# Patient Record
Sex: MALE | Race: WHITE | Employment: OTHER | URBAN - METROPOLITAN AREA
[De-identification: names, ages, dates, MRNs, and addresses within clinical notes are randomized per-mention and may not be internally consistent; named-entity substitution may affect disease eponyms.]

---

## 2017-03-30 ENCOUNTER — OFFICE VISIT (OUTPATIENT)
Dept: SURGERY | Age: 82
End: 2017-03-30

## 2017-03-30 VITALS
OXYGEN SATURATION: 96 % | HEART RATE: 61 BPM | DIASTOLIC BLOOD PRESSURE: 73 MMHG | BODY MASS INDEX: 29.21 KG/M2 | SYSTOLIC BLOOD PRESSURE: 132 MMHG | WEIGHT: 197.2 LBS | HEIGHT: 69 IN | RESPIRATION RATE: 18 BRPM

## 2017-03-30 DIAGNOSIS — K40.90 NON-RECURRENT UNILATERAL INGUINAL HERNIA WITHOUT OBSTRUCTION OR GANGRENE: Primary | ICD-10-CM

## 2017-03-30 NOTE — MR AVS SNAPSHOT
Visit Information Date & Time Provider Department Dept. Phone Encounter #  
 3/30/2017 12:00 PM Amada Bonner MD [facility] [id number] Upcoming Health Maintenance Date Due DTaP/Tdap/Td series (1 - Tdap) 3/13/1952 ZOSTER VACCINE AGE 60> 3/13/1991 GLAUCOMA SCREENING Q2Y 3/13/1996 MEDICARE YEARLY EXAM 3/13/1996 INFLUENZA AGE 9 TO ADULT 8/1/2016 Pneumococcal 65+ Low/Medium Risk (2 of 2 - PPSV23) 1/1/2019 Allergies as of 3/30/2017  Review Complete On: 3/30/2017 By: Corina Berrios No Known Allergies Current Immunizations  Reviewed on 4/9/2014 Name Date Pneumococcal Vaccine (Unspecified Type) 1/1/2014 Not reviewed this visit Vitals BP Pulse Resp Height(growth percentile) Weight(growth percentile) SpO2  
 132/73 (BP 1 Location: Left arm, BP Patient Position: Sitting) 61 18 5' 9\" (1.753 m) 197 lb 3.2 oz (89.4 kg) 96% BMI Smoking Status 29.12 kg/m2 Former Smoker BMI and BSA Data Body Mass Index Body Surface Area  
 29.12 kg/m 2 2.09 m 2 Your Updated Medication List  
  
   
This list is accurate as of: 3/30/17  3:08 PM.  Always use your most recent med list.  
  
  
  
  
 finasteride 5 mg tablet Commonly known as:  PROSCAR Take 5 mg by mouth daily (after dinner). furosemide 40 mg tablet Commonly known as:  LASIX Take 40 mg by mouth every morning. losartan 100 mg tablet Commonly known as:  COZAAR Take 100 mg by mouth two (2) times a day. NEXIUM PO Take 40 mg by mouth every morning. simvastatin 40 mg tablet Commonly known as:  ZOCOR Take 40 mg by mouth every morning. SYNTHROID 125 mcg tablet Generic drug:  levothyroxine Take 125 mcg by mouth Daily (before breakfast). tamsulosin 0.4 mg capsule Commonly known as:  FLOMAX Take 0.4 mg by mouth daily (after dinner). Introducing hospitals & HEALTH SERVICES! J[facility] Ingram introduces LiveLoop patient portal. Now you can access parts of your medical record, email your doctor's office, and request medication refills online. 1. In your internet browser, go to [email or web address] 2. Click on the First Time User? Click Here link in the Sign In box. You will see the New Member Sign Up page. 3. Enter your LiveLoop Access Code exactly as it appears below. You will not need to use this code after youve completed the sign-up process. If you do not sign up before the expiration date, you must request a new code. · LiveLoop Access Code: [email or web address] Expires: 6/28/2017 12:00 PM 
 
4. Enter the last four digits of your Social Security Number (xxxx) and Date of Birth (mm/dd/yyyy) as indicated and click Submit. You will be taken to the next sign-up page. 5. Create a LiveLoop ID. This will be your LiveLoop login ID and cannot be changed, so think of one that is secure and easy to remember. 6. Create a LiveLoop password. You can change your password at any time. 7. Enter your Password Reset Question and Answer. This can be used at a later time if you forget your password. 8. Enter your e-mail address. You will receive e-mail notification when new information is available in [address] 9. Click Sign Up. You can now view and download portions of your medical record. 10. Click the Download Summary menu link to download a portable copy of your medical information. If you have questions, please visit the Frequently Asked Questions section of the LiveLoop website. Remember, LiveLoop is NOT to be used for urgent needs. For medical emergencies, dial 911. Now available from your iPhone and Android! Please provide this summary of care documentation to your next provider. Your primary care clinician is listed as Rag[place]. If you have any questions after today's visit, please call [phone].

## 2017-04-17 ENCOUNTER — TELEPHONE (OUTPATIENT)
Dept: SURGERY | Age: 82
End: 2017-04-17

## 2017-04-17 NOTE — TELEPHONE ENCOUNTER
Patient has been waiting to hear from you regarding scheduling surgery.  He wants to know if you have spoken to his cardiologist.

## 2017-04-18 ENCOUNTER — TELEPHONE (OUTPATIENT)
Dept: SURGERY | Age: 82
End: 2017-04-18

## 2017-04-18 NOTE — TELEPHONE ENCOUNTER
Pt has not heard from Dr Alicia Noguera about Hernia Surg. Please get in touch with him today. They don't know what the holdup is.

## 2017-04-21 NOTE — TELEPHONE ENCOUNTER
Spoke to Primitivo Mark Huge surgery is scheduled for 5/5. Will get back with him next week with details. Also left message for his daughter.

## 2017-04-28 ENCOUNTER — TELEPHONE (OUTPATIENT)
Dept: SURGERY | Age: 82
End: 2017-04-28

## 2017-04-28 NOTE — PROGRESS NOTES
To: Fred Mcdaniel MD  From: Alejandra Armas MD    Thank you for sending Alejandro Root to see us. Encounter Date: 3/30/2017  History and Physical    Assessment:   Large right inguinal hernia. Comorbid BPH, CAD, xarelto use, GERD. Body mass index is 29.12 kg/(m^2). Plan:   Open repair. Will see if we can hold the Xarelto. Discussed possibility of incarceration, strangulation, increase in size of the hernia over time, and the risk of emergency surgery in the face of strangulation. Discussed techniques for reducing the hernia should it not reduce spontaneously. Knows to call immediately for incarceration. Also discussed the risk of surgery including recurrence, bleeding, hematoma, infection, difficulty voiding, chronic nerve pain, and the risks of general anesthetic. The patient expressed understanding of the risks and all questions were answered to the patient's satisfaction. HPI:   River Canseco is a 80 y.o. male who is seen in consultation at the request of Fred Mcdaniel MD for left inguinal hernia, right inguinal hernia. Symptoms were first noted several months ago. Pain is described as soreness only at times. Patient has a bulge. Bulge is reducible. Patient has urinary symptoms. Patient does not have difficulty with bowel movements. Patient does not have nausea or vomiting. Patient does not have history of previous hernia surgery. Patient does not have history of prior abdominal operations.       Past Medical History:   Diagnosis Date    Arrhythmia     A-fib    GERD (gastroesophageal reflux disease)     Hypertension     Other ill-defined conditions(799.89)     BPH    Other ill-defined conditions(799.89)     lipids    Other ill-defined conditions(799.89)     shingles hx    Thyroid disease     Unspecified adverse effect of anesthesia      Past Surgical History:   Procedure Laterality Date    HX CATARACT REMOVAL      HX HEART CATHETERIZATION      ablation    HX HEENT      scar tissue removed/laser    HX ORTHOPAEDIC      left foot fx--hardware    HX ORTHOPAEDIC  4/9/14    Right total knee arthroplasty    HX OTHER SURGICAL      prostate bx      Family History   Problem Relation Age of Onset    Heart Disease Mother     Hypertension Mother     Stroke Mother     Heart Disease Father     Cancer Paternal Grandmother       Social History   Substance Use Topics    Smoking status: Former Smoker     Packs/day: 0.50     Quit date: 3/28/1963    Smokeless tobacco: Never Used    Alcohol use Yes      Comment: rare/social-beer      Current Outpatient Prescriptions   Medication Sig    levothyroxine (SYNTHROID) 125 mcg tablet Take 125 mcg by mouth Daily (before breakfast).  ESOMEPRAZOLE MAGNESIUM (NEXIUM PO) Take 40 mg by mouth every morning.  furosemide (LASIX) 40 mg tablet Take 40 mg by mouth every morning.  losartan (COZAAR) 100 mg tablet Take 100 mg by mouth two (2) times a day.  finasteride (PROSCAR) 5 mg tablet Take 5 mg by mouth daily (after dinner).  tamsulosin (FLOMAX) 0.4 mg capsule Take 0.4 mg by mouth daily (after dinner).  simvastatin (ZOCOR) 40 mg tablet Take 40 mg by mouth every morning. No current facility-administered medications for this visit. Allergies:  No Known Allergies     Review of Systems:  10 systems reviewed. See scanned sheet in \"Media\" section. See HPI for pertinent positives and negatives. Objective:     Visit Vitals    /73 (BP 1 Location: Left arm, BP Patient Position: Sitting)    Pulse 61    Resp 18    Ht 5' 9\" (1.753 m)    Wt 89.4 kg (197 lb 3.2 oz)    SpO2 96%    BMI 29.12 kg/m2       Physical Exam:  General appearance  Alert, cooperative, no distress, appears stated age   HEENT Anicteric   Neck Supple   Back   No CVA tenderness   Lungs   Clear to auscultation bilaterally   Heart  Regular rate and rhythm. No murmur, rub or gallop   Abdomen   Soft.  Bowel sounds normal. No organomegaly. Large RIH.    Extremities no cyanosis or edema   Pulses 2+ right radial   Skin Skin color, texture, turgor normal.   Lymph nodes Inguinal nodes normal.   Neurologic Without overt sensory or motor deficit     Signed By: Jadyn Engel MD     April 28, 2017

## 2017-04-28 NOTE — TELEPHONE ENCOUNTER
He is having surgery on 5/5/17 and he hasn't heard from anyone as to what med's he should or should not take. Please call.

## 2017-04-28 NOTE — TELEPHONE ENCOUNTER
Spoke to Baldemar discussed surgery 5/5 times and instructed to stop xarelto 48 hrs prior. And all blood thinners. Will discuss again.

## 2017-05-01 NOTE — PERIOP NOTES
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 No longer symptoms and has since had neg nares culture (2014). Requested callback to verify receipt of message. Malu Daily called PAT and advised, orders/labs/ekg are to be faxed and that she will make Dr Adriana Mcgregor aware of verbal MRSA HX and call PAT back if he has any additional orders.

## 2017-05-01 NOTE — PERIOP NOTES
[facility]  Preoperative Instructions        Surgery Date 5/5/17          Time of Arrival 6:30am    1. On the day of your surgery, please report to the Surgical Services Registration Desk and sign in at your designated time. The Surgery Center is located to the right of the Emergency Room. 2. You must have someone with you to drive you home. You should not drive a car for 24 hours following surgery. Please make arrangements for a friend or family member to stay with you for the first 24 hours after your surgery. 3. Do not have anything to eat or drink (including water, gum, mints, coffee, juice) after midnight 5/4/17              . This may not apply to medications prescribed by your physician. Please note special instructions, if applicable. If you are currently taking Plavix, Coumadin, or other blood-thinning agents, contact your surgeon for instructions. 4. We recommend you do not drink any alcoholic beverages for 24 hours before and after your surgery. 5. Have a list of all current medications, including vitamins, herbal supplements and any other over the counter medications. Stop all Aspirin and non-steroidal anti-inflammatory drugs (I.e. Advil, Aleve), as directed by your surgeon's office. Stop all vitamins and herbal supplements seven days prior to your surgery. 6. Wear comfortable clothes. Wear glasses instead of contacts. Do not bring any money or jewelry. Please bring picture ID, insurance card, and any prearranged co-payment or hospital payment. Do not wear make-up, particularly mascara the morning of your surgery. Do not wear nail polish, particularly if you are having foot /hand surgery. Wear your hair loose or down, no ponytails, buns, shawn pins or clips. All body piercings must be removed.   Please shower with antibacterial soap for three consecutive days before and on the morning of surgery, but do not apply any lotions, powders or deodorants after the shower on the day of surgery. Please use a fresh towels after each shower. Please sleep in clean clothes and change bed linens the night before surgery. Please do not shave for 48 hours prior to surgery. Shaving of the face is acceptable. 7. You should understand that if you do not follow these instructions your surgery may be cancelled. If your physical condition changes (I.e. fever, cold or flu) please contact your surgeon as soon as possible. 8. It is important that you be on time. If a situation occurs where you may be late, please call [phone] (OR Holding Area). 9. If you have any questions and or problems, please call [phone] (Pre-admission Testing). 10. Your surgery time may be subject to change. You will receive a phone call the evening prior if your time changes. 11.  If having outpatient surgery, you must have someone to drive you here, stay with you during the duration of your stay, and to drive you home at time of discharge. Special Instructions:Pt stated he is to take last dose of Xarelto 5/2/17 for surgery    MEDICATIONS TO TAKE THE MORNING OF SURGERY WITH A SIP OF WATER:Synthroid, also may have Nexium if needed      I understand a pre-operative phone call will be made to verify my surgery time. In the event that I am not available, I give permission for a message to be left on my answering service and/or with another person?   Yes          ___________________      __________   _________    (Signature of Patient)             (Witness)                (Date and Time)

## 2017-05-05 ENCOUNTER — ANESTHESIA (OUTPATIENT)
Dept: SURGERY | Age: 82
DRG: 395 | End: 2017-05-05
Payer: MEDICARE

## 2017-05-05 ENCOUNTER — HOSPITAL ENCOUNTER (OUTPATIENT)
Age: 82
Setting detail: OUTPATIENT SURGERY
Discharge: HOME | DRG: 395 | End: 2017-05-05
Attending: SURGERY | Admitting: SURGERY
Payer: MEDICARE

## 2017-05-05 ENCOUNTER — ANESTHESIA EVENT (OUTPATIENT)
Dept: SURGERY | Age: 82
DRG: 395 | End: 2017-05-05
Payer: MEDICARE

## 2017-05-05 VITALS
SYSTOLIC BLOOD PRESSURE: 137 MMHG | BODY MASS INDEX: 27.93 KG/M2 | TEMPERATURE: 97.7 F | HEIGHT: 70 IN | DIASTOLIC BLOOD PRESSURE: 62 MMHG | HEART RATE: 65 BPM | OXYGEN SATURATION: 95 % | RESPIRATION RATE: 16 BRPM | WEIGHT: 195.11 LBS

## 2017-05-05 PROBLEM — K40.90 RIGHT INGUINAL HERNIA: Status: RESOLVED | Noted: 2017-05-05 | Resolved: 2017-05-05

## 2017-05-05 PROBLEM — K40.90 RIGHT INGUINAL HERNIA: Status: ACTIVE | Noted: 2017-05-05

## 2017-05-05 LAB
ANION GAP BLD CALC-SCNC: 19 MMOL/L (ref 5–15)
APPEARANCE UR: CLEAR
BACTERIA URNS QL MICRO: NEGATIVE /HPF
BILIRUB UR QL: NEGATIVE
BUN BLD-MCNC: 40 MG/DL (ref 9–20)
CA-I BLD-MCNC: 1.27 MMOL/L (ref 1.12–1.32)
CHLORIDE BLD-SCNC: 108 MMOL/L (ref 98–107)
CO2 BLD-SCNC: 22 MMOL/L (ref 21–32)
COLOR UR: NORMAL
CREAT BLD-MCNC: 1.6 MG/DL (ref 0.6–1.3)
EPITH CASTS URNS QL MICRO: NORMAL /LPF
GLUCOSE BLD-MCNC: 107 MG/DL (ref 65–100)
GLUCOSE UR STRIP.AUTO-MCNC: NEGATIVE MG/DL
HCT VFR BLD CALC: 41 % (ref 36.6–50.3)
HGB BLD-MCNC: 13.9 GM/DL (ref 12.1–17)
HGB UR QL STRIP: NEGATIVE
HYALINE CASTS URNS QL MICRO: NORMAL /LPF (ref 0–5)
KETONES UR QL STRIP.AUTO: NEGATIVE MG/DL
LEUKOCYTE ESTERASE UR QL STRIP.AUTO: NEGATIVE
NITRITE UR QL STRIP.AUTO: NEGATIVE
PH UR STRIP: 5.5 [PH] (ref 5–8)
POTASSIUM BLD-SCNC: 4.3 MMOL/L (ref 3.5–5.1)
PROT UR STRIP-MCNC: NEGATIVE MG/DL
RBC #/AREA URNS HPF: NORMAL /HPF (ref 0–5)
SERVICE CMNT-IMP: ABNORMAL
SODIUM BLD-SCNC: 143 MMOL/L (ref 136–145)
SP GR UR REFRACTOMETRY: 1.02 (ref 1–1.03)
UA: UC IF INDICATED,UAUC: NORMAL
UROBILINOGEN UR QL STRIP.AUTO: 0.2 EU/DL (ref 0.2–1)
WBC URNS QL MICRO: NORMAL /HPF (ref 0–4)

## 2017-05-05 PROCEDURE — 76210000006 HC OR PH I REC 0.5 TO 1 HR: Performed by: SURGERY

## 2017-05-05 PROCEDURE — 77030012890

## 2017-05-05 PROCEDURE — 88304 TISSUE EXAM BY PATHOLOGIST: CPT | Performed by: SURGERY

## 2017-05-05 PROCEDURE — 76060000033 HC ANESTHESIA 1 TO 1.5 HR: Performed by: SURGERY

## 2017-05-05 PROCEDURE — 77030031139 HC SUT VCRL2 J&J -A: Performed by: SURGERY

## 2017-05-05 PROCEDURE — 74011250636 HC RX REV CODE- 250/636

## 2017-05-05 PROCEDURE — 77030011640 HC PAD GRND REM COVD -A: Performed by: SURGERY

## 2017-05-05 PROCEDURE — 76010000149 HC OR TIME 1 TO 1.5 HR: Performed by: SURGERY

## 2017-05-05 PROCEDURE — C1781 MESH (IMPLANTABLE): HCPCS | Performed by: SURGERY

## 2017-05-05 PROCEDURE — 77030032490 HC SLV COMPR SCD KNE COVD -B: Performed by: SURGERY

## 2017-05-05 PROCEDURE — 74011000272 HC RX REV CODE- 272: Performed by: SURGERY

## 2017-05-05 PROCEDURE — 77030002966 HC SUT PDS J&J -A: Performed by: SURGERY

## 2017-05-05 PROCEDURE — 77030020143 HC AIRWY LARYN INTUB CGAS -A: Performed by: NURSE ANESTHETIST, CERTIFIED REGISTERED

## 2017-05-05 PROCEDURE — 81001 URINALYSIS AUTO W/SCOPE: CPT | Performed by: SURGERY

## 2017-05-05 PROCEDURE — 76210000021 HC REC RM PH II 0.5 TO 1 HR: Performed by: SURGERY

## 2017-05-05 PROCEDURE — 77030018673: Performed by: SURGERY

## 2017-05-05 PROCEDURE — 77030020782 HC GWN BAIR PAWS FLX 3M -B

## 2017-05-05 PROCEDURE — 74011000250 HC RX REV CODE- 250

## 2017-05-05 PROCEDURE — 77030003028 HC SUT VCRL J&J -A: Performed by: SURGERY

## 2017-05-05 PROCEDURE — 77030002933 HC SUT MCRYL J&J -A: Performed by: SURGERY

## 2017-05-05 PROCEDURE — 74011000250 HC RX REV CODE- 250: Performed by: SURGERY

## 2017-05-05 PROCEDURE — 74011250636 HC RX REV CODE- 250/636: Performed by: SURGERY

## 2017-05-05 PROCEDURE — 74011250637 HC RX REV CODE- 250/637

## 2017-05-05 PROCEDURE — 80047 BASIC METABLC PNL IONIZED CA: CPT

## 2017-05-05 PROCEDURE — 77030010507 HC ADH SKN DERMBND J&J -B: Performed by: SURGERY

## 2017-05-05 PROCEDURE — 77030012406 HC DRN WND PENRS BARD -A: Performed by: SURGERY

## 2017-05-05 PROCEDURE — 77030019908 HC STETH ESOPH SIMS -A: Performed by: NURSE ANESTHETIST, CERTIFIED REGISTERED

## 2017-05-05 PROCEDURE — 74011250636 HC RX REV CODE- 250/636: Performed by: ANESTHESIOLOGY

## 2017-05-05 DEVICE — MESH HERN W3XL4.75IN L PLA PRECUT FLAP STYL PARTIALLY ABSRB: Type: IMPLANTABLE DEVICE | Site: GROIN | Status: FUNCTIONAL

## 2017-05-05 RX ORDER — PROPOFOL 10 MG/ML
INJECTION, EMULSION INTRAVENOUS AS NEEDED
Status: DISCONTINUED | OUTPATIENT
Start: 2017-05-05 | End: 2017-05-05 | Stop reason: HOSPADM

## 2017-05-05 RX ORDER — SODIUM CHLORIDE, SODIUM LACTATE, POTASSIUM CHLORIDE, CALCIUM CHLORIDE 600; 310; 30; 20 MG/100ML; MG/100ML; MG/100ML; MG/100ML
25 INJECTION, SOLUTION INTRAVENOUS CONTINUOUS
Status: DISCONTINUED | OUTPATIENT
Start: 2017-05-05 | End: 2017-05-05 | Stop reason: HOSPADM

## 2017-05-05 RX ORDER — HYDROCODONE BITARTRATE AND ACETAMINOPHEN 5; 325 MG/1; MG/1
TABLET ORAL
Status: COMPLETED
Start: 2017-05-05 | End: 2017-05-05

## 2017-05-05 RX ORDER — SODIUM CHLORIDE 0.9 % (FLUSH) 0.9 %
5-10 SYRINGE (ML) INJECTION AS NEEDED
Status: DISCONTINUED | OUTPATIENT
Start: 2017-05-05 | End: 2017-05-05 | Stop reason: HOSPADM

## 2017-05-05 RX ORDER — FENTANYL CITRATE 50 UG/ML
25 INJECTION, SOLUTION INTRAMUSCULAR; INTRAVENOUS
Status: DISCONTINUED | OUTPATIENT
Start: 2017-05-05 | End: 2017-05-05 | Stop reason: HOSPADM

## 2017-05-05 RX ORDER — MIDAZOLAM HYDROCHLORIDE 1 MG/ML
INJECTION, SOLUTION INTRAMUSCULAR; INTRAVENOUS AS NEEDED
Status: DISCONTINUED | OUTPATIENT
Start: 2017-05-05 | End: 2017-05-05 | Stop reason: HOSPADM

## 2017-05-05 RX ORDER — LIDOCAINE HYDROCHLORIDE 10 MG/ML
0.1 INJECTION, SOLUTION EPIDURAL; INFILTRATION; INTRACAUDAL; PERINEURAL AS NEEDED
Status: DISCONTINUED | OUTPATIENT
Start: 2017-05-05 | End: 2017-05-05 | Stop reason: HOSPADM

## 2017-05-05 RX ORDER — HYDROMORPHONE HYDROCHLORIDE 1 MG/ML
0.2 INJECTION, SOLUTION INTRAMUSCULAR; INTRAVENOUS; SUBCUTANEOUS
Status: DISCONTINUED | OUTPATIENT
Start: 2017-05-05 | End: 2017-05-05 | Stop reason: HOSPADM

## 2017-05-05 RX ORDER — DOCUSATE SODIUM 100 MG/1
100 CAPSULE, LIQUID FILLED ORAL 2 TIMES DAILY
Qty: 60 CAP | Refills: 0 | Status: SHIPPED | OUTPATIENT
Start: 2017-05-05 | End: 2017-05-23 | Stop reason: ALTCHOICE

## 2017-05-05 RX ORDER — BUPIVACAINE HYDROCHLORIDE AND EPINEPHRINE 5; 5 MG/ML; UG/ML
INJECTION, SOLUTION EPIDURAL; INTRACAUDAL; PERINEURAL AS NEEDED
Status: DISCONTINUED | OUTPATIENT
Start: 2017-05-05 | End: 2017-05-05 | Stop reason: HOSPADM

## 2017-05-05 RX ORDER — DIPHENHYDRAMINE HYDROCHLORIDE 50 MG/ML
12.5 INJECTION, SOLUTION INTRAMUSCULAR; INTRAVENOUS AS NEEDED
Status: DISCONTINUED | OUTPATIENT
Start: 2017-05-05 | End: 2017-05-05 | Stop reason: HOSPADM

## 2017-05-05 RX ORDER — KETOROLAC TROMETHAMINE 30 MG/ML
INJECTION, SOLUTION INTRAMUSCULAR; INTRAVENOUS AS NEEDED
Status: DISCONTINUED | OUTPATIENT
Start: 2017-05-05 | End: 2017-05-05 | Stop reason: HOSPADM

## 2017-05-05 RX ORDER — HYDROCODONE BITARTRATE AND ACETAMINOPHEN 5; 325 MG/1; MG/1
1-2 TABLET ORAL
Qty: 40 TAB | Refills: 0 | Status: SHIPPED | OUTPATIENT
Start: 2017-05-05 | End: 2017-05-23 | Stop reason: ALTCHOICE

## 2017-05-05 RX ORDER — DEXAMETHASONE SODIUM PHOSPHATE 4 MG/ML
INJECTION, SOLUTION INTRA-ARTICULAR; INTRALESIONAL; INTRAMUSCULAR; INTRAVENOUS; SOFT TISSUE AS NEEDED
Status: DISCONTINUED | OUTPATIENT
Start: 2017-05-05 | End: 2017-05-05 | Stop reason: HOSPADM

## 2017-05-05 RX ORDER — ONDANSETRON 2 MG/ML
INJECTION INTRAMUSCULAR; INTRAVENOUS AS NEEDED
Status: DISCONTINUED | OUTPATIENT
Start: 2017-05-05 | End: 2017-05-05 | Stop reason: HOSPADM

## 2017-05-05 RX ORDER — SODIUM CHLORIDE 0.9 % (FLUSH) 0.9 %
5-10 SYRINGE (ML) INJECTION EVERY 8 HOURS
Status: DISCONTINUED | OUTPATIENT
Start: 2017-05-05 | End: 2017-05-05 | Stop reason: HOSPADM

## 2017-05-05 RX ORDER — HYDROCODONE BITARTRATE AND ACETAMINOPHEN 5; 325 MG/1; MG/1
1 TABLET ORAL ONCE
Status: COMPLETED | OUTPATIENT
Start: 2017-05-05 | End: 2017-05-05

## 2017-05-05 RX ORDER — LIDOCAINE HYDROCHLORIDE 20 MG/ML
INJECTION, SOLUTION EPIDURAL; INFILTRATION; INTRACAUDAL; PERINEURAL AS NEEDED
Status: DISCONTINUED | OUTPATIENT
Start: 2017-05-05 | End: 2017-05-05 | Stop reason: HOSPADM

## 2017-05-05 RX ORDER — ACETAMINOPHEN 10 MG/ML
INJECTION, SOLUTION INTRAVENOUS AS NEEDED
Status: DISCONTINUED | OUTPATIENT
Start: 2017-05-05 | End: 2017-05-05 | Stop reason: HOSPADM

## 2017-05-05 RX ORDER — FENTANYL CITRATE 50 UG/ML
INJECTION, SOLUTION INTRAMUSCULAR; INTRAVENOUS AS NEEDED
Status: DISCONTINUED | OUTPATIENT
Start: 2017-05-05 | End: 2017-05-05 | Stop reason: HOSPADM

## 2017-05-05 RX ORDER — VANCOMYCIN/0.9 % SOD CHLORIDE 1.5G/250ML
1500 PLASTIC BAG, INJECTION (ML) INTRAVENOUS ONCE
Status: COMPLETED | OUTPATIENT
Start: 2017-05-05 | End: 2017-05-05

## 2017-05-05 RX ADMIN — ONDANSETRON 4 MG: 2 INJECTION INTRAMUSCULAR; INTRAVENOUS at 09:13

## 2017-05-05 RX ADMIN — FENTANYL CITRATE 25 MCG: 50 INJECTION, SOLUTION INTRAMUSCULAR; INTRAVENOUS at 09:01

## 2017-05-05 RX ADMIN — LIDOCAINE HYDROCHLORIDE 50 MG: 20 INJECTION, SOLUTION EPIDURAL; INFILTRATION; INTRACAUDAL; PERINEURAL at 09:01

## 2017-05-05 RX ADMIN — FENTANYL CITRATE 25 MCG: 50 INJECTION, SOLUTION INTRAMUSCULAR; INTRAVENOUS at 10:31

## 2017-05-05 RX ADMIN — HYDROCODONE BITARTRATE AND ACETAMINOPHEN 1 TABLET: 5; 325 TABLET ORAL at 11:09

## 2017-05-05 RX ADMIN — PROPOFOL 40 MG: 10 INJECTION, EMULSION INTRAVENOUS at 09:12

## 2017-05-05 RX ADMIN — SODIUM CHLORIDE, SODIUM LACTATE, POTASSIUM CHLORIDE, AND CALCIUM CHLORIDE 25 ML/HR: 600; 310; 30; 20 INJECTION, SOLUTION INTRAVENOUS at 07:13

## 2017-05-05 RX ADMIN — FENTANYL CITRATE 25 MCG: 50 INJECTION, SOLUTION INTRAMUSCULAR; INTRAVENOUS at 10:26

## 2017-05-05 RX ADMIN — KETOROLAC TROMETHAMINE 15 MG: 30 INJECTION, SOLUTION INTRAMUSCULAR; INTRAVENOUS at 09:58

## 2017-05-05 RX ADMIN — FENTANYL CITRATE 25 MCG: 50 INJECTION, SOLUTION INTRAMUSCULAR; INTRAVENOUS at 09:11

## 2017-05-05 RX ADMIN — FENTANYL CITRATE 25 MCG: 50 INJECTION, SOLUTION INTRAMUSCULAR; INTRAVENOUS at 09:06

## 2017-05-05 RX ADMIN — MIDAZOLAM HYDROCHLORIDE 1 MG: 1 INJECTION, SOLUTION INTRAMUSCULAR; INTRAVENOUS at 08:53

## 2017-05-05 RX ADMIN — FENTANYL CITRATE 25 MCG: 50 INJECTION, SOLUTION INTRAMUSCULAR; INTRAVENOUS at 10:36

## 2017-05-05 RX ADMIN — ACETAMINOPHEN 1000 MG: 10 INJECTION, SOLUTION INTRAVENOUS at 09:05

## 2017-05-05 RX ADMIN — PROPOFOL 40 MG: 10 INJECTION, EMULSION INTRAVENOUS at 09:34

## 2017-05-05 RX ADMIN — PROPOFOL 120 MG: 10 INJECTION, EMULSION INTRAVENOUS at 09:01

## 2017-05-05 RX ADMIN — FENTANYL CITRATE 25 MCG: 50 INJECTION, SOLUTION INTRAMUSCULAR; INTRAVENOUS at 10:44

## 2017-05-05 RX ADMIN — FENTANYL CITRATE 25 MCG: 50 INJECTION, SOLUTION INTRAMUSCULAR; INTRAVENOUS at 09:23

## 2017-05-05 RX ADMIN — DEXAMETHASONE SODIUM PHOSPHATE 4 MG: 4 INJECTION, SOLUTION INTRA-ARTICULAR; INTRALESIONAL; INTRAMUSCULAR; INTRAVENOUS; SOFT TISSUE at 09:13

## 2017-05-05 RX ADMIN — VANCOMYCIN HYDROCHLORIDE 1500 MG: 10 INJECTION, POWDER, LYOPHILIZED, FOR SOLUTION INTRAVENOUS at 07:19

## 2017-05-05 NOTE — PERIOP NOTES
Handoff Report from Operating Room to PACU    Report received from ISAIAH Raphael RN and DAVI Granda CRNA regarding Song Jean. Surgeon(s):  Ilda Perez MD  And Procedure(s) (LRB):  OPEN RIGHT INGUINAL HERNIA REPAIR WITH MESH (Right)  confirmed   with allergies, dressings and local anesthetic discussed. Anesthesia type, drugs, patient history, complications, estimated blood loss, vital signs, intake and output, and last pain medication, lines and temperature were reviewed.

## 2017-05-05 NOTE — OP NOTES
DATE OF PROCEDURE:  5/5/2017     PREOPERATIVE DIAGNOSIS: Symptomatic right inguinal hernia. POSTOPERATIVE DIAGNOSIS: Reducible right sliding indirect inguinal hernia. OPERATIVE PROCEDURE: Open repair of right sliding inguinal hernia with mesh    SURGEON: Julia Moore MD     ANESTHESIA: General endotracheal.     ESTIMATED BLOOD LOSS: Minimal.     IMPLANTS:    Implant Name Type Inv. Item Serial No.  Lot No. LRB No. Used Action   MESH POLYSTR SLF- 12X8 RT -- PROGRIP - SN/A   MESH POLYSTR SLF- 12X8 RT -- PROGRIP N/A [facility] SURGICAL [id number] Right 1 Implanted       SPECIMENS:    ID Type Source Tests Collected by Time Destination   1 : Cord Lipoma and Hernia Sac Preservative Groin  Julia Moore MD 5/5/2017 8417 Pathology        INDICATIONS:  Symptomatic inguinal hernia. FINDINGS:  Indirect sliding inguinal hernia. DESCRIPTION OF PROCEDURE:  After obtaining informed consent, the patient was taken to the operating room and placed supine on the operating table. An operative time out was performed, and general endotracheal anesthesia was induced. Preoperative antibiotics were administered and the abdomen was prepped and draped in the usual sterile fashion. An MEDSEEK Hobby was employed. An incision was made over the inguinal ligament with a blade and taken down to the subcutaneous tissues using electrocautery. The inferior superficial epigastric vessels were divided between 3-0 Vicryl ties. The external oblique was identified and cleared of its areolar attachments. The external oblique was incised along its fibers at the level of the external ring. This was then elevated with Metzenbaum scissors and divided through the external ring. The cord structures were surrounded at the pubic tubercle with a 1/4-inch Penrose drain. The cremasterics were then  using blunt dissection and the cord structures were inspected. The hernia sac was noted and grasped.  It was dissected proximally to the internal ring. The remaining cord structures were identified and isolated. The hernia sac was then opened at the distal end with scissors and the sliding epiploic appendage was dissected off of the peritoneum and reduced. Under direct vision the sac was high ligated with a 2-0 Vicryl suture. The redundant sac was excised, and the peritoneum retracted back into the abdominal cavity. The Progrip mesh was then soaked in bacitracin and saline and made ready for implantation. It was set in place with 2-cm medial overlap of the pubic tubercle. The flap was then reapproximated around the cord. The mesh was then secured to the pubic tubercle and iliopubic tract with interrupted 2-0 PDS sutures. The operative field was then copiously irrigated with bacitracin and saline, and the external oblique aponeurosis was closed with a running 3-0 Vicryl suture. Again, the field was irrigated with bacitracin and saline, and Jazz's fascia was closed with a running 3-0 Vicryl. The deep dermal tissues were reapproximated with buried interrupted 3-0 Vicryl sutures, and the skin was then closed with a running subcuticular 4-0 Monocryl. The incision was dressed with Dermabond, and the patient was recovered from general anesthesia and taken to the recovery area in satisfactory condition. All instrument, sponge, and needle counts were reported as correct.

## 2017-05-05 NOTE — DISCHARGE INSTRUCTIONS
Discharge Instructions: Hernia -- Dr. Sushant Erazo    Call on next business day to arrange appointment for follow up in 3 weeks -- [phone]. Activity:  Walk regularly. No lifting more than 10 pounds for 6 weeks. Light aerobic activity is okay when you feel up to it. You may resume driving in three days unless still requiring narcotics for pain. Return to work in 1-2 weeks but no heavy lifting for 6 weeks. Apply ice to areas of tenderness for 20 minutes at a time. Keep a cloth between the skin and the bag of ice. Work:  You may return to work in 1 or 2 weeks to light activity. No lifting more than 10 pounds (=\"light duty\") for 6 weeks. If your employer can not accommodate \"light duty,\" your employer will need to provide any necessary paperwork to our office. This document with serve as the initial \"note\" to your employer. Diet:  You may resume normal diet. Wound Care:  Dermabond glue dressing will fall off on its own. If you experience a lot of drainage, develop redness around the wound, or a fever over 101 F occurs please call the office. There will be significant swelling under the incision(s) that will develop over the next 3-4 days. Ice will help reduce this. Male patients may experience swelling and bruising of the scrotum -- this is normal.  However, if the scrotum becomes tense and painful you should call. Wear a jock strap/athletic supporter for the next week to reduce scrotal swelling. If you can't urinate within 8 hours, call. Intentionally urinate every 2 hours today, even if you don't feel like you have to go. You may shower. No baths or swimming for 3 weeks. Medications:  See attached \"Medication Reconciliation. \"    Resume home medications as indicated on the Medical Reconciliation form. Do not use your Xarelto until Monday. PUT ICE ON YOUR INCISIONS 20 MINUTES EVERY HOUR WHILE THEY REMAIN SORE. PLACE A CLOTH BETWEEN YOUR SKIN THE THE ICE BAG.     A narcotic prescription will also be given for breakthrough pain. Tylenol can be used in place of the narcotic. Colace should be used to prevent constipation while on narcotics. If you are having diarrhea you can stop the colace. Narcotics and anesthesia sometimes cause nausea and vomiting. If persistent please call the office. Do not hesitate to call with questions or concerns. Reynaldo Stone MD  Tel [phone]  Fax [phone]  DISCHARGE SUMMARY from Nurse    The following personal items are in your possession at time of discharge:    Dental Appliances: Uppers  Visual Aid: Glasses     Home Medications: None  Jewelry: None  Clothing: Socks, Pants, Undergarments, Shirt, Footwear  Other Valuables: Other (comment), Eyeglasses (checkbook and glasses to daughter)  Personal Items Sent to Safe: declined          PATIENT INSTRUCTIONS:    After general anesthesia or intravenous sedation, for 24 hours or while taking prescription Narcotics:  · Limit your activities  · Do not drive and operate hazardous machinery  · Do not make important personal or business decisions  · Do  not drink alcoholic beverages  · If you have not urinated within 8 hours after discharge, please contact your surgeon on call. Report the following to your surgeon:  · Excessive pain, swelling, redness or odor of or around the surgical area  · Temperature over 100.5  · Nausea and vomiting lasting longer than 4 hours or if unable to take medications  · Any signs of decreased circulation or nerve impairment to extremity: change in color, persistent  numbness, tingling, coldness or increase pain  · Any questions        What to do at Home:   Please update this list whenever your medications are discontinued, doses are      changed, or new medications (including over-the-counter products) are added. A common side effect of anesthesia following surgery is nausea and/or vomiting.  In order to decrease symptoms, it is wise to avoid foods that are high in fat, greasy foods, milk products, and spicy foods for the first 24 hours. Acceptable foods for the first 24 hours following surgery include but are not limited to:     soup   broth    toast    crackers    applesauce    bananas    mashed potatoes,   soft or scrambled eggs   oatmeal    jello    It is important to eat when taking your pain medication. This will help to prevent nausea. If possible, please try to time your meals with your medications. It is very important to stay hydrated following surgery. Sip fluids frequently while awake. Avoid acidic drinks such as citrus juices and soda for 24 hours. Carbonated beverages may cause bloating and gas. Acceptable fluids include:    - water (flavor packets may add variety)  - coffee or tea (in moderation)  - Gatorade  - Hkoa-aid  - apple juice  - cranberry juice    You are encouraged to cough and deep breathe every hour when awake. This will help to prevent respiratory complications following anesthesia. You may want to hug a pillow when coughing and sneezing to add additional support to the surgical area and to decrease discomfort if you had abdominal or chest surgery. If you are discharged home with support stockings, you may remove them after 24 hours. Support stockings are used to help prevent blood clots in the legs following surgery. Please take time to review all of your Home Care Instructions and Medication Information sheets provided in your discharge packet. If you have any questions, please contact your surgeons office. Thank you. How to Care for Your Wound After Its Treated With  DERMABOND* Topical Skin Adhesive  DERMABOND* Topical Skin Adhesive (2-octyl cyanoacrylate) is a sterile, liquid skin adhesive  that holds wound edges together. The film will usually remain in place for 5 to 10 days, then  naturally fall off your skin.   The following will answer some of your questions and provide instructions for proper care for your  wound while it is healing:    CHECK WOUND APPEARANCE   Some swelling, redness, and pain are common with all wounds and normally will go away as the  wound heals. If swelling, redness, or pain increases or if the wound feels warm to the touch,  contact a doctor. Also contact a doctor if the wound edges reopen or separate. REPLACE BANDAGES   If your wound is bandaged, keep the bandage dry.  Replace the dressing daily until the adhesive film has fallen off or if the  bandage should become wet, unless otherwise instructed by your  physician.  When changing the dressing, do not place tape directly over the  DERMABOND adhesive film, because removing the tape later may also  remove the film. AVOID TOPICAL MEDICATIONS   Do not apply liquid or ointment medications or any other product to your wound while the  DERMABOND adhesive film is in place. These may loosen the film before your wound is healed. KEEP WOUND DRY AND PROTECTED   You may occasionally and briefly wet your wound in the shower or bath. Do not soak or scrub  your wound, do not swim, and avoid periods of heavy perspiration until the DERMABOND  adhesive has naturally fallen off. After showering or bathing, gently blot your wound dry with a  soft towel. If a protective dressing is being used, apply a fresh, dry bandage, being sure to keep  the tape off the DERMABOND adhesive film.  Apply a clean, dry bandage over the wound if necessary to protect it.  Protect your wound from injury until the skin has had sufficient time to heal.   Do not scratch, rub, or pick at the DERMABOND adhesive film. This may loosen the film before  your wound is healed.  Protect the wound from prolonged exposure to sunlight or tanning lamps while the film is in  place. If you have any questions or concerns about this product, please consult your doctor.   *Trademark ©[employer] 2002  *  Please carry medication information at all times in case of emergency situations. Hydrocodone/Acetaminophen (By mouth)   Acetaminophen (j-qgkr-k-MIN-oh-fen), Hydrocodone Bitartrate (ral-rres-WTJ-done bye-TAR-trate)  Treats pain. This medicine contains a narcotic pain reliever. Brand Name(s): Hycet, Lorcet, Lorcet HD, Lorcet Plus, Lortab 10/325, Lortab 5/325, Lortab 7.5/325, Lortab Elixir, Norco, Verdrocet, Vicodin, Vicodin ES, Vicodin HP, Xodol, Xodol 5/300   There may be other brand names for this medicine. When This Medicine Should Not Be Used: This medicine is not right for everyone. Do not use it if you had an allergic reaction to acetaminophen, hydrocodone, or other narcotic medicines, or stomach or bowel blockage (including paralytic ileus). How to Use This Medicine:   Capsule, Liquid, Tablet  · Your doctor will tell you how much medicine to use. Do not use more than directed. · An overdose can be dangerous. Follow directions carefully so you do not get too much medicine at one time. · Oral liquid: Measure the oral liquid medicine with a marked measuring spoon, oral syringe, or medicine cup. · Drink plenty of liquids to help avoid constipation. · This medicine should come with a Medication Guide. Ask your pharmacist for a copy if you do not have one. · Missed dose: Take a dose as soon as you remember. If it is almost time for your next dose, wait until then and take a regular dose. Do not take extra medicine to make up for a missed dose. · Store the medicine in a closed container at room temperature, away from heat, moisture, and direct light. Flush any unused Norco® tablets down the toilet. Drugs and Foods to Avoid:   Ask your doctor or pharmacist before using any other medicine, including over-the-counter medicines, vitamins, and herbal products. · Do not use this medicine if you are using or have used an MAO inhibitor within the past 14 days. · Some medicines can affect how hydrocodone/acetaminophen works.  Tell your doctor if you are using any of the following:   ¨ Carbamazepine, erythromycin, ketoconazole, mirtazapine, phenytoin, rifampin, ritonavir, tramadol, trazodone  ¨ Diuretic (water pill)  ¨ Medicine to treat depression or mental health problems  ¨ Medicine to treat migraine headaches  ¨ Phenothiazine medicine  · Tell your doctor if you use anything else that makes you sleepy. Some examples are allergy medicine, narcotic pain medicine, and alcohol. Tell your doctor if you are using buprenorphine, butorphanol, nalbuphine, pentazocine, or a muscle relaxer. · Do not drink alcohol while you are using this medicine. Acetaminophen can damage your liver, and your risk is higher if you also drink alcohol. Warnings While Using This Medicine:   · Tell your doctor if you are pregnant or breastfeeding, or if you have kidney disease, liver disease, lung or breathing problems, gallbladder or pancreas problems, an underactive thyroid, Coleman disease, prostate problems, trouble urinating, stomach problems, or a history of head injury or brain tumor, seizures, alcohol or drug addiction. · This medicine may cause the following problems:   ¨ High risk of overdose, which can lead to death  ¨ Respiratory depression (serious breathing problem that can be life-threatening)  ¨ Liver problems  ¨ Serious skin reactions  ¨ Serotonin syndrome (when used with certain medicines)  · This medicine can be habit-forming. Do not use more than your prescribed dose. Call your doctor if you think your medicine is not working. · This medicine may make you dizzy or drowsy. Do not drive or doing anything else that could be dangerous until you know how this medicine affects you. · This medicine contains acetaminophen. Read the labels of all other medicines you are using to see if they also contain acetaminophen, or ask your doctor or pharmacist. Gold Dhillon not use more than 4 grams (4,000 milligrams) total of acetaminophen in one day.   · Tell any doctor or dentist who treats you that you are using this medicine. This medicine may affect certain medical test results. · This medicine may cause constipation, especially with long-term use. Ask your doctor if you should use a laxative to prevent and treat constipation. · This medicine could cause infertility. Talk with your doctor before using this medicine if you plan to have children. · Keep all medicine out of the reach of children. Never share your medicine with anyone. Possible Side Effects While Using This Medicine:   Call your doctor right away if you notice any of these side effects:  · Allergic reaction: Itching or hives, swelling in your face or hands, swelling or tingling in your mouth or throat, chest tightness, trouble breathing  · Anxiety, restlessness, fast heartbeat, fever, sweating, muscle spasms, twitching, diarrhea, seeing or hearing things that are not there  · Blistering, peeling, red skin rash  · Blue lips, fingernails, or skin  · Dark urine or pale stools, loss of appetite, nausea or vomiting, stomach pain, yellow skin or eyes  · Extreme weakness, shallow breathing, slow heartbeat, sweating, seizures, cold or clammy skin  · Lightheadedness, dizziness, fainting  If you notice these less serious side effects, talk with your doctor:   · Constipation, nausea, vomiting  · Tiredness or sleepiness  If you notice other side effects that you think are caused by this medicine, tell your doctor. Call your doctor for medical advice about side effects. You may report side effects to FDA at 1-989-FDA-5553  © 2017 [address] Information is for End User's use only and may not be sold, redistributed or otherwise used for commercial purposes. The above information is an  only. It is not intended as medical advice for individual conditions or treatments. Talk to your doctor, nurse or pharmacist before following any medical regimen to see if it is safe and effective for you.         These are general instructions for a healthy lifestyle:    No smoking/ No tobacco products/ Avoid exposure to second hand smoke    Surgeon General's Warning:  Quitting smoking now greatly reduces serious risk to your health. Obesity, smoking, and sedentary lifestyle greatly increases your risk for illness    A healthy diet, regular physical exercise & weight monitoring are important for maintaining a healthy lifestyle    You may be retaining fluid if you have a history of heart failure or if you experience any of the following symptoms:  Weight gain of 3 pounds or more overnight or 5 pounds in a week, increased swelling in our hands or feet or shortness of breath while lying flat in bed. Please call your doctor as soon as you notice any of these symptoms; do not wait until your next office visit. Recognize signs and symptoms of STROKE:    F-face looks uneven    A-arms unable to move or move unevenly    S-speech slurred or non-existent    T-time-call 911 as soon as signs and symptoms begin-DO NOT go       Back to bed or wait to see if you get better-TIME IS BRAIN. Warning Signs of HEART ATTACK     Call 911 if you have these symptoms:   Chest discomfort. Most heart attacks involve discomfort in the center of the chest that lasts more than a few minutes, or that goes away and comes back. It can feel like uncomfortable pressure, squeezing, fullness, or pain.  Discomfort in other areas of the upper body. Symptoms can include pain or discomfort in one or both arms, the back, neck, jaw, or stomach.  Shortness of breath with or without chest discomfort.  Other signs may include breaking out in a cold sweat, nausea, or lightheadedness. Don't wait more than five minutes to call 911 - MINUTES MATTER! Fast action can save your life. Calling 911 is almost always the fastest way to get lifesaving treatment. Emergency Medical Services staff can begin treatment when they arrive -- up to an hour sooner than if someone gets to the hospital by car. The discharge information has been reviewed with the patient and caregiver. The patient and caregiver verbalized understanding. Discharge medications reviewed with the patient and caregiver and appropriate educational materials and side effects teaching were provided.

## 2017-05-05 NOTE — H&P
H&P    Assessment:    INGUINAL HERNIA RIGHT    Body mass index is 28.4 kg/(m^2). Plan:   OPEN RIGHT INGUINAL HERNIA REPAIR WITH MESH (Right Abdomen) Discussed the risk of surgery including bleeding, infection, injury to adjacent structures, and the risks of general anesthetic. The patient understands the risks; any and all questions were answered to the patient's satisfaction. Subjective:      Maddie Gallagher is a 80 y.o. male who presents for above procedure. See prior consult or office visit for details. Objective:   Blood pressure 143/56, pulse 60, temperature 98.2 °F (36.8 °C), resp. rate 18, height 5' 9.5\" (1.765 m), weight 88.5 kg (195 lb 1.7 oz), SpO2 96 %.   Temp (24hrs), Av.2 °F (36.8 °C), Min:98.2 °F (36.8 °C), Max:98.2 °F (36.8 °C)      Physical Exam:  PHYSICAL EXAM:    Chest:   [x]   CTA bialterally, no wheezing/rhonchi/rales/crackles    []   wheezing     []   rhonchi     []   crackles     []   use of accessory muscles    Heart:  [x]   regular rate and rhythm     [x]   No murmurs/rubs/gallops    []   irregular rhythm     []   Murmur     []   Rubs     []   Gallops    rih     Past Medical History:   Diagnosis Date    Arrhythmia     A-fib    GERD (gastroesophageal reflux disease)     Hypertension     Other ill-defined conditions     BPH    Other ill-defined conditions     lipids    Other ill-defined conditions     shingles hx    Thyroid disease     Unspecified adverse effect of anesthesia      Past Surgical History:   Procedure Laterality Date    HX CATARACT REMOVAL      HX HEART CATHETERIZATION      ablation    HX HEENT      scar tissue removed/laser    HX ORTHOPAEDIC      left foot fx--hardware    HX ORTHOPAEDIC  14    Right total knee arthroplasty    HX OTHER SURGICAL      prostate bx      Family History   Problem Relation Age of Onset    Heart Disease Mother     Hypertension Mother     Stroke Mother     Heart Disease Father     Cancer Paternal Grandmother Social History     Social History    Marital status:      Spouse name: N/A    Number of children: N/A    Years of education: N/A     Social History Main Topics    Smoking status: Former Smoker     Packs/day: 0.50     Quit date: 3/28/1963    Smokeless tobacco: Never Used    Alcohol use Yes      Comment: rare/social-beer    Drug use: Yes     Special: Prescription, OTC    Sexual activity: Not Asked     Other Topics Concern    None     Social History Narrative      Prior to Admission medications    Medication Sig Start Date End Date Taking? Authorizing Provider   docusate sodium (COLACE) 100 mg capsule Take 1 Cap by mouth two (2) times a day. May use OTC instead. Prevents constipation from narcotics. 5/5/17  Yes Kenzie Goodman MD   HYDROcodone-acetaminophen [facility]) 5-325 mg per tablet Take 1-2 Tabs by mouth every four (4) hours as needed for Pain. Max Daily Amount: 12 Tabs. 5/5/17  Yes Kenzie Goodman MD   rivaroxaban (XARELTO) 15 mg tab tablet Take 15 mg by mouth nightly. Yes Historical Provider   levothyroxine (SYNTHROID) 125 mcg tablet Take 125 mcg by mouth Daily (before breakfast). Yes Historical Provider   ESOMEPRAZOLE MAGNESIUM (NEXIUM PO) Take 40 mg by mouth every morning. Yes Historical Provider   furosemide (LASIX) 40 mg tablet Take 40 mg by mouth every morning. Yes Historical Provider   losartan (COZAAR) 100 mg tablet Take 100 mg by mouth daily. Yes Historical Provider   finasteride (PROSCAR) 5 mg tablet Take 5 mg by mouth daily (after dinner). Yes Historical Provider   tamsulosin (FLOMAX) 0.4 mg capsule Take 0.4 mg by mouth daily (after dinner). Yes Historical Provider   simvastatin (ZOCOR) 40 mg tablet Take 40 mg by mouth every morning. Yes Historical Provider     ALLERGIES:  No Known Allergies    Review of Systems:    See prior consult or office note. 10 systems negative except as specified.                 Signed By: Kenzie Goodman MD     May 5, 2017

## 2017-05-05 NOTE — IP AVS SNAPSHOT
[address] [facility] 
[phone] Patient: Yara Schafer MRN: [id number] PQU:[id number] You are allergic to the following No active allergies Recent Documentation Height Weight BMI Smoking Status 1.765 m 88.5 kg 28.4 kg/m2 Former Smoker Unresulted Labs Order Current Status CULTURE, MRSA In process Emergency Contacts Name Discharge Info Relation Home Work Mobile Anna Del Toro  Spouse [3] [phone] Bowen Del Toro  Child [2] [phone] About your hospitalization You were admitted on: May 5, 2017 You last received care in the:  [facility] You were discharged on: May 5, 2017 Unit phone number:  [phone] Why you were hospitalized Your primary diagnosis was:  Not on File Your diagnoses also included:  Right Inguinal Hernia Providers Seen During Your Hospitalizations Provider Role Specialty Primary office phone Sophie Lewis MD Attending Provider General Surgery [phone] Your Primary Care Physician (PCP) Primary Care Physician Office Phone Office Fax Yulissa Jonel [phone] Follow-up Information Follow up With Details Comments Contact Info MD Mario Villarreal [facility] 
[phone] Current Discharge Medication List  
  
START taking these medications Dose & Instructions Dispensing Information Comments Morning Noon Evening Bedtime  
 docusate sodium 100 mg capsule Commonly known as:  Major Rehman Your last dose was: Your next dose is:    
   
   
 Dose:  100 mg Take 1 Cap by mouth two (2) times a day. May use OTC instead. Prevents constipation from narcotics. Quantity:  60 Cap Refills:  0 HYDROcodone-acetaminophen 5-325 mg per tablet Commonly known as:  Elyn Barley Your last dose was: Your next dose is:    
   
   
 Dose:  1-2 Tab Take 1-2 Tabs by mouth every four (4) hours as needed for Pain. Max Daily Amount: 12 Tabs. Quantity:  40 Tab Refills:  0 CONTINUE these medications which have NOT CHANGED Dose & Instructions Dispensing Information Comments Morning Noon Evening Bedtime  
 finasteride 5 mg tablet Commonly known as:  PROSCAR Your last dose was: Your next dose is:    
   
   
 Dose:  5 mg Take 5 mg by mouth daily (after dinner). Refills:  0  
     
   
   
   
  
 furosemide 40 mg tablet Commonly known as:  LASIX Your last dose was: Your next dose is:    
   
   
 Dose:  40 mg Take 40 mg by mouth every morning. Refills:  0  
     
   
   
   
  
 losartan 100 mg tablet Commonly known as:  COZAAR Your last dose was: Your next dose is:    
   
   
 Dose:  100 mg Take 100 mg by mouth daily. Refills:  0 NEXIUM PO Your last dose was: Your next dose is:    
   
   
 Dose:  40 mg Take 40 mg by mouth every morning. Refills:  0  
     
   
   
   
  
 simvastatin 40 mg tablet Commonly known as:  ZOCOR Your last dose was: Your next dose is:    
   
   
 Dose:  40 mg Take 40 mg by mouth every morning. Refills:  0  
     
   
   
   
  
 SYNTHROID 125 mcg tablet Generic drug:  levothyroxine Your last dose was: Your next dose is:    
   
   
 Dose:  125 mcg Take 125 mcg by mouth Daily (before breakfast). Refills:  0  
     
   
   
   
  
 tamsulosin 0.4 mg capsule Commonly known as:  FLOMAX Your last dose was: Your next dose is:    
   
   
 Dose:  0.4 mg Take 0.4 mg by mouth daily (after dinner). Refills:  0 XARELTO 15 mg Tab tablet Generic drug:  rivaroxaban Your last dose was: Your next dose is:    
   
   
 Dose:  15 mg Take 15 mg by mouth nightly. Refills:  0 Where to Get Your Medications These medications were sent to [address] [place] [address], [address] [place] [address] Phone:  [phone]  
  docusate sodium 100 mg capsule Information on where to get these meds will be given to you by the nurse or doctor. ! Ask your nurse or doctor about these medications HYDROcodone-acetaminophen 5-325 mg per tablet Discharge Instructions Discharge Instructions: Hernia -- Dr. Rolon Face Call on next business day to arrange appointment for follow up in 3 weeks -- [phone]. Activity: 
Walk regularly. No lifting more than 10 pounds for 6 weeks. Light aerobic activity is okay when you feel up to it. You may resume driving in three days unless still requiring narcotics for pain. Return to work in 1-2 weeks but no heavy lifting for 6 weeks. Apply ice to areas of tenderness for 20 minutes at a time. Keep a cloth between the skin and the bag of ice. Work: 
You may return to work in 1 or 2 weeks to light activity. No lifting more than 10 pounds (=\"light duty\") for 6 weeks. If your employer can not accommodate \"light duty,\" your employer will need to provide any necessary paperwork to our office. This document with serve as the initial \"note\" to your employer. Diet: 
You may resume normal diet. Wound Care: 
Dermabond glue dressing will fall off on its own. If you experience a lot of drainage, develop redness around the wound, or a fever over 101 F occurs please call the office. There will be significant swelling under the incision(s) that will develop over the next 3-4 days. Ice will help reduce this.  
 
Male patients may experience swelling and bruising of the scrotum -- this is normal.  However, if the scrotum becomes tense and painful you should call. Wear a jock strap/athletic supporter for the next week to reduce scrotal swelling. If you can't urinate within 8 hours, call. Intentionally urinate every 2 hours today, even if you don't feel like you have to go. You may shower. No baths or swimming for 3 weeks. Medications: 
See attached \"Medication Reconciliation. \" 
 
Resume home medications as indicated on the Medical Reconciliation form. Do not use your Xarelto until Monday. PUT ICE ON YOUR INCISIONS 20 MINUTES EVERY HOUR WHILE THEY REMAIN SORE. PLACE A CLOTH BETWEEN YOUR SKIN THE THE ICE BAG. A narcotic prescription will also be given for breakthrough pain. Tylenol can be used in place of the narcotic. Colace should be used to prevent constipation while on narcotics. If you are having diarrhea you can stop the colace. Narcotics and anesthesia sometimes cause nausea and vomiting. If persistent please call the office. Do not hesitate to call with questions or concerns. Reynaldo Stone MD 
Tel [phone] Fax [phone] DISCHARGE SUMMARY from Nurse The following personal items are in your possession at time of discharge: 
 
Dental Appliances: Uppers Visual Aid: Glasses Home Medications: None Jewelry: None Clothing: Socks, Pants, Undergarments, Shirt, Footwear Other Valuables: Other (comment), Eyeglasses (checkbook and glasses to daughter) Personal Items Sent to Safe: declined PATIENT INSTRUCTIONS: 
 
After general anesthesia or intravenous sedation, for 24 hours or while taking prescription Narcotics: · Limit your activities · Do not drive and operate hazardous machinery · Do not make important personal or business decisions · Do  not drink alcoholic beverages · If you have not urinated within 8 hours after discharge, please contact your surgeon on call. Report the following to your surgeon: · Excessive pain, swelling, redness or odor of or around the surgical area · Temperature over 100.5 · Nausea and vomiting lasting longer than 4 hours or if unable to take medications · Any signs of decreased circulation or nerve impairment to extremity: change in color, persistent  numbness, tingling, coldness or increase pain · Any questions What to do at Home: 
 Please update this list whenever your medications are discontinued, doses are 
    changed, or new medications (including over-the-counter products) are added. A common side effect of anesthesia following surgery is nausea and/or vomiting. In order to decrease symptoms, it is wise to avoid foods that are high in fat, greasy foods, milk products, and spicy foods for the first 24 hours. Acceptable foods for the first 24 hours following surgery include but are not limited to: 
 
? soup 
? broth 
?  toast  
? crackers ? applesauce 
? bananas  
? mashed potatoes, 
? soft or scrambled eggs 
? oatmeal 
?  jello It is important to eat when taking your pain medication. This will help to prevent nausea. If possible, please try to time your meals with your medications. It is very important to stay hydrated following surgery. Sip fluids frequently while awake. Avoid acidic drinks such as citrus juices and soda for 24 hours. Carbonated beverages may cause bloating and gas. Acceptable fluids include: 
 
? water (flavor packets may add variety) ? coffee or tea (in moderation) ? Gatorade ? Eduar Fran ? apple juice 
? cranberry juice You are encouraged to cough and deep breathe every hour when awake. This will help to prevent respiratory complications following anesthesia. You may want to hug a pillow when coughing and sneezing to add additional support to the surgical area and to decrease discomfort if you had abdominal or chest surgery.  
 
If you are discharged home with support stockings, you may remove them after 24 hours. Support stockings are used to help prevent blood clots in the legs following surgery. Please take time to review all of your Home Care Instructions and Medication Information sheets provided in your discharge packet. If you have any questions, please contact your surgeons office. Thank you. How to Care for Your Wound After Its Treated With DERMABOND* Topical Skin Adhesive DERMABOND* Topical Skin Adhesive (2-octyl cyanoacrylate) is a sterile, liquid skin adhesive 
that holds wound edges together. The film will usually remain in place for 5 to 10 days, then 
naturally fall off your skin. The following will answer some of your questions and provide instructions for proper care for your 
wound while it is healing: CHECK WOUND APPEARANCE 
 Some swelling, redness, and pain are common with all wounds and normally will go away as the 
wound heals. If swelling, redness, or pain increases or if the wound feels warm to the touch, 
contact a doctor. Also contact a doctor if the wound edges reopen or separate. REPLACE BANDAGES 
 If your wound is bandaged, keep the bandage dry.  Replace the dressing daily until the adhesive film has fallen off or if the 
bandage should become wet, unless otherwise instructed by your 
physician.  When changing the dressing, do not place tape directly over the DERMABOND adhesive film, because removing the tape later may also 
remove the film. AVOID TOPICAL MEDICATIONS  Do not apply liquid or ointment medications or any other product to your wound while the DERMABOND adhesive film is in place. These may loosen the film before your wound is healed. KEEP WOUND DRY AND PROTECTED  You may occasionally and briefly wet your wound in the shower or bath. Do not soak or scrub 
your wound, do not swim, and avoid periods of heavy perspiration until the DERMABOND 
adhesive has naturally fallen off.  After showering or bathing, gently blot your wound dry with a 
soft towel. If a protective dressing is being used, apply a fresh, dry bandage, being sure to keep 
the tape off the DERMABOND adhesive film.  Apply a clean, dry bandage over the wound if necessary to protect it.  Protect your wound from injury until the skin has had sufficient time to heal. 
 Do not scratch, rub, or pick at the DERMABOND adhesive film. This may loosen the film before 
your wound is healed.  Protect the wound from prolonged exposure to sunlight or tanning lamps while the film is in 
place. If you have any questions or concerns about this product, please consult your doctor. *Trademark ©[employer] 2002 *  Please carry medication information at all times in case of emergency situations. Hydrocodone/Acetaminophen (By mouth) Acetaminophen (r-xwmt-i-MIN-oh-fen), Hydrocodone Bitartrate (vhn-dtbv-LNF-done bye-TAR-trate) Treats pain. This medicine contains a narcotic pain reliever. Brand Name(s): Hycet, Lorcet, Lorcet HD, Lorcet Plus, Lortab 10/325, Lortab 5/325, Lortab 7.5/325, Lortab Elixir, Norco, Verdrocet, Vicodin, Vicodin ES, Vicodin HP, Xodol, Xodol 5/300 There may be other brand names for this medicine. When This Medicine Should Not Be Used: This medicine is not right for everyone. Do not use it if you had an allergic reaction to acetaminophen, hydrocodone, or other narcotic medicines, or stomach or bowel blockage (including paralytic ileus). How to Use This Medicine:  
Capsule, Liquid, Tablet · Your doctor will tell you how much medicine to use. Do not use more than directed. · An overdose can be dangerous. Follow directions carefully so you do not get too much medicine at one time. · Oral liquid: Measure the oral liquid medicine with a marked measuring spoon, oral syringe, or medicine cup. · Drink plenty of liquids to help avoid constipation. · This medicine should come with a Medication Guide.  Ask your pharmacist for a copy if you do not have one. · Missed dose: Take a dose as soon as you remember. If it is almost time for your next dose, wait until then and take a regular dose. Do not take extra medicine to make up for a missed dose. · Store the medicine in a closed container at room temperature, away from heat, moisture, and direct light. Flush any unused Norco® tablets down the toilet. Drugs and Foods to Avoid: Ask your doctor or pharmacist before using any other medicine, including over-the-counter medicines, vitamins, and herbal products. · Do not use this medicine if you are using or have used an MAO inhibitor within the past 14 days. · Some medicines can affect how hydrocodone/acetaminophen works. Tell your doctor if you are using any of the following: ¨ Carbamazepine, erythromycin, ketoconazole, mirtazapine, phenytoin, rifampin, ritonavir, tramadol, trazodone ¨ Diuretic (water pill) ¨ Medicine to treat depression or mental health problems ¨ Medicine to treat migraine headaches ¨ Phenothiazine medicine · Tell your doctor if you use anything else that makes you sleepy. Some examples are allergy medicine, narcotic pain medicine, and alcohol. Tell your doctor if you are using buprenorphine, butorphanol, nalbuphine, pentazocine, or a muscle relaxer. · Do not drink alcohol while you are using this medicine. Acetaminophen can damage your liver, and your risk is higher if you also drink alcohol. Warnings While Using This Medicine: · Tell your doctor if you are pregnant or breastfeeding, or if you have kidney disease, liver disease, lung or breathing problems, gallbladder or pancreas problems, an underactive thyroid, Norberto disease, prostate problems, trouble urinating, stomach problems, or a history of head injury or brain tumor, seizures, alcohol or drug addiction. · This medicine may cause the following problems:  
¨ High risk of overdose, which can lead to death ¨ Respiratory depression (serious breathing problem that can be life-threatening) ¨ Liver problems ¨ Serious skin reactions ¨ Serotonin syndrome (when used with certain medicines) · This medicine can be habit-forming. Do not use more than your prescribed dose. Call your doctor if you think your medicine is not working. · This medicine may make you dizzy or drowsy. Do not drive or doing anything else that could be dangerous until you know how this medicine affects you. · This medicine contains acetaminophen. Read the labels of all other medicines you are using to see if they also contain acetaminophen, or ask your doctor or pharmacist. Baldomero Turcios not use more than 4 grams (4,000 milligrams) total of acetaminophen in one day. · Tell any doctor or dentist who treats you that you are using this medicine. This medicine may affect certain medical test results. · This medicine may cause constipation, especially with long-term use. Ask your doctor if you should use a laxative to prevent and treat constipation. · This medicine could cause infertility. Talk with your doctor before using this medicine if you plan to have children. · Keep all medicine out of the reach of children. Never share your medicine with anyone. Possible Side Effects While Using This Medicine:  
Call your doctor right away if you notice any of these side effects: · Allergic reaction: Itching or hives, swelling in your face or hands, swelling or tingling in your mouth or throat, chest tightness, trouble breathing · Anxiety, restlessness, fast heartbeat, fever, sweating, muscle spasms, twitching, diarrhea, seeing or hearing things that are not there · Blistering, peeling, red skin rash · Blue lips, fingernails, or skin · Dark urine or pale stools, loss of appetite, nausea or vomiting, stomach pain, yellow skin or eyes · Extreme weakness, shallow breathing, slow heartbeat, sweating, seizures, cold or clammy skin · Lightheadedness, dizziness, fainting If you notice these less serious side effects, talk with your doctor: · Constipation, nausea, vomiting · Tiredness or sleepiness If you notice other side effects that you think are caused by this medicine, tell your doctor. Call your doctor for medical advice about side effects. You may report side effects to FDA at 3-839-FDA-9020 © 2017 [address] Information is for End User's use only and may not be sold, redistributed or otherwise used for commercial purposes. The above information is an  only. It is not intended as medical advice for individual conditions or treatments. Talk to your doctor, nurse or pharmacist before following any medical regimen to see if it is safe and effective for you. These are general instructions for a healthy lifestyle: No smoking/ No tobacco products/ Avoid exposure to second hand smoke Surgeon General's Warning:  Quitting smoking now greatly reduces serious risk to your health. Obesity, smoking, and sedentary lifestyle greatly increases your risk for illness A healthy diet, regular physical exercise & weight monitoring are important for maintaining a healthy lifestyle You may be retaining fluid if you have a history of heart failure or if you experience any of the following symptoms:  Weight gain of 3 pounds or more overnight or 5 pounds in a week, increased swelling in our hands or feet or shortness of breath while lying flat in bed. Please call your doctor as soon as you notice any of these symptoms; do not wait until your next office visit. Recognize signs and symptoms of STROKE: 
 
F-face looks uneven A-arms unable to move or move unevenly S-speech slurred or non-existent T-time-call 911 as soon as signs and symptoms begin-DO NOT go Back to bed or wait to see if you get better-TIME IS BRAIN.  
 
Warning Signs of HEART ATTACK  
 
 Call 911 if you have these symptoms: 
? Chest discomfort. Most heart attacks involve discomfort in the center of the chest that lasts more than a few minutes, or that goes away and comes back. It can feel like uncomfortable pressure, squeezing, fullness, or pain. ? Discomfort in other areas of the upper body. Symptoms can include pain or discomfort in one or both arms, the back, neck, jaw, or stomach. ? Shortness of breath with or without chest discomfort. ? Other signs may include breaking out in a cold sweat, nausea, or lightheadedness. Don't wait more than five minutes to call [address]! Fast action can save your life. Calling 911 is almost always the fastest way to get lifesaving treatment. Emergency Medical Services staff can begin treatment when they arrive  up to an hour sooner than if someone gets to the hospital by car. The discharge information has been reviewed with the patient and caregiver. The patient and caregiver verbalized understanding. Discharge medications reviewed with the patient and caregiver and appropriate educational materials and side effects teaching were provided. Discharge Orders None Introducing [facility]! [facility] introduces [employer] patient portal. Now you can access parts of your medical record, email your doctor's office, and request medication refills online. 1. In your internet browser, go to [email or web address] 2. Click on the First Time User? Click Here link in the Sign In box. You will see the New Member Sign Up page. 3. Enter your [employer] Access Code exactly as it appears below. You will not need to use this code after youve completed the sign-up process. If you do not sign up before the expiration date, you must request a new code. · [employer] Access Code: [email or web address] Expires: 6/28/2017 12:00 PM 
 
4.  Enter the last four digits of your Social Security Number (xxxx) and Date of Birth (mm/dd/yyyy) as indicated and click Submit. You will be taken to the next sign-up page. 5. Create a Digital Perception ID. This will be your [employer] login ID and cannot be changed, so think of one that is secure and easy to remember. 6. Create a [employer] password. You can change your password at any time. 7. Enter your Password Reset Question and Answer. This can be used at a later time if you forget your password. 8. Enter your e-mail address. You will receive e-mail notification when new information is available in [address] 9. Click Sign Up. You can now view and download portions of your medical record. 10. Click the Download Summary menu link to download a portable copy of your medical information. If you have questions, please visit the Frequently Asked Questions section of the [employer] website. Remember, [employer]ception is NOT to be used for urgent needs. For medical emergencies, dial 911. Now available from your iPhone and Android! General Information Please provide this summary of care documentation to your next provider. Patient Signature:  ____________________________________________________________ Date:  ____________________________________________________________  
  
Zully Byrne Provider Signature:  ____________________________________________________________ Date:  ____________________________________________________________

## 2017-05-05 NOTE — PERIOP NOTES
Daughter in [address] notified of patients stable condition & plan for discharge to home in the next hour if patient is able to void.

## 2017-05-05 NOTE — ANESTHESIA POSTPROCEDURE EVALUATION
Post-Anesthesia Evaluation and Assessment    Patient: Wayland Fabry MRN: [id number]  SSN: [id number]    YOB: 1931  Age: 80 y.o. Sex: male       Cardiovascular Function/Vital Signs  Visit Vitals    /55    Pulse 66    Temp 36.7 °C (98 °F)    Resp 17    Ht 5' 9.5\" (1.765 m)    Wt 88.5 kg (195 lb 1.7 oz)    SpO2 94%    BMI 28.4 kg/m2       Patient is status post general anesthesia for Procedure(s):  OPEN RIGHT INGUINAL HERNIA REPAIR WITH MESH. Nausea/Vomiting: None    Postoperative hydration reviewed and adequate. Pain:  Pain Scale 1: Numeric (0 - 10) (05/05/17 1025)  Pain Intensity 1: 5 (05/05/17 1025)   Managed    Neurological Status:   Neuro (WDL): Exceptions to WDL (05/05/17 1013)  Neuro  Neurologic State: Drowsy; Eyes open to voice (Mildly stuporous) (05/05/17 1013)  Orientation Level: Oriented to person;Oriented to situation (05/05/17 1013)  Cognition: Appropriate for age attention/concentration; Follows commands (05/05/17 1013)  Speech: Delayed responses; Appropriate for age (05/05/17 1013)  LUE Motor Response: Purposeful (05/05/17 1013)  LLE Motor Response: Purposeful (05/05/17 1013)  RUE Motor Response: Purposeful (05/05/17 1013)  RLE Motor Response: Purposeful (05/05/17 1013)   At baseline    Mental Status and Level of Consciousness: Arousable    Pulmonary Status:   O2 Device: Nasal cannula (05/05/17 1045)   Adequate oxygenation and airway patent    Complications related to anesthesia: None    Post-anesthesia assessment completed.  No concerns    Signed By: Nakia Banda MD     May 5, 2017

## 2017-05-05 NOTE — ANESTHESIA PREPROCEDURE EVALUATION
Anesthetic History   No history of anesthetic complications            Review of Systems / Medical History  Patient summary reviewed, nursing notes reviewed and pertinent labs reviewed    Pulmonary  Within defined limits                 Neuro/Psych   Within defined limits           Cardiovascular    Hypertension: well controlled        Dysrhythmias       Exercise tolerance: >4 METS     GI/Hepatic/Renal     GERD: well controlled           Endo/Other      Hypothyroidism: well controlled       Other Findings              Physical Exam    Airway  Mallampati: II  TM Distance: > 6 cm  Neck ROM: normal range of motion   Mouth opening: Normal     Cardiovascular  Regular rate and rhythm,  S1 and S2 normal,  no murmur, click, rub, or gallop             Dental    Dentition: Lower partial plate and Full upper dentures     Pulmonary  Breath sounds clear to auscultation               Abdominal  GI exam deferred       Other Findings            Anesthetic Plan    ASA: 2  Anesthesia type: general          Induction: Intravenous  Anesthetic plan and risks discussed with: Patient

## 2017-05-06 LAB
BACTERIA SPEC CULT: NORMAL
BACTERIA SPEC CULT: NORMAL
SERVICE CMNT-IMP: NORMAL

## 2017-05-23 ENCOUNTER — OFFICE VISIT (OUTPATIENT)
Dept: SURGERY | Age: 82
End: 2017-05-23

## 2017-05-23 VITALS
BODY MASS INDEX: 27.72 KG/M2 | OXYGEN SATURATION: 98 % | HEART RATE: 92 BPM | SYSTOLIC BLOOD PRESSURE: 91 MMHG | WEIGHT: 193.6 LBS | DIASTOLIC BLOOD PRESSURE: 52 MMHG | RESPIRATION RATE: 16 BRPM | HEIGHT: 70 IN

## 2017-05-23 DIAGNOSIS — Z09 POSTOPERATIVE EXAMINATION: Primary | ICD-10-CM

## 2017-05-23 NOTE — PROGRESS NOTES
To: Richard Childers MD  From: Michael Melissa MD    Thank you for referring Diane Perez. He looks great. Encounter Date: 5/23/2017    Subjective:      Freddie Ricardo is a 80 y.o. male presents for postop care. Has no complaints today. Want to do more int he yard. Eating a regular diet without difficulty. Bowel movements are regular. Objective:     General:  alert, cooperative, no distress, appears stated age   Abdomen: soft, bowel sounds active, non-tender   Incision(s):  healing well, no drainage, no erythema, repair intact with vigorous valsalva. Assessment:     S/p sliding right inguinal hernia repair. Doing well postoperatively. Plan:     Reminded of activity restrictions documented on discharge instructions. Told him he can do everything besides heavy lifting. Patient understands no further follow-up required. Knows to call for any concerns.       Michael Melissa MD

## 2017-05-23 NOTE — MR AVS SNAPSHOT
Visit Information Date & Time Provider Department Dept. Phone Encounter #  
 5/23/2017  2:00 PM Aman Morales MD [facility] [id number] Upcoming Health Maintenance Date Due DTaP/Tdap/Td series (1 - Tdap) 3/13/1952 ZOSTER VACCINE AGE 60> 3/13/1991 GLAUCOMA SCREENING Q2Y 3/13/1996 MEDICARE YEARLY EXAM 3/13/1996 INFLUENZA AGE 9 TO ADULT 8/1/2017 Pneumococcal 65+ Low/Medium Risk (2 of 2 - PPSV23) 1/1/2019 Allergies as of 5/23/2017  Review Complete On: 5/23/2017 By: Aman Morales MD  
 No Known Allergies Current Immunizations  Reviewed on 4/9/2014 Name Date Pneumococcal Vaccine (Unspecified Type) 1/1/2014 Not reviewed this visit You Were Diagnosed With   
  
 Codes Comments Postoperative examination    -  Primary ICD-10-CM: B07 ICD-9-CM: V67.00 Vitals BP Pulse Resp Height(growth percentile) Weight(growth percentile) SpO2  
 91/52 (BP 1 Location: Left arm, BP Patient Position: Sitting) 92 16 5' 9.5\" (1.765 m) 193 lb 9.6 oz (87.8 kg) 98% BMI Smoking Status 28.18 kg/m2 Former Smoker BMI and BSA Data Body Mass Index Body Surface Area  
 28.18 kg/m 2 2.07 m 2 Preferred Pharmacy Pharmacy Name Phone [facility] [address] [phone] Your Updated Medication List  
  
   
This list is accurate as of: 5/23/17 11:59 PM.  Always use your most recent med list.  
  
  
  
  
 finasteride 5 mg tablet Commonly known as:  PROSCAR Take 5 mg by mouth daily (after dinner). furosemide 40 mg tablet Commonly known as:  LASIX Take 40 mg by mouth every morning. losartan 100 mg tablet Commonly known as:  COZAAR Take 100 mg by mouth daily. NEXIUM PO Take 40 mg by mouth every morning. simvastatin 40 mg tablet Commonly known as:  ZOCOR Take 40 mg by mouth every morning. SYNTHROID 125 mcg tablet Generic drug:  levothyroxine Take 125 mcg by mouth Daily (before breakfast). tamsulosin 0.4 mg capsule Commonly known as:  FLOMAX Take 0.4 mg by mouth daily (after dinner). XARELTO 15 mg Tab tablet Generic drug:  rivaroxaban Take 15 mg by mouth nightly. Introducing [facility]! [employer] introduces Nepris patient portal. Now you can access parts of your medical record, email your doctor's office, and request medication refills online. 1. In your internet browser, go to [email or web address] 2. Click on the First Time User? Click Here link in the Sign In box. You will see the New Member Sign Up page. 3. Enter your Nepris Access Code exactly as it appears below. You will not need to use this code after youve completed the sign-up process. If you do not sign up before the expiration date, you must request a new code. · Nepris Access Code: [email or web address] Expires: 6/28/2017 12:00 PM 
 
4. Enter the last four digits of your Social Security Number (xxxx) and Date of Birth (mm/dd/yyyy) as indicated and click Submit. You will be taken to the next sign-up page. 5. Create a Nepris ID. This will be your Nepris login ID and cannot be changed, so think of one that is secure and easy to remember. 6. Create a Nepris password. You can change your password at any time. 7. Enter your Password Reset Question and Answer. This can be used at a later time if you forget your password. 8. Enter your e-mail address. You will receive e-mail notification when new information is available in [address] 9. Click Sign Up. You can now view and download portions of your medical record. 10. Click the Download Summary menu link to download a portable copy of your medical information. If you have questions, please visit the Frequently Asked Questions section of the Nepris website.  Remember, Nepris is NOT to be used for urgent needs. For medical emergencies, dial 911. Now available from your iPhone and Android! Please provide this summary of care documentation to your next provider. Your primary care clinician is listed as Raghu. If you have any questions after today's visit, please call [phone].

## 2017-08-21 PROBLEM — J44.9 COPD (CHRONIC OBSTRUCTIVE PULMONARY DISEASE) (HCC): Status: ACTIVE | Noted: 2017-08-21

## 2017-08-21 PROBLEM — M25.50 JOINT PAIN: Status: ACTIVE | Noted: 2017-08-21

## 2017-08-21 PROBLEM — E87.5 HYPERKALEMIA: Status: ACTIVE | Noted: 2017-08-21

## 2017-08-21 PROBLEM — B02.9 SHINGLES: Status: ACTIVE | Noted: 2017-08-21

## 2017-08-21 PROBLEM — H83.09 LABYRINTHITIS: Status: ACTIVE | Noted: 2017-08-21

## 2017-08-21 PROBLEM — C44.91 CANCER OF THE SKIN, BASAL CELL: Status: ACTIVE | Noted: 2017-08-21

## 2017-08-21 PROBLEM — R97.20 ELEVATED PROSTATE SPECIFIC ANTIGEN (PSA): Status: ACTIVE | Noted: 2017-08-21

## 2017-08-21 PROBLEM — E03.9 HYPOTHYROID: Status: ACTIVE | Noted: 2017-08-21

## 2017-08-21 PROBLEM — M10.9 GOUT: Status: ACTIVE | Noted: 2017-08-21

## 2017-08-21 PROBLEM — K21.9 GERD (GASTROESOPHAGEAL REFLUX DISEASE): Status: ACTIVE | Noted: 2017-08-21

## 2017-08-21 PROBLEM — I48.91 ATRIAL FIBRILLATION (HCC): Status: ACTIVE | Noted: 2017-08-21

## 2017-08-21 PROBLEM — I25.10 ASCVD (ARTERIOSCLEROTIC CARDIOVASCULAR DISEASE): Status: ACTIVE | Noted: 2017-08-21

## 2017-08-21 PROBLEM — E78.5 HYPERLIPIDEMIA: Status: ACTIVE | Noted: 2017-08-21

## 2017-08-21 PROBLEM — N18.9 CKD (CHRONIC KIDNEY DISEASE): Status: ACTIVE | Noted: 2017-08-21

## 2017-08-21 PROBLEM — I12.9 HYPERTENSION WITH RENAL DISEASE: Status: ACTIVE | Noted: 2017-08-21

## 2017-08-21 PROBLEM — R73.02 GLUCOSE INTOLERANCE (IMPAIRED GLUCOSE TOLERANCE): Status: ACTIVE | Noted: 2017-08-21

## 2017-08-21 PROBLEM — N40.0 BPH (BENIGN PROSTATIC HYPERPLASIA): Status: ACTIVE | Noted: 2017-08-21

## 2017-08-21 PROBLEM — R06.00 DYSPNEA: Status: ACTIVE | Noted: 2017-08-21

## 2017-08-21 PROBLEM — Z79.899 ON STATIN THERAPY: Status: ACTIVE | Noted: 2017-08-21

## 2017-08-21 RX ORDER — NAPROXEN 500 MG/1
500 TABLET ORAL 2 TIMES DAILY WITH MEALS
COMMUNITY
End: 2018-04-11 | Stop reason: ALTCHOICE

## 2017-08-21 RX ORDER — ALLOPURINOL 100 MG/1
TABLET ORAL DAILY
COMMUNITY
End: 2018-06-18 | Stop reason: SDUPTHER

## 2017-08-21 RX ORDER — LEVOTHYROXINE SODIUM 137 UG/1
TABLET ORAL
COMMUNITY
End: 2018-03-19 | Stop reason: ALTCHOICE

## 2017-09-12 ENCOUNTER — OFFICE VISIT (OUTPATIENT)
Dept: INTERNAL MEDICINE CLINIC | Age: 82
End: 2017-09-12

## 2017-09-12 VITALS
OXYGEN SATURATION: 94 % | RESPIRATION RATE: 20 BRPM | DIASTOLIC BLOOD PRESSURE: 60 MMHG | HEIGHT: 70 IN | BODY MASS INDEX: 28.92 KG/M2 | HEART RATE: 52 BPM | SYSTOLIC BLOOD PRESSURE: 136 MMHG | WEIGHT: 202 LBS

## 2017-09-12 DIAGNOSIS — N18.3 CKD (CHRONIC KIDNEY DISEASE), STAGE 3 (MODERATE): ICD-10-CM

## 2017-09-12 DIAGNOSIS — R29.6 FREQUENT FALLS: ICD-10-CM

## 2017-09-12 DIAGNOSIS — K21.9 GASTROESOPHAGEAL REFLUX DISEASE WITHOUT ESOPHAGITIS: ICD-10-CM

## 2017-09-12 DIAGNOSIS — I12.9 HYPERTENSION WITH RENAL DISEASE: Primary | ICD-10-CM

## 2017-09-12 DIAGNOSIS — E78.2 MIXED HYPERLIPIDEMIA: ICD-10-CM

## 2017-09-12 DIAGNOSIS — I25.10 ASCVD (ARTERIOSCLEROTIC CARDIOVASCULAR DISEASE): ICD-10-CM

## 2017-09-12 DIAGNOSIS — I48.0 PAROXYSMAL ATRIAL FIBRILLATION (HCC): ICD-10-CM

## 2017-09-12 DIAGNOSIS — J44.9 CHRONIC OBSTRUCTIVE PULMONARY DISEASE, UNSPECIFIED COPD TYPE (HCC): ICD-10-CM

## 2017-09-12 DIAGNOSIS — R73.02 GLUCOSE INTOLERANCE (IMPAIRED GLUCOSE TOLERANCE): ICD-10-CM

## 2017-09-12 DIAGNOSIS — Z00.00 MEDICARE ANNUAL WELLNESS VISIT, INITIAL: ICD-10-CM

## 2017-09-12 PROBLEM — M15.9 PRIMARY OSTEOARTHRITIS INVOLVING MULTIPLE JOINTS: Status: ACTIVE | Noted: 2017-09-12

## 2017-09-12 RX ORDER — SIMVASTATIN 40 MG/1
40 TABLET, FILM COATED ORAL
Qty: 90 TAB | Refills: 3 | Status: SHIPPED | OUTPATIENT
Start: 2017-09-12 | End: 2018-01-01 | Stop reason: SDUPTHER

## 2017-09-12 RX ORDER — FUROSEMIDE 40 MG/1
TABLET ORAL
Qty: 90 TAB | Refills: 3 | Status: CANCELLED | OUTPATIENT
Start: 2017-09-12

## 2017-09-12 RX ORDER — FUROSEMIDE 40 MG/1
TABLET ORAL
Qty: 90 TAB | Refills: 3 | Status: SHIPPED | OUTPATIENT
Start: 2017-09-12 | End: 2019-01-01

## 2017-09-12 RX ORDER — FINASTERIDE 5 MG/1
5 TABLET, FILM COATED ORAL
Qty: 90 TAB | Refills: 3 | Status: SHIPPED | OUTPATIENT
Start: 2017-09-12 | End: 2018-01-01 | Stop reason: SDUPTHER

## 2017-09-12 NOTE — PROGRESS NOTES
1. Have you been to the ER, urgent care clinic since your last visit? Hospitalized since your last visit? No    2. Have you seen or consulted any other health care providers outside of the [address] [place] [address] since your last visit? Include any pap smears or colon screening. No    3 month follow up    Episodes of diarrhea off and on. Several days where he spit up discolored mucus. Discomfort with swallowing for about 6 weeks.

## 2017-09-12 NOTE — PROGRESS NOTES
This is an Initial Medicare Annual Wellness Exam (AWV) (Performed 12 months after IPPE or effective date of Medicare Part B enrollment, Once in a lifetime)    I have reviewed the patient's medical history in detail and updated the computerized patient record. Today for his initial annual Medicare wellness examination. He is also here in follow-up of his medical problems to include hypertension, glucose intolerance, hyperlipidemia, ASCVD, history of atrial fibrillation, BPH, chronic kidney disease, COPD, DJD, GERD, and gout. He is taking his medications and following his diet. He is trying to maintain a lot of activity and keep himself physically fit. He denies any chest pain but has some chronic dyspnea without change in there are no other cardiorespiratory complaints. There are no GI/ complaints. He has no headaches or neurologic complaints. He denies any other complaints on complete review of systems. History     Past Medical History:   Diagnosis Date    Arrhythmia     A-fib    GERD (gastroesophageal reflux disease)     Hypertension     Other ill-defined conditions     BPH    Other ill-defined conditions     lipids    Other ill-defined conditions     shingles hx    Thyroid disease     Unspecified adverse effect of anesthesia       Past Surgical History:   Procedure Laterality Date    HX CATARACT REMOVAL      HX HEART CATHETERIZATION      ablation    HX HEENT      scar tissue removed/laser    HX HERNIA REPAIR  05/05/2017    Sari Womack MD    HX ORTHOPAEDIC      left foot fx--hardware    HX ORTHOPAEDIC  4/9/14    Right total knee arthroplasty    HX OTHER SURGICAL      prostate bx     Current Outpatient Prescriptions   Medication Sig Dispense Refill    furosemide (LASIX) 40 mg tablet 1 daily 90 Tab 3    dilTIAZem ER (CARDIZEM LA) 120 mg tablet Take 1 Tab by mouth daily. 90 Tab 3    simvastatin (ZOCOR) 40 mg tablet Take 1 Tab by mouth nightly.  90 Tab 3    finasteride (PROSCAR) 5 mg tablet Take 1 Tab by mouth daily (after dinner). 90 Tab 3    levothyroxine (SYNTHROID) 137 mcg tablet Take  by mouth Daily (before breakfast).  naproxen (NAPROSYN) 500 mg tablet Take 500 mg by mouth two (2) times daily (with meals).  allopurinol (ZYLOPRIM) 100 mg tablet Take  by mouth daily.  DOCOSAHEXANOIC ACID/EPA (FISH OIL PO) Take  by mouth.  rivaroxaban (XARELTO) 15 mg tab tablet Take 15 mg by mouth nightly.  ESOMEPRAZOLE MAGNESIUM (NEXIUM PO) Take 40 mg by mouth every morning.  losartan (COZAAR) 100 mg tablet Take 100 mg by mouth daily.  tamsulosin (FLOMAX) 0.4 mg capsule Take 0.4 mg by mouth.  Take 2 tablets daily after dinner       No Known Allergies  Family History   Problem Relation Age of Onset    Heart Disease Mother     Hypertension Mother     Stroke Mother     Heart Disease Father     Cancer Paternal Grandmother      Social History   Substance Use Topics    Smoking status: Former Smoker     Packs/day: 0.50     Quit date: 3/28/1963    Smokeless tobacco: Never Used    Alcohol use Yes      Comment: rare/social-beer     Patient Active Problem List   Diagnosis Code    Cancer of the skin, basal cell C44.91    Elevated prostate specific antigen (PSA) R97.20    Hyperkalemia E87.5    Shingles B02.9    On statin therapy Z79.899    Labyrinthitis H83.09    Hypothyroid E03.9    Hypertension with renal disease I12.9    Hyperlipidemia E78.5    Gout M10.9    Glucose intolerance (impaired glucose tolerance) R73.02    GERD (gastroesophageal reflux disease) K21.9    COPD (chronic obstructive pulmonary disease) (HCC) J44.9    CKD (chronic kidney disease) N18.9    BPH (benign prostatic hyperplasia) N40.0    Atrial fibrillation (HCC) I48.91    ASCVD (arteriosclerotic cardiovascular disease) I25.10    Frequent falls R29.6    Medicare annual wellness visit, initial Z00.00    Primary osteoarthritis involving multiple joints M15.0       Depression Risk Factor Screening:     PHQ over the last two weeks 9/12/2017   Little interest or pleasure in doing things Not at all   Feeling down, depressed or hopeless Not at all   Total Score PHQ 2 0     Alcohol Risk Factor Screening: You do not drink alcohol or very rarely. Functional Ability and Level of Safety:     Hearing Loss  Hearing is good. Activities of Daily Living  The home contains: no safety equipment  Patient does total self care    Fall RiskFall Risk Assessment, last 12 mths 9/12/2017   Able to walk? Yes   Fall in past 12 months? Yes   Fall with injury? No   Number of falls in past 12 months 6   Fall Risk Score 6       Abuse Screen  Patient is not abused    Cognitive Screening   Evaluation of Cognitive Function:  Has your family/caregiver stated any concerns about your memory: no  Normal     ROS:    Constitutional: He denies fevers, weight loss, sweats. Eyes: No blurred or double vision. ENT: No difficulty with swallowing, taste, speech or smell. Neck: no stiffness or swelling  Respiratory: No cough wheezing or shortness of breath. Cardiovascular: Denies chest pain, palpitations, unexplained indigestion or syncope. Gastrointestinal:  No changes in bowel movements, no abdominal pain, no bloating. Genitourinary:  He denies frequency, nocturia or stranguria. Extremities: No joint pain, stiffness or swelling. Neurological:  No numbness, tingling, burring paresthesias or loss of motor strength. No syncope, dizziness or frequent headache. He has had several falls in his own yard but not picking his feet up when he walks. He has a cane but he does not use it. Lymphatic: no adenopathy noted  Hematologic: no easy bruising or bleeding gums  Skin:  No recent rashes or mole changes. Psychiatric/Behavioral:  Negative for depression.       Vitals:    09/12/17 1452   BP: 136/60   Pulse: (!) 52   Resp: 20   SpO2: 94%   Weight: 202 lb (91.6 kg)   Height: 5' 9.5\" (1.765 m)   PainSc:   0 - No pain        PHYSICAL EXAM:    General appearance - alert, well appearing, and in no distress  Mental status - alert, oriented to person, place, and time  HEENT:  Ears - bilateral TM's and external ear canals clear  Eyes - pupillary responses were normal.  Extraocular muscle function intact. Lids and conjunctiva not injected. Fundoscopic exam revealed sharp disc margins. eye movements intact  Pharynx- clear with teeth in good repair. No masses were noted  Neck - supple without thyromegaly or burit. No JVD noted  Lungs - clear to auscultation and percussion  Cardiac- normal rate, regular rhythm without murmurs. PMI not displaced. No gallop, rub or click  Abdomen - flat, soft, non-tender without palpable organomegaly or mass. No pulsatile mass was felt, and not bruit was heard. Bowel sounds were active  Extremities -  no clubbing cyanosis or edema  Lymphatics - no palpable lymphadenopathy, no hepatosplenomegaly  Hematologic: no petechiae or purpura  Peripheral vascular -Femoral, Dorsalis pedis and posterior tibial pulses felt without difficulty  Skin - no rash or unusual mole change noted  Neurological - Cranial nerves II-XII grossly intact. Motor strength 5/5. DTR's 2+ and symmetric. Station and gait normal  Back exam - full range of motion, no tenderness, palpable spasm or pain on motion  Musculoskeletal - no joint tenderness, deformity or swelling      Patient Care Team   Patient Care Team:  Dat Mei MD as PCP - General (Internal Medicine)  Jaison Valencia MD as Surgeon (General Surgery)    Assessment/Plan   Education and counseling provided:  Are appropriate based on today's review and evaluation    ASSESSMENT:   1. Hypertension with renal disease    2. Glucose intolerance (impaired glucose tolerance)    3. Mixed hyperlipidemia    4. Chronic obstructive pulmonary disease, unspecified COPD type ([address]    5. ASCVD (arteriosclerotic cardiovascular disease)    6. Paroxysmal atrial fibrillation (HCC)    7.  CKD (chronic kidney disease), stage 3 (moderate)    8. Frequent falls    9. Medicare annual wellness visit, initial    10. Gastroesophageal reflux disease without esophagitis      Impression  1. Hypertension that is currently controlled medicines reviewed and will not be changed  2. Glucose intolerance that status is pending I did review his last numbers with him A1c at that time was 6.4 right at the diabetic cutoff stressed diet and exercise  3. Hyperlipidemia we will see what that status is and make adjustments if necessary his last numbers were good  4. COPD currently seems to be stable  5. ASCVD that is currently stable and asymptomatic  6. History of A. fib he has had no recent palpitations or anything to indicate atrial fib  7. Chronic kidney disease we will see what that status is and make adjustments as necessary  8. Frequent falls that is the main concern and I stressed importance of taking care of this problem before it becomes a issue where he cracks a hip has old injuries. I suggested he use a cane which she is reluctant to do. I did convince him to go to physical therapy for balance training and hopefully that will be beneficial.  That referral was made. 9.  GERD that seems to be stable  Medicare annual wellness examination questionnaire performed on patient today. Results were reviewed with him and his questions were answered. Lifestyle recommendations and modifications strongly suggested made. Laboratory studies are pending as noted. I will make further recommendations and suggestions based upon the labs. We will otherwise continue current medications and recheck him in 3 months or sooner should there be a problem. Hopefully this is some progression with physical therapy.     PLAN:  .  Orders Placed This Encounter    REFERRAL TO PHYSICAL THERAPY    AMB POC LIPID PROFILE    AMB POC HEMOGLOBIN A1C    AMB POC COMPREHENSIVE METABOLIC PANEL    AMB POC CK (CPK)    furosemide (LASIX) 40 mg tablet  dilTIAZem ER (CARDIZEM LA) 120 mg tablet    simvastatin (ZOCOR) 40 mg tablet    finasteride (PROSCAR) 5 mg tablet         ATTENTION:   This medical record was transcribed using an electronic medical records system. Although proofread, it may and can contain electronic and spelling errors. Other human spelling and other errors may be present. Corrections may be executed at a later time. Please feel free to contact us for any clarifications as needed. Follow-up Disposition:  Return in about 3 months (around 12/12/2017).       Eric Milligan MD     Health Maintenance Due   Topic Date Due    DTaP/Tdap/Td series (1 - Tdap) 03/13/1952    ZOSTER VACCINE AGE 60>  01/13/1991    GLAUCOMA SCREENING Q2Y  03/13/1996    INFLUENZA AGE 9 TO ADULT  08/01/2017

## 2017-09-12 NOTE — MR AVS SNAPSHOT
Visit Information Date & Time Provider Department Dept. Phone Encounter #  
 9/12/2017  1:50 PM Rafael Rivera MD [facility] [phone] [id number] Follow-up Instructions Return in about 3 months (around 12/12/2017). Your Appointments 12/12/2017  1:50 PM  
FOLLOW UP 10 with MD ECTOR March [facility] ([address]) Appt Note: [address] [facility] [address] [phone] [address] [facility] [phone] Upcoming Health Maintenance Date Due DTaP/Tdap/Td series (1 - Tdap) 3/13/1952 ZOSTER VACCINE AGE 60> 1/13/1991 GLAUCOMA SCREENING Q2Y 3/13/1996 INFLUENZA AGE 9 TO ADULT 8/1/2017 MEDICARE YEARLY EXAM 9/13/2018 Allergies as of 9/12/2017  Review Complete On: 9/12/2017 By: Rafael Rivera MD  
 No Known Allergies Current Immunizations  Reviewed on 4/9/2014 Name Date Influenza Vaccine 10/20/2016, 12/9/2015, 10/27/2014 Pneumococcal Conjugate (PCV-13) 9/1/2015 Pneumococcal Vaccine (Unspecified Type) 1/1/2014, 10/11/2004 Not reviewed this visit You Were Diagnosed With   
  
 Codes Comments Hypertension with renal disease    -  Primary ICD-10-CM: I12.9 ICD-9-CM: 403.90 Glucose intolerance (impaired glucose tolerance)     ICD-10-CM: R73.02 
ICD-9-CM: 790.22 Mixed hyperlipidemia     ICD-10-CM: E78.2 ICD-9-CM: 272.2 Chronic obstructive pulmonary disease, unspecified COPD type ([facility]     ICD-10-CM: J44.9 ICD-9-CM: 233 ASCVD (arteriosclerotic cardiovascular disease)     ICD-10-CM: I25.10 ICD-9-CM: 429.2, 440.9 Paroxysmal atrial fibrillation (HCC)     ICD-10-CM: I48.0 ICD-9-CM: 427.31 CKD (chronic kidney disease), stage 3 (moderate)     ICD-10-CM: N18.3 ICD-9-CM: 296. 3 Frequent falls     ICD-10-CM: R29.6 ICD-9-CM: V15.88   
 Medicare annual wellness visit, initial     ICD-10-CM: Z00.00 ICD-9-CM: V70.0 Gastroesophageal reflux disease without esophagitis     ICD-10-CM: K21.9 ICD-9-CM: 530.81 Vitals BP Pulse Resp Height(growth percentile) Weight(growth percentile) SpO2  
 136/60 (BP 1 Location: Right arm, BP Patient Position: Sitting) (!) 52 20 5' 9.5\" (1.765 m) 202 lb (91.6 kg) 94% BMI Smoking Status 29.4 kg/m2 Former Smoker BMI and BSA Data Body Mass Index Body Surface Area  
 29.4 kg/m 2 2.12 m 2 Preferred Pharmacy Pharmacy Name Phone [facility] Yeni Durand, [address] [place] [address] [phone] Your Updated Medication List  
  
   
This list is accurate as of: 9/12/17  3:21 PM.  Always use your most recent med list.  
  
  
  
  
 allopurinol 100 mg tablet Commonly known as:  Hilda Karla Take  by mouth daily. dilTIAZem  mg tablet Commonly known as:  CARDIZEM LA Take 1 Tab by mouth daily. finasteride 5 mg tablet Commonly known as:  PROSCAR Take 1 Tab by mouth daily (after dinner). FISH OIL PO Take  by mouth. furosemide 40 mg tablet Commonly known as:  LASIX  
1 daily  
  
 losartan 100 mg tablet Commonly known as:  COZAAR Take 100 mg by mouth daily. naproxen 500 mg tablet Commonly known as:  NAPROSYN Take 500 mg by mouth two (2) times daily (with meals). NEXIUM PO Take 40 mg by mouth every morning. simvastatin 40 mg tablet Commonly known as:  ZOCOR Take 1 Tab by mouth nightly. SYNTHROID 137 mcg tablet Generic drug:  levothyroxine Take  by mouth Daily (before breakfast). tamsulosin 0.4 mg capsule Commonly known as:  FLOMAX Take 0.4 mg by mouth. Take 2 tablets daily after dinner XARELTO 15 mg Tab tablet Generic drug:  rivaroxaban Take 15 mg by mouth nightly. Prescriptions Printed Refills furosemide (LASIX) 40 mg tablet 3 Si daily Class: Print  
 dilTIAZem ER (CARDIZEM LA) 120 mg tablet 3 Sig: Take 1 Tab by mouth daily. Class: Print Route: Oral  
 simvastatin (ZOCOR) 40 mg tablet 3 Sig: Take 1 Tab by mouth nightly. Class: Print Route: Oral  
 finasteride (PROSCAR) 5 mg tablet 3 Sig: Take 1 Tab by mouth daily (after dinner). Class: Print Route: Oral  
  
We Performed the Following AMB POC CK (CPK) [21317 CPT(R)] AMB POC COMPREHENSIVE METABOLIC PANEL [47985 CPT(R)] AMB POC HEMOGLOBIN A1C [59068 CPT(R)] AMB POC LIPID PROFILE [81547 CPT(R)] REFERRAL TO PHYSICAL THERAPY [DCJ25 Custom] Comments:  
 . Physical therapy physical therapy for balance training with [facility]. Evaluate and treat Follow-up Instructions Return in about 3 months (around 2017). Referral Information Referral ID Referred By Referred To  
  
 [id number] Handy Morley Not Available Visits Status Start Date End Date 1 New Request 17 If your referral has a status of pending review or denied, additional information will be sent to support the outcome of this decision. Introducing hospitals & HEALTH SERVICES! [employer] introduces AdTrib patient portal. Now you can access parts of your medical record, email your doctor's office, and request medication refills online. 1. In your internet browser, go to [email or web address] 2. Click on the First Time User? Click Here link in the Sign In box. You will see the New Member Sign Up page. 3. Enter your AdTrib Access Code exactly as it appears below. You will not need to use this code after youve completed the sign-up process. If you do not sign up before the expiration date, you must request a new code. · AdTrib Access Code: [email or web address] Expires: 2017  1:42 PM 
 
4.  Enter the last four digits of your Social Security Number (xxxx) and Date of Birth (mm/dd/yyyy) as indicated and click Submit. You will be taken to the next sign-up page. 5. Create a Wildfire ID. This will be your Wildfire login ID and cannot be changed, so think of one that is secure and easy to remember. 6. Create a Wildfire password. You can change your password at any time. 7. Enter your Password Reset Question and Answer. This can be used at a later time if you forget your password. 8. Enter your e-mail address. You will receive e-mail notification when new information is available in [address] 9. Click Sign Up. You can now view and download portions of your medical record. 10. Click the Download Summary menu link to download a portable copy of your medical information. If you have questions, please visit the Frequently Asked Questions section of the Wildfire website. Remember, Wildfire is NOT to be used for urgent needs. For medical emergencies, dial 911. Now available from your iPhone and Android! Please provide this summary of care documentation to your next provider. Your primary care clinician is listed as Raghu. If you have any questions after today's visit, please call [phone].

## 2017-09-13 LAB
ALBUMIN SERPL-MCNC: 4 G/DL (ref 3.9–5.4)
ALKALINE PHOS POC: 86 U/L (ref 38–126)
ALT SERPL-CCNC: 35 U/L (ref 9–52)
AST SERPL-CCNC: 22 U/L (ref 14–36)
BUN BLD-MCNC: 24 MG/DL (ref 9–20)
CALCIUM BLD-MCNC: 9.8 MG/DL (ref 8.4–10.2)
CHLORIDE BLD-SCNC: 107 MMOL/L (ref 98–107)
CHOLEST SERPL-MCNC: 131 MG/DL (ref 0–200)
CK (CPK) POC: 121 U/L (ref 30–135)
CO2 POC: 28 MMOL/L (ref 22–32)
CREAT BLD-MCNC: 1.2 MG/DL (ref 0.8–1.5)
EGFR (POC): 54.4
GLUCOSE POC: 87 MG/DL (ref 75–110)
HBA1C MFR BLD HPLC: 6 % (ref 4.5–5.7)
HDLC SERPL-MCNC: 44 MG/DL (ref 35–130)
LDL CHOLESTEROL POC: 65 MG/DL (ref 0–130)
POTASSIUM SERPL-SCNC: 5 MMOL/L (ref 3.6–5)
PROT SERPL-MCNC: 7 G/DL (ref 6.3–8.2)
SODIUM SERPL-SCNC: 145 MMOL/L (ref 137–145)
TCHOL/HDL RATIO (POC): 3 (ref 0–4)
TOTAL BILIRUBIN POC: 0.8 MG/DL (ref 0.2–1.3)
TRIGL SERPL-MCNC: 110 MG/DL (ref 0–200)
VLDLC SERPL CALC-MCNC: 22 MG/DL

## 2017-12-11 PROBLEM — E78.2 MIXED HYPERLIPIDEMIA: Status: ACTIVE | Noted: 2017-08-21

## 2017-12-12 ENCOUNTER — OFFICE VISIT (OUTPATIENT)
Dept: INTERNAL MEDICINE CLINIC | Age: 82
End: 2017-12-12

## 2017-12-12 VITALS
OXYGEN SATURATION: 96 % | HEART RATE: 55 BPM | BODY MASS INDEX: 29.06 KG/M2 | WEIGHT: 203 LBS | SYSTOLIC BLOOD PRESSURE: 138 MMHG | HEIGHT: 70 IN | DIASTOLIC BLOOD PRESSURE: 66 MMHG

## 2017-12-12 DIAGNOSIS — Z23 ENCOUNTER FOR IMMUNIZATION: ICD-10-CM

## 2017-12-12 DIAGNOSIS — M15.9 PRIMARY OSTEOARTHRITIS INVOLVING MULTIPLE JOINTS: ICD-10-CM

## 2017-12-12 DIAGNOSIS — K21.9 GASTROESOPHAGEAL REFLUX DISEASE WITHOUT ESOPHAGITIS: ICD-10-CM

## 2017-12-12 DIAGNOSIS — J44.9 CHRONIC OBSTRUCTIVE PULMONARY DISEASE, UNSPECIFIED COPD TYPE (HCC): ICD-10-CM

## 2017-12-12 DIAGNOSIS — I12.9 HYPERTENSION WITH RENAL DISEASE: Primary | ICD-10-CM

## 2017-12-12 DIAGNOSIS — R35.1 BENIGN PROSTATIC HYPERPLASIA WITH NOCTURIA: ICD-10-CM

## 2017-12-12 DIAGNOSIS — I25.10 ASCVD (ARTERIOSCLEROTIC CARDIOVASCULAR DISEASE): ICD-10-CM

## 2017-12-12 DIAGNOSIS — I48.0 PAROXYSMAL ATRIAL FIBRILLATION (HCC): ICD-10-CM

## 2017-12-12 DIAGNOSIS — N18.30 STAGE 3 CHRONIC KIDNEY DISEASE (HCC): ICD-10-CM

## 2017-12-12 DIAGNOSIS — N40.1 BENIGN PROSTATIC HYPERPLASIA WITH NOCTURIA: ICD-10-CM

## 2017-12-12 DIAGNOSIS — E78.2 MIXED HYPERLIPIDEMIA: ICD-10-CM

## 2017-12-12 DIAGNOSIS — R73.02 GLUCOSE INTOLERANCE (IMPAIRED GLUCOSE TOLERANCE): ICD-10-CM

## 2017-12-12 NOTE — MR AVS SNAPSHOT
Visit Information Date & Time Provider Department Dept. Phone Encounter #  
 12/12/2017  1:50 PM Rudy Jernigan MD [address] [place] [address] [phone] [id number] Follow-up Instructions Return in about 3 months (around 3/12/2018). Upcoming Health Maintenance Date Due DTaP/Tdap/Td series (1 - Tdap) 3/13/1952 ZOSTER VACCINE AGE 60> 1/13/1991 GLAUCOMA SCREENING Q2Y 3/13/1996 MEDICARE YEARLY EXAM 9/13/2018 Allergies as of 12/12/2017  Review Complete On: 12/12/2017 By: Rudy Jernigan MD  
 No Known Allergies Current Immunizations  Reviewed on 4/9/2014 Name Date Influenza High Dose Vaccine PF  Incomplete Influenza Vaccine 10/20/2016, 12/9/2015, 10/27/2014 Pneumococcal Conjugate (PCV-13) 9/1/2015 Pneumococcal Vaccine (Unspecified Type) 1/1/2014, 10/11/2004 Not reviewed this visit You Were Diagnosed With   
  
 Codes Comments Hypertension with renal disease    -  Primary ICD-10-CM: I12.9 ICD-9-CM: 403.90 Glucose intolerance (impaired glucose tolerance)     ICD-10-CM: R73.02 
ICD-9-CM: 790.22 Gastroesophageal reflux disease without esophagitis     ICD-10-CM: K21.9 ICD-9-CM: 530.81 Chronic obstructive pulmonary disease, unspecified COPD type ([facility]     ICD-10-CM: J44.9 ICD-9-CM: 525 ASCVD (arteriosclerotic cardiovascular disease)     ICD-10-CM: I25.10 ICD-9-CM: 429.2, 440.9 Paroxysmal atrial fibrillation (HCC)     ICD-10-CM: I48.0 ICD-9-CM: 427.31 Stage 3 chronic kidney disease     ICD-10-CM: N18.3 ICD-9-CM: 068. 3 Mixed hyperlipidemia     ICD-10-CM: E78.2 ICD-9-CM: 272.2 Primary osteoarthritis involving multiple joints     ICD-10-CM: M15.0 ICD-9-CM: 715.09 Benign prostatic hyperplasia with nocturia     ICD-10-CM: N40.1, R35.1 ICD-9-CM: 600.01, 788.43 Encounter for immunization     ICD-10-CM: V00 ICD-9-CM: V03.89 Vitals BP Pulse Height(growth percentile) Weight(growth percentile) SpO2 BMI  
 138/66 (!) 55 5' 9.5\" (1.765 m) 203 lb (92.1 kg) 96% 29.55 kg/m2 Smoking Status Former Smoker Vitals History BMI and BSA Data Body Mass Index Body Surface Area  
 29.55 kg/m 2 2.13 m 2 Preferred Pharmacy [facility]acy [facility] Phone [facility] [address] [place] [address] [phone] Your Updated Medication List  
  
   
This list is accurate as of: 12/12/17  3:02 PM.  Always use your most recent med list.  
  
  
  
  
 allopurinol 100 mg tablet Commonly known as:  Jaunita Phenes Take  by mouth daily. dilTIAZem  mg tablet Commonly known as:  CARDIZEM LA Take 1 Tab by mouth daily. finasteride 5 mg tablet Commonly known as:  PROSCAR Take 1 Tab by mouth daily (after dinner). FISH OIL PO Take  by mouth. furosemide 40 mg tablet Commonly known as:  LASIX  
1 daily  
  
 losartan 100 mg tablet Commonly known as:  COZAAR Take 100 mg by mouth daily. naproxen 500 mg tablet Commonly known as:  NAPROSYN Take 500 mg by mouth two (2) times daily (with meals). NEXIUM PO Take 40 mg by mouth every morning. simvastatin 40 mg tablet Commonly known as:  ZOCOR Take 1 Tab by mouth nightly. SYNTHROID 137 mcg tablet Generic drug:  levothyroxine Take  by mouth Daily (before breakfast). tamsulosin 0.4 mg capsule Commonly known as:  FLOMAX Take 0.4 mg by mouth. Take 2 tablets daily after dinner XARELTO 15 mg Tab tablet Generic drug:  rivaroxaban Take 15 mg by mouth nightly. We Performed the Following ADMIN INFLUENZA VIRUS VAC [ [facility]] INFLUENZA VIRUS VACCINE, HIGH DOSE SEASONAL, PRESERVATIVE FREE [19211 CPT(R)] Follow-up Instructions Return in about 3 months (around 3/12/2018). Introducing \[facility]\"" & HEALTH SERVICES! Maritza Oseguera introduces [employer] patient portal. Now you can access parts of your medical record, email your doctor's office, and request medication refills online. 1. In your internet browser, go to [email or web address] 2. Click on the First Time User? Click Here link in the Sign In box. You will see the New Member Sign Up page. 3. Enter your [employer] Access Code exactly as it appears below. You will not need to use this code after youve completed the sign-up process. If you do not sign up before the expiration date, you must request a new code. · [employer] Access Code: [email or web address] Expires: 3/12/2018  1:39 PM 
 
4. Enter the last four digits of your Social Security Number (xxxx) and Date of Birth (mm/dd/yyyy) as indicated and click Submit. You will be taken to the next sign-up page. 5. Create a Webupo ID. This will be your Web[employer] login ID and cannot be changed, so think of one that is secure and easy to remember. 6. Create a [employer] password. You can change your password at any time. 7. Enter your Password Reset Question and Answer. This can be used at a later time if you forget your password. 8. Enter your e-mail address. You will receive e-mail notification when new information is available in [address] 9. Click Sign Up. You can now view and download portions of your medical record. 10. Click the Download Summary menu link to download a portable copy of your medical information. If you have questions, please visit the Frequently Asked Questions section of the [employer] website. Remember, [employer] is NOT to be used for urgent needs. For medical emergencies, dial 911. Now available from your iPhone and Android! Please provide this summary of care documentation to your next provider. Your primary care clinician is listed as Raghu. If you have any questions after today's visit, please call [phone].

## 2017-12-12 NOTE — PROGRESS NOTES
Chief Complaint   Patient presents with    Follow-up       SUBJECTIVE:    Faviola Centeno is a 80 y.o. male who returns today in follow-up for his hypertension, glucose intolerance, hyperlipidemia, COPD, ASCVD, history of atrial fibrillation, DJD, CKD, GERD, and BPH. He is still having some nocturia 2-3 times per night despite taking the Flomax 0.8 daily as well as the Proscar 5 mg daily. He denies any dysuria or any urinary frequency during the daytime. There are no other  complaints and no GI complaints. He denies chest pain palpitations shortness breath lightheadedness or cardiorespiratory complaints. There are no headaches dizziness or neurologic complaints. He does note that physical therapy tremendously helped his balance as far as his falls and has had no more falls. He denies any arthritic complaints. There are no other complaints on complete review of systems. He is taking his medications following his diet and trying to exercise regularly to maintain his balance and strength. Current Outpatient Prescriptions   Medication Sig Dispense Refill    furosemide (LASIX) 40 mg tablet 1 daily 90 Tab 3    dilTIAZem ER (CARDIZEM LA) 120 mg tablet Take 1 Tab by mouth daily. 90 Tab 3    simvastatin (ZOCOR) 40 mg tablet Take 1 Tab by mouth nightly. 90 Tab 3    finasteride (PROSCAR) 5 mg tablet Take 1 Tab by mouth daily (after dinner). 90 Tab 3    levothyroxine (SYNTHROID) 137 mcg tablet Take  by mouth Daily (before breakfast).  naproxen (NAPROSYN) 500 mg tablet Take 500 mg by mouth two (2) times daily (with meals).  allopurinol (ZYLOPRIM) 100 mg tablet Take  by mouth daily.  DOCOSAHEXANOIC ACID/EPA (FISH OIL PO) Take  by mouth.  rivaroxaban (XARELTO) 15 mg tab tablet Take 15 mg by mouth nightly.  ESOMEPRAZOLE MAGNESIUM (NEXIUM PO) Take 40 mg by mouth every morning.  losartan (COZAAR) 100 mg tablet Take 100 mg by mouth daily.       tamsulosin (FLOMAX) 0.4 mg capsule Take 0.4 mg by mouth. Take 2 tablets daily after dinner       Past Medical History:   Diagnosis Date    Arrhythmia     A-fib    GERD (gastroesophageal reflux disease)     Hypertension     Other ill-defined conditions(799.89)     BPH    Other ill-defined conditions(799.89)     lipids    Other ill-defined conditions(799.89)     shingles hx    Thyroid disease     Unspecified adverse effect of anesthesia      Past Surgical History:   Procedure Laterality Date    HX CATARACT REMOVAL      HX HEART CATHETERIZATION      ablation    HX HEENT      scar tissue removed/laser    HX HERNIA REPAIR  05/05/2017    Tang Womack MD    HX ORTHOPAEDIC      left foot fx--hardware    HX ORTHOPAEDIC  4/9/14    Right total knee arthroplasty    HX OTHER SURGICAL      prostate bx     No Known Allergies    REVIEW OF SYSTEMS:  General: negative for - chills or fever, or weight loss or gain  ENT: negative for - headaches, nasal congestion or tinnitus  Eyes: no blurred or visual changes  Neck: No stiffness or swollen nodes  Respiratory: negative for - cough, hemoptysis, shortness of breath or wheezing  Cardiovascular : negative for - chest pain, edema, palpitations or shortness of breath  Gastrointestinal: negative for - abdominal pain, blood in stools, heartburn or nausea/vomiting  Genito-Urinary: no dysuria, trouble voiding, or hematuria.   Nocturia 2-3 times per night  Musculoskeletal: negative for - gait disturbance, joint pain, joint stiffness or joint swelling  Neurological: no TIA or stroke symptoms  Hematologic: no bruises, no bleeding  Lymphatic: no swollen glands  Integument: no lumps, mole changes, nail changes or rash  Endocrine:no malaise/lethargy poly uria or polydipsia or unexpected weight changes        Social History     Social History    Marital status:      Spouse name: N/A    Number of children: N/A    Years of education: N/A     Social History Main Topics    Smoking status: Former Smoker Packs/day: 0.50     Quit date: 3/28/1963    Smokeless tobacco: Never Used    Alcohol use Yes      Comment: rare/social-beer    Drug use: Yes     Special: Prescription, OTC    Sexual activity: Not Asked     Other Topics Concern    None     Social History Narrative     Family History   Problem Relation Age of Onset    Heart Disease Mother     Hypertension Mother     Stroke Mother     Heart Disease Father     Cancer Paternal Grandmother        OBJECTIVE:     Visit Vitals    /66    Pulse (!) 55    Ht 5' 9.5\" (1.765 m)    Wt 203 lb (92.1 kg)    SpO2 96%    BMI 29.55 kg/m2     CONSTITUTIONAL:   well nourished, appears age appropriate  EYES: sclera anicteric, PERRL, EOMI  ENMT:nars clear, moist mucous membranes, pharynx clear  NECK: supple. Thyroid normal, No JVD or bruits  RESPIRATORY: Chest: clear to ascultation and percussion, normal inspiratory effort  CARDIOVASCULAR: Heart: regular rate and rhythm no murmurs, rubs or gallops, PMI not displaced, No thrills  GASTROINTESTINAL: Abdomen: non distended, soft, non tender, bowel sounds normal  HEMATOLOGIC: no purpura, petechiae or bruising  LYMPHATIC: No lymph node enlargemant  MUSCULOSKELETAL: Extremities: no edema or active synovitis, pulse 1+   INTEGUMENT: No unusual rashes or suspicious skin lesions noted. Nails appear normal.  PERIPHERAL VASCULAR: normal pulses femoral, PT and DP  NEUROLOGIC: non-focal exam, A & O X 3  PSYCHIATRIC:, appropriate affect     ASSESSMENT:   1. Hypertension with renal disease    2. Glucose intolerance (impaired glucose tolerance)    3. Gastroesophageal reflux disease without esophagitis    4. Chronic obstructive pulmonary disease, unspecified COPD type ([address]    5. ASCVD (arteriosclerotic cardiovascular disease)    6. Paroxysmal atrial fibrillation (HCC)    7. Stage 3 chronic kidney disease    8. Mixed hyperlipidemia    9. Primary osteoarthritis involving multiple joints    10.  Benign prostatic hyperplasia with nocturia 11. Encounter for immunization      Impression  1. Hypertension that is control with just reviewed his medicines will continue the same  2. Glucose intolerance repeat status pending reviewed prior labs repeat status pending will make adjustments if necessary  3. GERD that seems to be stable  4. COPD stable on current regimen  5. ASCVD currently asymptomatic  6. Paroxysmal atrial fibrillation currently in sinus rhythm  7. CKD repeat status pending reviewed prior labs and will make further recommendations necessary  8. Hyperlipidemia repeat status pending reviewed prior labs  9. DJD that seems to be stable  10. BPH we discussed switching Flomax to Rapaflo but might run into an insurance issue so he will stick with the Flomax and he says he be okay doing that  Flu shot given today. Labs are pending as noted will make further recommendation based on those. Follow stable continue same and follow-up scheduled again for 3 months or sooner should be a problem. PLAN:  .  Orders Placed This Encounter    Influenza virus vaccine ([address]) 72 years and older ([address])    AMB POC LIPID PROFILE    AMB POC HEMOGLOBIN A1C    AMB POC COMPREHENSIVE METABOLIC PANEL    AMB POC CK (CPK)         ATTENTION:   This medical record was transcribed using an electronic medical records system. Although proofread, it may and can contain electronic and spelling errors. Other human spelling and other errors may be present. Corrections may be executed at a later time. Please feel free to contact us for any clarifications as needed. Follow-up Disposition:  Return in about 3 months (around 3/12/2018). No results found for any visits on 12/12/17. Abdullahi Sharma MD    The patient verbalized understanding of the problems and plans as explained.

## 2017-12-12 NOTE — PROGRESS NOTES
Paul Miller presents with   Chief Complaint   Patient presents with    Follow-up   Patient here for a 3 month followup & fasting labs. No new complaints. 1. Have you been to the ER, urgent care clinic since your last visit? Hospitalized since your last visit? No    2. Have you seen or consulted any other health care providers outside of the [address] [place] [address] since your last visit? Include any pap smears or colon screening.  No

## 2017-12-13 LAB
ALBUMIN SERPL-MCNC: 4.1 G/DL (ref 3.9–5.4)
ALKALINE PHOS POC: 74 U/L (ref 38–126)
ALT SERPL-CCNC: 29 U/L (ref 9–52)
AST SERPL-CCNC: 23 U/L (ref 14–36)
BUN BLD-MCNC: 37 MG/DL (ref 9–20)
CALCIUM BLD-MCNC: 9.8 MG/DL (ref 8.4–10.2)
CHLORIDE BLD-SCNC: 106 MMOL/L (ref 98–107)
CHOLEST SERPL-MCNC: 128 MG/DL (ref 0–200)
CK (CPK) POC: 152 U/L (ref 30–135)
CO2 POC: 27 MMOL/L (ref 22–32)
CREAT BLD-MCNC: 1.6 MG/DL (ref 0.8–1.5)
EGFR (POC): 38.4
GLUCOSE POC: 99 MG/DL (ref 75–110)
HBA1C MFR BLD HPLC: 5.8 % (ref 4.5–5.7)
HDLC SERPL-MCNC: 46 MG/DL (ref 35–130)
LDL CHOLESTEROL POC: 65 MG/DL (ref 0–130)
POTASSIUM SERPL-SCNC: 4.2 MMOL/L (ref 3.6–5)
PROT SERPL-MCNC: 7.1 G/DL (ref 6.3–8.2)
SODIUM SERPL-SCNC: 146 MMOL/L (ref 137–145)
TCHOL/HDL RATIO (POC): 2.8 (ref 0–4)
TOTAL BILIRUBIN POC: 1 MG/DL (ref 0.2–1.3)
TRIGL SERPL-MCNC: 85 MG/DL (ref 0–200)
VLDLC SERPL CALC-MCNC: 17 MG/DL

## 2018-01-01 ENCOUNTER — OFFICE VISIT (OUTPATIENT)
Dept: INTERNAL MEDICINE CLINIC | Age: 83
End: 2018-01-01

## 2018-01-01 ENCOUNTER — TELEPHONE (OUTPATIENT)
Dept: INTERNAL MEDICINE CLINIC | Age: 83
End: 2018-01-01

## 2018-01-01 VITALS
SYSTOLIC BLOOD PRESSURE: 138 MMHG | HEIGHT: 69 IN | RESPIRATION RATE: 14 BRPM | WEIGHT: 198.4 LBS | OXYGEN SATURATION: 95 % | HEART RATE: 55 BPM | BODY MASS INDEX: 29.38 KG/M2 | DIASTOLIC BLOOD PRESSURE: 60 MMHG

## 2018-01-01 DIAGNOSIS — K21.9 GASTROESOPHAGEAL REFLUX DISEASE WITHOUT ESOPHAGITIS: ICD-10-CM

## 2018-01-01 DIAGNOSIS — I12.9 HYPERTENSION WITH RENAL DISEASE: Primary | ICD-10-CM

## 2018-01-01 DIAGNOSIS — I48.0 PAROXYSMAL ATRIAL FIBRILLATION (HCC): ICD-10-CM

## 2018-01-01 DIAGNOSIS — E78.2 MIXED HYPERLIPIDEMIA: ICD-10-CM

## 2018-01-01 DIAGNOSIS — J44.9 CHRONIC OBSTRUCTIVE PULMONARY DISEASE, UNSPECIFIED COPD TYPE (HCC): ICD-10-CM

## 2018-01-01 DIAGNOSIS — R19.7 DIARRHEA, UNSPECIFIED TYPE: ICD-10-CM

## 2018-01-01 DIAGNOSIS — I25.10 ASCVD (ARTERIOSCLEROTIC CARDIOVASCULAR DISEASE): ICD-10-CM

## 2018-01-01 DIAGNOSIS — M15.9 PRIMARY OSTEOARTHRITIS INVOLVING MULTIPLE JOINTS: ICD-10-CM

## 2018-01-01 DIAGNOSIS — N18.30 STAGE 3 CHRONIC KIDNEY DISEASE (HCC): ICD-10-CM

## 2018-01-01 DIAGNOSIS — R73.02 GLUCOSE INTOLERANCE (IMPAIRED GLUCOSE TOLERANCE): ICD-10-CM

## 2018-01-01 LAB
ALBUMIN SERPL-MCNC: 4.1 G/DL (ref 3.5–4.7)
ALBUMIN/GLOB SERPL: 1.4 {RATIO} (ref 1.2–2.2)
ALP SERPL-CCNC: 94 IU/L (ref 39–117)
ALT SERPL-CCNC: 18 IU/L (ref 0–44)
AST SERPL-CCNC: 17 IU/L (ref 0–40)
BILIRUB SERPL-MCNC: 0.6 MG/DL (ref 0–1.2)
BUN SERPL-MCNC: 23 MG/DL (ref 8–27)
BUN/CREAT SERPL: 16 (ref 10–24)
C DIFF TOX A+B STL QL IA: POSITIVE
CALCIUM SERPL-MCNC: 9.5 MG/DL (ref 8.6–10.2)
CAMPYLOBACTER STL CULT: NORMAL
CHLORIDE SERPL-SCNC: 106 MMOL/L (ref 96–106)
CHOLEST SERPL-MCNC: 129 MG/DL (ref 100–199)
CO2 SERPL-SCNC: 28 MMOL/L (ref 20–29)
CREAT SERPL-MCNC: 1.43 MG/DL (ref 0.76–1.27)
E COLI SXT STL QL IA: NEGATIVE
EST. AVERAGE GLUCOSE BLD GHB EST-MCNC: 123 MG/DL
GLOBULIN SER CALC-MCNC: 3 G/DL (ref 1.5–4.5)
GLUCOSE SERPL-MCNC: 96 MG/DL (ref 65–99)
HBA1C MFR BLD: 5.9 % (ref 4.8–5.6)
HDLC SERPL-MCNC: 38 MG/DL
LDLC SERPL CALC-MCNC: 69 MG/DL (ref 0–99)
O+P SPEC MICRO: NORMAL
POTASSIUM SERPL-SCNC: 4.6 MMOL/L (ref 3.5–5.2)
PROT SERPL-MCNC: 7.1 G/DL (ref 6–8.5)
SALM + SHIG STL CULT: NORMAL
SODIUM SERPL-SCNC: 145 MMOL/L (ref 134–144)
TRIGL SERPL-MCNC: 112 MG/DL (ref 0–149)
VLDLC SERPL CALC-MCNC: 22 MG/DL (ref 5–40)

## 2018-01-01 RX ORDER — SIMVASTATIN 40 MG/1
40 TABLET, FILM COATED ORAL
Qty: 90 TAB | Refills: 3 | Status: SHIPPED | OUTPATIENT
Start: 2018-01-01

## 2018-01-01 RX ORDER — FINASTERIDE 5 MG/1
5 TABLET, FILM COATED ORAL
Qty: 90 TAB | Refills: 3 | Status: SHIPPED | OUTPATIENT
Start: 2018-01-01

## 2018-01-01 RX ORDER — METRONIDAZOLE 500 MG/1
500 TABLET ORAL 3 TIMES DAILY
Qty: 30 TAB | Refills: 0 | Status: SHIPPED | OUTPATIENT
Start: 2018-01-01 | End: 2019-01-01 | Stop reason: ALTCHOICE

## 2018-03-18 DIAGNOSIS — N40.0 BENIGN PROSTATIC HYPERPLASIA, UNSPECIFIED WHETHER LOWER URINARY TRACT SYMPTOMS PRESENT: Primary | ICD-10-CM

## 2018-03-18 PROBLEM — N18.30 STAGE 3 CHRONIC KIDNEY DISEASE (HCC): Status: ACTIVE | Noted: 2017-08-21

## 2018-03-19 ENCOUNTER — OFFICE VISIT (OUTPATIENT)
Dept: INTERNAL MEDICINE CLINIC | Age: 83
End: 2018-03-19

## 2018-03-19 VITALS
SYSTOLIC BLOOD PRESSURE: 138 MMHG | HEIGHT: 70 IN | HEART RATE: 59 BPM | DIASTOLIC BLOOD PRESSURE: 68 MMHG | TEMPERATURE: 97.7 F | RESPIRATION RATE: 20 BRPM | OXYGEN SATURATION: 96 % | BODY MASS INDEX: 29.35 KG/M2 | WEIGHT: 205 LBS

## 2018-03-19 DIAGNOSIS — R73.02 GLUCOSE INTOLERANCE (IMPAIRED GLUCOSE TOLERANCE): ICD-10-CM

## 2018-03-19 DIAGNOSIS — N18.30 STAGE 3 CHRONIC KIDNEY DISEASE (HCC): ICD-10-CM

## 2018-03-19 DIAGNOSIS — L72.3 SEBACEOUS CYST: ICD-10-CM

## 2018-03-19 DIAGNOSIS — I25.10 ASCVD (ARTERIOSCLEROTIC CARDIOVASCULAR DISEASE): ICD-10-CM

## 2018-03-19 DIAGNOSIS — I48.0 PAROXYSMAL ATRIAL FIBRILLATION (HCC): ICD-10-CM

## 2018-03-19 DIAGNOSIS — J44.9 CHRONIC OBSTRUCTIVE PULMONARY DISEASE, UNSPECIFIED COPD TYPE (HCC): ICD-10-CM

## 2018-03-19 DIAGNOSIS — M15.9 PRIMARY OSTEOARTHRITIS INVOLVING MULTIPLE JOINTS: ICD-10-CM

## 2018-03-19 DIAGNOSIS — K21.9 GASTROESOPHAGEAL REFLUX DISEASE WITHOUT ESOPHAGITIS: ICD-10-CM

## 2018-03-19 DIAGNOSIS — E78.2 MIXED HYPERLIPIDEMIA: ICD-10-CM

## 2018-03-19 DIAGNOSIS — I12.9 HYPERTENSION WITH RENAL DISEASE: Primary | ICD-10-CM

## 2018-03-19 RX ORDER — TAMSULOSIN HYDROCHLORIDE 0.4 MG/1
CAPSULE ORAL
Qty: 180 CAP | Refills: 3 | Status: SHIPPED | OUTPATIENT
Start: 2018-03-19 | End: 2019-01-01 | Stop reason: SDUPTHER

## 2018-03-19 RX ORDER — CEPHALEXIN 500 MG/1
500 CAPSULE ORAL 2 TIMES DAILY
Qty: 20 CAP | Refills: 0 | Status: SHIPPED | OUTPATIENT
Start: 2018-03-19 | End: 2018-04-11 | Stop reason: ALTCHOICE

## 2018-03-19 RX ORDER — LEVOTHYROXINE SODIUM 137 UG/1
137 TABLET ORAL
Qty: 90 TAB | Refills: 3 | Status: SHIPPED | OUTPATIENT
Start: 2018-03-19 | End: 2018-06-18 | Stop reason: ALTCHOICE

## 2018-03-19 NOTE — MR AVS SNAPSHOT
Alejandro Sanchez 
 
 
 [address] [id number] [phone] Patient: Shannon Farnsworth MRN: [id number] QA Visit Information Date & Time Provider Department Dept. Phone Encounter #  
 3/19/2018  1:50 PM Jes Jerome MD [facility] [id number] Your Appointments 2018  1:00 PM  
FOLLOW UP 10 with MD ECTOR Marrufo [facility] ([facility]) Appt Note: 3 mo flp [address] [address] [address] [address] Upcoming Health Maintenance Date Due DTaP/Tdap/Td series (1 - Tdap) 3/13/1952 ZOSTER VACCINE AGE 60> 1991 GLAUCOMA SCREENING Q2Y 3/13/1996 MEDICARE YEARLY EXAM 2018 Allergies as of 3/19/2018  Review Complete On: 3/19/2018 By: Jes Jerome MD  
 No Known Allergies Current Immunizations  Reviewed on 2014 Name Date Influenza High Dose Vaccine PF 2017 Influenza Vaccine 10/20/2016, 2015, 10/27/2014 Pneumococcal Conjugate (PCV-13) 2015 Pneumococcal Vaccine (Unspecified Type) 2014, 10/11/2004 Not reviewed this visit You Were Diagnosed With   
  
 Codes Comments Hypertension with renal disease    -  Primary ICD-10-CM: I12.9 ICD-9-CM: 403.90 Glucose intolerance (impaired glucose tolerance)     ICD-10-CM: R73.02 
ICD-9-CM: 790.22 Mixed hyperlipidemia     ICD-10-CM: E78.2 ICD-9-CM: 272.2 Primary osteoarthritis involving multiple joints     ICD-10-CM: M15.0 ICD-9-CM: 715.09 Gastroesophageal reflux disease without esophagitis     ICD-10-CM: K21.9 ICD-9-CM: 530.81 Paroxysmal atrial fibrillation (HCC)     ICD-10-CM: I48.0 ICD-9-CM: 427.31   
 ASCVD (arteriosclerotic cardiovascular disease)     ICD-10-CM: I25.10 ICD-9-CM: 429.2, 440.9 Chronic obstructive pulmonary disease, unspecified COPD type ([facility]     ICD-10-CM: J44.9 ICD-9-CM: 031 Stage 3 chronic kidney disease     ICD-10-CM: N18.3 ICD-9-CM: 060. 3 Sebaceous cyst     ICD-10-CM: L72.3 ICD-9-CM: 706. 2 Vitals BP Pulse Temp Resp Height(growth percentile) Weight(growth percentile)  
 138/68 (BP 1 Location: Left arm, BP Patient Position: Sitting) (!) 59 97.7 °F (36.5 °C) (Oral) 20 5' 9.5\" (1.765 m) 205 lb (93 kg) SpO2 BMI Smoking Status 96% 29.84 kg/m2 Former Smoker Vitals History BMI and BSA Data Body Mass Index Body Surface Area  
 29.84 kg/m 2 2.14 m 2 Preferred Pharmacy [facility]acy [facility] Phone [facility] [address] [place] [address] [phone] Your Updated Medication List  
  
   
This list is accurate as of 3/19/18  3:33 PM.  Always use your most recent med list.  
  
  
  
  
 allopurinol 100 mg tablet Commonly known as:  Beau Reno Take  by mouth daily. cephALEXin 500 mg capsule Commonly known as:  Verlena Cleburne Take 1 Cap by mouth two (2) times a day. dilTIAZem  mg tablet Commonly known as:  CARDIZEM LA Take 1 Tab by mouth daily. finasteride 5 mg tablet Commonly known as:  PROSCAR Take 1 Tab by mouth daily (after dinner). FISH OIL PO Take  by mouth. furosemide 40 mg tablet Commonly known as:  LASIX  
1 daily  
  
 levothyroxine 137 mcg tablet Commonly known as:  SYNTHROID Take 137 mcg by mouth Daily (before breakfast). losartan 100 mg tablet Commonly known as:  COZAAR Take 100 mg by mouth daily. naproxen 500 mg tablet Commonly known as:  NAPROSYN Take 500 mg by mouth two (2) times daily (with meals). NEXIUM PO Take 40 mg by mouth every morning. rivaroxaban 15 mg Tab tablet Commonly known as:  Channing Cathie Take 1 Tab by mouth daily (with breakfast). simvastatin 40 mg tablet Commonly known as:  ZOCOR Take 1 Tab by mouth nightly. tamsulosin 0.4 mg capsule Commonly known as:  FLOMAX Take 0.4 mg by mouth. Take 2 tablets daily after dinner Prescriptions Printed Refills  
 rivaroxaban (XARELTO) 15 mg tab tablet 3 Sig: Take 1 Tab by mouth daily (with breakfast). Class: Print Route: Oral  
 levothyroxine (SYNTHROID) 137 mcg tablet 3 Sig: Take 137 mcg by mouth Daily (before breakfast). Class: Print Route: Oral  
  
Prescriptions Sent to Pharmacy Refills  
 cephALEXin (KEFLEX) 500 mg capsule 0 Sig: Take 1 Cap by mouth two (2) times a day. Class: Normal  
 Pharmacy: [address] [place] [address] #: [phone] Route: Oral  
  
We Performed the Following AMB POC CK (CPK) [45400 CPT(R)] AMB POC COMPLETE CBC,AUTOMATED ENTER [id number] CPT(R)] AMB POC COMPREHENSIVE METABOLIC PANEL [53578 CPT(R)] AMB POC HEMOGLOBIN A1C [27007 CPT(R)] AMB POC LIPID PROFILE [76282 CPT(R)] DRAIN SKIN ABSCESS SIMPLE [21105 CPT(R)] Patient Instructions Arthritis: Care Instructions Your Care Instructions Arthritis, also called osteoarthritis, is a breakdown of the cartilage that cushions your joints. When the cartilage wears down, your bones rub against each other. This causes pain and stiffness. Many people have some arthritis as they age. Arthritis most often affects the joints of the spine, hands, hips, knees, or feet. You can take simple measures to protect your joints, ease your pain, and help you stay active. Follow-up care is a key part of your treatment and safety. Be sure to make and go to all appointments, and call your doctor if you are having problems. It's also a good idea to know your test results and keep a list of the medicines you take. How can you care for yourself at home? · Stay at a healthy weight. Being overweight puts extra strain on your joints. · Talk to your doctor or physical therapist about exercises that will help ease joint pain. ¨ Stretch. You may enjoy gentle forms of yoga to help keep your joints and muscles flexible. ¨ Walk instead of jog. Other types of exercise that are less stressful on the joints include riding a bicycle, swimming, julia chi, or water exercise. ¨ Lift weights. Strong muscles help reduce stress on your joints. Stronger thigh muscles, for example, take some of the stress off of the knees and hips. Learn the right way to lift weights so you do not make joint pain worse. · Take your medicines exactly as prescribed. Call your doctor if you think you are having a problem with your medicine. · Take pain medicines exactly as directed. ¨ If the doctor gave you a prescription medicine for pain, take it as prescribed. ¨ If you are not taking a prescription pain medicine, ask your doctor if you can take an over-the-counter medicine. · Use a cane, crutch, walker, or another device if you need help to get around. These can help rest your joints. You also can use other things to make life easier, such as a higher toilet seat and padded handles on kitchen utensils. · Do not sit in low chairs, which can make it hard to get up. · Put heat or cold on your sore joints as needed. Use whichever helps you most. You also can take turns with hot and cold packs. ¨ Apply heat 2 or 3 times a day for 20 to 30 minutes-using a heating pad, hot shower, or hot pack-to relieve pain and stiffness. ¨ Put ice or a cold pack on your sore joint for 10 to 20 minutes at a time. Put a thin cloth between the ice and your skin. When should you call for help? Call your doctor now or seek immediate medical care if: 
? · You have sudden swelling, warmth, or pain in any joint. ? · You have joint pain and a fever or rash. ? · You have such bad pain that you cannot use a joint. ? Watch closely for changes in your health, and be sure to contact your doctor if: 
? · You have mild joint symptoms that continue even with more than 6 weeks of care at home. ? · You have stomach pain or other problems with your medicine. Where can you learn more? Go to http://frank-lyubov.info/. Enter S509 in the search box to learn more about \"Arthritis: Care Instructions. \" Current as of: October 31, 2016 Content Version: 11.4 © 3486-4419 [employer]. Care instructions adapted under license by [employer] (which disclaims liability or warranty for this information). If you have questions about a medical condition or this instruction, always ask your healthcare professional. [address] any warranty or liability for your use of this information. Patient Instructions History Introducing \[facility]\"" & HEALTH SERVICES! Terrance Davila introduces [employer] patient portal. Now you can access parts of your medical record, email your doctor's office, and request medication refills online. 1. In your internet browser, go to [email or web address] 2. Click on the First Time User? Click Here link in the Sign In box. You will see the New Member Sign Up page. 3. Enter your [employer] Access Code exactly as it appears below. You will not need to use this code after youve completed the sign-up process. If you do not sign up before the expiration date, you must request a new code. · [employer] Access Code: [email or web address] Expires: 6/17/2018  1:31 PM 
 
4. Enter the last four digits of your Social Security Number (xxxx) and Date of Birth (mm/dd/yyyy) as indicated and click Submit. You will be taken to the next sign-up page. 5. Create a [employer] ID. This will be your [employer] login ID and cannot be changed, so think of one that is secure and easy to remember. 6. Create a [employer] password. You can change your password at any time. 7. Enter your Password Reset Question and Answer.  This can be used at a later time if you forget your password. 8. Enter your e-mail address. You will receive e-mail notification when new information is available in [address] 9. Click Sign Up. You can now view and download portions of your medical record. 10. Click the Download Summary menu link to download a portable copy of your medical information. If you have questions, please visit the Frequently Asked Questions section of the [employer] website. Remember, [employer] is NOT to be used for urgent needs. For medical emergencies, dial 911. Now available from your iPhone and Android! Please provide this summary of care documentation to your next provider. Your primary care clinician is listed as Raghu. If you have any questions after today's visit, please call [phone].

## 2018-03-19 NOTE — PATIENT INSTRUCTIONS
Arthritis: Care Instructions  Your Care Instructions  Arthritis, also called osteoarthritis, is a breakdown of the cartilage that cushions your joints. When the cartilage wears down, your bones rub against each other. This causes pain and stiffness. Many people have some arthritis as they age. Arthritis most often affects the joints of the spine, hands, hips, knees, or feet. You can take simple measures to protect your joints, ease your pain, and help you stay active. Follow-up care is a key part of your treatment and safety. Be sure to make and go to all appointments, and call your doctor if you are having problems. It's also a good idea to know your test results and keep a list of the medicines you take. How can you care for yourself at home? · Stay at a healthy weight. Being overweight puts extra strain on your joints. · Talk to your doctor or physical therapist about exercises that will help ease joint pain. ¨ Stretch. You may enjoy gentle forms of yoga to help keep your joints and muscles flexible. ¨ Walk instead of jog. Other types of exercise that are less stressful on the joints include riding a bicycle, swimming, julia chi, or water exercise. ¨ Lift weights. Strong muscles help reduce stress on your joints. Stronger thigh muscles, for example, take some of the stress off of the knees and hips. Learn the right way to lift weights so you do not make joint pain worse. · Take your medicines exactly as prescribed. Call your doctor if you think you are having a problem with your medicine. · Take pain medicines exactly as directed. ¨ If the doctor gave you a prescription medicine for pain, take it as prescribed. ¨ If you are not taking a prescription pain medicine, ask your doctor if you can take an over-the-counter medicine. · Use a cane, crutch, walker, or another device if you need help to get around. These can help rest your joints.  You also can use other things to make life easier, such as a higher toilet seat and padded handles on kitchen utensils. · Do not sit in low chairs, which can make it hard to get up. · Put heat or cold on your sore joints as needed. Use whichever helps you most. You also can take turns with hot and cold packs. ¨ Apply heat 2 or 3 times a day for 20 to 30 minutes-using a heating pad, hot shower, or hot pack-to relieve pain and stiffness. ¨ Put ice or a cold pack on your sore joint for 10 to 20 minutes at a time. Put a thin cloth between the ice and your skin. When should you call for help? Call your doctor now or seek immediate medical care if:  ? · You have sudden swelling, warmth, or pain in any joint. ? · You have joint pain and a fever or rash. ? · You have such bad pain that you cannot use a joint. ? Watch closely for changes in your health, and be sure to contact your doctor if:  ? · You have mild joint symptoms that continue even with more than 6 weeks of care at home. ? · You have stomach pain or other problems with your medicine. Where can you learn more? Go to http://frank-lyubov.info/. Enter C384 in the search box to learn more about \"Arthritis: Care Instructions. \"  Current as of: October 31, 2016  Content Version: 11.4  © 0459-5473 [employer]. Care instructions adapted under license by [employer] (which disclaims liability or warranty for this information). If you have questions about a medical condition or this instruction, always ask your healthcare professional. [place] [address] any warranty or liability for your use of this information.

## 2018-03-19 NOTE — PROGRESS NOTES
1. Have you been to the ER, urgent care clinic since your last visit? Hospitalized since your last visit? No    2. Have you seen or consulted any other health care providers outside of the [facility] since your last visit? Include any pap smears or colon screening. Yes, Cardiologist, Dr. Octavia Greenberg, , had Furosemide increased to 1 1/2 tablets daily. Chief Complaint   Patient presents with    Hypertension     3 mo. f/u    COPD     3 mo. f/u    Cholesterol Problem     3 mo.  f/u       No food or drink since 7am.

## 2018-03-19 NOTE — PROGRESS NOTES
Chief Complaint   Patient presents with    Hypertension     3 mo. f/u    COPD     3 mo. f/u    Cholesterol Problem     3 mo. f/u       SUBJECTIVE:    Dave Tam is a 80 y.o. male who returns in follow-up of his medical problems include hypertension, glucose intolerance, hyperlipidemia, COPD, GERD, DJD, ASCVD, atrial fibrillation and other medical problems. In addition to his chronic problems he has an acute problem today of a cyst is developed on the right upper back. This is become somewhat painful to him. He notes no fevers or chills. He denies any chest pain, shortness of breath, palpitations or cardiorespiratory complaints. He denies any GI or  complaints. He denies any headaches, dizziness or neurologic complaints. He has no change of his chronic arthritic complaints and there are no other complaints on complete review of systems. Current Outpatient Prescriptions   Medication Sig Dispense Refill    rivaroxaban (XARELTO) 15 mg tab tablet Take 1 Tab by mouth daily (with breakfast). 90 Tab 3    levothyroxine (SYNTHROID) 137 mcg tablet Take 137 mcg by mouth Daily (before breakfast). 90 Tab 3    cephALEXin (KEFLEX) 500 mg capsule Take 1 Cap by mouth two (2) times a day. 20 Cap 0    furosemide (LASIX) 40 mg tablet 1 daily (Patient taking differently: 1 daily  Indications: Take 1 1/2 tablets daily.) 90 Tab 3    dilTIAZem ER (CARDIZEM LA) 120 mg tablet Take 1 Tab by mouth daily. 90 Tab 3    simvastatin (ZOCOR) 40 mg tablet Take 1 Tab by mouth nightly. 90 Tab 3    finasteride (PROSCAR) 5 mg tablet Take 1 Tab by mouth daily (after dinner). 90 Tab 3    naproxen (NAPROSYN) 500 mg tablet Take 500 mg by mouth two (2) times daily (with meals).  allopurinol (ZYLOPRIM) 100 mg tablet Take  by mouth daily.  DOCOSAHEXANOIC ACID/EPA (FISH OIL PO) Take  by mouth.  ESOMEPRAZOLE MAGNESIUM (NEXIUM PO) Take 40 mg by mouth every morning.       losartan (COZAAR) 100 mg tablet Take 100 mg by mouth daily.  tamsulosin (FLOMAX) 0.4 mg capsule Take 0.4 mg by mouth.  Take 2 tablets daily after dinner       Past Medical History:   Diagnosis Date    Arrhythmia     A-fib    GERD (gastroesophageal reflux disease)     Hypertension     Other ill-defined conditions(799.89)     BPH    Other ill-defined conditions(799.89)     lipids    Other ill-defined conditions(799.89)     shingles hx    Thyroid disease     Unspecified adverse effect of anesthesia      Past Surgical History:   Procedure Laterality Date    HX CATARACT REMOVAL      HX HEART CATHETERIZATION      ablation    HX HEENT      scar tissue removed/laser    HX HERNIA REPAIR  05/05/2017    Babatunde Womack MD    HX ORTHOPAEDIC      left foot fx--hardware    HX ORTHOPAEDIC  4/9/14    Right total knee arthroplasty    HX OTHER SURGICAL      prostate bx     No Known Allergies    REVIEW OF SYSTEMS:  General: negative for - chills or fever, or weight loss or gain  ENT: negative for - headaches, nasal congestion or tinnitus  Eyes: no blurred or visual changes  Neck: No stiffness or swollen nodes  Respiratory: negative for - cough, hemoptysis, shortness of breath or wheezing  Cardiovascular : negative for - chest pain, edema, palpitations or shortness of breath  Gastrointestinal: negative for - abdominal pain, blood in stools, heartburn or nausea/vomiting  Genito-Urinary: no dysuria, trouble voiding, or hematuria  Musculoskeletal: negative for - gait disturbance, joint pain, joint stiffness or joint swelling  Neurological: no TIA or stroke symptoms  Hematologic: no bruises, no bleeding  Lymphatic: no swollen glands  Integument: no lumps, mole changes, nail changes or rash cyst upper back  Endocrine:no malaise/lethargy poly uria or polydipsia or unexpected weight changes        Social History     Social History    Marital status:      Spouse name: N/A    Number of children: N/A    Years of education: N/A     Social History Main Topics  Smoking status: Former Smoker     Packs/day: 0.50     Quit date: 3/28/1963    Smokeless tobacco: Never Used    Alcohol use Yes      Comment: rare/social-beer    Drug use: Yes     Special: Prescription, OTC    Sexual activity: Not Asked     Other Topics Concern    None     Social History Narrative     Family History   Problem Relation Age of Onset    Heart Disease Mother     Hypertension Mother     Stroke Mother     Heart Disease Father     Cancer Paternal Grandmother        OBJECTIVE:     Visit Vitals    /68 (BP 1 Location: Left arm, BP Patient Position: Sitting)    Pulse (!) 59    Temp 97.7 °F (36.5 °C) (Oral)    Resp 20    Ht 5' 9.5\" (1.765 m)    Wt 205 lb (93 kg)    SpO2 96%    BMI 29.84 kg/m2     CONSTITUTIONAL:   well nourished, appears age appropriate  EYES: sclera anicteric, PERRL, EOMI  ENMT:nares clear, moist mucous membranes, pharynx clear  NECK: supple. Thyroid normal, No JVD or bruits  RESPIRATORY: Chest: clear to ascultation and percussion, normal inspiratory effort  CARDIOVASCULAR: Heart: regular rate and rhythm no murmurs, rubs or gallops, PMI not displaced, No thrills  GASTROINTESTINAL: Abdomen: non distended, soft, non tender, bowel sounds normal  HEMATOLOGIC: no purpura, petechiae or bruising  LYMPHATIC: No lymph node enlargemant  MUSCULOSKELETAL: Extremities: no edema or active synovitis, pulse 1+   INTEGUMENT: No unusual rashes or suspicious skin lesions noted. Nails appear normal.  3-4 cm sebaceous cyst right upper back. PERIPHERAL VASCULAR: normal pulses femoral, PT and DP  NEUROLOGIC: non-focal exam, A & O X 3  PSYCHIATRIC:, appropriate affect     ASSESSMENT:   1. Hypertension with renal disease    2. Glucose intolerance (impaired glucose tolerance)    3. Mixed hyperlipidemia    4. Primary osteoarthritis involving multiple joints    5. Gastroesophageal reflux disease without esophagitis    6. Paroxysmal atrial fibrillation (HCC)    7.  ASCVD (arteriosclerotic cardiovascular disease)    8. Chronic obstructive pulmonary disease, unspecified COPD type ([facility] [address]    9. Stage 3 chronic kidney disease    10. Sebaceous cyst      Pression  1 hypertension that is controlled continue current therapy as reviewed with him  2.  Glucose intolerance repeat status pending will make adjustments as needed based upon today's lab  3. Hyperlipidemia repeat status pending reviewed prior labs make adjustments if needed  4. DJD that is stable  5. GERD that is stable  6. A. fib seem to be stable  7. Atherosclerotic cardiovascular disease continue current therapy  8. COPD stable  9. CKD repeat status pending will make adjustments as needed  10. Sebaceous cyst we discussed treatment options and decided in drainage after Betadine scrub and local lidocaine anesthesia this was incised and drained of a large amount of purulent sebaceous type material.  This was sent for culture. The wound was then sterilely dressed and she is instructed in local care for some Keflex 500 mg twice daily for 10 days  Recheck 3 months or sooner should this cyst not resolve or should he have other problems. PLAN:  .  Orders Placed This Encounter    DRAIN SKIN ABSCESS SIMPLE    AMB POC COMPLETE CBC,AUTOMATED ENTER    AMB POC COMPREHENSIVE METABOLIC PANEL    AMB POC LIPID PROFILE    AMB POC CK (CPK)    AMB POC HEMOGLOBIN A1C    rivaroxaban (XARELTO) 15 mg tab tablet    levothyroxine (SYNTHROID) 137 mcg tablet    cephALEXin (KEFLEX) 500 mg capsule         ATTENTION:   This medical record was transcribed using an electronic medical records system. Although proofread, it may and can contain electronic and spelling errors. Other human spelling and other errors may be present. Corrections may be executed at a later time. Please feel free to contact us for any clarifications as needed. Follow-up Disposition: Not on File    No results found for any visits on 03/19/18.     Iraida Epps MD    The patient verbalized understanding of the problems and plans as explained.

## 2018-03-19 NOTE — TELEPHONE ENCOUNTER
RX refill request from the patient/pharmacy. Patient last seen 12- with labs, and next appt. scheduled for 03.-.

## 2018-03-21 LAB
ALBUMIN SERPL-MCNC: 4.2 G/DL (ref 3.9–5.4)
ALKALINE PHOS POC: 74 U/L (ref 38–126)
ALT SERPL-CCNC: 34 U/L (ref 9–52)
AST SERPL-CCNC: 23 U/L (ref 14–36)
BUN BLD-MCNC: 26 MG/DL (ref 9–20)
CALCIUM BLD-MCNC: 10 MG/DL (ref 8.4–10.2)
CHLORIDE BLD-SCNC: 107 MMOL/L (ref 98–107)
CHOLEST SERPL-MCNC: 127 MG/DL (ref 0–200)
CK (CPK) POC: 131 U/L (ref 30–135)
CO2 POC: 31 MMOL/L (ref 22–32)
CREAT BLD-MCNC: 1.4 MG/DL (ref 0.8–1.5)
EGFR (POC): 44.9
GLUCOSE POC: 89 MG/DL (ref 75–110)
GRAN# POC: 5.1 K/UL (ref 2–7.8)
GRAN% POC: 62.7 % (ref 37–92)
HBA1C MFR BLD HPLC: 5.8 % (ref 4.5–5.7)
HCT VFR BLD CALC: 44.1 % (ref 37–51)
HDLC SERPL-MCNC: 42 MG/DL (ref 35–130)
HGB BLD-MCNC: 14 G/DL (ref 12–18)
LDL CHOLESTEROL POC: 63.8 MG/DL (ref 0–130)
LY# POC: 2.2 K/UL (ref 0.6–4.1)
LY% POC: 27.9 % (ref 10–58.5)
MCH RBC QN: 29.5 PG (ref 26–32)
MCHC RBC-ENTMCNC: 31.8 G/DL (ref 30–36)
MCV RBC: 93 FL (ref 80–97)
MID #, POC: 0.7 K/UL (ref 0–1.8)
MID% POC: 9.4 % (ref 0.1–24)
PLATELET # BLD: 191 K/UL (ref 140–440)
POTASSIUM SERPL-SCNC: 5.2 MMOL/L (ref 3.6–5)
PROT SERPL-MCNC: 7.4 G/DL (ref 6.3–8.2)
RBC # BLD: 4.74 M/UL (ref 4.2–6.3)
SODIUM SERPL-SCNC: 144 MMOL/L (ref 137–145)
TCHOL/HDL RATIO (POC): 3 (ref 0–4)
TOTAL BILIRUBIN POC: 1 MG/DL (ref 0.2–1.3)
TRIGL SERPL-MCNC: 106 MG/DL (ref 0–200)
VLDLC SERPL CALC-MCNC: 21.2 MG/DL
WBC # BLD: 8 K/UL (ref 4.1–10.9)

## 2018-03-25 LAB — BACTERIA SPEC AEROBE CULT: NORMAL

## 2018-04-03 ENCOUNTER — APPOINTMENT (OUTPATIENT)
Dept: CT IMAGING | Age: 83
End: 2018-04-03
Attending: PHYSICIAN ASSISTANT
Payer: MEDICARE

## 2018-04-03 ENCOUNTER — HOSPITAL ENCOUNTER (EMERGENCY)
Age: 83
Discharge: HOME | End: 2018-04-03
Attending: EMERGENCY MEDICINE
Payer: MEDICARE

## 2018-04-03 VITALS
WEIGHT: 204.37 LBS | RESPIRATION RATE: 16 BRPM | TEMPERATURE: 97.7 F | BODY MASS INDEX: 30.27 KG/M2 | HEART RATE: 60 BPM | HEIGHT: 69 IN | SYSTOLIC BLOOD PRESSURE: 124 MMHG | DIASTOLIC BLOOD PRESSURE: 60 MMHG

## 2018-04-03 DIAGNOSIS — S01.01XA SCALP LACERATION, INITIAL ENCOUNTER: Primary | ICD-10-CM

## 2018-04-03 PROCEDURE — 90715 TDAP VACCINE 7 YRS/> IM: CPT | Performed by: EMERGENCY MEDICINE

## 2018-04-03 PROCEDURE — 75810000293 HC SIMP/SUPERF WND  RPR

## 2018-04-03 PROCEDURE — 74011250636 HC RX REV CODE- 250/636: Performed by: EMERGENCY MEDICINE

## 2018-04-03 PROCEDURE — 99282 EMERGENCY DEPT VISIT SF MDM: CPT

## 2018-04-03 PROCEDURE — 77030018836 HC SOL IRR NACL ICUM -A

## 2018-04-03 PROCEDURE — 90471 IMMUNIZATION ADMIN: CPT

## 2018-04-03 PROCEDURE — 77030031132 HC SUT NYL COVD -A

## 2018-04-03 PROCEDURE — 70450 CT HEAD/BRAIN W/O DYE: CPT

## 2018-04-03 PROCEDURE — 74011000250 HC RX REV CODE- 250: Performed by: PHYSICIAN ASSISTANT

## 2018-04-03 RX ORDER — LIDOCAINE HYDROCHLORIDE AND EPINEPHRINE 10; 10 MG/ML; UG/ML
1.5 INJECTION, SOLUTION INFILTRATION; PERINEURAL
Status: COMPLETED | OUTPATIENT
Start: 2018-04-03 | End: 2018-04-03

## 2018-04-03 RX ADMIN — TETANUS TOXOID, REDUCED DIPHTHERIA TOXOID AND ACELLULAR PERTUSSIS VACCINE, ADSORBED 0.5 ML: 5; 2.5; 8; 8; 2.5 SUSPENSION INTRAMUSCULAR at 19:30

## 2018-04-03 RX ADMIN — LIDOCAINE HYDROCHLORIDE AND EPINEPHRINE 15 MG: 10; 10 INJECTION, SOLUTION INFILTRATION; PERINEURAL at 18:29

## 2018-04-03 NOTE — ED NOTES
I have reviewed discharge instructions with the patient. The patient verbalized understanding. Patient discharged home with transportation provided. No further complaints noted at this time.

## 2018-04-03 NOTE — ED PROVIDER NOTES
EMERGENCY DEPARTMENT HISTORY AND PHYSICAL EXAM      Date: 4/3/2018  Patient Name: Matthew Taylor    History of Presenting Illness     Chief Complaint   Patient presents with    Laceration     pt reported he tripped while raking leaves about an hour PTA. Pt with laceration to left posterior scalp. Pt denies LOC. He is taking xarelto. History Provided By: Patient    HPI: Matthew Taylor, 80 y.o. male with PMHx significant for HTN, a-fib on chronic anti-coagulation, presents ambulatory to the ED with cc of new laceration to L occipital scalp x PTA. Pt reports that he was doing yard work this afternoon when he tripped and fell backwards, hitting his head on a storage container. His laceration immediately began bleeding, but he denies LOC, nausea, vomiting or HA. Pt is on xarelto at this time. PCP: Jazmin Robertson MD    There are no other complaints, changes, or physical findings at this time. Current Outpatient Prescriptions   Medication Sig Dispense Refill    tamsulosin (FLOMAX) 0.4 mg capsule TAKE 2 CAPSULES DAILY 180 Cap 3    rivaroxaban (XARELTO) 15 mg tab tablet Take 1 Tab by mouth daily (with breakfast). 90 Tab 3    levothyroxine (SYNTHROID) 137 mcg tablet Take 137 mcg by mouth Daily (before breakfast). 90 Tab 3    cephALEXin (KEFLEX) 500 mg capsule Take 1 Cap by mouth two (2) times a day. 20 Cap 0    furosemide (LASIX) 40 mg tablet 1 daily (Patient taking differently: 1 daily  Indications: Take 1 1/2 tablets daily.) 90 Tab 3    dilTIAZem ER (CARDIZEM LA) 120 mg tablet Take 1 Tab by mouth daily. 90 Tab 3    simvastatin (ZOCOR) 40 mg tablet Take 1 Tab by mouth nightly. 90 Tab 3    finasteride (PROSCAR) 5 mg tablet Take 1 Tab by mouth daily (after dinner). 90 Tab 3    naproxen (NAPROSYN) 500 mg tablet Take 500 mg by mouth two (2) times daily (with meals).  allopurinol (ZYLOPRIM) 100 mg tablet Take  by mouth daily.  DOCOSAHEXANOIC ACID/EPA (FISH OIL PO) Take  by mouth.  ESOMEPRAZOLE MAGNESIUM (NEXIUM PO) Take 40 mg by mouth every morning.  losartan (COZAAR) 100 mg tablet Take 100 mg by mouth daily. Past History     Past Medical History:  Past Medical History:   Diagnosis Date    Arrhythmia     A-fib    GERD (gastroesophageal reflux disease)     Hypertension     Other ill-defined conditions(799.89)     BPH    Other ill-defined conditions(799.89)     lipids    Other ill-defined conditions(799.89)     shingles hx    Thyroid disease     Unspecified adverse effect of anesthesia        Past Surgical History:  Past Surgical History:   Procedure Laterality Date    HX CATARACT REMOVAL      HX HEART CATHETERIZATION      ablation    HX HEENT      scar tissue removed/laser    HX HERNIA REPAIR  05/05/2017    Karen Womack MD    HX ORTHOPAEDIC      left foot fx--hardware    HX ORTHOPAEDIC  4/9/14    Right total knee arthroplasty    HX OTHER SURGICAL      prostate bx       Family History:  Family History   Problem Relation Age of Onset    Heart Disease Mother     Hypertension Mother     Stroke Mother     Heart Disease Father     Cancer Paternal Grandmother        Social History:  Social History   Substance Use Topics    Smoking status: Former Smoker     Packs/day: 0.50     Quit date: 3/28/1963    Smokeless tobacco: Never Used    Alcohol use Yes      Comment: rare/social-beer       Allergies:  No Known Allergies      Review of Systems   Review of Systems   Constitutional: Negative. Negative for appetite change, chills, fatigue and fever. HENT: Negative. Negative for congestion, rhinorrhea, sinus pressure and sore throat. Eyes: Negative. Respiratory: Negative. Negative for cough, choking, chest tightness, shortness of breath and wheezing. Cardiovascular: Negative. Negative for chest pain, palpitations and leg swelling. Gastrointestinal: Negative for abdominal pain, constipation, diarrhea, nausea and vomiting. Endocrine: Negative. Genitourinary: Negative. Negative for difficulty urinating, dysuria, flank pain and urgency. Musculoskeletal: Negative. Skin: Positive for wound (laceration to L occipital scalp). Neurological: Negative. Negative for dizziness, speech difficulty, weakness, light-headedness, numbness and headaches. Psychiatric/Behavioral: Negative. All other systems reviewed and are negative. Physical Exam   Physical Exam   Constitutional: He is oriented to person, place, and time. He appears well-developed and well-nourished. No distress. HENT:   Head: Normocephalic. Mouth/Throat: Oropharynx is clear and moist. No oropharyngeal exudate. Laceration posterior scalp no active bleeding   Eyes: Conjunctivae and EOM are normal. Pupils are equal, round, and reactive to light. Neck: Normal range of motion. Neck supple. No JVD present. No tracheal deviation present. Cardiovascular: Normal rate, regular rhythm, normal heart sounds and intact distal pulses. No murmur heard. Pulmonary/Chest: Effort normal and breath sounds normal. No stridor. No respiratory distress. He has no wheezes. He has no rales. He exhibits no tenderness. Musculoskeletal: Normal range of motion. He exhibits no edema or tenderness. Neurological: He is alert and oriented to person, place, and time. No cranial nerve deficit. No gross motor or sensory deficits    Skin: Skin is warm and dry. He is not diaphoretic. Psychiatric: He has a normal mood and affect. His behavior is normal.   Nursing note and vitals reviewed. Diagnostic Study Results     Radiologic Studies -   CT Results  (Last 48 hours)               04/03/18 1622  CT HEAD WO CONT Final result    Impression:  IMPRESSION:  No acute findings. Age-related changes. Narrative:  EXAMINATION:  CT HEAD WO CONT       CLINICAL INFORMATION:  Head injury mild or moderate acute, no neurological   deficit   COMPARISON:  None.     TECHNIQUE: Routine axial head CT was performed. IV contrast was not   administered. Sagittal and coronal reconstructions were generated. CT dose reduction was achieved through use of a standardized protocol tailored   for this examination and automatic exposure control for dose modulation. FINDINGS:   EXTRA-AXIAL SPACES:  Normal    INTRACRANIAL HEMORRHAGE:  None. VENTRICULAR SYSTEM:  Normal for age. BASAL CISTERNS:  Normal.   CEREBRAL PARENCHYMA:  Mild white matter hypodensity likely due to age and   intracranial small vessel disease. Probable prominent perivascular space in the   left inferior basal ganglia region. MIDLINE SHIFT:  None. CEREBELLUM:  Normal.   BRAINSTEM: Normal.   CALVARIUM: Normal.   PARANASAL SINUSES AND MASTOID AIR CELLS: Clear. VISUALIZED ORBITS: Normal.   SELLA: Normal.   SKULL BASE: Normal.                 Medical Decision Making   I am the first provider for this patient. I reviewed the vital signs, available nursing notes, past medical history, past surgical history, family history and social history. Vital Signs-Reviewed the patient's vital signs. Patient Vitals for the past 12 hrs:   Temp Pulse Resp BP   04/03/18 1933 - 60 - 124/60   04/03/18 1540 97.7 °F (36.5 °C) 62 16 139/52       Records Reviewed: Nursing Notes and Old Medical Records    Provider Notes (Medical Decision Making):   DDx: laceration, [address]    ED Course:   Initial assessment performed. The patients presenting problems have been discussed, and they are in agreement with the care plan formulated and outlined with them. I have encouraged them to ask questions as they arise throughout their visit. Procedure Note - Laceration Repair:  6:33 PM  Procedure by Saritha Lew PA-C. Complexity: complex  6cm curvy/linear laceration to L posterior lateral scalp, no active bleeding, was irrigated copiously with NS under jet lavage, prepped with Shur-Clens and draped in a sterile fashion.   The area was anesthetized with 8 mLs of Lidocaine 1% with epinephrine via local infiltration. The wound was explored with the following results: No foreign bodies found. The wound was repaired with One layer suture closure: Skin Layer:  7 sutures placed, stitch type:simple interrupted, suture: 5-0 nylon. .  The wound was closed with good hemostasis and approximation. Sterile dressing applied. Estimated blood loss: 2-3 mL  The procedure took 31-45 minutes, and pt tolerated well. Disposition:  DISCHARGE NOTE:  7:37 PM  Pt has been reexamined. Pt has no new complaints, changes, or physical findings. Care plan outlined and precautions discussed. All available results reviewed with pt. All medications reviewed with pt. All of pts questions and concerns addressed. Pt agrees to f/u as instructed and agrees to return to ED upon further deterioration. Pt is ready to go home. Written by Clovis Mcmillan ED Scribe as dictated by Sarkis Rosario DO    PLAN:  1. Discharge Medication List as of 4/3/2018  7:43 PM        2. Follow-up Information     Follow up With Details Comments [address] [place] [address], [address]  [phone]          Return to ED if worse     Diagnosis     Clinical Impression:   1. Scalp laceration, initial encounter        Attestations: This note is prepared by Clovis Mcmillan acting as scribe for Sarkis Rosario, [address] [place] [address], DO : The scribe's documentation has been prepared under my direction and personally reviewed by me in its entirety. I confirm that the note above accurately reflects all work, treatment, procedures, and medical decision making performed by me.

## 2018-04-03 NOTE — DISCHARGE INSTRUCTIONS
Cuts Closed With Stitches: Care Instructions  Your Care Instructions  A cut can happen anywhere on your body. The doctor used stitches to close the cut. Using stitches also helps the cut heal and reduces scarring. Sometimes pieces of tape called Steri-Strips are put over the stitches. If the cut went deep and through the skin, the doctor may have put in two layers of stitches. The deeper layer brings the deep part of the cut together. These stitches will dissolve and don't need to be removed. The stitches in the upper layer are the ones you see on the cut. You will probably have a bandage over the stitches. You will need to have the stitches removed, usually in 7 to 14 days. The doctor has checked you carefully, but problems can develop later. If you notice any problems or new symptoms, get medical treatment right away. Follow-up care is a key part of your treatment and safety. Be sure to make and go to all appointments, and call your doctor if you are having problems. It's also a good idea to know your test results and keep a list of the medicines you take. How can you care for yourself at home? · Keep the cut dry for the first 24 to 48 hours. After this, you can shower if your doctor okays it. Pat the cut dry. · Don't soak the cut, such as in a bathtub. Your doctor will tell you when it's safe to get the cut wet. · If your doctor told you how to care for your cut, follow your doctor's instructions. If you did not get instructions, follow this general advice:  ¨ After the first 24 to 48 hours, wash around the cut with clean water 2 times a day. Don't use hydrogen peroxide or alcohol, which can slow healing. ¨ You may cover the cut with a thin layer of petroleum jelly, such as Vaseline, and a nonstick bandage. ¨ Apply more petroleum jelly and replace the bandage as needed. · Prop up the sore area on a pillow anytime you sit or lie down during the next 3 days.  Try to keep it above the level of your heart. This will help reduce swelling. · Avoid any activity that could cause your cut to reopen. · Do not remove the stitches on your own. Your doctor will tell you when to come back to have the stitches removed. · Leave Steri-Strips on until they fall off. · Be safe with medicines. Read and follow all instructions on the label. ¨ If the doctor gave you a prescription medicine for pain, take it as prescribed. ¨ If you are not taking a prescription pain medicine, ask your doctor if you can take an over-the-counter medicine. When should you call for help? Call your doctor now or seek immediate medical care if:  ? · You have new pain, or your pain gets worse. ? · The skin near the cut is cold or pale or changes color. ? · You have tingling, weakness, or numbness near the cut.   ? · The cut starts to bleed, and blood soaks through the bandage. Oozing small amounts of blood is normal.   ? · You have trouble moving the area near the cut.   ? · You have symptoms of infection, such as:  ¨ Increased pain, swelling, warmth, or redness around the cut. ¨ Red streaks leading from the cut. ¨ Pus draining from the cut. ¨ A fever. ? Watch closely for changes in your health, and be sure to contact your doctor if:  ? · The cut reopens. ? · You do not get better as expected. Where can you learn more? Go to http://frank-lyubov.info/. Enter R217 in the search box to learn more about \"Cuts Closed With Stitches: Care Instructions. \"  Current as of: March 20, 2017  Content Version: 11.4  © 6060-2448 [employer]. Care instructions adapted under license by [email or web address] (which disclaims liability or warranty for this information). If you have questions about a medical condition or this instruction, always ask your healthcare professional. [address] any warranty or liability for your use of this information.

## 2018-04-03 NOTE — ED NOTES
Pt evaluated in [facility], reports he was raking leaves and tripped today, fell and hit back of head on a container, denies LOC

## 2018-04-11 ENCOUNTER — OFFICE VISIT (OUTPATIENT)
Dept: INTERNAL MEDICINE CLINIC | Age: 83
End: 2018-04-11

## 2018-04-11 VITALS
OXYGEN SATURATION: 95 % | RESPIRATION RATE: 16 BRPM | WEIGHT: 204.4 LBS | TEMPERATURE: 97.9 F | BODY MASS INDEX: 30.27 KG/M2 | HEART RATE: 58 BPM | DIASTOLIC BLOOD PRESSURE: 58 MMHG | HEIGHT: 69 IN | SYSTOLIC BLOOD PRESSURE: 134 MMHG

## 2018-04-11 DIAGNOSIS — S01.01XD LACERATION OF SCALP, SUBSEQUENT ENCOUNTER: ICD-10-CM

## 2018-04-11 DIAGNOSIS — I48.0 PAROXYSMAL ATRIAL FIBRILLATION (HCC): ICD-10-CM

## 2018-04-11 DIAGNOSIS — R29.6 FREQUENT FALLS: Primary | ICD-10-CM

## 2018-04-11 PROBLEM — S01.01XA SCALP LACERATION: Status: ACTIVE | Noted: 2018-04-11

## 2018-04-11 NOTE — PATIENT INSTRUCTIONS
Arthritis: Care Instructions  Your Care Instructions  Arthritis, also called osteoarthritis, is a breakdown of the cartilage that cushions your joints. When the cartilage wears down, your bones rub against each other. This causes pain and stiffness. Many people have some arthritis as they age. Arthritis most often affects the joints of the spine, hands, hips, knees, or feet. You can take simple measures to protect your joints, ease your pain, and help you stay active. Follow-up care is a key part of your treatment and safety. Be sure to make and go to all appointments, and call your doctor if you are having problems. It's also a good idea to know your test results and keep a list of the medicines you take. How can you care for yourself at home? · Stay at a healthy weight. Being overweight puts extra strain on your joints. · Talk to your doctor or physical therapist about exercises that will help ease joint pain. ¨ Stretch. You may enjoy gentle forms of yoga to help keep your joints and muscles flexible. ¨ Walk instead of jog. Other types of exercise that are less stressful on the joints include riding a bicycle, swimming, julia chi, or water exercise. ¨ Lift weights. Strong muscles help reduce stress on your joints. Stronger thigh muscles, for example, take some of the stress off of the knees and hips. Learn the right way to lift weights so you do not make joint pain worse. · Take your medicines exactly as prescribed. Call your doctor if you think you are having a problem with your medicine. · Take pain medicines exactly as directed. ¨ If the doctor gave you a prescription medicine for pain, take it as prescribed. ¨ If you are not taking a prescription pain medicine, ask your doctor if you can take an over-the-counter medicine. · Use a cane, crutch, walker, or another device if you need help to get around. These can help rest your joints.  You also can use other things to make life easier, such as a higher toilet seat and padded handles on kitchen utensils. · Do not sit in low chairs, which can make it hard to get up. · Put heat or cold on your sore joints as needed. Use whichever helps you most. You also can take turns with hot and cold packs. ¨ Apply heat 2 or 3 times a day for 20 to 30 minutes-using a heating pad, hot shower, or hot pack-to relieve pain and stiffness. ¨ Put ice or a cold pack on your sore joint for 10 to 20 minutes at a time. Put a thin cloth between the ice and your skin. When should you call for help? Call your doctor now or seek immediate medical care if:  ? · You have sudden swelling, warmth, or pain in any joint. ? · You have joint pain and a fever or rash. ? · You have such bad pain that you cannot use a joint. ? Watch closely for changes in your health, and be sure to contact your doctor if:  ? · You have mild joint symptoms that continue even with more than 6 weeks of care at home. ? · You have stomach pain or other problems with your medicine. Where can you learn more? Go to http://frankPacific Shore Holdingslyubov.info/. Enter F362 in the search box to learn more about \"Arthritis: Care Instructions. \"  Current as of: October 31, 2016  Content Version: 11.4  © 5329-9339 [employer]. Care instructions adapted under license by [employer] (which disclaims liability or warranty for this information). If you have questions about a medical condition or this instruction, always ask your healthcare professional. [place] [address] any warranty or liability for your use of this information. Arthritis: Care Instructions  Your Care Instructions  Arthritis, also called osteoarthritis, is a breakdown of the cartilage that cushions your joints. When the cartilage wears down, your bones rub against each other. This causes pain and stiffness. Many people have some arthritis as they age.  Arthritis most often affects the joints of the spine, hands, hips, knees, or feet. You can take simple measures to protect your joints, ease your pain, and help you stay active. Follow-up care is a key part of your treatment and safety. Be sure to make and go to all appointments, and call your doctor if you are having problems. It's also a good idea to know your test results and keep a list of the medicines you take. How can you care for yourself at home? · Stay at a healthy weight. Being overweight puts extra strain on your joints. · Talk to your doctor or physical therapist about exercises that will help ease joint pain. ¨ Stretch. You may enjoy gentle forms of yoga to help keep your joints and muscles flexible. ¨ Walk instead of jog. Other types of exercise that are less stressful on the joints include riding a bicycle, swimming, julia chi, or water exercise. ¨ Lift weights. Strong muscles help reduce stress on your joints. Stronger thigh muscles, for example, take some of the stress off of the knees and hips. Learn the right way to lift weights so you do not make joint pain worse. · Take your medicines exactly as prescribed. Call your doctor if you think you are having a problem with your medicine. · Take pain medicines exactly as directed. ¨ If the doctor gave you a prescription medicine for pain, take it as prescribed. ¨ If you are not taking a prescription pain medicine, ask your doctor if you can take an over-the-counter medicine. · Use a cane, crutch, walker, or another device if you need help to get around. These can help rest your joints. You also can use other things to make life easier, such as a higher toilet seat and padded handles on kitchen utensils. · Do not sit in low chairs, which can make it hard to get up. · Put heat or cold on your sore joints as needed. Use whichever helps you most. You also can take turns with hot and cold packs.   ¨ Apply heat 2 or 3 times a day for 20 to 30 minutes-using a heating pad, hot shower, or hot pack-to relieve pain and stiffness. ¨ Put ice or a cold pack on your sore joint for 10 to 20 minutes at a time. Put a thin cloth between the ice and your skin. When should you call for help? Call your doctor now or seek immediate medical care if:  ? · You have sudden swelling, warmth, or pain in any joint. ? · You have joint pain and a fever or rash. ? · You have such bad pain that you cannot use a joint. ? Watch closely for changes in your health, and be sure to contact your doctor if:  ? · You have mild joint symptoms that continue even with more than 6 weeks of care at home. ? · You have stomach pain or other problems with your medicine. Where can you learn more? Go to http://frank-lyubov.info/. Enter Y857 in the search box to learn more about \"Arthritis: Care Instructions. \"  Current as of: October 31, 2016  Content Version: 11.4  © 9354-6115 [employer]. Care instructions adapted under license by [employer] (which disclaims liability or warranty for this information). If you have questions about a medical condition or this instruction, always ask your healthcare professional. [address] any warranty or liability for your use of this information.

## 2018-04-11 NOTE — PROGRESS NOTES
Per orders from Dr. Juliet Cabrales, removed 7 stitches from left side of scalp. Patient verified the seven stitches. Site healing well with scabbed area. No signs of infection observed.

## 2018-04-11 NOTE — PROGRESS NOTES
Chief Complaint   Patient presents with    Follow-up     take stitches out of head         1. Have you been to the ER, urgent care clinic since your last visit? Yes [id number] tripped at home head injury needed stitches  Hospitalized since your last visit? no    2. Have you seen or consulted any other health care providers outside of the [address] [place] [address] since your last visit? Include any pap smears or colon screening.  no

## 2018-04-11 NOTE — PROGRESS NOTES
Chief Complaint   Patient presents with    Follow-up     take stitches out of head       SUBJECTIVE:    Coty Sandoval is a 80 y.o. male who returns in follow-up after an ER visit on April 3 when he presented there with a laceration to his left occipital area that was sustained in the fall when he tripped over a root in his yard. He noted he had extensive bleeding although he is on Xarelto because of chronic atrial fibrillation. He was able to stop the bleeding by applying a cool washcloth and pressure at home before going to the emergency room. Sutures were placed and he said a CT was done of his head which was normal.  He did not lose consciousness. He has noted no headaches or visual symptoms since the fall. He denies any other falls and is now walking with a cane. He said he would have been fine except he tripped over a root. He notes no other complaints at the present time including no cardiorespiratory or neurologic complaints. Current Outpatient Prescriptions   Medication Sig Dispense Refill    tamsulosin (FLOMAX) 0.4 mg capsule TAKE 2 CAPSULES DAILY 180 Cap 3    rivaroxaban (XARELTO) 15 mg tab tablet Take 1 Tab by mouth daily (with breakfast). 90 Tab 3    levothyroxine (SYNTHROID) 137 mcg tablet Take 137 mcg by mouth Daily (before breakfast). 90 Tab 3    furosemide (LASIX) 40 mg tablet 1 daily (Patient taking differently: 1 daily  Indications: Take 1 1/2 tablets daily.) 90 Tab 3    dilTIAZem ER (CARDIZEM LA) 120 mg tablet Take 1 Tab by mouth daily. 90 Tab 3    simvastatin (ZOCOR) 40 mg tablet Take 1 Tab by mouth nightly. 90 Tab 3    finasteride (PROSCAR) 5 mg tablet Take 1 Tab by mouth daily (after dinner). 90 Tab 3    allopurinol (ZYLOPRIM) 100 mg tablet Take  by mouth daily.  DOCOSAHEXANOIC ACID/EPA (FISH OIL PO) Take  by mouth.  ESOMEPRAZOLE MAGNESIUM (NEXIUM PO) Take 40 mg by mouth every morning.  losartan (COZAAR) 100 mg tablet Take 100 mg by mouth daily. Past Medical History:   Diagnosis Date    Arrhythmia     A-fib    GERD (gastroesophageal reflux disease)     Hypertension     Other ill-defined conditions(799.89)     BPH    Other ill-defined conditions(799.89)     lipids    Other ill-defined conditions(799.89)     shingles hx    Thyroid disease     Unspecified adverse effect of anesthesia      Past Surgical History:   Procedure Laterality Date    HX CATARACT REMOVAL      HX HEART CATHETERIZATION      ablation    HX HEENT      scar tissue removed/laser    HX HERNIA REPAIR  05/05/2017    Danyelle Womack MD    HX ORTHOPAEDIC      left foot fx--hardware    HX ORTHOPAEDIC  4/9/14    Right total knee arthroplasty    HX OTHER SURGICAL      prostate bx     No Known Allergies    REVIEW OF SYSTEMS:  General: negative for - chills or fever, or weight loss or gain  ENT: negative for - headaches, nasal congestion or tinnitus  Eyes: no blurred or visual changes  Neck: No stiffness or swollen nodes  Respiratory: negative for - cough, hemoptysis, shortness of breath or wheezing  Cardiovascular : negative for - chest pain, edema, palpitations or shortness of breath  Gastrointestinal: negative for - abdominal pain, blood in stools, heartburn or nausea/vomiting  Genito-Urinary: no dysuria, trouble voiding, or hematuria  Musculoskeletal: negative for - gait disturbance, joint pain, joint stiffness or joint swelling  Neurological: no TIA or stroke symptoms. Status post fall as noted above  Hematologic: no bruises, no bleeding  Lymphatic: no swollen glands  Integument: no lumps, mole changes, nail changes or rash.   Laceration to left occipital area  Endocrine:no malaise/lethargy poly uria or polydipsia or unexpected weight changes        Social History     Social History    Marital status:      Spouse name: N/A    Number of children: N/A    Years of education: N/A     Social History Main Topics    Smoking status: Former Smoker     Packs/day: 0.50     Quit date: 3/28/1963    Smokeless tobacco: Never Used    Alcohol use Yes      Comment: rare/social-beer    Drug use: Yes     Special: Prescription, OTC    Sexual activity: Not Asked     Other Topics Concern    None     Social History Narrative     Family History   Problem Relation Age of Onset    Heart Disease Mother     Hypertension Mother     Stroke Mother     Heart Disease Father     Cancer Paternal Grandmother        OBJECTIVE:     Visit Vitals    /58 (BP 1 Location: Left arm, BP Patient Position: Sitting)    Pulse (!) 58    Temp 97.9 °F (36.6 °C) (Oral)    Resp 16    Ht 5' 9\" (1.753 m)    Wt 204 lb 6.4 oz (92.7 kg)    SpO2 95%    BMI 30.18 kg/m2     CONSTITUTIONAL:   well nourished, appears age appropriate  EYES: sclera anicteric, PERRL, EOMI  ENMT:nares clear, moist mucous membranes, pharynx clear  NECK: supple. Thyroid normal, No JVD or bruits  RESPIRATORY: Chest: clear to ascultation and percussion, normal inspiratory effort  CARDIOVASCULAR: Heart: regular rate and rhythm no murmurs, rubs or gallops, PMI not displaced, No thrills  GASTROINTESTINAL: Abdomen: non distended, soft, non tender, bowel sounds normal  HEMATOLOGIC: no purpura, petechiae or bruising  LYMPHATIC: No lymph node enlargemant  MUSCULOSKELETAL: Extremities: no edema or active synovitis, pulse 1+   INTEGUMENT: No unusual rashes or suspicious skin lesions noted. Nails appear normal..  Well-healed laceration left occipital area with sutures in place which are removed today  PERIPHERAL VASCULAR: normal pulses femoral, PT and DP  NEUROLOGIC: non-focal exam, A & O X 3  PSYCHIATRIC:, appropriate affect     ASSESSMENT:   1. Frequent falls    2. Paroxysmal atrial fibrillation (HCC)    3. Laceration of scalp, subsequent encounter      Impression  1. Status post fall in the ER after tripping on root at this point he is given informational falls and caution to be more From the continue using the cane which is now using  2. Paroxysmal atrial fibrillation thus on long-term Xarelto and thus had extensive bleeding  3. Laceration of scalp sutures in place they removed today is well-healed and he will continue with local care  I recheck him per previous schedule unless there are further problems. PLAN:  . No orders of the defined types were placed in this encounter. ATTENTION:   This medical record was transcribed using an electronic medical records system. Although proofread, it may and can contain electronic and spelling errors. Other human spelling and other errors may be present. Corrections may be executed at a later time. Please feel free to contact us for any clarifications as needed. Follow-up Disposition: Not on File    No results found for any visits on 04/11/18. Yolanda Ayoub MD    The patient verbalized understanding of the problems and plans as explained.

## 2018-04-11 NOTE — MR AVS SNAPSHOT
[facility] 
 
 
 [address] [address] [phone] Patient: Corwin Piña MRN: [id number] Visit Information Date & Time Provider Department Dept. Phone Encounter #  
 4/11/2018  2:10 PM Eric Milligan, [address] [facility] [phone] [id number] Your Appointments 6/18/2018  1:00 PM  
FOLLOW UP 10 with MD ECTOR [facility] ([address]) Appt Note: 3 mo flp [address] [phone] [address] [address] Upcoming Health Maintenance Date Due ZOSTER VACCINE AGE 60> 1/13/1991 GLAUCOMA SCREENING Q2Y 3/13/1996 MEDICARE YEARLY EXAM 9/13/2018 DTaP/Tdap/Td series (2 - Td) 4/3/2028 Allergies as of 4/11/2018  Review Complete On: 4/11/2018 By: Alessia De León LPN No Known Allergies Current Immunizations  Reviewed on 4/9/2014 Name Date Influenza High Dose Vaccine PF 12/12/2017 Influenza Vaccine 10/20/2016, 12/9/2015, 10/27/2014 Pneumococcal Conjugate (PCV-13) 9/1/2015 Pneumococcal Vaccine (Unspecified Type) 1/1/2014, 10/11/2004 Tdap 4/3/2018  7:30 PM  
  
 Not reviewed this visit Vitals BP Pulse Temp Resp Height(growth percentile) Weight(growth percentile) 134/58 (BP 1 Location: Left arm, BP Patient Position: Sitting) (!) 58 97.9 °F (36.6 °C) (Oral) 16 5' 9\" (1.753 m) 204 lb 6.4 oz (92.7 kg) SpO2 BMI Smoking Status 95% 30.18 kg/m2 Former Smoker Vitals History BMI and BSA Data Body Mass Index Body Surface Area  
 30.18 kg/m 2 2.12 m 2 Preferred Pharmacy Pharmacy Name Phone [facility] [address] [place] [address] [phone] Your Updated Medication List  
  
   
 This list is accurate as of 4/11/18  3:14 PM.  Always use your most recent med list.  
  
  
  
  
 allopurinol 100 mg tablet Commonly known as:  Dyan Salts Take  by mouth daily. cephALEXin 500 mg capsule Commonly known as:  Ronelle Pleas Take 1 Cap by mouth two (2) times a day. dilTIAZem  mg tablet Commonly known as:  CARDIZEM LA Take 1 Tab by mouth daily. finasteride 5 mg tablet Commonly known as:  PROSCAR Take 1 Tab by mouth daily (after dinner). FISH OIL PO Take  by mouth. furosemide 40 mg tablet Commonly known as:  LASIX  
1 daily  
  
 levothyroxine 137 mcg tablet Commonly known as:  SYNTHROID Take 137 mcg by mouth Daily (before breakfast). losartan 100 mg tablet Commonly known as:  COZAAR Take 100 mg by mouth daily. naproxen 500 mg tablet Commonly known as:  NAPROSYN Take 500 mg by mouth two (2) times daily (with meals). NEXIUM PO Take 40 mg by mouth every morning. rivaroxaban 15 mg Tab tablet Commonly known as:  Elby Eugene Take 1 Tab by mouth daily (with breakfast). simvastatin 40 mg tablet Commonly known as:  ZOCOR Take 1 Tab by mouth nightly. tamsulosin 0.4 mg capsule Commonly known as:  FLOMAX TAKE 2 CAPSULES DAILY Introducing [facility]! Inez Vilchis introduces [employer] [facility] patient portal. Now you can access parts of your medical record, email your doctor's office, and request medication refills online. 1. In your internet browser, go to [email or web address] 2. Click on the First Time User? Click Here link in the Sign In box. You will see the New Member Sign Up page. 3. Enter your [facility] [facility] Access Code exactly as it appears below. You will not need to use this code after youve completed the sign-up process. If you do not sign up before the expiration date, you must request a new code. · [employer] [facility] Access Code: [email or web address] Expires: 6/17/2018  1:31 PM 
 
 4. Enter the last four digits of your Social Security Number (xxxx) and Date of Birth (mm/dd/yyyy) as indicated and click Submit. You will be taken to the next sign-up page. 5. Create a [email or web address] ID. This will be your [email or web address] login ID and cannot be changed, so think of one that is secure and easy to remember. 6. Create a [email or web address] password. You can change your password at any time. 7. Enter your Password Reset Question and Answer. This can be used at a later time if you forget your password. 8. Enter your e-mail address. You will receive e-mail notification when new information is available in [address] 9. Click Sign Up. You can now view and download portions of your medical record. 10. Click the Download Summary menu link to download a portable copy of your medical information. If you have questions, please visit the Frequently Asked Questions section of the [email or web address] website. Remember, [email or web address] is NOT to be used for urgent needs. For medical emergencies, dial 911. Now available from your iPhone and Android! Please provide this summary of care documentation to your next provider. Your primary care clinician is listed as Raghu. If you have any questions after today's visit, please call [phone].

## 2018-06-17 PROBLEM — E66.09 CLASS 1 OBESITY DUE TO EXCESS CALORIES WITHOUT SERIOUS COMORBIDITY WITH BODY MASS INDEX (BMI) OF 30.0 TO 30.9 IN ADULT: Status: ACTIVE | Noted: 2018-06-17

## 2018-06-18 ENCOUNTER — OFFICE VISIT (OUTPATIENT)
Dept: INTERNAL MEDICINE CLINIC | Age: 83
End: 2018-06-18

## 2018-06-18 VITALS
HEIGHT: 69 IN | HEART RATE: 66 BPM | RESPIRATION RATE: 20 BRPM | WEIGHT: 197.6 LBS | TEMPERATURE: 98.1 F | OXYGEN SATURATION: 95 % | DIASTOLIC BLOOD PRESSURE: 58 MMHG | SYSTOLIC BLOOD PRESSURE: 100 MMHG | BODY MASS INDEX: 29.27 KG/M2

## 2018-06-18 DIAGNOSIS — N18.30 STAGE 3 CHRONIC KIDNEY DISEASE (HCC): ICD-10-CM

## 2018-06-18 DIAGNOSIS — E78.2 MIXED HYPERLIPIDEMIA: ICD-10-CM

## 2018-06-18 DIAGNOSIS — I12.9 HYPERTENSION WITH RENAL DISEASE: Primary | ICD-10-CM

## 2018-06-18 DIAGNOSIS — J44.9 CHRONIC OBSTRUCTIVE PULMONARY DISEASE, UNSPECIFIED COPD TYPE (HCC): ICD-10-CM

## 2018-06-18 DIAGNOSIS — R73.02 GLUCOSE INTOLERANCE (IMPAIRED GLUCOSE TOLERANCE): ICD-10-CM

## 2018-06-18 DIAGNOSIS — I25.10 ASCVD (ARTERIOSCLEROTIC CARDIOVASCULAR DISEASE): ICD-10-CM

## 2018-06-18 DIAGNOSIS — M25.552 PAIN OF LEFT HIP JOINT: ICD-10-CM

## 2018-06-18 DIAGNOSIS — K21.9 GASTROESOPHAGEAL REFLUX DISEASE WITHOUT ESOPHAGITIS: ICD-10-CM

## 2018-06-18 DIAGNOSIS — M15.9 PRIMARY OSTEOARTHRITIS INVOLVING MULTIPLE JOINTS: ICD-10-CM

## 2018-06-18 DIAGNOSIS — I48.0 PAROXYSMAL ATRIAL FIBRILLATION (HCC): ICD-10-CM

## 2018-06-18 RX ORDER — DICLOFENAC SODIUM 75 MG/1
75 TABLET, DELAYED RELEASE ORAL 2 TIMES DAILY
Qty: 20 TAB | Refills: 0 | Status: SHIPPED | OUTPATIENT
Start: 2018-06-18 | End: 2018-11-06 | Stop reason: ALTCHOICE

## 2018-06-18 RX ORDER — LOSARTAN POTASSIUM 100 MG/1
100 TABLET ORAL DAILY
Qty: 90 TAB | Refills: 3 | Status: SHIPPED | OUTPATIENT
Start: 2018-06-18 | End: 2018-09-12 | Stop reason: SDUPTHER

## 2018-06-18 RX ORDER — ALLOPURINOL 100 MG/1
100 TABLET ORAL DAILY
Qty: 90 TAB | Refills: 3 | Status: SHIPPED | OUTPATIENT
Start: 2018-06-18 | End: 2019-01-01 | Stop reason: ALTCHOICE

## 2018-06-18 RX ORDER — NAPROXEN 500 MG/1
500 TABLET ORAL 2 TIMES DAILY WITH MEALS
COMMUNITY
End: 2018-01-01 | Stop reason: ALTCHOICE

## 2018-06-18 NOTE — PATIENT INSTRUCTIONS
Arthritis: Care Instructions  Your Care Instructions  Arthritis, also called osteoarthritis, is a breakdown of the cartilage that cushions your joints. When the cartilage wears down, your bones rub against each other. This causes pain and stiffness. Many people have some arthritis as they age. Arthritis most often affects the joints of the spine, hands, hips, knees, or feet. You can take simple measures to protect your joints, ease your pain, and help you stay active. Follow-up care is a key part of your treatment and safety. Be sure to make and go to all appointments, and call your doctor if you are having problems. It's also a good idea to know your test results and keep a list of the medicines you take. How can you care for yourself at home? · Stay at a healthy weight. Being overweight puts extra strain on your joints. · Talk to your doctor or physical therapist about exercises that will help ease joint pain. ¨ Stretch. You may enjoy gentle forms of yoga to help keep your joints and muscles flexible. ¨ Walk instead of jog. Other types of exercise that are less stressful on the joints include riding a bicycle, swimming, julia chi, or water exercise. ¨ Lift weights. Strong muscles help reduce stress on your joints. Stronger thigh muscles, for example, take some of the stress off of the knees and hips. Learn the right way to lift weights so you do not make joint pain worse. · Take your medicines exactly as prescribed. Call your doctor if you think you are having a problem with your medicine. · Take pain medicines exactly as directed. ¨ If the doctor gave you a prescription medicine for pain, take it as prescribed. ¨ If you are not taking a prescription pain medicine, ask your doctor if you can take an over-the-counter medicine. · Use a cane, crutch, walker, or another device if you need help to get around. These can help rest your joints.  You also can use other things to make life easier, such as a higher toilet seat and padded handles on kitchen utensils. · Do not sit in low chairs, which can make it hard to get up. · Put heat or cold on your sore joints as needed. Use whichever helps you most. You also can take turns with hot and cold packs. ¨ Apply heat 2 or 3 times a day for 20 to 30 minutes-using a heating pad, hot shower, or hot pack-to relieve pain and stiffness. ¨ Put ice or a cold pack on your sore joint for 10 to 20 minutes at a time. Put a thin cloth between the ice and your skin. When should you call for help? Call your doctor now or seek immediate medical care if:  ? · You have sudden swelling, warmth, or pain in any joint. ? · You have joint pain and a fever or rash. ? · You have such bad pain that you cannot use a joint. ? Watch closely for changes in your health, and be sure to contact your doctor if:  ? · You have mild joint symptoms that continue even with more than 6 weeks of care at home. ? · You have stomach pain or other problems with your medicine. Where can you learn more? Go to http://frank-lyubov.info/. Enter K686 in the search box to learn more about \"Arthritis: Care Instructions. \"  Current as of: October 31, 2016  Content Version: 11.4  © 7951-6060 Collect. Care instructions adapted under license by [employer] (which disclaims liability or warranty for this information). If you have questions about a medical condition or this instruction, always ask your healthcare professional. [place] [address] any warranty or liability for your use of this information.

## 2018-06-18 NOTE — MR AVS SNAPSHOT
[address] [facility] [address] [address] [phone] Patient: Kenney Benitez MRN: [id number] XSL:1/49/5318 Visit Information Date & Time Provider Department Dept. Phone Encounter #  
 6/18/2018  1:00 PM Chong Cevallos, 20 [facility] [phone] [id number] Follow-up Instructions Return in about 3 months (around 9/18/2018). Upcoming Health Maintenance Date Due ZOSTER VACCINE AGE 60> 1/13/1991 GLAUCOMA SCREENING Q2Y 3/13/1996 Influenza Age 5 to Adult 8/1/2018 MEDICARE YEARLY EXAM 9/13/2018 DTaP/Tdap/Td series (2 - Td) 4/3/2028 Allergies as of 6/18/2018  Review Complete On: 6/18/2018 By: Chong Cevallos MD  
 No Known Allergies Current Immunizations  Reviewed on 4/9/2014 Name Date Influenza High Dose Vaccine PF 12/12/2017 Influenza Vaccine 10/20/2016, 12/9/2015, 10/27/2014 Pneumococcal Conjugate (PCV-13) 9/1/2015 Pneumococcal Vaccine (Unspecified Type) 1/1/2014, 10/11/2004 Tdap 4/3/2018  7:30 PM  
  
 Not reviewed this visit You Were Diagnosed With   
  
 Codes Comments Hypertension with renal disease    -  Primary ICD-10-CM: I12.9 ICD-9-CM: 403.90 Glucose intolerance (impaired glucose tolerance)     ICD-10-CM: R73.02 
ICD-9-CM: 790.22 Mixed hyperlipidemia     ICD-10-CM: E78.2 ICD-9-CM: 272.2 Primary osteoarthritis involving multiple joints     ICD-10-CM: M15.0 ICD-9-CM: 715.09 Gastroesophageal reflux disease without esophagitis     ICD-10-CM: K21.9 ICD-9-CM: 530.81 Chronic obstructive pulmonary disease, unspecified COPD type ([facility]     ICD-10-CM: J44.9 ICD-9-CM: 650 Paroxysmal atrial fibrillation (HCC)     ICD-10-CM: I48.0 ICD-9-CM: 427.31   
 ASCVD (arteriosclerotic cardiovascular disease)     ICD-10-CM: I25.10 ICD-9-CM: 429.2, 440.9 Stage 3 chronic kidney disease     ICD-10-CM: N18.3 ICD-9-CM: 585.3 Pain of left hip joint     ICD-10-CM: M25.552 ICD-9-CM: 719.45 Vitals BP Pulse Temp Resp Height(growth percentile) Weight(growth percentile) 100/58 (BP 1 Location: Left arm, BP Patient Position: Sitting) 66 98.1 °F (36.7 °C) (Oral) 20 5' 9\" (1.753 m) 197 lb 9.6 oz (89.6 kg) SpO2 BMI Smoking Status 95% 29.18 kg/m2 Former Smoker Vitals History BMI and BSA Data Body Mass Index Body Surface Area  
 29.18 kg/m 2 2.09 m 2 Preferred Pharmacy Pharmacy Name Phone [facility] [address] [phone] Your Updated Medication List  
  
   
This list is accurate as of 6/18/18  1:59 PM.  Always use your most recent med list.  
  
  
  
  
 allopurinol 100 mg tablet Commonly known as:  Pollyann Raja Take 1 Tab by mouth daily. dilTIAZem  mg tablet Commonly known as:  CARDIZEM LA Take 1 Tab by mouth daily. finasteride 5 mg tablet Commonly known as:  PROSCAR Take 1 Tab by mouth daily (after dinner). FISH OIL PO Take  by mouth. furosemide 40 mg tablet Commonly known as:  LASIX  
1 daily  
  
 losartan 100 mg tablet Commonly known as:  COZAAR Take 1 Tab by mouth daily. naproxen 500 mg tablet Commonly known as:  NAPROSYN Take 500 mg by mouth two (2) times daily (with meals). NEXIUM PO Take 40 mg by mouth every morning. rivaroxaban 15 mg Tab tablet Commonly known as:  García Fausto Take 1 Tab by mouth daily (with breakfast). simvastatin 40 mg tablet Commonly known as:  ZOCOR Take 1 Tab by mouth nightly. tamsulosin 0.4 mg capsule Commonly known as:  FLOMAX TAKE 2 CAPSULES DAILY Prescriptions Sent to Pharmacy Refills  
 dilTIAZem ER (CARDIZEM LA) 120 mg tablet 3 Sig: Take 1 Tab by mouth daily. Class: Normal  
 Pharmacy: [facility] [address] Ph #: [phone]  Route: Oral  
 allopurinol (ZYLOPRIM) 100 mg tablet 3 Sig: Take 1 Tab by mouth daily. Class: Normal  
 Pharmacy: [facility] [address] Ph #: [phone] Route: Oral  
 losartan (COZAAR) 100 mg tablet 3 Sig: Take 1 Tab by mouth daily. Class: Normal  
 Pharmacy: [facility] [address] Ph #: [phone] Route: Oral  
  
We Performed the Following AMB POC CK (CPK) [32831 CPT(R)] AMB POC COMPLETE CBC,AUTOMATED ENTER [id number] CPT(R)] AMB POC COMPREHENSIVE METABOLIC PANEL [38972 CPT(R)] AMB POC HEMOGLOBIN A1C [02551 CPT(R)] AMB POC LIPID PROFILE [04767 CPT(R)] Follow-up Instructions Return in about 3 months (around 9/18/2018). To-Do List   
 06/18/2018 Imaging:  XR HIP LT W OR WO PELV 2-3 VWS Patient Instructions Arthritis: Care Instructions Your Care Instructions Arthritis, also called osteoarthritis, is a breakdown of the cartilage that cushions your joints. When the cartilage wears down, your bones rub against each other. This causes pain and stiffness. Many people have some arthritis as they age. Arthritis most often affects the joints of the spine, hands, hips, knees, or feet. You can take simple measures to protect your joints, ease your pain, and help you stay active. Follow-up care is a key part of your treatment and safety. Be sure to make and go to all appointments, and call your doctor if you are having problems. It's also a good idea to know your test results and keep a list of the medicines you take. How can you care for yourself at home? · Stay at a healthy weight. Being overweight puts extra strain on your joints. · Talk to your doctor or physical therapist about exercises that will help ease joint pain. ¨ Stretch. You may enjoy gentle forms of yoga to help keep your joints and muscles flexible. ¨ Walk instead of jog.  Other types of exercise that are less stressful on the joints include riding a bicycle, swimming, julia chi, or water exercise. ¨ Lift weights. Strong muscles help reduce stress on your joints. Stronger thigh muscles, for example, take some of the stress off of the knees and hips. Learn the right way to lift weights so you do not make joint pain worse. · Take your medicines exactly as prescribed. Call your doctor if you think you are having a problem with your medicine. · Take pain medicines exactly as directed. ¨ If the doctor gave you a prescription medicine for pain, take it as prescribed. ¨ If you are not taking a prescription pain medicine, ask your doctor if you can take an over-the-counter medicine. · Use a cane, crutch, walker, or another device if you need help to get around. These can help rest your joints. You also can use other things to make life easier, such as a higher toilet seat and padded handles on kitchen utensils. · Do not sit in low chairs, which can make it hard to get up. · Put heat or cold on your sore joints as needed. Use whichever helps you most. You also can take turns with hot and cold packs. ¨ Apply heat 2 or 3 times a day for 20 to 30 minutes-using a heating pad, hot shower, or hot pack-to relieve pain and stiffness. ¨ Put ice or a cold pack on your sore joint for 10 to 20 minutes at a time. Put a thin cloth between the ice and your skin. When should you call for help? Call your doctor now or seek immediate medical care if: 
? · You have sudden swelling, warmth, or pain in any joint. ? · You have joint pain and a fever or rash. ? · You have such bad pain that you cannot use a joint. ? Watch closely for changes in your health, and be sure to contact your doctor if: 
? · You have mild joint symptoms that continue even with more than 6 weeks of care at home. ? · You have stomach pain or other problems with your medicine. Where can you learn more? Go to http://frank-lyubov.info/. Enter U718 in the search box to learn more about \"Arthritis: Care Instructions. \" Current as of: October 31, 2016 Content Version: 11.4 © 1040-4029 Healthwise, [employer]. Care instructions adapted under license by [employer] (which disclaims liability or warranty for this information). If you have questions about a medical condition or this instruction, always ask your healthcare professional. [address] any warranty or liability for your use of this information. Introducing [facility]! [facility] introduces Horsealot patient portal. Now you can access parts of your medical record, email your doctor's office, and request medication refills online. 1. In your internet browser, go to [email or web address] 2. Click on the First Time User? Click Here link in the Sign In box. You will see the New Member Sign Up page. 3. Enter your Horsealot Access Code exactly as it appears below. You will not need to use this code after youve completed the sign-up process. If you do not sign up before the expiration date, you must request a new code. · Horsealot Access Code: [email or web address] Expires: 9/16/2018  1:59 PM 
 
4. Enter the last four digits of your Social Security Number (xxxx) and Date of Birth (mm/dd/yyyy) as indicated and click Submit. You will be taken to the next sign-up page. 5. Create a Horsealot ID. This will be your Horsealot login ID and cannot be changed, so think of one that is secure and easy to remember. 6. Create a Horsealot password. You can change your password at any time. 7. Enter your Password Reset Question and Answer. This can be used at a later time if you forget your password. 8. Enter your e-mail address. You will receive e-mail notification when new information is available in [address] 9. Click Sign Up. You can now view and download portions of your medical record. 10. Click the Download Summary menu link to download a portable copy of your medical information. If you have questions, please visit the Frequently Asked Questions section of the Upside website. Remember, Upside is NOT to be used for urgent needs. For medical emergencies, dial 911. Now available from your iPhone and Android! Please provide this summary of care documentation to your next provider. Your primary care clinician is listed as Raghu. If you have any questions after today's visit, please call [phone].

## 2018-06-18 NOTE — PROGRESS NOTES
1. Have you been to the ER, urgent care clinic since your last visit? Hospitalized since your last visit? No    2. Have you seen or consulted any other health care providers outside of the [address] [place] [address] since your last visit? Include any pap smears or colon screening. No     Chief Complaint   Patient presents with    Hypertension     3 mo.  f/u    Irregular Heart Beat    Chronic Kidney Disease    Cholesterol Problem       Fasting

## 2018-06-18 NOTE — PROGRESS NOTES
Chief Complaint   Patient presents with    Hypertension     3 mo. f/u    Irregular Heart Beat    Chronic Kidney Disease    Cholesterol Problem       SUBJECTIVE:    Kevin Morrison is a 80 y.o. male who returns in follow-up of his medical problems include hypertension, glucose intolerance, hyperlipidemia, CKD, GERD, COPD, ASCVD, atrial fibrillation and other medical problems. In addition his chronic problems today he has some probable left hip pain is been bothering for about a week. He notes no radiation down the leg and has not had any falls. He denies any other joint aches and pains or arthritic complaints. He denies any chest pain, shortness breath, palpitations or cardiorespiratory complaints. There are no complaints GI or . He denies any headaches, dizziness or neurologic complaints. He is taking his medications and trying to follow his diet and try to get some exercise. Current Outpatient Prescriptions   Medication Sig Dispense Refill    naproxen (NAPROSYN) 500 mg tablet Take 500 mg by mouth two (2) times daily (with meals).  dilTIAZem ER (CARDIZEM LA) 120 mg tablet Take 1 Tab by mouth daily. 90 Tab 3    allopurinol (ZYLOPRIM) 100 mg tablet Take 1 Tab by mouth daily. 90 Tab 3    losartan (COZAAR) 100 mg tablet Take 1 Tab by mouth daily. 90 Tab 3    diclofenac EC (VOLTAREN) 75 mg EC tablet Take 1 Tab by mouth two (2) times a day. 20 Tab 0    tamsulosin (FLOMAX) 0.4 mg capsule TAKE 2 CAPSULES DAILY 180 Cap 3    rivaroxaban (XARELTO) 15 mg tab tablet Take 1 Tab by mouth daily (with breakfast). 90 Tab 3    furosemide (LASIX) 40 mg tablet 1 daily (Patient taking differently: Take 40 mg by mouth daily.) 90 Tab 3    simvastatin (ZOCOR) 40 mg tablet Take 1 Tab by mouth nightly. 90 Tab 3    finasteride (PROSCAR) 5 mg tablet Take 1 Tab by mouth daily (after dinner). 90 Tab 3    DOCOSAHEXANOIC ACID/EPA (FISH OIL PO) Take  by mouth.       ESOMEPRAZOLE MAGNESIUM (NEXIUM PO) Take 40 mg by mouth every morning.        Past Medical History:   Diagnosis Date    Arrhythmia     A-fib    GERD (gastroesophageal reflux disease)     Hypertension     Other ill-defined conditions(799.89)     BPH    Other ill-defined conditions(799.89)     lipids    Other ill-defined conditions(799.89)     shingles hx    Thyroid disease     Unspecified adverse effect of anesthesia      Past Surgical History:   Procedure Laterality Date    HX CATARACT REMOVAL      HX HEART CATHETERIZATION      ablation    HX HEENT      scar tissue removed/laser    HX HERNIA REPAIR  05/05/2017    Alfredo Womack MD    HX ORTHOPAEDIC      left foot fx--hardware    HX ORTHOPAEDIC  4/9/14    Right total knee arthroplasty    HX OTHER SURGICAL      prostate bx     No Known Allergies    REVIEW OF SYSTEMS:  General: negative for - chills or fever, or weight loss or gain  ENT: negative for - headaches, nasal congestion or tinnitus  Eyes: no blurred or visual changes  Neck: No stiffness or swollen nodes  Respiratory: negative for - cough, hemoptysis, shortness of breath or wheezing  Cardiovascular : negative for - chest pain, edema, palpitations or shortness of breath  Gastrointestinal: negative for - abdominal pain, blood in stools, heartburn or nausea/vomiting  Genito-Urinary: no dysuria, trouble voiding, or hematuria  Musculoskeletal: negative for - gait disturbance, joint pain, joint stiffness or joint swelling except left hip pain  Neurological: no TIA or stroke symptoms  Hematologic: no bruises, no bleeding  Lymphatic: no swollen glands  Integument: no lumps, mole changes, nail changes or rash  Endocrine:no malaise/lethargy poly uria or polydipsia or unexpected weight changes        Social History     Social History    Marital status:      Spouse name: N/A    Number of children: N/A    Years of education: N/A     Social History Main Topics    Smoking status: Former Smoker     Packs/day: 0.50     Quit date: 3/28/1963   [address] [place] [address] Smokeless tobacco: Never Used    Alcohol use Yes      Comment: rare/social-beer    Drug use: Yes     Special: Prescription, OTC    Sexual activity: Not Asked     Other Topics Concern    None     Social History Narrative     Family History   Problem Relation Age of Onset    Heart Disease Mother     Hypertension Mother     Stroke Mother     Heart Disease Father     Cancer Paternal Grandmother        OBJECTIVE:     Visit Vitals    /58 (BP 1 Location: Left arm, BP Patient Position: Sitting)    Pulse 66    Temp 98.1 °F (36.7 °C) (Oral)    Resp 20    Ht 5' 9\" (1.753 m)    Wt 197 lb 9.6 oz (89.6 kg)    SpO2 95%    BMI 29.18 kg/m2     CONSTITUTIONAL:   well nourished, appears age appropriate  EYES: sclera anicteric, PERRL, EOMI  ENMT:nares clear, moist mucous membranes, pharynx clear  NECK: supple. Thyroid normal, No JVD or bruits  RESPIRATORY: Chest: clear to ascultation and percussion, normal inspiratory effort  CARDIOVASCULAR: Heart: regular rate and rhythm no murmurs, rubs or gallops, PMI not displaced, No thrills  GASTROINTESTINAL: Abdomen: non distended, soft, non tender, bowel sounds normal  HEMATOLOGIC: no purpura, petechiae or bruising  LYMPHATIC: No lymph node enlargemant  MUSCULOSKELETAL: Extremities: no edema or active synovitis, pulse 1+. Left hip mild tenderness posteriorly  INTEGUMENT: No unusual rashes or suspicious skin lesions noted. Nails appear normal.  PERIPHERAL VASCULAR: normal pulses femoral, PT and DP  NEUROLOGIC: non-focal exam, A & O X 3  PSYCHIATRIC:, appropriate affect     ASSESSMENT:   1. Hypertension with renal disease    2. Glucose intolerance (impaired glucose tolerance)    3. Mixed hyperlipidemia    4. Primary osteoarthritis involving multiple joints    5. Gastroesophageal reflux disease without esophagitis    6. Chronic obstructive pulmonary disease, unspecified COPD type ([address]    7. Paroxysmal atrial fibrillation ([address]    8.  ASCVD (arteriosclerotic cardiovascular disease)    9. Stage 3 chronic kidney disease    10. Pain of left hip joint      Impression  1. Hypertension that is controlled continue current therapy reviewed with him  2.  Glucose intolerance repeat status pending reviewed prior labs will make adjustments if necessary  3. Hyperlipidemia prior labs reviewed repeat status pending will adjust if needed  4. DJD all stable except left hip pain as noted  5. GERD that is stable  6. COPD that is currently stable  7. Paroxysmal atrial fibrillation seems to be stable  8. ASCVD clinically stable  9. CKD stage III repeat status pending  10. Left hip pain x-ray obtained today reveals some arthritis but no acute process we will try diclofenac twice a day for 10 days. If not effective then a cortisone injection is an option. I will call the lab and make further recommendations adjustments if necessary. Follow-up scheduled for 3 months or sooner should they be a problem. PLAN:  .  Orders Placed This Encounter    XR HIP LT W OR WO PELV 2-3 VWS    AMB POC COMPLETE CBC,AUTOMATED ENTER    AMB POC COMPREHENSIVE METABOLIC PANEL    AMB POC HEMOGLOBIN A1C    AMB POC LIPID PROFILE    AMB POC CK (CPK)    naproxen (NAPROSYN) 500 mg tablet    dilTIAZem ER (CARDIZEM LA) 120 mg tablet    allopurinol (ZYLOPRIM) 100 mg tablet    losartan (COZAAR) 100 mg tablet    diclofenac EC (VOLTAREN) 75 mg EC tablet         ATTENTION:   This medical record was transcribed using an electronic medical records system. Although proofread, it may and can contain electronic and spelling errors. Other human spelling and other errors may be present. Corrections may be executed at a later time. Please feel free to contact us for any clarifications as needed. Follow-up Disposition:  Return in about 3 months (around 9/18/2018).     Results for orders placed or performed in visit on 06/18/18   XR HIP LT W OR WO PELV 2-3 VWS    Narrative    EXAM:  XR HIP LT W OR WO PELV 2-3 VWS    INDICATION:  Left hip pain    COMPARISON:  None. FINDINGS:  An AP view of the pelvis and a frogleg lateral view of the left hip  demonstrate no acute fracture or dislocation. Vascular calcification is noted. .         Impression    IMPRESSION:  No acute bony abnormality. Results for orders placed or performed in visit on 06/18/18   AMB POC COMPLETE CBC,AUTOMATED ENTER   Result Value Ref Range    WBC (POC)  4.1 - 10.9 K/uL    RBC (POC)  4.20 - 6.30 M/uL    HGB (POC)  12.0 - 18.0 g/dL    HCT (POC)  37.0 - 51.0 %    MCV (POC)  80.0 - 97.0 fL    MCH (POC)  26.0 - 32.0 pg    MCHC (POC)  30.0 - 36.0 g/dL    PLATELET (POC)  007.0 - 440.0 K/uL    ABS. LYMPHS (POC)  0.6 - 4.1 K/uL    LYMPHOCYTES (POC)  10.0 - 58.5 %    Mid # (POC)  0.0 - 1.8 K/uL    MID% POC  0.1 - 24.0 %    ABS. GRANS (POC)  2.0 - 7.8 K/uL    GRANULOCYTES (POC)  37.0 - 92.0 %   AMB POC COMPREHENSIVE METABOLIC PANEL   Result Value Ref Range    GLUCOSE  75 - 110 mg/dL    BUN  9 - 20 mg/dL    Creatinine (POC)  0.8 - 1.5 mg/dL    Sodium (POC)  137 - 145 MMOL/L    Potassium (POC)  3.6 - 5.0 MMOL/L    CHLORIDE  98 - 107 MMOL/L    CO2  22 - 32 MMOL/L    CALCIUM  8.4 - 10.2 mg/dL    TOTAL PROTEIN  6.3 - 8.2 g/dL    ALBUMIN  3.9 - 5.4 g/dL    AST (POC)  14 - 36 U/L    ALT (POC)  9 - 52 U/L    ALKALINE PHOS  38 - 126 U/L    TOTAL BILIRUBIN  0.2 - 1.3 mg/dL    eGFR (POC)     AMB POC HEMOGLOBIN A1C   Result Value Ref Range    Hemoglobin A1c (POC)  4.5 - 5.7 %   AMB POC LIPID PROFILE   Result Value Ref Range    Cholesterol (POC)  0 - 200 mg/dL    Triglycerides (POC)  0 - 200 mg/dL    HDL Cholesterol (POC)  35 - 130 mg/dL    VLDL (POC)  MG/DL    LDL Cholesterol (POC)  0.0 - 130.0 MG/DL    TChol/HDL Ratio (POC)  0.0 - 4.0   AMB POC CK (CPK)   Result Value Ref Range    CK (CPK) (POC)  30 - 135 U/L       Noah Love MD    The patient verbalized understanding of the problems and plans as explained.

## 2018-06-18 NOTE — ACP (ADVANCE CARE PLANNING)
Discussed Advanced Care Directives. No information on file. Patient states his son brought him a form to complete, but has not yet done it.

## 2018-06-20 LAB
ALBUMIN SERPL-MCNC: 3.9 G/DL (ref 3.9–5.4)
ALKALINE PHOS POC: 89 U/L (ref 38–126)
ALT SERPL-CCNC: 28 U/L (ref 9–52)
AST SERPL-CCNC: 19 U/L (ref 14–36)
BUN BLD-MCNC: 43 MG/DL (ref 9–20)
CALCIUM BLD-MCNC: 10.1 MG/DL (ref 8.4–10.2)
CHLORIDE BLD-SCNC: 106 MMOL/L (ref 98–107)
CHOLEST SERPL-MCNC: 107 MG/DL (ref 0–200)
CK (CPK) POC: 98 U/L (ref 30–135)
CO2 POC: 25 MMOL/L (ref 22–32)
CREAT BLD-MCNC: 1.8 MG/DL (ref 0.8–1.5)
EGFR (POC): 33.1
GLUCOSE POC: 100 MG/DL (ref 75–110)
GRAN# POC: 5.8 K/UL (ref 2–7.8)
GRAN% POC: 74.1 % (ref 37–92)
HBA1C MFR BLD HPLC: 5.9 % (ref 4.5–5.7)
HCT VFR BLD CALC: 39.8 % (ref 37–51)
HDLC SERPL-MCNC: 38 MG/DL (ref 35–130)
HGB BLD-MCNC: 13.2 G/DL (ref 12–18)
LDL CHOLESTEROL POC: 49 MG/DL (ref 0–130)
LY# POC: 1.5 K/UL (ref 0.6–4.1)
LY% POC: 19.8 % (ref 10–58.5)
MCH RBC QN: 30.7 PG (ref 26–32)
MCHC RBC-ENTMCNC: 33.2 G/DL (ref 30–36)
MCV RBC: 93 FL (ref 80–97)
MID #, POC: 0.4 K/UL (ref 0–1.8)
MID% POC: 6.1 % (ref 0.1–24)
PLATELET # BLD: 229 K/UL (ref 140–440)
POTASSIUM SERPL-SCNC: 5.6 MMOL/L (ref 3.6–5)
PROT SERPL-MCNC: 7.2 G/DL (ref 6.3–8.2)
RBC # BLD: 4.3 M/UL (ref 4.2–6.3)
SODIUM SERPL-SCNC: 143 MMOL/L (ref 137–145)
TCHOL/HDL RATIO (POC): 2.8 (ref 0–4)
TOTAL BILIRUBIN POC: 1.1 MG/DL (ref 0.2–1.3)
TRIGL SERPL-MCNC: 100 MG/DL (ref 0–200)
VLDLC SERPL CALC-MCNC: 20 MG/DL
WBC # BLD: 7.7 K/UL (ref 4.1–10.9)

## 2018-09-12 ENCOUNTER — OFFICE VISIT (OUTPATIENT)
Dept: INTERNAL MEDICINE CLINIC | Age: 83
End: 2018-09-12

## 2018-09-12 VITALS
HEART RATE: 75 BPM | DIASTOLIC BLOOD PRESSURE: 60 MMHG | WEIGHT: 196.4 LBS | RESPIRATION RATE: 17 BRPM | HEIGHT: 69 IN | BODY MASS INDEX: 29.09 KG/M2 | OXYGEN SATURATION: 97 % | SYSTOLIC BLOOD PRESSURE: 120 MMHG

## 2018-09-12 DIAGNOSIS — K21.9 GASTROESOPHAGEAL REFLUX DISEASE WITHOUT ESOPHAGITIS: ICD-10-CM

## 2018-09-12 DIAGNOSIS — I48.0 PAROXYSMAL ATRIAL FIBRILLATION (HCC): ICD-10-CM

## 2018-09-12 DIAGNOSIS — J44.9 CHRONIC OBSTRUCTIVE PULMONARY DISEASE, UNSPECIFIED COPD TYPE (HCC): ICD-10-CM

## 2018-09-12 DIAGNOSIS — Z00.00 MEDICARE ANNUAL WELLNESS VISIT, SUBSEQUENT: ICD-10-CM

## 2018-09-12 DIAGNOSIS — M15.9 PRIMARY OSTEOARTHRITIS INVOLVING MULTIPLE JOINTS: ICD-10-CM

## 2018-09-12 DIAGNOSIS — I12.9 HYPERTENSION WITH RENAL DISEASE: Primary | ICD-10-CM

## 2018-09-12 DIAGNOSIS — N18.30 STAGE 3 CHRONIC KIDNEY DISEASE (HCC): ICD-10-CM

## 2018-09-12 DIAGNOSIS — R73.02 GLUCOSE INTOLERANCE (IMPAIRED GLUCOSE TOLERANCE): ICD-10-CM

## 2018-09-12 DIAGNOSIS — I25.10 ASCVD (ARTERIOSCLEROTIC CARDIOVASCULAR DISEASE): ICD-10-CM

## 2018-09-12 DIAGNOSIS — E78.2 MIXED HYPERLIPIDEMIA: ICD-10-CM

## 2018-09-12 LAB
BACTERIA UA POCT, BACTPOCT: NORMAL
BILIRUB UR QL STRIP: NEGATIVE
CASTS UA POCT: NORMAL
CLUE CELLS, CLUEPOCT: NEGATIVE
CRYSTALS UA POCT, CRYSPOCT: NEGATIVE
EPITHELIAL CELLS POCT: NORMAL
GLUCOSE UR-MCNC: NEGATIVE MG/DL
GRAN# POC: 6.8 K/UL (ref 2–7.8)
GRAN% POC: 73.1 % (ref 37–92)
HCT VFR BLD CALC: 42 % (ref 37–51)
HGB BLD-MCNC: 13.9 G/DL (ref 12–18)
KETONES P FAST UR STRIP-MCNC: NEGATIVE MG/DL
LY# POC: 1.9 K/UL (ref 0.6–4.1)
LY% POC: 21.1 % (ref 10–58.5)
MCH RBC QN: 30.6 PG (ref 26–32)
MCHC RBC-ENTMCNC: 33.1 G/DL (ref 30–36)
MCV RBC: 92 FL (ref 80–97)
MID #, POC: 0.5 K/UL (ref 0–1.8)
MID% POC: 5.8 % (ref 0.1–24)
MUCUS UA POCT, MUCPOCT: NORMAL
PH UR STRIP: 5 [PH] (ref 5–7)
PLATELET # BLD: 223 K/UL (ref 140–440)
PROT UR QL STRIP: NORMAL
RBC # BLD: 4.55 M/UL (ref 4.2–6.3)
RBC UA POCT, RBCPOCT: NORMAL
SP GR UR STRIP: 1.02 (ref 1.01–1.02)
TRICH UA POCT, TRICHPOC: NEGATIVE
UA UROBILINOGEN AMB POC: NORMAL (ref 0.2–1)
URINALYSIS CLARITY POC: CLEAR
URINALYSIS COLOR POC: NORMAL
URINE BLOOD POC: NEGATIVE
URINE CULT COMMENT, POCT: NORMAL
URINE LEUKOCYTES POC: NEGATIVE
URINE NITRITES POC: NEGATIVE
WBC # BLD: 9.2 K/UL (ref 4.1–10.9)
WBC UA POCT, WBCPOCT: 0
YEAST UA POCT, YEASTPOC: NEGATIVE

## 2018-09-12 RX ORDER — LEVOTHYROXINE SODIUM 137 UG/1
TABLET ORAL
COMMUNITY
End: 2019-01-01 | Stop reason: SDUPTHER

## 2018-09-12 RX ORDER — LOSARTAN POTASSIUM 50 MG/1
50 TABLET ORAL DAILY
Qty: 90 TAB | Status: SHIPPED | OUTPATIENT
Start: 2018-09-12 | End: 2019-01-01 | Stop reason: ALTCHOICE

## 2018-09-12 NOTE — PROGRESS NOTES
This is a Subsequent Medicare Annual Wellness Visit providing Personalized Prevention Plan Services (PPPS) (Performed 12 months after initial AWV and PPPS ) I have reviewed the patient's medical history in detail and updated the computerized patient record. He presents today for subsequent annual Medicare wellness examination screening questionnaire. He is also here in follow-up of his medical problems include hypertension, hyperlipidemia, GERD, DJD, atherosclerotic coronary vascular disease, atrial fibrillation, BPH, COPD, CKD stage III, prior history of obesity with subsequent weight loss, and other medical problems. He is taking his medications and trying to follow his diet and try and remain physically active. He denies any chest pain, shortness breath, palpitations or cardiorespiratory complaints. He denies any GI or  complaints. He denies any headaches, dizziness or neurological planes. There are no current arthritic complaints and he has no other complaints on complete review of systems. History Past Medical History:  
Diagnosis Date  Arrhythmia A-fib  GERD (gastroesophageal reflux disease)  Hypertension  Other ill-defined conditions(799.89) BPH  Other ill-defined conditions(799.89) lipids  Other ill-defined conditions(799.89)   
 shingles hx  Thyroid disease  Unspecified adverse effect of anesthesia Past Surgical History:  
Procedure Laterality Date  HX CATARACT REMOVAL    
 HX HEART CATHETERIZATION    
 ablation  HX HEENT    
 scar tissue removed/laser  HX HERNIA REPAIR  05/05/2017 Graham Womack MD  
 HX ORTHOPAEDIC    
 left foot fx--hardware  HX ORTHOPAEDIC  4/9/14 Right total knee arthroplasty  HX OTHER SURGICAL    
 prostate bx Social History Substance Use Topics  Smoking status: Former Smoker Packs/day: 0.50 Quit date: 3/28/1963  Smokeless tobacco: Never Used  Alcohol use Yes Comment: rare/social-beer Current Outpatient Prescriptions Medication Sig Dispense Refill  levothyroxine (SYNTHROID) 137 mcg tablet Take  by mouth Daily (before breakfast).  losartan (COZAAR) 50 mg tablet Take 1 Tab by mouth daily. 90 Tab prn  naproxen (NAPROSYN) 500 mg tablet Take 500 mg by mouth two (2) times daily (with meals).  dilTIAZem ER (CARDIZEM LA) 120 mg tablet Take 1 Tab by mouth daily. 90 Tab 3  
 allopurinol (ZYLOPRIM) 100 mg tablet Take 1 Tab by mouth daily. 90 Tab 3  
 diclofenac EC (VOLTAREN) 75 mg EC tablet Take 1 Tab by mouth two (2) times a day. 20 Tab 0  
 tamsulosin (FLOMAX) 0.4 mg capsule TAKE 2 CAPSULES DAILY 180 Cap 3  
 rivaroxaban (XARELTO) 15 mg tab tablet Take 1 Tab by mouth daily (with breakfast). 90 Tab 3  furosemide (LASIX) 40 mg tablet 1 daily (Patient taking differently: Take 40 mg by mouth daily.) 90 Tab 3  
 simvastatin (ZOCOR) 40 mg tablet Take 1 Tab by mouth nightly. 90 Tab 3  
 finasteride (PROSCAR) 5 mg tablet Take 1 Tab by mouth daily (after dinner). 90 Tab 3  
 DOCOSAHEXANOIC ACID/EPA (FISH OIL PO) Take  by mouth.  ESOMEPRAZOLE MAGNESIUM (NEXIUM PO) Take 40 mg by mouth every morning. No Known Allergies Family History Problem Relation Age of Onset  Heart Disease Mother  Hypertension Mother  Stroke Mother  Heart Disease Father  Cancer Paternal Grandmother Patient Active Problem List  
 Diagnosis  Primary osteoarthritis involving multiple joints  Hypertension with renal disease  Mixed hyperlipidemia  Glucose intolerance (impaired glucose tolerance)  Gastroesophageal reflux disease without esophagitis  COPD (chronic obstructive pulmonary disease) ([facility])  Stage 3 chronic kidney disease  Atrial fibrillation ([facility] [address]  ASCVD (arteriosclerotic cardiovascular disease)  Class 1 obesity due to excess calories without serious comorbidity with body mass index (BMI) of 30.0 to 30.9 in adult  Acquired hypothyroidism  BPH (benign prostatic hyperplasia)  Pain of left hip joint  Sebaceous cyst  
 Frequent falls  Medicare annual wellness visit, subsequent  Cancer of the skin, basal cell  Elevated prostate specific antigen (PSA)  Hyperkalemia  Shingles  On statin therapy  Gout Patient Care Team: 
Blu Childers MD as PCP - General (Internal Medicine) Medardo Mcclure MD as Surgeon (General Surgery) Depression Risk Factor Screening: PHQ over the last two weeks 9/12/2018 Little interest or pleasure in doing things Not at all Feeling down, depressed, irritable, or hopeless Not at all Total Score PHQ 2 0 Alcohol Risk Factor Screening: You do not drink alcohol or very rarely. Functional Ability and Level of Safety:  
 
Fall Risk Fall Risk Assessment, last 12 mths 9/12/2018 Able to walk? Yes Fall in past 12 months? No  
Fall with injury? -  
Number of falls in past 12 months - Fall Risk Score - Hearing Loss  
mild Activities of Daily Living Self-care. ADL Assessment 9/12/2018 Feeding yourself No Help Needed Getting from bed to chair No Help Needed Getting dressed No Help Needed Bathing or showering No Help Needed Walk across the room (includes cane/walker) No Help Needed Using the telphone No Help Needed Taking your medications No Help Needed Preparing meals No Help Needed Managing money (expenses/bills) No Help Needed Moderately strenuous housework (laundry) No Help Needed Shopping for personal items (toiletries/medicines) No Help Needed Shopping for groceries No Help Needed Driving No Help Needed Climbing a flight of stairs No Help Needed Getting to places beyond walking distances No Help Needed Abuse Screen Patient is not abused Social History Social History Narrative Review of Systems ROS: 
 
 Constitutional: He denies fevers, weight loss, sweats. Eyes: No blurred or double vision. ENT: No difficulty with swallowing, taste, speech or smell. Neck: no stiffness or swelling Respiratory: No cough wheezing or shortness of breath. Cardiovascular: Denies chest pain, palpitations, unexplained indigestion or syncope. Gastrointestinal:  No changes in bowel movements, no abdominal pain, no bloating. Genitourinary:  He denies frequency, nocturia or stranguria. Extremities: No joint pain, stiffness or swelling. Neurological:  No numbness, tingling, burring paresthesias or loss of motor strength. No syncope, dizziness or frequent headache Lymphatic: no adenopathy noted Hematologic: no easy bruising or bleeding gums Skin:  No recent rashes or mole changes. Psychiatric/Behavioral:  Negative for depression. Physical Examination Evaluation of Cognitive Function: 
Mood/affect:  happy Appearance: age appropriate Family member/caregiver input: none Visit Vitals  /60 (BP 1 Location: Left arm, BP Patient Position: Sitting)  Pulse 75  Resp 17  Ht 5' 9\" (1.753 m)  Wt 196 lb 6.4 oz (89.1 kg)  SpO2 97%  BMI 29 kg/m2 Vitals:  
 09/12/18 1304 BP: 120/60 Pulse: 75 Resp: 17 SpO2: 97% Weight: 196 lb 6.4 oz (89.1 kg) Height: 5' 9\" (1.753 m) PainSc:   0 - No pain PHYSICAL EXAM: 
 
General appearance - alert, well appearing, and in no distress Mental status - alert, oriented to person, place, and time HEENT: 
Ears - bilateral TM's and external ear canals clear Eyes - pupillary responses were normal.  Extraocular muscle function intact. Lids and conjunctiva not injected. Fundoscopic exam revealed sharp disc margins. eye movements intact Pharynx- clear with teeth in good repair. No masses were noted Neck - supple without thyromegaly or burit. No JVD noted Lungs - clear to auscultation and percussion Cardiac- normal rate, regular rhythm without murmurs. PMI not displaced. No gallop, rub or click Abdomen - flat, soft, non-tender without palpable organomegaly or mass. No pulsatile mass was felt, and not bruit was heard. Bowel sounds were active : Circumcised, Testes descended w/o masses Rectal: Deferred per his request 
Extremities -  no clubbing cyanosis or edema Lymphatics - no palpable lymphadenopathy, no hepatosplenomegaly Hematologic: no petechiae or purpura Peripheral vascular -Femoral, Dorsalis pedis and posterior tibial pulses felt without difficulty Skin - no rash or unusual mole change noted Neurological - Cranial nerves II-XII grossly intact. Motor strength 5/5. DTR's 2+ and symmetric. Station and gait normal 
Back exam - full range of motion, no tenderness, palpable spasm or pain on motion Musculoskeletal - no joint tenderness, deformity or swelling Results for orders placed or performed in visit on 06/18/18 AMB POC COMPLETE CBC,AUTOMATED ENTER Result Value Ref Range WBC (POC) 7.7 4.1 - 10.9 K/uL  
 RBC (POC) 4.30 4.20 - 6.30 M/uL  
 HGB (POC) 13.2 12.0 - 18.0 g/dL HCT (POC) 39.8 37.0 - 51.0 %  
 MCV (POC) 93 80.0 - 97.0 fL  
 MCH (POC) 30.7 26.0 - 32.0 pg  
 MCHC (POC) 33.2 30.0 - 36.0 g/dL PLATELET (POC) 460 113.1 - 440.0 K/uL  
 ABS. LYMPHS (POC) 1.5 0.6 - 4.1 K/uL LYMPHOCYTES (POC) 19.8 10.0 - 58.5 % Mid # (POC) 0.4 0.0 - 1.8 K/uL MID% POC 6.1 0.1 - 24.0 %  
 ABS. GRANS (POC) 5.8 2.0 - 7.8 K/uL GRANULOCYTES (POC) 74.1 37.0 - 92.0 % AMB POC COMPREHENSIVE METABOLIC PANEL Result Value Ref Range GLUCOSE 100 75 - 110 mg/dL BUN 43 (A) 9 - 20 mg/dL Creatinine (POC) 1.8 (A) 0.8 - 1.5 mg/dL Sodium (POC) 143 137 - 145 MMOL/L Potassium (POC) 5.6 (A) 3.6 - 5.0 MMOL/L  
 CHLORIDE 106 98 - 107 MMOL/L  
 CO2 25 22 - 32 MMOL/L  
 CALCIUM 10.1 8.4 - 10.2 mg/dL TOTAL PROTEIN 7.2 6.3 - 8.2 g/dL ALBUMIN 3.9 3.9 - 5.4 g/dL AST (POC) 19 14 - 36 U/L  
 ALT (POC) 28 9 - 52 U/L ALKALINE PHOS 89 38 - 126 U/L  
 TOTAL BILIRUBIN 1.1 0.2 - 1.3 mg/dL  
 eGFR (POC) 33.1 AMB POC HEMOGLOBIN A1C Result Value Ref Range Hemoglobin A1c (POC) 5.9 (A) 4.5 - 5.7 % AMB POC LIPID PROFILE Result Value Ref Range Cholesterol (POC) 107 0 - 200 mg/dL Triglycerides (POC) 100 0 - 200 mg/dL HDL Cholesterol (POC) 38 35 - 130 mg/dL VLDL (POC) 20 MG/DL  
 LDL Cholesterol (POC) 49 0.0 - 130.0 MG/DL TChol/HDL Ratio (POC) 2.8 0.0 - 4.0 AMB POC CK (CPK) Result Value Ref Range CK (CPK) (POC) 98 30 - 135 U/L Advice/Referrals/Counseling Education and counseling provided: 
Are appropriate based on today's review and evaluation End-of-Life planning (with patient's consent) Pneumococcal Vaccine Influenza Vaccine Colorectal cancer screening tests Assessment/Plan ASSESSMENT:  
1. Hypertension with renal disease 2. Glucose intolerance (impaired glucose tolerance) 3. Mixed hyperlipidemia 4. Gastroesophageal reflux disease without esophagitis 5. Primary osteoarthritis involving multiple joints 6. Stage 3 chronic kidney disease 7. Chronic obstructive pulmonary disease, unspecified COPD type ([facility] [address] 8. Paroxysmal atrial fibrillation (HCC) 9. ASCVD (arteriosclerotic cardiovascular disease) 10. Medicare annual wellness visit, subsequent Impression 1. Hypertension that is controlled to continue current therapy reviewed with him 2.  Glucose intolerance repeat status pending and prior labs reviewed on make adjustments as necessary. 3.  Hyperlipidemia prior labs reviewed and repeat status pending and I will adjust if needed. 4.  GERD that is stable 5. DJD that is currently stable 6. CKD stage III repeat status pending on make adjustments as necessary. 7.  COPD that is stable 8. Paroxysmal atrial fibrillation currently appears to be in sinus rhythm EKG today reveals no acute process 9. ASCVD clinically stable as noted EKG without acute process continue aspirin daily Medicare annual wellness examination screening questionnaire completed today. The results were reviewed with him his questions were answered. Lifestyle recommendations and modifications discussed and made. I will call the lab results and make further recommendations or adjustments if necessary. Follow-up is scheduled for 3 months or sooner should there be a problem. PLAN: 
. Orders Placed This Encounter  METABOLIC PANEL, COMPREHENSIVE  T4, FREE  
 TSH 3RD GENERATION  
 LIPID PANEL  
 CK  
 HEMOGLOBIN A1C WITH EAG  
 AMB POC COMPLETE CBC,AUTOMATED ENTER  AMB POC URINALYSIS DIP STICK AUTO W/ MICRO  AMB POC EKG ROUTINE W/ 12 LEADS, INTER & REP  
 levothyroxine (SYNTHROID) 137 mcg tablet  losartan (COZAAR) 50 mg tablet ATTENTION:  
This medical record was transcribed using an electronic medical records system. Although proofread, it may and can contain electronic and spelling errors. Other human spelling and other errors may be present. Corrections may be executed at a later time. Please feel free to contact us for any clarifications as needed. Follow-up Disposition: 
Return in about 3 months (around 12/12/2018). Concepcion Jain MD 
 
Recommended healthy diet low in carbohydrates, fats, sodium and cholesterol. Recommended regular cardiovascular exercise 3-6 times per week for 30-60 minutes daily. Current Outpatient Prescriptions Medication Sig Dispense Refill  levothyroxine (SYNTHROID) 137 mcg tablet Take  by mouth Daily (before breakfast).  losartan (COZAAR) 50 mg tablet Take 1 Tab by mouth daily. 90 Tab prn  naproxen (NAPROSYN) 500 mg tablet Take 500 mg by mouth two (2) times daily (with meals).  dilTIAZem ER (CARDIZEM LA) 120 mg tablet Take 1 Tab by mouth daily. 90 Tab 3  
 allopurinol (ZYLOPRIM) 100 mg tablet Take 1 Tab by mouth daily.  90 Tab 3  
  diclofenac EC (VOLTAREN) 75 mg EC tablet Take 1 Tab by mouth two (2) times a day. 20 Tab 0  
 tamsulosin (FLOMAX) 0.4 mg capsule TAKE 2 CAPSULES DAILY 180 Cap 3  
 rivaroxaban (XARELTO) 15 mg tab tablet Take 1 Tab by mouth daily (with breakfast). 90 Tab 3  furosemide (LASIX) 40 mg tablet 1 daily (Patient taking differently: Take 40 mg by mouth daily.) 90 Tab 3  
 simvastatin (ZOCOR) 40 mg tablet Take 1 Tab by mouth nightly. 90 Tab 3  
 finasteride (PROSCAR) 5 mg tablet Take 1 Tab by mouth daily (after dinner). 90 Tab 3  
 DOCOSAHEXANOIC ACID/EPA (FISH OIL PO) Take  by mouth.  ESOMEPRAZOLE MAGNESIUM (NEXIUM PO) Take 40 mg by mouth every morning. No results found for any visits on 09/12/18. Verbal and written instructions (see AVS) provided. Patient expresses understanding of diagnosis and treatment plan.  
 
Zuly Bernstein MD

## 2018-09-12 NOTE — PATIENT INSTRUCTIONS
Arthritis: Care Instructions Your Care Instructions Arthritis, also called osteoarthritis, is a breakdown of the cartilage that cushions your joints. When the cartilage wears down, your bones rub against each other. This causes pain and stiffness. Many people have some arthritis as they age. Arthritis most often affects the joints of the spine, hands, hips, knees, or feet. You can take simple measures to protect your joints, ease your pain, and help you stay active. Follow-up care is a key part of your treatment and safety. Be sure to make and go to all appointments, and call your doctor if you are having problems. It's also a good idea to know your test results and keep a list of the medicines you take. How can you care for yourself at home? · Stay at a healthy weight. Being overweight puts extra strain on your joints. · Talk to your doctor or physical therapist about exercises that will help ease joint pain. ¨ Stretch. You may enjoy gentle forms of yoga to help keep your joints and muscles flexible. ¨ Walk instead of jog. Other types of exercise that are less stressful on the joints include riding a bicycle, swimming, julia chi, or water exercise. ¨ Lift weights. Strong muscles help reduce stress on your joints. Stronger thigh muscles, for example, take some of the stress off of the knees and hips. Learn the right way to lift weights so you do not make joint pain worse. · Take your medicines exactly as prescribed. Call your doctor if you think you are having a problem with your medicine. · Take pain medicines exactly as directed. ¨ If the doctor gave you a prescription medicine for pain, take it as prescribed. ¨ If you are not taking a prescription pain medicine, ask your doctor if you can take an over-the-counter medicine. · Use a cane, crutch, walker, or another device if you need help to get around. These can help rest your joints.  You also can use other things to make life easier, such as a higher toilet seat and padded handles on kitchen utensils. · Do not sit in low chairs, which can make it hard to get up. · Put heat or cold on your sore joints as needed. Use whichever helps you most. You also can take turns with hot and cold packs. ¨ Apply heat 2 or 3 times a day for 20 to 30 minutes-using a heating pad, hot shower, or hot pack-to relieve pain and stiffness. ¨ Put ice or a cold pack on your sore joint for 10 to 20 minutes at a time. Put a thin cloth between the ice and your skin. When should you call for help? Call your doctor now or seek immediate medical care if: 
  · You have sudden swelling, warmth, or pain in any joint.  
  · You have joint pain and a fever or rash.  
  · You have such bad pain that you cannot use a joint.  
 Watch closely for changes in your health, and be sure to contact your doctor if: 
  · You have mild joint symptoms that continue even with more than 6 weeks of care at home.  
  · You have stomach pain or other problems with your medicine. Where can you learn more? Go to http://frank-lyubov.info/. Enter D287 in the search box to learn more about \"Arthritis: Care Instructions. \" Current as of: October 10, 2017 Content Version: 11.7 © 5592-2937 [employer]. Care instructions adapted under license by [employer] (which disclaims liability or warranty for this information). If you have questions about a medical condition or this instruction, always ask your healthcare professional. [place] [address] any warranty or liability for your use of this information. Medicare Wellness Visit, Male The best way to live healthy is to have a lifestyle where you eat a well-balanced diet, exercise regularly, limit alcohol use, and quit all forms of tobacco/nicotine, if applicable. Regular preventive services are another way to keep healthy.  Preventive services (vaccines, screening tests, monitoring & exams) can help personalize your care plan, which helps you manage your own care. Screening tests can find health problems at the earliest stages, when they are easiest to treat. [facility] follows the current, evidence-based guidelines published by the [facility] ([facility]) when recommending preventive services for our patients. Because we follow these guidelines, sometimes recommendations change over time as research supports it. (For example, a prostate screening blood test is no longer routinely recommended for men with no symptoms.) Of course, you and your doctor may decide to screen more often for some diseases, based on your risk and co-morbidities (chronic disease you are already diagnosed with). Preventive services for you include: - Medicare offers their members a free annual wellness visit, which is time for you and your primary care provider to discuss and plan for your preventive service needs. Take advantage of this benefit every year! 
-All adults over age 72 should receive the recommended pneumonia vaccines. Current USPSTF guidelines recommend a series of two vaccines for the best pneumonia protection.  
-All adults should have a flu vaccine yearly and an ECG.  All adults age 61 and older should receive a shingles vaccine once in their lifetime.   
-All adults age 38-68 who are overweight should have a diabetes screening test once every three years.  
-Other screening tests & preventive services for persons with diabetes include: an eye exam to screen for diabetic retinopathy, a kidney function test, a foot exam, and stricter control over your cholesterol.  
-Cardiovascular screening for adults with routine risk involves an electrocardiogram (ECG) at intervals determined by the provider.  
-Colorectal cancer screening should be done for adults age 54-65 with no increased risk factors for colorectal cancer. There are a number of acceptable methods of screening for this type of cancer. Each test has its own benefits and drawbacks. Discuss with your provider what is most appropriate for you during your annual wellness visit. The different tests include: colonoscopy (considered the best screening method), a fecal occult blood test, a fecal DNA test, and sigmoidoscopy. 
-All adults born between [facility] should be screened once for Hepatitis C. 
-An Abdominal Aortic Aneurysm (AAA) Screening is recommended for men age 73-68 who has ever smoked in their lifetime. Here is a list of your current Health Maintenance items (your personalized list of preventive services) with a due date: 
Health Maintenance Due Topic Date Due  Shingles Vaccine  01/13/1991  Glaucoma Screening   03/13/1996  Flu Vaccine  08/01/2018

## 2018-09-12 NOTE — MR AVS SNAPSHOT
[address] [place] [address] [address] [address] [phone] Patient: Willard Kruse MRN: [id number] IWT:5/54/1799 Visit Information Date & Time Provider Department Dept. Phone Encounter #  
 9/12/2018  1:10 PM Zoya Mejia MD [facility] [id number] Follow-up Instructions Return in about 3 months (around 12/12/2018). Upcoming Health Maintenance Date Due ZOSTER VACCINE AGE 60> 1/13/1991 GLAUCOMA SCREENING Q2Y 3/13/1996 Influenza Age 5 to Adult 8/1/2018 MEDICARE YEARLY EXAM 9/13/2019 DTaP/Tdap/Td series (2 - Td) 4/3/2028 Allergies as of 9/12/2018  Review Complete On: 9/12/2018 By: Zoya Mejia MD  
 No Known Allergies Current Immunizations  Reviewed on 4/9/2014 Name Date Influenza High Dose Vaccine PF 12/12/2017 Influenza Vaccine 10/20/2016, 12/9/2015, 10/27/2014 Pneumococcal Conjugate (PCV-13) 9/1/2015 Pneumococcal Vaccine (Unspecified Type) 1/1/2014, 10/11/2004 Tdap 4/3/2018  7:30 PM  
  
 Not reviewed this visit You Were Diagnosed With   
  
 Codes Comments Hypertension with renal disease    -  Primary ICD-10-CM: I12.9 ICD-9-CM: 403.90 Glucose intolerance (impaired glucose tolerance)     ICD-10-CM: R73.02 
ICD-9-CM: 790.22 Mixed hyperlipidemia     ICD-10-CM: E78.2 ICD-9-CM: 272.2 Gastroesophageal reflux disease without esophagitis     ICD-10-CM: K21.9 ICD-9-CM: 530.81 Primary osteoarthritis involving multiple joints     ICD-10-CM: M15.0 ICD-9-CM: 715.09 Stage 3 chronic kidney disease     ICD-10-CM: N18.3 ICD-9-CM: 125. 3 Chronic obstructive pulmonary disease, unspecified COPD type ([facility]     ICD-10-CM: J44.9 ICD-9-CM: 766 Paroxysmal atrial fibrillation (HCC)     ICD-10-CM: I48.0 ICD-9-CM: 427.31   
 ASCVD (arteriosclerotic cardiovascular disease)     ICD-10-CM: I25.10 ICD-9-CM: 429.2, 440.9 Medicare annual wellness visit, subsequent     ICD-10-CM: Z00.00 ICD-9-CM: V70.0 Vitals BP Pulse Resp Height(growth percentile) Weight(growth percentile) SpO2  
 120/60 (BP 1 Location: Left arm, BP Patient Position: Sitting) 75 17 5' 9\" (1.753 m) 196 lb 6.4 oz (89.1 kg) 97% BMI Smoking Status 34 kg/m2 Former Smoker BMI and BSA Data Body Mass Index Body Surface Area  
 29 kg/m 2 2.08 m 2 Preferred Pharmacy Pharmacy Name Phone  [address] [phone] Your Updated Medication List  
  
   
This list is accurate as of 9/12/18  1:44 PM.  Always use your most recent med list.  
  
  
  
  
 allopurinol 100 mg tablet Commonly known as:  Ro Gosling Take 1 Tab by mouth daily. diclofenac EC 75 mg EC tablet Commonly known as:  VOLTAREN Take 1 Tab by mouth two (2) times a day. dilTIAZem  mg tablet Commonly known as:  CARDIZEM LA Take 1 Tab by mouth daily. finasteride 5 mg tablet Commonly known as:  PROSCAR Take 1 Tab by mouth daily (after dinner). FISH OIL PO Take  by mouth. furosemide 40 mg tablet Commonly known as:  LASIX  
1 daily  
  
 losartan 50 mg tablet Commonly known as:  COZAAR Take 1 Tab by mouth daily. naproxen 500 mg tablet Commonly known as:  NAPROSYN Take 500 mg by mouth two (2) times daily (with meals). NEXIUM PO Take 40 mg by mouth every morning. rivaroxaban 15 mg Tab tablet Commonly known as:  Nikky Daunt Take 1 Tab by mouth daily (with breakfast). simvastatin 40 mg tablet Commonly known as:  ZOCOR Take 1 Tab by mouth nightly. SYNTHROID 137 mcg tablet Generic drug:  levothyroxine Take  by mouth Daily (before breakfast). tamsulosin 0.4 mg capsule Commonly known as:  FLOMAX TAKE 2 CAPSULES DAILY Prescriptions Sent to Pharmacy  Refills  
 losartan (COZAAR) 50 mg tablet prn  
 Sig: Take 1 Tab by mouth daily. Class: Normal  
 Pharmacy: [facility] [address] Ph #: [phone] Route: Oral  
  
We Performed the Following AMB POC EKG ROUTINE W/ 12 LEADS, INTER & REP [18059 CPT(R)] Follow-up Instructions Return in about 3 months (around 12/12/2018). Patient Instructions Arthritis: Care Instructions Your Care Instructions Arthritis, also called osteoarthritis, is a breakdown of the cartilage that cushions your joints. When the cartilage wears down, your bones rub against each other. This causes pain and stiffness. Many people have some arthritis as they age. Arthritis most often affects the joints of the spine, hands, hips, knees, or feet. You can take simple measures to protect your joints, ease your pain, and help you stay active. Follow-up care is a key part of your treatment and safety. Be sure to make and go to all appointments, and call your doctor if you are having problems. It's also a good idea to know your test results and keep a list of the medicines you take. How can you care for yourself at home? · Stay at a healthy weight. Being overweight puts extra strain on your joints. · Talk to your doctor or physical therapist about exercises that will help ease joint pain. ¨ Stretch. You may enjoy gentle forms of yoga to help keep your joints and muscles flexible. ¨ Walk instead of jog. Other types of exercise that are less stressful on the joints include riding a bicycle, swimming, julia chi, or water exercise. ¨ Lift weights. Strong muscles help reduce stress on your joints. Stronger thigh muscles, for example, take some of the stress off of the knees and hips. Learn the right way to lift weights so you do not make joint pain worse. · Take your medicines exactly as prescribed. Call your doctor if you think you are having a problem with your medicine. · Take pain medicines exactly as directed. ¨ If the doctor gave you a prescription medicine for pain, take it as prescribed. ¨ If you are not taking a prescription pain medicine, ask your doctor if you can take an over-the-counter medicine. · Use a cane, crutch, walker, or another device if you need help to get around. These can help rest your joints. You also can use other things to make life easier, such as a higher toilet seat and padded handles on kitchen utensils. · Do not sit in low chairs, which can make it hard to get up. · Put heat or cold on your sore joints as needed. Use whichever helps you most. You also can take turns with hot and cold packs. ¨ Apply heat 2 or 3 times a day for 20 to 30 minutes-using a heating pad, hot shower, or hot pack-to relieve pain and stiffness. ¨ Put ice or a cold pack on your sore joint for 10 to 20 minutes at a time. Put a thin cloth between the ice and your skin. When should you call for help? Call your doctor now or seek immediate medical care if: 
  · You have sudden swelling, warmth, or pain in any joint.  
  · You have joint pain and a fever or rash.  
  · You have such bad pain that you cannot use a joint.  
 Watch closely for changes in your health, and be sure to contact your doctor if: 
  · You have mild joint symptoms that continue even with more than 6 weeks of care at home.  
  · You have stomach pain or other problems with your medicine. Where can you learn more? Go to http://frank-lyubov.info/. Enter N005 in the search box to learn more about \"Arthritis: Care Instructions. \" Current as of: October 10, 2017 Content Version: 11.7 © 6537-1558 [employer]. Care instructions adapted under license by [employer] (which disclaims liability or warranty for this information).  If you have questions about a medical condition or this instruction, always ask your healthcare professional. [employer] disclaims any warranty or liability for your use of this information. Medicare Wellness Visit, Male The best way to live healthy is to have a lifestyle where you eat a well-balanced diet, exercise regularly, limit alcohol use, and quit all forms of tobacco/nicotine, if applicable. Regular preventive services are another way to keep healthy. Preventive services (vaccines, screening tests, monitoring & exams) can help personalize your care plan, which helps you manage your own care. Screening tests can find health problems at the earliest stages, when they are easiest to treat. [address] [facility] follows the current, evidence-based guidelines published by the [facility] Haider Houser ([facility]) when recommending preventive services for our patients. Because we follow these guidelines, sometimes recommendations change over time as research supports it. (For example, a prostate screening blood test is no longer routinely recommended for men with no symptoms.) Of course, you and your doctor may decide to screen more often for some diseases, based on your risk and co-morbidities (chronic disease you are already diagnosed with). Preventive services for you include: - Medicare offers their members a free annual wellness visit, which is time for you and your primary care provider to discuss and plan for your preventive service needs. Take advantage of this benefit every year! 
-All adults over age 72 should receive the recommended pneumonia vaccines. Current USPSTF guidelines recommend a series of two vaccines for the best pneumonia protection.  
-All adults should have a flu vaccine yearly and an ECG.  All adults age 61 and older should receive a shingles vaccine once in their lifetime.   
-All adults age 38-68 who are overweight should have a diabetes screening test once every three years.  
-Other screening tests & preventive services for persons with diabetes include: an eye exam to screen for diabetic retinopathy, a kidney function test, a foot exam, and stricter control over your cholesterol.  
-Cardiovascular screening for adults with routine risk involves an electrocardiogram (ECG) at intervals determined by the provider.  
-Colorectal cancer screening should be done for adults age 54-65 with no increased risk factors for colorectal cancer. There are a number of acceptable methods of screening for this type of cancer. Each test has its own benefits and drawbacks. Discuss with your provider what is most appropriate for you during your annual wellness visit. The different tests include: colonoscopy (considered the best screening method), a fecal occult blood test, a fecal DNA test, and sigmoidoscopy. 
-All adults born between [facility] should be screened once for Hepatitis C. 
-An Abdominal Aortic Aneurysm (AAA) Screening is recommended for men age 73-68 who has ever smoked in their lifetime. Here is a list of your current Health Maintenance items (your personalized list of preventive services) with a due date: 
Health Maintenance Due Topic Date Due  Shingles Vaccine  01/13/1991  Glaucoma Screening   03/13/1996  Flu Vaccine  08/01/2018 Introducing [address]! [facility] introduces [employer] patient portal. Now you can access parts of your medical record, email your doctor's office, and request medication refills online. 1. In your internet browser, go to [email or web address] [email or web address] 2. Click on the First Time User? Click Here link in the Sign In box. You will see the New Member Sign Up page. 3. Enter your [employer] Access Code exactly as it appears below. You will not need to use this code after youve completed the sign-up process. If you do not sign up before the expiration date, you must request a new code. · [employer] Access Code: [email or web address] Expires: 9/16/2018  1:59 PM 
 
 4. Enter the last four digits of your Social Security Number (xxxx) and Date of Birth (mm/dd/yyyy) as indicated and click Submit. You will be taken to the next sign-up page. 5. Create a pluriSelect ID. This will be your [employer] login ID and cannot be changed, so think of one that is secure and easy to remember. 6. Create a [employer] password. You can change your password at any time. 7. Enter your Password Reset Question and Answer. This can be used at a later time if you forget your password. 8. Enter your e-mail address. You will receive e-mail notification when new information is available in [address] 9. Click Sign Up. You can now view and download portions of your medical record. 10. Click the Download Summary menu link to download a portable copy of your medical information. If you have questions, please visit the Frequently Asked Questions section of the [employer] website. Remember, [employer] is NOT to be used for urgent needs. For medical emergencies, dial 911. Now available from your iPhone and Android! Please provide this summary of care documentation to your next provider. Your primary care clinician is listed as Raghu. If you have any questions after today's visit, please call [phone].

## 2018-09-12 NOTE — PROGRESS NOTES
Chief Complaint Patient presents with  Complete Physical  
 
1. Have you been to the ER, urgent care clinic since your last visit? Hospitalized since your last visit? No 
 
2. Have you seen or consulted any other health care providers outside of the [address] [place] [address] since your last visit? Include any pap smears or colon screening. No 
Visit Vitals  /60 (BP 1 Location: Left arm, BP Patient Position: Sitting)  Pulse 75  Resp 17  Ht 5' 9\" (1.753 m)  Wt 196 lb 6.4 oz (89.1 kg)  SpO2 97%  BMI 29 kg/m2

## 2018-09-13 LAB
ALBUMIN SERPL-MCNC: 4.4 G/DL (ref 3.5–4.7)
ALBUMIN/GLOB SERPL: 1.6 {RATIO} (ref 1.2–2.2)
ALP SERPL-CCNC: 98 IU/L (ref 39–117)
ALT SERPL-CCNC: 21 IU/L (ref 0–44)
AST SERPL-CCNC: 21 IU/L (ref 0–40)
BILIRUB SERPL-MCNC: 0.7 MG/DL (ref 0–1.2)
BUN SERPL-MCNC: 28 MG/DL (ref 8–27)
BUN/CREAT SERPL: 16 (ref 10–24)
CALCIUM SERPL-MCNC: 9.9 MG/DL (ref 8.6–10.2)
CHLORIDE SERPL-SCNC: 100 MMOL/L (ref 96–106)
CHOLEST SERPL-MCNC: 125 MG/DL (ref 100–199)
CK SERPL-CCNC: 106 U/L (ref 24–204)
CO2 SERPL-SCNC: 25 MMOL/L (ref 20–29)
CREAT SERPL-MCNC: 1.8 MG/DL (ref 0.76–1.27)
EST. AVERAGE GLUCOSE BLD GHB EST-MCNC: 123 MG/DL
GLOBULIN SER CALC-MCNC: 2.7 G/DL (ref 1.5–4.5)
GLUCOSE SERPL-MCNC: 96 MG/DL (ref 65–99)
HBA1C MFR BLD: 5.9 % (ref 4.8–5.6)
HDLC SERPL-MCNC: 41 MG/DL
LDLC SERPL CALC-MCNC: 70 MG/DL (ref 0–99)
POTASSIUM SERPL-SCNC: 5 MMOL/L (ref 3.5–5.2)
PROT SERPL-MCNC: 7.1 G/DL (ref 6–8.5)
SODIUM SERPL-SCNC: 140 MMOL/L (ref 134–144)
T4 FREE SERPL-MCNC: 1.76 NG/DL (ref 0.82–1.77)
TRIGL SERPL-MCNC: 72 MG/DL (ref 0–149)
TSH SERPL DL<=0.005 MIU/L-ACNC: 0.23 UIU/ML (ref 0.45–4.5)
VLDLC SERPL CALC-MCNC: 14 MG/DL (ref 5–40)

## 2018-11-06 ENCOUNTER — OFFICE VISIT (OUTPATIENT)
Dept: INTERNAL MEDICINE CLINIC | Age: 83
End: 2018-11-06

## 2018-11-06 VITALS
HEIGHT: 69 IN | SYSTOLIC BLOOD PRESSURE: 130 MMHG | DIASTOLIC BLOOD PRESSURE: 48 MMHG | RESPIRATION RATE: 19 BRPM | BODY MASS INDEX: 29.3 KG/M2 | HEART RATE: 37 BPM | WEIGHT: 197.8 LBS | OXYGEN SATURATION: 94 %

## 2018-11-06 DIAGNOSIS — Z23 ENCOUNTER FOR IMMUNIZATION: ICD-10-CM

## 2018-11-06 DIAGNOSIS — R19.7 DIARRHEA, UNSPECIFIED TYPE: Primary | ICD-10-CM

## 2018-11-06 DIAGNOSIS — I48.0 PAROXYSMAL ATRIAL FIBRILLATION (HCC): ICD-10-CM

## 2018-11-06 RX ORDER — METRONIDAZOLE 500 MG/1
500 TABLET ORAL 3 TIMES DAILY
Qty: 30 TAB | Refills: 0 | Status: SHIPPED | OUTPATIENT
Start: 2018-11-06 | End: 2018-11-07 | Stop reason: SDUPTHER

## 2018-11-06 NOTE — PATIENT INSTRUCTIONS
Body Mass Index: Care Instructions Your Care Instructions Body mass index (BMI) can help you see if your weight is raising your risk for health problems. It uses a formula to compare how much you weigh with how tall you are. · A BMI lower than 18.5 is considered underweight. · A BMI between 18.5 and 24.9 is considered healthy. · A BMI between 25 and 29.9 is considered overweight. A BMI of 30 or higher is considered obese. If your BMI is in the normal range, it means that you have a lower risk for weight-related health problems. If your BMI is in the overweight or obese range, you may be at increased risk for weight-related health problems, such as high blood pressure, heart disease, stroke, arthritis or joint pain, and diabetes. If your BMI is in the underweight range, you may be at increased risk for health problems such as fatigue, lower protection (immunity) against illness, muscle loss, bone loss, hair loss, and hormone problems. BMI is just one measure of your risk for weight-related health problems. You may be at higher risk for health problems if you are not active, you eat an unhealthy diet, or you drink too much alcohol or use tobacco products. Follow-up care is a key part of your treatment and safety. Be sure to make and go to all appointments, and call your doctor if you are having problems. It's also a good idea to know your test results and keep a list of the medicines you take. How can you care for yourself at home? · Practice healthy eating habits. This includes eating plenty of fruits, vegetables, whole grains, lean protein, and low-fat dairy. · If your doctor recommends it, get more exercise. Walking is a good choice. Bit by bit, increase the amount you walk every day. Try for at least 30 minutes on most days of the week. · Do not smoke. Smoking can increase your risk for health problems.  If you need help quitting, talk to your doctor about stop-smoking programs and medicines. These can increase your chances of quitting for good. · Limit alcohol to 2 drinks a day for men and 1 drink a day for women. Too much alcohol can cause health problems. If you have a BMI higher than 25 · Your doctor may do other tests to check your risk for weight-related health problems. This may include measuring the distance around your waist. A waist measurement of more than 40 inches in men or 35 inches in women can increase the risk of weight-related health problems. · Talk with your doctor about steps you can take to stay healthy or improve your health. You may need to make lifestyle changes to lose weight and stay healthy, such as changing your diet and getting regular exercise. If you have a BMI lower than 18.5 · Your doctor may do other tests to check your risk for health problems. · Talk with your doctor about steps you can take to stay healthy or improve your health. You may need to make lifestyle changes to gain or maintain weight and stay healthy, such as getting more healthy foods in your diet and doing exercises to build muscle. Where can you learn more? Go to http://frank-lyubov.info/. Enter S176 in the search box to learn more about \"Body Mass Index: Care Instructions. \" Current as of: June 26, 2018 Content Version: 11.8 © 7546-8165 [employer]. Care instructions adapted under license by [employer] (which disclaims liability or warranty for this information). If you have questions about a medical condition or this instruction, always ask your healthcare professional. [address] any warranty or liability for your use of this information.

## 2018-11-06 NOTE — PROGRESS NOTES
Chief Complaint Patient presents with  Diarrhea  
  2 months of diarrhea 1. Have you been to the ER, urgent care clinic since your last visit? Hospitalized since your last visit? No 
 
2. Have you seen or consulted any other health care providers outside of the [address] [place] [address] since your last visit? Include any pap smears or colon screening. No 
Visit Vitals /48 (BP 1 Location: Left arm, BP Patient Position: Sitting) Pulse (!) 37 Resp 19 Ht 5' 9\" (1.753 m) Wt 197 lb 12.8 oz (89.7 kg) SpO2 94% BMI 29.21 kg/m²

## 2018-11-06 NOTE — PROGRESS NOTES
Subjective:  
Crow Perry is a 80 y.o. male Chief Complaint Patient presents with  Diarrhea  
  2 months of diarrhea History of present illness: He presents today for evaluation of diarrhea is been present for several months. He notes that he will take Pepto-Bismol for the diarrhea and it seems to stop it for several days and then will start back gradually to the point become full-blown diarrhea about once a week with multiple stools per day. He is noted no blood in the bowel movements. He notes no abdominal pain or nausea vomiting. He denies any fevers or chills. He has had problems off and on with loose bowels for many years but this is become much worse. He has not changed any medications recently. He denies any other complaints including no lightheadedness or dizziness or signs of dehydration. Patient Active Problem List  
Diagnosis Code  Cancer of the skin, basal cell C44.91  
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  
 Gout M10.9  Glucose intolerance (impaired glucose tolerance) R73.02  
 Gastroesophageal reflux disease without esophagitis K21.9  
 COPD (chronic obstructive pulmonary disease) (HCC) J44.9  Stage 3 chronic kidney disease (HCC) N18.3  BPH (benign prostatic hyperplasia) N40.0  Atrial fibrillation (HCC) I48.91  
 ASCVD (arteriosclerotic cardiovascular disease) I25.10  Frequent falls R29.6  Medicare annual wellness visit, subsequent Z00.00  Primary osteoarthritis involving multiple joints M15.0  Sebaceous cyst L72.3  Class 1 obesity due to excess calories without serious comorbidity with body mass index (BMI) of 30.0 to 30.9 in adult E66.09, Z68.30  Pain of left hip joint M25.552  Diarrhea R19.7 Past Medical History:  
Diagnosis Date  Arrhythmia A-fib  GERD (gastroesophageal reflux disease)  Hypertension  Other ill-defined conditions(799.89) BPH  Other ill-defined conditions(799.89) lipids  Other ill-defined conditions(799.89)   
 shingles hx  Thyroid disease  Unspecified adverse effect of anesthesia No Known Allergies Family History Problem Relation Age of Onset  Heart Disease Mother  Hypertension Mother  Stroke Mother  Heart Disease Father  Cancer Paternal Grandmother Social History Socioeconomic History  Marital status:  Spouse name: Not on file  Number of children: Not on file  Years of education: Not on file  Highest education level: Not on file Social Needs  Financial resource strain: Not on file  Food insecurity - worry: Not on file  Food insecurity - inability: Not on file  Transportation needs - medical: Not on file  Transportation needs - non-medical: Not on file Occupational History  Not on file Tobacco Use  Smoking status: Former Smoker Packs/day: 0.50 Last attempt to quit: 3/28/1963 Years since quittin.8  Smokeless tobacco: Never Used Substance and Sexual Activity  Alcohol use: Yes Comment: rare/social-beer  Drug use: Yes Types: Prescription, OTC  Sexual activity: Not on file Other Topics Concern  Not on file Social History Narrative  Not on file Prior to Admission medications Medication Sig Start Date End Date Taking? Authorizing Provider  
metroNIDAZOLE (FLAGYL) 500 mg tablet Take 1 Tab by mouth three (3) times daily. 18  Yes Rambo Llanes MD  
levothyroxine (SYNTHROID) 137 mcg tablet Take  by mouth Daily (before breakfast). Yes Provider, Historical  
losartan (COZAAR) 50 mg tablet Take 1 Tab by mouth daily.  18  Yes Rambo Llanes MD  
naproxen (NAPROSYN) 500 mg tablet Take 500 mg by mouth two (2) times daily (with meals). Yes Provider, Historical  
dilTIAZem ER (CARDIZEM LA) 120 mg tablet Take 1 Tab by mouth daily. 6/18/18  Yes Maxwell Yee MD  
allopurinol (ZYLOPRIM) 100 mg tablet Take 1 Tab by mouth daily. 6/18/18  Yes Maxwell Yee MD  
tamsulosin [facility]) 0.4 mg capsule TAKE 2 CAPSULES DAILY 3/19/18  Yes Maxwell Yee MD  
rivaroxaban (XARELTO) 15 mg tab tablet Take 1 Tab by mouth daily (with breakfast). 3/19/18  Yes Maxwell Yee MD  
furosemide (LASIX) 40 mg tablet 1 daily Patient taking differently: Take 40 mg by mouth daily. 9/12/17  Yes Maxwell Yee MD  
simvastatin (ZOCOR) 40 mg tablet Take 1 Tab by mouth nightly. 9/12/17  Yes Maxwell Yee MD  
finasteride (PROSCAR) 5 mg tablet Take 1 Tab by mouth daily (after dinner). 9/12/17  Yes Maxwell Yee MD  
[facility] ACID/EPA (FISH OIL PO) Take  by mouth. Yes Provider, Historical  
ESOMEPRAZOLE MAGNESIUM (NEXIUM PO) Take 40 mg by mouth every morning. Yes Provider, Historical  
  
 
Review of Systems Constitutional:  He denies fever, weight loss, sweats or fatigue. EYES: No blurred or double vision, ENT: no nasal congestion, no headache or dizziness. No difficulty with               swallowing, taste, speech or smell. Respiratory:  No cough, wheezing or shortness of breath. No sputum production. Cardiac:  Denies chest pain, palpitations, unexplained indigestion, syncope, edema, PND or orthopnea. GI: Diarrhea as described. No abdominal pain, no bloating, anorexia, nausea, vomiting or heartburn. :  No frequency or dysuria. Denies incontinence or sexual dysfunction. Extremities:  No joint pain, stiffness or swelling Back:.no pain or soreness Skin:  No recent rashes or mole changes. Neurological:  No numbness, tingling, burning paresthesias or loss of motor strength. No syncope, dizziness, frequent headaches or memory loss. Hematologic:  No easy bruising Lymphatic: No lymph node enlargement Objective:  
 
Vitals:  
 11/06/18 1515 BP: 130/48 Pulse: (!) 37 Resp: 19 SpO2: 94% Weight: 197 lb 12.8 oz (89.7 kg) Height: 5' 9\" (1.753 m) PainSc:   0 - No pain Body mass index is 29.21 kg/m². Physical Examination:  
           General Appearance:  Well-developed, well-nourished, no acute distress. HEENT:   
  Ears:  The TMs and ear canals were clear. Eyes:  The pupillary responses were normal.  Extraocular muscle function intact. Lids and conjunctiva not injected. Funduscopic exam revealed sharp disc margins. Nares: Clear w/o edema or erythema Pharynx:  Clear with teeth in good repair. No masses were noted. Neck:  Supple without thyromegaly or adenopathy. No JVD noted. No carotid                bruits. Lungs:  Clear to auscultation and percussion. Cardiac:  Regular rate and rhythm without murmur. PMI not displaced. No gallop, rub or click. Abdominal: Soft, non-tender, no hepata-spleenomegally or masses Extremities:  No clubbing, cyanosis or edema. Skin:  No rash or unusual mole changes noted. Lymph Nodes:  None felt in the cervical, supraclavicular, axillary or inguinal region. Neurological: . DTRs 2+ and symmetric. Station and gait normal.  
Hematologic:   No purpura or petechiae Assessment/Plan: 1. Diarrhea, unspecified type 2. Paroxysmal atrial fibrillation (HCC) 3. Encounter for immunization Impressions/Plan: 
Impression 1. Diarrhea of unclear etiology we will first get some stool samples checking for culture and O&P as well as C. difficile although has had no recent antibiotics once he is got the stool sample was collected into the lab will put him on Flagyl 500 3 times daily for 10 days to see if this helps. If the cultures are negative and Flagyl does not clear to problem and I think he is going need a colonoscopy which I discussed with him 2.  Paroxysmal atrial fibrillation I see no evidence to indicate this is related to any blood loss Flu shot given today. Recheck per previous schedule or sooner if there is a problem. Orders Placed This Encounter  CULTURE, STOOL  
 OVA & PARASITES, STOOL  C DIFFICILE TOXIN A & B BY EIA  Influenza Vaccine Inactivated (IIV)(FLUAD), Subunit, Adjuvanted, IM, (24975)  metroNIDAZOLE (FLAGYL) 500 mg tablet Follow-up Disposition: 
Return At prior appt. No results found for any visits on 11/06/18. Sandor Carlos MD 
 
The patient was given after the visit summary the patient verbalized an understanding of the plans and problems as explained.

## 2018-11-07 RX ORDER — METRONIDAZOLE 500 MG/1
500 TABLET ORAL 3 TIMES DAILY
Qty: 30 TAB | Refills: 0 | Status: SHIPPED | OUTPATIENT
Start: 2018-11-07 | End: 2018-01-01 | Stop reason: ALTCHOICE

## 2018-11-07 NOTE — TELEPHONE ENCOUNTER
RX refill request from the patient/pharmacy. Patient last seen 11- with labs, and next appt. scheduled for 12-  Requested Prescriptions     Pending Prescriptions Disp Refills    metroNIDAZOLE (FLAGYL) 500 mg tablet 30 Tab 0     Sig: Take 1 Tab by mouth three (3) times daily. Barb Colon

## 2018-11-13 NOTE — PROGRESS NOTES
Stool is negative except C. difficile is positive and Flagyl is the correct treatment. See if he is doing better. And make sure we treat him for a full 2 weeks with Flagyl 500mg 3 times daily.

## 2018-11-14 NOTE — TELEPHONE ENCOUNTER
----- Message from Nancy Engle MD sent at 11/13/2018  6:20 PM EST -----  Stool is negative except C. difficile is positive and Flagyl is the correct treatment. See if he is doing better. And make sure we treat him for a full 2 weeks with Flagyl 500mg 3 times daily.

## 2018-11-14 NOTE — TELEPHONE ENCOUNTER
Patient informed. Has enough of the medicine because he paid cash to get the medicine from the drug store and then he just got the mail order rx. Advised needs to take this for a full two weeks. Advised to f/up if no improvement.

## 2018-12-12 NOTE — PROGRESS NOTES
Chief Complaint   Patient presents with    Hypertension     3 month f/u    Cholesterol Problem    Diarrhea     again       SUBJECTIVE:    Maria L Guillen is a 80 y.o. male who returns in follow-up of his medical problems include hypertension, hyperlipidemia, atrial fibrillation, ASCVD, GERD, DJD, glucose intolerance, COPD, CKD stage III, gout and other medical problems. In addition to his chronic problems he is having little bit of problem with recurrent diarrhea. He recently saw me for that a little over a month ago at which time I gave him a course of Flagyl his diarrhea almost resolved and that has returned a little again. He notes he has about 3 loose bowel movements in the morning and then he is good for the rest of the day. He is not recent taking the antibiotics other than Flagyl which I gave him which helped the diarrhea. He denies any abdominal pain. He is noted no fevers or chills. He denies any other GI complaints. He denies any chest pain, shortness of breath, palpitations, PND, orthopnea or cardiorespiratory complaints. There are no headaches, dizziness or neurologic complaints. He has no current change of his chronic arthritic complaints and no other complaints on complete review of systems. Current Outpatient Medications   Medication Sig Dispense Refill    finasteride (PROSCAR) 5 mg tablet Take 1 Tab by mouth daily (after dinner). 90 Tab 3    simvastatin (ZOCOR) 40 mg tablet Take 1 Tab by mouth nightly. 90 Tab 3    metroNIDAZOLE (FLAGYL) 500 mg tablet Take 1 Tab by mouth three (3) times daily. 30 Tab 0    levothyroxine (SYNTHROID) 137 mcg tablet Take  by mouth Daily (before breakfast).  losartan (COZAAR) 50 mg tablet Take 1 Tab by mouth daily. 90 Tab prn    dilTIAZem ER (CARDIZEM LA) 120 mg tablet Take 1 Tab by mouth daily. 90 Tab 3    allopurinol (ZYLOPRIM) 100 mg tablet Take 1 Tab by mouth daily.  90 Tab 3    tamsulosin (FLOMAX) 0.4 mg capsule TAKE 2 CAPSULES DAILY 180 Cap 3    rivaroxaban (XARELTO) 15 mg tab tablet Take 1 Tab by mouth daily (with breakfast). 90 Tab 3    furosemide (LASIX) 40 mg tablet 1 daily (Patient taking differently: Take 40 mg by mouth daily.) 90 Tab 3     Past Medical History:   Diagnosis Date    Arrhythmia     A-fib    GERD (gastroesophageal reflux disease)     Hypertension     Other ill-defined conditions(799.89)     BPH    Other ill-defined conditions(799.89)     lipids    Other ill-defined conditions(799.89)     shingles hx    Thyroid disease     Unspecified adverse effect of anesthesia      Past Surgical History:   Procedure Laterality Date    HX CATARACT REMOVAL      HX HEART CATHETERIZATION      ablation    HX HEENT      scar tissue removed/laser    HX HERNIA REPAIR  05/05/2017    Terry Womack MD    HX ORTHOPAEDIC      left foot fx--hardware    HX ORTHOPAEDIC  4/9/14    Right total knee arthroplasty    HX OTHER SURGICAL      prostate bx     No Known Allergies    REVIEW OF SYSTEMS:  General: negative for - chills or fever, or weight loss or gain  ENT: negative for - headaches, nasal congestion or tinnitus  Eyes: no blurred or visual changes  Neck: No stiffness or swollen nodes  Respiratory: negative for - cough, hemoptysis, shortness of breath or wheezing  Cardiovascular : negative for - chest pain, edema, palpitations or shortness of breath  Gastrointestinal: negative for - abdominal pain, blood in stools, heartburn or nausea/vomiting.   Positive for diarrhea as noted  Genito-Urinary: no dysuria, trouble voiding, or hematuria  Musculoskeletal: negative for - gait disturbance, change of his chronic joint pain, joint stiffness or joint swelling  Neurological: no TIA or stroke symptoms  Hematologic: no bruises, no bleeding  Lymphatic: no swollen glands  Integument: no lumps, mole changes, nail changes or rash  Endocrine:no malaise/lethargy poly uria or polydipsia or unexpected weight changes        Social History Socioeconomic History    Marital status:      Spouse name: Not on file    Number of children: Not on file    Years of education: Not on file    Highest education level: Not on file   Tobacco Use    Smoking status: Former Smoker     Packs/day: 0.50     Last attempt to quit: 3/28/1963     Years since quittin.7    Smokeless tobacco: Never Used   Substance and Sexual Activity    Alcohol use: Yes     Comment: rare/social-beer    Drug use: Yes     Types: Prescription, OTC     Family History   Problem Relation Age of Onset    Heart Disease Mother     Hypertension Mother     Stroke Mother     Heart Disease Father     Cancer Paternal Grandmother        OBJECTIVE:     Visit Vitals  /60   Pulse (!) 55   Resp 14   Ht 5' 9\" (1.753 m)   Wt 198 lb 6.4 oz (90 kg)   SpO2 95%   BMI 29.30 kg/m²     CONSTITUTIONAL:   well nourished, appears age appropriate  EYES: sclera anicteric, PERRL, EOMI  ENMT:nares clear, moist mucous membranes, pharynx clear  NECK: supple. Thyroid normal, No JVD or bruits  RESPIRATORY: Chest: clear to ascultation and percussion, normal inspiratory effort  CARDIOVASCULAR: Heart: regular rate and rhythm no murmurs, rubs or gallops, PMI not displaced, No thrills  GASTROINTESTINAL: Abdomen: non distended, soft, non tender, bowel sounds normal  HEMATOLOGIC: no purpura, petechiae or bruising  LYMPHATIC: No lymph node enlargemant  MUSCULOSKELETAL: Extremities: no edema or active synovitis, pulse 1+   INTEGUMENT: No unusual rashes or suspicious skin lesions noted. Nails appear normal.  PERIPHERAL VASCULAR: normal pulses femoral, PT and DP  NEUROLOGIC: non-focal exam, A & O X 3  PSYCHIATRIC:, appropriate affect     ASSESSMENT:   1. Hypertension with renal disease    2. Glucose intolerance (impaired glucose tolerance)    3. Mixed hyperlipidemia    4. Gastroesophageal reflux disease without esophagitis    5. Primary osteoarthritis involving multiple joints    6.  ASCVD (arteriosclerotic cardiovascular disease)    7. Chronic obstructive pulmonary disease, unspecified COPD type ([facility] [address]    8. Stage 3 chronic kidney disease (HCC)    9. Paroxysmal atrial fibrillation ([facility] [address]    10. Diarrhea, unspecified type      Impression  1. Hypertension that is controlled to continue current therapy reviewed with him  2.  Glucose intolerance repeat status pending and prior labs reviewed no make adjustments if necessary. 3.  Hyperlipidemia prior lab reviewed repeat status pending I will adjust if needed. 4   GERD that is stable  5. DJD that is stable  6. ASCVD clinically stable continue aspirin daily  7. COPD currently without any symptoms  8. CKD stage III repeat status pending  9. Paroxysmal atrial fibrillation on chronic Xarelto  10. Diarrhea at this point I question whether some of this may be related to his fish oil or Nexium so I have asked him to stop both of those. Since he did get better with the Flagyl for I will give him another 10-day course of Flagyl. Recheck if not resolved otherwise per previous schedule. I will call the lab results in the interim. PLAN:  .  Orders Placed This Encounter    METABOLIC PANEL, COMPREHENSIVE    LIPID PANEL    HEMOGLOBIN A1C WITH EAG    finasteride (PROSCAR) 5 mg tablet    simvastatin (ZOCOR) 40 mg tablet    metroNIDAZOLE (FLAGYL) 500 mg tablet         ATTENTION:   This medical record was transcribed using an electronic medical records system. Although proofread, it may and can contain electronic and spelling errors. Other human spelling and other errors may be present. Corrections may be executed at a later time. Please feel free to contact us for any clarifications as needed. Follow-up Disposition:  Return in about 3 months (around 3/12/2019). No results found for any visits on 12/12/18. Gildardo Dominguez MD    The patient verbalized understanding of the problems and plans as explained.

## 2018-12-12 NOTE — PROGRESS NOTES
Chief Complaint   Patient presents with    Hypertension     3 month f/u    Cholesterol Problem    Diarrhea     again           1. Have you been to the ER, urgent care clinic since your last visit? Hospitalized since your last visit? no    2. Have you seen or consulted any other health care providers outside of the [address] [place] [address] since your last visit? Include any pap smears or colon screening.   no

## 2018-12-12 NOTE — PATIENT INSTRUCTIONS
Arthritis: Care Instructions  Your Care Instructions  Arthritis, also called osteoarthritis, is a breakdown of the cartilage that cushions your joints. When the cartilage wears down, your bones rub against each other. This causes pain and stiffness. Many people have some arthritis as they age. Arthritis most often affects the joints of the spine, hands, hips, knees, or feet. You can take simple measures to protect your joints, ease your pain, and help you stay active. Follow-up care is a key part of your treatment and safety. Be sure to make and go to all appointments, and call your doctor if you are having problems. It's also a good idea to know your test results and keep a list of the medicines you take. How can you care for yourself at home? · Stay at a healthy weight. Being overweight puts extra strain on your joints. · Talk to your doctor or physical therapist about exercises that will help ease joint pain. ? Stretch. You may enjoy gentle forms of yoga to help keep your joints and muscles flexible. ? Walk instead of jog. Other types of exercise that are less stressful on the joints include riding a bicycle, swimming, julia chi, or water exercise. ? Lift weights. Strong muscles help reduce stress on your joints. Stronger thigh muscles, for example, take some of the stress off of the knees and hips. Learn the right way to lift weights so you do not make joint pain worse. · Take your medicines exactly as prescribed. Call your doctor if you think you are having a problem with your medicine. · Take pain medicines exactly as directed. ? If the doctor gave you a prescription medicine for pain, take it as prescribed. ? If you are not taking a prescription pain medicine, ask your doctor if you can take an over-the-counter medicine. · Use a cane, crutch, walker, or another device if you need help to get around. These can help rest your joints.  You also can use other things to make life easier, such as a higher toilet seat and padded handles on kitchen utensils. · Do not sit in low chairs, which can make it hard to get up. · Put heat or cold on your sore joints as needed. Use whichever helps you most. You also can take turns with hot and cold packs. ? Apply heat 2 or 3 times a day for 20 to 30 minutesusing a heating pad, hot shower, or hot packto relieve pain and stiffness. ? Put ice or a cold pack on your sore joint for 10 to 20 minutes at a time. Put a thin cloth between the ice and your skin. When should you call for help? Call your doctor now or seek immediate medical care if:    · You have sudden swelling, warmth, or pain in any joint.     · You have joint pain and a fever or rash.     · You have such bad pain that you cannot use a joint.    Watch closely for changes in your health, and be sure to contact your doctor if:    · You have mild joint symptoms that continue even with more than 6 weeks of care at home.     · You have stomach pain or other problems with your medicine. Where can you learn more? Go to http://frank-lyuobv.info/. Enter E861 in the search box to learn more about \"Arthritis: Care Instructions. \"  Current as of: June 11, 2018  Content Version: 11.8  © 2528-8742 [employer]. Care instructions adapted under license by [employer] (which disclaims liability or warranty for this information). If you have questions about a medical condition or this instruction, always ask your healthcare professional. [place] [address] any warranty or liability for your use of this information.

## 2019-01-01 ENCOUNTER — TELEPHONE (OUTPATIENT)
Dept: INTERNAL MEDICINE CLINIC | Age: 84
End: 2019-01-01

## 2019-01-01 ENCOUNTER — ANESTHESIA EVENT (OUTPATIENT)
Dept: SURGERY | Age: 84
DRG: 215 | End: 2019-01-01
Payer: MEDICARE

## 2019-01-01 ENCOUNTER — APPOINTMENT (OUTPATIENT)
Dept: CT IMAGING | Age: 84
DRG: 356 | End: 2019-01-01
Attending: EMERGENCY MEDICINE
Payer: MEDICARE

## 2019-01-01 ENCOUNTER — OFFICE VISIT (OUTPATIENT)
Dept: INTERNAL MEDICINE CLINIC | Age: 84
End: 2019-01-01

## 2019-01-01 ENCOUNTER — PATIENT OUTREACH (OUTPATIENT)
Dept: INTERNAL MEDICINE CLINIC | Age: 84
End: 2019-01-01

## 2019-01-01 ENCOUNTER — HOSPITAL ENCOUNTER (INPATIENT)
Age: 84
LOS: 14 days | Discharge: NURSING, REHAB OR LONG-TERM CARE | DRG: 356 | End: 2019-10-10
Attending: EMERGENCY MEDICINE | Admitting: GENERAL ACUTE CARE HOSPITAL
Payer: MEDICARE

## 2019-01-01 ENCOUNTER — APPOINTMENT (OUTPATIENT)
Dept: GENERAL RADIOLOGY | Age: 84
DRG: 356 | End: 2019-01-01
Attending: INTERNAL MEDICINE
Payer: MEDICARE

## 2019-01-01 ENCOUNTER — APPOINTMENT (OUTPATIENT)
Dept: NON INVASIVE DIAGNOSTICS | Age: 84
DRG: 215 | End: 2019-01-01
Attending: INTERNAL MEDICINE
Payer: MEDICARE

## 2019-01-01 ENCOUNTER — APPOINTMENT (OUTPATIENT)
Dept: GENERAL RADIOLOGY | Age: 84
End: 2019-01-01
Attending: EMERGENCY MEDICINE
Payer: MEDICARE

## 2019-01-01 ENCOUNTER — HOSPICE ADMISSION (OUTPATIENT)
Dept: HOSPICE | Facility: HOSPICE | Age: 84
End: 2019-01-01
Payer: MEDICARE

## 2019-01-01 ENCOUNTER — APPOINTMENT (OUTPATIENT)
Dept: GENERAL RADIOLOGY | Age: 84
DRG: 871 | End: 2019-01-01
Attending: INTERNAL MEDICINE
Payer: MEDICARE

## 2019-01-01 ENCOUNTER — ANESTHESIA EVENT (OUTPATIENT)
Dept: ENDOSCOPY | Age: 84
DRG: 356 | End: 2019-01-01
Payer: MEDICARE

## 2019-01-01 ENCOUNTER — ANESTHESIA (OUTPATIENT)
Dept: ENDOSCOPY | Age: 84
DRG: 356 | End: 2019-01-01
Payer: MEDICARE

## 2019-01-01 ENCOUNTER — APPOINTMENT (OUTPATIENT)
Dept: NON INVASIVE DIAGNOSTICS | Age: 84
DRG: 948 | End: 2019-01-01
Attending: INTERNAL MEDICINE
Payer: MEDICARE

## 2019-01-01 ENCOUNTER — HOSPITAL ENCOUNTER (INPATIENT)
Age: 84
LOS: 14 days | Discharge: HOSPICE | DRG: 871 | End: 2019-11-07
Attending: EMERGENCY MEDICINE | Admitting: INTERNAL MEDICINE
Payer: MEDICARE

## 2019-01-01 ENCOUNTER — APPOINTMENT (OUTPATIENT)
Dept: GENERAL RADIOLOGY | Age: 84
DRG: 871 | End: 2019-01-01
Attending: EMERGENCY MEDICINE
Payer: MEDICARE

## 2019-01-01 ENCOUNTER — PATIENT OUTREACH (OUTPATIENT)
Dept: FAMILY MEDICINE CLINIC | Age: 84
End: 2019-01-01

## 2019-01-01 ENCOUNTER — ANESTHESIA (OUTPATIENT)
Dept: SURGERY | Age: 84
DRG: 356 | End: 2019-01-01
Payer: MEDICARE

## 2019-01-01 ENCOUNTER — APPOINTMENT (OUTPATIENT)
Dept: GENERAL RADIOLOGY | Age: 84
DRG: 356 | End: 2019-01-01
Attending: SURGERY
Payer: MEDICARE

## 2019-01-01 ENCOUNTER — APPOINTMENT (OUTPATIENT)
Dept: GENERAL RADIOLOGY | Age: 84
DRG: 356 | End: 2019-01-01
Attending: EMERGENCY MEDICINE
Payer: MEDICARE

## 2019-01-01 ENCOUNTER — ANESTHESIA EVENT (OUTPATIENT)
Dept: SURGERY | Age: 84
DRG: 356 | End: 2019-01-01
Payer: MEDICARE

## 2019-01-01 ENCOUNTER — HOSPITAL ENCOUNTER (INPATIENT)
Age: 84
LOS: 1 days | DRG: 872 | End: 2019-11-08
Attending: INTERNAL MEDICINE | Admitting: INTERNAL MEDICINE
Payer: OTHER MISCELLANEOUS

## 2019-01-01 ENCOUNTER — HOSPITAL ENCOUNTER (INPATIENT)
Age: 84
LOS: 5 days | Discharge: NURSING, REHAB OR LONG-TERM CARE | DRG: 215 | End: 2019-07-22
Attending: INTERNAL MEDICINE | Admitting: INTERNAL MEDICINE
Payer: MEDICARE

## 2019-01-01 ENCOUNTER — APPOINTMENT (OUTPATIENT)
Dept: GENERAL RADIOLOGY | Age: 84
DRG: 948 | End: 2019-01-01
Attending: INTERNAL MEDICINE
Payer: MEDICARE

## 2019-01-01 ENCOUNTER — APPOINTMENT (OUTPATIENT)
Dept: GENERAL RADIOLOGY | Age: 84
End: 2019-01-01
Attending: PHYSICIAN ASSISTANT
Payer: MEDICARE

## 2019-01-01 ENCOUNTER — HOSPITAL ENCOUNTER (INPATIENT)
Age: 84
LOS: 9 days | Discharge: NURSING, REHAB OR LONG-TERM CARE | DRG: 948 | End: 2019-09-04
Attending: INTERNAL MEDICINE | Admitting: INTERNAL MEDICINE
Payer: MEDICARE

## 2019-01-01 ENCOUNTER — ANESTHESIA (OUTPATIENT)
Dept: SURGERY | Age: 84
DRG: 215 | End: 2019-01-01
Payer: MEDICARE

## 2019-01-01 ENCOUNTER — HOSPITAL ENCOUNTER (OUTPATIENT)
Dept: GENERAL RADIOLOGY | Age: 84
Discharge: HOME | End: 2019-07-11
Payer: MEDICARE

## 2019-01-01 ENCOUNTER — APPOINTMENT (OUTPATIENT)
Dept: NON INVASIVE DIAGNOSTICS | Age: 84
DRG: 871 | End: 2019-01-01
Attending: INTERNAL MEDICINE
Payer: MEDICARE

## 2019-01-01 ENCOUNTER — HOSPITAL ENCOUNTER (OUTPATIENT)
Dept: VASCULAR SURGERY | Age: 84
Discharge: HOME | End: 2019-09-18
Attending: INTERNAL MEDICINE
Payer: MEDICARE

## 2019-01-01 ENCOUNTER — APPOINTMENT (OUTPATIENT)
Dept: CT IMAGING | Age: 84
End: 2019-01-01
Attending: EMERGENCY MEDICINE
Payer: MEDICARE

## 2019-01-01 ENCOUNTER — HOSPITAL ENCOUNTER (EMERGENCY)
Age: 84
Discharge: HOME | End: 2019-08-11
Attending: EMERGENCY MEDICINE
Payer: MEDICARE

## 2019-01-01 ENCOUNTER — HOSPITAL ENCOUNTER (EMERGENCY)
Age: 84
Discharge: HOME | End: 2019-01-22
Attending: EMERGENCY MEDICINE
Payer: MEDICARE

## 2019-01-01 ENCOUNTER — APPOINTMENT (OUTPATIENT)
Dept: GENERAL RADIOLOGY | Age: 84
DRG: 215 | End: 2019-01-01
Attending: INTERNAL MEDICINE
Payer: MEDICARE

## 2019-01-01 VITALS
HEIGHT: 69 IN | WEIGHT: 181.99 LBS | BODY MASS INDEX: 26.96 KG/M2 | RESPIRATION RATE: 17 BRPM | TEMPERATURE: 98.3 F | HEART RATE: 60 BPM | OXYGEN SATURATION: 95 % | SYSTOLIC BLOOD PRESSURE: 141 MMHG | DIASTOLIC BLOOD PRESSURE: 63 MMHG

## 2019-01-01 VITALS
RESPIRATION RATE: 19 BRPM | HEIGHT: 69 IN | DIASTOLIC BLOOD PRESSURE: 70 MMHG | SYSTOLIC BLOOD PRESSURE: 136 MMHG | TEMPERATURE: 97.8 F | HEART RATE: 69 BPM | BODY MASS INDEX: 26.9 KG/M2 | OXYGEN SATURATION: 98 % | WEIGHT: 181.6 LBS

## 2019-01-01 VITALS
WEIGHT: 182.2 LBS | OXYGEN SATURATION: 95 % | HEART RATE: 75 BPM | DIASTOLIC BLOOD PRESSURE: 54 MMHG | TEMPERATURE: 98.2 F | HEIGHT: 69 IN | SYSTOLIC BLOOD PRESSURE: 104 MMHG | RESPIRATION RATE: 16 BRPM | BODY MASS INDEX: 26.98 KG/M2

## 2019-01-01 VITALS
OXYGEN SATURATION: 96 % | RESPIRATION RATE: 14 BRPM | HEIGHT: 69 IN | BODY MASS INDEX: 27.11 KG/M2 | SYSTOLIC BLOOD PRESSURE: 136 MMHG | HEART RATE: 64 BPM | DIASTOLIC BLOOD PRESSURE: 69 MMHG | TEMPERATURE: 97.7 F | WEIGHT: 183 LBS

## 2019-01-01 VITALS
HEART RATE: 88 BPM | OXYGEN SATURATION: 92 % | TEMPERATURE: 99.1 F | SYSTOLIC BLOOD PRESSURE: 120 MMHG | DIASTOLIC BLOOD PRESSURE: 40 MMHG | RESPIRATION RATE: 18 BRPM

## 2019-01-01 VITALS
WEIGHT: 186 LBS | HEIGHT: 69 IN | HEART RATE: 79 BPM | DIASTOLIC BLOOD PRESSURE: 74 MMHG | OXYGEN SATURATION: 95 % | TEMPERATURE: 97.6 F | RESPIRATION RATE: 20 BRPM | BODY MASS INDEX: 27.55 KG/M2 | SYSTOLIC BLOOD PRESSURE: 112 MMHG

## 2019-01-01 VITALS
OXYGEN SATURATION: 96 % | RESPIRATION RATE: 18 BRPM | WEIGHT: 216.05 LBS | DIASTOLIC BLOOD PRESSURE: 42 MMHG | HEIGHT: 69 IN | HEART RATE: 69 BPM | SYSTOLIC BLOOD PRESSURE: 100 MMHG | TEMPERATURE: 97.6 F | BODY MASS INDEX: 32 KG/M2

## 2019-01-01 VITALS
HEART RATE: 66 BPM | TEMPERATURE: 97.8 F | BODY MASS INDEX: 29.31 KG/M2 | RESPIRATION RATE: 22 BRPM | WEIGHT: 197.89 LBS | SYSTOLIC BLOOD PRESSURE: 125 MMHG | DIASTOLIC BLOOD PRESSURE: 46 MMHG | HEIGHT: 69 IN | OXYGEN SATURATION: 97 %

## 2019-01-01 VITALS
WEIGHT: 182.4 LBS | SYSTOLIC BLOOD PRESSURE: 112 MMHG | TEMPERATURE: 98.3 F | RESPIRATION RATE: 22 BRPM | BODY MASS INDEX: 27.02 KG/M2 | HEIGHT: 69 IN | DIASTOLIC BLOOD PRESSURE: 60 MMHG | OXYGEN SATURATION: 96 % | HEART RATE: 81 BPM

## 2019-01-01 VITALS
HEART RATE: 70 BPM | WEIGHT: 191.8 LBS | OXYGEN SATURATION: 96 % | TEMPERATURE: 98 F | HEIGHT: 69 IN | RESPIRATION RATE: 23 BRPM | SYSTOLIC BLOOD PRESSURE: 128 MMHG | BODY MASS INDEX: 28.41 KG/M2 | DIASTOLIC BLOOD PRESSURE: 69 MMHG

## 2019-01-01 VITALS
DIASTOLIC BLOOD PRESSURE: 56 MMHG | HEIGHT: 69 IN | RESPIRATION RATE: 19 BRPM | WEIGHT: 197.2 LBS | OXYGEN SATURATION: 94 % | HEART RATE: 59 BPM | BODY MASS INDEX: 29.21 KG/M2 | SYSTOLIC BLOOD PRESSURE: 108 MMHG | TEMPERATURE: 97 F

## 2019-01-01 VITALS
DIASTOLIC BLOOD PRESSURE: 72 MMHG | HEART RATE: 55 BPM | WEIGHT: 203 LBS | OXYGEN SATURATION: 96 % | HEIGHT: 69 IN | BODY MASS INDEX: 30.07 KG/M2 | SYSTOLIC BLOOD PRESSURE: 148 MMHG | RESPIRATION RATE: 16 BRPM | TEMPERATURE: 97.8 F

## 2019-01-01 VITALS
WEIGHT: 200 LBS | HEART RATE: 62 BPM | OXYGEN SATURATION: 97 % | DIASTOLIC BLOOD PRESSURE: 85 MMHG | SYSTOLIC BLOOD PRESSURE: 132 MMHG | TEMPERATURE: 97.8 F | HEIGHT: 69 IN | RESPIRATION RATE: 16 BRPM | BODY MASS INDEX: 29.62 KG/M2

## 2019-01-01 VITALS
DIASTOLIC BLOOD PRESSURE: 67 MMHG | OXYGEN SATURATION: 98 % | HEIGHT: 69 IN | TEMPERATURE: 97.9 F | BODY MASS INDEX: 27 KG/M2 | SYSTOLIC BLOOD PRESSURE: 118 MMHG | WEIGHT: 182.32 LBS | HEART RATE: 60 BPM | RESPIRATION RATE: 18 BRPM

## 2019-01-01 VITALS
HEART RATE: 64 BPM | BODY MASS INDEX: 29.83 KG/M2 | DIASTOLIC BLOOD PRESSURE: 58 MMHG | HEIGHT: 69 IN | OXYGEN SATURATION: 94 % | WEIGHT: 201.4 LBS | SYSTOLIC BLOOD PRESSURE: 128 MMHG | TEMPERATURE: 97.8 F | RESPIRATION RATE: 21 BRPM

## 2019-01-01 VITALS
TEMPERATURE: 98 F | HEART RATE: 69 BPM | WEIGHT: 203.8 LBS | HEIGHT: 69 IN | RESPIRATION RATE: 19 BRPM | OXYGEN SATURATION: 94 % | BODY MASS INDEX: 30.18 KG/M2 | DIASTOLIC BLOOD PRESSURE: 84 MMHG | SYSTOLIC BLOOD PRESSURE: 136 MMHG

## 2019-01-01 VITALS
HEART RATE: 70 BPM | RESPIRATION RATE: 18 BRPM | TEMPERATURE: 97.8 F | OXYGEN SATURATION: 97 % | BODY MASS INDEX: 29.51 KG/M2 | SYSTOLIC BLOOD PRESSURE: 110 MMHG | WEIGHT: 199.2 LBS | HEIGHT: 69 IN | DIASTOLIC BLOOD PRESSURE: 60 MMHG

## 2019-01-01 DIAGNOSIS — A41.9 SEPSIS WITH ACUTE RENAL FAILURE, DUE TO UNSPECIFIED ORGANISM, UNSPECIFIED ACUTE RENAL FAILURE TYPE, UNSPECIFIED WHETHER SEPTIC SHOCK PRESENT (HCC): ICD-10-CM

## 2019-01-01 DIAGNOSIS — I73.9 PERIPHERAL VASCULAR DISEASE (HCC): ICD-10-CM

## 2019-01-01 DIAGNOSIS — R53.1 WEAKNESS GENERALIZED: ICD-10-CM

## 2019-01-01 DIAGNOSIS — N18.3 ACUTE RENAL FAILURE SUPERIMPOSED ON STAGE 3 CHRONIC KIDNEY DISEASE, UNSPECIFIED ACUTE RENAL FAILURE TYPE: ICD-10-CM

## 2019-01-01 DIAGNOSIS — I12.9 HYPERTENSION WITH RENAL DISEASE: ICD-10-CM

## 2019-01-01 DIAGNOSIS — J44.9 CHRONIC OBSTRUCTIVE PULMONARY DISEASE, UNSPECIFIED COPD TYPE (HCC): ICD-10-CM

## 2019-01-01 DIAGNOSIS — E03.9 ACQUIRED HYPOTHYROIDISM: ICD-10-CM

## 2019-01-01 DIAGNOSIS — M16.11 PRIMARY OSTEOARTHRITIS OF RIGHT HIP: ICD-10-CM

## 2019-01-01 DIAGNOSIS — R73.02 GLUCOSE INTOLERANCE (IMPAIRED GLUCOSE TOLERANCE): ICD-10-CM

## 2019-01-01 DIAGNOSIS — E78.2 MIXED HYPERLIPIDEMIA: ICD-10-CM

## 2019-01-01 DIAGNOSIS — I25.10 ASCVD (ARTERIOSCLEROTIC CARDIOVASCULAR DISEASE): ICD-10-CM

## 2019-01-01 DIAGNOSIS — M15.9 PRIMARY OSTEOARTHRITIS INVOLVING MULTIPLE JOINTS: Primary | ICD-10-CM

## 2019-01-01 DIAGNOSIS — M54.42 ACUTE BILATERAL LOW BACK PAIN WITH BILATERAL SCIATICA: Primary | ICD-10-CM

## 2019-01-01 DIAGNOSIS — R07.9 CHEST PAIN, UNSPECIFIED TYPE: ICD-10-CM

## 2019-01-01 DIAGNOSIS — I33.0 ACUTE BACTERIAL ENDOCARDITIS: ICD-10-CM

## 2019-01-01 DIAGNOSIS — N40.1 BENIGN PROSTATIC HYPERPLASIA WITH URINARY RETENTION: ICD-10-CM

## 2019-01-01 DIAGNOSIS — K92.1 GASTROINTESTINAL HEMORRHAGE WITH MELENA: ICD-10-CM

## 2019-01-01 DIAGNOSIS — I12.9 HYPERTENSION WITH RENAL DISEASE: Primary | ICD-10-CM

## 2019-01-01 DIAGNOSIS — I82.4Z2 ACUTE DEEP VEIN THROMBOSIS (DVT) OF DISTAL VEIN OF LEFT LOWER EXTREMITY (HCC): ICD-10-CM

## 2019-01-01 DIAGNOSIS — R65.20 SEPSIS WITH ACUTE RENAL FAILURE, DUE TO UNSPECIFIED ORGANISM, UNSPECIFIED ACUTE RENAL FAILURE TYPE, UNSPECIFIED WHETHER SEPTIC SHOCK PRESENT (HCC): ICD-10-CM

## 2019-01-01 DIAGNOSIS — N18.30 STAGE 3 CHRONIC KIDNEY DISEASE (HCC): ICD-10-CM

## 2019-01-01 DIAGNOSIS — M15.9 PRIMARY OSTEOARTHRITIS INVOLVING MULTIPLE JOINTS: ICD-10-CM

## 2019-01-01 DIAGNOSIS — M79.89 LEFT LEG SWELLING: ICD-10-CM

## 2019-01-01 DIAGNOSIS — G93.40 ACUTE ENCEPHALOPATHY: ICD-10-CM

## 2019-01-01 DIAGNOSIS — A41.9 SEVERE SEPSIS (HCC): ICD-10-CM

## 2019-01-01 DIAGNOSIS — R06.02 SOB (SHORTNESS OF BREATH): Primary | ICD-10-CM

## 2019-01-01 DIAGNOSIS — L03.113 CELLULITIS OF RIGHT UPPER EXTREMITY: ICD-10-CM

## 2019-01-01 DIAGNOSIS — K21.9 GASTROESOPHAGEAL REFLUX DISEASE WITHOUT ESOPHAGITIS: ICD-10-CM

## 2019-01-01 DIAGNOSIS — I48.0 PAROXYSMAL ATRIAL FIBRILLATION (HCC): ICD-10-CM

## 2019-01-01 DIAGNOSIS — R65.21 SEPSIS WITH ACUTE ORGAN DYSFUNCTION AND SEPTIC SHOCK, DUE TO UNSPECIFIED ORGANISM, UNSPECIFIED TYPE (HCC): Primary | ICD-10-CM

## 2019-01-01 DIAGNOSIS — R53.1 WEAKNESS GENERALIZED: Primary | ICD-10-CM

## 2019-01-01 DIAGNOSIS — F51.01 PRIMARY INSOMNIA: Primary | ICD-10-CM

## 2019-01-01 DIAGNOSIS — I95.1 CHRONIC ORTHOSTATIC HYPOTENSION: ICD-10-CM

## 2019-01-01 DIAGNOSIS — R60.9 EDEMA, UNSPECIFIED TYPE: ICD-10-CM

## 2019-01-01 DIAGNOSIS — R06.09 DOE (DYSPNEA ON EXERTION): ICD-10-CM

## 2019-01-01 DIAGNOSIS — N17.9 ACUTE RENAL FAILURE SUPERIMPOSED ON STAGE 3 CHRONIC KIDNEY DISEASE, UNSPECIFIED ACUTE RENAL FAILURE TYPE: ICD-10-CM

## 2019-01-01 DIAGNOSIS — D64.9 ANEMIA, UNSPECIFIED TYPE: ICD-10-CM

## 2019-01-01 DIAGNOSIS — I50.33 ACUTE ON CHRONIC DIASTOLIC CONGESTIVE HEART FAILURE (HCC): ICD-10-CM

## 2019-01-01 DIAGNOSIS — A41.9 SEPSIS WITH ACUTE ORGAN DYSFUNCTION AND SEPTIC SHOCK, DUE TO UNSPECIFIED ORGANISM, UNSPECIFIED TYPE (HCC): Primary | ICD-10-CM

## 2019-01-01 DIAGNOSIS — N17.9 AKI (ACUTE KIDNEY INJURY) (HCC): ICD-10-CM

## 2019-01-01 DIAGNOSIS — N17.9 SEPSIS WITH ACUTE RENAL FAILURE, DUE TO UNSPECIFIED ORGANISM, UNSPECIFIED ACUTE RENAL FAILURE TYPE, UNSPECIFIED WHETHER SEPTIC SHOCK PRESENT (HCC): ICD-10-CM

## 2019-01-01 DIAGNOSIS — R33.8 BENIGN PROSTATIC HYPERPLASIA WITH URINARY RETENTION: ICD-10-CM

## 2019-01-01 DIAGNOSIS — K31.819 AVM (ARTERIOVENOUS MALFORMATION) OF STOMACH, ACQUIRED: ICD-10-CM

## 2019-01-01 DIAGNOSIS — E87.5 HYPERKALEMIA: Primary | ICD-10-CM

## 2019-01-01 DIAGNOSIS — F51.01 PRIMARY INSOMNIA: ICD-10-CM

## 2019-01-01 DIAGNOSIS — R65.20 SEVERE SEPSIS (HCC): ICD-10-CM

## 2019-01-01 DIAGNOSIS — R53.1 WEAKNESS: ICD-10-CM

## 2019-01-01 DIAGNOSIS — J18.9 PNEUMONIA OF LEFT LOWER LOBE DUE TO INFECTIOUS ORGANISM: ICD-10-CM

## 2019-01-01 DIAGNOSIS — N40.0 BENIGN PROSTATIC HYPERPLASIA, UNSPECIFIED WHETHER LOWER URINARY TRACT SYMPTOMS PRESENT: ICD-10-CM

## 2019-01-01 DIAGNOSIS — Z71.89 ACP (ADVANCE CARE PLANNING): ICD-10-CM

## 2019-01-01 DIAGNOSIS — S70.01XA CONTUSION OF RIGHT HIP, INITIAL ENCOUNTER: Primary | ICD-10-CM

## 2019-01-01 DIAGNOSIS — I50.32 CHRONIC DIASTOLIC HEART FAILURE (HCC): ICD-10-CM

## 2019-01-01 DIAGNOSIS — I95.9 HYPOTENSION, UNSPECIFIED HYPOTENSION TYPE: Primary | ICD-10-CM
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
## 2019-01-01 DIAGNOSIS — R41.0 DELIRIUM: ICD-10-CM

## 2019-01-01 DIAGNOSIS — I35.0 NONRHEUMATIC AORTIC VALVE STENOSIS: ICD-10-CM

## 2019-01-01 DIAGNOSIS — M54.41 ACUTE BILATERAL LOW BACK PAIN WITH BILATERAL SCIATICA: Primary | ICD-10-CM

## 2019-01-01 DIAGNOSIS — I95.1 ORTHOSTATIC HYPOTENSION: Primary | ICD-10-CM

## 2019-01-01 DIAGNOSIS — I25.10 ASCVD (ARTERIOSCLEROTIC CARDIOVASCULAR DISEASE): Primary | ICD-10-CM

## 2019-01-01 DIAGNOSIS — R06.02 SOB (SHORTNESS OF BREATH): ICD-10-CM

## 2019-01-01 LAB
A-G RATIO,AGRAT: 1.4 RATIO
A-G RATIO,AGRAT: 1.6 RATIO
ABO + RH BLD: NORMAL
ACT BLD: 131 SECS (ref 79–138)
ACT BLD: 136 SECS (ref 79–138)
ACT BLD: 142 SECS (ref 79–138)
ACT BLD: 142 SECS (ref 79–138)
ACT BLD: 147 SECS (ref 79–138)
ACT BLD: 153 SECS (ref 79–138)
ACT BLD: 158 SECS (ref 79–138)
ACT BLD: 164 SECS (ref 79–138)
ACT BLD: 169 SECS (ref 79–138)
ACT BLD: 169 SECS (ref 79–138)
ACT BLD: 175 SECS (ref 79–138)
ACT BLD: 180 SECS (ref 79–138)
ACT BLD: 208 SECS (ref 79–138)
ACT BLD: 224 SECS (ref 79–138)
ACT BLD: 235 SECS (ref 79–138)
ACT BLD: 241 SECS (ref 79–138)
ACT BLD: 246 SECS (ref 79–138)
ACT BLD: 252 SECS (ref 79–138)
ACT BLD: 257 SECS (ref 79–138)
ACT BLD: 263 SECS (ref 79–138)
ALBUMIN SERPL-MCNC: 2.4 G/DL (ref 3.5–5)
ALBUMIN SERPL-MCNC: 2.6 G/DL (ref 3.5–5)
ALBUMIN SERPL-MCNC: 2.7 G/DL (ref 3.5–5)
ALBUMIN SERPL-MCNC: 2.8 G/DL (ref 3.5–5)
ALBUMIN SERPL-MCNC: 3 G/DL (ref 3.5–5)
ALBUMIN SERPL-MCNC: 4.2 G/DL (ref 3.9–5.4)
ALBUMIN SERPL-MCNC: 4.2 G/DL (ref 3.9–5.4)
ALBUMIN/GLOB SERPL: 0.6 {RATIO} (ref 1.1–2.2)
ALBUMIN/GLOB SERPL: 0.7 {RATIO} (ref 1.1–2.2)
ALBUMIN/GLOB SERPL: 0.8 {RATIO} (ref 1.1–2.2)
ALBUMIN/GLOB SERPL: 0.8 {RATIO} (ref 1.1–2.2)
ALBUMIN/GLOB SERPL: 1 {RATIO} (ref 1.1–2.2)
ALP SERPL-CCNC: 65 U/L (ref 45–117)
ALP SERPL-CCNC: 80 U/L (ref 45–117)
ALP SERPL-CCNC: 83 U/L (ref 45–117)
ALP SERPL-CCNC: 87 U/L (ref 45–117)
ALP SERPL-CCNC: 89 U/L (ref 45–117)
ALP SERPL-CCNC: 94 U/L (ref 38–126)
ALP SERPL-CCNC: 95 U/L (ref 38–126)
ALT SERPL-CCNC: 13 U/L (ref 12–78)
ALT SERPL-CCNC: 16 U/L (ref 12–78)
ALT SERPL-CCNC: 19 U/L (ref 12–78)
ALT SERPL-CCNC: 21 U/L (ref 12–78)
ALT SERPL-CCNC: 22 U/L (ref 9–52)
ALT SERPL-CCNC: 29 U/L (ref 9–52)
ALT SERPL-CCNC: 58 U/L (ref 12–78)
ANION GAP SERPL CALC-SCNC: 10 MMOL/L (ref 5–15)
ANION GAP SERPL CALC-SCNC: 11 MMOL/L (ref 5–15)
ANION GAP SERPL CALC-SCNC: 11 MMOL/L (ref 5–15)
ANION GAP SERPL CALC-SCNC: 12 MMOL/L
ANION GAP SERPL CALC-SCNC: 12 MMOL/L
ANION GAP SERPL CALC-SCNC: 12 MMOL/L (ref 5–15)
ANION GAP SERPL CALC-SCNC: 14 MMOL/L
ANION GAP SERPL CALC-SCNC: 3 MMOL/L (ref 5–15)
ANION GAP SERPL CALC-SCNC: 4 MMOL/L (ref 5–15)
ANION GAP SERPL CALC-SCNC: 4 MMOL/L (ref 5–15)
ANION GAP SERPL CALC-SCNC: 5 MMOL/L (ref 5–15)
ANION GAP SERPL CALC-SCNC: 6 MMOL/L (ref 5–15)
ANION GAP SERPL CALC-SCNC: 7 MMOL/L
ANION GAP SERPL CALC-SCNC: 7 MMOL/L (ref 5–15)
ANION GAP SERPL CALC-SCNC: 8 MMOL/L
ANION GAP SERPL CALC-SCNC: 8 MMOL/L (ref 5–15)
ANION GAP SERPL CALC-SCNC: 9 MMOL/L (ref 5–15)
ANION GAP SERPL CALC-SCNC: 9 MMOL/L (ref 5–15)
APPEARANCE UR: ABNORMAL
APPEARANCE UR: ABNORMAL
APPEARANCE UR: CLEAR
APTT PPP: >130 SEC (ref 22.1–32)
AST SERPL W P-5'-P-CCNC: 19 U/L (ref 14–36)
AST SERPL W P-5'-P-CCNC: 24 U/L (ref 14–36)
AST SERPL-CCNC: 16 U/L (ref 15–37)
AST SERPL-CCNC: 17 U/L (ref 15–37)
AST SERPL-CCNC: 17 U/L (ref 15–37)
AST SERPL-CCNC: 30 U/L (ref 15–37)
AST SERPL-CCNC: 57 U/L (ref 15–37)
ATRIAL RATE: 104 BPM
ATRIAL RATE: 72 BPM
ATRIAL RATE: 74 BPM
ATRIAL RATE: 74 BPM
AV VELOCITY RATIO: 0.34
AV VTI RATIO: 0.5
BACTERIA SPEC CULT: ABNORMAL
BACTERIA SPEC CULT: NORMAL
BACTERIA URNS QL MICRO: NEGATIVE /HPF
BACTERIA,BACTU: ABNORMAL
BASOPHILS # BLD AUTO: 0 X10E3/UL (ref 0–0.2)
BASOPHILS # BLD AUTO: 0 X10E3/UL (ref 0–0.2)
BASOPHILS # BLD AUTO: 0.1 X10E3/UL (ref 0–0.2)
BASOPHILS # BLD AUTO: 0.1 X10E3/UL (ref 0–0.2)
BASOPHILS # BLD: 0 K/UL (ref 0–0.1)
BASOPHILS # BLD: 0.1 K/UL (ref 0–0.1)
BASOPHILS NFR BLD AUTO: 0 %
BASOPHILS NFR BLD AUTO: 0 %
BASOPHILS NFR BLD AUTO: 1 %
BASOPHILS NFR BLD AUTO: 1 %
BASOPHILS NFR BLD: 0 % (ref 0–1)
BASOPHILS NFR BLD: 1 % (ref 0–1)
BILIRUB SERPL-MCNC: 0.4 MG/DL (ref 0.2–1)
BILIRUB SERPL-MCNC: 0.5 MG/DL (ref 0.2–1)
BILIRUB SERPL-MCNC: 0.6 MG/DL (ref 0.2–1)
BILIRUB SERPL-MCNC: 0.6 MG/DL (ref 0.2–1)
BILIRUB SERPL-MCNC: 0.8 MG/DL (ref 0.2–1.3)
BILIRUB SERPL-MCNC: 0.9 MG/DL (ref 0.2–1.3)
BILIRUB SERPL-MCNC: 1 MG/DL (ref 0.2–1)
BILIRUB UR QL CFM: NEGATIVE
BILIRUB UR QL CFM: NEGATIVE
BILIRUB UR QL: NEGATIVE
BLD PROD TYP BPU: NORMAL
BLOOD GROUP ANTIBODIES SERPL: NORMAL
BNP SERPL-MCNC: 2224 PG/ML
BNP SERPL-MCNC: 4368 PG/ML
BPU ID: NORMAL
BUN SERPL-MCNC: 11 MG/DL (ref 6–20)
BUN SERPL-MCNC: 12 MG/DL (ref 6–20)
BUN SERPL-MCNC: 13 MG/DL (ref 6–20)
BUN SERPL-MCNC: 13 MG/DL (ref 6–20)
BUN SERPL-MCNC: 14 MG/DL (ref 6–20)
BUN SERPL-MCNC: 15 MG/DL (ref 6–20)
BUN SERPL-MCNC: 15 MG/DL (ref 6–20)
BUN SERPL-MCNC: 16 MG/DL (ref 6–20)
BUN SERPL-MCNC: 16 MG/DL (ref 6–20)
BUN SERPL-MCNC: 17 MG/DL (ref 6–20)
BUN SERPL-MCNC: 17 MG/DL (ref 6–20)
BUN SERPL-MCNC: 19 MG/DL (ref 6–20)
BUN SERPL-MCNC: 20 MG/DL (ref 9–20)
BUN SERPL-MCNC: 21 MG/DL (ref 6–20)
BUN SERPL-MCNC: 21 MG/DL (ref 9–20)
BUN SERPL-MCNC: 22 MG/DL (ref 6–20)
BUN SERPL-MCNC: 22 MG/DL (ref 6–20)
BUN SERPL-MCNC: 23 MG/DL (ref 6–20)
BUN SERPL-MCNC: 24 MG/DL (ref 6–20)
BUN SERPL-MCNC: 25 MG/DL (ref 6–20)
BUN SERPL-MCNC: 26 MG/DL (ref 6–20)
BUN SERPL-MCNC: 27 MG/DL (ref 6–20)
BUN SERPL-MCNC: 28 MG/DL (ref 6–20)
BUN SERPL-MCNC: 29 MG/DL (ref 6–20)
BUN SERPL-MCNC: 29 MG/DL (ref 6–20)
BUN SERPL-MCNC: 31 MG/DL (ref 9–20)
BUN SERPL-MCNC: 31 MG/DL (ref 9–20)
BUN SERPL-MCNC: 33 MG/DL (ref 6–20)
BUN SERPL-MCNC: 35 MG/DL (ref 6–20)
BUN SERPL-MCNC: 35 MG/DL (ref 6–20)
BUN SERPL-MCNC: 36 MG/DL (ref 6–20)
BUN SERPL-MCNC: 37 MG/DL (ref 6–20)
BUN SERPL-MCNC: 37 MG/DL (ref 9–20)
BUN SERPL-MCNC: 40 MG/DL (ref 9–20)
BUN SERPL-MCNC: 44 MG/DL (ref 9–20)
BUN/CREAT SERPL: 10 (ref 12–20)
BUN/CREAT SERPL: 11 (ref 12–20)
BUN/CREAT SERPL: 12 (ref 12–20)
BUN/CREAT SERPL: 14 (ref 12–20)
BUN/CREAT SERPL: 14 (ref 12–20)
BUN/CREAT SERPL: 15 (ref 12–20)
BUN/CREAT SERPL: 15 (ref 12–20)
BUN/CREAT SERPL: 16 (ref 12–20)
BUN/CREAT SERPL: 16 (ref 12–20)
BUN/CREAT SERPL: 17 (ref 12–20)
BUN/CREAT SERPL: 18 (ref 12–20)
BUN/CREAT SERPL: 18 (ref 12–20)
BUN/CREAT SERPL: 19 (ref 12–20)
BUN/CREAT SERPL: 20 (ref 12–20)
BUN/CREAT SERPL: 21 (ref 12–20)
BUN/CREAT SERPL: 22 (ref 12–20)
BUN/CREAT SERPL: 28 (ref 12–20)
BUN/CREAT SERPL: 29 (ref 12–20)
BUN/CREAT SERPL: 8 (ref 12–20)
BUN/CREAT SERPL: 9 (ref 12–20)
BUN/CREAT SERPL: 9 (ref 12–20)
BUN/CREATININE RATIO,BUCR: 18 RATIO
BUN/CREATININE RATIO,BUCR: 19 RATIO
CALCIUM SERPL-MCNC: 10 MG/DL (ref 8.4–10.2)
CALCIUM SERPL-MCNC: 10 MG/DL (ref 8.4–10.2)
CALCIUM SERPL-MCNC: 10.2 MG/DL (ref 8.4–10.2)
CALCIUM SERPL-MCNC: 7.7 MG/DL (ref 8.5–10.1)
CALCIUM SERPL-MCNC: 8 MG/DL (ref 8.5–10.1)
CALCIUM SERPL-MCNC: 8.2 MG/DL (ref 8.5–10.1)
CALCIUM SERPL-MCNC: 8.3 MG/DL (ref 8.5–10.1)
CALCIUM SERPL-MCNC: 8.3 MG/DL (ref 8.5–10.1)
CALCIUM SERPL-MCNC: 8.4 MG/DL (ref 8.5–10.1)
CALCIUM SERPL-MCNC: 8.5 MG/DL (ref 8.5–10.1)
CALCIUM SERPL-MCNC: 8.6 MG/DL (ref 8.5–10.1)
CALCIUM SERPL-MCNC: 8.6 MG/DL (ref 8.5–10.1)
CALCIUM SERPL-MCNC: 8.7 MG/DL (ref 8.5–10.1)
CALCIUM SERPL-MCNC: 8.8 MG/DL (ref 8.5–10.1)
CALCIUM SERPL-MCNC: 8.8 MG/DL (ref 8.5–10.1)
CALCIUM SERPL-MCNC: 8.9 MG/DL (ref 8.5–10.1)
CALCIUM SERPL-MCNC: 9 MG/DL (ref 8.5–10.1)
CALCIUM SERPL-MCNC: 9.1 MG/DL (ref 8.5–10.1)
CALCIUM SERPL-MCNC: 9.2 MG/DL (ref 8.5–10.1)
CALCIUM SERPL-MCNC: 9.3 MG/DL (ref 8.5–10.1)
CALCIUM SERPL-MCNC: 9.4 MG/DL (ref 8.5–10.1)
CALCIUM SERPL-MCNC: 9.5 MG/DL (ref 8.5–10.1)
CALCIUM SERPL-MCNC: 9.6 MG/DL (ref 8.4–10.2)
CALCIUM SERPL-MCNC: 9.6 MG/DL (ref 8.5–10.1)
CALCIUM SERPL-MCNC: 9.7 MG/DL (ref 8.4–10.2)
CALCIUM SERPL-MCNC: 9.8 MG/DL (ref 8.4–10.2)
CALCIUM SERPL-MCNC: 9.9 MG/DL (ref 8.4–10.2)
CALCULATED P AXIS, ECG09: 15 DEGREES
CALCULATED P AXIS, ECG09: 49 DEGREES
CALCULATED P AXIS, ECG09: 72 DEGREES
CALCULATED P AXIS, ECG09: 87 DEGREES
CALCULATED R AXIS, ECG10: 17 DEGREES
CALCULATED R AXIS, ECG10: 18 DEGREES
CALCULATED R AXIS, ECG10: 20 DEGREES
CALCULATED R AXIS, ECG10: 36 DEGREES
CALCULATED R AXIS, ECG10: 43 DEGREES
CALCULATED R AXIS, ECG10: 70 DEGREES
CALCULATED T AXIS, ECG11: 26 DEGREES
CALCULATED T AXIS, ECG11: 30 DEGREES
CALCULATED T AXIS, ECG11: 34 DEGREES
CALCULATED T AXIS, ECG11: 41 DEGREES
CALCULATED T AXIS, ECG11: 42 DEGREES
CALCULATED T AXIS, ECG11: 52 DEGREES
CC UR VC: ABNORMAL
CC UR VC: NORMAL
CC UR VC: NORMAL
CHLORIDE SERPL-SCNC: 101 MMOL/L (ref 97–108)
CHLORIDE SERPL-SCNC: 101 MMOL/L (ref 97–108)
CHLORIDE SERPL-SCNC: 102 MMOL/L (ref 97–108)
CHLORIDE SERPL-SCNC: 102 MMOL/L (ref 98–107)
CHLORIDE SERPL-SCNC: 102 MMOL/L (ref 98–107)
CHLORIDE SERPL-SCNC: 104 MMOL/L (ref 97–108)
CHLORIDE SERPL-SCNC: 104 MMOL/L (ref 97–108)
CHLORIDE SERPL-SCNC: 105 MMOL/L (ref 97–108)
CHLORIDE SERPL-SCNC: 106 MMOL/L (ref 97–108)
CHLORIDE SERPL-SCNC: 106 MMOL/L (ref 97–108)
CHLORIDE SERPL-SCNC: 106 MMOL/L (ref 98–107)
CHLORIDE SERPL-SCNC: 106 MMOL/L (ref 98–107)
CHLORIDE SERPL-SCNC: 107 MMOL/L (ref 97–108)
CHLORIDE SERPL-SCNC: 108 MMOL/L (ref 97–108)
CHLORIDE SERPL-SCNC: 108 MMOL/L (ref 98–107)
CHLORIDE SERPL-SCNC: 109 MMOL/L (ref 97–108)
CHLORIDE SERPL-SCNC: 110 MMOL/L (ref 97–108)
CHLORIDE SERPL-SCNC: 110 MMOL/L (ref 98–107)
CHLORIDE SERPL-SCNC: 111 MMOL/L (ref 97–108)
CHLORIDE SERPL-SCNC: 112 MMOL/L (ref 97–108)
CHLORIDE SERPL-SCNC: 112 MMOL/L (ref 97–108)
CHLORIDE SERPL-SCNC: 113 MMOL/L (ref 97–108)
CHLORIDE SERPL-SCNC: 115 MMOL/L (ref 97–108)
CHLORIDE SERPL-SCNC: 116 MMOL/L (ref 97–108)
CHLORIDE SERPL-SCNC: 117 MMOL/L (ref 97–108)
CHLORIDE SERPL-SCNC: 95 MMOL/L (ref 98–107)
CHOL/HDL RATIO,CHHD: 3 RATIO (ref 0–4)
CHOL/HDL RATIO,CHHD: 3 RATIO (ref 0–4)
CHOLEST SERPL-MCNC: 117 MG/DL (ref 0–200)
CHOLEST SERPL-MCNC: 120 MG/DL (ref 0–200)
CK MB CFR SERPL CALC: NORMAL % (ref 0–2.5)
CK MB SERPL-MCNC: <1 NG/ML (ref 5–25)
CK SERPL-CCNC: 110 U/L (ref 30–135)
CK SERPL-CCNC: 119 U/L (ref 30–135)
CK SERPL-CCNC: 41 U/L (ref 39–308)
CLARITY: CLEAR
CO2 SERPL-SCNC: 17 MMOL/L (ref 21–32)
CO2 SERPL-SCNC: 19 MMOL/L (ref 21–32)
CO2 SERPL-SCNC: 20 MMOL/L (ref 21–32)
CO2 SERPL-SCNC: 21 MMOL/L (ref 21–32)
CO2 SERPL-SCNC: 22 MMOL/L (ref 21–32)
CO2 SERPL-SCNC: 23 MMOL/L (ref 21–32)
CO2 SERPL-SCNC: 23 MMOL/L (ref 21–32)
CO2 SERPL-SCNC: 24 MMOL/L (ref 21–32)
CO2 SERPL-SCNC: 24 MMOL/L (ref 22–32)
CO2 SERPL-SCNC: 25 MMOL/L (ref 21–32)
CO2 SERPL-SCNC: 25 MMOL/L (ref 22–32)
CO2 SERPL-SCNC: 26 MMOL/L (ref 21–32)
CO2 SERPL-SCNC: 26 MMOL/L (ref 22–32)
CO2 SERPL-SCNC: 26 MMOL/L (ref 22–32)
CO2 SERPL-SCNC: 27 MMOL/L (ref 21–32)
CO2 SERPL-SCNC: 28 MMOL/L (ref 21–32)
CO2 SERPL-SCNC: 28 MMOL/L (ref 22–32)
CO2 SERPL-SCNC: 29 MMOL/L (ref 21–32)
COHGB MFR BLD: 1.1 % (ref 1–2)
COLOR UR: ABNORMAL
COLOR UR: NORMAL
COMMENT, HOLDF: NORMAL
COMMENT, HOLDF: NORMAL
CREAT SERPL-MCNC: 0.92 MG/DL (ref 0.7–1.3)
CREAT SERPL-MCNC: 1.07 MG/DL (ref 0.7–1.3)
CREAT SERPL-MCNC: 1.14 MG/DL (ref 0.7–1.3)
CREAT SERPL-MCNC: 1.21 MG/DL (ref 0.7–1.3)
CREAT SERPL-MCNC: 1.22 MG/DL (ref 0.7–1.3)
CREAT SERPL-MCNC: 1.23 MG/DL (ref 0.7–1.3)
CREAT SERPL-MCNC: 1.23 MG/DL (ref 0.7–1.3)
CREAT SERPL-MCNC: 1.26 MG/DL (ref 0.7–1.3)
CREAT SERPL-MCNC: 1.29 MG/DL (ref 0.7–1.3)
CREAT SERPL-MCNC: 1.3 MG/DL (ref 0.7–1.3)
CREAT SERPL-MCNC: 1.31 MG/DL (ref 0.7–1.3)
CREAT SERPL-MCNC: 1.31 MG/DL (ref 0.7–1.3)
CREAT SERPL-MCNC: 1.32 MG/DL (ref 0.7–1.3)
CREAT SERPL-MCNC: 1.33 MG/DL (ref 0.7–1.3)
CREAT SERPL-MCNC: 1.36 MG/DL (ref 0.7–1.3)
CREAT SERPL-MCNC: 1.37 MG/DL (ref 0.7–1.3)
CREAT SERPL-MCNC: 1.38 MG/DL (ref 0.7–1.3)
CREAT SERPL-MCNC: 1.43 MG/DL (ref 0.7–1.3)
CREAT SERPL-MCNC: 1.45 MG/DL (ref 0.7–1.3)
CREAT SERPL-MCNC: 1.45 MG/DL (ref 0.7–1.3)
CREAT SERPL-MCNC: 1.48 MG/DL (ref 0.7–1.3)
CREAT SERPL-MCNC: 1.49 MG/DL (ref 0.7–1.3)
CREAT SERPL-MCNC: 1.5 MG/DL (ref 0.7–1.3)
CREAT SERPL-MCNC: 1.52 MG/DL (ref 0.7–1.3)
CREAT SERPL-MCNC: 1.54 MG/DL (ref 0.7–1.3)
CREAT SERPL-MCNC: 1.55 MG/DL (ref 0.7–1.3)
CREAT SERPL-MCNC: 1.58 MG/DL (ref 0.7–1.3)
CREAT SERPL-MCNC: 1.58 MG/DL (ref 0.7–1.3)
CREAT SERPL-MCNC: 1.6 MG/DL (ref 0.8–1.5)
CREAT SERPL-MCNC: 1.6 MG/DL (ref 0.8–1.5)
CREAT SERPL-MCNC: 1.66 MG/DL (ref 0.7–1.3)
CREAT SERPL-MCNC: 1.7 MG/DL (ref 0.8–1.5)
CREAT SERPL-MCNC: 1.71 MG/DL (ref 0.7–1.3)
CREAT SERPL-MCNC: 1.79 MG/DL (ref 0.7–1.3)
CREAT SERPL-MCNC: 1.8 MG/DL (ref 0.8–1.5)
CREAT SERPL-MCNC: 1.8 MG/DL (ref 0.8–1.5)
CREAT SERPL-MCNC: 1.9 MG/DL (ref 0.8–1.5)
CREAT SERPL-MCNC: 2.03 MG/DL (ref 0.7–1.3)
CREAT SERPL-MCNC: 2.11 MG/DL (ref 0.7–1.3)
CREAT SERPL-MCNC: 2.4 MG/DL (ref 0.8–1.5)
CREAT SERPL-MCNC: 3.24 MG/DL (ref 0.7–1.3)
CROSSMATCH RESULT,%XM: NORMAL
DATE LAST DOSE: ABNORMAL
DIAGNOSIS, 93000: NORMAL
DIFFERENTIAL METHOD BLD: ABNORMAL
ECHO AV AREA PEAK VELOCITY: 0.9 CM2
ECHO AV AREA PEAK VELOCITY: 1.1 CM2
ECHO AV AREA VTI: 0.9 CM2
ECHO AV AREA VTI: 1.6 CM2
ECHO AV AREA/BSA PEAK VELOCITY: 0.5 CM2/M2
ECHO AV AREA/BSA VTI: 0.8 CM2/M2
ECHO AV MEAN GRADIENT: 25.9 MMHG
ECHO AV MEAN GRADIENT: 7.3 MMHG
ECHO AV MEAN VELOCITY: 1.17 M/S
ECHO AV PEAK GRADIENT: 18.9 MMHG
ECHO AV PEAK GRADIENT: 43 MMHG
ECHO AV PEAK VELOCITY: 217.61 CM/S
ECHO AV PEAK VELOCITY: 327.97 CM/S
ECHO AV VTI: 35.18 CM
ECHO AV VTI: 74.3 CM
ECHO EST RA PRESSURE: 10 MMHG
ECHO EST RA PRESSURE: 10 MMHG
ECHO LA MAJOR AXIS: 3.23 CM
ECHO LA VOL 4C: 57.56 ML (ref 18–58)
ECHO LA VOLUME INDEX A4C: 28.98 ML/M2 (ref 16–28)
ECHO LV E' LATERAL VELOCITY: 9.27 CM/S
ECHO LV E' SEPTAL VELOCITY: 9.27 CM/S
ECHO LV EDV TEICHHOLZ: 0.29 ML
ECHO LV ESV TEICHHOLZ: 0.13 ML
ECHO LV INTERNAL DIMENSION DIASTOLIC: 3.48 CM (ref 4.2–5.9)
ECHO LV INTERNAL DIMENSION DIASTOLIC: 4.03 CM (ref 4.2–5.9)
ECHO LV INTERNAL DIMENSION SYSTOLIC: 2.52 CM
ECHO LV INTERNAL DIMENSION SYSTOLIC: 3.01 CM
ECHO LV IVSD: 1.02 CM (ref 0.6–1)
ECHO LV IVSD: 1.7 CM (ref 0.6–1)
ECHO LV MASS 2D: 155.2 G (ref 88–224)
ECHO LV MASS 2D: 221.6 G (ref 88–224)
ECHO LV MASS INDEX 2D: 105.6 G/M2 (ref 49–115)
ECHO LV MASS INDEX 2D: 78.1 G/M2 (ref 49–115)
ECHO LV POSTERIOR WALL DIASTOLIC: 1.05 CM (ref 0.6–1)
ECHO LV POSTERIOR WALL DIASTOLIC: 1.27 CM (ref 0.6–1)
ECHO LVOT CARDIAC OUTPUT: 4.1 L/MIN
ECHO LVOT DIAM: 1.86 CM
ECHO LVOT DIAM: 2.03 CM
ECHO LVOT PEAK GRADIENT: 2.3 MMHG
ECHO LVOT PEAK GRADIENT: 5.1 MMHG
ECHO LVOT PEAK VELOCITY: 112.49 CM/S
ECHO LVOT PEAK VELOCITY: 75.01 CM/S
ECHO LVOT SV: 57.2 ML
ECHO LVOT SV: 70.1 ML
ECHO LVOT VTI: 17.68 CM
ECHO LVOT VTI: 25.67 CM
ECHO MV A VELOCITY: 107.77 CM/S
ECHO MV A VELOCITY: 66.59 CM/S
ECHO MV AREA PHT: 2.4 CM2
ECHO MV AREA PHT: 5.9 CM2
ECHO MV AREA VTI: 2.2 CM2
ECHO MV E DECELERATION TIME (DT): 114.7 MS
ECHO MV E DECELERATION TIME (DT): 317.3 MS
ECHO MV E VELOCITY: 104.78 CM/S
ECHO MV E VELOCITY: 108.4 CM/S
ECHO MV E/A RATIO: 0.97
ECHO MV E/A RATIO: 1.63
ECHO MV E/E' LATERAL: 11.69
ECHO MV E/E' RATIO (AVERAGED): 11.69
ECHO MV E/E' SEPTAL: 11.69
ECHO MV MAX VELOCITY: 107.64 CM/S
ECHO MV MEAN GRADIENT: 1.6 MMHG
ECHO MV MEAN INFLOW VELOCITY: 0.58 M/S
ECHO MV PEAK GRADIENT: 4.6 MMHG
ECHO MV PRESSURE HALF TIME (PHT): 37.6 MS
ECHO MV PRESSURE HALF TIME (PHT): 92 MS
ECHO MV REGURGITANT PEAK GRADIENT: 27.9 MMHG
ECHO MV REGURGITANT PEAK VELOCITY: 264.2 CM/S
ECHO MV VTI: 25.68 CM
ECHO PULMONARY ARTERY SYSTOLIC PRESSURE (PASP): 40.5 MMHG
ECHO PULMONARY ARTERY SYSTOLIC PRESSURE (PASP): 58.3 MMHG
ECHO RIGHT VENTRICULAR SYSTOLIC PRESSURE (RVSP): 40.5 MMHG
ECHO RIGHT VENTRICULAR SYSTOLIC PRESSURE (RVSP): 58.3 MMHG
ECHO RV INTERNAL DIMENSION: 3.6 CM
ECHO TV MAX VELOCITY: 46.94 CM/S
ECHO TV PEAK GRADIENT: 0.9 MMHG
ECHO TV REGURGITANT MAX VELOCITY: 276.1 CM/S
ECHO TV REGURGITANT MAX VELOCITY: 347.33 CM/S
ECHO TV REGURGITANT PEAK GRADIENT: 30.5 MMHG
ECHO TV REGURGITANT PEAK GRADIENT: 48.3 MMHG
EOSINOPHIL # BLD AUTO: 0.1 X10E3/UL (ref 0–0.4)
EOSINOPHIL # BLD AUTO: 0.2 X10E3/UL (ref 0–0.4)
EOSINOPHIL # BLD AUTO: 0.2 X10E3/UL (ref 0–0.4)
EOSINOPHIL # BLD AUTO: 0.4 X10E3/UL (ref 0–0.4)
EOSINOPHIL # BLD: 0 K/UL (ref 0–0.4)
EOSINOPHIL # BLD: 0.1 K/UL (ref 0–0.4)
EOSINOPHIL # BLD: 0.2 K/UL (ref 0–0.4)
EOSINOPHIL # BLD: 0.3 K/UL (ref 0–0.4)
EOSINOPHIL # BLD: 0.4 K/UL (ref 0–0.4)
EOSINOPHIL # BLD: 0.4 K/UL (ref 0–0.4)
EOSINOPHIL NFR BLD AUTO: 1 %
EOSINOPHIL NFR BLD AUTO: 2 %
EOSINOPHIL NFR BLD AUTO: 3 %
EOSINOPHIL NFR BLD AUTO: 4 %
EOSINOPHIL NFR BLD: 0 % (ref 0–7)
EOSINOPHIL NFR BLD: 1 % (ref 0–7)
EOSINOPHIL NFR BLD: 2 % (ref 0–7)
EOSINOPHIL NFR BLD: 3 % (ref 0–7)
EOSINOPHIL NFR BLD: 4 % (ref 0–7)
EOSINOPHIL NFR BLD: 6 % (ref 0–7)
EPITH CASTS URNS QL MICRO: ABNORMAL /LPF
EPITH CASTS URNS QL MICRO: NORMAL /LPF
EPITH CASTS URNS QL MICRO: NORMAL /LPF
ERYTHROCYTE [DISTWIDTH] IN BLOOD BY AUTOMATED COUNT: 12.4 % (ref 11.5–14.5)
ERYTHROCYTE [DISTWIDTH] IN BLOOD BY AUTOMATED COUNT: 12.6 % (ref 11.5–14.5)
ERYTHROCYTE [DISTWIDTH] IN BLOOD BY AUTOMATED COUNT: 13.3 %
ERYTHROCYTE [DISTWIDTH] IN BLOOD BY AUTOMATED COUNT: 13.9 %
ERYTHROCYTE [DISTWIDTH] IN BLOOD BY AUTOMATED COUNT: 14.4 % (ref 12.3–15.4)
ERYTHROCYTE [DISTWIDTH] IN BLOOD BY AUTOMATED COUNT: 14.5 % (ref 11.5–14.5)
ERYTHROCYTE [DISTWIDTH] IN BLOOD BY AUTOMATED COUNT: 14.9 % (ref 11.5–14.5)
ERYTHROCYTE [DISTWIDTH] IN BLOOD BY AUTOMATED COUNT: 14.9 % (ref 11.5–14.5)
ERYTHROCYTE [DISTWIDTH] IN BLOOD BY AUTOMATED COUNT: 14.9 % (ref 12.3–15.4)
ERYTHROCYTE [DISTWIDTH] IN BLOOD BY AUTOMATED COUNT: 15.3 % (ref 11.5–14.5)
ERYTHROCYTE [DISTWIDTH] IN BLOOD BY AUTOMATED COUNT: 15.3 % (ref 11.5–14.5)
ERYTHROCYTE [DISTWIDTH] IN BLOOD BY AUTOMATED COUNT: 15.4 % (ref 11.5–14.5)
ERYTHROCYTE [DISTWIDTH] IN BLOOD BY AUTOMATED COUNT: 15.5 % (ref 11.5–14.5)
ERYTHROCYTE [DISTWIDTH] IN BLOOD BY AUTOMATED COUNT: 15.5 % (ref 12.3–15.4)
ERYTHROCYTE [DISTWIDTH] IN BLOOD BY AUTOMATED COUNT: 15.6 % (ref 11.5–14.5)
ERYTHROCYTE [DISTWIDTH] IN BLOOD BY AUTOMATED COUNT: 15.8 % (ref 12.3–15.4)
ERYTHROCYTE [DISTWIDTH] IN BLOOD BY AUTOMATED COUNT: 15.9 % (ref 11.5–14.5)
ERYTHROCYTE [DISTWIDTH] IN BLOOD BY AUTOMATED COUNT: 16.1 % (ref 11.5–14.5)
ERYTHROCYTE [DISTWIDTH] IN BLOOD BY AUTOMATED COUNT: 16.1 % (ref 11.5–14.5)
ERYTHROCYTE [DISTWIDTH] IN BLOOD BY AUTOMATED COUNT: 16.2 % (ref 11.5–14.5)
ERYTHROCYTE [DISTWIDTH] IN BLOOD BY AUTOMATED COUNT: 16.2 % (ref 11.5–14.5)
ERYTHROCYTE [DISTWIDTH] IN BLOOD BY AUTOMATED COUNT: 16.4 % (ref 11.5–14.5)
ERYTHROCYTE [DISTWIDTH] IN BLOOD BY AUTOMATED COUNT: 16.7 % (ref 11.5–14.5)
ERYTHROCYTE [DISTWIDTH] IN BLOOD BY AUTOMATED COUNT: 16.8 % (ref 11.5–14.5)
ERYTHROCYTE [DISTWIDTH] IN BLOOD BY AUTOMATED COUNT: 17.1 % (ref 11.5–14.5)
ERYTHROCYTE [DISTWIDTH] IN BLOOD BY AUTOMATED COUNT: 17.2 % (ref 11.5–14.5)
ERYTHROCYTE [DISTWIDTH] IN BLOOD BY AUTOMATED COUNT: 17.2 % (ref 11.5–14.5)
ERYTHROCYTE [DISTWIDTH] IN BLOOD BY AUTOMATED COUNT: 17.6 % (ref 11.5–14.5)
ERYTHROCYTE [DISTWIDTH] IN BLOOD BY AUTOMATED COUNT: 17.7 % (ref 11.5–14.5)
ERYTHROCYTE [DISTWIDTH] IN BLOOD BY AUTOMATED COUNT: 17.8 % (ref 11.5–14.5)
ERYTHROCYTE [DISTWIDTH] IN BLOOD BY AUTOMATED COUNT: 17.8 % (ref 11.5–14.5)
ERYTHROCYTE [DISTWIDTH] IN BLOOD BY AUTOMATED COUNT: 17.9 % (ref 11.5–14.5)
ERYTHROCYTE [DISTWIDTH] IN BLOOD BY AUTOMATED COUNT: 17.9 % (ref 11.5–14.5)
ERYTHROCYTE [DISTWIDTH] IN BLOOD BY AUTOMATED COUNT: 18.2 % (ref 11.5–14.5)
ERYTHROCYTE [DISTWIDTH] IN BLOOD BY AUTOMATED COUNT: 18.3 % (ref 11.5–14.5)
ERYTHROCYTE [DISTWIDTH] IN BLOOD BY AUTOMATED COUNT: 18.3 % (ref 11.5–14.5)
ERYTHROCYTE [DISTWIDTH] IN BLOOD BY AUTOMATED COUNT: 18.5 % (ref 11.5–14.5)
ERYTHROCYTE [DISTWIDTH] IN BLOOD BY AUTOMATED COUNT: 18.5 % (ref 11.5–14.5)
ERYTHROCYTE [DISTWIDTH] IN BLOOD BY AUTOMATED COUNT: 18.6 % (ref 11.5–14.5)
EST. AVERAGE GLUCOSE BLD GHB EST-MCNC: 120 MG/DL
EST. AVERAGE GLUCOSE BLD GHB EST-MCNC: 123 MG/DL
GLOBULIN SER CALC-MCNC: 2.5 G/DL (ref 2–4)
GLOBULIN SER CALC-MCNC: 3.7 G/DL (ref 2–4)
GLOBULIN SER CALC-MCNC: 3.8 G/DL (ref 2–4)
GLOBULIN SER CALC-MCNC: 3.9 G/DL (ref 2–4)
GLOBULIN SER CALC-MCNC: 4 G/DL (ref 2–4)
GLOBULIN,GLOB: 2.6
GLOBULIN,GLOB: 3.1
GLUCOSE 24H UR-MRATE: NEGATIVE G/(24.H)
GLUCOSE BLD STRIP.AUTO-MCNC: 103 MG/DL (ref 65–100)
GLUCOSE BLD STRIP.AUTO-MCNC: 103 MG/DL (ref 65–100)
GLUCOSE BLD STRIP.AUTO-MCNC: 113 MG/DL (ref 65–100)
GLUCOSE BLD STRIP.AUTO-MCNC: 119 MG/DL (ref 65–100)
GLUCOSE BLD STRIP.AUTO-MCNC: 133 MG/DL (ref 65–100)
GLUCOSE BLD STRIP.AUTO-MCNC: 133 MG/DL (ref 65–100)
GLUCOSE BLD STRIP.AUTO-MCNC: 138 MG/DL (ref 65–100)
GLUCOSE BLD STRIP.AUTO-MCNC: 147 MG/DL (ref 65–100)
GLUCOSE BLD STRIP.AUTO-MCNC: 154 MG/DL (ref 65–100)
GLUCOSE SERPL-MCNC: 100 MG/DL (ref 65–100)
GLUCOSE SERPL-MCNC: 101 MG/DL (ref 65–100)
GLUCOSE SERPL-MCNC: 101 MG/DL (ref 65–100)
GLUCOSE SERPL-MCNC: 105 MG/DL (ref 65–100)
GLUCOSE SERPL-MCNC: 107 MG/DL (ref 65–100)
GLUCOSE SERPL-MCNC: 107 MG/DL (ref 75–110)
GLUCOSE SERPL-MCNC: 109 MG/DL (ref 65–100)
GLUCOSE SERPL-MCNC: 110 MG/DL (ref 65–100)
GLUCOSE SERPL-MCNC: 111 MG/DL (ref 65–100)
GLUCOSE SERPL-MCNC: 111 MG/DL (ref 65–100)
GLUCOSE SERPL-MCNC: 113 MG/DL (ref 65–100)
GLUCOSE SERPL-MCNC: 113 MG/DL (ref 75–110)
GLUCOSE SERPL-MCNC: 114 MG/DL (ref 65–100)
GLUCOSE SERPL-MCNC: 114 MG/DL (ref 75–110)
GLUCOSE SERPL-MCNC: 115 MG/DL (ref 65–100)
GLUCOSE SERPL-MCNC: 121 MG/DL (ref 65–100)
GLUCOSE SERPL-MCNC: 122 MG/DL (ref 65–100)
GLUCOSE SERPL-MCNC: 127 MG/DL (ref 65–100)
GLUCOSE SERPL-MCNC: 142 MG/DL (ref 65–100)
GLUCOSE SERPL-MCNC: 151 MG/DL (ref 65–100)
GLUCOSE SERPL-MCNC: 152 MG/DL (ref 65–100)
GLUCOSE SERPL-MCNC: 77 MG/DL (ref 65–100)
GLUCOSE SERPL-MCNC: 78 MG/DL (ref 65–100)
GLUCOSE SERPL-MCNC: 79 MG/DL (ref 65–100)
GLUCOSE SERPL-MCNC: 81 MG/DL (ref 65–100)
GLUCOSE SERPL-MCNC: 83 MG/DL (ref 65–100)
GLUCOSE SERPL-MCNC: 85 MG/DL (ref 75–110)
GLUCOSE SERPL-MCNC: 89 MG/DL (ref 65–100)
GLUCOSE SERPL-MCNC: 89 MG/DL (ref 65–100)
GLUCOSE SERPL-MCNC: 93 MG/DL (ref 65–100)
GLUCOSE SERPL-MCNC: 93 MG/DL (ref 65–100)
GLUCOSE SERPL-MCNC: 94 MG/DL (ref 65–100)
GLUCOSE SERPL-MCNC: 94 MG/DL (ref 65–100)
GLUCOSE SERPL-MCNC: 95 MG/DL (ref 65–100)
GLUCOSE SERPL-MCNC: 95 MG/DL (ref 75–110)
GLUCOSE SERPL-MCNC: 96 MG/DL (ref 65–100)
GLUCOSE SERPL-MCNC: 96 MG/DL (ref 65–100)
GLUCOSE SERPL-MCNC: 97 MG/DL (ref 65–100)
GLUCOSE SERPL-MCNC: 97 MG/DL (ref 65–100)
GLUCOSE SERPL-MCNC: 97 MG/DL (ref 75–110)
GLUCOSE SERPL-MCNC: 98 MG/DL (ref 65–100)
GLUCOSE SERPL-MCNC: 98 MG/DL (ref 65–100)
GLUCOSE SERPL-MCNC: 99 MG/DL (ref 75–110)
GLUCOSE UR STRIP.AUTO-MCNC: 500 MG/DL
GLUCOSE UR STRIP.AUTO-MCNC: NEGATIVE MG/DL
HBA1C MFR BLD: 5.8 % (ref 4.8–5.6)
HBA1C MFR BLD: 5.9 % (ref 4.8–5.6)
HCT VFR BLD AUTO: 22.2 % (ref 36.6–50.3)
HCT VFR BLD AUTO: 23.2 % (ref 36.6–50.3)
HCT VFR BLD AUTO: 24.7 % (ref 36.6–50.3)
HCT VFR BLD AUTO: 24.9 % (ref 36.6–50.3)
HCT VFR BLD AUTO: 25.4 % (ref 36.6–50.3)
HCT VFR BLD AUTO: 25.4 % (ref 36.6–50.3)
HCT VFR BLD AUTO: 26 % (ref 36.6–50.3)
HCT VFR BLD AUTO: 26.1 % (ref 36.6–50.3)
HCT VFR BLD AUTO: 26.1 % (ref 36.6–50.3)
HCT VFR BLD AUTO: 26.2 % (ref 36.6–50.3)
HCT VFR BLD AUTO: 26.3 % (ref 36.6–50.3)
HCT VFR BLD AUTO: 26.4 % (ref 36.6–50.3)
HCT VFR BLD AUTO: 26.5 % (ref 36.6–50.3)
HCT VFR BLD AUTO: 26.8 % (ref 36.6–50.3)
HCT VFR BLD AUTO: 27.3 % (ref 36.6–50.3)
HCT VFR BLD AUTO: 27.4 % (ref 36.6–50.3)
HCT VFR BLD AUTO: 27.7 % (ref 36.6–50.3)
HCT VFR BLD AUTO: 28 % (ref 36.6–50.3)
HCT VFR BLD AUTO: 28 % (ref 36.6–50.3)
HCT VFR BLD AUTO: 28.1 % (ref 36.6–50.3)
HCT VFR BLD AUTO: 28.7 % (ref 36.6–50.3)
HCT VFR BLD AUTO: 28.7 % (ref 36.6–50.3)
HCT VFR BLD AUTO: 29 % (ref 36.6–50.3)
HCT VFR BLD AUTO: 29 % (ref 36.6–50.3)
HCT VFR BLD AUTO: 29.9 % (ref 36.6–50.3)
HCT VFR BLD AUTO: 30.2 % (ref 36.6–50.3)
HCT VFR BLD AUTO: 30.7 % (ref 36.6–50.3)
HCT VFR BLD AUTO: 30.8 % (ref 36.6–50.3)
HCT VFR BLD AUTO: 31.4 % (ref 36.6–50.3)
HCT VFR BLD AUTO: 31.5 % (ref 36.6–50.3)
HCT VFR BLD AUTO: 31.6 % (ref 36.6–50.3)
HCT VFR BLD AUTO: 31.8 % (ref 36.6–50.3)
HCT VFR BLD AUTO: 32 % (ref 36.6–50.3)
HCT VFR BLD AUTO: 32.4 % (ref 36.6–50.3)
HCT VFR BLD AUTO: 32.8 % (ref 36.6–50.3)
HCT VFR BLD AUTO: 32.9 % (ref 36.6–50.3)
HCT VFR BLD AUTO: 33.1 % (ref 37.5–51)
HCT VFR BLD AUTO: 33.5 % (ref 36.6–50.3)
HCT VFR BLD AUTO: 33.9 % (ref 36.6–50.3)
HCT VFR BLD AUTO: 34.3 % (ref 36.6–50.3)
HCT VFR BLD AUTO: 35.9 % (ref 37.5–51)
HCT VFR BLD AUTO: 36.9 % (ref 37.5–51)
HCT VFR BLD AUTO: 36.9 % (ref 37.5–51)
HCT VFR BLD AUTO: 37.2 % (ref 36.6–50.3)
HCT VFR BLD AUTO: 41.2 % (ref 37–51)
HCT VFR BLD AUTO: 44.9 % (ref 37–51)
HDLC SERPL-MCNC: 35 MG/DL (ref 35–130)
HDLC SERPL-MCNC: 43 MG/DL (ref 35–130)
HEMOCCULT STL QL: POSITIVE
HGB BLD OXIMETRY-MCNC: 9.2 G/DL (ref 14–17)
HGB BLD-MCNC: 10 G/DL (ref 12.1–17)
HGB BLD-MCNC: 10.1 G/DL (ref 12.1–17)
HGB BLD-MCNC: 10.2 G/DL (ref 12.1–17)
HGB BLD-MCNC: 10.3 G/DL (ref 12.1–17)
HGB BLD-MCNC: 10.6 G/DL (ref 12.1–17)
HGB BLD-MCNC: 10.8 G/DL (ref 12.1–17)
HGB BLD-MCNC: 11.1 G/DL (ref 12.1–17)
HGB BLD-MCNC: 11.1 G/DL (ref 13–17.7)
HGB BLD-MCNC: 11.5 G/DL (ref 12.1–17)
HGB BLD-MCNC: 11.5 G/DL (ref 13–17.7)
HGB BLD-MCNC: 11.9 G/DL (ref 13–17.7)
HGB BLD-MCNC: 12 G/DL (ref 13–17.7)
HGB BLD-MCNC: 13.6 G/DL (ref 12–18)
HGB BLD-MCNC: 14.8 G/DL (ref 12–18)
HGB BLD-MCNC: 7 G/DL (ref 12.1–17)
HGB BLD-MCNC: 7.4 G/DL (ref 12.1–17)
HGB BLD-MCNC: 7.5 G/DL (ref 12.1–17)
HGB BLD-MCNC: 7.5 G/DL (ref 12.1–17)
HGB BLD-MCNC: 7.6 G/DL (ref 12.1–17)
HGB BLD-MCNC: 7.7 G/DL (ref 12.1–17)
HGB BLD-MCNC: 7.8 G/DL (ref 12.1–17)
HGB BLD-MCNC: 7.9 G/DL (ref 12.1–17)
HGB BLD-MCNC: 8 G/DL (ref 12.1–17)
HGB BLD-MCNC: 8.1 G/DL (ref 12.1–17)
HGB BLD-MCNC: 8.2 G/DL (ref 12.1–17)
HGB BLD-MCNC: 8.3 G/DL (ref 12.1–17)
HGB BLD-MCNC: 8.4 G/DL (ref 12.1–17)
HGB BLD-MCNC: 8.4 G/DL (ref 12.1–17)
HGB BLD-MCNC: 8.5 G/DL (ref 12.1–17)
HGB BLD-MCNC: 8.6 G/DL (ref 12.1–17)
HGB BLD-MCNC: 8.7 G/DL (ref 12.1–17)
HGB BLD-MCNC: 8.7 G/DL (ref 12.1–17)
HGB BLD-MCNC: 9 G/DL (ref 12.1–17)
HGB BLD-MCNC: 9.2 G/DL (ref 12.1–17)
HGB BLD-MCNC: 9.5 G/DL (ref 12.1–17)
HGB BLD-MCNC: 9.5 G/DL (ref 12.1–17)
HGB BLD-MCNC: 9.6 G/DL (ref 12.1–17)
HGB BLD-MCNC: 9.7 G/DL (ref 12.1–17)
HGB UR QL STRIP: ABNORMAL
HGB UR QL STRIP: NEGATIVE
HYALINE CASTS URNS QL MICRO: ABNORMAL /LPF (ref 0–5)
HYALINE CASTS URNS QL MICRO: ABNORMAL /LPF (ref 0–5)
HYALINE CASTS URNS QL MICRO: NORMAL /LPF (ref 0–5)
HYALINE CASTS URNS QL MICRO: NORMAL /LPF (ref 0–5)
IMM GRANULOCYTES # BLD AUTO: 0 K/UL (ref 0–0.04)
IMM GRANULOCYTES # BLD AUTO: 0 X10E3/UL (ref 0–0.1)
IMM GRANULOCYTES # BLD AUTO: 0.1 K/UL (ref 0–0.04)
IMM GRANULOCYTES # BLD AUTO: 0.2 K/UL (ref 0–0.04)
IMM GRANULOCYTES # BLD AUTO: 0.3 K/UL (ref 0–0.04)
IMM GRANULOCYTES # BLD AUTO: 0.3 K/UL (ref 0–0.04)
IMM GRANULOCYTES NFR BLD AUTO: 0 %
IMM GRANULOCYTES NFR BLD AUTO: 0 %
IMM GRANULOCYTES NFR BLD AUTO: 0 % (ref 0–0.5)
IMM GRANULOCYTES NFR BLD AUTO: 1 %
IMM GRANULOCYTES NFR BLD AUTO: 1 % (ref 0–0.5)
IMM GRANULOCYTES NFR BLD AUTO: 2 % (ref 0–0.5)
INR PPP: 1.1 (ref 0.9–1.1)
INR PPP: 1.1 (ref 0.9–1.1)
INR PPP: 1.2 (ref 0.9–1.1)
KETONES UR QL STRIP.AUTO: 15 MG/DL
KETONES UR QL STRIP.AUTO: NEGATIVE
KETONES UR QL STRIP.AUTO: NEGATIVE MG/DL
LACTATE BLD-SCNC: 3.98 MMOL/L (ref 0.4–2)
LACTATE SERPL-SCNC: 1.5 MMOL/L (ref 0.4–2)
LACTATE SERPL-SCNC: 2 MMOL/L (ref 0.4–2)
LDL/HDL RATIO,LDHD: 1 RATIO
LDL/HDL RATIO,LDHD: 2 RATIO
LDLC SERPL CALC-MCNC: 57 MG/DL (ref 0–130)
LDLC SERPL CALC-MCNC: 65 MG/DL (ref 0–130)
LEUKOCYTE ESTERASE UR QL STRIP.AUTO: ABNORMAL
LEUKOCYTE ESTERASE UR QL STRIP.AUTO: ABNORMAL
LEUKOCYTE ESTERASE UR QL STRIP.AUTO: NEGATIVE
LEUKOCYTE ESTERASE: NEGATIVE
LVFS 2D: 27.52 %
LVOT MG: 1 MMHG
LVOT MV: 0.46 CM/S
LVSV (TEICH): 12.78 ML
LYMPHOCYTES # BLD AUTO: 1.4 X10E3/UL (ref 0.7–3.1)
LYMPHOCYTES # BLD AUTO: 1.6 X10E3/UL (ref 0.7–3.1)
LYMPHOCYTES # BLD AUTO: 1.7 X10E3/UL (ref 0.7–3.1)
LYMPHOCYTES # BLD AUTO: 1.8 X10E3/UL (ref 0.7–3.1)
LYMPHOCYTES # BLD: 0.4 K/UL (ref 0.8–3.5)
LYMPHOCYTES # BLD: 0.7 K/UL (ref 0.8–3.5)
LYMPHOCYTES # BLD: 0.8 K/UL (ref 0.8–3.5)
LYMPHOCYTES # BLD: 0.9 K/UL (ref 0.8–3.5)
LYMPHOCYTES # BLD: 0.9 K/UL (ref 0.8–3.5)
LYMPHOCYTES # BLD: 1 K/UL (ref 0.8–3.5)
LYMPHOCYTES # BLD: 1.1 K/UL (ref 0.8–3.5)
LYMPHOCYTES # BLD: 1.2 K/UL (ref 0.8–3.5)
LYMPHOCYTES # BLD: 1.3 K/UL (ref 0.8–3.5)
LYMPHOCYTES # BLD: 1.3 K/UL (ref 0.8–3.5)
LYMPHOCYTES # BLD: 1.4 K/UL (ref 0.8–3.5)
LYMPHOCYTES # BLD: 1.5 K/UL (ref 0.8–3.5)
LYMPHOCYTES # BLD: 1.6 K/UL (ref 0.8–3.5)
LYMPHOCYTES # BLD: 1.7 K/UL (ref 0.8–3.5)
LYMPHOCYTES # BLD: 1.8 K/UL (ref 0.8–3.5)
LYMPHOCYTES # BLD: 1.9 K/UL (ref 0.8–3.5)
LYMPHOCYTES # BLD: 1.9 K/UL (ref 0.8–3.5)
LYMPHOCYTES ABSOLUTE: 1.7 K/UL (ref 0.6–4.1)
LYMPHOCYTES ABSOLUTE: 1.7 K/UL (ref 0.6–4.1)
LYMPHOCYTES NFR BLD AUTO: 14 %
LYMPHOCYTES NFR BLD AUTO: 20 %
LYMPHOCYTES NFR BLD AUTO: 21 %
LYMPHOCYTES NFR BLD AUTO: 21 %
LYMPHOCYTES NFR BLD: 10 % (ref 12–49)
LYMPHOCYTES NFR BLD: 11 % (ref 12–49)
LYMPHOCYTES NFR BLD: 12 % (ref 12–49)
LYMPHOCYTES NFR BLD: 12 % (ref 12–49)
LYMPHOCYTES NFR BLD: 13 % (ref 12–49)
LYMPHOCYTES NFR BLD: 14 % (ref 12–49)
LYMPHOCYTES NFR BLD: 15 % (ref 12–49)
LYMPHOCYTES NFR BLD: 16 % (ref 12–49)
LYMPHOCYTES NFR BLD: 17 % (ref 12–49)
LYMPHOCYTES NFR BLD: 17.7 % (ref 10–58.5)
LYMPHOCYTES NFR BLD: 19.5 % (ref 10–58.5)
LYMPHOCYTES NFR BLD: 21 % (ref 12–49)
LYMPHOCYTES NFR BLD: 4 % (ref 12–49)
LYMPHOCYTES NFR BLD: 6 % (ref 12–49)
LYMPHOCYTES NFR BLD: 7 % (ref 12–49)
LYMPHOCYTES NFR BLD: 8 % (ref 12–49)
MCH RBC QN AUTO: 27.2 PG (ref 26–34)
MCH RBC QN AUTO: 27.2 PG (ref 26–34)
MCH RBC QN AUTO: 27.3 PG (ref 26–34)
MCH RBC QN AUTO: 27.4 PG (ref 26–34)
MCH RBC QN AUTO: 27.4 PG (ref 26–34)
MCH RBC QN AUTO: 27.5 PG (ref 26–34)
MCH RBC QN AUTO: 27.6 PG (ref 26–34)
MCH RBC QN AUTO: 27.7 PG (ref 26–34)
MCH RBC QN AUTO: 27.8 PG (ref 26–34)
MCH RBC QN AUTO: 27.9 PG (ref 26–34)
MCH RBC QN AUTO: 27.9 PG (ref 26–34)
MCH RBC QN AUTO: 28 PG (ref 26–34)
MCH RBC QN AUTO: 28 PG (ref 26–34)
MCH RBC QN AUTO: 28.1 PG (ref 26–34)
MCH RBC QN AUTO: 28.2 PG (ref 26–34)
MCH RBC QN AUTO: 28.2 PG (ref 26–34)
MCH RBC QN AUTO: 28.3 PG (ref 26.6–33)
MCH RBC QN AUTO: 28.3 PG (ref 26–34)
MCH RBC QN AUTO: 28.4 PG (ref 26.6–33)
MCH RBC QN AUTO: 28.4 PG (ref 26.6–33)
MCH RBC QN AUTO: 28.4 PG (ref 26–34)
MCH RBC QN AUTO: 28.6 PG (ref 26–34)
MCH RBC QN AUTO: 28.7 PG (ref 26–34)
MCH RBC QN AUTO: 28.8 PG (ref 26.6–33)
MCH RBC QN AUTO: 29 PG (ref 26–34)
MCH RBC QN AUTO: 29.6 PG (ref 26–34)
MCH RBC QN AUTO: 30.4 PG (ref 26–34)
MCH RBC QN AUTO: 30.8 PG (ref 26–34)
MCH RBC QN AUTO: 31.4 PG (ref 26–32)
MCH RBC QN AUTO: 31.6 PG (ref 26–32)
MCHC RBC AUTO-ENTMCNC: 29.6 G/DL (ref 30–36.5)
MCHC RBC AUTO-ENTMCNC: 29.7 G/DL (ref 30–36.5)
MCHC RBC AUTO-ENTMCNC: 29.8 G/DL (ref 30–36.5)
MCHC RBC AUTO-ENTMCNC: 29.9 G/DL (ref 30–36.5)
MCHC RBC AUTO-ENTMCNC: 30 G/DL (ref 30–36.5)
MCHC RBC AUTO-ENTMCNC: 30 G/DL (ref 30–36.5)
MCHC RBC AUTO-ENTMCNC: 30.1 G/DL (ref 30–36.5)
MCHC RBC AUTO-ENTMCNC: 30.1 G/DL (ref 30–36.5)
MCHC RBC AUTO-ENTMCNC: 30.3 G/DL (ref 30–36.5)
MCHC RBC AUTO-ENTMCNC: 30.4 G/DL (ref 30–36.5)
MCHC RBC AUTO-ENTMCNC: 30.4 G/DL (ref 30–36.5)
MCHC RBC AUTO-ENTMCNC: 30.5 G/DL (ref 30–36.5)
MCHC RBC AUTO-ENTMCNC: 30.7 G/DL (ref 30–36.5)
MCHC RBC AUTO-ENTMCNC: 30.8 G/DL (ref 30–36.5)
MCHC RBC AUTO-ENTMCNC: 30.8 G/DL (ref 30–36.5)
MCHC RBC AUTO-ENTMCNC: 30.9 G/DL (ref 30–36.5)
MCHC RBC AUTO-ENTMCNC: 31 G/DL (ref 30–36.5)
MCHC RBC AUTO-ENTMCNC: 31.1 G/DL (ref 30–36.5)
MCHC RBC AUTO-ENTMCNC: 31.1 G/DL (ref 30–36.5)
MCHC RBC AUTO-ENTMCNC: 31.2 G/DL (ref 30–36.5)
MCHC RBC AUTO-ENTMCNC: 31.3 G/DL (ref 30–36.5)
MCHC RBC AUTO-ENTMCNC: 31.4 G/DL (ref 30–36.5)
MCHC RBC AUTO-ENTMCNC: 31.5 G/DL (ref 30–36.5)
MCHC RBC AUTO-ENTMCNC: 31.9 G/DL (ref 30–36.5)
MCHC RBC AUTO-ENTMCNC: 31.9 G/DL (ref 30–36.5)
MCHC RBC AUTO-ENTMCNC: 32 G/DL (ref 31.5–35.7)
MCHC RBC AUTO-ENTMCNC: 32.1 G/DL (ref 30–36.5)
MCHC RBC AUTO-ENTMCNC: 32.2 G/DL (ref 30–36.5)
MCHC RBC AUTO-ENTMCNC: 32.2 G/DL (ref 31.5–35.7)
MCHC RBC AUTO-ENTMCNC: 32.4 G/DL (ref 30–36.5)
MCHC RBC AUTO-ENTMCNC: 32.4 G/DL (ref 30–36.5)
MCHC RBC AUTO-ENTMCNC: 32.5 G/DL (ref 30–36.5)
MCHC RBC AUTO-ENTMCNC: 32.5 G/DL (ref 31.5–35.7)
MCHC RBC AUTO-ENTMCNC: 32.7 G/DL (ref 30–36.5)
MCHC RBC AUTO-ENTMCNC: 33 G/DL (ref 30–36)
MCHC RBC AUTO-ENTMCNC: 33 G/DL (ref 30–36)
MCHC RBC AUTO-ENTMCNC: 33.5 G/DL (ref 31.5–35.7)
MCV RBC AUTO: 85 FL (ref 79–97)
MCV RBC AUTO: 86.9 FL (ref 80–99)
MCV RBC AUTO: 87.1 FL (ref 80–99)
MCV RBC AUTO: 87.7 FL (ref 80–99)
MCV RBC AUTO: 87.7 FL (ref 80–99)
MCV RBC AUTO: 88 FL (ref 79–97)
MCV RBC AUTO: 88 FL (ref 79–97)
MCV RBC AUTO: 88 FL (ref 80–99)
MCV RBC AUTO: 88.5 FL (ref 80–99)
MCV RBC AUTO: 88.6 FL (ref 80–99)
MCV RBC AUTO: 88.8 FL (ref 80–99)
MCV RBC AUTO: 88.8 FL (ref 80–99)
MCV RBC AUTO: 88.9 FL (ref 80–99)
MCV RBC AUTO: 89 FL (ref 79–97)
MCV RBC AUTO: 89 FL (ref 80–99)
MCV RBC AUTO: 89.5 FL (ref 80–99)
MCV RBC AUTO: 89.7 FL (ref 80–99)
MCV RBC AUTO: 89.8 FL (ref 80–99)
MCV RBC AUTO: 90 FL (ref 80–99)
MCV RBC AUTO: 90.2 FL (ref 80–99)
MCV RBC AUTO: 90.4 FL (ref 80–99)
MCV RBC AUTO: 90.6 FL (ref 80–99)
MCV RBC AUTO: 90.7 FL (ref 80–99)
MCV RBC AUTO: 90.9 FL (ref 80–99)
MCV RBC AUTO: 91 FL (ref 80–99)
MCV RBC AUTO: 91.3 FL (ref 80–99)
MCV RBC AUTO: 91.4 FL (ref 80–99)
MCV RBC AUTO: 91.5 FL (ref 80–99)
MCV RBC AUTO: 91.9 FL (ref 80–99)
MCV RBC AUTO: 92.1 FL (ref 80–99)
MCV RBC AUTO: 92.7 FL (ref 80–99)
MCV RBC AUTO: 92.7 FL (ref 80–99)
MCV RBC AUTO: 93.4 FL (ref 80–99)
MCV RBC AUTO: 93.5 FL (ref 80–99)
MCV RBC AUTO: 93.9 FL (ref 80–99)
MCV RBC AUTO: 93.9 FL (ref 80–99)
MCV RBC AUTO: 94 FL (ref 80–99)
MCV RBC AUTO: 94 FL (ref 80–99)
MCV RBC AUTO: 94.2 FL (ref 80–99)
MCV RBC AUTO: 95 FL (ref 80–99)
MCV RBC AUTO: 95.4 FL (ref 80–97)
MCV RBC AUTO: 95.7 FL (ref 80–97)
METHGB MFR BLD: 0.4 % (ref 0–1.4)
MONOCYTES # BLD AUTO: 0.8 X10E3/UL (ref 0.1–0.9)
MONOCYTES # BLD AUTO: 0.9 X10E3/UL (ref 0.1–0.9)
MONOCYTES # BLD AUTO: 1.3 X10E3/UL (ref 0.1–0.9)
MONOCYTES # BLD AUTO: 1.5 X10E3/UL (ref 0.1–0.9)
MONOCYTES # BLD: 0.2 K/UL (ref 0–1)
MONOCYTES # BLD: 0.5 K/UL (ref 0–1)
MONOCYTES # BLD: 0.9 K/UL (ref 0–1)
MONOCYTES # BLD: 1 K/UL (ref 0–1)
MONOCYTES # BLD: 1.1 K/UL (ref 0–1)
MONOCYTES # BLD: 1.2 K/UL (ref 0–1)
MONOCYTES # BLD: 1.2 K/UL (ref 0–1)
MONOCYTES # BLD: 1.3 K/UL (ref 0–1)
MONOCYTES # BLD: 1.4 K/UL (ref 0–1)
MONOCYTES # BLD: 1.5 K/UL (ref 0–1)
MONOCYTES # BLD: 1.5 K/UL (ref 0–1)
MONOCYTES # BLD: 1.6 K/UL (ref 0–1)
MONOCYTES # BLD: 1.8 K/UL (ref 0–1)
MONOCYTES ABS-DIF,2141: 0.8 K/UL (ref 0–1.8)
MONOCYTES ABS-DIF,2141: 0.8 K/UL (ref 0–1.8)
MONOCYTES NFR BLD AUTO: 10 %
MONOCYTES NFR BLD AUTO: 11 %
MONOCYTES NFR BLD AUTO: 15 %
MONOCYTES NFR BLD AUTO: 16 %
MONOCYTES NFR BLD: 10 % (ref 5–13)
MONOCYTES NFR BLD: 11 % (ref 5–13)
MONOCYTES NFR BLD: 12 % (ref 5–13)
MONOCYTES NFR BLD: 15 % (ref 5–13)
MONOCYTES NFR BLD: 2 % (ref 5–13)
MONOCYTES NFR BLD: 5 % (ref 5–13)
MONOCYTES NFR BLD: 7 % (ref 5–13)
MONOCYTES NFR BLD: 7 % (ref 5–13)
MONOCYTES NFR BLD: 8 % (ref 5–13)
MONOCYTES NFR BLD: 8.9 % (ref 0.1–24)
MONOCYTES NFR BLD: 9 % (ref 5–13)
MONOCYTES NFR BLD: 9.4 % (ref 0.1–24)
MUCOUS THREADS URNS QL MICRO: ABNORMAL /LPF
MV DEC SLOPE: 9.45
NEUTROPHILS # BLD AUTO: 5 X10E3/UL (ref 1.4–7)
NEUTROPHILS # BLD AUTO: 5.1 X10E3/UL (ref 1.4–7)
NEUTROPHILS # BLD AUTO: 5.8 X10E3/UL (ref 1.4–7)
NEUTROPHILS # BLD AUTO: 6.7 X10E3/UL (ref 1.4–7)
NEUTROPHILS # BLD: 71.1 % (ref 37–92)
NEUTROPHILS # BLD: 73.4 % (ref 37–92)
NEUTROPHILS ABS,2156: 6 K/UL (ref 2–7.8)
NEUTROPHILS ABS,2156: 7 K/UL (ref 2–7.8)
NEUTROPHILS NFR BLD AUTO: 60 %
NEUTROPHILS NFR BLD AUTO: 64 %
NEUTROPHILS NFR BLD AUTO: 65 %
NEUTROPHILS NFR BLD AUTO: 70 %
NEUTS SEG # BLD: 10.1 K/UL (ref 1.8–8)
NEUTS SEG # BLD: 10.2 K/UL (ref 1.8–8)
NEUTS SEG # BLD: 10.4 K/UL (ref 1.8–8)
NEUTS SEG # BLD: 10.7 K/UL (ref 1.8–8)
NEUTS SEG # BLD: 10.9 K/UL (ref 1.8–8)
NEUTS SEG # BLD: 11.1 K/UL (ref 1.8–8)
NEUTS SEG # BLD: 11.5 K/UL (ref 1.8–8)
NEUTS SEG # BLD: 11.9 K/UL (ref 1.8–8)
NEUTS SEG # BLD: 15.8 K/UL (ref 1.8–8)
NEUTS SEG # BLD: 16.7 K/UL (ref 1.8–8)
NEUTS SEG # BLD: 18.2 K/UL (ref 1.8–8)
NEUTS SEG # BLD: 3.9 K/UL (ref 1.8–8)
NEUTS SEG # BLD: 5.5 K/UL (ref 1.8–8)
NEUTS SEG # BLD: 6.3 K/UL (ref 1.8–8)
NEUTS SEG # BLD: 6.4 K/UL (ref 1.8–8)
NEUTS SEG # BLD: 7.4 K/UL (ref 1.8–8)
NEUTS SEG # BLD: 7.5 K/UL (ref 1.8–8)
NEUTS SEG # BLD: 7.5 K/UL (ref 1.8–8)
NEUTS SEG # BLD: 7.6 K/UL (ref 1.8–8)
NEUTS SEG # BLD: 7.8 K/UL (ref 1.8–8)
NEUTS SEG # BLD: 7.9 K/UL (ref 1.8–8)
NEUTS SEG # BLD: 8 K/UL (ref 1.8–8)
NEUTS SEG # BLD: 8.1 K/UL (ref 1.8–8)
NEUTS SEG # BLD: 8.1 K/UL (ref 1.8–8)
NEUTS SEG # BLD: 8.3 K/UL (ref 1.8–8)
NEUTS SEG # BLD: 8.3 K/UL (ref 1.8–8)
NEUTS SEG # BLD: 8.6 K/UL (ref 1.8–8)
NEUTS SEG # BLD: 8.9 K/UL (ref 1.8–8)
NEUTS SEG # BLD: 9.2 K/UL (ref 1.8–8)
NEUTS SEG # BLD: 9.3 K/UL (ref 1.8–8)
NEUTS SEG # BLD: 9.4 K/UL (ref 1.8–8)
NEUTS SEG # BLD: 9.4 K/UL (ref 1.8–8)
NEUTS SEG # BLD: 9.7 K/UL (ref 1.8–8)
NEUTS SEG NFR BLD: 57 % (ref 32–75)
NEUTS SEG NFR BLD: 67 % (ref 32–75)
NEUTS SEG NFR BLD: 71 % (ref 32–75)
NEUTS SEG NFR BLD: 72 % (ref 32–75)
NEUTS SEG NFR BLD: 73 % (ref 32–75)
NEUTS SEG NFR BLD: 73 % (ref 32–75)
NEUTS SEG NFR BLD: 74 % (ref 32–75)
NEUTS SEG NFR BLD: 74 % (ref 32–75)
NEUTS SEG NFR BLD: 75 % (ref 32–75)
NEUTS SEG NFR BLD: 76 % (ref 32–75)
NEUTS SEG NFR BLD: 77 % (ref 32–75)
NEUTS SEG NFR BLD: 77 % (ref 32–75)
NEUTS SEG NFR BLD: 78 % (ref 32–75)
NEUTS SEG NFR BLD: 78 % (ref 32–75)
NEUTS SEG NFR BLD: 79 % (ref 32–75)
NEUTS SEG NFR BLD: 81 % (ref 32–75)
NEUTS SEG NFR BLD: 81 % (ref 32–75)
NEUTS SEG NFR BLD: 82 % (ref 32–75)
NEUTS SEG NFR BLD: 82 % (ref 32–75)
NEUTS SEG NFR BLD: 83 % (ref 32–75)
NEUTS SEG NFR BLD: 83 % (ref 32–75)
NEUTS SEG NFR BLD: 84 % (ref 32–75)
NEUTS SEG NFR BLD: 85 % (ref 32–75)
NEUTS SEG NFR BLD: 86 % (ref 32–75)
NEUTS SEG NFR BLD: 86 % (ref 32–75)
NEUTS SEG NFR BLD: 94 % (ref 32–75)
NITRITE UR QL STRIP.AUTO: NEGATIVE
NITRITE UR QL STRIP.AUTO: POSITIVE
NRBC # BLD: 0 K/UL (ref 0–0.01)
NRBC # BLD: 0.02 K/UL (ref 0–0.01)
NRBC BLD-RTO: 0 PER 100 WBC
NRBC BLD-RTO: 0.1 PER 100 WBC
OXYHGB MFR BLD: 62.2 % (ref 94–97)
P-R INTERVAL, ECG05: 204 MS
P-R INTERVAL, ECG05: 204 MS
P-R INTERVAL, ECG05: 208 MS
P-R INTERVAL, ECG05: 244 MS
PH UR STRIP: 5 [PH] (ref 5–7)
PH UR STRIP: 5 [PH] (ref 5–8)
PH UR STRIP: 5 [PH] (ref 5–8)
PH UR STRIP: 5.5 [PH] (ref 5–8)
PH UR STRIP: 6.5 [PH] (ref 5–8)
PLATELET # BLD AUTO: 134 K/UL (ref 150–400)
PLATELET # BLD AUTO: 138 K/UL (ref 150–400)
PLATELET # BLD AUTO: 139 K/UL (ref 150–400)
PLATELET # BLD AUTO: 142 K/UL (ref 150–400)
PLATELET # BLD AUTO: 150 K/UL (ref 150–400)
PLATELET # BLD AUTO: 156 K/UL (ref 150–400)
PLATELET # BLD AUTO: 157 K/UL (ref 150–400)
PLATELET # BLD AUTO: 163 K/UL (ref 150–400)
PLATELET # BLD AUTO: 169 K/UL (ref 150–400)
PLATELET # BLD AUTO: 180 K/UL (ref 150–400)
PLATELET # BLD AUTO: 182 K/UL (ref 150–400)
PLATELET # BLD AUTO: 188 K/UL (ref 150–400)
PLATELET # BLD AUTO: 189 K/UL (ref 150–400)
PLATELET # BLD AUTO: 193 X10E3/UL (ref 150–450)
PLATELET # BLD AUTO: 195 K/UL (ref 150–400)
PLATELET # BLD AUTO: 204 K/UL (ref 150–400)
PLATELET # BLD AUTO: 211 K/UL (ref 150–400)
PLATELET # BLD AUTO: 215 K/UL (ref 150–400)
PLATELET # BLD AUTO: 216 K/UL (ref 150–400)
PLATELET # BLD AUTO: 217 K/UL (ref 150–400)
PLATELET # BLD AUTO: 219 K/UL (ref 150–400)
PLATELET # BLD AUTO: 220 K/UL (ref 140–440)
PLATELET # BLD AUTO: 233 K/UL (ref 150–400)
PLATELET # BLD AUTO: 241 K/UL (ref 150–400)
PLATELET # BLD AUTO: 243 K/UL (ref 150–400)
PLATELET # BLD AUTO: 249 X10E3/UL (ref 150–450)
PLATELET # BLD AUTO: 258 K/UL (ref 150–400)
PLATELET # BLD AUTO: 269 X10E3/UL (ref 150–450)
PLATELET # BLD AUTO: 274 K/UL (ref 150–400)
PLATELET # BLD AUTO: 280 K/UL (ref 140–440)
PLATELET # BLD AUTO: 288 K/UL (ref 150–400)
PLATELET # BLD AUTO: 299 K/UL (ref 150–400)
PLATELET # BLD AUTO: 323 K/UL (ref 150–400)
PLATELET # BLD AUTO: 327 K/UL (ref 150–400)
PLATELET # BLD AUTO: 335 K/UL (ref 150–400)
PLATELET # BLD AUTO: 337 K/UL (ref 150–400)
PLATELET # BLD AUTO: 337 K/UL (ref 150–400)
PLATELET # BLD AUTO: 339 K/UL (ref 150–400)
PLATELET # BLD AUTO: 341 K/UL (ref 150–400)
PLATELET # BLD AUTO: 347 K/UL (ref 150–400)
PLATELET # BLD AUTO: 365 X10E3/UL (ref 150–450)
PLATELET # BLD AUTO: 397 K/UL (ref 150–400)
PMV BLD AUTO: 10 FL (ref 8.9–12.9)
PMV BLD AUTO: 10.1 FL (ref 8.9–12.9)
PMV BLD AUTO: 10.1 FL (ref 8.9–12.9)
PMV BLD AUTO: 10.2 FL (ref 8.9–12.9)
PMV BLD AUTO: 10.2 FL (ref 8.9–12.9)
PMV BLD AUTO: 10.4 FL (ref 8.9–12.9)
PMV BLD AUTO: 10.4 FL (ref 8.9–12.9)
PMV BLD AUTO: 10.5 FL (ref 8.9–12.9)
PMV BLD AUTO: 10.6 FL (ref 8.9–12.9)
PMV BLD AUTO: 10.7 FL (ref 8.9–12.9)
PMV BLD AUTO: 10.7 FL (ref 8.9–12.9)
PMV BLD AUTO: 10.8 FL (ref 8.9–12.9)
PMV BLD AUTO: 10.9 FL (ref 8.9–12.9)
PMV BLD AUTO: 10.9 FL (ref 8.9–12.9)
PMV BLD AUTO: 11 FL (ref 8.9–12.9)
PMV BLD AUTO: 11.1 FL (ref 8.9–12.9)
PMV BLD AUTO: 11.2 FL (ref 8.9–12.9)
PMV BLD AUTO: 11.2 FL (ref 8.9–12.9)
PMV BLD AUTO: 11.3 FL (ref 8.9–12.9)
PMV BLD AUTO: 11.5 FL (ref 8.9–12.9)
PMV BLD AUTO: 11.5 FL (ref 8.9–12.9)
PMV BLD AUTO: 11.8 FL (ref 8.9–12.9)
PMV BLD AUTO: 12 FL (ref 8.9–12.9)
PMV BLD AUTO: 12.5 FL (ref 8.9–12.9)
PMV BLD AUTO: 12.7 FL (ref 8.9–12.9)
PMV BLD AUTO: 8.6 FL
PMV BLD AUTO: 8.7 FL
PMV BLD AUTO: 9.9 FL (ref 8.9–12.9)
PMV BLD AUTO: 9.9 FL (ref 8.9–12.9)
POTASSIUM SERPL-SCNC: 3.4 MMOL/L (ref 3.5–5.1)
POTASSIUM SERPL-SCNC: 3.5 MMOL/L (ref 3.5–5.1)
POTASSIUM SERPL-SCNC: 3.6 MMOL/L (ref 3.5–5.1)
POTASSIUM SERPL-SCNC: 3.6 MMOL/L (ref 3.5–5.1)
POTASSIUM SERPL-SCNC: 3.7 MMOL/L (ref 3.5–5.1)
POTASSIUM SERPL-SCNC: 3.8 MMOL/L (ref 3.5–5.1)
POTASSIUM SERPL-SCNC: 3.9 MMOL/L (ref 3.5–5.1)
POTASSIUM SERPL-SCNC: 3.9 MMOL/L (ref 3.5–5.1)
POTASSIUM SERPL-SCNC: 4 MMOL/L (ref 3.5–5.1)
POTASSIUM SERPL-SCNC: 4.1 MMOL/L (ref 3.5–5.1)
POTASSIUM SERPL-SCNC: 4.2 MMOL/L (ref 3.5–5.1)
POTASSIUM SERPL-SCNC: 4.2 MMOL/L (ref 3.6–5)
POTASSIUM SERPL-SCNC: 4.2 MMOL/L (ref 3.6–5)
POTASSIUM SERPL-SCNC: 4.3 MMOL/L (ref 3.5–5.1)
POTASSIUM SERPL-SCNC: 4.5 MMOL/L (ref 3.5–5.1)
POTASSIUM SERPL-SCNC: 4.6 MMOL/L (ref 3.5–5.1)
POTASSIUM SERPL-SCNC: 4.7 MMOL/L (ref 3.5–5.1)
POTASSIUM SERPL-SCNC: 4.8 MMOL/L (ref 3.6–5)
POTASSIUM SERPL-SCNC: 5.3 MMOL/L (ref 3.6–5)
POTASSIUM SERPL-SCNC: 5.5 MMOL/L (ref 3.6–5)
POTASSIUM SERPL-SCNC: 5.5 MMOL/L (ref 3.6–5)
POTASSIUM SERPL-SCNC: 6.6 MMOL/L (ref 3.6–5)
PROT SERPL-MCNC: 5.1 G/DL (ref 6.4–8.2)
PROT SERPL-MCNC: 6.3 G/DL (ref 6.4–8.2)
PROT SERPL-MCNC: 6.5 G/DL (ref 6.4–8.2)
PROT SERPL-MCNC: 6.7 G/DL (ref 6.4–8.2)
PROT SERPL-MCNC: 6.8 G/DL (ref 6.3–8.2)
PROT SERPL-MCNC: 6.8 G/DL (ref 6.4–8.2)
PROT SERPL-MCNC: 7.3 G/DL (ref 6.3–8.2)
PROT UR STRIP-MCNC: 300 MG/DL
PROT UR STRIP-MCNC: ABNORMAL MG/DL
PROT UR STRIP-MCNC: ABNORMAL MG/DL
PROT UR STRIP-MCNC: NEGATIVE MG/DL
PROTHROMBIN TIME: 11.4 SEC (ref 9–11.1)
PROTHROMBIN TIME: 11.7 SEC (ref 9–11.1)
PROTHROMBIN TIME: 12.4 SEC (ref 9–11.1)
Q-T INTERVAL, ECG07: 302 MS
Q-T INTERVAL, ECG07: 332 MS
Q-T INTERVAL, ECG07: 336 MS
Q-T INTERVAL, ECG07: 382 MS
Q-T INTERVAL, ECG07: 390 MS
Q-T INTERVAL, ECG07: 398 MS
QRS DURATION, ECG06: 100 MS
QRS DURATION, ECG06: 100 MS
QRS DURATION, ECG06: 104 MS
QRS DURATION, ECG06: 90 MS
QRS DURATION, ECG06: 96 MS
QRS DURATION, ECG06: 96 MS
QTC CALCULATION (BEZET), ECG08: 397 MS
QTC CALCULATION (BEZET), ECG08: 403 MS
QTC CALCULATION (BEZET), ECG08: 424 MS
QTC CALCULATION (BEZET), ECG08: 427 MS
QTC CALCULATION (BEZET), ECG08: 441 MS
QTC CALCULATION (BEZET), ECG08: 441 MS
RBC # BLD AUTO: 2.63 M/UL (ref 4.1–5.7)
RBC # BLD AUTO: 2.72 M/UL (ref 4.1–5.7)
RBC # BLD AUTO: 2.72 M/UL (ref 4.1–5.7)
RBC # BLD AUTO: 2.74 M/UL (ref 4.1–5.7)
RBC # BLD AUTO: 2.76 M/UL (ref 4.1–5.7)
RBC # BLD AUTO: 2.76 M/UL (ref 4.1–5.7)
RBC # BLD AUTO: 2.78 M/UL (ref 4.1–5.7)
RBC # BLD AUTO: 2.86 M/UL (ref 4.1–5.7)
RBC # BLD AUTO: 2.87 M/UL (ref 4.1–5.7)
RBC # BLD AUTO: 2.87 M/UL (ref 4.1–5.7)
RBC # BLD AUTO: 2.9 M/UL (ref 4.1–5.7)
RBC # BLD AUTO: 2.91 M/UL (ref 4.1–5.7)
RBC # BLD AUTO: 2.93 M/UL (ref 4.1–5.7)
RBC # BLD AUTO: 3.07 M/UL (ref 4.1–5.7)
RBC # BLD AUTO: 3.09 M/UL (ref 4.1–5.7)
RBC # BLD AUTO: 3.11 M/UL (ref 4.1–5.7)
RBC # BLD AUTO: 3.13 M/UL (ref 4.1–5.7)
RBC # BLD AUTO: 3.14 M/UL (ref 4.1–5.7)
RBC # BLD AUTO: 3.15 M/UL (ref 4.1–5.7)
RBC # BLD AUTO: 3.18 M/UL (ref 4.1–5.7)
RBC # BLD AUTO: 3.33 M/UL (ref 4.1–5.7)
RBC # BLD AUTO: 3.35 M/UL (ref 4.1–5.7)
RBC # BLD AUTO: 3.41 M/UL (ref 4.1–5.7)
RBC # BLD AUTO: 3.41 M/UL (ref 4.1–5.7)
RBC # BLD AUTO: 3.46 M/UL (ref 4.1–5.7)
RBC # BLD AUTO: 3.52 M/UL (ref 4.1–5.7)
RBC # BLD AUTO: 3.59 M/UL (ref 4.1–5.7)
RBC # BLD AUTO: 3.63 M/UL (ref 4.1–5.7)
RBC # BLD AUTO: 3.64 M/UL (ref 4.1–5.7)
RBC # BLD AUTO: 3.65 M/UL (ref 4.1–5.7)
RBC # BLD AUTO: 3.65 M/UL (ref 4.1–5.7)
RBC # BLD AUTO: 3.66 M/UL (ref 4.1–5.7)
RBC # BLD AUTO: 3.68 M/UL (ref 4.1–5.7)
RBC # BLD AUTO: 3.78 M/UL (ref 4.1–5.7)
RBC # BLD AUTO: 3.89 M/UL (ref 4.1–5.7)
RBC # BLD AUTO: 3.91 X10E6/UL (ref 4.14–5.8)
RBC # BLD AUTO: 4.06 X10E6/UL (ref 4.14–5.8)
RBC # BLD AUTO: 4.17 X10E6/UL (ref 4.14–5.8)
RBC # BLD AUTO: 4.19 M/UL (ref 4.1–5.7)
RBC # BLD AUTO: 4.19 X10E6/UL (ref 4.14–5.8)
RBC # BLD AUTO: 4.31 M/UL (ref 4.2–6.3)
RBC # BLD AUTO: 4.71 M/UL (ref 4.2–6.3)
RBC #/AREA URNS HPF: 0 #/HPF
RBC #/AREA URNS HPF: >100 /HPF (ref 0–5)
RBC #/AREA URNS HPF: NORMAL /HPF (ref 0–5)
RBC #/AREA URNS HPF: NORMAL /HPF (ref 0–5)
RBC MORPH BLD: ABNORMAL
REPORTED DOSE,DOSE: ABNORMAL UNITS
REPORTED DOSE/TIME,TMG: ABNORMAL
SAMPLES BEING HELD,HOLD: NORMAL
SAMPLES BEING HELD,HOLD: NORMAL
SAO2 % BLD: 63 % (ref 95–99)
SERVICE CMNT-IMP: ABNORMAL
SERVICE CMNT-IMP: NORMAL
SODIUM SERPL-SCNC: 134 MMOL/L (ref 136–145)
SODIUM SERPL-SCNC: 135 MMOL/L (ref 137–145)
SODIUM SERPL-SCNC: 136 MMOL/L (ref 136–145)
SODIUM SERPL-SCNC: 136 MMOL/L (ref 136–145)
SODIUM SERPL-SCNC: 137 MMOL/L (ref 136–145)
SODIUM SERPL-SCNC: 137 MMOL/L (ref 136–145)
SODIUM SERPL-SCNC: 138 MMOL/L (ref 136–145)
SODIUM SERPL-SCNC: 138 MMOL/L (ref 137–145)
SODIUM SERPL-SCNC: 139 MMOL/L (ref 136–145)
SODIUM SERPL-SCNC: 140 MMOL/L (ref 136–145)
SODIUM SERPL-SCNC: 140 MMOL/L (ref 137–145)
SODIUM SERPL-SCNC: 140 MMOL/L (ref 137–145)
SODIUM SERPL-SCNC: 141 MMOL/L (ref 136–145)
SODIUM SERPL-SCNC: 141 MMOL/L (ref 137–145)
SODIUM SERPL-SCNC: 142 MMOL/L (ref 136–145)
SODIUM SERPL-SCNC: 143 MMOL/L (ref 136–145)
SODIUM SERPL-SCNC: 143 MMOL/L (ref 137–145)
SODIUM SERPL-SCNC: 144 MMOL/L (ref 136–145)
SODIUM SERPL-SCNC: 144 MMOL/L (ref 137–145)
SODIUM SERPL-SCNC: 145 MMOL/L (ref 136–145)
SP GR UR REFRACTOMETRY: 1 (ref 1–1.03)
SP GR UR REFRACTOMETRY: 1.02 (ref 1–1.03)
SP GR UR REFRACTOMETRY: 1.03 (ref 1–1.03)
SPECIMEN EXP DATE BLD: NORMAL
STATUS OF UNIT,%ST: NORMAL
T4 FREE SERPL-MCNC: 1.5 NG/DL (ref 0.8–1.5)
THERAPEUTIC RANGE,PTTT: ABNORMAL SECS (ref 58–77)
TRIGL SERPL-MCNC: 86 MG/DL (ref 0–200)
TRIGL SERPL-MCNC: 99 MG/DL (ref 0–200)
TROPONIN I SERPL-MCNC: 0.06 NG/ML
TROPONIN I SERPL-MCNC: <0.05 NG/ML
TROPONIN I SERPL-MCNC: <0.05 NG/ML
TSH SERPL DL<=0.05 MIU/L-ACNC: 0.07 UIU/ML (ref 0.36–3.74)
UA: UC IF INDICATED,UAUC: ABNORMAL
UA: UC IF INDICATED,UAUC: ABNORMAL
UNIT DIVISION, %UDIV: 0
UROBILINOGEN UR QL STRIP.AUTO: 0.2 EU/DL (ref 0.2–1)
UROBILINOGEN UR QL STRIP.AUTO: 1 EU/DL (ref 0.2–1)
UROBILINOGEN UR QL STRIP.AUTO: 8 EU/DL (ref 0.2–1)
UROBILINOGEN UR QL STRIP.AUTO: ABNORMAL
VANCOMYCIN TROUGH SERPL-MCNC: 10.3 UG/ML (ref 5–10)
VENTRICULAR RATE, ECG03: 104 BPM
VENTRICULAR RATE, ECG03: 104 BPM
VENTRICULAR RATE, ECG03: 72 BPM
VENTRICULAR RATE, ECG03: 74 BPM
VENTRICULAR RATE, ECG03: 74 BPM
VENTRICULAR RATE, ECG03: 89 BPM
VLDLC SERPL CALC-MCNC: 17 MG/DL
VLDLC SERPL CALC-MCNC: 20 MG/DL
WBC # BLD AUTO: 10.1 K/UL (ref 4.1–11.1)
WBC # BLD AUTO: 10.2 K/UL (ref 4.1–11.1)
WBC # BLD AUTO: 10.2 K/UL (ref 4.1–11.1)
WBC # BLD AUTO: 10.4 K/UL (ref 4.1–11.1)
WBC # BLD AUTO: 10.4 K/UL (ref 4.1–11.1)
WBC # BLD AUTO: 10.6 K/UL (ref 4.1–11.1)
WBC # BLD AUTO: 10.7 K/UL (ref 4.1–11.1)
WBC # BLD AUTO: 10.7 K/UL (ref 4.1–11.1)
WBC # BLD AUTO: 11 K/UL (ref 4.1–11.1)
WBC # BLD AUTO: 11 K/UL (ref 4.1–11.1)
WBC # BLD AUTO: 11.2 K/UL (ref 4.1–11.1)
WBC # BLD AUTO: 11.3 K/UL (ref 4.1–11.1)
WBC # BLD AUTO: 11.4 K/UL (ref 4.1–11.1)
WBC # BLD AUTO: 11.7 K/UL (ref 4.1–11.1)
WBC # BLD AUTO: 12.3 K/UL (ref 4.1–11.1)
WBC # BLD AUTO: 12.3 K/UL (ref 4.1–11.1)
WBC # BLD AUTO: 12.6 K/UL (ref 4.1–11.1)
WBC # BLD AUTO: 12.6 K/UL (ref 4.1–11.1)
WBC # BLD AUTO: 12.7 K/UL (ref 4.1–11.1)
WBC # BLD AUTO: 13.8 K/UL (ref 4.1–11.1)
WBC # BLD AUTO: 14 K/UL (ref 4.1–11.1)
WBC # BLD AUTO: 14 K/UL (ref 4.1–11.1)
WBC # BLD AUTO: 14.8 K/UL (ref 4.1–11.1)
WBC # BLD AUTO: 14.9 K/UL (ref 4.1–11.1)
WBC # BLD AUTO: 19.2 K/UL (ref 4.1–11.1)
WBC # BLD AUTO: 19.9 K/UL (ref 4.1–11.1)
WBC # BLD AUTO: 21.3 K/UL (ref 4.1–11.1)
WBC # BLD AUTO: 6.8 K/UL (ref 4.1–11.1)
WBC # BLD AUTO: 7.1 K/UL (ref 4.1–11.1)
WBC # BLD AUTO: 7.3 K/UL (ref 4.1–11.1)
WBC # BLD AUTO: 7.7 X10E3/UL (ref 3.4–10.8)
WBC # BLD AUTO: 8.5 K/UL (ref 4.1–10.9)
WBC # BLD AUTO: 8.5 K/UL (ref 4.1–11.1)
WBC # BLD AUTO: 8.5 X10E3/UL (ref 3.4–10.8)
WBC # BLD AUTO: 8.8 X10E3/UL (ref 3.4–10.8)
WBC # BLD AUTO: 9.2 K/UL (ref 4.1–11.1)
WBC # BLD AUTO: 9.5 K/UL (ref 4.1–10.9)
WBC # BLD AUTO: 9.7 X10E3/UL (ref 3.4–10.8)
WBC URNS QL MICRO: ABNORMAL #/HPF
WBC URNS QL MICRO: ABNORMAL /HPF (ref 0–4)
WBC URNS QL MICRO: NORMAL /HPF (ref 0–4)
WBC URNS QL MICRO: NORMAL /HPF (ref 0–4)

## 2019-01-01 PROCEDURE — 74011250637 HC RX REV CODE- 250/637: Performed by: INTERNAL MEDICINE

## 2019-01-01 PROCEDURE — 74011000250 HC RX REV CODE- 250: Performed by: NURSE PRACTITIONER

## 2019-01-01 PROCEDURE — 84443 ASSAY THYROID STIM HORMONE: CPT

## 2019-01-01 PROCEDURE — 85025 COMPLETE CBC W/AUTO DIFF WBC: CPT

## 2019-01-01 PROCEDURE — 65660000000 HC RM CCU STEPDOWN

## 2019-01-01 PROCEDURE — 74011250636 HC RX REV CODE- 250/636: Performed by: INTERNAL MEDICINE

## 2019-01-01 PROCEDURE — B2111ZZ FLUOROSCOPY OF MULTIPLE CORONARY ARTERIES USING LOW OSMOLAR CONTRAST: ICD-10-PCS | Performed by: INTERNAL MEDICINE

## 2019-01-01 PROCEDURE — 74011000258 HC RX REV CODE- 258: Performed by: INTERNAL MEDICINE

## 2019-01-01 PROCEDURE — 74011000250 HC RX REV CODE- 250: Performed by: GENERAL ACUTE CARE HOSPITAL

## 2019-01-01 PROCEDURE — 77030004549 HC CATH ANGI DX PRF MRTM -A: Performed by: INTERNAL MEDICINE

## 2019-01-01 PROCEDURE — 36415 COLL VENOUS BLD VENIPUNCTURE: CPT

## 2019-01-01 PROCEDURE — 87040 BLOOD CULTURE FOR BACTERIA: CPT

## 2019-01-01 PROCEDURE — 65620000000 HC RM CCU GENERAL

## 2019-01-01 PROCEDURE — 74011250636 HC RX REV CODE- 250/636: Performed by: GENERAL ACUTE CARE HOSPITAL

## 2019-01-01 PROCEDURE — C9113 INJ PANTOPRAZOLE SODIUM, VIA: HCPCS | Performed by: GENERAL ACUTE CARE HOSPITAL

## 2019-01-01 PROCEDURE — 71046 X-RAY EXAM CHEST 2 VIEWS: CPT

## 2019-01-01 PROCEDURE — 76060000034 HC ANESTHESIA 1.5 TO 2 HR: Performed by: SURGERY

## 2019-01-01 PROCEDURE — C1769 GUIDE WIRE: HCPCS | Performed by: INTERNAL MEDICINE

## 2019-01-01 PROCEDURE — 74011250636 HC RX REV CODE- 250/636: Performed by: SURGERY

## 2019-01-01 PROCEDURE — C1894 INTRO/SHEATH, NON-LASER: HCPCS | Performed by: INTERNAL MEDICINE

## 2019-01-01 PROCEDURE — 81001 URINALYSIS AUTO W/SCOPE: CPT

## 2019-01-01 PROCEDURE — 06H03DZ INSERTION OF INTRALUMINAL DEVICE INTO INFERIOR VENA CAVA, PERCUTANEOUS APPROACH: ICD-10-PCS | Performed by: SURGERY

## 2019-01-01 PROCEDURE — 87186 SC STD MICRODIL/AGAR DIL: CPT

## 2019-01-01 PROCEDURE — 77010033678 HC OXYGEN DAILY

## 2019-01-01 PROCEDURE — 97530 THERAPEUTIC ACTIVITIES: CPT

## 2019-01-01 PROCEDURE — 02HA3RZ INSERTION OF SHORT-TERM EXTERNAL HEART ASSIST SYSTEM INTO HEART, PERCUTANEOUS APPROACH: ICD-10-PCS | Performed by: INTERNAL MEDICINE

## 2019-01-01 PROCEDURE — 97165 OT EVAL LOW COMPLEX 30 MIN: CPT | Performed by: OCCUPATIONAL THERAPIST

## 2019-01-01 PROCEDURE — 80048 BASIC METABOLIC PNL TOTAL CA: CPT

## 2019-01-01 PROCEDURE — 74011636320 HC RX REV CODE- 636/320: Performed by: SURGERY

## 2019-01-01 PROCEDURE — 94760 N-INVAS EAR/PLS OXIMETRY 1: CPT

## 2019-01-01 PROCEDURE — 93005 ELECTROCARDIOGRAM TRACING: CPT

## 2019-01-01 PROCEDURE — P9016 RBC LEUKOCYTES REDUCED: HCPCS

## 2019-01-01 PROCEDURE — 83605 ASSAY OF LACTIC ACID: CPT

## 2019-01-01 PROCEDURE — 74011250636 HC RX REV CODE- 250/636: Performed by: NURSE ANESTHETIST, CERTIFIED REGISTERED

## 2019-01-01 PROCEDURE — 74011250637 HC RX REV CODE- 250/637: Performed by: NURSE PRACTITIONER

## 2019-01-01 PROCEDURE — 77030029641 HC PMP CARD PERC IMPELLA CP ABIM -L: Performed by: INTERNAL MEDICINE

## 2019-01-01 PROCEDURE — 85027 COMPLETE CBC AUTOMATED: CPT

## 2019-01-01 PROCEDURE — 97110 THERAPEUTIC EXERCISES: CPT

## 2019-01-01 PROCEDURE — 76210000063 HC OR PH I REC FIRST 0.5 HR: Performed by: SURGERY

## 2019-01-01 PROCEDURE — 86900 BLOOD TYPING SEROLOGIC ABO: CPT

## 2019-01-01 PROCEDURE — 82550 ASSAY OF CK (CPK): CPT

## 2019-01-01 PROCEDURE — 51798 US URINE CAPACITY MEASURE: CPT

## 2019-01-01 PROCEDURE — C1769 GUIDE WIRE: HCPCS | Performed by: SURGERY

## 2019-01-01 PROCEDURE — C1887 CATHETER, GUIDING: HCPCS | Performed by: INTERNAL MEDICINE

## 2019-01-01 PROCEDURE — 74011250636 HC RX REV CODE- 250/636

## 2019-01-01 PROCEDURE — 97161 PT EVAL LOW COMPLEX 20 MIN: CPT

## 2019-01-01 PROCEDURE — 74011250637 HC RX REV CODE- 250/637: Performed by: HOSPITALIST

## 2019-01-01 PROCEDURE — 77030030195 HC CATH ANGI DX PRF4 MRTM -A: Performed by: INTERNAL MEDICINE

## 2019-01-01 PROCEDURE — 76060000032 HC ANESTHESIA 0.5 TO 1 HR: Performed by: SURGERY

## 2019-01-01 PROCEDURE — 74011250637 HC RX REV CODE- 250/637: Performed by: GENERAL ACUTE CARE HOSPITAL

## 2019-01-01 PROCEDURE — 93451 RIGHT HEART CATH: CPT | Performed by: INTERNAL MEDICINE

## 2019-01-01 PROCEDURE — 77030013992 HC SNR POLYP ENDOSC BSC -B: Performed by: SPECIALIST

## 2019-01-01 PROCEDURE — 5A0221D ASSISTANCE WITH CARDIAC OUTPUT USING IMPELLER PUMP, CONTINUOUS: ICD-10-PCS | Performed by: INTERNAL MEDICINE

## 2019-01-01 PROCEDURE — 71045 X-RAY EXAM CHEST 1 VIEW: CPT

## 2019-01-01 PROCEDURE — 77030019697 HC SYR ANGI INFL MRTM -B: Performed by: INTERNAL MEDICINE

## 2019-01-01 PROCEDURE — 74011000250 HC RX REV CODE- 250: Performed by: SURGERY

## 2019-01-01 PROCEDURE — 99284 EMERGENCY DEPT VISIT MOD MDM: CPT

## 2019-01-01 PROCEDURE — 74011000258 HC RX REV CODE- 258: Performed by: EMERGENCY MEDICINE

## 2019-01-01 PROCEDURE — 77030018729 HC ELECTRD DEFIB PAD CARD -B: Performed by: INTERNAL MEDICINE

## 2019-01-01 PROCEDURE — 82375 ASSAY CARBOXYHB QUANT: CPT

## 2019-01-01 PROCEDURE — 77030008463 HC STPLR SKN PROX J&J -B: Performed by: SURGERY

## 2019-01-01 PROCEDURE — 81003 URINALYSIS AUTO W/O SCOPE: CPT

## 2019-01-01 PROCEDURE — 0656 HSPC GENERAL INPATIENT

## 2019-01-01 PROCEDURE — 82962 GLUCOSE BLOOD TEST: CPT

## 2019-01-01 PROCEDURE — 77030028837 HC SYR ANGI PWR INJ COEU -A: Performed by: INTERNAL MEDICINE

## 2019-01-01 PROCEDURE — 97535 SELF CARE MNGMENT TRAINING: CPT | Performed by: OCCUPATIONAL THERAPIST

## 2019-01-01 PROCEDURE — 33990 INSJ PERQ VAD L HRT ARTERIAL: CPT | Performed by: INTERNAL MEDICINE

## 2019-01-01 PROCEDURE — 77030020256 HC SOL INJ NACL 0.9%  500ML: Performed by: SURGERY

## 2019-01-01 PROCEDURE — 77030022017 HC DRSG HEMO QCLOT ZMED -A: Performed by: INTERNAL MEDICINE

## 2019-01-01 PROCEDURE — 85018 HEMOGLOBIN: CPT

## 2019-01-01 PROCEDURE — C1874 STENT, COATED/COV W/DEL SYS: HCPCS | Performed by: INTERNAL MEDICINE

## 2019-01-01 PROCEDURE — 74011000250 HC RX REV CODE- 250

## 2019-01-01 PROCEDURE — 30233N1 TRANSFUSION OF NONAUTOLOGOUS RED BLOOD CELLS INTO PERIPHERAL VEIN, PERCUTANEOUS APPROACH: ICD-10-PCS | Performed by: GENERAL ACUTE CARE HOSPITAL

## 2019-01-01 PROCEDURE — 97530 THERAPEUTIC ACTIVITIES: CPT | Performed by: PHYSICAL THERAPIST

## 2019-01-01 PROCEDURE — C1725 CATH, TRANSLUMIN NON-LASER: HCPCS | Performed by: INTERNAL MEDICINE

## 2019-01-01 PROCEDURE — 76010000129 HC CV SURG 1.5 TO 2 HR: Performed by: SURGERY

## 2019-01-01 PROCEDURE — 83880 ASSAY OF NATRIURETIC PEPTIDE: CPT

## 2019-01-01 PROCEDURE — 96360 HYDRATION IV INFUSION INIT: CPT

## 2019-01-01 PROCEDURE — 77030018766 HC KT ENDOSC IMAG GVIM -F: Performed by: SPECIALIST

## 2019-01-01 PROCEDURE — 74011636320 HC RX REV CODE- 636/320: Performed by: INTERNAL MEDICINE

## 2019-01-01 PROCEDURE — 97162 PT EVAL MOD COMPLEX 30 MIN: CPT

## 2019-01-01 PROCEDURE — 85610 PROTHROMBIN TIME: CPT

## 2019-01-01 PROCEDURE — 77030011640 HC PAD GRND REM COVD -A: Performed by: SURGERY

## 2019-01-01 PROCEDURE — 99285 EMERGENCY DEPT VISIT HI MDM: CPT

## 2019-01-01 PROCEDURE — 02PA3RZ REMOVAL OF SHORT-TERM EXTERNAL HEART ASSIST SYSTEM FROM HEART, PERCUTANEOUS APPROACH: ICD-10-PCS | Performed by: SURGERY

## 2019-01-01 PROCEDURE — 96365 THER/PROPH/DIAG IV INF INIT: CPT

## 2019-01-01 PROCEDURE — 73502 X-RAY EXAM HIP UNI 2-3 VIEWS: CPT

## 2019-01-01 PROCEDURE — 74011000250 HC RX REV CODE- 250: Performed by: INTERNAL MEDICINE

## 2019-01-01 PROCEDURE — 77030026438 HC STYL ET INTUB CARD -A: Performed by: ANESTHESIOLOGY

## 2019-01-01 PROCEDURE — 76937 US GUIDE VASCULAR ACCESS: CPT | Performed by: INTERNAL MEDICINE

## 2019-01-01 PROCEDURE — 97116 GAIT TRAINING THERAPY: CPT

## 2019-01-01 PROCEDURE — C1751 CATH, INF, PER/CENT/MIDLINE: HCPCS | Performed by: INTERNAL MEDICINE

## 2019-01-01 PROCEDURE — 84484 ASSAY OF TROPONIN QUANT: CPT

## 2019-01-01 PROCEDURE — 74011250636 HC RX REV CODE- 250/636: Performed by: ANESTHESIOLOGY

## 2019-01-01 PROCEDURE — 93460 R&L HRT ART/VENTRICLE ANGIO: CPT | Performed by: INTERNAL MEDICINE

## 2019-01-01 PROCEDURE — 77030014008 HC SPNG HEMSTAT J&J -C: Performed by: SURGERY

## 2019-01-01 PROCEDURE — 92928 PRQ TCAT PLMT NTRAC ST 1 LES: CPT | Performed by: INTERNAL MEDICINE

## 2019-01-01 PROCEDURE — 77030010221 HC SPLNT WR POS TELE -B: Performed by: INTERNAL MEDICINE

## 2019-01-01 PROCEDURE — 74011250636 HC RX REV CODE- 250/636: Performed by: EMERGENCY MEDICINE

## 2019-01-01 PROCEDURE — 36430 TRANSFUSION BLD/BLD COMPNT: CPT

## 2019-01-01 PROCEDURE — 88305 TISSUE EXAM BY PATHOLOGIST: CPT

## 2019-01-01 PROCEDURE — 99152 MOD SED SAME PHYS/QHP 5/>YRS: CPT | Performed by: INTERNAL MEDICINE

## 2019-01-01 PROCEDURE — 86923 COMPATIBILITY TEST ELECTRIC: CPT

## 2019-01-01 PROCEDURE — 74011250637 HC RX REV CODE- 250/637: Performed by: SURGERY

## 2019-01-01 PROCEDURE — 80053 COMPREHEN METABOLIC PANEL: CPT

## 2019-01-01 PROCEDURE — 30233N1 TRANSFUSION OF NONAUTOLOGOUS RED BLOOD CELLS INTO PERIPHERAL VEIN, PERCUTANEOUS APPROACH: ICD-10-PCS | Performed by: INTERNAL MEDICINE

## 2019-01-01 PROCEDURE — 77030013079 HC BLNKT BAIR HGGR 3M -A: Performed by: ANESTHESIOLOGY

## 2019-01-01 PROCEDURE — 74011250637 HC RX REV CODE- 250/637: Performed by: SPECIALIST

## 2019-01-01 PROCEDURE — 74011000250 HC RX REV CODE- 250: Performed by: ANESTHESIOLOGY

## 2019-01-01 PROCEDURE — 74011000250 HC RX REV CODE- 250: Performed by: NURSE ANESTHETIST, CERTIFIED REGISTERED

## 2019-01-01 PROCEDURE — 85014 HEMATOCRIT: CPT

## 2019-01-01 PROCEDURE — 97535 SELF CARE MNGMENT TRAINING: CPT

## 2019-01-01 PROCEDURE — 93306 TTE W/DOPPLER COMPLETE: CPT

## 2019-01-01 PROCEDURE — 76060000031 HC ANESTHESIA FIRST 0.5 HR: Performed by: SPECIALIST

## 2019-01-01 PROCEDURE — 76010000138 HC OR TIME 0.5 TO 1 HR: Performed by: SURGERY

## 2019-01-01 PROCEDURE — 77030005518 HC CATH URETH FOL 2W BARD -B

## 2019-01-01 PROCEDURE — 027035Z DILATION OF CORONARY ARTERY, ONE ARTERY WITH TWO DRUG-ELUTING INTRALUMINAL DEVICES, PERCUTANEOUS APPROACH: ICD-10-PCS | Performed by: INTERNAL MEDICINE

## 2019-01-01 PROCEDURE — 96361 HYDRATE IV INFUSION ADD-ON: CPT

## 2019-01-01 PROCEDURE — 93454 CORONARY ARTERY ANGIO S&I: CPT | Performed by: INTERNAL MEDICINE

## 2019-01-01 PROCEDURE — 93971 EXTREMITY STUDY: CPT

## 2019-01-01 PROCEDURE — 3336500001 HSPC ELECTION

## 2019-01-01 PROCEDURE — 84439 ASSAY OF FREE THYROXINE: CPT

## 2019-01-01 PROCEDURE — 36600 WITHDRAWAL OF ARTERIAL BLOOD: CPT

## 2019-01-01 PROCEDURE — 74018 RADEX ABDOMEN 1 VIEW: CPT

## 2019-01-01 PROCEDURE — C1751 CATH, INF, PER/CENT/MIDLINE: HCPCS

## 2019-01-01 PROCEDURE — 74011250636 HC RX REV CODE- 250/636: Performed by: NURSE PRACTITIONER

## 2019-01-01 PROCEDURE — 87086 URINE CULTURE/COLONY COUNT: CPT

## 2019-01-01 PROCEDURE — 4A023N8 MEASUREMENT OF CARDIAC SAMPLING AND PRESSURE, BILATERAL, PERCUTANEOUS APPROACH: ICD-10-PCS | Performed by: INTERNAL MEDICINE

## 2019-01-01 PROCEDURE — 02703ZZ DILATION OF CORONARY ARTERY, ONE ARTERY, PERCUTANEOUS APPROACH: ICD-10-PCS | Performed by: INTERNAL MEDICINE

## 2019-01-01 PROCEDURE — 0W3P8ZZ CONTROL BLEEDING IN GASTROINTESTINAL TRACT, VIA NATURAL OR ARTIFICIAL OPENING ENDOSCOPIC: ICD-10-PCS | Performed by: SPECIALIST

## 2019-01-01 PROCEDURE — 80202 ASSAY OF VANCOMYCIN: CPT

## 2019-01-01 PROCEDURE — 0T9B70Z DRAINAGE OF BLADDER WITH DRAINAGE DEVICE, VIA NATURAL OR ARTIFICIAL OPENING: ICD-10-PCS | Performed by: INTERNAL MEDICINE

## 2019-01-01 PROCEDURE — 93458 L HRT ARTERY/VENTRICLE ANGIO: CPT | Performed by: INTERNAL MEDICINE

## 2019-01-01 PROCEDURE — 76040000019: Performed by: SPECIALIST

## 2019-01-01 PROCEDURE — C1892 INTRO/SHEATH,FIXED,PEEL-AWAY: HCPCS | Performed by: SURGERY

## 2019-01-01 PROCEDURE — 65270000015 HC RM PRIVATE ONCOLOGY

## 2019-01-01 PROCEDURE — 97116 GAIT TRAINING THERAPY: CPT | Performed by: PHYSICAL THERAPIST

## 2019-01-01 PROCEDURE — 0DJ08ZZ INSPECTION OF UPPER INTESTINAL TRACT, VIA NATURAL OR ARTIFICIAL OPENING ENDOSCOPIC: ICD-10-PCS | Performed by: SPECIALIST

## 2019-01-01 PROCEDURE — 87077 CULTURE AEROBIC IDENTIFY: CPT

## 2019-01-01 PROCEDURE — C1760 CLOSURE DEV, VASC: HCPCS | Performed by: SURGERY

## 2019-01-01 PROCEDURE — 97165 OT EVAL LOW COMPLEX 30 MIN: CPT

## 2019-01-01 PROCEDURE — 76010000162 HC OR TIME 1.5 TO 2 HR INTENSV-TIER 1: Performed by: SURGERY

## 2019-01-01 PROCEDURE — C9113 INJ PANTOPRAZOLE SODIUM, VIA: HCPCS | Performed by: EMERGENCY MEDICINE

## 2019-01-01 PROCEDURE — 97161 PT EVAL LOW COMPLEX 20 MIN: CPT | Performed by: PHYSICAL THERAPIST

## 2019-01-01 PROCEDURE — 70450 CT HEAD/BRAIN W/O DYE: CPT

## 2019-01-01 PROCEDURE — 74011250637 HC RX REV CODE- 250/637

## 2019-01-01 PROCEDURE — 77030010936 HC CLP LIG BSC -C: Performed by: SPECIALIST

## 2019-01-01 PROCEDURE — 85730 THROMBOPLASTIN TIME PARTIAL: CPT

## 2019-01-01 PROCEDURE — 74011000272 HC RX REV CODE- 272: Performed by: SURGERY

## 2019-01-01 PROCEDURE — 77030019569 HC BND COMPR RAD TERU -B: Performed by: INTERNAL MEDICINE

## 2019-01-01 PROCEDURE — 74011250636 HC RX REV CODE- 250/636: Performed by: SPECIALIST

## 2019-01-01 PROCEDURE — 77030018846 HC SOL IRR STRL H20 ICUM -A

## 2019-01-01 PROCEDURE — 85347 COAGULATION TIME ACTIVATED: CPT

## 2019-01-01 PROCEDURE — 77030002986 HC SUT PROL J&J -A: Performed by: SURGERY

## 2019-01-01 PROCEDURE — 93308 TTE F-UP OR LMTD: CPT

## 2019-01-01 PROCEDURE — 77030031139 HC SUT VCRL2 J&J -A: Performed by: SURGERY

## 2019-01-01 PROCEDURE — C1894 INTRO/SHEATH, NON-LASER: HCPCS | Performed by: SURGERY

## 2019-01-01 PROCEDURE — 82272 OCCULT BLD FECES 1-3 TESTS: CPT

## 2019-01-01 PROCEDURE — 0DBH8ZZ EXCISION OF CECUM, VIA NATURAL OR ARTIFICIAL OPENING ENDOSCOPIC: ICD-10-PCS | Performed by: SPECIALIST

## 2019-01-01 PROCEDURE — 74011000250 HC RX REV CODE- 250: Performed by: SPECIALIST

## 2019-01-01 PROCEDURE — 77030040133 HC BLANKET THRM DISP CSZ -A

## 2019-01-01 PROCEDURE — B3101ZZ FLUOROSCOPY OF THORACIC AORTA USING LOW OSMOLAR CONTRAST: ICD-10-PCS | Performed by: INTERNAL MEDICINE

## 2019-01-01 PROCEDURE — 77030015766: Performed by: INTERNAL MEDICINE

## 2019-01-01 PROCEDURE — 71250 CT THORAX DX C-: CPT

## 2019-01-01 PROCEDURE — 77030018836 HC SOL IRR NACL ICUM -A

## 2019-01-01 PROCEDURE — 76040000007: Performed by: SPECIALIST

## 2019-01-01 PROCEDURE — 76060000032 HC ANESTHESIA 0.5 TO 1 HR: Performed by: SPECIALIST

## 2019-01-01 PROCEDURE — 99153 MOD SED SAME PHYS/QHP EA: CPT | Performed by: INTERNAL MEDICINE

## 2019-01-01 PROCEDURE — 4A12XSH MONITORING OF CARDIAC VASCULAR PERFUSION USING INDOCYANINE GREEN DYE, EXTERNAL APPROACH: ICD-10-PCS | Performed by: INTERNAL MEDICINE

## 2019-01-01 PROCEDURE — 77030026918 HC ADMN ST IV BLD BD -A

## 2019-01-01 PROCEDURE — 77030008684 HC TU ET CUF COVD -B: Performed by: ANESTHESIOLOGY

## 2019-01-01 PROCEDURE — 76000 FLUOROSCOPY <1 HR PHYS/QHP: CPT

## 2019-01-01 PROCEDURE — C1880 VENA CAVA FILTER: HCPCS | Performed by: SURGERY

## 2019-01-01 DEVICE — STENT RONYX27526UX RESOLUTE ONYX 2.75X26
Type: IMPLANTABLE DEVICE | Status: FUNCTIONAL
Brand: RESOLUTE ONYX™

## 2019-01-01 DEVICE — FILTER VENA CAVA 7FR 79X65CM -- IGTCFS-65-1-FEM-CELECT-PT: Type: IMPLANTABLE DEVICE | Site: GROIN | Status: FUNCTIONAL

## 2019-01-01 DEVICE — STENT RONYX27522UX RESOLUTE ONYX 2.75X22
Type: IMPLANTABLE DEVICE | Status: FUNCTIONAL
Brand: RESOLUTE ONYX™

## 2019-01-01 RX ORDER — GUAIFENESIN 100 MG/5ML
81 LIQUID (ML) ORAL DAILY
Status: DISCONTINUED | OUTPATIENT
Start: 2019-01-01 | End: 2019-01-01 | Stop reason: HOSPADM

## 2019-01-01 RX ORDER — GUAIFENESIN 600 MG/1
600 TABLET, EXTENDED RELEASE ORAL EVERY 12 HOURS
Status: DISCONTINUED | OUTPATIENT
Start: 2019-01-01 | End: 2019-01-01

## 2019-01-01 RX ORDER — HEPARIN SODIUM 200 [USP'U]/100ML
INJECTION, SOLUTION INTRAVENOUS
Status: DISCONTINUED | OUTPATIENT
Start: 2019-01-01 | End: 2019-01-01

## 2019-01-01 RX ORDER — MIDAZOLAM HYDROCHLORIDE 1 MG/ML
1 INJECTION, SOLUTION INTRAMUSCULAR; INTRAVENOUS
Status: DISCONTINUED | OUTPATIENT
Start: 2019-01-01 | End: 2019-01-01

## 2019-01-01 RX ORDER — DOCUSATE SODIUM 100 MG/1
100 CAPSULE, LIQUID FILLED ORAL 2 TIMES DAILY
Status: DISCONTINUED | OUTPATIENT
Start: 2019-01-01 | End: 2019-01-01 | Stop reason: HOSPADM

## 2019-01-01 RX ORDER — SODIUM CHLORIDE 0.9 % (FLUSH) 0.9 %
5-40 SYRINGE (ML) INJECTION EVERY 8 HOURS
Status: DISCONTINUED | OUTPATIENT
Start: 2019-01-01 | End: 2019-01-01 | Stop reason: HOSPADM

## 2019-01-01 RX ORDER — SULFAMETHOXAZOLE AND TRIMETHOPRIM 800; 160 MG/1; MG/1
1 TABLET ORAL EVERY 12 HOURS
Status: DISCONTINUED | OUTPATIENT
Start: 2019-01-01 | End: 2019-01-01 | Stop reason: HOSPADM

## 2019-01-01 RX ORDER — LIDOCAINE HYDROCHLORIDE 20 MG/ML
INJECTION, SOLUTION EPIDURAL; INFILTRATION; INTRACAUDAL; PERINEURAL AS NEEDED
Status: DISCONTINUED | OUTPATIENT
Start: 2019-01-01 | End: 2019-01-01 | Stop reason: HOSPADM

## 2019-01-01 RX ORDER — SODIUM CHLORIDE 9 MG/ML
100 INJECTION, SOLUTION INTRAVENOUS CONTINUOUS
Status: DISCONTINUED | OUTPATIENT
Start: 2019-01-01 | End: 2019-01-01

## 2019-01-01 RX ORDER — CLOPIDOGREL BISULFATE 75 MG/1
75 TABLET ORAL DAILY
Status: DISCONTINUED | OUTPATIENT
Start: 2019-01-01 | End: 2019-01-01

## 2019-01-01 RX ORDER — SODIUM CHLORIDE 0.9 % (FLUSH) 0.9 %
5-40 SYRINGE (ML) INJECTION EVERY 8 HOURS
Status: DISCONTINUED | OUTPATIENT
Start: 2019-01-01 | End: 2019-01-01 | Stop reason: SDUPTHER

## 2019-01-01 RX ORDER — ETOMIDATE 2 MG/ML
INJECTION INTRAVENOUS AS NEEDED
Status: DISCONTINUED | OUTPATIENT
Start: 2019-01-01 | End: 2019-01-01 | Stop reason: HOSPADM

## 2019-01-01 RX ORDER — PROPOFOL 10 MG/ML
INJECTION, EMULSION INTRAVENOUS AS NEEDED
Status: DISCONTINUED | OUTPATIENT
Start: 2019-01-01 | End: 2019-01-01 | Stop reason: HOSPADM

## 2019-01-01 RX ORDER — DEXTROMETHORPHAN/PSEUDOEPHED 2.5-7.5/.8
1.2 DROPS ORAL
Status: DISCONTINUED | OUTPATIENT
Start: 2019-01-01 | End: 2019-01-01 | Stop reason: HOSPADM

## 2019-01-01 RX ORDER — TAMSULOSIN HYDROCHLORIDE 0.4 MG/1
0.8 CAPSULE ORAL DAILY
Status: DISCONTINUED | OUTPATIENT
Start: 2019-01-01 | End: 2019-01-01 | Stop reason: HOSPADM

## 2019-01-01 RX ORDER — HALOPERIDOL 5 MG/ML
2 INJECTION INTRAMUSCULAR ONCE
Status: COMPLETED | OUTPATIENT
Start: 2019-01-01 | End: 2019-01-01

## 2019-01-01 RX ORDER — NAPROXEN 500 MG/1
500 TABLET ORAL 2 TIMES DAILY WITH MEALS
COMMUNITY
End: 2019-01-01

## 2019-01-01 RX ORDER — AMIODARONE HYDROCHLORIDE 200 MG/1
400 TABLET ORAL 3 TIMES DAILY
Status: DISCONTINUED | OUTPATIENT
Start: 2019-01-01 | End: 2019-01-01

## 2019-01-01 RX ORDER — FUROSEMIDE 20 MG/1
20 TABLET ORAL DAILY
Qty: 5 TAB | Refills: 0 | Status: SHIPPED | OUTPATIENT
Start: 2019-01-01 | End: 2019-01-01

## 2019-01-01 RX ORDER — TEMAZEPAM 30 MG/1
CAPSULE ORAL
Qty: 30 CAP | Refills: 5 | Status: SHIPPED | OUTPATIENT
Start: 2019-01-01

## 2019-01-01 RX ORDER — SODIUM CHLORIDE 9 MG/ML
250 INJECTION, SOLUTION INTRAVENOUS AS NEEDED
Status: DISCONTINUED | OUTPATIENT
Start: 2019-01-01 | End: 2019-01-01 | Stop reason: HOSPADM

## 2019-01-01 RX ORDER — ENOXAPARIN SODIUM 100 MG/ML
30 INJECTION SUBCUTANEOUS EVERY 24 HOURS
Status: DISCONTINUED | OUTPATIENT
Start: 2019-01-01 | End: 2019-01-01

## 2019-01-01 RX ORDER — FINASTERIDE 5 MG/1
5 TABLET, FILM COATED ORAL
Status: DISCONTINUED | OUTPATIENT
Start: 2019-01-01 | End: 2019-01-01 | Stop reason: HOSPADM

## 2019-01-01 RX ORDER — DOXYCYCLINE HYCLATE 100 MG
100 TABLET ORAL 2 TIMES DAILY
Qty: 20 TAB | Refills: 0 | Status: SHIPPED | OUTPATIENT
Start: 2019-01-01 | End: 2019-01-01

## 2019-01-01 RX ORDER — OXYCODONE AND ACETAMINOPHEN 5; 325 MG/1; MG/1
1 TABLET ORAL
Status: DISCONTINUED | OUTPATIENT
Start: 2019-01-01 | End: 2019-01-01

## 2019-01-01 RX ORDER — ESOMEPRAZOLE MAGNESIUM 40 MG/1
CAPSULE, DELAYED RELEASE ORAL DAILY
COMMUNITY
End: 2019-01-01 | Stop reason: SDUPTHER

## 2019-01-01 RX ORDER — FUROSEMIDE 40 MG/1
40 TABLET ORAL DAILY
Status: DISCONTINUED | OUTPATIENT
Start: 2019-01-01 | End: 2019-01-01 | Stop reason: HOSPADM

## 2019-01-01 RX ORDER — HEPARIN SODIUM 5000 [USP'U]/ML
5000 INJECTION, SOLUTION INTRAVENOUS; SUBCUTANEOUS EVERY 8 HOURS
Status: DISCONTINUED | OUTPATIENT
Start: 2019-01-01 | End: 2019-01-01

## 2019-01-01 RX ORDER — MAGNESIUM CITRATE
296 SOLUTION, ORAL ORAL
Status: COMPLETED | OUTPATIENT
Start: 2019-01-01 | End: 2019-01-01

## 2019-01-01 RX ORDER — TEMAZEPAM 15 MG/1
15 CAPSULE ORAL
Status: DISCONTINUED | OUTPATIENT
Start: 2019-01-01 | End: 2019-01-01 | Stop reason: HOSPADM

## 2019-01-01 RX ORDER — CLOPIDOGREL BISULFATE 75 MG/1
300 TABLET ORAL
Status: COMPLETED | OUTPATIENT
Start: 2019-01-01 | End: 2019-01-01

## 2019-01-01 RX ORDER — GLYCOPYRROLATE 0.2 MG/ML
0.2 INJECTION INTRAMUSCULAR; INTRAVENOUS 3 TIMES DAILY
Status: COMPLETED | OUTPATIENT
Start: 2019-01-01 | End: 2019-01-01

## 2019-01-01 RX ORDER — DIPHENHYDRAMINE HYDROCHLORIDE 50 MG/ML
12.5 INJECTION, SOLUTION INTRAMUSCULAR; INTRAVENOUS AS NEEDED
Status: DISCONTINUED | OUTPATIENT
Start: 2019-01-01 | End: 2019-01-01 | Stop reason: HOSPADM

## 2019-01-01 RX ORDER — FACIAL-BODY WIPES
10 EACH TOPICAL DAILY PRN
Status: DISCONTINUED | OUTPATIENT
Start: 2019-01-01 | End: 2019-11-09 | Stop reason: HOSPADM

## 2019-01-01 RX ORDER — TAMSULOSIN HYDROCHLORIDE 0.4 MG/1
0.4 CAPSULE ORAL DAILY
Status: DISCONTINUED | OUTPATIENT
Start: 2019-01-01 | End: 2019-01-01

## 2019-01-01 RX ORDER — HALOPERIDOL 5 MG/ML
2 INJECTION INTRAMUSCULAR
Status: DISCONTINUED | OUTPATIENT
Start: 2019-01-01 | End: 2019-01-01 | Stop reason: HOSPADM

## 2019-01-01 RX ORDER — LORAZEPAM 0.5 MG/1
0.5 TABLET ORAL
Status: DISCONTINUED | OUTPATIENT
Start: 2019-01-01 | End: 2019-01-01

## 2019-01-01 RX ORDER — NALOXONE HYDROCHLORIDE 0.4 MG/ML
0.4 INJECTION, SOLUTION INTRAMUSCULAR; INTRAVENOUS; SUBCUTANEOUS AS NEEDED
Status: DISCONTINUED | OUTPATIENT
Start: 2019-01-01 | End: 2019-01-01 | Stop reason: HOSPADM

## 2019-01-01 RX ORDER — SODIUM CHLORIDE 0.9 % (FLUSH) 0.9 %
5-40 SYRINGE (ML) INJECTION AS NEEDED
Status: DISCONTINUED | OUTPATIENT
Start: 2019-01-01 | End: 2019-01-01 | Stop reason: HOSPADM

## 2019-01-01 RX ORDER — DIPHENHYDRAMINE HCL 25 MG
25 CAPSULE ORAL ONCE
Status: ACTIVE | OUTPATIENT
Start: 2019-01-01 | End: 2019-01-01

## 2019-01-01 RX ORDER — MIDODRINE HYDROCHLORIDE 5 MG/1
10 TABLET ORAL
Status: DISCONTINUED | OUTPATIENT
Start: 2019-01-01 | End: 2019-01-01 | Stop reason: HOSPADM

## 2019-01-01 RX ORDER — OFLOXACIN 3 MG/ML
1 SOLUTION/ DROPS OPHTHALMIC 4 TIMES DAILY
Status: DISCONTINUED | OUTPATIENT
Start: 2019-01-01 | End: 2019-01-01 | Stop reason: CLARIF

## 2019-01-01 RX ORDER — FENTANYL CITRATE 50 UG/ML
25 INJECTION, SOLUTION INTRAMUSCULAR; INTRAVENOUS
Status: DISCONTINUED | OUTPATIENT
Start: 2019-01-01 | End: 2019-01-01 | Stop reason: HOSPADM

## 2019-01-01 RX ORDER — HEPARIN SODIUM 200 [USP'U]/100ML
INJECTION, SOLUTION INTRAVENOUS
Status: COMPLETED | OUTPATIENT
Start: 2019-01-01 | End: 2019-01-01

## 2019-01-01 RX ORDER — CLOPIDOGREL BISULFATE 75 MG/1
75 TABLET ORAL DAILY
Qty: 90 TAB | Refills: 3 | Status: SHIPPED | OUTPATIENT
Start: 2019-01-01 | End: 2019-01-01 | Stop reason: SDUPTHER

## 2019-01-01 RX ORDER — FENTANYL CITRATE 50 UG/ML
25 INJECTION, SOLUTION INTRAMUSCULAR; INTRAVENOUS
Status: DISCONTINUED | OUTPATIENT
Start: 2019-01-01 | End: 2019-01-01

## 2019-01-01 RX ORDER — HEPARIN SODIUM 1000 [USP'U]/ML
INJECTION, SOLUTION INTRAVENOUS; SUBCUTANEOUS AS NEEDED
Status: DISCONTINUED | OUTPATIENT
Start: 2019-01-01 | End: 2019-01-01 | Stop reason: HOSPADM

## 2019-01-01 RX ORDER — FACIAL-BODY WIPES
10 EACH TOPICAL DAILY PRN
Status: DISCONTINUED | OUTPATIENT
Start: 2019-01-01 | End: 2019-01-01 | Stop reason: HOSPADM

## 2019-01-01 RX ORDER — AMIODARONE HYDROCHLORIDE 200 MG/1
200 TABLET ORAL DAILY
Status: DISCONTINUED | OUTPATIENT
Start: 2019-01-01 | End: 2019-01-01

## 2019-01-01 RX ORDER — ONDANSETRON 2 MG/ML
4 INJECTION INTRAMUSCULAR; INTRAVENOUS
Status: DISCONTINUED | OUTPATIENT
Start: 2019-01-01 | End: 2019-01-01 | Stop reason: HOSPADM

## 2019-01-01 RX ORDER — POLYETHYLENE GLYCOL 3350 17 G/17G
17 POWDER, FOR SOLUTION ORAL DAILY
Qty: 30 PACKET | Status: SHIPPED | OUTPATIENT
Start: 2019-01-01

## 2019-01-01 RX ORDER — EPHEDRINE SULFATE/0.9% NACL/PF 50 MG/5 ML
SYRINGE (ML) INTRAVENOUS AS NEEDED
Status: DISCONTINUED | OUTPATIENT
Start: 2019-01-01 | End: 2019-01-01 | Stop reason: HOSPADM

## 2019-01-01 RX ORDER — BALSAM PERU/CASTOR OIL
OINTMENT (GRAM) TOPICAL 2 TIMES DAILY
Status: DISCONTINUED | OUTPATIENT
Start: 2019-01-01 | End: 2019-01-01 | Stop reason: HOSPADM

## 2019-01-01 RX ORDER — PHENYLEPHRINE HYDROCHLORIDE 10 MG/ML
INJECTION INTRAVENOUS AS NEEDED
Status: DISCONTINUED | OUTPATIENT
Start: 2019-01-01 | End: 2019-01-01 | Stop reason: HOSPADM

## 2019-01-01 RX ORDER — PANTOPRAZOLE SODIUM 40 MG/1
40 TABLET, DELAYED RELEASE ORAL
Status: DISCONTINUED | OUTPATIENT
Start: 2019-01-01 | End: 2019-01-01 | Stop reason: HOSPADM

## 2019-01-01 RX ORDER — PROPOFOL 10 MG/ML
INJECTION, EMULSION INTRAVENOUS
Status: DISCONTINUED | OUTPATIENT
Start: 2019-01-01 | End: 2019-01-01 | Stop reason: HOSPADM

## 2019-01-01 RX ORDER — ACETAMINOPHEN 325 MG/1
650 TABLET ORAL
Status: DISCONTINUED | OUTPATIENT
Start: 2019-01-01 | End: 2019-01-01 | Stop reason: HOSPADM

## 2019-01-01 RX ORDER — SODIUM CHLORIDE 0.9 % (FLUSH) 0.9 %
5-10 SYRINGE (ML) INJECTION AS NEEDED
Status: DISCONTINUED | OUTPATIENT
Start: 2019-01-01 | End: 2019-01-01 | Stop reason: SDUPTHER

## 2019-01-01 RX ORDER — ONDANSETRON 2 MG/ML
INJECTION INTRAMUSCULAR; INTRAVENOUS AS NEEDED
Status: DISCONTINUED | OUTPATIENT
Start: 2019-01-01 | End: 2019-01-01 | Stop reason: HOSPADM

## 2019-01-01 RX ORDER — HALOPERIDOL 5 MG/ML
1 INJECTION INTRAMUSCULAR
Status: DISCONTINUED | OUTPATIENT
Start: 2019-01-01 | End: 2019-01-01 | Stop reason: HOSPADM

## 2019-01-01 RX ORDER — ALBUTEROL SULFATE 90 UG/1
2 AEROSOL, METERED RESPIRATORY (INHALATION)
Qty: 1 INHALER | Refills: 3 | Status: SHIPPED | OUTPATIENT
Start: 2019-01-01 | End: 2019-01-01 | Stop reason: SDUPTHER

## 2019-01-01 RX ORDER — SODIUM CHLORIDE 0.9 % (FLUSH) 0.9 %
5-40 SYRINGE (ML) INJECTION AS NEEDED
Status: DISCONTINUED | OUTPATIENT
Start: 2019-01-01 | End: 2019-01-01 | Stop reason: SDUPTHER

## 2019-01-01 RX ORDER — ACETAMINOPHEN 325 MG/1
650 TABLET ORAL ONCE
Status: COMPLETED | OUTPATIENT
Start: 2019-01-01 | End: 2019-01-01

## 2019-01-01 RX ORDER — LIDOCAINE HYDROCHLORIDE AND EPINEPHRINE 10; 10 MG/ML; UG/ML
INJECTION, SOLUTION INFILTRATION; PERINEURAL AS NEEDED
Status: DISCONTINUED | OUTPATIENT
Start: 2019-01-01 | End: 2019-01-01 | Stop reason: HOSPADM

## 2019-01-01 RX ORDER — AMIODARONE HYDROCHLORIDE 200 MG/1
200 TABLET ORAL DAILY
Status: DISCONTINUED | OUTPATIENT
Start: 2019-01-01 | End: 2019-01-01 | Stop reason: HOSPADM

## 2019-01-01 RX ORDER — POLYETHYLENE GLYCOL 3350, SODIUM SULFATE ANHYDROUS, SODIUM BICARBONATE, SODIUM CHLORIDE, POTASSIUM CHLORIDE 236; 22.74; 6.74; 5.86; 2.97 G/4L; G/4L; G/4L; G/4L; G/4L
4000 POWDER, FOR SOLUTION ORAL ONCE
Status: COMPLETED | OUTPATIENT
Start: 2019-01-01 | End: 2019-01-01

## 2019-01-01 RX ORDER — LORAZEPAM 2 MG/ML
0.5 INJECTION INTRAMUSCULAR
Status: DISCONTINUED | OUTPATIENT
Start: 2019-01-01 | End: 2019-01-01 | Stop reason: HOSPADM

## 2019-01-01 RX ORDER — LIDOCAINE HYDROCHLORIDE 10 MG/ML
INJECTION, SOLUTION EPIDURAL; INFILTRATION; INTRACAUDAL; PERINEURAL AS NEEDED
Status: DISCONTINUED | OUTPATIENT
Start: 2019-01-01 | End: 2019-01-01

## 2019-01-01 RX ORDER — DEXTROSE MONOHYDRATE AND SODIUM CHLORIDE 5; .45 G/100ML; G/100ML
50 INJECTION, SOLUTION INTRAVENOUS CONTINUOUS
Status: DISCONTINUED | OUTPATIENT
Start: 2019-01-01 | End: 2019-01-01

## 2019-01-01 RX ORDER — OXYCODONE AND ACETAMINOPHEN 5; 325 MG/1; MG/1
2 TABLET ORAL
Status: DISCONTINUED | OUTPATIENT
Start: 2019-01-01 | End: 2019-01-01

## 2019-01-01 RX ORDER — LIDOCAINE HYDROCHLORIDE 10 MG/ML
0.1 INJECTION, SOLUTION EPIDURAL; INFILTRATION; INTRACAUDAL; PERINEURAL AS NEEDED
Status: DISCONTINUED | OUTPATIENT
Start: 2019-01-01 | End: 2019-01-01 | Stop reason: HOSPADM

## 2019-01-01 RX ORDER — GUAIFENESIN 100 MG/5ML
81 LIQUID (ML) ORAL DAILY
COMMUNITY

## 2019-01-01 RX ORDER — HEPARIN SODIUM 10000 [USP'U]/100ML
500-2000 INJECTION, SOLUTION INTRAVENOUS
Status: DISCONTINUED | OUTPATIENT
Start: 2019-01-01 | End: 2019-01-01

## 2019-01-01 RX ORDER — DOXYCYCLINE 100 MG/1
100 CAPSULE ORAL 2 TIMES DAILY
Qty: 20 CAP | Refills: 0 | Status: SHIPPED | OUTPATIENT
Start: 2019-01-01 | End: 2019-01-01 | Stop reason: SDUPTHER

## 2019-01-01 RX ORDER — FENTANYL CITRATE 50 UG/ML
INJECTION, SOLUTION INTRAMUSCULAR; INTRAVENOUS AS NEEDED
Status: DISCONTINUED | OUTPATIENT
Start: 2019-01-01 | End: 2019-01-01 | Stop reason: HOSPADM

## 2019-01-01 RX ORDER — DILTIAZEM HYDROCHLORIDE 120 MG/1
120 CAPSULE, COATED, EXTENDED RELEASE ORAL DAILY
Status: DISCONTINUED | OUTPATIENT
Start: 2019-01-01 | End: 2019-01-01

## 2019-01-01 RX ORDER — CLOPIDOGREL BISULFATE 75 MG/1
75 TABLET ORAL DAILY
Qty: 90 TAB | Refills: 3 | Status: SHIPPED | OUTPATIENT
Start: 2019-01-01

## 2019-01-01 RX ORDER — ATORVASTATIN CALCIUM 40 MG/1
40 TABLET, FILM COATED ORAL DAILY
Status: DISCONTINUED | OUTPATIENT
Start: 2019-01-01 | End: 2019-01-01 | Stop reason: HOSPADM

## 2019-01-01 RX ORDER — HYDROCODONE BITARTRATE AND ACETAMINOPHEN 5; 325 MG/1; MG/1
1 TABLET ORAL
Qty: 8 TAB | Refills: 0 | Status: SHIPPED | OUTPATIENT
Start: 2019-01-01 | End: 2019-01-01 | Stop reason: ALTCHOICE

## 2019-01-01 RX ORDER — AMIODARONE HYDROCHLORIDE 200 MG/1
200 TABLET ORAL DAILY
Qty: 30 TAB | Refills: 5 | Status: SHIPPED | OUTPATIENT
Start: 2019-01-01 | End: 2019-01-01 | Stop reason: SDUPTHER

## 2019-01-01 RX ORDER — GUAIFENESIN 100 MG/5ML
81 LIQUID (ML) ORAL DAILY
Status: DISCONTINUED | OUTPATIENT
Start: 2019-01-01 | End: 2019-01-01

## 2019-01-01 RX ORDER — FUROSEMIDE 10 MG/ML
20 INJECTION INTRAMUSCULAR; INTRAVENOUS ONCE
Status: COMPLETED | OUTPATIENT
Start: 2019-01-01 | End: 2019-01-01

## 2019-01-01 RX ORDER — LORAZEPAM 2 MG/ML
0.5 INJECTION INTRAMUSCULAR EVERY 6 HOURS
Status: DISCONTINUED | OUTPATIENT
Start: 2019-01-01 | End: 2019-11-09 | Stop reason: HOSPADM

## 2019-01-01 RX ORDER — CLOPIDOGREL BISULFATE 75 MG/1
75 TABLET ORAL DAILY
Status: DISCONTINUED | OUTPATIENT
Start: 2019-01-01 | End: 2019-01-01 | Stop reason: HOSPADM

## 2019-01-01 RX ORDER — SUCCINYLCHOLINE CHLORIDE 20 MG/ML
INJECTION INTRAMUSCULAR; INTRAVENOUS AS NEEDED
Status: DISCONTINUED | OUTPATIENT
Start: 2019-01-01 | End: 2019-01-01 | Stop reason: HOSPADM

## 2019-01-01 RX ORDER — MIDODRINE HYDROCHLORIDE 5 MG/1
10 TABLET ORAL
Status: DISCONTINUED | OUTPATIENT
Start: 2019-01-01 | End: 2019-01-01

## 2019-01-01 RX ORDER — MIDAZOLAM HYDROCHLORIDE 1 MG/ML
INJECTION, SOLUTION INTRAMUSCULAR; INTRAVENOUS AS NEEDED
Status: DISCONTINUED | OUTPATIENT
Start: 2019-01-01 | End: 2019-01-01 | Stop reason: HOSPADM

## 2019-01-01 RX ORDER — PANTOPRAZOLE SODIUM 40 MG/1
40 TABLET, DELAYED RELEASE ORAL DAILY
Status: DISCONTINUED | OUTPATIENT
Start: 2019-01-01 | End: 2019-01-01 | Stop reason: HOSPADM

## 2019-01-01 RX ORDER — LIDOCAINE HYDROCHLORIDE 10 MG/ML
INJECTION, SOLUTION EPIDURAL; INFILTRATION; INTRACAUDAL; PERINEURAL AS NEEDED
Status: DISCONTINUED | OUTPATIENT
Start: 2019-01-01 | End: 2019-01-01 | Stop reason: HOSPADM

## 2019-01-01 RX ORDER — SODIUM CHLORIDE 9 MG/ML
250 INJECTION, SOLUTION INTRAVENOUS AS NEEDED
Status: DISCONTINUED | OUTPATIENT
Start: 2019-01-01 | End: 2019-01-01

## 2019-01-01 RX ORDER — HYDROMORPHONE HYDROCHLORIDE 2 MG/ML
0.5 INJECTION, SOLUTION INTRAMUSCULAR; INTRAVENOUS; SUBCUTANEOUS EVERY 6 HOURS
Status: DISCONTINUED | OUTPATIENT
Start: 2019-01-01 | End: 2019-01-01

## 2019-01-01 RX ORDER — MORPHINE SULFATE 4 MG/ML
4 INJECTION, SOLUTION INTRAMUSCULAR; INTRAVENOUS
Status: DISCONTINUED | OUTPATIENT
Start: 2019-01-01 | End: 2019-01-01

## 2019-01-01 RX ORDER — POLYETHYLENE GLYCOL 3350 17 G/17G
17 POWDER, FOR SOLUTION ORAL DAILY
Status: DISCONTINUED | OUTPATIENT
Start: 2019-01-01 | End: 2019-01-01 | Stop reason: HOSPADM

## 2019-01-01 RX ORDER — OXYCODONE AND ACETAMINOPHEN 5; 325 MG/1; MG/1
1 TABLET ORAL
Status: DISCONTINUED | OUTPATIENT
Start: 2019-01-01 | End: 2019-01-01 | Stop reason: HOSPADM

## 2019-01-01 RX ORDER — ACETAMINOPHEN 325 MG/1
650 TABLET ORAL
Status: COMPLETED | OUTPATIENT
Start: 2019-01-01 | End: 2019-01-01

## 2019-01-01 RX ORDER — VANCOMYCIN 1.75 GRAM/500 ML IN 0.9 % SODIUM CHLORIDE INTRAVENOUS
1750
Status: COMPLETED | OUTPATIENT
Start: 2019-01-01 | End: 2019-01-01

## 2019-01-01 RX ORDER — GLYCOPYRROLATE 0.2 MG/ML
0.2 INJECTION INTRAMUSCULAR; INTRAVENOUS
Status: DISCONTINUED | OUTPATIENT
Start: 2019-01-01 | End: 2019-11-09 | Stop reason: HOSPADM

## 2019-01-01 RX ORDER — TEMAZEPAM 15 MG/1
CAPSULE ORAL
Qty: 60 CAP | Refills: 5 | Status: SHIPPED | OUTPATIENT
Start: 2019-01-01 | End: 2019-01-01 | Stop reason: SDUPTHER

## 2019-01-01 RX ORDER — ACETAMINOPHEN 325 MG/1
650 TABLET ORAL ONCE
Status: ACTIVE | OUTPATIENT
Start: 2019-01-01 | End: 2019-01-01

## 2019-01-01 RX ORDER — ACETAMINOPHEN 650 MG/1
SUPPOSITORY RECTAL
Status: COMPLETED
Start: 2019-01-01 | End: 2019-01-01

## 2019-01-01 RX ORDER — DILTIAZEM HYDROCHLORIDE 120 MG/1
120 CAPSULE, COATED, EXTENDED RELEASE ORAL DAILY
Status: DISCONTINUED | OUTPATIENT
Start: 2019-01-01 | End: 2019-01-01 | Stop reason: HOSPADM

## 2019-01-01 RX ORDER — TEMAZEPAM 15 MG/1
30 CAPSULE ORAL
Status: DISCONTINUED | OUTPATIENT
Start: 2019-01-01 | End: 2019-01-01 | Stop reason: HOSPADM

## 2019-01-01 RX ORDER — SODIUM CHLORIDE 9 MG/ML
50 INJECTION, SOLUTION INTRAVENOUS CONTINUOUS
Status: DISPENSED | OUTPATIENT
Start: 2019-01-01 | End: 2019-01-01

## 2019-01-01 RX ORDER — ALBUTEROL SULFATE 90 UG/1
2 AEROSOL, METERED RESPIRATORY (INHALATION)
Qty: 1 INHALER | Refills: 3 | Status: SHIPPED | OUTPATIENT
Start: 2019-01-01

## 2019-01-01 RX ORDER — SIMVASTATIN 20 MG/1
40 TABLET, FILM COATED ORAL
Status: DISCONTINUED | OUTPATIENT
Start: 2019-01-01 | End: 2019-01-01

## 2019-01-01 RX ORDER — CIPROFLOXACIN HYDROCHLORIDE 3.5 MG/ML
1 SOLUTION/ DROPS TOPICAL 4 TIMES DAILY
Status: DISCONTINUED | OUTPATIENT
Start: 2019-01-01 | End: 2019-01-01 | Stop reason: HOSPADM

## 2019-01-01 RX ORDER — MIDODRINE HYDROCHLORIDE 10 MG/1
10 TABLET ORAL
Qty: 90 TAB | Refills: 0 | Status: SHIPPED | OUTPATIENT
Start: 2019-01-01 | End: 2019-11-09

## 2019-01-01 RX ORDER — ENOXAPARIN SODIUM 100 MG/ML
40 INJECTION SUBCUTANEOUS EVERY 24 HOURS
Status: DISCONTINUED | OUTPATIENT
Start: 2019-01-01 | End: 2019-01-01 | Stop reason: HOSPADM

## 2019-01-01 RX ORDER — BISACODYL 5 MG
10 TABLET, DELAYED RELEASE (ENTERIC COATED) ORAL
Status: COMPLETED | OUTPATIENT
Start: 2019-01-01 | End: 2019-01-01

## 2019-01-01 RX ORDER — HYDROMORPHONE HYDROCHLORIDE 1 MG/ML
0.5 INJECTION, SOLUTION INTRAMUSCULAR; INTRAVENOUS; SUBCUTANEOUS
Status: DISCONTINUED | OUTPATIENT
Start: 2019-01-01 | End: 2019-11-09 | Stop reason: HOSPADM

## 2019-01-01 RX ORDER — SODIUM CHLORIDE, SODIUM LACTATE, POTASSIUM CHLORIDE, CALCIUM CHLORIDE 600; 310; 30; 20 MG/100ML; MG/100ML; MG/100ML; MG/100ML
25 INJECTION, SOLUTION INTRAVENOUS CONTINUOUS
Status: DISCONTINUED | OUTPATIENT
Start: 2019-01-01 | End: 2019-01-01 | Stop reason: HOSPADM

## 2019-01-01 RX ORDER — KETOROLAC TROMETHAMINE 30 MG/ML
30 INJECTION, SOLUTION INTRAMUSCULAR; INTRAVENOUS
Status: DISCONTINUED | OUTPATIENT
Start: 2019-01-01 | End: 2019-11-09 | Stop reason: HOSPADM

## 2019-01-01 RX ORDER — SULFAMETHOXAZOLE AND TRIMETHOPRIM 800; 160 MG/1; MG/1
1 TABLET ORAL EVERY 12 HOURS
Qty: 16 TAB | Refills: 0 | Status: SHIPPED | OUTPATIENT
Start: 2019-01-01

## 2019-01-01 RX ORDER — ALBUTEROL SULFATE 90 UG/1
2 AEROSOL, METERED RESPIRATORY (INHALATION)
Status: DISCONTINUED | OUTPATIENT
Start: 2019-01-01 | End: 2019-01-01 | Stop reason: HOSPADM

## 2019-01-01 RX ORDER — TEMAZEPAM 30 MG/1
30 CAPSULE ORAL
Qty: 30 CAP | Refills: 5 | Status: SHIPPED | OUTPATIENT
Start: 2019-01-01

## 2019-01-01 RX ORDER — ONDANSETRON 2 MG/ML
4 INJECTION INTRAMUSCULAR; INTRAVENOUS
Status: DISCONTINUED | OUTPATIENT
Start: 2019-01-01 | End: 2019-01-01 | Stop reason: SDUPTHER

## 2019-01-01 RX ORDER — TEMAZEPAM 30 MG/1
30 CAPSULE ORAL
Status: DISCONTINUED | OUTPATIENT
Start: 2019-01-01 | End: 2019-01-01 | Stop reason: HOSPADM

## 2019-01-01 RX ORDER — AMIODARONE HYDROCHLORIDE 200 MG/1
200 TABLET ORAL DAILY
Qty: 30 TAB | Status: SHIPPED | OUTPATIENT
Start: 2019-01-01

## 2019-01-01 RX ORDER — ESOMEPRAZOLE MAGNESIUM 40 MG/1
40 CAPSULE, DELAYED RELEASE ORAL DAILY
Qty: 90 CAP | Refills: 3 | Status: SHIPPED | OUTPATIENT
Start: 2019-01-01

## 2019-01-01 RX ORDER — QUETIAPINE FUMARATE 25 MG/1
25 TABLET, FILM COATED ORAL 2 TIMES DAILY
Status: DISCONTINUED | OUTPATIENT
Start: 2019-01-01 | End: 2019-01-01

## 2019-01-01 RX ORDER — FENTANYL CITRATE 50 UG/ML
25 INJECTION, SOLUTION INTRAMUSCULAR; INTRAVENOUS
Status: DISPENSED | OUTPATIENT
Start: 2019-01-01 | End: 2019-01-01

## 2019-01-01 RX ORDER — HALOPERIDOL 5 MG/ML
2 INJECTION INTRAMUSCULAR
Status: DISCONTINUED | OUTPATIENT
Start: 2019-01-01 | End: 2019-01-01

## 2019-01-01 RX ORDER — DEXTROSE MONOHYDRATE AND SODIUM CHLORIDE 5; .45 G/100ML; G/100ML
100 INJECTION, SOLUTION INTRAVENOUS CONTINUOUS
Status: DISCONTINUED | OUTPATIENT
Start: 2019-01-01 | End: 2019-01-01

## 2019-01-01 RX ORDER — TEMAZEPAM 15 MG/1
15 CAPSULE ORAL
Qty: 30 CAP | Refills: 2 | Status: SHIPPED | OUTPATIENT
Start: 2019-01-01 | End: 2019-01-01 | Stop reason: SDUPTHER

## 2019-01-01 RX ORDER — LORAZEPAM 2 MG/ML
1 INJECTION INTRAMUSCULAR
Status: DISCONTINUED | OUTPATIENT
Start: 2019-01-01 | End: 2019-01-01 | Stop reason: HOSPADM

## 2019-01-01 RX ORDER — MIDODRINE HYDROCHLORIDE 5 MG/1
5 TABLET ORAL
Qty: 90 TAB | Refills: 5 | Status: SHIPPED | OUTPATIENT
Start: 2019-01-01 | End: 2019-01-01 | Stop reason: SDUPTHER

## 2019-01-01 RX ORDER — FUROSEMIDE 40 MG/1
40 TABLET ORAL 2 TIMES DAILY
Qty: 10 TAB | Refills: 0 | Status: SHIPPED
Start: 2019-01-01 | End: 2019-01-01 | Stop reason: ALTCHOICE

## 2019-01-01 RX ORDER — PANTOPRAZOLE SODIUM 40 MG/1
40 TABLET, DELAYED RELEASE ORAL
Status: DISCONTINUED | OUTPATIENT
Start: 2019-01-01 | End: 2019-01-01

## 2019-01-01 RX ORDER — PANTOPRAZOLE SODIUM 40 MG/10ML
40 INJECTION, POWDER, LYOPHILIZED, FOR SOLUTION INTRAVENOUS
Status: COMPLETED | OUTPATIENT
Start: 2019-01-01 | End: 2019-01-01

## 2019-01-01 RX ORDER — FUROSEMIDE 40 MG/1
40 TABLET ORAL DAILY
Qty: 30 TAB | Refills: 0 | Status: SHIPPED
Start: 2019-01-01 | End: 2019-01-01 | Stop reason: SDUPTHER

## 2019-01-01 RX ORDER — TAMSULOSIN HYDROCHLORIDE 0.4 MG/1
0.4 CAPSULE ORAL DAILY
Status: DISCONTINUED | OUTPATIENT
Start: 2019-01-01 | End: 2019-01-01 | Stop reason: HOSPADM

## 2019-01-01 RX ORDER — TAMSULOSIN HYDROCHLORIDE 0.4 MG/1
CAPSULE ORAL
Qty: 180 CAP | Refills: 3 | Status: SHIPPED | OUTPATIENT
Start: 2019-01-01

## 2019-01-01 RX ORDER — HYDROMORPHONE HYDROCHLORIDE 1 MG/ML
0.5 INJECTION, SOLUTION INTRAMUSCULAR; INTRAVENOUS; SUBCUTANEOUS
Status: DISCONTINUED | OUTPATIENT
Start: 2019-01-01 | End: 2019-01-01 | Stop reason: HOSPADM

## 2019-01-01 RX ORDER — MIDODRINE HYDROCHLORIDE 5 MG/1
5 TABLET ORAL
Status: DISCONTINUED | OUTPATIENT
Start: 2019-01-01 | End: 2019-01-01

## 2019-01-01 RX ORDER — FLUTICASONE PROPIONATE 50 MCG
2 SPRAY, SUSPENSION (ML) NASAL DAILY
Status: DISCONTINUED | OUTPATIENT
Start: 2019-01-01 | End: 2019-01-01

## 2019-01-01 RX ORDER — FUROSEMIDE 40 MG/1
40 TABLET ORAL DAILY
Qty: 90 TAB | Refills: 3 | Status: SHIPPED | OUTPATIENT
Start: 2019-01-01 | End: 2019-01-01

## 2019-01-01 RX ORDER — MIDODRINE HYDROCHLORIDE 5 MG/1
5 TABLET ORAL
Qty: 90 TAB | Refills: 0 | Status: SHIPPED | OUTPATIENT
Start: 2019-01-01 | End: 2019-01-01

## 2019-01-01 RX ORDER — METHYLPREDNISOLONE ACETATE 40 MG/ML
40 INJECTION, SUSPENSION INTRA-ARTICULAR; INTRALESIONAL; INTRAMUSCULAR; SOFT TISSUE ONCE
Qty: 1 VIAL | Refills: 0
Start: 2019-01-01 | End: 2019-01-01

## 2019-01-01 RX ORDER — ACETAMINOPHEN 650 MG/1
650 SUPPOSITORY RECTAL
Status: COMPLETED | OUTPATIENT
Start: 2019-01-01 | End: 2019-01-01

## 2019-01-01 RX ORDER — LORAZEPAM 2 MG/ML
1 INJECTION INTRAMUSCULAR
Status: DISCONTINUED | OUTPATIENT
Start: 2019-01-01 | End: 2019-11-09 | Stop reason: HOSPADM

## 2019-01-01 RX ORDER — ACETAMINOPHEN 325 MG/1
650 TABLET ORAL
Status: DISCONTINUED | OUTPATIENT
Start: 2019-01-01 | End: 2019-01-01

## 2019-01-01 RX ORDER — VERAPAMIL HYDROCHLORIDE 2.5 MG/ML
INJECTION, SOLUTION INTRAVENOUS AS NEEDED
Status: DISCONTINUED | OUTPATIENT
Start: 2019-01-01 | End: 2019-01-01 | Stop reason: HOSPADM

## 2019-01-01 RX ORDER — SODIUM CHLORIDE, SODIUM LACTATE, POTASSIUM CHLORIDE, CALCIUM CHLORIDE 600; 310; 30; 20 MG/100ML; MG/100ML; MG/100ML; MG/100ML
INJECTION, SOLUTION INTRAVENOUS
Status: DISCONTINUED | OUTPATIENT
Start: 2019-01-01 | End: 2019-01-01 | Stop reason: HOSPADM

## 2019-01-01 RX ORDER — FACIAL-BODY WIPES
10 EACH TOPICAL
Status: COMPLETED | OUTPATIENT
Start: 2019-01-01 | End: 2019-01-01

## 2019-01-01 RX ORDER — SODIUM CHLORIDE 9 MG/ML
75 INJECTION, SOLUTION INTRAVENOUS CONTINUOUS
Status: DISCONTINUED | OUTPATIENT
Start: 2019-01-01 | End: 2019-01-01

## 2019-01-01 RX ORDER — PRAVASTATIN SODIUM 40 MG/1
40 TABLET ORAL DAILY
Status: DISCONTINUED | OUTPATIENT
Start: 2019-01-01 | End: 2019-01-01 | Stop reason: HOSPADM

## 2019-01-01 RX ORDER — DOXYCYCLINE 100 MG/10ML
100 INJECTION, POWDER, LYOPHILIZED, FOR SOLUTION INTRAVENOUS EVERY 12 HOURS
Status: DISCONTINUED | OUTPATIENT
Start: 2019-01-01 | End: 2019-01-01 | Stop reason: CLARIF

## 2019-01-01 RX ORDER — MORPHINE SULFATE 2 MG/ML
2 INJECTION, SOLUTION INTRAMUSCULAR; INTRAVENOUS
Status: DISCONTINUED | OUTPATIENT
Start: 2019-01-01 | End: 2019-01-01

## 2019-01-01 RX ORDER — VERAPAMIL HYDROCHLORIDE 2.5 MG/ML
INJECTION, SOLUTION INTRAVENOUS AS NEEDED
Status: DISCONTINUED | OUTPATIENT
Start: 2019-01-01 | End: 2019-01-01

## 2019-01-01 RX ORDER — MIDODRINE HYDROCHLORIDE 5 MG/1
5 TABLET ORAL
Status: DISCONTINUED | OUTPATIENT
Start: 2019-01-01 | End: 2019-01-01 | Stop reason: HOSPADM

## 2019-01-01 RX ORDER — SODIUM CHLORIDE, SODIUM LACTATE, POTASSIUM CHLORIDE, CALCIUM CHLORIDE 600; 310; 30; 20 MG/100ML; MG/100ML; MG/100ML; MG/100ML
25 INJECTION, SOLUTION INTRAVENOUS CONTINUOUS
Status: DISCONTINUED | OUTPATIENT
Start: 2019-01-01 | End: 2019-01-01

## 2019-01-01 RX ORDER — HYDROMORPHONE HYDROCHLORIDE 2 MG/ML
0.5 INJECTION, SOLUTION INTRAMUSCULAR; INTRAVENOUS; SUBCUTANEOUS EVERY 6 HOURS
Status: DISCONTINUED | OUTPATIENT
Start: 2019-01-01 | End: 2019-11-09 | Stop reason: HOSPADM

## 2019-01-01 RX ORDER — PRAVASTATIN SODIUM 40 MG/1
80 TABLET ORAL
Status: DISCONTINUED | OUTPATIENT
Start: 2019-01-01 | End: 2019-01-01 | Stop reason: HOSPADM

## 2019-01-01 RX ORDER — PREDNISONE 5 MG/1
TABLET ORAL
Qty: 1 PACKAGE | Refills: 0 | Status: SHIPPED | OUTPATIENT
Start: 2019-01-01 | End: 2019-01-01 | Stop reason: ALTCHOICE

## 2019-01-01 RX ORDER — SCOLOPAMINE TRANSDERMAL SYSTEM 1 MG/1
1 PATCH, EXTENDED RELEASE TRANSDERMAL
Status: DISCONTINUED | OUTPATIENT
Start: 2019-01-01 | End: 2019-01-01 | Stop reason: HOSPADM

## 2019-01-01 RX ORDER — LEVOTHYROXINE SODIUM 137 UG/1
137 TABLET ORAL
Qty: 90 TAB | Refills: 3 | Status: SHIPPED | OUTPATIENT
Start: 2019-01-01

## 2019-01-01 RX ORDER — PHENYLEPHRINE HCL IN 0.9% NACL 0.4MG/10ML
SYRINGE (ML) INTRAVENOUS AS NEEDED
Status: DISCONTINUED | OUTPATIENT
Start: 2019-01-01 | End: 2019-01-01 | Stop reason: HOSPADM

## 2019-01-01 RX ORDER — HYDROMORPHONE HYDROCHLORIDE 1 MG/ML
0.2 INJECTION, SOLUTION INTRAMUSCULAR; INTRAVENOUS; SUBCUTANEOUS
Status: DISCONTINUED | OUTPATIENT
Start: 2019-01-01 | End: 2019-01-01 | Stop reason: HOSPADM

## 2019-01-01 RX ORDER — DOXYCYCLINE HYCLATE 100 MG
100 TABLET ORAL EVERY 12 HOURS
Status: DISCONTINUED | OUTPATIENT
Start: 2019-01-01 | End: 2019-01-01

## 2019-01-01 RX ORDER — METHOCARBAMOL 750 MG/1
750 TABLET, FILM COATED ORAL 4 TIMES DAILY
Qty: 20 TAB | Refills: 0 | Status: SHIPPED | OUTPATIENT
Start: 2019-01-01 | End: 2019-01-01 | Stop reason: ALTCHOICE

## 2019-01-01 RX ORDER — OXYCODONE AND ACETAMINOPHEN 5; 325 MG/1; MG/1
1-2 TABLET ORAL
Status: DISCONTINUED | OUTPATIENT
Start: 2019-01-01 | End: 2019-01-01

## 2019-01-01 RX ORDER — GUAIFENESIN 100 MG/5ML
100 SOLUTION ORAL
Status: DISCONTINUED | OUTPATIENT
Start: 2019-01-01 | End: 2019-01-01 | Stop reason: HOSPADM

## 2019-01-01 RX ORDER — PREDNISONE 5 MG/1
TABLET ORAL
Qty: 48 TAB | Refills: 0 | Status: SHIPPED | OUTPATIENT
Start: 2019-01-01 | End: 2019-01-01 | Stop reason: SDUPTHER

## 2019-01-01 RX ORDER — IODIXANOL 320 MG/ML
INJECTION, SOLUTION INTRAVASCULAR AS NEEDED
Status: DISCONTINUED | OUTPATIENT
Start: 2019-01-01 | End: 2019-01-01 | Stop reason: HOSPADM

## 2019-01-01 RX ADMIN — GUAIFENESIN 600 MG: 600 TABLET, EXTENDED RELEASE ORAL at 21:59

## 2019-01-01 RX ADMIN — Medication 10 ML: at 21:12

## 2019-01-01 RX ADMIN — Medication 10 ML: at 14:00

## 2019-01-01 RX ADMIN — HALOPERIDOL LACTATE 2 MG: 5 INJECTION, SOLUTION INTRAMUSCULAR at 09:16

## 2019-01-01 RX ADMIN — Medication 10 ML: at 21:30

## 2019-01-01 RX ADMIN — MIDODRINE HYDROCHLORIDE 10 MG: 5 TABLET ORAL at 17:20

## 2019-01-01 RX ADMIN — AMIODARONE HYDROCHLORIDE 200 MG: 200 TABLET ORAL at 09:10

## 2019-01-01 RX ADMIN — Medication 10 ML: at 06:47

## 2019-01-01 RX ADMIN — Medication 10 ML: at 21:37

## 2019-01-01 RX ADMIN — Medication 10 MG: at 19:28

## 2019-01-01 RX ADMIN — HEPARIN SODIUM 5000 UNITS: 5000 INJECTION INTRAVENOUS; SUBCUTANEOUS at 01:00

## 2019-01-01 RX ADMIN — MIDODRINE HYDROCHLORIDE 10 MG: 5 TABLET ORAL at 08:49

## 2019-01-01 RX ADMIN — Medication 10 ML: at 05:30

## 2019-01-01 RX ADMIN — Medication 10 ML: at 06:26

## 2019-01-01 RX ADMIN — SODIUM CHLORIDE 125 ML/HR: 900 INJECTION, SOLUTION INTRAVENOUS at 22:47

## 2019-01-01 RX ADMIN — HALOPERIDOL LACTATE 2 MG: 5 INJECTION, SOLUTION INTRAMUSCULAR at 09:53

## 2019-01-01 RX ADMIN — LORAZEPAM 1 MG: 2 INJECTION INTRAMUSCULAR; INTRAVENOUS at 02:58

## 2019-01-01 RX ADMIN — CEFEPIME HYDROCHLORIDE 2 G: 2 INJECTION, POWDER, FOR SOLUTION INTRAVENOUS at 22:47

## 2019-01-01 RX ADMIN — POLYETHYLENE GLYCOL 3350 17 G: 17 POWDER, FOR SOLUTION ORAL at 09:05

## 2019-01-01 RX ADMIN — MIDODRINE HYDROCHLORIDE 10 MG: 5 TABLET ORAL at 17:13

## 2019-01-01 RX ADMIN — Medication 10 ML: at 15:14

## 2019-01-01 RX ADMIN — CLOPIDOGREL BISULFATE 75 MG: 75 TABLET ORAL at 09:25

## 2019-01-01 RX ADMIN — Medication 10 ML: at 17:20

## 2019-01-01 RX ADMIN — TAMSULOSIN HYDROCHLORIDE 0.4 MG: 0.4 CAPSULE ORAL at 09:57

## 2019-01-01 RX ADMIN — Medication 10 ML: at 05:52

## 2019-01-01 RX ADMIN — DOCUSATE SODIUM 100 MG: 100 CAPSULE, LIQUID FILLED ORAL at 10:01

## 2019-01-01 RX ADMIN — MIDODRINE HYDROCHLORIDE 10 MG: 5 TABLET ORAL at 12:38

## 2019-01-01 RX ADMIN — CLOPIDOGREL BISULFATE 75 MG: 75 TABLET ORAL at 10:03

## 2019-01-01 RX ADMIN — SODIUM CHLORIDE 40 MG: 9 INJECTION, SOLUTION INTRAMUSCULAR; INTRAVENOUS; SUBCUTANEOUS at 20:36

## 2019-01-01 RX ADMIN — FLUTICASONE PROPIONATE 2 SPRAY: 50 SPRAY, METERED NASAL at 08:48

## 2019-01-01 RX ADMIN — PANTOPRAZOLE SODIUM 40 MG: 40 TABLET, DELAYED RELEASE ORAL at 10:01

## 2019-01-01 RX ADMIN — HALOPERIDOL LACTATE 1 MG: 5 INJECTION, SOLUTION INTRAMUSCULAR at 15:58

## 2019-01-01 RX ADMIN — FENTANYL CITRATE 25 MCG: 50 INJECTION, SOLUTION INTRAMUSCULAR; INTRAVENOUS at 21:26

## 2019-01-01 RX ADMIN — CLOPIDOGREL BISULFATE 75 MG: 75 TABLET ORAL at 08:18

## 2019-01-01 RX ADMIN — PRAVASTATIN SODIUM 40 MG: 40 TABLET ORAL at 10:37

## 2019-01-01 RX ADMIN — TAMSULOSIN HYDROCHLORIDE 0.8 MG: 0.4 CAPSULE ORAL at 08:42

## 2019-01-01 RX ADMIN — CLOPIDOGREL BISULFATE 75 MG: 75 TABLET ORAL at 09:02

## 2019-01-01 RX ADMIN — LORAZEPAM 1 MG: 2 INJECTION INTRAMUSCULAR; INTRAVENOUS at 08:19

## 2019-01-01 RX ADMIN — MIDODRINE HYDROCHLORIDE 10 MG: 5 TABLET ORAL at 18:01

## 2019-01-01 RX ADMIN — TAMSULOSIN HYDROCHLORIDE 0.8 MG: 0.4 CAPSULE ORAL at 11:08

## 2019-01-01 RX ADMIN — HYDROMORPHONE HYDROCHLORIDE 0.5 MG: 1 INJECTION, SOLUTION INTRAMUSCULAR; INTRAVENOUS; SUBCUTANEOUS at 20:42

## 2019-01-01 RX ADMIN — HEPARIN SODIUM 5000 UNITS: 5000 INJECTION INTRAVENOUS; SUBCUTANEOUS at 08:44

## 2019-01-01 RX ADMIN — VANCOMYCIN HYDROCHLORIDE 1000 MG: 1 INJECTION, POWDER, LYOPHILIZED, FOR SOLUTION INTRAVENOUS at 05:45

## 2019-01-01 RX ADMIN — MIDODRINE HYDROCHLORIDE 10 MG: 5 TABLET ORAL at 18:57

## 2019-01-01 RX ADMIN — Medication 10 ML: at 21:35

## 2019-01-01 RX ADMIN — AMIODARONE HYDROCHLORIDE 200 MG: 200 TABLET ORAL at 09:04

## 2019-01-01 RX ADMIN — DILTIAZEM HYDROCHLORIDE 120 MG: 120 CAPSULE, COATED, EXTENDED RELEASE ORAL at 10:01

## 2019-01-01 RX ADMIN — ACETAMINOPHEN 650 MG: 325 TABLET ORAL at 02:47

## 2019-01-01 RX ADMIN — SODIUM CHLORIDE 40 MG: 9 INJECTION, SOLUTION INTRAMUSCULAR; INTRAVENOUS; SUBCUTANEOUS at 21:12

## 2019-01-01 RX ADMIN — LEVOTHYROXINE SODIUM 137 MCG: 25 TABLET ORAL at 07:00

## 2019-01-01 RX ADMIN — LEVOTHYROXINE SODIUM 137 MCG: 112 TABLET ORAL at 08:49

## 2019-01-01 RX ADMIN — FUROSEMIDE 40 MG: 40 TABLET ORAL at 08:41

## 2019-01-01 RX ADMIN — DOCUSATE SODIUM 100 MG: 100 CAPSULE, LIQUID FILLED ORAL at 17:54

## 2019-01-01 RX ADMIN — HEPARIN SODIUM 5000 UNITS: 5000 INJECTION INTRAVENOUS; SUBCUTANEOUS at 17:01

## 2019-01-01 RX ADMIN — HYDROMORPHONE HYDROCHLORIDE 0.5 MG: 1 INJECTION, SOLUTION INTRAMUSCULAR; INTRAVENOUS; SUBCUTANEOUS at 15:03

## 2019-01-01 RX ADMIN — CLOPIDOGREL BISULFATE 75 MG: 75 TABLET ORAL at 08:28

## 2019-01-01 RX ADMIN — Medication 10 ML: at 21:07

## 2019-01-01 RX ADMIN — CEFTRIAXONE 1 G: 1 INJECTION, POWDER, FOR SOLUTION INTRAMUSCULAR; INTRAVENOUS at 23:15

## 2019-01-01 RX ADMIN — Medication 10 ML: at 20:26

## 2019-01-01 RX ADMIN — AMIODARONE HYDROCHLORIDE 200 MG: 200 TABLET ORAL at 09:26

## 2019-01-01 RX ADMIN — FINASTERIDE 5 MG: 5 TABLET, FILM COATED ORAL at 17:40

## 2019-01-01 RX ADMIN — MIDODRINE HYDROCHLORIDE 10 MG: 5 TABLET ORAL at 08:18

## 2019-01-01 RX ADMIN — SODIUM CHLORIDE 40 MG: 9 INJECTION, SOLUTION INTRAMUSCULAR; INTRAVENOUS; SUBCUTANEOUS at 08:21

## 2019-01-01 RX ADMIN — CEFTRIAXONE 1 G: 1 INJECTION, POWDER, FOR SOLUTION INTRAMUSCULAR; INTRAVENOUS at 21:33

## 2019-01-01 RX ADMIN — TAMSULOSIN HYDROCHLORIDE 0.8 MG: 0.4 CAPSULE ORAL at 08:17

## 2019-01-01 RX ADMIN — QUETIAPINE FUMARATE 25 MG: 25 TABLET ORAL at 09:26

## 2019-01-01 RX ADMIN — TAMSULOSIN HYDROCHLORIDE 0.4 MG: 0.4 CAPSULE ORAL at 09:45

## 2019-01-01 RX ADMIN — CIPROFLOXACIN 1 DROP: 3 SOLUTION OPHTHALMIC at 12:03

## 2019-01-01 RX ADMIN — TAMSULOSIN HYDROCHLORIDE 0.4 MG: 0.4 CAPSULE ORAL at 10:37

## 2019-01-01 RX ADMIN — Medication 10 ML: at 05:54

## 2019-01-01 RX ADMIN — PRAVASTATIN SODIUM 80 MG: 40 TABLET ORAL at 20:37

## 2019-01-01 RX ADMIN — CEFTRIAXONE 1 G: 1 INJECTION, POWDER, FOR SOLUTION INTRAMUSCULAR; INTRAVENOUS at 21:13

## 2019-01-01 RX ADMIN — MIDODRINE HYDROCHLORIDE 10 MG: 5 TABLET ORAL at 18:28

## 2019-01-01 RX ADMIN — CEFEPIME HYDROCHLORIDE 2 G: 2 INJECTION, POWDER, FOR SOLUTION INTRAVENOUS at 10:50

## 2019-01-01 RX ADMIN — DOXYCYCLINE 100 MG: 100 INJECTION, POWDER, LYOPHILIZED, FOR SOLUTION INTRAVENOUS at 08:21

## 2019-01-01 RX ADMIN — HEPARIN SODIUM 5000 UNITS: 5000 INJECTION INTRAVENOUS; SUBCUTANEOUS at 08:28

## 2019-01-01 RX ADMIN — Medication 10 ML: at 14:04

## 2019-01-01 RX ADMIN — FINASTERIDE 5 MG: 5 TABLET, FILM COATED ORAL at 17:31

## 2019-01-01 RX ADMIN — FENTANYL CITRATE 25 MCG: 50 INJECTION, SOLUTION INTRAMUSCULAR; INTRAVENOUS at 23:11

## 2019-01-01 RX ADMIN — CEFEPIME HYDROCHLORIDE 2 G: 2 INJECTION, POWDER, FOR SOLUTION INTRAVENOUS at 12:24

## 2019-01-01 RX ADMIN — LEVOTHYROXINE SODIUM 137 MCG: 112 TABLET ORAL at 05:57

## 2019-01-01 RX ADMIN — ASPIRIN 81 MG 81 MG: 81 TABLET ORAL at 08:27

## 2019-01-01 RX ADMIN — MIDODRINE HYDROCHLORIDE 10 MG: 5 TABLET ORAL at 17:29

## 2019-01-01 RX ADMIN — HEPARIN SODIUM 5000 UNITS: 5000 INJECTION INTRAVENOUS; SUBCUTANEOUS at 08:20

## 2019-01-01 RX ADMIN — ENOXAPARIN SODIUM 40 MG: 40 INJECTION SUBCUTANEOUS at 17:43

## 2019-01-01 RX ADMIN — ASPIRIN 81 MG 81 MG: 81 TABLET ORAL at 08:16

## 2019-01-01 RX ADMIN — SODIUM CHLORIDE 100 ML/HR: 900 INJECTION, SOLUTION INTRAVENOUS at 10:58

## 2019-01-01 RX ADMIN — Medication 10 ML: at 08:30

## 2019-01-01 RX ADMIN — MIDODRINE HYDROCHLORIDE 10 MG: 5 TABLET ORAL at 17:17

## 2019-01-01 RX ADMIN — LEVOTHYROXINE SODIUM 137 MCG: 112 TABLET ORAL at 05:17

## 2019-01-01 RX ADMIN — GUAIFENESIN 600 MG: 600 TABLET, EXTENDED RELEASE ORAL at 19:24

## 2019-01-01 RX ADMIN — ATORVASTATIN CALCIUM 40 MG: 40 TABLET, FILM COATED ORAL at 08:16

## 2019-01-01 RX ADMIN — FINASTERIDE 5 MG: 5 TABLET, FILM COATED ORAL at 18:20

## 2019-01-01 RX ADMIN — LORAZEPAM 1 MG: 2 INJECTION INTRAMUSCULAR; INTRAVENOUS at 16:01

## 2019-01-01 RX ADMIN — AMIODARONE HYDROCHLORIDE 200 MG: 200 TABLET ORAL at 08:18

## 2019-01-01 RX ADMIN — DEXTROSE MONOHYDRATE AND SODIUM CHLORIDE 100 ML/HR: 5; .45 INJECTION, SOLUTION INTRAVENOUS at 12:51

## 2019-01-01 RX ADMIN — AMIODARONE HYDROCHLORIDE 400 MG: 200 TABLET ORAL at 21:33

## 2019-01-01 RX ADMIN — CLOPIDOGREL BISULFATE 75 MG: 75 TABLET ORAL at 10:58

## 2019-01-01 RX ADMIN — FINASTERIDE 5 MG: 5 TABLET, FILM COATED ORAL at 18:36

## 2019-01-01 RX ADMIN — AMIODARONE HYDROCHLORIDE 200 MG: 200 TABLET ORAL at 08:42

## 2019-01-01 RX ADMIN — LEVOTHYROXINE SODIUM 137 MCG: 25 TABLET ORAL at 06:00

## 2019-01-01 RX ADMIN — VANCOMYCIN HYDROCHLORIDE 1750 MG: 10 INJECTION, POWDER, LYOPHILIZED, FOR SOLUTION INTRAVENOUS at 10:48

## 2019-01-01 RX ADMIN — CEFAZOLIN 1 G: 1 INJECTION, POWDER, FOR SOLUTION INTRAMUSCULAR; INTRAVENOUS; PARENTERAL at 05:53

## 2019-01-01 RX ADMIN — HEPARIN SODIUM 5000 UNITS: 5000 INJECTION INTRAVENOUS; SUBCUTANEOUS at 09:12

## 2019-01-01 RX ADMIN — PROPOFOL 50 MG: 10 INJECTION, EMULSION INTRAVENOUS at 10:41

## 2019-01-01 RX ADMIN — MIDODRINE HYDROCHLORIDE 10 MG: 5 TABLET ORAL at 08:32

## 2019-01-01 RX ADMIN — TAMSULOSIN HYDROCHLORIDE 0.4 MG: 0.4 CAPSULE ORAL at 09:11

## 2019-01-01 RX ADMIN — FUROSEMIDE 40 MG: 40 TABLET ORAL at 11:09

## 2019-01-01 RX ADMIN — ASPIRIN 81 MG 81 MG: 81 TABLET ORAL at 10:01

## 2019-01-01 RX ADMIN — Medication 10 ML: at 07:02

## 2019-01-01 RX ADMIN — SODIUM CHLORIDE 40 MG: 9 INJECTION, SOLUTION INTRAMUSCULAR; INTRAVENOUS; SUBCUTANEOUS at 09:25

## 2019-01-01 RX ADMIN — AMIODARONE HYDROCHLORIDE 200 MG: 200 TABLET ORAL at 08:10

## 2019-01-01 RX ADMIN — HEPARIN SODIUM 5000 UNITS: 5000 INJECTION INTRAVENOUS; SUBCUTANEOUS at 23:53

## 2019-01-01 RX ADMIN — CEFAZOLIN 1 G: 1 INJECTION, POWDER, FOR SOLUTION INTRAMUSCULAR; INTRAVENOUS; PARENTERAL at 18:24

## 2019-01-01 RX ADMIN — SODIUM CHLORIDE 40 MG: 9 INJECTION, SOLUTION INTRAMUSCULAR; INTRAVENOUS; SUBCUTANEOUS at 20:27

## 2019-01-01 RX ADMIN — TAMSULOSIN HYDROCHLORIDE 0.4 MG: 0.4 CAPSULE ORAL at 09:04

## 2019-01-01 RX ADMIN — CEFTRIAXONE 1 G: 1 INJECTION, POWDER, FOR SOLUTION INTRAMUSCULAR; INTRAVENOUS at 05:25

## 2019-01-01 RX ADMIN — AMIODARONE HYDROCHLORIDE 200 MG: 200 TABLET ORAL at 10:58

## 2019-01-01 RX ADMIN — Medication 10 ML: at 22:06

## 2019-01-01 RX ADMIN — Medication 10 ML: at 05:47

## 2019-01-01 RX ADMIN — MIDODRINE HYDROCHLORIDE 10 MG: 5 TABLET ORAL at 09:10

## 2019-01-01 RX ADMIN — TEMAZEPAM 15 MG: 15 CAPSULE ORAL at 23:12

## 2019-01-01 RX ADMIN — Medication 10 ML: at 07:01

## 2019-01-01 RX ADMIN — POLYETHYLENE GLYCOL 3350 17 G: 17 POWDER, FOR SOLUTION ORAL at 09:04

## 2019-01-01 RX ADMIN — HEPARIN SODIUM AND DEXTROSE 500 UNITS/HR: 10000; 5 INJECTION INTRAVENOUS at 21:37

## 2019-01-01 RX ADMIN — Medication 10 ML: at 14:50

## 2019-01-01 RX ADMIN — Medication 10 ML: at 05:20

## 2019-01-01 RX ADMIN — CLOPIDOGREL BISULFATE 300 MG: 75 TABLET, FILM COATED ORAL at 10:03

## 2019-01-01 RX ADMIN — LEVOTHYROXINE SODIUM 137 MCG: 112 TABLET ORAL at 05:01

## 2019-01-01 RX ADMIN — HALOPERIDOL LACTATE 2 MG: 5 INJECTION INTRAMUSCULAR at 22:04

## 2019-01-01 RX ADMIN — MIDODRINE HYDROCHLORIDE 10 MG: 5 TABLET ORAL at 11:37

## 2019-01-01 RX ADMIN — Medication 10 ML: at 21:06

## 2019-01-01 RX ADMIN — SODIUM CHLORIDE 150 ML/HR: 900 INJECTION, SOLUTION INTRAVENOUS at 02:38

## 2019-01-01 RX ADMIN — Medication 10 ML: at 06:24

## 2019-01-01 RX ADMIN — GUAIFENESIN 600 MG: 600 TABLET, EXTENDED RELEASE ORAL at 21:29

## 2019-01-01 RX ADMIN — Medication 10 ML: at 06:02

## 2019-01-01 RX ADMIN — MIDODRINE HYDROCHLORIDE 5 MG: 5 TABLET ORAL at 17:49

## 2019-01-01 RX ADMIN — MIDODRINE HYDROCHLORIDE 10 MG: 5 TABLET ORAL at 08:10

## 2019-01-01 RX ADMIN — Medication 10 ML: at 21:14

## 2019-01-01 RX ADMIN — AMIODARONE HYDROCHLORIDE 200 MG: 200 TABLET ORAL at 08:27

## 2019-01-01 RX ADMIN — HEPARIN SODIUM 5000 UNITS: 5000 INJECTION INTRAVENOUS; SUBCUTANEOUS at 23:38

## 2019-01-01 RX ADMIN — TEMAZEPAM 30 MG: 15 CAPSULE ORAL at 21:29

## 2019-01-01 RX ADMIN — ASPIRIN 81 MG 81 MG: 81 TABLET ORAL at 08:18

## 2019-01-01 RX ADMIN — CEFAZOLIN 1 G: 1 INJECTION, POWDER, FOR SOLUTION INTRAMUSCULAR; INTRAVENOUS; PARENTERAL at 06:04

## 2019-01-01 RX ADMIN — DOCUSATE SODIUM 100 MG: 100 CAPSULE, LIQUID FILLED ORAL at 08:41

## 2019-01-01 RX ADMIN — FENTANYL CITRATE 25 MCG: 50 INJECTION, SOLUTION INTRAMUSCULAR; INTRAVENOUS at 19:37

## 2019-01-01 RX ADMIN — CIPROFLOXACIN 1 DROP: 3 SOLUTION OPHTHALMIC at 19:50

## 2019-01-01 RX ADMIN — MIDODRINE HYDROCHLORIDE 10 MG: 5 TABLET ORAL at 09:05

## 2019-01-01 RX ADMIN — ASPIRIN 81 MG 81 MG: 81 TABLET ORAL at 09:05

## 2019-01-01 RX ADMIN — CIPROFLOXACIN 1 DROP: 3 SOLUTION OPHTHALMIC at 11:44

## 2019-01-01 RX ADMIN — DEXTROSE MONOHYDRATE AND SODIUM CHLORIDE 50 ML/HR: 5; .45 INJECTION, SOLUTION INTRAVENOUS at 20:52

## 2019-01-01 RX ADMIN — FUROSEMIDE 20 MG: 10 INJECTION, SOLUTION INTRAMUSCULAR; INTRAVENOUS at 09:09

## 2019-01-01 RX ADMIN — PANTOPRAZOLE SODIUM 40 MG: 40 TABLET, DELAYED RELEASE ORAL at 08:31

## 2019-01-01 RX ADMIN — CLOPIDOGREL BISULFATE 75 MG: 75 TABLET ORAL at 09:26

## 2019-01-01 RX ADMIN — POLYETHYLENE GLYCOL 3350 17 G: 17 POWDER, FOR SOLUTION ORAL at 08:18

## 2019-01-01 RX ADMIN — CIPROFLOXACIN 1 DROP: 3 SOLUTION OPHTHALMIC at 08:48

## 2019-01-01 RX ADMIN — TEMAZEPAM 30 MG: 30 CAPSULE ORAL at 23:15

## 2019-01-01 RX ADMIN — Medication 1 AMPULE: at 10:04

## 2019-01-01 RX ADMIN — CLOPIDOGREL BISULFATE 75 MG: 75 TABLET ORAL at 18:36

## 2019-01-01 RX ADMIN — PROPOFOL 70 MG: 10 INJECTION, EMULSION INTRAVENOUS at 12:17

## 2019-01-01 RX ADMIN — Medication 10 ML: at 13:00

## 2019-01-01 RX ADMIN — LEVOTHYROXINE SODIUM 137 MCG: 112 TABLET ORAL at 05:02

## 2019-01-01 RX ADMIN — SODIUM CHLORIDE 500 MG: 900 INJECTION, SOLUTION INTRAVENOUS at 09:26

## 2019-01-01 RX ADMIN — TEMAZEPAM 30 MG: 15 CAPSULE ORAL at 20:04

## 2019-01-01 RX ADMIN — Medication 10 ML: at 05:01

## 2019-01-01 RX ADMIN — Medication 10 ML: at 21:16

## 2019-01-01 RX ADMIN — Medication 10 ML: at 21:00

## 2019-01-01 RX ADMIN — CLOPIDOGREL BISULFATE 75 MG: 75 TABLET ORAL at 09:04

## 2019-01-01 RX ADMIN — CLOPIDOGREL BISULFATE 75 MG: 75 TABLET ORAL at 09:32

## 2019-01-01 RX ADMIN — TEMAZEPAM 30 MG: 30 CAPSULE ORAL at 21:32

## 2019-01-01 RX ADMIN — CLOPIDOGREL BISULFATE 75 MG: 75 TABLET ORAL at 09:45

## 2019-01-01 RX ADMIN — PRAVASTATIN SODIUM 40 MG: 40 TABLET ORAL at 08:00

## 2019-01-01 RX ADMIN — HALOPERIDOL LACTATE 2 MG: 5 INJECTION INTRAMUSCULAR at 00:18

## 2019-01-01 RX ADMIN — LORAZEPAM 1 MG: 2 INJECTION INTRAMUSCULAR; INTRAVENOUS at 01:31

## 2019-01-01 RX ADMIN — ATORVASTATIN CALCIUM 40 MG: 40 TABLET, FILM COATED ORAL at 10:01

## 2019-01-01 RX ADMIN — CLOPIDOGREL BISULFATE 75 MG: 75 TABLET ORAL at 08:49

## 2019-01-01 RX ADMIN — AMIODARONE HYDROCHLORIDE 400 MG: 200 TABLET ORAL at 09:04

## 2019-01-01 RX ADMIN — LEVOTHYROXINE SODIUM 137 MCG: 25 TABLET ORAL at 08:00

## 2019-01-01 RX ADMIN — ASPIRIN 81 MG CHEWABLE TABLET 81 MG: 81 TABLET CHEWABLE at 11:03

## 2019-01-01 RX ADMIN — DOCUSATE SODIUM 100 MG: 100 CAPSULE, LIQUID FILLED ORAL at 17:39

## 2019-01-01 RX ADMIN — ASPIRIN 81 MG 81 MG: 81 TABLET ORAL at 08:41

## 2019-01-01 RX ADMIN — Medication 10 ML: at 21:13

## 2019-01-01 RX ADMIN — ASPIRIN 81 MG 81 MG: 81 TABLET ORAL at 09:09

## 2019-01-01 RX ADMIN — SODIUM CHLORIDE 1000 ML: 900 INJECTION, SOLUTION INTRAVENOUS at 07:37

## 2019-01-01 RX ADMIN — DOXYCYCLINE 100 MG: 100 INJECTION, POWDER, LYOPHILIZED, FOR SOLUTION INTRAVENOUS at 08:00

## 2019-01-01 RX ADMIN — Medication 80 MCG: at 19:12

## 2019-01-01 RX ADMIN — VANCOMYCIN HYDROCHLORIDE 1000 MG: 1 INJECTION, POWDER, LYOPHILIZED, FOR SOLUTION INTRAVENOUS at 14:01

## 2019-01-01 RX ADMIN — GUAIFENESIN 600 MG: 600 TABLET, EXTENDED RELEASE ORAL at 08:18

## 2019-01-01 RX ADMIN — FLUTICASONE PROPIONATE 2 SPRAY: 50 SPRAY, METERED NASAL at 09:02

## 2019-01-01 RX ADMIN — CEFTRIAXONE 1 G: 1 INJECTION, POWDER, FOR SOLUTION INTRAMUSCULAR; INTRAVENOUS at 21:04

## 2019-01-01 RX ADMIN — LEVOTHYROXINE SODIUM 137 MCG: 112 TABLET ORAL at 09:05

## 2019-01-01 RX ADMIN — Medication 10 ML: at 06:14

## 2019-01-01 RX ADMIN — MIDODRINE HYDROCHLORIDE 10 MG: 5 TABLET ORAL at 13:08

## 2019-01-01 RX ADMIN — ETOMIDATE 4 MCG: 2 INJECTION INTRAVENOUS at 19:33

## 2019-01-01 RX ADMIN — Medication 10 ML: at 20:42

## 2019-01-01 RX ADMIN — FINASTERIDE 5 MG: 5 TABLET, FILM COATED ORAL at 19:28

## 2019-01-01 RX ADMIN — ASPIRIN 81 MG 81 MG: 81 TABLET ORAL at 09:30

## 2019-01-01 RX ADMIN — SODIUM CHLORIDE 40 MG: 9 INJECTION, SOLUTION INTRAMUSCULAR; INTRAVENOUS; SUBCUTANEOUS at 21:18

## 2019-01-01 RX ADMIN — GUAIFENESIN 600 MG: 600 TABLET, EXTENDED RELEASE ORAL at 09:02

## 2019-01-01 RX ADMIN — SULFAMETHOXAZOLE AND TRIMETHOPRIM 1 TABLET: 800; 160 TABLET ORAL at 09:11

## 2019-01-01 RX ADMIN — PANTOPRAZOLE SODIUM 40 MG: 40 TABLET, DELAYED RELEASE ORAL at 09:30

## 2019-01-01 RX ADMIN — MIDODRINE HYDROCHLORIDE 10 MG: 5 TABLET ORAL at 18:15

## 2019-01-01 RX ADMIN — ASPIRIN 81 MG 81 MG: 81 TABLET ORAL at 08:42

## 2019-01-01 RX ADMIN — Medication 10 ML: at 21:24

## 2019-01-01 RX ADMIN — FLUTICASONE PROPIONATE 2 SPRAY: 50 SPRAY, METERED NASAL at 09:32

## 2019-01-01 RX ADMIN — GLYCOPYRROLATE 0.2 MG: 0.2 INJECTION, SOLUTION INTRAMUSCULAR; INTRAVENOUS at 17:46

## 2019-01-01 RX ADMIN — GUAIFENESIN 600 MG: 600 TABLET, EXTENDED RELEASE ORAL at 09:26

## 2019-01-01 RX ADMIN — HEPARIN SODIUM 5000 UNITS: 5000 INJECTION INTRAVENOUS; SUBCUTANEOUS at 08:14

## 2019-01-01 RX ADMIN — MIDODRINE HYDROCHLORIDE 10 MG: 5 TABLET ORAL at 14:12

## 2019-01-01 RX ADMIN — CEFAZOLIN 1 G: 1 INJECTION, POWDER, FOR SOLUTION INTRAMUSCULAR; INTRAVENOUS; PARENTERAL at 18:13

## 2019-01-01 RX ADMIN — DEXTROSE MONOHYDRATE AND SODIUM CHLORIDE 100 ML/HR: 5; .45 INJECTION, SOLUTION INTRAVENOUS at 02:55

## 2019-01-01 RX ADMIN — FINASTERIDE 5 MG: 5 TABLET, FILM COATED ORAL at 19:13

## 2019-01-01 RX ADMIN — FINASTERIDE 5 MG: 5 TABLET, FILM COATED ORAL at 17:13

## 2019-01-01 RX ADMIN — CEFTRIAXONE 1 G: 1 INJECTION, POWDER, FOR SOLUTION INTRAMUSCULAR; INTRAVENOUS at 21:47

## 2019-01-01 RX ADMIN — VANCOMYCIN HYDROCHLORIDE 1000 MG: 1 INJECTION, POWDER, LYOPHILIZED, FOR SOLUTION INTRAVENOUS at 05:02

## 2019-01-01 RX ADMIN — BISACODYL 10 MG: 10 SUPPOSITORY RECTAL at 10:40

## 2019-01-01 RX ADMIN — HEPARIN SODIUM 5000 UNITS: 5000 INJECTION INTRAVENOUS; SUBCUTANEOUS at 18:01

## 2019-01-01 RX ADMIN — ASPIRIN 81 MG CHEWABLE TABLET 81 MG: 81 TABLET CHEWABLE at 09:45

## 2019-01-01 RX ADMIN — SUCCINYLCHOLINE CHLORIDE 160 MG: 20 INJECTION INTRAMUSCULAR; INTRAVENOUS at 18:54

## 2019-01-01 RX ADMIN — Medication 10 ML: at 21:33

## 2019-01-01 RX ADMIN — AMIODARONE HYDROCHLORIDE 200 MG: 200 TABLET ORAL at 08:21

## 2019-01-01 RX ADMIN — TEMAZEPAM 15 MG: 15 CAPSULE ORAL at 21:18

## 2019-01-01 RX ADMIN — CEFAZOLIN 1 G: 1 INJECTION, POWDER, FOR SOLUTION INTRAMUSCULAR; INTRAVENOUS; PARENTERAL at 17:59

## 2019-01-01 RX ADMIN — ASPIRIN 81 MG 81 MG: 81 TABLET ORAL at 08:00

## 2019-01-01 RX ADMIN — Medication 296 ML: at 16:10

## 2019-01-01 RX ADMIN — AMIODARONE HYDROCHLORIDE 200 MG: 200 TABLET ORAL at 09:32

## 2019-01-01 RX ADMIN — ASPIRIN 81 MG 81 MG: 81 TABLET ORAL at 08:31

## 2019-01-01 RX ADMIN — AMIODARONE HYDROCHLORIDE 200 MG: 200 TABLET ORAL at 08:12

## 2019-01-01 RX ADMIN — PANTOPRAZOLE SODIUM 40 MG: 40 TABLET, DELAYED RELEASE ORAL at 08:19

## 2019-01-01 RX ADMIN — CLOPIDOGREL BISULFATE 75 MG: 75 TABLET ORAL at 10:37

## 2019-01-01 RX ADMIN — FINASTERIDE 5 MG: 5 TABLET, FILM COATED ORAL at 19:40

## 2019-01-01 RX ADMIN — SODIUM CHLORIDE 75 ML/HR: 900 INJECTION, SOLUTION INTRAVENOUS at 20:38

## 2019-01-01 RX ADMIN — SODIUM CHLORIDE 100 ML/HR: 900 INJECTION, SOLUTION INTRAVENOUS at 20:45

## 2019-01-01 RX ADMIN — Medication 10 ML: at 08:20

## 2019-01-01 RX ADMIN — ACETAMINOPHEN 650 MG: 325 TABLET ORAL at 10:02

## 2019-01-01 RX ADMIN — Medication 10 ML: at 15:19

## 2019-01-01 RX ADMIN — LEVOTHYROXINE SODIUM 137 MCG: 0.11 TABLET ORAL at 06:00

## 2019-01-01 RX ADMIN — HEPARIN SODIUM 5000 UNITS: 5000 INJECTION INTRAVENOUS; SUBCUTANEOUS at 00:22

## 2019-01-01 RX ADMIN — LORAZEPAM 0.5 MG: 2 INJECTION INTRAMUSCULAR; INTRAVENOUS at 16:13

## 2019-01-01 RX ADMIN — LEVOTHYROXINE SODIUM 137 MCG: 112 TABLET ORAL at 05:50

## 2019-01-01 RX ADMIN — TEMAZEPAM 30 MG: 30 CAPSULE ORAL at 21:18

## 2019-01-01 RX ADMIN — PANTOPRAZOLE SODIUM 40 MG: 40 TABLET, DELAYED RELEASE ORAL at 10:02

## 2019-01-01 RX ADMIN — ASPIRIN 81 MG CHEWABLE TABLET 81 MG: 81 TABLET CHEWABLE at 18:58

## 2019-01-01 RX ADMIN — Medication 10 ML: at 21:31

## 2019-01-01 RX ADMIN — ASPIRIN 81 MG CHEWABLE TABLET 81 MG: 81 TABLET CHEWABLE at 11:36

## 2019-01-01 RX ADMIN — PANTOPRAZOLE SODIUM 40 MG: 40 TABLET, DELAYED RELEASE ORAL at 09:05

## 2019-01-01 RX ADMIN — AMIODARONE HYDROCHLORIDE 200 MG: 200 TABLET ORAL at 09:43

## 2019-01-01 RX ADMIN — HEPARIN SODIUM 5000 UNITS: 5000 INJECTION INTRAVENOUS; SUBCUTANEOUS at 08:17

## 2019-01-01 RX ADMIN — FINASTERIDE 5 MG: 5 TABLET, FILM COATED ORAL at 18:16

## 2019-01-01 RX ADMIN — MIDODRINE HYDROCHLORIDE 10 MG: 5 TABLET ORAL at 13:14

## 2019-01-01 RX ADMIN — MIDODRINE HYDROCHLORIDE 10 MG: 5 TABLET ORAL at 11:45

## 2019-01-01 RX ADMIN — MIDODRINE HYDROCHLORIDE 10 MG: 5 TABLET ORAL at 11:20

## 2019-01-01 RX ADMIN — GUAIFENESIN 600 MG: 600 TABLET, EXTENDED RELEASE ORAL at 08:28

## 2019-01-01 RX ADMIN — Medication 10 ML: at 21:26

## 2019-01-01 RX ADMIN — PRAVASTATIN SODIUM 80 MG: 40 TABLET ORAL at 21:05

## 2019-01-01 RX ADMIN — Medication 10 ML: at 23:12

## 2019-01-01 RX ADMIN — TAMSULOSIN HYDROCHLORIDE 0.8 MG: 0.4 CAPSULE ORAL at 08:00

## 2019-01-01 RX ADMIN — ACETAMINOPHEN 650 MG: 325 TABLET ORAL at 19:38

## 2019-01-01 RX ADMIN — TEMAZEPAM 30 MG: 30 CAPSULE ORAL at 21:05

## 2019-01-01 RX ADMIN — SODIUM CHLORIDE 40 MG: 9 INJECTION, SOLUTION INTRAMUSCULAR; INTRAVENOUS; SUBCUTANEOUS at 08:12

## 2019-01-01 RX ADMIN — LIDOCAINE HYDROCHLORIDE 100 MG: 20 INJECTION, SOLUTION EPIDURAL; INFILTRATION; INTRACAUDAL; PERINEURAL at 10:30

## 2019-01-01 RX ADMIN — MIDODRINE HYDROCHLORIDE 10 MG: 5 TABLET ORAL at 17:01

## 2019-01-01 RX ADMIN — FINASTERIDE 5 MG: 5 TABLET, FILM COATED ORAL at 17:20

## 2019-01-01 RX ADMIN — LEVOTHYROXINE SODIUM 137 MCG: 112 TABLET ORAL at 10:58

## 2019-01-01 RX ADMIN — MIDODRINE HYDROCHLORIDE 10 MG: 5 TABLET ORAL at 10:58

## 2019-01-01 RX ADMIN — TAMSULOSIN HYDROCHLORIDE 0.8 MG: 0.4 CAPSULE ORAL at 08:41

## 2019-01-01 RX ADMIN — HEPARIN SODIUM 5000 UNITS: 5000 INJECTION INTRAVENOUS; SUBCUTANEOUS at 16:48

## 2019-01-01 RX ADMIN — SODIUM CHLORIDE 40 MG: 9 INJECTION, SOLUTION INTRAMUSCULAR; INTRAVENOUS; SUBCUTANEOUS at 21:32

## 2019-01-01 RX ADMIN — Medication 80 MCG: at 19:09

## 2019-01-01 RX ADMIN — Medication 10 ML: at 06:00

## 2019-01-01 RX ADMIN — SODIUM CHLORIDE 500 MG: 900 INJECTION, SOLUTION INTRAVENOUS at 15:01

## 2019-01-01 RX ADMIN — AMIODARONE HYDROCHLORIDE 200 MG: 200 TABLET ORAL at 09:08

## 2019-01-01 RX ADMIN — ENOXAPARIN SODIUM 40 MG: 40 INJECTION SUBCUTANEOUS at 17:49

## 2019-01-01 RX ADMIN — FENTANYL CITRATE 25 MCG: 50 INJECTION, SOLUTION INTRAMUSCULAR; INTRAVENOUS at 00:56

## 2019-01-01 RX ADMIN — TAMSULOSIN HYDROCHLORIDE 0.4 MG: 0.4 CAPSULE ORAL at 18:56

## 2019-01-01 RX ADMIN — LEVOTHYROXINE SODIUM 137 MCG: 112 TABLET ORAL at 06:23

## 2019-01-01 RX ADMIN — ASPIRIN 81 MG CHEWABLE TABLET 81 MG: 81 TABLET CHEWABLE at 08:10

## 2019-01-01 RX ADMIN — CIPROFLOXACIN 1 DROP: 3 SOLUTION OPHTHALMIC at 23:09

## 2019-01-01 RX ADMIN — ASPIRIN 81 MG 81 MG: 81 TABLET ORAL at 11:09

## 2019-01-01 RX ADMIN — Medication 10 ML: at 22:47

## 2019-01-01 RX ADMIN — HEPARIN SODIUM 5000 UNITS: 5000 INJECTION INTRAVENOUS; SUBCUTANEOUS at 00:15

## 2019-01-01 RX ADMIN — SODIUM CHLORIDE 40 MG: 9 INJECTION, SOLUTION INTRAMUSCULAR; INTRAVENOUS; SUBCUTANEOUS at 08:15

## 2019-01-01 RX ADMIN — Medication 10 ML: at 13:21

## 2019-01-01 RX ADMIN — FUROSEMIDE 20 MG: 10 INJECTION, SOLUTION INTRAMUSCULAR; INTRAVENOUS at 10:04

## 2019-01-01 RX ADMIN — AMIODARONE HYDROCHLORIDE 200 MG: 200 TABLET ORAL at 09:11

## 2019-01-01 RX ADMIN — DEXTROSE MONOHYDRATE AND SODIUM CHLORIDE 100 ML/HR: 5; .45 INJECTION, SOLUTION INTRAVENOUS at 23:55

## 2019-01-01 RX ADMIN — PANTOPRAZOLE SODIUM 40 MG: 40 TABLET, DELAYED RELEASE ORAL at 10:37

## 2019-01-01 RX ADMIN — FINASTERIDE 5 MG: 5 TABLET, FILM COATED ORAL at 19:03

## 2019-01-01 RX ADMIN — FINASTERIDE 5 MG: 5 TABLET, FILM COATED ORAL at 17:51

## 2019-01-01 RX ADMIN — BISACODYL 10 MG: 10 SUPPOSITORY RECTAL at 20:57

## 2019-01-01 RX ADMIN — PANTOPRAZOLE SODIUM 40 MG: 40 TABLET, DELAYED RELEASE ORAL at 08:00

## 2019-01-01 RX ADMIN — FUROSEMIDE 40 MG: 40 TABLET ORAL at 10:02

## 2019-01-01 RX ADMIN — CLOPIDOGREL BISULFATE 75 MG: 75 TABLET ORAL at 09:10

## 2019-01-01 RX ADMIN — CLOPIDOGREL BISULFATE 75 MG: 75 TABLET, FILM COATED ORAL at 08:41

## 2019-01-01 RX ADMIN — SODIUM CHLORIDE 0.2 MCG/KG/HR: 900 INJECTION, SOLUTION INTRAVENOUS at 04:34

## 2019-01-01 RX ADMIN — DOCUSATE SODIUM 100 MG: 100 CAPSULE, LIQUID FILLED ORAL at 11:08

## 2019-01-01 RX ADMIN — SODIUM CHLORIDE 40 MG: 9 INJECTION, SOLUTION INTRAMUSCULAR; INTRAVENOUS; SUBCUTANEOUS at 09:26

## 2019-01-01 RX ADMIN — MIDAZOLAM 1 MG: 1 INJECTION INTRAMUSCULAR; INTRAVENOUS at 21:17

## 2019-01-01 RX ADMIN — TEMAZEPAM 30 MG: 30 CAPSULE ORAL at 21:48

## 2019-01-01 RX ADMIN — MIDODRINE HYDROCHLORIDE 5 MG: 5 TABLET ORAL at 09:04

## 2019-01-01 RX ADMIN — PANTOPRAZOLE SODIUM 40 MG: 40 TABLET, DELAYED RELEASE ORAL at 09:03

## 2019-01-01 RX ADMIN — QUETIAPINE FUMARATE 25 MG: 25 TABLET ORAL at 09:30

## 2019-01-01 RX ADMIN — LORAZEPAM 1 MG: 2 INJECTION INTRAMUSCULAR; INTRAVENOUS at 22:55

## 2019-01-01 RX ADMIN — MIDODRINE HYDROCHLORIDE 10 MG: 5 TABLET ORAL at 09:02

## 2019-01-01 RX ADMIN — TAMSULOSIN HYDROCHLORIDE 0.8 MG: 0.4 CAPSULE ORAL at 08:16

## 2019-01-01 RX ADMIN — SODIUM CHLORIDE 40 MG: 9 INJECTION, SOLUTION INTRAMUSCULAR; INTRAVENOUS; SUBCUTANEOUS at 23:15

## 2019-01-01 RX ADMIN — LEVOTHYROXINE SODIUM 137 MCG: 112 TABLET ORAL at 05:08

## 2019-01-01 RX ADMIN — SULFAMETHOXAZOLE AND TRIMETHOPRIM 1 TABLET: 800; 160 TABLET ORAL at 21:18

## 2019-01-01 RX ADMIN — Medication 10 ML: at 06:34

## 2019-01-01 RX ADMIN — LEVOTHYROXINE SODIUM 137 MCG: 112 TABLET ORAL at 06:26

## 2019-01-01 RX ADMIN — LEVOTHYROXINE SODIUM 137 MCG: 25 TABLET ORAL at 05:49

## 2019-01-01 RX ADMIN — PRAVASTATIN SODIUM 80 MG: 40 TABLET ORAL at 21:48

## 2019-01-01 RX ADMIN — MIDODRINE HYDROCHLORIDE 5 MG: 5 TABLET ORAL at 08:41

## 2019-01-01 RX ADMIN — FLUTICASONE PROPIONATE 2 SPRAY: 50 SPRAY, METERED NASAL at 08:30

## 2019-01-01 RX ADMIN — PRAVASTATIN SODIUM 80 MG: 40 TABLET ORAL at 23:15

## 2019-01-01 RX ADMIN — FINASTERIDE 5 MG: 5 TABLET, FILM COATED ORAL at 17:33

## 2019-01-01 RX ADMIN — DOXYCYCLINE HYCLATE 100 MG: 100 TABLET, COATED ORAL at 21:31

## 2019-01-01 RX ADMIN — MIDODRINE HYDROCHLORIDE 5 MG: 5 TABLET ORAL at 12:09

## 2019-01-01 RX ADMIN — POLYETHYLENE GLYCOL 3350 17 G: 17 POWDER, FOR SOLUTION ORAL at 11:09

## 2019-01-01 RX ADMIN — Medication 10 ML: at 05:05

## 2019-01-01 RX ADMIN — TAMSULOSIN HYDROCHLORIDE 0.4 MG: 0.4 CAPSULE ORAL at 09:06

## 2019-01-01 RX ADMIN — SODIUM CHLORIDE 40 MG: 9 INJECTION, SOLUTION INTRAMUSCULAR; INTRAVENOUS; SUBCUTANEOUS at 09:10

## 2019-01-01 RX ADMIN — MIDODRINE HYDROCHLORIDE 10 MG: 5 TABLET ORAL at 09:30

## 2019-01-01 RX ADMIN — GUAIFENESIN 600 MG: 600 TABLET, EXTENDED RELEASE ORAL at 08:19

## 2019-01-01 RX ADMIN — TICAGRELOR 90 MG: 90 TABLET ORAL at 17:54

## 2019-01-01 RX ADMIN — Medication 1 AMPULE: at 17:54

## 2019-01-01 RX ADMIN — TAMSULOSIN HYDROCHLORIDE 0.4 MG: 0.4 CAPSULE ORAL at 13:51

## 2019-01-01 RX ADMIN — TAMSULOSIN HYDROCHLORIDE 0.8 MG: 0.4 CAPSULE ORAL at 10:02

## 2019-01-01 RX ADMIN — SODIUM CHLORIDE, SODIUM LACTATE, POTASSIUM CHLORIDE, AND CALCIUM CHLORIDE 25 ML/HR: 600; 310; 30; 20 INJECTION, SOLUTION INTRAVENOUS at 20:19

## 2019-01-01 RX ADMIN — Medication 10 ML: at 16:05

## 2019-01-01 RX ADMIN — MIDODRINE HYDROCHLORIDE 10 MG: 5 TABLET ORAL at 08:27

## 2019-01-01 RX ADMIN — HEPARIN SODIUM 5000 UNITS: 5000 INJECTION INTRAVENOUS; SUBCUTANEOUS at 08:12

## 2019-01-01 RX ADMIN — Medication 10 ML: at 06:06

## 2019-01-01 RX ADMIN — Medication 10 ML: at 08:47

## 2019-01-01 RX ADMIN — MIDAZOLAM 1 MG: 1 INJECTION INTRAMUSCULAR; INTRAVENOUS at 01:10

## 2019-01-01 RX ADMIN — PRAVASTATIN SODIUM 40 MG: 40 TABLET ORAL at 08:18

## 2019-01-01 RX ADMIN — HEPARIN SODIUM 5000 UNITS: 5000 INJECTION INTRAVENOUS; SUBCUTANEOUS at 10:58

## 2019-01-01 RX ADMIN — CIPROFLOXACIN 1 DROP: 3 SOLUTION OPHTHALMIC at 17:26

## 2019-01-01 RX ADMIN — LORAZEPAM 0.5 MG: 2 INJECTION INTRAMUSCULAR; INTRAVENOUS at 02:14

## 2019-01-01 RX ADMIN — ACETAMINOPHEN 650 MG: 325 TABLET ORAL at 21:16

## 2019-01-01 RX ADMIN — MIDODRINE HYDROCHLORIDE 5 MG: 5 TABLET ORAL at 17:26

## 2019-01-01 RX ADMIN — Medication 10 ML: at 08:33

## 2019-01-01 RX ADMIN — VANCOMYCIN HYDROCHLORIDE 1000 MG: 1 INJECTION, POWDER, LYOPHILIZED, FOR SOLUTION INTRAVENOUS at 05:21

## 2019-01-01 RX ADMIN — TAMSULOSIN HYDROCHLORIDE 0.4 MG: 0.4 CAPSULE ORAL at 09:25

## 2019-01-01 RX ADMIN — PRAVASTATIN SODIUM 40 MG: 40 TABLET ORAL at 09:08

## 2019-01-01 RX ADMIN — Medication 10 ML: at 21:59

## 2019-01-01 RX ADMIN — CLOPIDOGREL BISULFATE 75 MG: 75 TABLET ORAL at 08:42

## 2019-01-01 RX ADMIN — DOXYCYCLINE HYCLATE 100 MG: 100 TABLET, COATED ORAL at 08:43

## 2019-01-01 RX ADMIN — FINASTERIDE 5 MG: 5 TABLET, FILM COATED ORAL at 17:42

## 2019-01-01 RX ADMIN — HEPARIN SODIUM 5000 UNITS: 5000 INJECTION INTRAVENOUS; SUBCUTANEOUS at 09:30

## 2019-01-01 RX ADMIN — HYDROMORPHONE HYDROCHLORIDE 0.5 MG: 2 INJECTION, SOLUTION INTRAMUSCULAR; INTRAVENOUS; SUBCUTANEOUS at 12:06

## 2019-01-01 RX ADMIN — GUAIFENESIN 600 MG: 600 TABLET, EXTENDED RELEASE ORAL at 19:37

## 2019-01-01 RX ADMIN — PANTOPRAZOLE SODIUM 40 MG: 40 TABLET, DELAYED RELEASE ORAL at 08:43

## 2019-01-01 RX ADMIN — PANTOPRAZOLE SODIUM 40 MG: 40 TABLET, DELAYED RELEASE ORAL at 11:08

## 2019-01-01 RX ADMIN — DOXYCYCLINE 100 MG: 100 INJECTION, POWDER, LYOPHILIZED, FOR SOLUTION INTRAVENOUS at 08:48

## 2019-01-01 RX ADMIN — LORAZEPAM 1 MG: 2 INJECTION INTRAMUSCULAR; INTRAVENOUS at 19:50

## 2019-01-01 RX ADMIN — VANCOMYCIN HYDROCHLORIDE 1000 MG: 1 INJECTION, POWDER, LYOPHILIZED, FOR SOLUTION INTRAVENOUS at 13:08

## 2019-01-01 RX ADMIN — CLOPIDOGREL BISULFATE 75 MG: 75 TABLET, FILM COATED ORAL at 11:08

## 2019-01-01 RX ADMIN — DEXTROSE MONOHYDRATE AND SODIUM CHLORIDE 100 ML/HR: 5; .45 INJECTION, SOLUTION INTRAVENOUS at 09:37

## 2019-01-01 RX ADMIN — SODIUM CHLORIDE 40 MG: 9 INJECTION, SOLUTION INTRAMUSCULAR; INTRAVENOUS; SUBCUTANEOUS at 08:10

## 2019-01-01 RX ADMIN — HEPARIN SODIUM 5000 UNITS: 5000 INJECTION INTRAVENOUS; SUBCUTANEOUS at 00:08

## 2019-01-01 RX ADMIN — AMIODARONE HYDROCHLORIDE 200 MG: 200 TABLET ORAL at 08:00

## 2019-01-01 RX ADMIN — CLOPIDOGREL BISULFATE 75 MG: 75 TABLET ORAL at 18:56

## 2019-01-01 RX ADMIN — PANTOPRAZOLE SODIUM 40 MG: 40 TABLET, DELAYED RELEASE ORAL at 08:18

## 2019-01-01 RX ADMIN — PROPOFOL 75 MCG/KG/MIN: 10 INJECTION, EMULSION INTRAVENOUS at 16:46

## 2019-01-01 RX ADMIN — QUETIAPINE FUMARATE 25 MG: 25 TABLET ORAL at 08:18

## 2019-01-01 RX ADMIN — MIDODRINE HYDROCHLORIDE 10 MG: 5 TABLET ORAL at 18:09

## 2019-01-01 RX ADMIN — SODIUM CHLORIDE 250 ML: 900 INJECTION, SOLUTION INTRAVENOUS at 08:56

## 2019-01-01 RX ADMIN — CLOPIDOGREL BISULFATE 75 MG: 75 TABLET ORAL at 08:41

## 2019-01-01 RX ADMIN — Medication 10 ML: at 05:46

## 2019-01-01 RX ADMIN — Medication 10 ML: at 06:45

## 2019-01-01 RX ADMIN — ONDANSETRON 4 MG: 2 INJECTION INTRAMUSCULAR; INTRAVENOUS at 20:15

## 2019-01-01 RX ADMIN — CEFEPIME HYDROCHLORIDE 2 G: 2 INJECTION, POWDER, FOR SOLUTION INTRAVENOUS at 21:37

## 2019-01-01 RX ADMIN — VANCOMYCIN HYDROCHLORIDE 1000 MG: 1 INJECTION, POWDER, LYOPHILIZED, FOR SOLUTION INTRAVENOUS at 18:18

## 2019-01-01 RX ADMIN — CEFEPIME HYDROCHLORIDE 2 G: 2 INJECTION, POWDER, FOR SOLUTION INTRAVENOUS at 12:05

## 2019-01-01 RX ADMIN — DOXYCYCLINE 100 MG: 100 INJECTION, POWDER, LYOPHILIZED, FOR SOLUTION INTRAVENOUS at 21:46

## 2019-01-01 RX ADMIN — Medication 10 ML: at 05:55

## 2019-01-01 RX ADMIN — CEFAZOLIN 1 G: 1 INJECTION, POWDER, FOR SOLUTION INTRAMUSCULAR; INTRAVENOUS; PARENTERAL at 05:51

## 2019-01-01 RX ADMIN — LEVOTHYROXINE SODIUM 137 MCG: 112 TABLET ORAL at 07:01

## 2019-01-01 RX ADMIN — Medication 80 MCG: at 19:18

## 2019-01-01 RX ADMIN — AMIODARONE HYDROCHLORIDE 200 MG: 200 TABLET ORAL at 08:43

## 2019-01-01 RX ADMIN — TEMAZEPAM 30 MG: 30 CAPSULE ORAL at 20:36

## 2019-01-01 RX ADMIN — ACETAMINOPHEN 650 MG: 325 TABLET ORAL at 11:03

## 2019-01-01 RX ADMIN — ASPIRIN 81 MG 81 MG: 81 TABLET ORAL at 09:26

## 2019-01-01 RX ADMIN — ENOXAPARIN SODIUM 40 MG: 40 INJECTION SUBCUTANEOUS at 18:20

## 2019-01-01 RX ADMIN — MORPHINE SULFATE 2 MG: 2 INJECTION, SOLUTION INTRAMUSCULAR; INTRAVENOUS at 03:53

## 2019-01-01 RX ADMIN — LEVOTHYROXINE SODIUM 137 MCG: 0.11 TABLET ORAL at 10:00

## 2019-01-01 RX ADMIN — DOXYCYCLINE HYCLATE 100 MG: 100 TABLET, COATED ORAL at 09:04

## 2019-01-01 RX ADMIN — TAMSULOSIN HYDROCHLORIDE 0.4 MG: 0.4 CAPSULE ORAL at 09:21

## 2019-01-01 RX ADMIN — LEVOTHYROXINE SODIUM 137 MCG: 25 TABLET ORAL at 06:47

## 2019-01-01 RX ADMIN — ALBUTEROL SULFATE 2 PUFF: 90 AEROSOL, METERED RESPIRATORY (INHALATION) at 08:31

## 2019-01-01 RX ADMIN — MIDODRINE HYDROCHLORIDE 5 MG: 5 TABLET ORAL at 17:40

## 2019-01-01 RX ADMIN — GUAIFENESIN 600 MG: 600 TABLET, EXTENDED RELEASE ORAL at 21:36

## 2019-01-01 RX ADMIN — Medication 10 ML: at 05:07

## 2019-01-01 RX ADMIN — Medication 10 ML: at 14:13

## 2019-01-01 RX ADMIN — ACETAMINOPHEN 650 MG: 325 TABLET ORAL at 08:16

## 2019-01-01 RX ADMIN — MIDODRINE HYDROCHLORIDE 5 MG: 5 TABLET ORAL at 19:28

## 2019-01-01 RX ADMIN — HYDROMORPHONE HYDROCHLORIDE 0.5 MG: 1 INJECTION, SOLUTION INTRAMUSCULAR; INTRAVENOUS; SUBCUTANEOUS at 04:13

## 2019-01-01 RX ADMIN — HALOPERIDOL LACTATE 2 MG: 5 INJECTION INTRAMUSCULAR at 02:40

## 2019-01-01 RX ADMIN — MIDODRINE HYDROCHLORIDE 5 MG: 5 TABLET ORAL at 17:03

## 2019-01-01 RX ADMIN — GLYCOPYRROLATE 0.2 MG: 0.2 INJECTION, SOLUTION INTRAMUSCULAR; INTRAVENOUS at 17:21

## 2019-01-01 RX ADMIN — HALOPERIDOL LACTATE 1 MG: 5 INJECTION, SOLUTION INTRAMUSCULAR at 06:08

## 2019-01-01 RX ADMIN — ASPIRIN 81 MG 81 MG: 81 TABLET ORAL at 09:02

## 2019-01-01 RX ADMIN — PRAVASTATIN SODIUM 80 MG: 40 TABLET ORAL at 21:32

## 2019-01-01 RX ADMIN — Medication 10 ML: at 22:44

## 2019-01-01 RX ADMIN — LEVOTHYROXINE SODIUM 137 MCG: 0.11 TABLET ORAL at 08:16

## 2019-01-01 RX ADMIN — CEFTRIAXONE 1 G: 1 INJECTION, POWDER, FOR SOLUTION INTRAMUSCULAR; INTRAVENOUS at 22:31

## 2019-01-01 RX ADMIN — ASPIRIN 81 MG CHEWABLE TABLET 81 MG: 81 TABLET CHEWABLE at 08:18

## 2019-01-01 RX ADMIN — LORAZEPAM 0.5 MG: 2 INJECTION INTRAMUSCULAR; INTRAVENOUS at 20:55

## 2019-01-01 RX ADMIN — PANTOPRAZOLE SODIUM 40 MG: 40 TABLET, DELAYED RELEASE ORAL at 08:16

## 2019-01-01 RX ADMIN — Medication 10 ML: at 12:30

## 2019-01-01 RX ADMIN — SODIUM CHLORIDE 40 MG: 9 INJECTION, SOLUTION INTRAMUSCULAR; INTRAVENOUS; SUBCUTANEOUS at 20:35

## 2019-01-01 RX ADMIN — PANTOPRAZOLE SODIUM 40 MG: 40 TABLET, DELAYED RELEASE ORAL at 07:01

## 2019-01-01 RX ADMIN — LEVOTHYROXINE SODIUM 137 MCG: 112 TABLET ORAL at 05:20

## 2019-01-01 RX ADMIN — MIDODRINE HYDROCHLORIDE 10 MG: 5 TABLET ORAL at 12:59

## 2019-01-01 RX ADMIN — DILTIAZEM HYDROCHLORIDE 120 MG: 120 CAPSULE, COATED, EXTENDED RELEASE ORAL at 11:08

## 2019-01-01 RX ADMIN — CASTOR OIL AND BALSAM, PERU: 788; 87 OINTMENT TOPICAL at 19:21

## 2019-01-01 RX ADMIN — Medication 1 AMPULE: at 08:41

## 2019-01-01 RX ADMIN — SODIUM CHLORIDE 40 MG: 9 INJECTION, SOLUTION INTRAMUSCULAR; INTRAVENOUS; SUBCUTANEOUS at 21:19

## 2019-01-01 RX ADMIN — PANTOPRAZOLE SODIUM 40 MG: 40 TABLET, DELAYED RELEASE ORAL at 09:32

## 2019-01-01 RX ADMIN — DEXTROSE MONOHYDRATE AND SODIUM CHLORIDE 100 ML/HR: 5; .45 INJECTION, SOLUTION INTRAVENOUS at 16:49

## 2019-01-01 RX ADMIN — ACETAMINOPHEN 650 MG: 325 TABLET ORAL at 01:53

## 2019-01-01 RX ADMIN — BISACODYL 10 MG: 5 TABLET, COATED ORAL at 12:44

## 2019-01-01 RX ADMIN — HALOPERIDOL LACTATE 2 MG: 5 INJECTION, SOLUTION INTRAMUSCULAR at 02:37

## 2019-01-01 RX ADMIN — HEPARIN SODIUM 5000 UNITS: 5000 INJECTION INTRAVENOUS; SUBCUTANEOUS at 16:54

## 2019-01-01 RX ADMIN — AMIODARONE HYDROCHLORIDE 400 MG: 200 TABLET ORAL at 17:54

## 2019-01-01 RX ADMIN — MIDODRINE HYDROCHLORIDE 5 MG: 5 TABLET ORAL at 12:38

## 2019-01-01 RX ADMIN — PANTOPRAZOLE SODIUM 40 MG: 40 TABLET, DELAYED RELEASE ORAL at 08:42

## 2019-01-01 RX ADMIN — DILTIAZEM HYDROCHLORIDE 120 MG: 120 CAPSULE, COATED, EXTENDED RELEASE ORAL at 10:37

## 2019-01-01 RX ADMIN — Medication 40 ML: at 10:03

## 2019-01-01 RX ADMIN — MIDODRINE HYDROCHLORIDE 5 MG: 5 TABLET ORAL at 08:18

## 2019-01-01 RX ADMIN — ACETAMINOPHEN 650 MG: 325 TABLET ORAL at 02:28

## 2019-01-01 RX ADMIN — POLYETHYLENE GLYCOL 3350 17 G: 17 POWDER, FOR SOLUTION ORAL at 09:08

## 2019-01-01 RX ADMIN — Medication 10 ML: at 21:19

## 2019-01-01 RX ADMIN — TEMAZEPAM 15 MG: 15 CAPSULE ORAL at 21:16

## 2019-01-01 RX ADMIN — Medication 10 ML: at 07:19

## 2019-01-01 RX ADMIN — TEMAZEPAM 30 MG: 15 CAPSULE ORAL at 21:24

## 2019-01-01 RX ADMIN — Medication 1 AMPULE: at 00:22

## 2019-01-01 RX ADMIN — FENTANYL CITRATE 25 MCG: 50 INJECTION, SOLUTION INTRAMUSCULAR; INTRAVENOUS at 20:20

## 2019-01-01 RX ADMIN — MIDODRINE HYDROCHLORIDE 5 MG: 5 TABLET ORAL at 17:54

## 2019-01-01 RX ADMIN — Medication 10 ML: at 17:40

## 2019-01-01 RX ADMIN — LEVOTHYROXINE SODIUM 137 MCG: 112 TABLET ORAL at 06:22

## 2019-01-01 RX ADMIN — LORAZEPAM 1 MG: 2 INJECTION INTRAMUSCULAR; INTRAVENOUS at 00:08

## 2019-01-01 RX ADMIN — ASPIRIN 81 MG 81 MG: 81 TABLET ORAL at 10:37

## 2019-01-01 RX ADMIN — TEMAZEPAM 30 MG: 15 CAPSULE ORAL at 19:24

## 2019-01-01 RX ADMIN — SODIUM CHLORIDE 100 ML/HR: 900 INJECTION, SOLUTION INTRAVENOUS at 07:01

## 2019-01-01 RX ADMIN — TAMSULOSIN HYDROCHLORIDE 0.4 MG: 0.4 CAPSULE ORAL at 09:10

## 2019-01-01 RX ADMIN — SODIUM CHLORIDE 40 MG: 9 INJECTION, SOLUTION INTRAMUSCULAR; INTRAVENOUS; SUBCUTANEOUS at 13:51

## 2019-01-01 RX ADMIN — Medication 10 ML: at 05:21

## 2019-01-01 RX ADMIN — MIDODRINE HYDROCHLORIDE 10 MG: 5 TABLET ORAL at 12:19

## 2019-01-01 RX ADMIN — FINASTERIDE 5 MG: 5 TABLET, FILM COATED ORAL at 18:10

## 2019-01-01 RX ADMIN — LEVOTHYROXINE SODIUM 137 MCG: 112 TABLET ORAL at 09:25

## 2019-01-01 RX ADMIN — HEPARIN SODIUM 5000 UNITS: 5000 INJECTION INTRAVENOUS; SUBCUTANEOUS at 23:46

## 2019-01-01 RX ADMIN — ASPIRIN 81 MG 81 MG: 81 TABLET ORAL at 10:58

## 2019-01-01 RX ADMIN — SIMVASTATIN 40 MG: 20 TABLET, FILM COATED ORAL at 21:33

## 2019-01-01 RX ADMIN — MIDODRINE HYDROCHLORIDE 10 MG: 5 TABLET ORAL at 17:31

## 2019-01-01 RX ADMIN — SODIUM CHLORIDE 559 ML: 900 INJECTION, SOLUTION INTRAVENOUS at 05:28

## 2019-01-01 RX ADMIN — MIDODRINE HYDROCHLORIDE 10 MG: 5 TABLET ORAL at 08:12

## 2019-01-01 RX ADMIN — VANCOMYCIN HYDROCHLORIDE 1000 MG: 1 INJECTION, POWDER, LYOPHILIZED, FOR SOLUTION INTRAVENOUS at 12:06

## 2019-01-01 RX ADMIN — MIDODRINE HYDROCHLORIDE 10 MG: 5 TABLET ORAL at 16:46

## 2019-01-01 RX ADMIN — SODIUM CHLORIDE 40 MG: 9 INJECTION, SOLUTION INTRAMUSCULAR; INTRAVENOUS; SUBCUTANEOUS at 11:37

## 2019-01-01 RX ADMIN — PRAVASTATIN SODIUM 40 MG: 40 TABLET ORAL at 08:42

## 2019-01-01 RX ADMIN — HEPARIN SODIUM 5000 UNITS: 5000 INJECTION INTRAVENOUS; SUBCUTANEOUS at 17:29

## 2019-01-01 RX ADMIN — DILTIAZEM HYDROCHLORIDE 120 MG: 120 CAPSULE, COATED, EXTENDED RELEASE ORAL at 15:03

## 2019-01-01 RX ADMIN — LORAZEPAM 0.5 MG: 2 INJECTION INTRAMUSCULAR; INTRAVENOUS at 19:39

## 2019-01-01 RX ADMIN — HYDROMORPHONE HYDROCHLORIDE 0.5 MG: 2 INJECTION, SOLUTION INTRAMUSCULAR; INTRAVENOUS; SUBCUTANEOUS at 16:13

## 2019-01-01 RX ADMIN — ASPIRIN 81 MG 81 MG: 81 TABLET ORAL at 08:14

## 2019-01-01 RX ADMIN — SODIUM CHLORIDE 1000 ML: 900 INJECTION, SOLUTION INTRAVENOUS at 16:48

## 2019-01-01 RX ADMIN — Medication 10 ML: at 13:08

## 2019-01-01 RX ADMIN — Medication 1 AMPULE: at 08:18

## 2019-01-01 RX ADMIN — MIDODRINE HYDROCHLORIDE 5 MG: 5 TABLET ORAL at 11:48

## 2019-01-01 RX ADMIN — SODIUM CHLORIDE 40 MG: 9 INJECTION, SOLUTION INTRAMUSCULAR; INTRAVENOUS; SUBCUTANEOUS at 09:22

## 2019-01-01 RX ADMIN — Medication 10 ML: at 16:11

## 2019-01-01 RX ADMIN — CLOPIDOGREL BISULFATE 75 MG: 75 TABLET ORAL at 08:14

## 2019-01-01 RX ADMIN — PRAVASTATIN SODIUM 80 MG: 40 TABLET ORAL at 21:18

## 2019-01-01 RX ADMIN — HEPARIN SODIUM 5000 UNITS: 5000 INJECTION INTRAVENOUS; SUBCUTANEOUS at 18:09

## 2019-01-01 RX ADMIN — TAMSULOSIN HYDROCHLORIDE 0.8 MG: 0.4 CAPSULE ORAL at 10:01

## 2019-01-01 RX ADMIN — MIDODRINE HYDROCHLORIDE 10 MG: 5 TABLET ORAL at 10:50

## 2019-01-01 RX ADMIN — LORAZEPAM 1 MG: 2 INJECTION INTRAMUSCULAR; INTRAVENOUS at 21:31

## 2019-01-01 RX ADMIN — Medication 10 ML: at 20:38

## 2019-01-01 RX ADMIN — LEVOTHYROXINE SODIUM 137 MCG: 0.11 TABLET ORAL at 06:24

## 2019-01-01 RX ADMIN — LEVOTHYROXINE SODIUM 137 MCG: 0.11 TABLET ORAL at 05:35

## 2019-01-01 RX ADMIN — TEMAZEPAM 30 MG: 15 CAPSULE ORAL at 21:19

## 2019-01-01 RX ADMIN — CLOPIDOGREL BISULFATE 75 MG: 75 TABLET ORAL at 08:43

## 2019-01-01 RX ADMIN — ACETAMINOPHEN 650 MG: 325 TABLET ORAL at 20:44

## 2019-01-01 RX ADMIN — DOXYCYCLINE 100 MG: 100 INJECTION, POWDER, LYOPHILIZED, FOR SOLUTION INTRAVENOUS at 21:19

## 2019-01-01 RX ADMIN — Medication 10 ML: at 10:59

## 2019-01-01 RX ADMIN — ATORVASTATIN CALCIUM 40 MG: 40 TABLET, FILM COATED ORAL at 11:08

## 2019-01-01 RX ADMIN — TEMAZEPAM 30 MG: 30 CAPSULE ORAL at 22:43

## 2019-01-01 RX ADMIN — QUETIAPINE FUMARATE 25 MG: 25 TABLET ORAL at 18:09

## 2019-01-01 RX ADMIN — CIPROFLOXACIN 1 DROP: 3 SOLUTION OPHTHALMIC at 16:11

## 2019-01-01 RX ADMIN — ACETAMINOPHEN 650 MG: 325 TABLET ORAL at 21:42

## 2019-01-01 RX ADMIN — Medication 10 ML: at 21:46

## 2019-01-01 RX ADMIN — CLOPIDOGREL BISULFATE 75 MG: 75 TABLET ORAL at 08:10

## 2019-01-01 RX ADMIN — AMIODARONE HYDROCHLORIDE 200 MG: 200 TABLET ORAL at 08:50

## 2019-01-01 RX ADMIN — PRAVASTATIN SODIUM 40 MG: 40 TABLET ORAL at 08:41

## 2019-01-01 RX ADMIN — CEFAZOLIN 1 G: 1 INJECTION, POWDER, FOR SOLUTION INTRAMUSCULAR; INTRAVENOUS; PARENTERAL at 21:31

## 2019-01-01 RX ADMIN — SIMVASTATIN 40 MG: 20 TABLET, FILM COATED ORAL at 22:08

## 2019-01-01 RX ADMIN — ASPIRIN 81 MG CHEWABLE TABLET 81 MG: 81 TABLET CHEWABLE at 09:11

## 2019-01-01 RX ADMIN — AMIODARONE HYDROCHLORIDE 200 MG: 200 TABLET ORAL at 08:19

## 2019-01-01 RX ADMIN — POLYETHYLENE GLYCOL-3350 AND ELECTROLYTES 4000 ML: 236; 6.74; 5.86; 2.97; 22.74 POWDER, FOR SOLUTION ORAL at 16:04

## 2019-01-01 RX ADMIN — LORAZEPAM 1 MG: 2 INJECTION INTRAMUSCULAR; INTRAVENOUS at 04:13

## 2019-01-01 RX ADMIN — LORAZEPAM 1 MG: 2 INJECTION INTRAMUSCULAR; INTRAVENOUS at 12:24

## 2019-01-01 RX ADMIN — Medication 10 ML: at 05:29

## 2019-01-01 RX ADMIN — VANCOMYCIN HYDROCHLORIDE 1000 MG: 1 INJECTION, POWDER, LYOPHILIZED, FOR SOLUTION INTRAVENOUS at 00:08

## 2019-01-01 RX ADMIN — HEPARIN SODIUM 5000 UNITS: 5000 INJECTION INTRAVENOUS; SUBCUTANEOUS at 01:35

## 2019-01-01 RX ADMIN — SODIUM CHLORIDE 75 ML/HR: 900 INJECTION, SOLUTION INTRAVENOUS at 04:29

## 2019-01-01 RX ADMIN — LIDOCAINE HYDROCHLORIDE 40 MG: 20 INJECTION, SOLUTION EPIDURAL; INFILTRATION; INTRACAUDAL; PERINEURAL at 11:54

## 2019-01-01 RX ADMIN — ASPIRIN 81 MG CHEWABLE TABLET 81 MG: 81 TABLET CHEWABLE at 08:20

## 2019-01-01 RX ADMIN — FINASTERIDE 5 MG: 5 TABLET, FILM COATED ORAL at 18:27

## 2019-01-01 RX ADMIN — HEPARIN SODIUM 5000 UNITS: 5000 INJECTION INTRAVENOUS; SUBCUTANEOUS at 23:31

## 2019-01-01 RX ADMIN — SODIUM CHLORIDE 40 MG: 9 INJECTION, SOLUTION INTRAMUSCULAR; INTRAVENOUS; SUBCUTANEOUS at 09:11

## 2019-01-01 RX ADMIN — Medication 10 ML: at 18:28

## 2019-01-01 RX ADMIN — PANTOPRAZOLE SODIUM 40 MG: 40 TABLET, DELAYED RELEASE ORAL at 08:14

## 2019-01-01 RX ADMIN — CEFEPIME HYDROCHLORIDE 2 G: 2 INJECTION, POWDER, FOR SOLUTION INTRAVENOUS at 21:23

## 2019-01-01 RX ADMIN — SODIUM CHLORIDE 50 ML/HR: 900 INJECTION, SOLUTION INTRAVENOUS at 10:23

## 2019-01-01 RX ADMIN — FINASTERIDE 5 MG: 5 TABLET, FILM COATED ORAL at 17:17

## 2019-01-01 RX ADMIN — HEPARIN SODIUM 5000 UNITS: 5000 INJECTION INTRAVENOUS; SUBCUTANEOUS at 16:47

## 2019-01-01 RX ADMIN — QUETIAPINE FUMARATE 25 MG: 25 TABLET ORAL at 08:43

## 2019-01-01 RX ADMIN — Medication 10 ML: at 05:35

## 2019-01-01 RX ADMIN — TEMAZEPAM 15 MG: 15 CAPSULE ORAL at 20:27

## 2019-01-01 RX ADMIN — FINASTERIDE 5 MG: 5 TABLET, FILM COATED ORAL at 17:03

## 2019-01-01 RX ADMIN — GLYCOPYRROLATE 0.2 MG: 0.2 INJECTION, SOLUTION INTRAMUSCULAR; INTRAVENOUS at 20:42

## 2019-01-01 RX ADMIN — FINASTERIDE 5 MG: 5 TABLET, FILM COATED ORAL at 17:37

## 2019-01-01 RX ADMIN — ATORVASTATIN CALCIUM 40 MG: 40 TABLET, FILM COATED ORAL at 08:41

## 2019-01-01 RX ADMIN — PROPOFOL 50 MG: 10 INJECTION, EMULSION INTRAVENOUS at 16:46

## 2019-01-01 RX ADMIN — HYDROMORPHONE HYDROCHLORIDE 0.5 MG: 2 INJECTION, SOLUTION INTRAMUSCULAR; INTRAVENOUS; SUBCUTANEOUS at 17:46

## 2019-01-01 RX ADMIN — MIDODRINE HYDROCHLORIDE 5 MG: 5 TABLET ORAL at 08:42

## 2019-01-01 RX ADMIN — HEPARIN SODIUM 5000 UNITS: 5000 INJECTION INTRAVENOUS; SUBCUTANEOUS at 09:27

## 2019-01-01 RX ADMIN — AMIODARONE HYDROCHLORIDE 200 MG: 200 TABLET ORAL at 09:45

## 2019-01-01 RX ADMIN — ACETAMINOPHEN 650 MG: 650 SUPPOSITORY RECTAL at 05:37

## 2019-01-01 RX ADMIN — QUETIAPINE FUMARATE 25 MG: 25 TABLET ORAL at 17:04

## 2019-01-01 RX ADMIN — AMIODARONE HYDROCHLORIDE 400 MG: 200 TABLET ORAL at 09:05

## 2019-01-01 RX ADMIN — Medication 10 ML: at 22:39

## 2019-01-01 RX ADMIN — TAMSULOSIN HYDROCHLORIDE 0.4 MG: 0.4 CAPSULE ORAL at 11:37

## 2019-01-01 RX ADMIN — MIDODRINE HYDROCHLORIDE 5 MG: 5 TABLET ORAL at 18:56

## 2019-01-01 RX ADMIN — FLUTICASONE PROPIONATE 2 SPRAY: 50 SPRAY, METERED NASAL at 08:31

## 2019-01-01 RX ADMIN — HEPARIN SODIUM 5000 UNITS: 5000 INJECTION INTRAVENOUS; SUBCUTANEOUS at 08:49

## 2019-01-01 RX ADMIN — MIDODRINE HYDROCHLORIDE 10 MG: 5 TABLET ORAL at 13:07

## 2019-01-01 RX ADMIN — MIDODRINE HYDROCHLORIDE 10 MG: 5 TABLET ORAL at 09:26

## 2019-01-01 RX ADMIN — PANTOPRAZOLE SODIUM 40 MG: 40 TABLET, DELAYED RELEASE ORAL at 08:49

## 2019-01-01 RX ADMIN — VANCOMYCIN HYDROCHLORIDE 1000 MG: 1 INJECTION, POWDER, LYOPHILIZED, FOR SOLUTION INTRAVENOUS at 22:05

## 2019-01-01 RX ADMIN — HALOPERIDOL LACTATE 2 MG: 5 INJECTION, SOLUTION INTRAMUSCULAR at 02:56

## 2019-01-01 RX ADMIN — HEPARIN SODIUM 5000 UNITS: 5000 INJECTION INTRAVENOUS; SUBCUTANEOUS at 16:12

## 2019-01-01 RX ADMIN — PRAVASTATIN SODIUM 40 MG: 40 TABLET ORAL at 09:04

## 2019-01-01 RX ADMIN — MIDODRINE HYDROCHLORIDE 5 MG: 5 TABLET ORAL at 09:08

## 2019-01-01 RX ADMIN — ASPIRIN 81 MG 81 MG: 81 TABLET ORAL at 09:32

## 2019-01-01 RX ADMIN — MIDODRINE HYDROCHLORIDE 5 MG: 5 TABLET ORAL at 11:40

## 2019-01-01 RX ADMIN — CIPROFLOXACIN 1 DROP: 3 SOLUTION OPHTHALMIC at 17:49

## 2019-01-01 RX ADMIN — SODIUM CHLORIDE 40 MG: 9 INJECTION, SOLUTION INTRAMUSCULAR; INTRAVENOUS; SUBCUTANEOUS at 21:05

## 2019-01-01 RX ADMIN — PANTOPRAZOLE SODIUM 40 MG: 40 TABLET, DELAYED RELEASE ORAL at 10:58

## 2019-01-01 RX ADMIN — MIDODRINE HYDROCHLORIDE 10 MG: 5 TABLET ORAL at 16:48

## 2019-01-01 RX ADMIN — CLOPIDOGREL BISULFATE 75 MG: 75 TABLET ORAL at 09:05

## 2019-01-01 RX ADMIN — SODIUM CHLORIDE 1000 ML: 900 INJECTION, SOLUTION INTRAVENOUS at 18:15

## 2019-01-01 RX ADMIN — TEMAZEPAM 30 MG: 30 CAPSULE ORAL at 21:12

## 2019-01-01 RX ADMIN — HALOPERIDOL LACTATE 2 MG: 5 INJECTION, SOLUTION INTRAMUSCULAR at 02:20

## 2019-01-01 RX ADMIN — ACETAMINOPHEN 650 MG: 325 TABLET ORAL at 21:08

## 2019-01-01 RX ADMIN — HEPARIN SODIUM 5000 UNITS: 5000 INJECTION INTRAVENOUS; SUBCUTANEOUS at 23:56

## 2019-01-01 RX ADMIN — ACETAMINOPHEN 650 MG: 325 TABLET ORAL at 00:41

## 2019-01-01 RX ADMIN — SODIUM CHLORIDE 75 ML/HR: 900 INJECTION, SOLUTION INTRAVENOUS at 02:41

## 2019-01-01 RX ADMIN — MIDODRINE HYDROCHLORIDE 10 MG: 5 TABLET ORAL at 12:04

## 2019-01-01 RX ADMIN — TEMAZEPAM 15 MG: 15 CAPSULE ORAL at 21:47

## 2019-01-01 RX ADMIN — DOXYCYCLINE 100 MG: 100 INJECTION, POWDER, LYOPHILIZED, FOR SOLUTION INTRAVENOUS at 09:12

## 2019-01-01 RX ADMIN — SIMVASTATIN 40 MG: 20 TABLET, FILM COATED ORAL at 21:05

## 2019-01-01 RX ADMIN — LORAZEPAM 1 MG: 2 INJECTION INTRAMUSCULAR; INTRAVENOUS at 07:44

## 2019-01-01 RX ADMIN — LEVOTHYROXINE SODIUM 137 MCG: 112 TABLET ORAL at 05:54

## 2019-01-01 RX ADMIN — Medication 10 ML: at 21:18

## 2019-01-01 RX ADMIN — OXYCODONE AND ACETAMINOPHEN 2 TABLET: 5; 325 TABLET ORAL at 15:14

## 2019-01-01 RX ADMIN — SODIUM CHLORIDE 40 MG: 9 INJECTION, SOLUTION INTRAMUSCULAR; INTRAVENOUS; SUBCUTANEOUS at 22:02

## 2019-01-01 RX ADMIN — CIPROFLOXACIN 1 DROP: 3 SOLUTION OPHTHALMIC at 08:00

## 2019-01-01 RX ADMIN — GUAIFENESIN 600 MG: 600 TABLET, EXTENDED RELEASE ORAL at 08:43

## 2019-01-01 RX ADMIN — GUAIFENESIN 600 MG: 600 TABLET, EXTENDED RELEASE ORAL at 21:19

## 2019-01-01 RX ADMIN — LORAZEPAM 1 MG: 2 INJECTION INTRAMUSCULAR; INTRAVENOUS at 14:22

## 2019-01-01 RX ADMIN — FINASTERIDE 5 MG: 5 TABLET, FILM COATED ORAL at 17:54

## 2019-01-01 RX ADMIN — DOCUSATE SODIUM 100 MG: 100 CAPSULE, LIQUID FILLED ORAL at 18:52

## 2019-01-01 RX ADMIN — Medication 10 ML: at 18:29

## 2019-01-01 RX ADMIN — MIDODRINE HYDROCHLORIDE 5 MG: 5 TABLET ORAL at 09:45

## 2019-01-01 RX ADMIN — PANTOPRAZOLE SODIUM 40 MG: 40 TABLET, DELAYED RELEASE ORAL at 09:02

## 2019-01-01 RX ADMIN — Medication 80 MCG: at 19:37

## 2019-01-01 RX ADMIN — PRAVASTATIN SODIUM 40 MG: 40 TABLET ORAL at 09:05

## 2019-01-01 RX ADMIN — HEPARIN SODIUM 5000 UNITS: 5000 INJECTION INTRAVENOUS; SUBCUTANEOUS at 01:06

## 2019-01-01 RX ADMIN — TICAGRELOR 90 MG: 90 TABLET ORAL at 08:16

## 2019-01-01 RX ADMIN — TICAGRELOR 90 MG: 90 TABLET ORAL at 18:35

## 2019-01-01 RX ADMIN — LEVOTHYROXINE SODIUM 137 MCG: 112 TABLET ORAL at 10:04

## 2019-01-01 RX ADMIN — FINASTERIDE 5 MG: 5 TABLET, FILM COATED ORAL at 17:26

## 2019-01-01 RX ADMIN — TAMSULOSIN HYDROCHLORIDE 0.4 MG: 0.4 CAPSULE ORAL at 08:10

## 2019-01-01 RX ADMIN — TEMAZEPAM 30 MG: 30 CAPSULE ORAL at 21:08

## 2019-01-01 RX ADMIN — AMIODARONE HYDROCHLORIDE 400 MG: 200 TABLET ORAL at 08:42

## 2019-01-01 RX ADMIN — AMIODARONE HYDROCHLORIDE 400 MG: 200 TABLET ORAL at 17:42

## 2019-01-01 RX ADMIN — LEVOTHYROXINE SODIUM 137 MCG: 25 TABLET ORAL at 06:34

## 2019-01-01 RX ADMIN — CLOPIDOGREL BISULFATE 75 MG: 75 TABLET ORAL at 08:33

## 2019-01-01 RX ADMIN — POLYETHYLENE GLYCOL 3350 17 G: 17 POWDER, FOR SOLUTION ORAL at 17:34

## 2019-01-01 RX ADMIN — DEXTROSE MONOHYDRATE AND SODIUM CHLORIDE 50 ML/HR: 5; .45 INJECTION, SOLUTION INTRAVENOUS at 11:00

## 2019-01-01 RX ADMIN — MIDODRINE HYDROCHLORIDE 5 MG: 5 TABLET ORAL at 18:20

## 2019-01-01 RX ADMIN — PRAVASTATIN SODIUM 80 MG: 40 TABLET ORAL at 21:08

## 2019-01-01 RX ADMIN — LORAZEPAM 1 MG: 2 INJECTION INTRAMUSCULAR; INTRAVENOUS at 14:59

## 2019-01-01 RX ADMIN — TAMSULOSIN HYDROCHLORIDE 0.8 MG: 0.4 CAPSULE ORAL at 08:40

## 2019-01-01 RX ADMIN — FINASTERIDE 5 MG: 5 TABLET, FILM COATED ORAL at 18:52

## 2019-01-01 RX ADMIN — Medication 10 ML: at 08:03

## 2019-01-01 RX ADMIN — LEVOTHYROXINE SODIUM 137 MCG: 112 TABLET ORAL at 05:45

## 2019-01-01 RX ADMIN — SIMVASTATIN 40 MG: 20 TABLET, FILM COATED ORAL at 23:44

## 2019-01-01 RX ADMIN — PANTOPRAZOLE SODIUM 40 MG: 40 INJECTION, POWDER, FOR SOLUTION INTRAVENOUS at 18:15

## 2019-01-01 RX ADMIN — HYDROMORPHONE HYDROCHLORIDE 0.5 MG: 2 INJECTION, SOLUTION INTRAMUSCULAR; INTRAVENOUS; SUBCUTANEOUS at 23:09

## 2019-01-01 RX ADMIN — MIDODRINE HYDROCHLORIDE 10 MG: 5 TABLET ORAL at 18:10

## 2019-01-01 RX ADMIN — AMIODARONE HYDROCHLORIDE 200 MG: 200 TABLET ORAL at 18:56

## 2019-01-01 RX ADMIN — CLOPIDOGREL BISULFATE 75 MG: 75 TABLET ORAL at 08:00

## 2019-01-01 RX ADMIN — AMIODARONE HYDROCHLORIDE 200 MG: 200 TABLET ORAL at 09:25

## 2019-01-01 RX ADMIN — GLYCOPYRROLATE 0.2 MG: 0.2 INJECTION, SOLUTION INTRAMUSCULAR; INTRAVENOUS at 09:10

## 2019-01-01 RX ADMIN — Medication 10 ML: at 13:14

## 2019-01-01 RX ADMIN — Medication 10 ML: at 12:28

## 2019-01-01 RX ADMIN — DOCUSATE SODIUM 100 MG: 100 CAPSULE, LIQUID FILLED ORAL at 18:16

## 2019-01-01 RX ADMIN — MIDODRINE HYDROCHLORIDE 10 MG: 5 TABLET ORAL at 12:11

## 2019-01-01 RX ADMIN — Medication 10 ML: at 17:21

## 2019-01-01 RX ADMIN — MIDODRINE HYDROCHLORIDE 10 MG: 5 TABLET ORAL at 08:14

## 2019-01-01 RX ADMIN — CLOPIDOGREL BISULFATE 75 MG: 75 TABLET, FILM COATED ORAL at 10:01

## 2019-01-01 RX ADMIN — ASPIRIN 81 MG 81 MG: 81 TABLET ORAL at 08:49

## 2019-01-01 RX ADMIN — Medication 10 ML: at 05:51

## 2019-01-01 RX ADMIN — Medication 10 ML: at 05:48

## 2019-01-01 RX ADMIN — MIDODRINE HYDROCHLORIDE 5 MG: 5 TABLET ORAL at 17:51

## 2019-01-01 RX ADMIN — ACETAMINOPHEN 650 MG: 325 TABLET ORAL at 03:25

## 2019-01-01 RX ADMIN — MIDODRINE HYDROCHLORIDE 10 MG: 5 TABLET ORAL at 08:46

## 2019-01-01 RX ADMIN — SODIUM CHLORIDE, SODIUM LACTATE, POTASSIUM CHLORIDE, AND CALCIUM CHLORIDE 25 ML/HR: 600; 310; 30; 20 INJECTION, SOLUTION INTRAVENOUS at 15:27

## 2019-01-01 RX ADMIN — VANCOMYCIN HYDROCHLORIDE 1000 MG: 1 INJECTION, POWDER, LYOPHILIZED, FOR SOLUTION INTRAVENOUS at 16:45

## 2019-01-01 RX ADMIN — Medication 10 ML: at 17:51

## 2019-01-01 RX ADMIN — AMIODARONE HYDROCHLORIDE 200 MG: 200 TABLET ORAL at 11:32

## 2019-01-01 RX ADMIN — CIPROFLOXACIN 1 DROP: 3 SOLUTION OPHTHALMIC at 23:12

## 2019-01-01 RX ADMIN — PANTOPRAZOLE SODIUM 40 MG: 40 TABLET, DELAYED RELEASE ORAL at 09:08

## 2019-01-01 RX ADMIN — DOXYCYCLINE 100 MG: 100 INJECTION, POWDER, LYOPHILIZED, FOR SOLUTION INTRAVENOUS at 20:25

## 2019-01-01 RX ADMIN — MIDODRINE HYDROCHLORIDE 10 MG: 5 TABLET ORAL at 17:33

## 2019-01-01 RX ADMIN — TEMAZEPAM 30 MG: 15 CAPSULE ORAL at 21:59

## 2019-01-01 RX ADMIN — MIDODRINE HYDROCHLORIDE 5 MG: 5 TABLET ORAL at 11:58

## 2019-01-01 RX ADMIN — DOCUSATE SODIUM 100 MG: 100 CAPSULE, LIQUID FILLED ORAL at 08:16

## 2019-01-01 RX ADMIN — CIPROFLOXACIN 1 DROP: 3 SOLUTION OPHTHALMIC at 08:42

## 2019-01-01 RX ADMIN — MIDODRINE HYDROCHLORIDE 10 MG: 5 TABLET ORAL at 08:21

## 2019-01-01 RX ADMIN — HEPARIN SODIUM 5000 UNITS: 5000 INJECTION INTRAVENOUS; SUBCUTANEOUS at 18:29

## 2019-01-01 RX ADMIN — Medication 10 MG: at 16:58

## 2019-01-01 RX ADMIN — MIDODRINE HYDROCHLORIDE 5 MG: 5 TABLET ORAL at 13:50

## 2019-01-01 RX ADMIN — HEPARIN SODIUM 11.2 ML/HR: 5000 INJECTION INTRAVENOUS; SUBCUTANEOUS at 19:08

## 2019-01-01 RX ADMIN — LEVOTHYROXINE SODIUM 137 MCG: 25 TABLET ORAL at 05:10

## 2019-01-01 RX ADMIN — Medication 10 ML: at 05:11

## 2019-01-01 RX ADMIN — HEPARIN SODIUM 5000 UNITS: 5000 INJECTION INTRAVENOUS; SUBCUTANEOUS at 00:12

## 2019-01-01 RX ADMIN — VANCOMYCIN HYDROCHLORIDE 1000 MG: 1 INJECTION, POWDER, LYOPHILIZED, FOR SOLUTION INTRAVENOUS at 12:37

## 2019-01-01 RX ADMIN — Medication 10 ML: at 21:03

## 2019-01-01 RX ADMIN — MIDODRINE HYDROCHLORIDE 5 MG: 5 TABLET ORAL at 11:39

## 2019-01-01 RX ADMIN — MIDODRINE HYDROCHLORIDE 10 MG: 5 TABLET ORAL at 09:57

## 2019-01-01 RX ADMIN — TAMSULOSIN HYDROCHLORIDE 0.4 MG: 0.4 CAPSULE ORAL at 08:18

## 2019-01-01 RX ADMIN — CIPROFLOXACIN 1 DROP: 3 SOLUTION OPHTHALMIC at 20:26

## 2019-01-01 RX ADMIN — HEPARIN SODIUM 5000 UNITS: 5000 INJECTION INTRAVENOUS; SUBCUTANEOUS at 17:04

## 2019-01-01 RX ADMIN — POLYETHYLENE GLYCOL 3350 17 G: 17 POWDER, FOR SOLUTION ORAL at 08:42

## 2019-01-01 RX ADMIN — AMIODARONE HYDROCHLORIDE 200 MG: 200 TABLET ORAL at 09:30

## 2019-01-01 RX ADMIN — FINASTERIDE 5 MG: 5 TABLET, FILM COATED ORAL at 17:34

## 2019-01-01 RX ADMIN — TAMSULOSIN HYDROCHLORIDE 0.8 MG: 0.4 CAPSULE ORAL at 09:08

## 2019-01-01 RX ADMIN — MIDODRINE HYDROCHLORIDE 5 MG: 5 TABLET ORAL at 17:42

## 2019-01-01 RX ADMIN — MIDODRINE HYDROCHLORIDE 10 MG: 5 TABLET ORAL at 12:24

## 2019-01-01 RX ADMIN — DOXYCYCLINE HYCLATE 100 MG: 100 TABLET, COATED ORAL at 21:03

## 2019-01-01 RX ADMIN — HYDROMORPHONE HYDROCHLORIDE 0.5 MG: 2 INJECTION, SOLUTION INTRAMUSCULAR; INTRAVENOUS; SUBCUTANEOUS at 05:04

## 2019-01-01 RX ADMIN — MIDODRINE HYDROCHLORIDE 5 MG: 5 TABLET ORAL at 18:36

## 2019-01-01 RX ADMIN — SODIUM CHLORIDE 40 MG: 9 INJECTION, SOLUTION INTRAMUSCULAR; INTRAVENOUS; SUBCUTANEOUS at 18:54

## 2019-01-01 RX ADMIN — SODIUM CHLORIDE 1000 ML: 900 INJECTION, SOLUTION INTRAVENOUS at 05:23

## 2019-01-01 RX ADMIN — SODIUM CHLORIDE 100 ML/HR: 900 INJECTION, SOLUTION INTRAVENOUS at 16:00

## 2019-01-01 RX ADMIN — HEPARIN SODIUM 5000 UNITS: 5000 INJECTION INTRAVENOUS; SUBCUTANEOUS at 08:18

## 2019-01-01 RX ADMIN — Medication 10 MG: at 19:19

## 2019-01-01 RX ADMIN — DILTIAZEM HYDROCHLORIDE 120 MG: 120 CAPSULE, COATED, EXTENDED RELEASE ORAL at 08:41

## 2019-01-01 RX ADMIN — CEFTRIAXONE 1 G: 1 INJECTION, POWDER, FOR SOLUTION INTRAMUSCULAR; INTRAVENOUS at 22:02

## 2019-01-01 RX ADMIN — ETOMIDATE 14 MCG: 2 INJECTION INTRAVENOUS at 18:54

## 2019-01-01 RX ADMIN — MIDODRINE HYDROCHLORIDE 5 MG: 5 TABLET ORAL at 19:03

## 2019-01-01 RX ADMIN — PRAVASTATIN SODIUM 80 MG: 40 TABLET ORAL at 22:03

## 2019-01-01 RX ADMIN — ENOXAPARIN SODIUM 40 MG: 40 INJECTION SUBCUTANEOUS at 19:03

## 2019-01-01 RX ADMIN — HALOPERIDOL LACTATE 1 MG: 5 INJECTION, SOLUTION INTRAMUSCULAR at 08:28

## 2019-01-01 RX ADMIN — DOXYCYCLINE 100 MG: 100 INJECTION, POWDER, LYOPHILIZED, FOR SOLUTION INTRAVENOUS at 21:15

## 2019-01-01 RX ADMIN — Medication 10 ML: at 05:18

## 2019-01-01 RX ADMIN — ASPIRIN 81 MG CHEWABLE TABLET 81 MG: 81 TABLET CHEWABLE at 09:21

## 2019-01-01 RX ADMIN — DILTIAZEM HYDROCHLORIDE 120 MG: 120 CAPSULE, COATED, EXTENDED RELEASE ORAL at 10:02

## 2019-01-01 RX ADMIN — SODIUM CHLORIDE 75 ML/HR: 900 INJECTION, SOLUTION INTRAVENOUS at 15:00

## 2019-01-01 RX ADMIN — Medication 10 ML: at 19:03

## 2019-01-01 RX ADMIN — MIDODRINE HYDROCHLORIDE 5 MG: 5 TABLET ORAL at 08:14

## 2019-01-01 RX ADMIN — Medication 5 ML: at 06:00

## 2019-01-01 RX ADMIN — MIDODRINE HYDROCHLORIDE 10 MG: 5 TABLET ORAL at 17:19

## 2019-01-01 RX ADMIN — TEMAZEPAM 30 MG: 30 CAPSULE ORAL at 22:07

## 2019-01-01 RX ADMIN — CLOPIDOGREL BISULFATE 75 MG: 75 TABLET ORAL at 18:57

## 2019-01-01 RX ADMIN — LEVOTHYROXINE SODIUM 137 MCG: 112 TABLET ORAL at 06:01

## 2019-01-01 RX ADMIN — Medication 10 ML: at 16:50

## 2019-01-01 RX ADMIN — CLOPIDOGREL BISULFATE 75 MG: 75 TABLET ORAL at 08:21

## 2019-01-01 RX ADMIN — MIDODRINE HYDROCHLORIDE 10 MG: 5 TABLET ORAL at 08:19

## 2019-01-01 RX ADMIN — ASPIRIN 81 MG CHEWABLE TABLET 81 MG: 81 TABLET CHEWABLE at 18:36

## 2019-01-01 RX ADMIN — MIDODRINE HYDROCHLORIDE 10 MG: 5 TABLET ORAL at 12:47

## 2019-01-01 RX ADMIN — DOXYCYCLINE 100 MG: 100 INJECTION, POWDER, LYOPHILIZED, FOR SOLUTION INTRAVENOUS at 23:11

## 2019-01-01 RX ADMIN — Medication 10 ML: at 22:37

## 2019-01-01 RX ADMIN — LORAZEPAM 0.5 MG: 2 INJECTION INTRAMUSCULAR; INTRAVENOUS at 01:30

## 2019-01-01 RX ADMIN — MIDODRINE HYDROCHLORIDE 10 MG: 5 TABLET ORAL at 18:27

## 2019-01-01 RX ADMIN — MIDODRINE HYDROCHLORIDE 10 MG: 5 TABLET ORAL at 09:25

## 2019-01-01 RX ADMIN — MIDODRINE HYDROCHLORIDE 10 MG: 5 TABLET ORAL at 12:27

## 2019-01-01 RX ADMIN — ASPIRIN 81 MG 81 MG: 81 TABLET ORAL at 08:43

## 2019-01-01 RX ADMIN — HALOPERIDOL LACTATE 1 MG: 5 INJECTION, SOLUTION INTRAMUSCULAR at 21:00

## 2019-01-01 RX ADMIN — SULFAMETHOXAZOLE AND TRIMETHOPRIM 1 TABLET: 800; 160 TABLET ORAL at 08:10

## 2019-01-01 RX ADMIN — SULFAMETHOXAZOLE AND TRIMETHOPRIM 1 TABLET: 800; 160 TABLET ORAL at 08:18

## 2019-01-01 RX ADMIN — LIDOCAINE HYDROCHLORIDE 60 MG: 20 INJECTION, SOLUTION EPIDURAL; INFILTRATION; INTRACAUDAL; PERINEURAL at 18:54

## 2019-01-01 RX ADMIN — LEVOTHYROXINE SODIUM 137 MCG: 112 TABLET ORAL at 07:03

## 2019-01-01 RX ADMIN — SODIUM CHLORIDE 40 MG: 9 INJECTION, SOLUTION INTRAMUSCULAR; INTRAVENOUS; SUBCUTANEOUS at 21:33

## 2019-01-01 RX ADMIN — AMIODARONE HYDROCHLORIDE 400 MG: 200 TABLET ORAL at 21:03

## 2019-01-01 RX ADMIN — MIDODRINE HYDROCHLORIDE 10 MG: 5 TABLET ORAL at 12:31

## 2019-01-01 RX ADMIN — CLOPIDOGREL BISULFATE 75 MG: 75 TABLET ORAL at 11:37

## 2019-01-01 RX ADMIN — SODIUM CHLORIDE 40 MG: 9 INJECTION, SOLUTION INTRAMUSCULAR; INTRAVENOUS; SUBCUTANEOUS at 09:58

## 2019-01-01 RX ADMIN — HYDROMORPHONE HYDROCHLORIDE 0.5 MG: 2 INJECTION, SOLUTION INTRAMUSCULAR; INTRAVENOUS; SUBCUTANEOUS at 18:00

## 2019-01-01 RX ADMIN — SODIUM CHLORIDE 40 MG: 9 INJECTION, SOLUTION INTRAMUSCULAR; INTRAVENOUS; SUBCUTANEOUS at 09:45

## 2019-01-01 RX ADMIN — ENOXAPARIN SODIUM 30 MG: 30 INJECTION SUBCUTANEOUS at 19:40

## 2019-01-01 RX ADMIN — MIDODRINE HYDROCHLORIDE 5 MG: 5 TABLET ORAL at 12:03

## 2019-01-01 RX ADMIN — LEVOTHYROXINE SODIUM 137 MCG: 112 TABLET ORAL at 09:34

## 2019-01-01 RX ADMIN — Medication 10 ML: at 21:09

## 2019-01-01 RX ADMIN — CEFAZOLIN 1 G: 1 INJECTION, POWDER, FOR SOLUTION INTRAMUSCULAR; INTRAVENOUS; PARENTERAL at 14:42

## 2019-01-01 RX ADMIN — FLUTICASONE PROPIONATE 2 SPRAY: 50 SPRAY, METERED NASAL at 08:20

## 2019-01-01 RX ADMIN — Medication 10 ML: at 12:39

## 2019-01-01 RX ADMIN — HEPARIN SODIUM 5000 UNITS: 5000 INJECTION INTRAVENOUS; SUBCUTANEOUS at 00:36

## 2019-01-01 RX ADMIN — SODIUM CHLORIDE 125 ML/HR: 900 INJECTION, SOLUTION INTRAVENOUS at 10:47

## 2019-01-01 RX ADMIN — MAGNESIUM CITRATE 296 ML: 1.75 LIQUID ORAL at 09:58

## 2019-01-01 RX ADMIN — ASPIRIN 81 MG 81 MG: 81 TABLET ORAL at 09:04

## 2019-01-01 RX ADMIN — SODIUM CHLORIDE 500 MG: 900 INJECTION, SOLUTION INTRAVENOUS at 10:03

## 2019-01-01 RX ADMIN — AMIODARONE HYDROCHLORIDE 200 MG: 200 TABLET ORAL at 09:21

## 2019-01-01 RX ADMIN — ACETAMINOPHEN 650 MG: 325 TABLET ORAL at 08:50

## 2019-01-01 RX ADMIN — AMIODARONE HYDROCHLORIDE 200 MG: 200 TABLET ORAL at 08:31

## 2019-01-01 RX ADMIN — LEVOTHYROXINE SODIUM 137 MCG: 112 TABLET ORAL at 08:14

## 2019-01-01 RX ADMIN — CLOPIDOGREL BISULFATE 75 MG: 75 TABLET ORAL at 08:11

## 2019-01-01 RX ADMIN — GUAIFENESIN 600 MG: 600 TABLET, EXTENDED RELEASE ORAL at 08:32

## 2019-01-01 RX ADMIN — GUAIFENESIN 600 MG: 600 TABLET, EXTENDED RELEASE ORAL at 21:24

## 2019-01-01 RX ADMIN — Medication 10 ML: at 15:32

## 2019-01-01 RX ADMIN — MIDODRINE HYDROCHLORIDE 5 MG: 5 TABLET ORAL at 12:20

## 2019-01-01 RX ADMIN — CIPROFLOXACIN 1 DROP: 3 SOLUTION OPHTHALMIC at 12:39

## 2019-01-01 RX ADMIN — SODIUM CHLORIDE 40 MG: 9 INJECTION, SOLUTION INTRAMUSCULAR; INTRAVENOUS; SUBCUTANEOUS at 21:00

## 2019-01-01 RX ADMIN — FLUTICASONE PROPIONATE 2 SPRAY: 50 SPRAY, METERED NASAL at 08:29

## 2019-01-01 RX ADMIN — AMIODARONE HYDROCHLORIDE 200 MG: 200 TABLET ORAL at 08:14

## 2019-01-01 RX ADMIN — ENOXAPARIN SODIUM 40 MG: 40 INJECTION SUBCUTANEOUS at 17:51

## 2019-01-01 RX ADMIN — LEVOTHYROXINE SODIUM 137 MCG: 25 TABLET ORAL at 07:19

## 2019-01-01 RX ADMIN — HEPARIN SODIUM 5000 UNITS: 5000 INJECTION INTRAVENOUS; SUBCUTANEOUS at 16:50

## 2019-01-01 RX ADMIN — AMIODARONE HYDROCHLORIDE 200 MG: 200 TABLET ORAL at 09:57

## 2019-01-01 RX ADMIN — LORAZEPAM 1 MG: 2 INJECTION INTRAMUSCULAR; INTRAVENOUS at 13:40

## 2019-01-01 RX ADMIN — PANTOPRAZOLE SODIUM 40 MG: 40 TABLET, DELAYED RELEASE ORAL at 08:27

## 2019-01-01 RX ADMIN — FLUTICASONE PROPIONATE 2 SPRAY: 50 SPRAY, METERED NASAL at 08:46

## 2019-01-01 RX ADMIN — DILTIAZEM HYDROCHLORIDE 5 MG/HR: 5 INJECTION INTRAVENOUS at 04:28

## 2019-01-01 RX ADMIN — GUAIFENESIN 600 MG: 600 TABLET, EXTENDED RELEASE ORAL at 21:12

## 2019-01-01 RX ADMIN — CLOPIDOGREL BISULFATE 75 MG: 75 TABLET ORAL at 09:30

## 2019-01-01 RX ADMIN — Medication 10 ML: at 14:56

## 2019-01-01 RX ADMIN — SIMVASTATIN 40 MG: 20 TABLET, FILM COATED ORAL at 21:12

## 2019-01-01 RX ADMIN — MIDODRINE HYDROCHLORIDE 5 MG: 5 TABLET ORAL at 12:24

## 2019-01-01 RX ADMIN — Medication 10 ML: at 17:04

## 2019-01-01 RX ADMIN — HYDROMORPHONE HYDROCHLORIDE 0.5 MG: 1 INJECTION, SOLUTION INTRAMUSCULAR; INTRAVENOUS; SUBCUTANEOUS at 13:11

## 2019-01-01 RX ADMIN — LORAZEPAM 0.5 MG: 2 INJECTION INTRAMUSCULAR; INTRAVENOUS at 09:00

## 2019-01-01 RX ADMIN — FLUTICASONE PROPIONATE 2 SPRAY: 50 SPRAY, METERED NASAL at 09:05

## 2019-01-01 RX ADMIN — Medication 10 ML: at 22:08

## 2019-01-01 RX ADMIN — GUAIFENESIN 600 MG: 600 TABLET, EXTENDED RELEASE ORAL at 09:32

## 2019-01-01 RX ADMIN — CLOPIDOGREL BISULFATE 75 MG: 75 TABLET ORAL at 08:19

## 2019-01-01 RX ADMIN — CEFEPIME HYDROCHLORIDE 2 G: 2 INJECTION, POWDER, FOR SOLUTION INTRAVENOUS at 21:30

## 2019-01-01 RX ADMIN — Medication 10 ML: at 14:42

## 2019-01-01 RX ADMIN — ALBUTEROL SULFATE 2 PUFF: 90 AEROSOL, METERED RESPIRATORY (INHALATION) at 08:12

## 2019-01-01 RX ADMIN — GLYCOPYRROLATE 0.2 MG: 0.2 INJECTION, SOLUTION INTRAMUSCULAR; INTRAVENOUS at 09:09

## 2019-01-01 RX ADMIN — GUAIFENESIN 600 MG: 600 TABLET, EXTENDED RELEASE ORAL at 08:14

## 2019-01-01 RX ADMIN — FLUTICASONE PROPIONATE 2 SPRAY: 50 SPRAY, METERED NASAL at 09:27

## 2019-01-01 RX ADMIN — MIDODRINE HYDROCHLORIDE 5 MG: 5 TABLET ORAL at 09:05

## 2019-01-01 RX ADMIN — CLOPIDOGREL BISULFATE 75 MG: 75 TABLET ORAL at 09:21

## 2019-01-01 RX ADMIN — Medication 10 ML: at 15:07

## 2019-01-01 RX ADMIN — MIDODRINE HYDROCHLORIDE 10 MG: 5 TABLET ORAL at 17:37

## 2019-01-01 RX ADMIN — Medication 10 ML: at 23:51

## 2019-01-01 RX ADMIN — TAMSULOSIN HYDROCHLORIDE 0.4 MG: 0.4 CAPSULE ORAL at 08:11

## 2019-01-01 RX ADMIN — Medication 10 ML: at 16:54

## 2019-01-01 RX ADMIN — QUETIAPINE FUMARATE 25 MG: 25 TABLET ORAL at 18:15

## 2019-01-01 RX ADMIN — SODIUM CHLORIDE 50 ML/HR: 900 INJECTION, SOLUTION INTRAVENOUS at 11:52

## 2019-01-01 RX ADMIN — Medication 10 ML: at 18:53

## 2019-01-01 RX ADMIN — ENOXAPARIN SODIUM 40 MG: 40 INJECTION SUBCUTANEOUS at 17:26

## 2019-01-01 RX ADMIN — HALOPERIDOL LACTATE 2 MG: 5 INJECTION, SOLUTION INTRAMUSCULAR at 10:04

## 2019-01-01 RX ADMIN — GUAIFENESIN 600 MG: 600 TABLET, EXTENDED RELEASE ORAL at 09:05

## 2019-01-01 RX ADMIN — SODIUM CHLORIDE 40 MG: 9 INJECTION, SOLUTION INTRAMUSCULAR; INTRAVENOUS; SUBCUTANEOUS at 21:08

## 2019-01-01 RX ADMIN — ASPIRIN 81 MG 81 MG: 81 TABLET ORAL at 08:19

## 2019-01-01 RX ADMIN — CLOPIDOGREL BISULFATE 75 MG: 75 TABLET ORAL at 09:08

## 2019-01-01 RX ADMIN — TAMSULOSIN HYDROCHLORIDE 0.4 MG: 0.4 CAPSULE ORAL at 08:21

## 2019-01-01 RX ADMIN — CEFAZOLIN 1 G: 1 INJECTION, POWDER, FOR SOLUTION INTRAMUSCULAR; INTRAVENOUS; PARENTERAL at 07:01

## 2019-01-01 RX ADMIN — SODIUM CHLORIDE, SODIUM LACTATE, POTASSIUM CHLORIDE, CALCIUM CHLORIDE: 600; 310; 30; 20 INJECTION, SOLUTION INTRAVENOUS at 18:55

## 2019-01-01 RX ADMIN — CASTOR OIL AND BALSAM, PERU: 788; 87 OINTMENT TOPICAL at 09:10

## 2019-01-01 RX ADMIN — TEMAZEPAM 30 MG: 30 CAPSULE ORAL at 22:03

## 2019-01-01 RX ADMIN — PANTOPRAZOLE SODIUM 40 MG: 40 TABLET, DELAYED RELEASE ORAL at 07:47

## 2019-01-01 RX ADMIN — TEMAZEPAM 30 MG: 15 CAPSULE ORAL at 19:37

## 2019-01-01 RX ADMIN — GUAIFENESIN 600 MG: 600 TABLET, EXTENDED RELEASE ORAL at 20:05

## 2019-01-01 RX ADMIN — MIDODRINE HYDROCHLORIDE 5 MG: 5 TABLET ORAL at 09:21

## 2019-01-01 RX ADMIN — ENOXAPARIN SODIUM 40 MG: 40 INJECTION SUBCUTANEOUS at 17:03

## 2019-01-01 RX ADMIN — MIDODRINE HYDROCHLORIDE 10 MG: 5 TABLET ORAL at 11:03

## 2019-01-01 RX ADMIN — LORAZEPAM 0.5 MG: 2 INJECTION INTRAMUSCULAR; INTRAVENOUS at 16:05

## 2019-01-01 RX ADMIN — TAMSULOSIN HYDROCHLORIDE 0.8 MG: 0.4 CAPSULE ORAL at 08:43

## 2019-01-01 RX ADMIN — Medication 5 ML: at 22:00

## 2019-01-01 RX ADMIN — ASPIRIN 81 MG CHEWABLE TABLET 81 MG: 81 TABLET CHEWABLE at 09:57

## 2019-01-01 RX ADMIN — HEPARIN SODIUM 5000 UNITS: 5000 INJECTION INTRAVENOUS; SUBCUTANEOUS at 09:04

## 2019-01-01 RX ADMIN — AMIODARONE HYDROCHLORIDE 200 MG: 200 TABLET ORAL at 09:02

## 2019-01-01 RX ADMIN — POLYETHYLENE GLYCOL 3350 17 G: 17 POWDER, FOR SOLUTION ORAL at 10:03

## 2019-01-01 RX ADMIN — HEPARIN SODIUM 5000 UNITS: 5000 INJECTION INTRAVENOUS; SUBCUTANEOUS at 18:50

## 2019-01-22 NOTE — ED PROVIDER NOTES
EMERGENCY DEPARTMENT HISTORY AND PHYSICAL EXAM 
 
 
Date: 1/22/2019 Patient Name: Maria E Weinstein History of Presenting Illness Chief Complaint Patient presents with  
 Hip Pain Right hip pain secondary to fall. States he occasionally losses his balance. Denies head injury/LOC. Patient states he attempted to walk with walker after fall, but is having increased pain History Provided By: Patient HPI: Maria E Weinstein, 80 y.o. male with PMHx significant for GERD, HTN, and HLD, presents ambulatory to the ED with cc of right hip pain due to a mechanical fall earlier today. Pt was watering his indoor plants and turned to go get more water and his feet got tripped up. Pt denies any head injury or LOC. He states that he has been ambulatory since, but it hurts to ambulate. He took naprosyn with no lasting relief. He denies numbness, tingling, weakness, HA.  
 
 
PCP: Ashely Cr MD 
 
There are no other complaints, changes, or physical findings at this time. Current Outpatient Medications Medication Sig Dispense Refill  methocarbamol (ROBAXIN-750) 750 mg tablet Take 1 Tab by mouth four (4) times daily. 20 Tab 0  
 HYDROcodone-acetaminophen (NORCO) 5-325 mg per tablet Take 1 Tab by mouth every four (4) hours as needed for Pain. Max Daily Amount: 6 Tabs. 8 Tab 0  
 finasteride (PROSCAR) 5 mg tablet Take 1 Tab by mouth daily (after dinner). 90 Tab 3  
 simvastatin (ZOCOR) 40 mg tablet Take 1 Tab by mouth nightly. 90 Tab 3  
 metroNIDAZOLE (FLAGYL) 500 mg tablet Take 1 Tab by mouth three (3) times daily. 30 Tab 0  
 levothyroxine (SYNTHROID) 137 mcg tablet Take  by mouth Daily (before breakfast).  losartan (COZAAR) 50 mg tablet Take 1 Tab by mouth daily. 90 Tab prn  dilTIAZem ER (CARDIZEM LA) 120 mg tablet Take 1 Tab by mouth daily. 90 Tab 3  
 allopurinol (ZYLOPRIM) 100 mg tablet Take 1 Tab by mouth daily.  90 Tab 3  
  tamsulosin (FLOMAX) 0.4 mg capsule TAKE 2 CAPSULES DAILY 180 Cap 3  
 rivaroxaban (XARELTO) 15 mg tab tablet Take 1 Tab by mouth daily (with breakfast). 90 Tab 3  furosemide (LASIX) 40 mg tablet 1 daily (Patient taking differently: Take 40 mg by mouth daily.) 90 Tab 3 Past History Past Medical History: 
Past Medical History:  
Diagnosis Date  Arrhythmia A-fib  GERD (gastroesophageal reflux disease)  Hypertension  Other ill-defined conditions(799.89) BPH  Other ill-defined conditions(799.89) lipids  Other ill-defined conditions(799.89)   
 shingles hx  Thyroid disease  Unspecified adverse effect of anesthesia Past Surgical History: 
Past Surgical History:  
Procedure Laterality Date  HX CATARACT REMOVAL    
 HX HEART CATHETERIZATION    
 ablation  HX HEENT    
 scar tissue removed/laser  HX HERNIA REPAIR  2017 Gareth Womack MD  
 HX ORTHOPAEDIC    
 left foot fx--hardware  HX ORTHOPAEDIC  14 Right total knee arthroplasty  HX OTHER SURGICAL    
 prostate bx Family History: 
Family History Problem Relation Age of Onset  Heart Disease Mother  Hypertension Mother  Stroke Mother  Heart Disease Father  Cancer Paternal Grandmother Social History: 
Social History Tobacco Use  Smoking status: Former Smoker Packs/day: 0.50 Last attempt to quit: 3/28/1963 Years since quittin.8  Smokeless tobacco: Never Used Substance Use Topics  Alcohol use: Yes Comment: rare/social-beer  Drug use: Yes Types: Prescription, OTC Allergies: 
No Known Allergies Review of Systems Review of Systems Constitutional: Negative. Negative for activity change, appetite change, chills and fever. Respiratory: Negative for cough, shortness of breath and wheezing. Cardiovascular: Negative for chest pain and palpitations. Gastrointestinal: Negative for nausea and vomiting. Musculoskeletal: Positive for arthralgias. Negative for myalgias. Skin: Negative for color change, rash and wound. Neurological: Negative for dizziness and headaches. All other systems reviewed and are negative. Physical Exam  
Physical Exam  
Constitutional: He is oriented to person, place, and time. He appears well-developed and well-nourished. No distress. 80 y.o.  male in NAD Communicates appropriately and in full sentences HENT:  
Head: Normocephalic and atraumatic. Eyes: Conjunctivae are normal. Pupils are equal, round, and reactive to light. Right eye exhibits no discharge. Left eye exhibits no discharge. Neck: Normal range of motion. Neck supple. No nuchal rigidity or meningeal signs Pulmonary/Chest: Effort normal. No respiratory distress. Musculoskeletal: Normal range of motion. He exhibits tenderness (R lateral hip). He exhibits no edema or deformity. No neurologic, motor, vascular, or compartment embarrassment observed on exam. No focal neurologic deficits. Neurological: He is alert and oriented to person, place, and time. No focal neuro deficits. NVI. Neurologically intact of UE and LE B/L Sensation intact and symmetrical of UE and LE B/L. Strength 5/5 of UE B/L, Strength 5/5 of LE B/L. Skin: Skin is warm and dry. He is not diaphoretic. No erythema. No acute hematoma or contusion Psychiatric: He has a normal mood and affect. His behavior is normal.  
Nursing note and vitals reviewed. Diagnostic Study Results Radiologic Studies -  
XR HIP RT W OR WO PELV 2-3 VWS Final Result IMPRESSION: No acute abnormality. Bilateral hip osteoarthritis and lumbar  
degenerative changes. Medical Decision Making I am the first provider for this patient. I reviewed the vital signs, available nursing notes, past medical history, past surgical history, family history and social history. Vital Signs-Reviewed the patient's vital signs. Patient Vitals for the past 12 hrs: 
 Temp Pulse Resp BP SpO2  
01/22/19 1311 97.8 °F (36.6 °C) 62 16 132/85 97 % Records Reviewed: Nursing Notes and Old Medical Records Provider Notes (Medical Decision Making): DDx: strain, sprain, contusion, spasm, fracture, non-compliance with walker/cane. ED Course:  
Initial assessment performed. The patients presenting problems have been discussed, and they are in agreement with the care plan formulated and outlined with them. I have encouraged them to ask questions as they arise throughout their visit. DISCHARGE NOTE: 
Ranulfo Chang Jr's  results have been reviewed with him. He has been counseled regarding his diagnosis. He verbally conveys understanding and agreement of the signs, symptoms, diagnosis, treatment and prognosis and additionally agrees to follow up as recommended with Dr. Nona Henley MD in 24 - 48 hours. He also agrees with the care-plan and conveys that all of his questions have been answered. I have also put together some discharge instructions for him that include: 1) educational information regarding their diagnosis, 2) how to care for their diagnosis at home, as well a 3) list of reasons why they would want to return to the ED prior to their follow-up appointment, should their condition change. He and/or family's questions have been answered. I have encouraged them to see the official results in [facility]\" or to retrieve the specifics of their results from medical records. PLAN: 
1. Return precautions as discussed 2. Follow-up with providers as directed 3. Medications as prescribed Return to ED if worse Diagnosis Clinical Impression: 1. Contusion of right hip, initial encounter 2. Fall from ground level Discharge Medication List as of 1/22/2019  3:34 PM  
  
START taking these medications Details methocarbamol (ROBAXIN-750) 750 mg tablet Take 1 Tab by mouth four (4) times daily. , Normal, Disp-20 Tab, R-0  
  
HYDROcodone-acetaminophen (NORCO) 5-325 mg per tablet Take 1 Tab by mouth every four (4) hours as needed for Pain. Max Daily Amount: 6 Tabs., Print, Disp-8 Tab, R-0  
  
  
CONTINUE these medications which have NOT CHANGED Details  
finasteride (PROSCAR) 5 mg tablet Take 1 Tab by mouth daily (after dinner). , Normal, Disp-90 Tab, R-3  
  
simvastatin (ZOCOR) 40 mg tablet Take 1 Tab by mouth nightly., Normal, Disp-90 Tab, R-3  
  
metroNIDAZOLE (FLAGYL) 500 mg tablet Take 1 Tab by mouth three (3) times daily. , Normal, Disp-30 Tab, R-0  
  
levothyroxine (SYNTHROID) 137 mcg tablet Take  by mouth Daily (before breakfast). , Historical Med  
  
losartan (COZAAR) 50 mg tablet Take 1 Tab by mouth daily. , Normal, Disp-90 Tab, R-prn  
  
dilTIAZem ER (CARDIZEM LA) 120 mg tablet Take 1 Tab by mouth daily. , Normal, Disp-90 Tab, R-3  
  
allopurinol (ZYLOPRIM) 100 mg tablet Take 1 Tab by mouth daily. , Normal, Disp-90 Tab, R-3  
  
tamsulosin (FLOMAX) 0.4 mg capsule TAKE 2 CAPSULES DAILY, Normal, Disp-180 Cap, R-3  
  
rivaroxaban (XARELTO) 15 mg tab tablet Take 1 Tab by mouth daily (with breakfast). , Print, Disp-90 Tab, R-3  
  
furosemide (LASIX) 40 mg tablet 1 daily, Print, Disp-90 Tab, R-3 Follow-up Information Follow up With Specialties Details Why Contact Info Robert Bear MD Internal Medicine Schedule an appointment as soon as possible for a visit in 2 days As needed, If symptoms worsen [facility] [address] [facility] 
[phone] [facility] EMERGENCY DEPT Emergency Medicine Go to If symptoms worsen [address] [facility] 
[phone] Junior Aguirreo, DO Orthopedic Surgery Call today  [facility] [address] [facility] [address] [id number] 
[phone] This note will not be viewable in [address]

## 2019-01-22 NOTE — ED NOTES
Discharge instructions reviewed with the pt by the PA. Pt wheeled out and discharged home with family.

## 2019-01-22 NOTE — DISCHARGE INSTRUCTIONS
Patient Education   Patient Education        [place] Area: Instrucciones de cuidado - [ Hip Sprain: Care Instructions ]  Instrucciones de cuidado    Un esguince (torcedura) de cadera ocurre cuando los ligamentos alrededor de la cadera se estiran o se desgarran. Los ligamentos son los tejidos resistentes que conectan un hueso con otro. Puede lesionarse la cadera en kristine caída, cuando corre o henrietta la práctica de deportes que implican giros o cambios repentinos de dirección, ellen el básquetbol o el fútbol. La mayoría de los esguinces de cadera menores mejoran con tratamiento en el hogar. La atención de seguimiento es kristine parte clave de robertson tratamiento y seguridad. Asegúrese de hacer y acudir a todas las citas, y llame a robertson médico si está teniendo problemas. También es kristine buena idea saber los resultados de eleonora exámenes y mantener kristine lista de los medicamentos que bhargavi. ¿Cómo puede cuidarse en el [facility]? · Si robertson médico le kendy muletas o un andador, úselos según las instrucciones. · Descanse y proteja robertson cadera. Trate de dejar de hacer o disminuir cualquier movimiento que le cause dolor. · Colóquese hielo o kristine compresa fría en la cadera henrietta 10 a 20 minutos cada vez. Trate de hacerlo cada 1 a 2 horas henrietta los siguientes 3 días (cuando esté despierto) o hasta que la hinchazón baje. Póngase un paño grider entre el hielo y la piel. · Sea jennifer con los medicamentos. Chelsi y siga todas las instrucciones de la [place]. ? Si el médico le recetó un analgésico (medicamento para el dolor), tómelo según las indicaciones. ? Si no está tomando un analgésico recetado, pregúntele a robertson médico si puede bri gwendolyn de [employer]. · [address], evite las cosas que pudieran aumentar la hinchazón, ellen las duchas calientes, los \"jacuzzis\" o las compresas calientes.   · Después de 2 a 3 días, si la hinchazón ha desaparecido, póngase kristine almohadilla térmica (a baja temperatura) o un paño húmedo tibio sobre robertson cadera antes de hacer estiramientos suaves. El calor le ayudará a  la cadera. · Adina ejercicios para fortalecer robertson cadera, ellen se los hayan indicado robertson médico o fisioterapeuta. · Rosanna Lacer a poco a robertson nivel habitual de actividades. ¿Cuándo debe pedir ayuda? Llame a robertson médico ahora mismo o busque atención médica inmediata si:    · El dolor empeora.     · No puede caminar o ponerse de pie sin ayuda.     · Tiene señales de infección, tales ellen fiebre o mayor dolor, hinchazón, enrojecimiento o aumento de la temperatura en la cadera.     · Tiene señales de un coágulo de cale, tales ellen:  ? Dolor en la pantorrilla, el muslo, la damion o detrás de la rodilla. ? Enrojecimiento e hinchazón en la pierna o la damion.     · Siente hormigueo, debilitamiento o entumecimiento en la pierna, el pie o los dedos del pie.    Preste especial atención a los cambios en robertson ramu y asegúrese de comunicarse con robertson médico si:    · El dolor no mejora en 2 o 3 días.     · Aún tiene dolor después de 2 semanas. ¿Dónde puede encontrar más información en inglés? [place] a http://frank-lyubov.info/. [place] Ivy P953 en la búsqueda para aprender más acerca de \"Esguince de cadera: Instrucciones de cuidado - [ Hip Sprain: Care Instructions ]. \"  Revisado: 20 septiembre, 2018  Versión del contenido: 11.9  © 2345-2352 [facility], [employer]. Las instrucciones de cuidado fueron adaptadas bajo licencia por [employer] (which disclaims liability or warranty for this information). Si usted tiene [place] afección médica o sobre estas instrucciones, siempre pregunte a robertson profesional de ramu. [facility] niega toda garantía o responsabilidad por robertson uso de esta información. Hip Pain: Care Instructions  Your Care Instructions    Hip pain may be caused by many things, including overuse, a fall, or a twisting movement. Another cause of hip pain is arthritis.  Your pain may increase when you stand up, walk, or squat. The pain may come and go or may be constant. Home treatment can help relieve hip pain, swelling, and stiffness. If your pain is ongoing, you may need more tests and treatment. Follow-up care is a key part of your treatment and safety. Be sure to make and go to all appointments, and call your doctor if you are having problems. It's also a good idea to know your test results and keep a list of the medicines you take. How can you care for yourself at home? · Take pain medicines exactly as directed. ? If the doctor gave you a prescription medicine for pain, take it as prescribed. ? If you are not taking a prescription pain medicine, ask your doctor if you can take an over-the-counter medicine. · Rest and protect your hip. Take a break from any activity, including standing or walking, that may cause pain. · Put ice or a cold pack against your hip for 10 to 20 minutes at a time. Try to do this every 1 to 2 hours for the next 3 days (when you are awake) or until the swelling goes down. Put a thin cloth between the ice and your skin. · Sleep on your healthy side with a pillow between your knees, or sleep on your back with pillows under your knees. · If there is no swelling, you can put moist heat, a heating pad, or a warm cloth on your hip. Do gentle stretching exercises to help keep your hip flexible. · Learn how to prevent falls. Have your vision and hearing checked regularly. Wear slippers or shoes with a nonskid sole. · Stay at a healthy weight. · Wear comfortable shoes. When should you call for help? Call 911 anytime you think you may need emergency care. For example, call if:    · You have sudden chest pain and shortness of breath, or you cough up blood.     · You are not able to stand or walk or bear weight.     · Your buttocks, legs, or feet feel numb or tingly.     · Your leg or foot is cool or pale or changes color.     · You have severe pain.  Call your doctor now or seek immediate medical care if:    · You have signs of infection, such as:  ? Increased pain, swelling, warmth, or redness in the hip area. ? Red streaks leading from the hip area. ? Pus draining from the hip area. ? A fever.     · You have signs of a blood clot, such as:  ? Pain in your calf, back of the knee, thigh, or groin. ? Redness and swelling in your leg or groin.     · You are not able to bend, straighten, or move your leg normally.     · You have trouble urinating or having bowel movements.    Watch closely for changes in your health, and be sure to contact your doctor if:    · You do not get better as expected. Where can you learn more? Go to http://frank-lyubov.info/. Enter V839 in the search box to learn more about \"Hip Pain: Care Instructions. \"  Current as of: September 23, 2018  Content Version: 11.9  © 9049-3732 [place], [facility]. Care instructions adapted under license by [employer] (which disclaims liability or warranty for this information). If you have questions about a medical condition or this instruction, always ask your healthcare professional. [place] [address] any warranty or liability for your use of this information.

## 2019-01-22 NOTE — PROGRESS NOTES
physical Therapy Emergency Department EVALUATION/DISCHARGE Patient: Yuliya Zimmerman (4 y.o. male) Date: 1/22/2019 Primary Diagnosis: No admission diagnoses are documented for this encounter. Precautions:     
ASSESSMENT : 
Based on the objective data described below, the patient presents with R hip pain s/p fall. Asked by KENNETH Chauhan Se for eval. 
Pt lives with family in one story home with 1 step between family room and kitchen. He is normally independent w/o device and is active, anxious to get back to hunting. At this time, pt rates pain as minimal to none except with twisting motion in stand/amb at which time he states it is an 8/10. He is able to WB with use of RW. He is S for transfers and amb with RW with S with steady gait. Reviewed stair amb for entry to home with pt and his dtr and reviewed amb with RW over one step within the home. Pt and dtr voiced understanding. Advised help on entry stairs for safety and initial S on the one step in the home for eval of safety. Pt has used RW prior post surgery and injury and shows recall of previous training. Rounded with PA post session. Pt has his own RW. He does not currently show need for continued therapy however if pain does not resolve and any further needed testing does not reveal any acute fx, pt may benefit from referral to OPPT for the R hip pain and soft tissue injury. Pt aware and will f/u with MD. Further acute physical therapy in the ED is not indicated at this time. PLAN : 
Discharge Recommendations:  
 
[x]   Home with family []   Skilled nursing facility []   Admission to hospital with rehab likely needed 
[]   Inpatient rehab referral 
[]   Outpatient physical therapy referral 
[]   Other: Further Equipment Recommendations for Discharge:  
[x]   Rolling walker with 5\" wheels 
[]   Crutches  
[]   Cane  
[]   Wheelchair  
[]   Other: COMMUNICATION/EDUCATION:  
Communication/Collaboration: [x]   Fall prevention education was provided and the patient/caregiver indicated understanding. [x]   Patient/family have participated as able and agree with findings and recommendations. []   Patient is unable to participate in plan of care at this time. Findings and recommendations were discussed with: MD physician and PA SUBJECTIVE:  
Patient stated I want to be able to rabbit hunt.  OBJECTIVE DATA SUMMARY:  
HISTORY:   
Past Medical History:  
Diagnosis Date  Arrhythmia A-fib  GERD (gastroesophageal reflux disease)  Hypertension  Other ill-defined conditions(799.89) BPH  Other ill-defined conditions(799.89) lipids  Other ill-defined conditions(799.89)   
 shingles hx  Thyroid disease  Unspecified adverse effect of anesthesia Past Surgical History:  
Procedure Laterality Date  HX CATARACT REMOVAL    
 HX HEART CATHETERIZATION    
 ablation  HX HEENT    
 scar tissue removed/laser  HX HERNIA REPAIR  05/05/2017 Komal Womack MD  
 HX ORTHOPAEDIC    
 left foot fx--hardware  HX ORTHOPAEDIC  4/9/14 Right total knee arthroplasty  HX OTHER SURGICAL    
 prostate bx  
 
Prior Level of Function/Home Situation: Pt lives with family in one story home with 1 step between family room and kitchen. He is normally independent w/o device and is active, anxious to get back to hunting. Home Situation Home Environment: Private residence # Steps to Enter: 3 Rails to Enter: Yes Hand Rails : Left One/Two Story Residence: One story(but one step fr family room to kitchen) Living Alone: No 
Support Systems: Child(toño), Family member(s) Patient Expects to be Discharged to[de-identified] Private residence Current DME Used/Available at Home: Walker, rolling, Cane, straight, Shower chair Tub or Shower Type: Shower EXAMINATION/PRESENTATION/DECISION MAKING: Critical Behavior: 
Neurologic State: Alert Orientation Level: Oriented X4 
 Cognition: Follows commands Hearing: Auditory Auditory Impairment: None Range Of Motion: 
AROM: Generally decreased, functional(full but slow RLE, hip) PROM: Within functional limits Strength:   
Strength: Generally decreased, functional 
  
  
  
  
   
Coordination: 
Coordination: Within functional limits Functional Mobility: 
Bed Mobility: 
Rolling: (received in w/c in fast track) Transfers: 
Sit to Stand: Supervision Stand to Sit: Supervision Balance:  
Sitting: Intact Standing: Impaired Standing - Static: Fair(with RW) Standing - Dynamic : Fair(with RW) Ambulation/Gait Training: 
Distance (ft): 50 Feet (ft) Assistive Device: Walker, rolling;Gait belt Ambulation - Level of Assistance: Supervision Gait Abnormalities: Antalgic;Decreased step clearance(on R) Base of Support: Shift to left Stance: Right decreased Speed/Izabella: Slow Step Length: Right shortened;Left shortened Special Tests: 
Barthel Index: 
 
Bathin Bladder: 10 Bowels: 10 
Groomin Dressin Feeding: 10 Mobility: 10 Stairs: 5 Toilet Use: 10 Transfer (Bed to Chair and Back): 10 Total: 75 The Barthel ADL Index: Guidelines 1. The index should be used as a record of what a patient does, not as a record of what a patient could do. 2. The main aim is to establish degree of independence from any help, physical or verbal, however minor and for whatever reason. 3. The need for supervision renders the patient not independent. 4. A patient's performance should be established using the best available evidence. Asking the patient, friends/relatives and nurses are the usual sources, but direct observation and common sense are also important. However direct testing is not needed. 5. Usually the patient's performance over the preceding 24-48 hours is important, but occasionally longer periods will be relevant. 6. Middle categories imply that the patient supplies over 50 per cent of the effort. 7. Use of aids to be independent is allowed. Eliazar Moran., Barthel, D.W. (2069). Functional evaluation: the Barthel Index. [address] (14)2. LUIS Hernandez, Deshaun Montalvo., Opal Fitzpatrick., [place], [facility] (1999). Measuring the change indisability after inpatient rehabilitation; comparison of the responsiveness of the Barthel Index and Functional [place] Measure. Journal of Neurology, Neurosurgery, and Psychiatry, 66(4), 599-291. Sonja Judd, N.J.A, ALESSANDRO Madrigal, & Geoffrey Singh M.A. (2004.) Assessment of post-stroke quality of life in cost-effectiveness studies: The usefulness of the Barthel Index and the EuroQoL-5D. [facility], 29, 962-11 Physical Therapy Evaluation Charge Determination History Examination Presentation Decision-Making HIGH Complexity :3+ comorbidities / personal factors will impact the outcome/ POC  MEDIUM Complexity : 3 Standardized tests and measures addressing body structure, function, activity limitation and / or participation in recreation  LOW Complexity : Stable, uncomplicated  LOW Complexity : FOTO score of  Based on the above components, the patient evaluation is determined to be of the following complexity level: LOW Pain: 
Pain Scale 1: Numeric (0 - 10) Pain Intensity 1: 8 Pain Location 1: Hip Pain Orientation 1: Right Pain Description 1: Aching Pain Intervention(s) 1: Family Support Activity Tolerance:  
See above narrative Please refer to the flowsheet for vital signs taken during this treatment. After treatment:  
[x]         Patient left in no apparent distress sitting up in chair 
[]         Patient left in no apparent distress in bed 
[x]         Call bell left within reach [x]         Nursing notified 
[]         Caregiver present 
[]         Bed alarm activated Thank you for this referral. 
 Karen Iniguez, PT Time Calculation: 19 mins

## 2019-02-15 PROBLEM — M16.11 PRIMARY OSTEOARTHRITIS OF RIGHT HIP: Status: ACTIVE | Noted: 2019-01-01

## 2019-02-15 NOTE — PROGRESS NOTES
Reviewed record in preparation for visit and have obtained necessary documentation. Identified pt with two pt identifiers(name and ). Chief Complaint Patient presents with  
 Hip Pain Pt states he fell in  around the  or  and was in pain. He went to the ER were they performed an X-Ray and told him everything came back fine. Pt states when he walks his right hip sends down a sensation and lately he has not been able to perform his daily activites Visit Vitals /72 (BP 1 Location: Left arm, BP Patient Position: Sitting) Pulse (!) 55 Temp 97.8 °F (36.6 °C) (Oral) Resp 16 Ht 5' 9\" (1.753 m) Wt 203 lb (92.1 kg) SpO2 96% BMI 29.98 kg/m² Health Maintenance Due Topic Date Due  Shingrix Vaccine Age 50> (1 of 2) 1981  GLAUCOMA SCREENING Q2Y  1996 Mr. Valentina Segal has a reminder for a \"due or due soon\" health maintenance. I have asked that he discuss health maintenance topic(s) due with Her  primary care provider. Coordination of Care Questionnaire: 
:  
 
1) Have you been to an emergency room, urgent care clinic since your last visit? Yes due to a fall on  Hospitalized since your last visit? no          
 
2) Have you seen or consulted any other health care providers outside of [address] [place] [address] since your last visit? no  (Include any pap smears or colon screenings in this section.) 3) Do you have an Advance Directive on file? no 
 
4) Are you interested in receiving information on Advance Directives? NO Patient is accompanied by self I have received verbal consent from Inez Webb to discuss any/all medical information while they are present in the room.

## 2019-02-15 NOTE — PROGRESS NOTES
Subjective:  
Stefan Grant is a 80 y.o. male Chief Complaint Patient presents with  
 Hip Pain Pt states he fell in Jan around the 21st or 22nd and was in pain. He went to the ER were they performed an X-Ray and told him everything came back fine. Pt states when he walks his right hip sends down a sensation and lately he has not been able to perform his daily activites History of present illness: He presents complaints of right hip pain having had a fall around 21st 22 January and went to the emergency room at which time x-rays were obtained. I reviewed the ER record he did have an x-ray of the right hip performed on the 22nd which revealed some arthritis in no acute process. He notes he is continued to have pain except when he rested when he gets up and walks around the hips bother him so much he has to rest again for a few days. It does not give out or lock up on him. He notes no other specific complaints other than the right hip pain. He has had no other falls and he is using a cane to walk now. Patient Active Problem List  
Diagnosis Code  Cancer of the skin, basal cell C44.91  
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  
 Gout M10.9  Glucose intolerance (impaired glucose tolerance) R73.02  
 Gastroesophageal reflux disease without esophagitis K21.9  
 COPD (chronic obstructive pulmonary disease) ([facility]) J44.9  Stage 3 chronic kidney disease ([facility]) N18.3  BPH (benign prostatic hyperplasia) N40.0  Atrial fibrillation ([facility]) I48.91  
 ASCVD (arteriosclerotic cardiovascular disease) I25.10  Frequent falls R29.6  Medicare annual wellness visit, subsequent Z00.00  Primary osteoarthritis involving multiple joints M15.0  Sebaceous cyst L72.3  Class 1 obesity due to excess calories without serious comorbidity with body mass index (BMI) of 30.0 to 30.9 in adult E66.09, Z68.30  Pain of left hip joint M25.552  Diarrhea R19.7  Primary osteoarthritis of right hip M16.11 Past Medical History:  
Diagnosis Date  Arrhythmia A-fib  GERD (gastroesophageal reflux disease)  Hypertension  Other ill-defined conditions(799.89) BPH  Other ill-defined conditions(799.89) lipids  Other ill-defined conditions(799.89)   
 shingles hx  Thyroid disease  Unspecified adverse effect of anesthesia No Known Allergies Family History Problem Relation Age of Onset  Heart Disease Mother  Hypertension Mother  Stroke Mother  Heart Disease Father  Cancer Paternal Grandmother Social History Socioeconomic History  Marital status:  Spouse name: Not on file  Number of children: Not on file  Years of education: Not on file  Highest education level: Not on file Social Needs  Financial resource strain: Not on file  Food insecurity - worry: Not on file  Food insecurity - inability: Not on file  Transportation needs - medical: Not on file  Transportation needs - non-medical: Not on file Occupational History  Not on file Tobacco Use  Smoking status: Former Smoker Packs/day: 0.50 Last attempt to quit: 3/28/1963 Years since quittin.9  Smokeless tobacco: Never Used Substance and Sexual Activity  Alcohol use: Yes Comment: rare/social-beer  Drug use: Yes Types: Prescription, OTC  Sexual activity: Not on file Other Topics Concern  Not on file Social History Narrative  Not on file Prior to Admission medications Medication Sig Start Date End Date Taking?  Authorizing Provider  
methylPREDNISolone acetate (DEPO-MEDROL) 40 mg/mL injection 1 mL by IntraMUSCular route once for 1 dose. 2/15/19 2/15/19 Yes Viktoria Amin MD  
finasteride (PROSCAR) 5 mg tablet Take 1 Tab by mouth daily (after dinner). 12/12/18  Yes Viktoria Amin MD  
simvastatin (ZOCOR) 40 mg tablet Take 1 Tab by mouth nightly. 12/12/18  Yes Viktoria Amin MD  
levothyroxine (SYNTHROID) 137 mcg tablet Take  by mouth Daily (before breakfast). Yes Provider, Historical  
losartan (COZAAR) 50 mg tablet Take 1 Tab by mouth daily. Patient taking differently: Take 25 mg by mouth daily. 9/12/18  Yes Viktoria Amin MD  
allopurinol (ZYLOPRIM) 100 mg tablet Take 1 Tab by mouth daily. 6/18/18  Yes Viktoria Amin MD  
tamsulosin [facility]) 0.4 mg capsule TAKE 2 CAPSULES DAILY 3/19/18  Yes Viktoria Amin MD  
rivaroxaban (XARELTO) 15 mg tab tablet Take 1 Tab by mouth daily (with breakfast). 3/19/18  Yes Viktoria Amin MD  
furosemide (LASIX) 40 mg tablet 1 daily Patient taking differently: Take 40 mg by mouth daily. 9/12/17  Yes Viktoria Amin MD  
methocarbamol (ROBAXIN-750) 750 mg tablet Take 1 Tab by mouth four (4) times daily. 1/22/19   Mroro Harris PA  
metroNIDAZOLE (FLAGYL) 500 mg tablet Take 1 Tab by mouth three (3) times daily. 12/12/18   Viktoria Amin MD  
dilTIAZem ER (CARDIZEM LA) 120 mg tablet Take 1 Tab by mouth daily. 6/18/18   Viktoria Amin MD  
  
 
Review of Systems Constitutional:  He denies fever, weight loss, sweats or fatigue. EYES: No blurred or double vision, ENT: no nasal congestion, no headache or dizziness. No difficulty with               swallowing, taste, speech or smell. Respiratory:  No cough, wheezing or shortness of breath. No sputum production. Cardiac:  Denies chest pain, palpitations, unexplained indigestion, syncope, edema, PND or orthopnea. GI:  No changes in bowel movements, no abdominal pain, no bloating, anorexia, nausea, vomiting or heartburn. :  No frequency or dysuria. Denies incontinence or sexual dysfunction. Extremities:  No joint pain, stiffness or swelling except right hip is noted Back:.no pain or soreness Skin:  No recent rashes or mole changes. Neurological:  No numbness, tingling, burning paresthesias or loss of motor strength. No syncope, dizziness, frequent headaches or memory loss. Hematologic:  No easy bruising Lymphatic: No lymph node enlargement Objective:  
 
Vitals:  
 02/15/19 1419 BP: 148/72 Pulse: (!) 55 Resp: 16 Temp: 97.8 °F (36.6 °C) TempSrc: Oral  
SpO2: 96% Weight: 203 lb (92.1 kg) Height: 5' 9\" (1.753 m) PainSc:   0 - No pain Body mass index is 29.98 kg/m². Physical Examination:  
           General Appearance:  Well-developed, well-nourished, no acute distress. HEENT:   
  Ears:  The TMs and ear canals were clear. Eyes:  The pupillary responses were normal.  Extraocular muscle function intact. Lids and conjunctiva not injected. Funduscopic exam revealed sharp disc margins. Nares: Clear w/o edema or erythema Pharynx:  Clear with teeth in good repair. No masses were noted. Neck:  Supple without thyromegaly or adenopathy. No JVD noted. No carotid                bruits. Lungs:  Clear to auscultation and percussion. Cardiac:  Regular rate and rhythm without murmur. PMI not displaced. No gallop, rub or click. Abdominal: Soft, non-tender, no hepata-spleenomegally or masses Extremities:  No clubbing, cyanosis or edema. Right hip tender over the lateral aspect increase range of motion without joint effusion Skin:  No rash or unusual mole changes noted. Lymph Nodes:  None felt in the cervical, supraclavicular, axillary or inguinal region. Neurological: . DTRs 2+ and symmetric. Station and gait with a mild limp with the use of a cane Hematologic:   No purpura or petechiae Assessment/Plan: 1. Primary osteoarthritis involving multiple joints 2. Hypertension with renal disease 3. Primary osteoarthritis of right hip Impressions/Plan: 
Impression 1. DJD x-rays of the hip are reviewed do indeed reveal that 2. Hypertension control is not adequate but he saw his cardiologist who decreased his losartan from 50 mg daily to 25 and I think he needs to go back to 50 mg daily. 3.  DJD right hip we discussed treatment options and elected to go with injection. After informed consent and Betadine scrub the right hip is entered laterally injected with Depo-Medrol 40 mg a cc lidocaine which tolerated quite well. Recheck as previously scheduled and will reassess his blood pressure at that time. I will see him sooner if there is a problem. Orders Placed This Encounter  DRAIN/INJECT LARGE JOINT/BURSA  methylPREDNISolone acetate (DEPO-MEDROL) 40 mg/mL injection Follow-up Disposition: 
Return At prior appt. No results found for any visits on 02/15/19. Cecy Brewster MD 
 
The patient was given after the visit summary the patient verbalized an understanding of the plans and problems as explained.

## 2019-02-15 NOTE — PATIENT INSTRUCTIONS
Hip Arthritis: Care Instructions Your Care Instructions Arthritis, also called osteoarthritis, is a breakdown of the tissue (cartilage) that cushions your joints. Many people have some arthritis as they age. When the cartilage in your hip joints wears down, your hip bone rubs against the hip socket. This causes pain and stiffness. Work with your doctor to find the right mix of treatments for your arthritis. There are things you can do at home to protect your hip joints, ease your pain, and help you stay active. But if your arthritis becomes so bad that you cannot walk, you may need surgery to replace the hip joint. Follow-up care is a key part of your treatment and safety. Be sure to make and go to all appointments, and call your doctor if you are having problems. It's also a good idea to know your test results and keep a list of the medicines you take. How can you care for yourself at home? · Stay at a healthy weight. Being overweight puts extra strain on your hip joints. · Talk to your doctor or physical therapist about exercises that will help ease hip pain. These tips may help. ? Stretch to help prevent stiffness and to prevent injury before you exercise. You may enjoy gentle forms of yoga to help keep your joints and muscles flexible. ? Walk instead of jog. Other types of exercise that are less stressful on the joints include riding a bike, swimming, and doing water exercise. ? Lift weights. Strong muscles help reduce stress on your joints. Stronger thigh muscles, for example, take some of the stress off of the knees and hips. Learn the right way to lift weights so you do not make joint pain worse. · Take pain medicines exactly as directed. ? If the doctor gave you a prescription medicine for pain, take it as prescribed. ? If you are not taking a prescription pain medicine, ask your doctor if you can take an over-the-counter medicine. · Use a cane, crutch, walker, or another device if you need help to get around. These can help rest your hips. You also can use other things to make life easier, such as a higher toilet seat. · Do not sit in low chairs, which can make it painful to get up. · Put heat or cold on your sore hips as needed. Use whichever helps you most. You also can go back and forth between hot and cold packs. ? Apply heat 2 or 3 times a day for 20 to 30 minutesusing a heating pad, hot shower, or hot packto relieve pain and stiffness. ? Put ice or a cold pack on your sore hips for 10 to 20 minutes at a time to numb the area. Put a thin cloth between the ice and your skin. · Think about talking to your doctor about using capsaicin, a cream you apply to the skin for pain relief. When should you call for help? Call your doctor now or seek immediate medical care if: 
  · You have sudden swelling, warmth, or pain in any joint.  
  · You have joint pain and a fever or rash.  
  · You have such bad pain that you cannot use the joint.  
 Watch closely for changes in your health, and be sure to contact your doctor if: 
  · You have mild joint symptoms that continue even with more than 6 weeks of care at home.  
  · You do not get better as expected.  
  · You have stomach pain or other problems with your medicine. Where can you learn more? Go to http://frank-lyubov.info/. Enter S238 in the search box to learn more about \"Hip Arthritis: Care Instructions. \" Current as of: Fina 10, 2018 Content Version: 11.9 © 3534-5349 [employer]. Care instructions adapted under license by [employer] (which disclaims liability or warranty for this information). If you have questions about a medical condition or this instruction, always ask your healthcare professional. [address] any warranty or liability for your use of this information.

## 2019-03-11 NOTE — PROGRESS NOTES
Chief Complaint   Patient presents with    Hypertension     3 month follow up    Medication Refill     synthroid, tamsulosin, esomepra       SUBJECTIVE:    Matthew Taylor is a 80 y.o. male who returns in follow-up of his medical problems include hypertension, glucose intolerance, hyperlipidemia, atrial fibrillation, ASCVD, GERD, DJD and other medical problems. He does notice a little fatigue but has no other specific complaints. He is taking his medications and trying to follow his diet and trying to get some exercise. He currently denies any chest pain, shortness of breath, palpitations, PND, orthopnea or other cardiorespiratory complaints. There are no GI or  complaints. He has no change of his chronic arthritic complaints and there are no other complaints on complete review of systems. Current Outpatient Medications   Medication Sig Dispense Refill    esomeprazole (NEXIUM) 40 mg capsule Take  by mouth daily.  finasteride (PROSCAR) 5 mg tablet Take 1 Tab by mouth daily (after dinner). 90 Tab 3    simvastatin (ZOCOR) 40 mg tablet Take 1 Tab by mouth nightly. 90 Tab 3    levothyroxine (SYNTHROID) 137 mcg tablet Take  by mouth Daily (before breakfast).  losartan (COZAAR) 50 mg tablet Take 1 Tab by mouth daily. 90 Tab prn    dilTIAZem ER (CARDIZEM LA) 120 mg tablet Take 1 Tab by mouth daily. 90 Tab 3    tamsulosin (FLOMAX) 0.4 mg capsule TAKE 2 CAPSULES DAILY 180 Cap 3    rivaroxaban (XARELTO) 15 mg tab tablet Take 1 Tab by mouth daily (with breakfast).  90 Tab 3    furosemide (LASIX) 40 mg tablet 1 daily (Patient taking differently: Take 40 mg by mouth daily.) 90 Tab 3     Past Medical History:   Diagnosis Date    Arrhythmia     A-fib    GERD (gastroesophageal reflux disease)     Hypertension     Other ill-defined conditions(799.89)     BPH    Other ill-defined conditions(799.89)     lipids    Other ill-defined conditions(799.89)     shingles hx    Thyroid disease     Unspecified adverse effect of anesthesia      Past Surgical History:   Procedure Laterality Date    HX CATARACT REMOVAL      HX HEART CATHETERIZATION      ablation    HX HEENT      scar tissue removed/laser    HX HERNIA REPAIR  2017    Bryson Womack MD    HX ORTHOPAEDIC      left foot fx--hardware    HX ORTHOPAEDIC  14    Right total knee arthroplasty    HX OTHER SURGICAL      prostate bx     No Known Allergies    REVIEW OF SYSTEMS:  General: negative for - chills or fever, or weight loss or gain  ENT: negative for - headaches, nasal congestion or tinnitus  Eyes: no blurred or visual changes  Neck: No stiffness or swollen nodes  Respiratory: negative for - cough, hemoptysis, shortness of breath or wheezing  Cardiovascular : negative for - chest pain, edema, palpitations or shortness of breath  Gastrointestinal: negative for - abdominal pain, blood in stools, heartburn or nausea/vomiting  Genito-Urinary: no dysuria, trouble voiding, or hematuria  Musculoskeletal: negative for - gait disturbance, change of his chronic joint pain, joint stiffness or joint swelling  Neurological: no TIA or stroke symptoms  Hematologic: no bruises, no bleeding  Lymphatic: no swollen glands  Integument: no lumps, mole changes, nail changes or rash  Endocrine:no malaise/lethargy poly uria or polydipsia or unexpected weight changes        Social History     Socioeconomic History    Marital status:      Spouse name: Not on file    Number of children: Not on file    Years of education: Not on file    Highest education level: Not on file   Tobacco Use    Smoking status: Former Smoker     Packs/day: 0.50     Last attempt to quit: 3/28/1963     Years since quittin.9    Smokeless tobacco: Never Used   Substance and Sexual Activity    Alcohol use: Yes     Comment: rare/social-beer    Drug use: Yes     Types: Prescription, OTC     Family History   Problem Relation Age of Onset    Heart Disease Mother    [facility] Hypertension Mother     Stroke Mother     Heart Disease Father     Cancer Paternal Grandmother        OBJECTIVE:     Visit Vitals  /56   Pulse (!) 59   Temp 97 °F (36.1 °C) (Oral)   Resp 19   Ht 5' 9\" (1.753 m)   Wt 197 lb 3.2 oz (89.4 kg)   SpO2 94%   BMI 29.12 kg/m²     CONSTITUTIONAL:   well nourished, appears age appropriate  EYES: sclera anicteric, PERRL, EOMI  ENMT:nares clear, moist mucous membranes, pharynx clear  NECK: supple. Thyroid normal, No JVD or bruits  RESPIRATORY: Chest: clear to ascultation and percussion, normal inspiratory effort  CARDIOVASCULAR: Heart: regular rate and rhythm no murmurs, rubs or gallops, PMI not displaced, No thrills  GASTROINTESTINAL: Abdomen: non distended, soft, non tender, bowel sounds normal  HEMATOLOGIC: no purpura, petechiae or bruising  LYMPHATIC: No lymph node enlargemant  MUSCULOSKELETAL: Extremities: no edema or active synovitis, pulse 1+   INTEGUMENT: No unusual rashes or suspicious skin lesions noted. Nails appear normal.  PERIPHERAL VASCULAR: normal pulses femoral, PT and DP  NEUROLOGIC: non-focal exam, A & O X 3  PSYCHIATRIC:, appropriate affect     ASSESSMENT:   1. Hypertension with renal disease    2. Glucose intolerance (impaired glucose tolerance)    3. Mixed hyperlipidemia    4. Paroxysmal atrial fibrillation (HCC)    5. ASCVD (arteriosclerotic cardiovascular disease)    6. Chronic obstructive pulmonary disease, unspecified COPD type ([facility] [address]    7. Gastroesophageal reflux disease without esophagitis    8. Primary osteoarthritis involving multiple joints    9. Stage 3 chronic kidney disease (HCC)      Impression  1. Hypertension is controlled to continue current therapy reviewed with him. 2.  Glucose intolerance repeat status is pending and prior labs reviewed no make adjustments if necessary. 3.  Hyperlipidemia prior lab reviewed repeat status pending I will adjust if needed.   4   Paroxysmal atrial fibrillation currently on Xarelto so continue same  5. ASCVD clinically stable continue aspirin daily and he is on Xarelto  6. COPD stable  7. GERD currently stable  8. DJD that is stable  9. CKD stage III repeat status pending  Fatigue I will check a blood count today along with his lab  I will call the lab results and make further recommendations or adjustments if necessary. Follow-up as scheduled for 3 months or sooner if there is a problem. PLAN:  .  Orders Placed This Encounter    HEMOGLOBIN A1C WITH EAG    METABOLIC PANEL, COMPREHENSIVE ([facility])    LIPID PANEL ([facility])    CK ([facility])    CBC WITH AUTOMATED DIFF ([facility])    esomeprazole (NEXIUM) 40 mg capsule         ATTENTION:   This medical record was transcribed using an electronic medical records system. Although proofread, it may and can contain electronic and spelling errors. Other human spelling and other errors may be present. Corrections may be executed at a later time. Please feel free to contact us for any clarifications as needed. Follow-up Disposition:  Return in about 3 months (around 6/12/2019). No results found for any visits on 03/12/19. Eric Milligan MD    The patient verbalized understanding of the problems and plans as explained.

## 2019-03-12 NOTE — PATIENT INSTRUCTIONS
Arthritis: Care Instructions  Your Care Instructions  Arthritis, also called osteoarthritis, is a breakdown of the cartilage that cushions your joints. When the cartilage wears down, your bones rub against each other. This causes pain and stiffness. Many people have some arthritis as they age. Arthritis most often affects the joints of the spine, hands, hips, knees, or feet. You can take simple measures to protect your joints, ease your pain, and help you stay active. Follow-up care is a key part of your treatment and safety. Be sure to make and go to all appointments, and call your doctor if you are having problems. It's also a good idea to know your test results and keep a list of the medicines you take. How can you care for yourself at home? · Stay at a healthy weight. Being overweight puts extra strain on your joints. · Talk to your doctor or physical therapist about exercises that will help ease joint pain. ? Stretch. You may enjoy gentle forms of yoga to help keep your joints and muscles flexible. ? Walk instead of jog. Other types of exercise that are less stressful on the joints include riding a bicycle, swimming, julia chi, or water exercise. ? Lift weights. Strong muscles help reduce stress on your joints. Stronger thigh muscles, for example, take some of the stress off of the knees and hips. Learn the right way to lift weights so you do not make joint pain worse. · Take your medicines exactly as prescribed. Call your doctor if you think you are having a problem with your medicine. · Take pain medicines exactly as directed. ? If the doctor gave you a prescription medicine for pain, take it as prescribed. ? If you are not taking a prescription pain medicine, ask your doctor if you can take an over-the-counter medicine. · Use a cane, crutch, walker, or another device if you need help to get around. These can help rest your joints.  You also can use other things to make life easier, such as a higher toilet seat and padded handles on kitchen utensils. · Do not sit in low chairs, which can make it hard to get up. · Put heat or cold on your sore joints as needed. Use whichever helps you most. You also can take turns with hot and cold packs. ? Apply heat 2 or 3 times a day for 20 to 30 minutesusing a heating pad, hot shower, or hot packto relieve pain and stiffness. ? Put ice or a cold pack on your sore joint for 10 to 20 minutes at a time. Put a thin cloth between the ice and your skin. When should you call for help? Call your doctor now or seek immediate medical care if:    · You have sudden swelling, warmth, or pain in any joint.     · You have joint pain and a fever or rash.     · You have such bad pain that you cannot use a joint.    Watch closely for changes in your health, and be sure to contact your doctor if:    · You have mild joint symptoms that continue even with more than 6 weeks of care at home.     · You have stomach pain or other problems with your medicine. Where can you learn more? Go to http://frank-lyubov.info/. Enter D368 in the search box to learn more about \"Arthritis: Care Instructions. \"  Current as of: Fina 10, 2018  Content Version: 11.9  © 3493-7607 [employer]. Care instructions adapted under license by "[employer] (which disclaims liability or warranty for this information). If you have questions about a medical condition or this instruction, always ask your healthcare professional. [place] [address] any warranty or liability for your use of this information.

## 2019-03-12 NOTE — PROGRESS NOTES
Chief Complaint   Patient presents with    Hypertension     3 month follow up    Medication Refill     synthroid, tamsulosin, esomepra       1. Have you been to the ER, urgent care clinic since your last visit? No    Hospitalized since your last visit? No     2. Have you seen or consulted any other health care providers outside of the [address] [place] [address] since your last visit?    No

## 2019-03-22 NOTE — TELEPHONE ENCOUNTER
RX refill request from the patient/pharmacy. Patient last seen 03- with labs, and next appt. scheduled for 06-  Requested Prescriptions     Pending Prescriptions Disp Refills    tamsulosin (FLOMAX) 0.4 mg capsule 180 Cap 3     Sig: TAKE 2 CAPSULES DAILY    levothyroxine (SYNTHROID) 137 mcg tablet 90 Tab 3     Sig: Take 137 mcg by mouth Daily (before breakfast).  esomeprazole (NEXIUM) 40 mg capsule 90 Cap 3     Sig: Take 1 Cap by mouth daily. Elias Krause

## 2019-04-22 PROBLEM — M54.42 ACUTE BILATERAL LOW BACK PAIN WITH BILATERAL SCIATICA: Status: ACTIVE | Noted: 2019-01-01

## 2019-04-22 PROBLEM — M54.41 ACUTE BILATERAL LOW BACK PAIN WITH BILATERAL SCIATICA: Status: ACTIVE | Noted: 2019-01-01

## 2019-04-22 NOTE — PROGRESS NOTES
Chief Complaint Patient presents with  Back Pain  
  x  1 week with pain going down both legs Visit Vitals /88 (BP 1 Location: Left arm, BP Patient Position: Sitting) Pulse 69 Temp 98 °F (36.7 °C) (Oral) Resp 19 Ht 5' 9\" (1.753 m) Wt 203 lb 12.8 oz (92.4 kg) SpO2 94% BMI 30.10 kg/m² 1. Have you been to the ER, urgent care clinic since your last visit? Hospitalized since your last visit? No 
 
2. Have you seen or consulted any other health care providers outside of the [address] [place] [address] since your last visit? Include any pap smears or colon screening.  No

## 2019-04-22 NOTE — PATIENT INSTRUCTIONS
Learning About Relief for Back Pain What is back tension and strain? Back strain happens when you overstretch, or pull, a muscle in your back. You may hurt your back in an accident or when you exercise or lift something. Most back pain will get better with rest and time. You can take care of yourself at home to help your back heal. 
What can you do first to relieve back pain? When you first feel back pain, try these steps: 
· Walk. Take a short walk (10 to 20 minutes) on a level surface (no slopes, hills, or stairs) every 2 to 3 hours. Walk only distances you can manage without pain, especially leg pain. · Relax. Find a comfortable position for rest. Some people are comfortable on the floor or a medium-firm bed with a small pillow under their head and another under their knees. Some people prefer to lie on their side with a pillow between their knees. Don't stay in one position for too long. · Try heat or ice. Try using a heating pad on a low or medium setting, or take a warm shower, for 15 to 20 minutes every 2 to 3 hours. Or you can buy single-use heat wraps that last up to 8 hours. You can also try an ice pack for 10 to 15 minutes every 2 to 3 hours. You can use an ice pack or a bag of frozen vegetables wrapped in a thin towel. There is not strong evidence that either heat or ice will help, but you can try them to see if they help. You may also want to try switching between heat and cold. · Take pain medicine exactly as directed. ? If the doctor gave you a prescription medicine for pain, take it as prescribed. ? If you are not taking a prescription pain medicine, ask your doctor if you can take an over-the-counter medicine. What else can you do? · Stretch and exercise. Exercises that increase flexibility may relieve your pain and make it easier for your muscles to keep your spine in a good, neutral position. And don't forget to keep walking. · Do self-massage. You can use self-massage to unwind after work or school or to energize yourself in the morning. You can easily massage your feet, hands, or neck. Self-massage works best if you are in comfortable clothes and are sitting or lying in a comfortable position. Use oil or lotion to massage bare skin. · Reduce stress. Back pain can lead to a vicious [place]: Distress about the pain tenses the muscles in your back, which in turn causes more pain. Learn how to relax your mind and your muscles to lower your stress. Where can you learn more? Go to http://frank-lyubov.info/. Enter Q159 in the search box to learn more about \"Learning About Relief for Back Pain. \" Current as of: September 20, 2018 Content Version: 11.9 © 6676-7294 [employer]. Care instructions adapted under license by [employer] (which disclaims liability or warranty for this information). If you have questions about a medical condition or this instruction, always ask your healthcare professional. [address] any warranty or liability for your use of this information.

## 2019-04-22 NOTE — PROGRESS NOTES
Subjective:  
Nabeel Sullivan is a 80 y.o. male Chief Complaint Patient presents with  Back Pain  
  x  1 week with pain going down both legs History of present illness: He presents complaints of low back pain over the past week this been going on with physical activity and goes down both legs. He is periodically had some back pain intermittently to go down one leg or the other goal usually after 3-4-5 days. He has never had pain is persisted this long goes down both legs. As long as he sitting around a relaxing he pain is when he gets up and is physically active he notes the pain in the seem to start in his upper legs rather than his calves and really does not go to his calves. He has no history of falls or trauma. He notes no bowel or bladder dysfunction. He has no other particular complaints. Patient Active Problem List  
Diagnosis Code  Cancer of the skin, basal cell C44.91  
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  
 Gout M10.9  Glucose intolerance (impaired glucose tolerance) R73.02  
 Gastroesophageal reflux disease without esophagitis K21.9  
 COPD (chronic obstructive pulmonary disease) (HCC) J44.9  Stage 3 chronic kidney disease (HCC) N18.3  BPH (benign prostatic hyperplasia) N40.0  Atrial fibrillation (HCC) I48.91  
 ASCVD (arteriosclerotic cardiovascular disease) I25.10  Frequent falls R29.6  Medicare annual wellness visit, subsequent Z00.00  Primary osteoarthritis involving multiple joints M15.0  Sebaceous cyst L72.3  Class 1 obesity due to excess calories without serious comorbidity with body mass index (BMI) of 30.0 to 30.9 in adult E66.09, Z68.30  Pain of left hip joint M25.552  Diarrhea R19.7  Primary osteoarthritis of right hip M16.11  
  Acute bilateral low back pain with bilateral sciatica M54.42, M54.41 Past Medical History:  
Diagnosis Date  Arrhythmia A-fib  GERD (gastroesophageal reflux disease)  Hypertension  Other ill-defined conditions(799.89) BPH  Other ill-defined conditions(799.89) lipids  Other ill-defined conditions(799.89)   
 shingles hx  Thyroid disease  Unspecified adverse effect of anesthesia No Known Allergies Family History Problem Relation Age of Onset  Heart Disease Mother  Hypertension Mother  Stroke Mother  Heart Disease Father  Cancer Paternal Grandmother Social History Socioeconomic History  Marital status:  Spouse name: Not on file  Number of children: Not on file  Years of education: Not on file  Highest education level: Not on file Occupational History  Not on file Social Needs  Financial resource strain: Not on file  Food insecurity:  
  Worry: Not on file Inability: Not on file  Transportation needs:  
  Medical: Not on file Non-medical: Not on file Tobacco Use  Smoking status: Former Smoker Packs/day: 0.50 Last attempt to quit: 3/28/1963 Years since quittin.1  Smokeless tobacco: Never Used Substance and Sexual Activity  Alcohol use: Yes Comment: rare/social-beer  Drug use: Yes Types: Prescription, OTC  Sexual activity: Not on file Lifestyle  Physical activity:  
  Days per week: Not on file Minutes per session: Not on file  Stress: Not on file Relationships  Social connections:  
  Talks on phone: Not on file Gets together: Not on file Attends Mandaeism service: Not on file Active member of club or organization: Not on file Attends meetings of clubs or organizations: Not on file Relationship status: Not on file  Intimate partner violence:  
  Fear of current or ex partner: Not on file Emotionally abused: Not on file Physically abused: Not on file Forced sexual activity: Not on file Other Topics Concern  Not on file Social History Narrative  Not on file Prior to Admission medications Medication Sig Start Date End Date Taking? Authorizing Provider  
predniSONE (STERAPRED) 5 mg dose pack See administration instruction per 5mg dose pack 4/22/19  Yes Sari Dunbar MD  
tamsulosin [facility]) 0.4 mg capsule TAKE 2 CAPSULES DAILY 3/22/19  Yes Sari Dunbar MD  
levothyroxine (SYNTHROID) 137 mcg tablet Take 137 mcg by mouth Daily (before breakfast). 3/22/19  Yes Sari Dunbar MD  
esomeprazole (NEXIUM) 40 mg capsule Take 1 Cap by mouth daily. 3/22/19  Yes Sari Dunbar MD  
finasteride (PROSCAR) 5 mg tablet Take 1 Tab by mouth daily (after dinner). 12/12/18  Yes Sari Dunbar MD  
simvastatin (ZOCOR) 40 mg tablet Take 1 Tab by mouth nightly. 12/12/18  Yes Sari Dunbar MD  
losartan (COZAAR) 50 mg tablet Take 1 Tab by mouth daily. 9/12/18  Yes Sari Dunbar MD  
dilTIAZem ER (CARDIZEM LA) 120 mg tablet Take 1 Tab by mouth daily. 6/18/18  Yes Sari Dunbar MD  
rivaroxaban Melanie Amrit) 15 mg tab tablet Take 1 Tab by mouth daily (with breakfast). 3/19/18  Yes Sari Dunbar MD  
furosemide (LASIX) 40 mg tablet 1 daily Patient taking differently: Take 40 mg by mouth daily. 9/12/17  Yes Sari Dunbar MD  
  
 
Review of Systems Constitutional:  He denies fever, weight loss, sweats or fatigue. EYES: No blurred or double vision, ENT: no nasal congestion, no headache or dizziness. No difficulty with               swallowing, taste, speech or smell. Respiratory:  No cough, wheezing or shortness of breath. No sputum production. Cardiac:  Denies chest pain, palpitations, unexplained indigestion, syncope, edema, PND or orthopnea. GI:  No changes in bowel movements, no abdominal pain, no bloating, anorexia, nausea, vomiting or heartburn. :  No frequency or dysuria. Denies incontinence or sexual dysfunction. Extremities:  No joint pain, stiffness or swelling Back:. Low back pain is noted Skin:  No recent rashes or mole changes. Neurological:  No numbness, tingling, burning paresthesias or loss of motor strength. No syncope, dizziness, frequent headaches or memory loss. Hematologic:  No easy bruising Lymphatic: No lymph node enlargement Objective:  
 
Vitals:  
 04/22/19 1358 04/22/19 1428 BP: 142/88 136/84 Pulse: 69 Resp: 19 Temp: 98 °F (36.7 °C) TempSrc: Oral   
SpO2: 94% Weight: 203 lb 12.8 oz (92.4 kg) Height: 5' 9\" (1.753 m) PainSc:   0 - No pain Body mass index is 30.1 kg/m². Physical Examination:  
           General Appearance:  Well-developed, well-nourished, no acute distress. HEENT:   
  Ears:  The TMs and ear canals were clear. Eyes:  The pupillary responses were normal.  Extraocular muscle function intact. Lids and conjunctiva not injected. Funduscopic exam revealed sharp disc margins. Nares: Clear w/o edema or erythema Pharynx:  Clear with teeth in good repair. No masses were noted. Neck:  Supple without thyromegaly or adenopathy. No JVD noted. No carotid                bruits. Lungs:  Clear to auscultation and percussion. Cardiac:  Regular rate and rhythm without murmur. PMI not displaced. No gallop, rub or click. Abdominal: Soft, non-tender, no hepata-spleenomegally or masses Extremities:  No clubbing, cyanosis or edema. Back: No CVA point tenderness and straight leg raise is negative bilateral. 
Skin:  No rash or unusual mole changes noted. Lymph Nodes:  None felt in the cervical, supraclavicular, axillary or inguinal region. Neurological: . DTRs 2+ and symmetric. Station and gait normal.  
Hematologic:   No purpura or petechiae Assessment/Plan: 1. Acute bilateral low back pain with bilateral sciatica 2. Primary osteoarthritis involving multiple joints Impressions/Plan: 
Impression 1. Severe low back pain x-ray obtained today reveals marked degenerative changes we will try a prednisone 5 mg 12-day Dosepak Recheck if not resolved otherwise check at previously scheduled appointment Orders Placed This Encounter  XR SPINE LUMB 2 OR 3 V  
 predniSONE (STERAPRED) 5 mg dose pack No results found for any visits on 04/22/19. Anil Barajas MD 
 
The patient was given after the visit summary the patient verbalized an understanding of the plans and problems as explained.

## 2019-05-10 NOTE — TELEPHONE ENCOUNTER
RX refill request from the patient/pharmacy. Patient last seen 04- with labs, and next appt. scheduled for 06-  Requested Prescriptions     Pending Prescriptions Disp Refills    predniSONE (STERAPRED) 5 mg dose pack [Pharmacy Med Name: PREDNISONE 5 MG TAB DSPK] 1 Package 0     Sig: SEE ADMINISTRATION INSTRUCTION PER 5MG DOSE PACK   .

## 2019-06-12 PROBLEM — R06.09 DOE (DYSPNEA ON EXERTION): Status: ACTIVE | Noted: 2019-01-01

## 2019-06-12 NOTE — PROGRESS NOTES
Chief Complaint Patient presents with  Hypertension 3 month follow up  Blood sugar problem  COPD  Shortness of Breath  
  x 1 month SUBJECTIVE: 
 
River Wilson is a 80 y.o. male who returns in follow-up for his medical problems to include hypertension, glucose intolerance, hyperlipidemia, COPD, CKD stage III, DJD, history of gout, history of paroxysmal atrial fibrillation as well as a recorded history of ASCVD although no details of that are available. He currently notes he is developed increased dyspnea on exertion when he is walking places. He knows that he has to stop because he gets short of breath but gets no chest tightness or chest pain. He denies any swelling in his ankles and denies any PND orthopnea. He denies any GI or  complaints and there is no change in bowel habits or color of his bowel movements. He denies any headaches, dizziness or neurologic complaints. He has some chronic unchanged arthritic complaints but no other complaints on complete review of systems. Current Outpatient Medications Medication Sig Dispense Refill  rivaroxaban (XARELTO) 15 mg tab tablet Take 1 Tab by mouth daily (with breakfast). 90 Tab 3  
 tamsulosin (FLOMAX) 0.4 mg capsule TAKE 2 CAPSULES DAILY 180 Cap 3  
 levothyroxine (SYNTHROID) 137 mcg tablet Take 137 mcg by mouth Daily (before breakfast). 90 Tab 3  
 esomeprazole (NEXIUM) 40 mg capsule Take 1 Cap by mouth daily. 90 Cap 3  
 finasteride (PROSCAR) 5 mg tablet Take 1 Tab by mouth daily (after dinner). 90 Tab 3  
 simvastatin (ZOCOR) 40 mg tablet Take 1 Tab by mouth nightly. 90 Tab 3  
 losartan (COZAAR) 50 mg tablet Take 1 Tab by mouth daily. 90 Tab prn  dilTIAZem ER (CARDIZEM LA) 120 mg tablet Take 1 Tab by mouth daily. 90 Tab 3  furosemide (LASIX) 40 mg tablet 1 daily (Patient taking differently: Take 40 mg by mouth daily.) 90 Tab 3 Past Medical History:  
Diagnosis Date  Arrhythmia A-fib  GERD (gastroesophageal reflux disease)  Hypertension  Other ill-defined conditions(799.89) BPH  Other ill-defined conditions(799.89) lipids  Other ill-defined conditions(799.89)   
 shingles hx  Thyroid disease  Unspecified adverse effect of anesthesia Past Surgical History:  
Procedure Laterality Date  HX CATARACT REMOVAL    
 HX HEART CATHETERIZATION    
 ablation  HX HEENT    
 scar tissue removed/laser  HX HERNIA REPAIR  05/05/2017 Ron Womack MD  
 HX ORTHOPAEDIC    
 left foot fx--hardware  HX ORTHOPAEDIC  4/9/14 Right total knee arthroplasty  HX OTHER SURGICAL    
 prostate bx No Known Allergies REVIEW OF SYSTEMS: 
General: negative for - chills or fever, or weight loss or gain ENT: negative for - headaches, nasal congestion or tinnitus Eyes: no blurred or visual changes Neck: No stiffness or swollen nodes Respiratory: negative for - cough, hemoptysis, shortness of breath or wheezing Cardiovascular : negative for - chest pain, edema, palpitations or shortness of breath but positive for dyspnea on exertion Gastrointestinal: negative for - abdominal pain, blood in stools, heartburn or nausea/vomiting Genito-Urinary: no dysuria, trouble voiding, or hematuria Musculoskeletal: negative for - gait disturbance, joint pain, joint stiffness or joint swelling Neurological: no TIA or stroke symptoms Hematologic: no bruises, no bleeding Lymphatic: no swollen glands Integument: no lumps, mole changes, nail changes or rash Endocrine:no malaise/lethargy poly uria or polydipsia or unexpected weight changes Social History Socioeconomic History  Marital status:  Spouse name: Not on file  Number of children: Not on file  Years of education: Not on file  Highest education level: Not on file Tobacco Use  Smoking status: Former Smoker Packs/day: 0.50 Last attempt to quit: 3/28/1963 Years since quittin.2  Smokeless tobacco: Never Used Substance and Sexual Activity  Alcohol use: Yes Comment: rare/social-beer  Drug use: Yes Types: Prescription, OTC Family History Problem Relation Age of Onset  Heart Disease Mother  Hypertension Mother  Stroke Mother  Heart Disease Father  Cancer Paternal Grandmother OBJECTIVE:  
 
Visit Vitals /58 (BP 1 Location: Left arm, BP Patient Position: Sitting) Pulse 64 Temp 97.8 °F (36.6 °C) (Oral) Resp 21 Ht 5' 9\" (1.753 m) Wt 201 lb 6.4 oz (91.4 kg) SpO2 94% BMI 29.74 kg/m² CONSTITUTIONAL:   well nourished, appears age appropriate EYES: sclera anicteric, PERRL, EOMI 
ENMT:nares clear, moist mucous membranes, pharynx clear NECK: supple. Thyroid normal, No JVD or bruits RESPIRATORY: Chest: clear to ascultation and percussion, normal inspiratory effort CARDIOVASCULAR: Heart: regular rate and rhythm no murmurs, rubs or gallops, PMI not displaced, No thrills GASTROINTESTINAL: Abdomen: non distended, soft, non tender, bowel sounds normal 
HEMATOLOGIC: no purpura, petechiae or bruising LYMPHATIC: No lymph node enlargemant MUSCULOSKELETAL: Extremities: no edema or active synovitis, pulse 1+. Currently uses a cane for ambulation INTEGUMENT: No unusual rashes or suspicious skin lesions noted. Nails appear normal. 
PERIPHERAL VASCULAR: normal pulses femoral, PT and DP NEUROLOGIC: non-focal exam, A & O X 3 PSYCHIATRIC:, appropriate affect ASSESSMENT:  
1. Hypertension with renal disease 2. Glucose intolerance (impaired glucose tolerance) 3. Mixed hyperlipidemia 4. Gastroesophageal reflux disease without esophagitis 5. Chronic obstructive pulmonary disease, unspecified COPD type ([address] 6. Stage 3 chronic kidney disease ([address] 7. Paroxysmal atrial fibrillation ([address] 8. ASCVD (arteriosclerotic cardiovascular disease) 9. JAFFE (dyspnea on exertion) Impression 1.  Hypertension that is currently controlled so continue current therapy there. That was reviewed with him. 2.  Glucose intolerance repeat status pending a prior lab review no make adjustments if necessary. 3   Hyperlipidemia prior lab reviewed and repeat status pending I will adjust if needed. 4.  GERD that is currently stable 5. COPD I do not think his dyspnea on exertion is related to that and his O2 sat is 94% and he has no wheezes on exam. 
6.  CKD stage III repeat status pending 7. Paroxysmal atrial fibrillation appears to be in sinus rhythm on exam today and I do not think his dyspnea on exertion is related to cardiac rhythm issues. He previously had ablation done in 2005 
8   ASCVD with catheterization done 6/11/2004 showing 80% stenosis of nondominant RCA and 30% stenosis of LAD with apparently also had some left main disease at that time with a percentage noted. The last stress test I can find is from 2014 when he had a Lexiscan 1 day nuclear test which was normal. 
I discussed this case today by phone with Dr. Mika Barr his cardiologist and he is going to set him up for a Lexiscan Cardiolite scan. I will call with lab results and make further recommendations or adjustments if necessary. Tentative follow-up with me scheduled for 3 months although I will need to see him sooner if he has a problem. PLAN: 
. Orders Placed This Encounter  HEMOGLOBIN A1C WITH EAG  
 METABOLIC PANEL, COMPREHENSIVE ([facility])  LIPID PANEL ([facility])  CK ([facility])  CBC WITH AUTOMATED DIFF ([facility])  AMB POC EKG ROUTINE W/ 12 LEADS, INTER & REP  rivaroxaban (XARELTO) 15 mg tab tablet ATTENTION:  
This medical record was transcribed using an electronic medical records system. Although proofread, it may and can contain electronic and spelling errors. Other human spelling and other errors may be present. Corrections may be executed at a later time. Please feel free to contact us for any clarifications as needed. Follow-up and Dispositions · Return in about 3 months (around 9/12/2019). No results found for any visits on 06/12/19. Princess Robbin MD 
 
The patient verbalized understanding of the problems and plans as explained.

## 2019-06-12 NOTE — PROGRESS NOTES
Chief Complaint Patient presents with  Hypertension 3 month follow up  Blood sugar problem  COPD  Shortness of Breath  
  x 1 month Visit Vitals /58 (BP 1 Location: Left arm, BP Patient Position: Sitting) Pulse 64 Temp 97.8 °F (36.6 °C) (Oral) Resp 21 Ht 5' 9\" (1.753 m) Wt 201 lb 6.4 oz (91.4 kg) SpO2 94% BMI 29.74 kg/m² 1. Have you been to the ER, urgent care clinic since your last visit? Hospitalized since your last visit? No 
 
2. Have you seen or consulted any other health care providers outside of the [address] [place] [address] since your last visit? Include any pap smears or colon screening.  No

## 2019-06-12 NOTE — TELEPHONE ENCOUNTER
Called patient regarding his elevated potassium in his lab work today. Discussed with patient per Dr. Norma Jacques any extra sources of potassium. Patient denies any extra sources of potassium.   Dr. Norma Jacques desires patient to f/up 6- for STAT BMP

## 2019-06-12 NOTE — PATIENT INSTRUCTIONS
Chronic Obstructive Pulmonary Disease (COPD): Care Instructions Your Care Instructions Chronic obstructive pulmonary disease (COPD) is a general term for a group of lung diseases, including emphysema and chronic bronchitis. People with COPD have decreased airflow in and out of the lungs, which makes it hard to breathe. The airways also can get clogged with thick mucus. Cigarette smoking is a major cause of COPD. Although there is no cure for COPD, you can slow its progress. Following your treatment plan and taking care of yourself can help you feel better and live longer. Follow-up care is a key part of your treatment and safety. Be sure to make and go to all appointments, and call your doctor if you are having problems. It's also a good idea to know your test results and keep a list of the medicines you take. How can you care for yourself at home? 
 Staying healthy 
  · Do not smoke. This is the most important step you can take to prevent more damage to your lungs. If you need help quitting, talk to your doctor about stop-smoking programs and medicines. These can increase your chances of quitting for good.  
  · Avoid colds and flu. Get a pneumococcal vaccine shot. If you have had one before, ask your doctor whether you need a second dose. Get the flu vaccine every fall. If you must be around people with colds or the flu, wash your hands often.  
  · Avoid secondhand smoke, air pollution, and high altitudes. Also avoid cold, dry air and hot, humid air. Stay at home with your windows closed when air pollution is bad.  
 Medicines and oxygen therapy 
  · Take your medicines exactly as prescribed. Call your doctor if you think you are having a problem with your medicine.  
  · You may be taking medicines such as: 
? Bronchodilators. These help open your airways and make breathing easier.  Bronchodilators are either short-acting (work for 6 to 9 hours) or long-acting (work for 24 hours). You inhale most bronchodilators, so they start to act quickly. Always carry your quick-relief inhaler with you in case you need it while you are away from home. ? Corticosteroids (prednisone, budesonide). These reduce airway inflammation. They come in pill or inhaled form. You must take these medicines every day for them to work well.  
  · A spacer may help you get more inhaled medicine to your lungs. Ask your doctor or pharmacist if a spacer is right for you. If it is, ask how to use it properly.  
  · Do not take any vitamins, over-the-counter medicine, or herbal products without talking to your doctor first.  
  · If your doctor prescribed antibiotics, take them as directed. Do not stop taking them just because you feel better. You need to take the full course of antibiotics.  
  · Oxygen therapy boosts the amount of oxygen in your blood and helps you breathe easier. Use the flow rate your doctor has recommended, and do not change it without talking to your doctor first.  
Activity 
  · Get regular exercise. Walking is an easy way to get exercise. Start out slowly, and walk a little more each day.  
  · Pay attention to your breathing. You are exercising too hard if you cannot talk while you are exercising.  
  · Take short rest breaks when doing household chores and other activities.  
  · Learn breathing methodssuch as breathing through pursed lipsto help you become less short of breath.  
  · If your doctor has not set you up with a pulmonary rehabilitation program, talk to him or her about whether rehab is right for you. Rehab includes exercise programs, education about your disease and how to manage it, help with diet and other changes, and emotional support. Diet 
  · Eat regular, healthy meals. Use bronchodilators about 1 hour before you eat to make it easier to eat.  Eat several small meals instead of three large ones. Drink beverages at the end of the meal. Avoid foods that are hard to chew.  
  · Eat foods that contain protein so that you do not lose muscle mass.  
  · Talk with your doctor if you gain too much weight or if you lose weight without trying.  
 Mental health 
  · Talk to your family, friends, or a therapist about your feelings. It is normal to feel frightened, angry, hopeless, helpless, and even guilty. Talking openly about bad feelings can help you cope. If these feelings last, talk to your doctor. When should you call for help? Call 911 anytime you think you may need emergency care. For example, call if: 
  · You have severe trouble breathing.  
 Call your doctor now or seek immediate medical care if: 
  · You have new or worse trouble breathing.  
  · You cough up blood.  
  · You have a fever.  
 Watch closely for changes in your health, and be sure to contact your doctor if: 
  · You cough more deeply or more often, especially if you notice more mucus or a change in the color of your mucus.  
  · You have new or worse swelling in your legs or belly.  
  · You are not getting better as expected. Where can you learn more? Go to http://frank-lyubov.info/. Michael Noriega in the search box to learn more about \"Chronic Obstructive Pulmonary Disease (COPD): Care Instructions. \" Current as of: September 5, 2018 Content Version: 11.9 © 3627-6234 [employer]. Care instructions adapted under license by [employer] (which disclaims liability or warranty for this information). If you have questions about a medical condition or this instruction, always ask your healthcare professional. [address] any warranty or liability for your use of this information.

## 2019-06-13 NOTE — PROGRESS NOTES
Labs okay except for the high potassium which we have already discussed and follow-up as scheduled for that the other labs are stable

## 2019-06-14 NOTE — PROGRESS NOTES
Sven Tillman  Identified pt with two pt identifiers(name and ). Chief Complaint Patient presents with  Labs  
  follow up on fasting labs 1. Have you been to the ER, urgent care clinic since your last visit? Hospitalized since your last visit? No 
 
2. Have you seen or consulted any other health care providers outside of the [address] [place] [address] since your last visit? Include any pap smears or colon screening. No 
 
 
Health Maintenance Topics with due status: Overdue Topic Date Due Shingrix Vaccine Age 50> 1981 GLAUCOMA SCREENING Q2Y 1996 Health Maintenance Topics with due status: Not Due Topic Last Completion Date DTaP/Tdap/Td series 2018 MEDICARE YEARLY EXAM 2018 Influenza Age 5 to Adult 2018 Health Maintenance Topics with due status: Completed Topic Last Completion Date Pneumococcal 65+ years 2015 Medication reconciliation up to date and corrected with patient at this time. Today's provider has been notified of reason for visit, vitals and flowsheets obtained on patients. Reviewed record in preparation for visit, huddled with provider and have obtained necessary documentation. Wt Readings from Last 3 Encounters:  
19 199 lb 3.2 oz (90.4 kg) 19 201 lb 6.4 oz (91.4 kg) 19 203 lb 12.8 oz (92.4 kg) Temp Readings from Last 3 Encounters:  
19 97.8 °F (36.6 °C) (Oral) 19 97.8 °F (36.6 °C) (Oral) 19 98 °F (36.7 °C) (Oral) BP Readings from Last 3 Encounters:  
19 110/60  
19 128/58  
19 136/84 Pulse Readings from Last 3 Encounters:  
19 70  
19 64  
19 69 Vitals:  
 19 1330 BP: 110/60 Pulse: 70 Resp: 18 Temp: 97.8 °F (36.6 °C) TempSrc: Oral  
SpO2: 97% Weight: 199 lb 3.2 oz (90.4 kg) Height: 5' 9\" (1.753 m) PainSc:   0 - No pain Learning Assessment: 
:  
 
 Learning Assessment 9/12/2017 3/30/2017 PRIMARY LEARNER Patient Patient PRIMARY LANGUAGE ENGLISH ENGLISH  
LEARNER PREFERENCE PRIMARY LISTENING LISTENING  
ANSWERED BY patient patient RELATIONSHIP SELF SELF Depression Screening: 
:  
 
3 most recent PHQ Screens 6/12/2019 Little interest or pleasure in doing things Not at all Feeling down, depressed, irritable, or hopeless Not at all Total Score PHQ 2 0 No flowsheet data found. Fall Risk Assessment: 
:  
 
Fall Risk Assessment, last 12 mths 6/12/2019 Able to walk? Yes Fall in past 12 months? No  
Fall with injury? -  
Number of falls in past 12 months - Fall Risk Score -  
 
 
Abuse Screening: 
:  
 
Abuse Screening Questionnaire 9/12/2017 Do you ever feel afraid of your partner? Inez Dias Are you in a relationship with someone who physically or mentally threatens you? Inez Dias Is it safe for you to go home? Y  
 
 
ADL Screening: 
:  
 

## 2019-06-14 NOTE — PATIENT INSTRUCTIONS
Hyperkalemia: Care Instructions Your Care Instructions Hyperkalemia is too much potassium in the blood. Potassium helps keep the right mix of fluids in your body. It also helps your nerves and muscles work as they should. And it keeps your heartbeat in a normal rhythm. Some things can raise potassium levels. These include some health problems, medicines, and kidney problems. (Normally, your kidneys remove extra potassium.) Too much potassium can cause nausea. It also can cause a heartbeat that isn't normal. But you may not have any symptoms. Too much potassium can be dangerous. That's why it's important to treat it. If you are taking any of the medicines that can raise your levels, your doctor will ask you to stop. You may get medicines to lower your levels. And you may have to limit or not eat foods that have a lot of potassium. Follow-up care is a key part of your treatment and safety. Be sure to make and go to all appointments, and call your doctor if you are having problems. It's also a good idea to know your test results and keep a list of the medicines you take. How can you care for yourself at home? · Take your medicines exactly as prescribed. Call your doctor if you think you are having a problem with your medicine. · Stop taking certain medicines if your doctor asks you to. They may be causing your high potassium levels. If you have concerns about stopping medicine, talk with your doctor. · If you have kidney, heart, or liver disease and have to limit fluids, talk with your doctor before you increase the amount of fluids you drink. If the doctor says it's okay, drink plenty of fluids. This means drinking enough so that your urine is light yellow or clear like water. · Avoid strenuous exercise until your doctor tells you it is okay. · Potassium is in many foods, including vegetables, fruits, and milk products.  Foods high in potassium include bananas, cantaloupe, broccoli, milk, potatoes, and tomatoes. · Low potassium foods include blueberries, raspberries, cucumber, white or brown rice, spaghetti, and macaroni. · Do not use a salt substitute without talking to your doctor first. Most of these are very high in potassium. · Be sure to tell your doctor about any prescription, over-the-counter, or herbal medicines you take. Some of these can raise potassium. When should you call for help? Call 911 anytime you think you may need emergency care. For example, call if: 
  · You passed out (lost consciousness).  
  · You have an unusual heartbeat. Your heart may beat fast or skip beats.  
 Call your doctor now or seek immediate medical care if: 
  · You have muscle aches.  
  · You feel very weak.  
 Watch closely for changes in your health, and be sure to contact your doctor if: 
  · You do not get better as expected. Where can you learn more? Go to http://frank-lyubov.info/. Enter S324 in the search box to learn more about \"Hyperkalemia: Care Instructions. \" Current as of: March 14, 2018 Content Version: 11.9 © 3741-0568 [employer]. Care instructions adapted under license by [employer] (which disclaims liability or warranty for this information). If you have questions about a medical condition or this instruction, always ask your healthcare professional. [address] any warranty or liability for your use of this information.

## 2019-07-17 PROBLEM — I25.10 ATHEROSCLEROSIS OF NATIVE CORONARY ARTERY: Status: ACTIVE | Noted: 2019-01-01

## 2019-07-17 NOTE — PROGRESS NOTES
[facility] Consult Service Progress NOTE  Pulmonary, Critical Care, and Sleep Medicine    Name: Buck Jiang MRN: [id number]   : 3/13/1931 Hospital: [facility]   Date: 2019  Admission Date: 2019     Hospital Day: 1    Chart and notes reviewed. Data reviewed. I have evaluated and examined the patient. Pt now in CCU following coronary revascularization; \" protected PCI of LM\"    Excerpts from admission and consult notes reviewed as follows:     Pt is 81 yo remote WM remote smoker with HTN, ASCVD, paroxysmal afib who has been having severe JAFFE. Found at cath to have LM disease. DR. Marcelino Villa performed a \"protected PCI\" of his LM using an impella. Two stents placed. On Brilinta. Was on Xarelto prior to admission. Pt now in CCU. Not on pressors. Khoa aid urine. No clots. Ross hematuria. No SOB. Getting IV fluid hydration. Impella on P3    Asked to help with CCU medical management. Discussed with dr. Luis Manuel Zendejas:   1. CAD severe LM disease  2. S/p PCI LM with ESPERANZA x 2  3. Impella support  4. Hemolysis  5. Hematuria  6. anemia  7. Paroxysmal Atrial Fibrillation  8. H/o Atrail flutter ablation  9. GERD  10. HTN  11. Hypothyroidism  12. H/o COPD but no PFts for review and not on inhalers and not in exacerbation  13.   15. Dyslipidemia  15. Distant smoker  16. Additional workup outlined below  17. Multiorgan dysfunction as outlined above: Pt has one or more acute or chronic illnesses with severe exacerbation with progression or side effects of treatment that poses a threat to life or bodily function  18. Pt is requiring Drug therapy requiring intensive monitoring for toxicity      RECOMMENDATIONS/PLAN:   1. CCU monitoring  2. serial H and H, labs in AM  3. If clots form, may need CBI, urology consult  4. UA with reflex culture  5. Monitor bronchial secretions  6. CXR in AM  7. Hemodynamic monitoring  8.  Bronchial hygiene therapy  9. Bronchodilators prn  10. PT, OT when able  11. Pt needs IV fluids with additives and Drug therapy requiring intensive monitoring for toxicity  12. DVT, SUP prophylaxiss  13. Glucose monitoring and insulin management for tight glycemic control  14.  Prescription drug management with home med reconciliation done          [x] High complexity decision making was performed  [x] See my orders for details    Social History     Tobacco Use    Smoking status: Former Smoker     Packs/day: 0.50     Last attempt to quit: 3/28/1963     Years since quittin.3    Smokeless tobacco: Never Used   Substance Use Topics    Alcohol use: Yes     Comment: rare/social-beer      Family History   Problem Relation Age of Onset    Heart Disease Mother     Hypertension Mother     Stroke Mother     Heart Disease Father     Cancer Paternal Grandmother         No Known Allergies     MAR reviewed and pertinent medications noted or modified as needed  Current Facility-Administered Medications   Medication    [START ON 2019] aspirin chewable tablet 81 mg    [START ON 2019] dilTIAZem CD (CARDIZEM CD) capsule 120 mg    finasteride (PROSCAR) tablet 5 mg    [START ON 2019] levothyroxine (SYNTHROID) tablet 137 mcg    [START ON 2019] atorvastatin (LIPITOR) tablet 40 mg    tamsulosin (FLOMAX) capsule 0.8 mg    ticagrelor (BRILINTA) tablet 90 mg    sodium chloride (NS) flush 5-40 mL    sodium chloride (NS) flush 5-40 mL    acetaminophen (TYLENOL) tablet 650 mg    ondansetron (ZOFRAN) injection 4 mg    LORazepam (ATIVAN) tablet 0.5 mg    docusate sodium (COLACE) capsule 100 mg    heparin (porcine) 25,000 Units in dextrose 5% 500 mL infusion    0.9% sodium chloride infusion    heparin 25,000 units in D5W 250 ml infusion    [START ON 2019] pantoprazole (PROTONIX) tablet 40 mg        Vital Signs: Intake/Output: Intake/Output:   Visit Vitals  /51   Pulse 75   Temp 97.7 °F (36.5 °C)   Resp 10 Ht 5' 9\" (1.753 m)   Wt 90.7 kg (200 lb)   SpO2 96%   BMI 29.53 kg/m²    Temp (24hrs), Av.7 °F (36.5 °C), Min:97.7 °F (36.5 °C), Max:97.7 °F (36.5 °C)        Telemetry:normal sinus rhythm;  O2 Device: Nasal cannula  O2 Flow Rate (L/min): 2 l/min    Wt Readings from Last 4 Encounters:   19 90.7 kg (200 lb)   19 90.4 kg (199 lb 3.2 oz)   19 91.4 kg (201 lb 6.4 oz)   19 92.4 kg (203 lb 12.8 oz)          Intake/Output Summary (Last 24 hours) at 2019 1541  Last data filed at 2019 1500  Gross per 24 hour   Intake    Output 100 ml   Net -100 ml       Last shift:       07 -  1900  In: -   Out: 100 [Urine:100]    Last 3 shifts: No intake/output data recorded. Physical Exam:    General: alert and oriented times 3;   male    HEAD: Normocephalic, without obvious abnormality, atraumatic; bearded   EYES: conjunctivae clear. PERRL,  AN Icteric sclerae   NOSE: nares normal, no drainage, no flaring,    THROAT: mucous membranes dry; Lips, mucosa dry; No Thrush;  crowded airway   Neck: Supple, symmetrical, trachea midline,  No accessory mm use; No Stridor/ cuff leak, No goiter or thyroid tenderness   LYMPH: No abnormally enlarged lymph nodes. in neck or groin   Chest: increased AP diameter; mediastinal tubes in place; pacemaker wires   Lungs: normal air entry   Heart: Regular rate and rhythm ; ; NO edema   Abdomen: soft, nondistended, without guarding, without rebound   :   Greenberg,   gross hematuria   Extremity: negative, clubbing; Impella    Neuro: alert; withdraws to pain; unable to check gait and station; does follow simple commands   Psych: oriented to time, place and person; Unable to assess;   No agitation; normal affect   Skin: Pallor; ;    Pulses:Bilateral, DP, 2+   Capillary refill: abnormal: ; well perfused, pale,        DATA:    Labs:    Recent Labs     19  1519   WBC 8.5   HGB 11.1*        No results for input(s): NA, K, CL, CO2, GLU, BUN, CREA, CA, MG, PHOS, LAC, ALB, TBIL, SGOT, ALT, AML, LPSE in the last 72 hours. No results for input(s): PH, PCO2, PO2, HCO3, FIO2 in the last 72 hours. No results for input(s): CPK, CKNDX, TROIQ in the last 72 hours. No lab exists for component: CPKMB  No results found for: BNPP, BNP   Lab Results   Component Value Date/Time    Culture result: MRSA NOT PRESENT 05/05/2017 07:18 AM    Culture result:  05/05/2017 07:18 AM         Screening of patient nares for MRSA is for surveillance purposes and, if positive, to facilitate isolation considerations in high risk settings. It is not intended for automatic decolonization interventions per se as regimens are not sufficiently effective to warrant routine use. Culture result:  03/28/2014 10:50 AM     MRSA NOT PRESENT      Screening of patient nares for MRSA is for surveillance purposes and, if positive, to facilitate isolation considerations in high risk settings. It is not intended for automatic decolonization interventions per se as regimens are not sufficiently effective to warrant routine use. Lab Results   Component Value Date/Time    TSH 0.233 (L) 09/12/2018 01:45 PM       Imaging:      CXR Results  (Last 48 hours)    None        Results from [place] encounter on 07/11/19   XR CHEST PA LAT    Narrative EXAM: XR CHEST PA LAT    INDICATION: preop    COMPARISON: Chest x-ray 3/28/2014. FINDINGS: PA and lateral radiographs of the chest demonstrate hyperinflated and  hyperlucent lungs. No focal infiltrate, nodule, or mass. The cardiac and  mediastinal contours and pulmonary vascularity are normal. Atherosclerotic  calcifications affect the aortic arch and the thoracic aorta is tortuous. The  chest wall structures and visualized upper abdomen show no acute findings with  incidental note of degenerative spine and shoulder changes as well as diffuse  osteopenia. Impression IMPRESSION: No acute findings.  COPD and additional chronic changes as above.     Results from [place] encounter on 07/11/19   XR CHEST PA LAT    Narrative EXAM: XR CHEST PA LAT    INDICATION: preop    COMPARISON: Chest x-ray 3/28/2014. FINDINGS: PA and lateral radiographs of the chest demonstrate hyperinflated and  hyperlucent lungs. No focal infiltrate, nodule, or mass. The cardiac and  mediastinal contours and pulmonary vascularity are normal. Atherosclerotic  calcifications affect the aortic arch and the thoracic aorta is tortuous. The  chest wall structures and visualized upper abdomen show no acute findings with  incidental note of degenerative spine and shoulder changes as well as diffuse  osteopenia. Impression IMPRESSION: No acute findings. COPD and additional chronic changes as above. Results from Appointment encounter on 04/22/19   XR SPINE LUMB 2 OR 3 V    Narrative EXAM: XR SPINE LUMB 2 OR 3 V    INDICATION: Acute bilateral lower back pain with bilateral sciatica    COMPARISON: None. FINDINGS: AP, lateral and spot lateral views of the lumbar spine. There is a dextroconvex rotary lumbar curvature. The vertebral body heights are  well-preserved. There is 8 mm anterolisthesis of L4 relative to L5. Spondylitic  changes are present in the upper lumbar spine. Spondylitic change with disc  space narrowing noted at L3-4 and L4-5. Facet arthropathy is a dominant feature  at the lower 3 levels. Vascular calcification is noted of the aortoiliac system. Mild constipation is present. Impression IMPRESSION:      Grade 1 anterolisthesis of L4 on a degenerative basis. Probable central canal stenosis at L4-5 and L5-S1 on a multifactorial basis. Lower lumbar facet arthropathy   Mild scoliosis  Mild constipation           Results from [place] encounter on 01/22/19   XR HIP RT W OR WO PELV 2-3 VWS    Narrative EXAM: XR HIP RT W OR WO PELV 2-3 VWS    INDICATION: fall. Acute right hip pain    COMPARISON: 6/18/2018.     FINDINGS: An AP view of the pelvis and a frogleg lateral view of the right hip  demonstrate no fracture, dislocation or other acute abnormality. Degenerative  changes are seen in the hip joints bilaterally and visualized lumbar spine      Impression IMPRESSION: No acute abnormality. Bilateral hip osteoarthritis and lumbar  degenerative changes. Results from [place] encounter on 04/03/18   CT HEAD WO CONT    Narrative EXAMINATION:  CT HEAD WO CONT    CLINICAL INFORMATION:  Head injury mild or moderate acute, no neurological  deficit  COMPARISON:  None. TECHNIQUE: Routine axial head CT was performed. IV contrast was not  administered. Sagittal and coronal reconstructions were generated. CT dose reduction was achieved through use of a standardized protocol tailored  for this examination and automatic exposure control for dose modulation. FINDINGS:  EXTRA-AXIAL SPACES:  Normal   INTRACRANIAL HEMORRHAGE:  None. VENTRICULAR SYSTEM:  Normal for age. BASAL CISTERNS:  Normal.  CEREBRAL PARENCHYMA:  Mild white matter hypodensity likely due to age and  intracranial small vessel disease. Probable prominent perivascular space in the  left inferior basal ganglia region. MIDLINE SHIFT:  None. CEREBELLUM:  Normal.  BRAINSTEM: Normal.  CALVARIUM: Normal.  PARANASAL SINUSES AND MASTOID AIR CELLS: Clear. VISUALIZED ORBITS: Normal.  SELLA: Normal.  SKULL BASE: Normal.      Impression IMPRESSION:  No acute findings. Age-related changes.          This care involved high complexity medical decision making: I personally:  · Reviewed the flowsheet and previous days notes  · Reviewed and summarized records or history from previous days note or discussions with staff, family  · Parenteral controlled substances - Reviewed/ Adjusted / [place] / Started  · High Risk Drug therapy requiring intensive monitoring for toxicity: eg steroids, pressors, antibiotics  · Reviewed and/or ordered Clinical lab tests  · Reviewed and/or ordered Radiology tests  · Reviewed and/or ordered of Medicine tests  · Independently visualized radiologic Images  · Reviewed the patients ECG / Telemetry  ·     My assessment/management discussed with: Cardiology, Nursing, Respiratory Therapy for coordination of care    Pt's condition is acute and unstable requiring inpatient hospitalization. This care involved high complexity decision making in order to assess, support vital system function, and to treat this degree of vital organ system failure, and to prevent further deterioration of the patients condition.      Clemens Goodpasture, MD

## 2019-07-17 NOTE — Clinical Note
Lesion located in the Proximal LM. Balloon inserted. Balloon inflated using multiple inflations inflation technique. Pressure = 14 nelly; Duration = 20 sec. Inflation 2: Pressure: 16 nelly; Duration: 20 sec. Inflation 3: Pressure: 16 nelly; Duration: 30 sec. Inflation 4: Pressure: 15 nelly; Duration: 15 sec.

## 2019-07-17 NOTE — Clinical Note
Lesion located in the Proximal LAD. Balloon inserted. Balloon inflated using multiple inflations inflation technique. Pressure = 14 nelly; Duration = 30 sec. Inflation 2: Pressure: 14 nelly; Duration: 20 sec. Inflation 3: Pressure: 14 nelly; Duration: 20 sec. Inflation 4: Pressure: 14 nelly; Duration: 30 sec.

## 2019-07-17 NOTE — PROGRESS NOTES
Progress Note      7/17/2019 2:51 PM  NAME: Evaristo Mercer   MRN:  [id number]   Admit Diagnosis: Chest pain, unspecified type [R07.9]  Atherosclerosis of native coronary artery [I25.10]     Problem List:     - Progressive angina, cath 7/2019 with impella supported PCI of LM into LAD with balloon side hole to LCx  - Normal EF, mild AS  - Prox afib, currently in sinus  - Hx of aflutter ablation  - HTN  - Dyslipidemia  - Hypothyroid  - GERD  - BPH  - Distant tobacco  , lives alone, ADLs, occasionally using cane  Son:  Tonie Rhodes. [phone]        Assessment/Plan:     - Impella placement  - PCI of LM  - Currently euvolemic  - Long procedure, repeat labs    - Hold xarelto  - Cont ASA  - Add brilinta 90mg bid  - Cont diltiazem, intolerant of beta blocker in past  - Hold ARB  - Lasix prn  - Hydration while impella in place  - Cont statin  - Cont proscar and flomax    Current goal will be to have impella removed Thursday afternoon. I have called Dr. Neha Davis. [x]       High complexity decision making was performed in this patient at high risk for decompensation with multiple organ involvement. Subjective:     Evaristo Mercer denies chest pain, dyspnea. Discussed with RN events overnight. Review of Systems:    Symptom Y/N Comments  Symptom Y/N Comments   Fever/Chills N   Chest Pain N    Poor Appetite N   Edema N    Cough N   Abdominal Pain N    Sputum N   Joint Pain N    SOB/JAFFE N   Pruritis/Rash N    Nausea/vomit N   Tolerating PT/OT Y    Diarrhea N   Tolerating Diet Y    Constipation N   Other       Could NOT obtain due to:      Objective:      Physical Exam:    Last 24hrs VS reviewed since prior progress note.  Most recent are:    Visit Vitals  /54   Pulse 70   Temp 97.7 °F (36.5 °C)   Resp 16   Ht 5' 9\" (1.753 m)   Wt 90.7 kg (200 lb)   SpO2 96%   BMI 29.53 kg/m²     No intake or output data in the 24 hours ending 07/17/19 1451     General Appearance: Well developed, well nourished, alert & oriented x 3,    no acute distress. Ears/Nose/Mouth/Throat: Hearing grossly normal.  Neck: Supple. Chest: Lungs clear to auscultation bilaterally. Cardiovascular: Regular rate and rhythm, S1S2 normal, no murmur. Abdomen: Soft, non-tender, bowel sounds are active. Extremities: No edema bilaterally. Skin: Warm and dry. PMH/SH reviewed - no change compared to H&P    Data Review    Telemetry: normal sinus rhythm     Lab Data Personally Reviewed:    No results for input(s): WBC, HGB, HCT, PLT, HGBEXT, HCTEXT, PLTEXT in the last 72 hours. No results for input(s): INR, PTP, APTT in the last 72 hours. No lab exists for component: INREXT   No results for input(s): NA, K, CL, CO2, BUN, CREA, GLU, CA, MG in the last 72 hours. No results for input(s): CPK, CKNDX, TROIQ in the last 72 hours. No lab exists for component: CPKMB  Lab Results   Component Value Date/Time    Cholesterol, total 117 06/12/2019 02:00 PM    HDL Cholesterol 35 06/12/2019 02:00 PM    LDL, calculated 65 06/12/2019 02:00 PM    Triglyceride 86 06/12/2019 02:00 PM    CHOL/HDL Ratio 3 06/12/2019 02:00 PM       No results for input(s): SGOT, GPT, AP, TBIL, TP, ALB, GLOB, GGT, AML, LPSE in the last 72 hours. No lab exists for component: AMYP, HLPSE  No results for input(s): PH, PCO2, PO2 in the last 72 hours.     Medications Personally Reviewed:    Current Facility-Administered Medications   Medication Dose Route Frequency    [START ON 7/18/2019] aspirin chewable tablet 81 mg  81 mg Oral DAILY    [START ON 7/18/2019] dilTIAZem CD (CARDIZEM CD) capsule 120 mg  120 mg Oral DAILY    finasteride (PROSCAR) tablet 5 mg  5 mg Oral PCD    [START ON 7/18/2019] levothyroxine (SYNTHROID) tablet 137 mcg  137 mcg Oral ACB    [START ON 7/18/2019] atorvastatin (LIPITOR) tablet 40 mg  40 mg Oral DAILY    tamsulosin (FLOMAX) capsule 0.8 mg  0.8 mg Oral DAILY    ticagrelor (BRILINTA) tablet 90 mg  90 mg Oral BID    sodium chloride (NS) flush 5-40 mL  5-40 mL IntraVENous Q8H    sodium chloride (NS) flush 5-40 mL  5-40 mL IntraVENous PRN    acetaminophen (TYLENOL) tablet 650 mg  650 mg Oral Q4H PRN    ondansetron (ZOFRAN) injection 4 mg  4 mg IntraVENous Q4H PRN    LORazepam (ATIVAN) tablet 0.5 mg  0.5 mg Oral Q6H PRN    docusate sodium (COLACE) capsule 100 mg  100 mg Oral BID    heparin (porcine) 25,000 Units in dextrose 5% 500 mL infusion  4-20 mL/hr Other TITRATE    0.9% sodium chloride infusion  100 mL/hr IntraVENous CONTINUOUS    heparin 25,000 units in D5W 250 ml infusion  500-2,000 Units/hr IntraVENous TITRATE    [START ON 7/18/2019] pantoprazole (PROTONIX) tablet 40 mg  40 mg Oral HERNAN Lu MD

## 2019-07-17 NOTE — Clinical Note
Lesion located in the Proximal LM. Balloon inserted. Balloon inflated using multiple inflations inflation technique. Pressure = 8 nelly; Duration = 18 sec. Inflation 2: Pressure: 8 nelly; Duration: 10 sec.

## 2019-07-17 NOTE — Clinical Note
Impella inserted, position confirmed and started. Impella inserted over the wire. Impella insertion site: LFA, at the left ventricle. Performance level set at/to = P9.  Patient is in cardiogenic shock: No.

## 2019-07-17 NOTE — Clinical Note
Sheath: left in place. Site secured by Tegaderm and suture. Sheath connected to heparin pressure bag.

## 2019-07-17 NOTE — Clinical Note
Lesion located in the Proximal LAD. Balloon inserted. Balloon inflated using multiple inflations inflation technique. Pressure = 14 nelly; Duration = 20 sec. Inflation 2: Pressure: 14 nelly; Duration: 20 sec.

## 2019-07-17 NOTE — Clinical Note
Lesion located in the Proximal LAD. Balloon inserted. Balloon inflated using multiple inflations inflation technique. Pressure = 14 nelly; Duration = 20 sec. Inflation 2: Pressure: 8 nelly; Duration: 10 sec.

## 2019-07-17 NOTE — Clinical Note
Lesion located in the Ostium LAD. Balloon inserted. Balloon inflated using kissing and multiple inflations inflation technique. A CATH BLLN RX TREK 3.0X15MM --  was also used. Pressure = 18 nelly; Duration = 25 sec. Pressure = 18 nelly; Duration = 25 sec. Inflation 2: Pressure: 18 nelly; Duration: 15 sec.

## 2019-07-17 NOTE — Clinical Note
Lesion located in the Proximal LM. Balloon inserted. Balloon inflated using multiple inflations inflation technique. Pressure = 20 nelly; Duration = 20 sec. Inflation 2: Pressure: 24 nelly; Duration: 20 sec.

## 2019-07-17 NOTE — PROGRESS NOTES
1500- TRANSFER - IN REPORT:    Verbal report received from Cath Lab RN(name) on [employer]  being received from Cath Lab(unit) for routine progression of care      Report consisted of patients Situation, Background, Assessment and   Recommendations(SBAR). Information from the following report(s) SBAR, Kardex, Intake/Output, Recent Results, Cardiac Rhythm NSR and Alarm Parameters  was reviewed with the receiving nurse. Opportunity for questions and clarification was provided. Assessment completed upon patients arrival to unit and care assumed. Primary Nurse Berry Moncada RN and B. Summerlin, RN performed a dual skin assessment on this patient No impairment noted  Berry score is 16    1600- Admission assessment completed; see flow sheet for details. Pt A/V/Ox4; cooperative with care; follows commands. Currently in NSR: BP labile; peripheral pulses palpable. Lungs are clear; diminished in the bases; currently on NC~3LPM; O2 sats WNL. ABD is semi-soft; BS hypoactive; currently on clear liquid diet; tolerating PO fluids well. Greenberg catheter in place; gross hematuria noted. Skin is cool and dry; pale. Impella & [place] to Left groin; Rt. Artrial sheath; both sites with some oozing. TR band to right radial; also with a mild amount of oozing. Pt repositioned; denies pain/discomfort at this time    ACT~208    Dr. Tito Dobbs at the bedside; aware of hematuria. Hemodynamic parameters reviewed with MD, requesting CI~2.0    1805- Dr. Tito Dobbs paged regarding low BPs, systolic current upper 54I; low 80s. MD also made aware of pt's increased anxiety regarding lying flat, and complaints of feeling SOB. O2 sats WNL; lungs remains clear; SVO2~WNL. Pt reports he sleeps lying upright with multiple pillows. Verbal orders received; NS increased to 150ml/hr, PRN Ativan & Fentanyl ordered.  MD \"ok\" with systolic BPs in the 94W as long as CI/CI and UOP remain WNL    1815- ACT~164; spoke with Dianne Atkins [facility] regarding starting Heparin. Primary RN a little delia about starting systemic Heparin due to hematuria that has not resolved/cleared up since admit to CCU. Per Impella Rep [facility], she suggested starting Heparin via the Impella (purge) first.     1845- Heparin started via the Impella; verified with 2 RNs; will monitor sites for oozing and hematuria     Hemodynamics: CI/CO~1.8/3.3 SVO2~68%; BP remains labile    1900- ACT~146    1915- Bedside and Verbal shift change report given to [address] [place] [address] (oncoming nurse) by Francisco Wilson RN (offgoing nurse). Report included the following information SBAR, Kardex, Intake/Output, Recent Results, Cardiac Rhythm NSR and Alarm Parameters .

## 2019-07-17 NOTE — Clinical Note
Balloon inserted. Balloon inflated using multiple inflations inflation technique. Pressure = 18 nelly; Duration = 20 sec. Inflation 2: Pressure: 24 nelly; Duration: 20 sec.

## 2019-07-17 NOTE — Clinical Note
Lesion: Located in the Proximal LM. Stent inserted. Multiple inflations used. First inflation pressure = 12 nelly; inflation time: 30 sec.

## 2019-07-17 NOTE — Clinical Note
Lesion: Located in the Proximal LAD. Stent inserted. Multiple inflations used. First inflation pressure = 20 nelly; inflation time: 25 sec.

## 2019-07-17 NOTE — Clinical Note
Lesion located in the Ostium LAD. Balloon inserted. Balloon inflated using single inflation technique.

## 2019-07-17 NOTE — PROCEDURES
BRIEF OPERATIVE NOTE    Date of Procedure: 7/17/2019   Procedure: Cardiac catheterization    Preoperative Diagnosis: Coronary artery disease  Postoperative Diagnosis: Coronary artery disease     Surgeon/assistant: Madina Hopper MD    Anesthesia: Conscious sedation  Estimated Blood Loss: <50cc  Specimens: None    Findings:   LVEDP: 12, mild AS   Left ventricular angiography: Not done, known nl EF   Coronary angiography: 80% LM extending into LAD.     Intervention: Placement of Impella, PCI of LM into LAD with 2 ESPERANZA    Complications: None  Non-coronary Implants: None

## 2019-07-17 NOTE — Clinical Note
Lesion located in the Proximal LM. Balloon inserted. Balloon inflated using multiple inflations inflation technique. Pressure = 14 nelly; Duration = 15 sec. Inflation 2: Pressure: 16 nelly; Duration: 15 sec. Inflation 3: Pressure: 15 nelly; Duration: 40 sec.

## 2019-07-17 NOTE — Clinical Note
Vessel: bilateral AA w/ Runoff, AA w/ Runoff, Power injection to the artery. Single view taken. PSI = 900. Injection rate = 15 mL/sec. Total injected volume = 30 mL.

## 2019-07-17 NOTE — Clinical Note
TRANSFER - OUT REPORT:  
 
Verbal report given to: Kian Horowitz RN. Report consisted of patient's Situation, Background, Assessment and  
Recommendations(SBAR). Opportunity for questions and clarification was provided. Patient transported with a Registered Nurse and [address] [place] [address] / [facility] ([facility] Patient transported to: CCU.

## 2019-07-17 NOTE — Clinical Note
Lesion located in the Proximal LAD. Balloon inserted. Balloon inflated using multiple inflations inflation technique. Pressure = 8 nelly; Duration = 20 sec.

## 2019-07-18 NOTE — PROGRESS NOTES
1939: Patient anxious/restless. C/O SOB, but O2 saturations mid-upper 90's. Lungs clear/diminished. Wanting to sit up to sleep. Explained reasoning behind bedrest tonight. PRN ativan given. 2014: . Hematuria continues. 2020: Generalized pain/discomforts. Restless. PRN fentanyl given. 2100: More comfortable at this time. No SOB. 2108: . Hematuria continues. 2137: Messaged Impella Rep [facility]. Will start systemic heparin. 2311: Generalized pain/discomforts. Restless. PRN fentanyl given. 0130: Restless. Needing frequent reminding to stay in bed, keep leg straight and no sitting up. PRN ativan given. 0400: Hematuria darker, but no clots. Continue to monitor and CBC sent with morning labs. VSS. Hemodynamics stable. 0522: . No change in systemic heparin rate.  0621: . No change in systemic heparin rate.

## 2019-07-18 NOTE — PROGRESS NOTES
0715- Bedside and Verbal shift change report given to Janak Katerin Avmadelyn (oncoming nurse) by [facility] (offgoing nurse). Report included the following information SBAR, Kardex, Intake/Output, Recent Results, Cardiac Rhythm NSR and Alarm Parameters . 3359- Shift assessment completed; see flow sheet for details. Pt A/V/Ox4, periodic forgetfulness; follows commands. Currently in NSR; BP stable. Lungs are clear; diminished in the bases; remains on RA. ABD is semi-soft; BS hypoactive; currently NPO for procedure this evening. Greenberg catheter in place; adequate UOP; urine is bloody in coloration. Skin is warm and dry; trace amount of bruising to Right wrist from recent cath lab procedure. Left groin Impella/[place] site is oozy; dry dressing applied to area. Right groin sheath with old bloody drainage. Pt denies pain/discomfort at this time. Repositioned. ACT~169    36- Spoke with Dr. Tito Dobbs; verbal order to decrease Impella performance to P2; and give 1 unit of PRBCs for Hgb~8.5.     0848- PRBCs started at this time; will monitor for adverse reactions     1022- Dr. Gee Lopez at the bedside; explained surgical procedure scheduled for this evening to remove Impella; pt's grandson at the bedside during this time. Informed consent obtained. 1215- Reassessment completed; see flow sheet for details. No acute changes in pt assessment. Left groin Imeplla site continues to ooze; dry dressing applied to area. ACT~175       1245- Dr. Tito Dobbs at the bedside; verbal update given. Verbal order to restart PO Cardizem     1605- Reassessment completed; see flow sheet for details. No acute changes in pt assessment. Oozing continues from left impella groin site. Bath received another CHG bath in preparation for surgery. Denies pain/discomfort. Family present at the bedside. ACT~164    1715- Spoke with Dr. Owen Hernandez regarding recent elevated BPs in 820Y systolic. No new orders at this time.     1845- OR team at the bedside to transfer pt down for Impella removal. VSS    1945- Bedside and Verbal shift change report given to Brandan Monge RN (oncoming nurse) by Kaela Moran RN (offgoing nurse). Report included the following information SBAR, Kardex, Intake/Output, Recent Results, Cardiac Rhythm NSR and Alarm Parameters .

## 2019-07-18 NOTE — PROGRESS NOTES
Progress Note      7/18/2019 2:51 PM  NAME: Kay Wellington   MRN:  [id number]   Admit Diagnosis: Chest pain, unspecified type [R07.9]  Atherosclerosis of native coronary artery [I25.10]     Problem List:     - Progressive angina, cath 7/2019 with impella supported PCI of LM into LAD with balloon side hole to LCx  - Normal EF, mild AS  - Prox afib, currently in sinus  - Hx of aflutter ablation  - HTN  - Dyslipidemia  - Hypothyroid  - GERD  - BPH  - Distant tobacco  , lives alone, ADLs, occasionally using cane  Son:  Viviana Rodriguez the III. [phone]        Assessment/Plan:     - Impella placement  - PCI of LM  - Currently euvolemic  - Cr stable  - Post operative anemia due to expected blood loss. Hg decreasing, on anticoagulation, for surgery today, Tx 1U now with lasix 20mg. - hematuria    - Continue holding xarelto  - Cont ASA  - Cont Added brilinta 90mg bid  - Hold diltiazem, intolerant of beta blocker in past  - Hold ARB  - Lasix prn  - Hydration while impella in place, decrease today  - Cont statin  - Cont proscar and flomax    Current goal will be to have impella removed This afternoon, decreased to P2. I have called Dr. Usha Stover. Remove right femoral sheath and PA catheter after Impella removed. [x]       High complexity decision making was performed in this patient at high risk for decompensation with multiple organ involvement. Subjective:     Kay Wellington denies chest pain, dyspnea. Discussed with RN events overnight. Review of Systems:    Symptom Y/N Comments  Symptom Y/N Comments   Fever/Chills N   Chest Pain N    Poor Appetite N   Edema N    Cough N   Abdominal Pain N    Sputum N   Joint Pain N    SOB/JAFFE N   Pruritis/Rash N    Nausea/vomit N   Tolerating PT/OT Y    Diarrhea N   Tolerating Diet Y    Constipation N   Other       Could NOT obtain due to:      Objective:      Physical Exam:    Last 24hrs VS reviewed since prior progress note.  Most recent are:    Visit Vitals  /46   Pulse 70   Temp 98.4 °F (36.9 °C)   Resp 17   Ht 5' 9\" (1.753 m)   Wt 90.7 kg (200 lb)   SpO2 96%   BMI 29.53 kg/m²       Intake/Output Summary (Last 24 hours) at 7/18/2019 0610  Last data filed at 7/18/2019 0600  Gross per 24 hour   Intake 3341.36 ml   Output 1053 ml   Net 2288.36 ml        General Appearance: Well developed, well nourished, alert & oriented x 3,    no acute distress. Ears/Nose/Mouth/Throat: Hearing grossly normal.  Neck: Supple. Chest: Lungs clear to auscultation bilaterally. Cardiovascular: Regular rate and rhythm, S1S2 normal, no murmur. Abdomen: Soft, non-tender, bowel sounds are active. Extremities: No edema bilaterally. Skin: Warm and dry. PMH/SH reviewed - no change compared to H&P    Data Review    Telemetry: normal sinus rhythm     Lab Data Personally Reviewed:    Recent Labs     07/17/19  1519   WBC 8.5   HGB 11.1*   HCT 34.3*        Recent Labs     07/17/19  1519   INR 1.2*   PTP 12.4*      Recent Labs     07/18/19  0417 07/17/19  1519    144   K 4.2 4.2   * 115*   CO2 21 21   BUN 35* 37*   CREA 1.22 1.31*   * 122*   CA 8.6 8.0*     No results for input(s): CPK, CKNDX, TROIQ in the last 72 hours. No lab exists for component: CPKMB  Lab Results   Component Value Date/Time    Cholesterol, total 117 06/12/2019 02:00 PM    HDL Cholesterol 35 06/12/2019 02:00 PM    LDL, calculated 65 06/12/2019 02:00 PM    Triglyceride 86 06/12/2019 02:00 PM    CHOL/HDL Ratio 3 06/12/2019 02:00 PM       Recent Labs     07/18/19  0417   SGOT 16   AP 65   TP 5.1*   ALB 2.6*   GLOB 2.5     No results for input(s): PH, PCO2, PO2 in the last 72 hours.     Medications Personally Reviewed:    Current Facility-Administered Medications   Medication Dose Route Frequency    aspirin chewable tablet 81 mg  81 mg Oral DAILY    dilTIAZem CD (CARDIZEM CD) capsule 120 mg  120 mg Oral DAILY    finasteride (PROSCAR) tablet 5 mg  5 mg Oral PCD    levothyroxine (SYNTHROID) tablet 137 mcg  137 mcg Oral ACB    atorvastatin (LIPITOR) tablet 40 mg  40 mg Oral DAILY    tamsulosin (FLOMAX) capsule 0.8 mg  0.8 mg Oral DAILY    ticagrelor (BRILINTA) tablet 90 mg  90 mg Oral BID    sodium chloride (NS) flush 5-40 mL  5-40 mL IntraVENous Q8H    sodium chloride (NS) flush 5-40 mL  5-40 mL IntraVENous PRN    acetaminophen (TYLENOL) tablet 650 mg  650 mg Oral Q4H PRN    ondansetron (ZOFRAN) injection 4 mg  4 mg IntraVENous Q4H PRN    docusate sodium (COLACE) capsule 100 mg  100 mg Oral BID    heparin (porcine) 25,000 Units in dextrose 5% 500 mL infusion  4-20 mL/hr Other TITRATE    0.9% sodium chloride infusion  100 mL/hr IntraVENous CONTINUOUS    heparin 25,000 units in D5W 250 ml infusion  500-2,000 Units/hr IntraVENous TITRATE    pantoprazole (PROTONIX) tablet 40 mg  40 mg Oral ACB    alcohol 62% (NOZIN) nasal  1 Ampule  1 Ampule Topical Q12H    LORazepam (ATIVAN) injection 0.5 mg  0.5 mg IntraVENous Q6H PRN    fentaNYL citrate (PF) injection 25 mcg  25 mcg IntraVENous Q2H PRN         Joesph Zhong MD

## 2019-07-18 NOTE — CDMP QUERY
Pt admitted with \"CAD\" and has anemia documented. Please further specify type and acuity of anemia in the medical record. ? Anemia due to acute blood loss  ? Anemia due to chronic blood loss  ? Anemia due to iron deficiency  ? Anemia due to postoperative blood loss  (please specify if expected or complication of the surgery)  ? Anemia due to chronic disease, please specify disease  ? Dilutional anemia  ? Other, please specify  ? Clinically unable to determine    The medical record reflects the following:     Risk Factors: S/p Cardiac cath  Dx: Hematuria; Hemolysis; Greenberg catheter in place     Clinical Indicators: H/H: 11.1/34.3  ^ 8.5/26.0. Little Challenger Little Challenger EBL: < 50cc. ...noted: Hematuria darker, but no clots. Continue to monitor and CBC sent with morning labs. VSS. Hemodynamics stable; ACT~164; spoke with Dianne MakiCliffwood regarding starting Heparin.  Primary RN a little delia about starting systemic Heparin due to hematuria that has not resolved/cleared up since admit to CCU     Treatment: Transfused 1 unit pRBCs;  Monitor CBC; IVF     Thank you,    Noemi Brock  [facility]  [phone]

## 2019-07-18 NOTE — PROGRESS NOTES
Interdisciplinary team rounds were held   Plan of care discussed. Goal: tentative removal of Impella this afternoon. See MD orders and progress notes for further  interventions and desired outcomes.

## 2019-07-18 NOTE — PROGRESS NOTES
[facility] Consult Service Progress NOTE  Pulmonary, Critical Care, and Sleep Medicine    Name: Buck Jiang MRN: [id number]   : 3/13/1931 Hospital: [facility]   Date: 2019  Admission Date: 2019     Hospital Day: 2    Chart and notes reviewed. Data reviewed. I have evaluated and examined the patient. Pt now in CCU following coronary revascularization; \" protected PCI of LM\"    Excerpts from admission and consult notes reviewed as follows:     Pt is 79 yo remote WM remote smoker with HTN, ASCVD, paroxysmal afib who has been having severe JAFFE. Found at cath to have LM disease. DR. Marcelino Villa performed a \"protected PCI\" of his LM using an impella. Two stents placed. On Brilinta. Was on Xarelto prior to admission. Pt now in CCU. Not on pressors. Khoa aid urine. No clots. Ross hematuria. No SOB. Getting IV fluid hydration. Impella on P3    Asked to help with CCU medical management. Discussed with dr. Marcelino Villa     urine same. Mild SOb last night. No chest pain. Impella P-3. Hgb has dropped from 11.1 to 8.5. CXR reviewed      IMPRESSION:   1. CAD severe LM disease  2. S/p PCI LM with ESPERANZA x 2  3. Impella support  4. Hemolysis  5. Hematuria  6. anemia  7. Paroxysmal Atrial Fibrillation  8. H/o [place] flutter ablation  9. GERD  10. HTN  11. Hypothyroidism  12. H/o COPD but no PFts for review and not on inhalers and not in exacerbation  13.   15. Dyslipidemia  15. Distant smoker  16. Additional workup outlined below  17. Multiorgan dysfunction as outlined above: Pt has one or more acute or chronic illnesses with severe exacerbation with progression or side effects of treatment that poses a threat to life or bodily function  18. Pt is requiring Drug therapy requiring intensive monitoring for toxicity      RECOMMENDATIONS/PLAN:   1. CCU monitoring  2. serial H and H, labs in AM  3. If Hgb drops more, may need trasfusion  4.  If clots form, may need CBI, urology consult  5. UA with reflex culture  6. Monitor bronchial secretions  7. CXR in AM  8. Hemodynamic monitoring  9. Bronchial hygiene therapy  10. Bronchodilators prn  11. PT, OT when able  12. Pt needs IV fluids with additives and Drug therapy requiring intensive monitoring for toxicity  13. DVT, SUP prophylaxiss  14. Glucose monitoring and insulin management for tight glycemic control  15.  Prescription drug management with home med reconciliation done          [x] High complexity decision making was performed  [x] See my orders for details    Social History     Tobacco Use    Smoking status: Former Smoker     Packs/day: 0.50     Last attempt to quit: 3/28/1963     Years since quittin.3    Smokeless tobacco: Never Used   Substance Use Topics    Alcohol use: Yes     Comment: rare/social-beer      Family History   Problem Relation Age of Onset    Heart Disease Mother     Hypertension Mother     Stroke Mother     Heart Disease Father     Cancer Paternal Grandmother         No Known Allergies     MAR reviewed and pertinent medications noted or modified as needed  Current Facility-Administered Medications   Medication    0.9% sodium chloride infusion 250 mL    furosemide (LASIX) injection 20 mg    0.9% sodium chloride infusion 250 mL    aspirin chewable tablet 81 mg    finasteride (PROSCAR) tablet 5 mg    levothyroxine (SYNTHROID) tablet 137 mcg    atorvastatin (LIPITOR) tablet 40 mg    tamsulosin (FLOMAX) capsule 0.8 mg    ticagrelor (BRILINTA) tablet 90 mg    sodium chloride (NS) flush 5-40 mL    sodium chloride (NS) flush 5-40 mL    acetaminophen (TYLENOL) tablet 650 mg    ondansetron (ZOFRAN) injection 4 mg    docusate sodium (COLACE) capsule 100 mg    heparin (porcine) 25,000 Units in dextrose 5% 500 mL infusion    0.9% sodium chloride infusion    heparin 25,000 units in D5W 250 ml infusion    pantoprazole (PROTONIX) tablet 40 mg    alcohol 62% (NOZIN) nasal  1 Ampule  LORazepam (ATIVAN) injection 0.5 mg    fentaNYL citrate (PF) injection 25 mcg        Vital Signs: Intake/Output: Intake/Output:   Visit Vitals  /49   Pulse 75   Temp (P) 98.3 °F (36.8 °C)   Resp 18   Ht 5' 9\" (1.753 m)   Wt 90.7 kg (200 lb)   SpO2 98%   BMI 29.53 kg/m²    Temp (24hrs), Av.6 °F (37 °C), Min:97.5 °F (36.4 °C), Max:99.1 °F (37.3 °C)        Telemetry:normal sinus rhythm;  O2 Device: Room air  O2 Flow Rate (L/min): 2 l/min    Wt Readings from Last 4 Encounters:   19 90.7 kg (200 lb)   19 90.4 kg (199 lb 3.2 oz)   19 91.4 kg (201 lb 6.4 oz)   19 92.4 kg (203 lb 12.8 oz)          Intake/Output Summary (Last 24 hours) at 2019 0848  Last data filed at 2019 0800  Gross per 24 hour   Intake 3821.26 ml   Output 1173 ml   Net 2648.26 ml       Last shift:      701 -  190  In: -   Out: 60 [Urine:60]    Last 3 shifts: 1901 -  0700  In: 3821.3 [I.V.:3821.3]  Out: 1113 [Urine:1113]     Physical Exam:    General: alert and oriented times 3;   male    HEAD: Normocephalic, without obvious abnormality, atraumatic; bearded   EYES: conjunctivae clear. PERRL,  AN Icteric sclerae   NOSE: nares normal, no drainage, no flaring,    THROAT: mucous membranes dry; Lips, mucosa dry; No Thrush;  crowded airway   Neck: Supple, symmetrical, trachea midline,  No accessory mm use; No Stridor/ cuff leak, No goiter or thyroid tenderness   LYMPH: No abnormally enlarged lymph nodes. in neck or groin   Chest: increased AP diameter; mediastinal tubes in place; pacemaker wires   Lungs: normal air entry   Heart: Regular rate and rhythm ; ; NO edema   Abdomen: soft, nondistended, without guarding, without rebound   :   Greenberg,   gross hematuria   Extremity: negative, clubbing; Impella    Neuro: alert; withdraws to pain; unable to check gait and station; does follow simple commands   Psych: oriented to time, place and person; Unable to assess;   No agitation; normal affect   Skin: Pallor; ;    Pulses:Bilateral, DP, 2+   Capillary refill: abnormal: ; well perfused, pale,        DATA:    Labs:    Recent Labs     07/18/19  0522 07/18/19  0417 07/17/19  1519   WBC  --  12.7* 8.5   HGB  --  8.5* 11.1*   PLT  --  163 188   INR 1.1  --  1.2*   APTT >130.0*  --   --      Recent Labs     07/18/19 0417 07/17/19  1519    144   K 4.2 4.2   * 115*   CO2 21 21   * 122*   BUN 35* 37*   CREA 1.22 1.31*   CA 8.6 8.0*   ALB 2.6*  --    SGOT 16  --    ALT 13  --      No results for input(s): PH, PCO2, PO2, HCO3, FIO2 in the last 72 hours. No results for input(s): CPK, CKNDX, TROIQ in the last 72 hours. No lab exists for component: CPKMB  No results found for: BNPP, BNP   Lab Results   Component Value Date/Time    Culture result: MRSA NOT PRESENT 05/05/2017 07:18 AM    Culture result:  05/05/2017 07:18 AM         Screening of patient nares for MRSA is for surveillance purposes and, if positive, to facilitate isolation considerations in high risk settings. It is not intended for automatic decolonization interventions per se as regimens are not sufficiently effective to warrant routine use. Culture result:  03/28/2014 10:50 AM     MRSA NOT PRESENT      Screening of patient nares for MRSA is for surveillance purposes and, if positive, to facilitate isolation considerations in high risk settings. It is not intended for automatic decolonization interventions per se as regimens are not sufficiently effective to warrant routine use. Lab Results   Component Value Date/Time    TSH 0.233 (L) 09/12/2018 01:45 PM       Imaging:      CXR Results  (Last 48 hours)               07/18/19 0543  XR CHEST PORT Final result    Impression:  IMPRESSION: Angela Greenfield and [place] placements. No pneumothorax or overt pulmonary   edema. Narrative:  EXAM: Portable CXR. 0518 hours. INDICATION: Heart failure. impella/pre op surgery       COMPARISON: 7/11/2019.        FINDINGS:   Shanon Mathis tip has been placed with tip coiled in the main pulmonary artery. Kenard Dia   has been placed. There is no pneumothorax. Heart size is top normal. There is no   overt pulmonary edema. There is no pulmonary infiltrate or apparent pleural   effusion. Results from [place] encounter on 07/17/19   XR CHEST PORT    Narrative EXAM: Portable CXR. 0518 hours. INDICATION: Heart failure. impella/pre op surgery    COMPARISON: 7/11/2019. FINDINGS:  Kasey Schultz tip has been placed with tip coiled in the main pulmonary artery. Kenard Dia  has been placed. There is no pneumothorax. Heart size is top normal. There is no  overt pulmonary edema. There is no pulmonary infiltrate or apparent pleural  effusion. Impression IMPRESSION: IMPELLA and [place] placements. No pneumothorax or overt pulmonary  edema. Results from [place] encounter on 07/17/19   XR CHEST PORT    Narrative EXAM: Portable CXR. 0518 hours. INDICATION: Heart failure. impella/pre op surgery    COMPARISON: 7/11/2019. FINDINGS:  Kasey Schultz tip has been placed with tip coiled in the main pulmonary artery. Kenard Dia  has been placed. There is no pneumothorax. Heart size is top normal. There is no  overt pulmonary edema. There is no pulmonary infiltrate or apparent pleural  effusion. Impression IMPRESSION: IMPELLA and [place] placements. No pneumothorax or overt pulmonary  edema. Results from [place] encounter on 07/11/19   XR CHEST PA LAT    Narrative EXAM: XR CHEST PA LAT    INDICATION: preop    COMPARISON: Chest x-ray 3/28/2014. FINDINGS: PA and lateral radiographs of the chest demonstrate hyperinflated and  hyperlucent lungs. No focal infiltrate, nodule, or mass. The cardiac and  mediastinal contours and pulmonary vascularity are normal. Atherosclerotic  calcifications affect the aortic arch and the thoracic aorta is tortuous.  The  chest wall structures and visualized upper abdomen show no acute findings with  incidental note of degenerative spine and shoulder changes as well as diffuse  osteopenia. Impression IMPRESSION: No acute findings. COPD and additional chronic changes as above. Results from Appointment encounter on 04/22/19   XR SPINE LUMB 2 OR 3 V    Narrative EXAM: XR SPINE LUMB 2 OR 3 V    INDICATION: Acute bilateral lower back pain with bilateral sciatica    COMPARISON: None. FINDINGS: AP, lateral and spot lateral views of the lumbar spine. There is a dextroconvex rotary lumbar curvature. The vertebral body heights are  well-preserved. There is 8 mm anterolisthesis of L4 relative to L5. Spondylitic  changes are present in the upper lumbar spine. Spondylitic change with disc  space narrowing noted at L3-4 and L4-5. Facet arthropathy is a dominant feature  at the lower 3 levels. Vascular calcification is noted of the aortoiliac system. Mild constipation is present. Impression IMPRESSION:      Grade 1 anterolisthesis of L4 on a degenerative basis. Probable central canal stenosis at L4-5 and L5-S1 on a multifactorial basis. Lower lumbar facet arthropathy   Mild scoliosis  Mild constipation             Results from [place] encounter on 04/03/18   CT HEAD WO CONT    Narrative EXAMINATION:  CT HEAD WO CONT    CLINICAL INFORMATION:  Head injury mild or moderate acute, no neurological  deficit  COMPARISON:  None. TECHNIQUE: Routine axial head CT was performed. IV contrast was not  administered. Sagittal and coronal reconstructions were generated. CT dose reduction was achieved through use of a standardized protocol tailored  for this examination and automatic exposure control for dose modulation. FINDINGS:  EXTRA-AXIAL SPACES:  Normal   INTRACRANIAL HEMORRHAGE:  None. VENTRICULAR SYSTEM:  Normal for age. BASAL CISTERNS:  Normal.  CEREBRAL PARENCHYMA:  Mild white matter hypodensity likely due to age and  intracranial small vessel disease.  Probable prominent perivascular space in the  left inferior basal ganglia region. MIDLINE SHIFT:  None. CEREBELLUM:  Normal.  BRAINSTEM: Normal.  CALVARIUM: Normal.  PARANASAL SINUSES AND MASTOID AIR CELLS: Clear. VISUALIZED ORBITS: Normal.  SELLA: Normal.  SKULL BASE: Normal.      Impression IMPRESSION:  No acute findings. Age-related changes. This care involved high complexity medical decision making: I personally:  · Reviewed the flowsheet and previous days notes  · Reviewed and summarized records or history from previous days note or discussions with staff, family  · Parenteral controlled substances - Reviewed/ Adjusted / Fco Caldwell / Started  · High Risk Drug therapy requiring intensive monitoring for toxicity: eg steroids, pressors, antibiotics  · Reviewed and/or ordered Clinical lab tests  · Reviewed and/or ordered Radiology tests  · Reviewed and/or ordered of Medicine tests  · Independently visualized radiologic Images  · Reviewed the patients ECG / Telemetry  ·     My assessment/management discussed with: Cardiology, Nursing, Respiratory Therapy for coordination of care    Pt's condition is acute and unstable requiring inpatient hospitalization. This care involved high complexity decision making in order to assess, support vital system function, and to treat this degree of vital organ system failure, and to prevent further deterioration of the patients condition.      Alexander Rice MD

## 2019-07-18 NOTE — PROCEDURES
[facility] CATH    Name:  Zackary Reid  MR#:  [id number]  :  1931  ACCOUNT #:  [de-identified]  DATE OF SERVICE:  2019    PREOPERATIVE DIAGNOSIS:  Atherosclerosis of native coronary artery with progressive angina    POSTOPERATIVE DIAGNOSIS:  Same    PROCEDURES PERFORMED:  1. Left heart cardiac catheterization. 2.  Selective coronary angiography. 3.  Aortography with pelvic runoff. 4.  Placement of an Impella CP percutaneously through the left common femoral artery. 5.  Placement of a PA catheter with right heart cardiac catheterization through the left common femoral vein. 6.  PTCA and stenting of the left main extending into the left anterior descending with two drug-eluting stents with balloon side hole into the left circumflex. 7. Sedation. Sedation was administered by cath lab staff and I supervised them as I monitored the patient for the duration of the procedure. DURATION:  279 minutes. SURGEON:  Sheryle Matsu, MD    ASSISTANT:  None    ESTIMATED BLOOD LOSS:  Approximately 500 mL. SPECIMENS REMOVED:  None. COMPLICATIONS:  None. IMPLANTS:  As described in body of report, two stents. ANESTHESIA:  Sedation is Versed and fentanyl. INDICATION:  Progressive anginal symptoms in an 80year-old despite maximal medical therapy. TECHNIQUE:  Initially diagnostic cardiac cauterization was performed through the right renal artery. RESULTS:  1. Left heart cardiac catheterization:  Arterial pressure was 112/56 with a mean of 76. Left ventricular pressure was 130/12. COMMENTS:  Left ventricular end diastolic pressures within normal limits. There was mild aortic stenosis with a peak gradient of 20 mmHg. 2.  Selective coronary angiography:  Left main:  The left main is small in caliber with severe dampening with engagement. There was diffuse 80%-90% disease in the left main extending into the left anterior descending.   The left anterior descending is a large vessel that gives rise to two moderate early diagonal branches. The proximal portion of the left anterior descending has 60%-70% disease extending from the left main. Left circumflex: The left circumflex is dominant. The left circumflex is a large vessel that gives rise to a small ramus in OM1 and left posterolateral and left PDA. The small ramus has disease and is small in caliber. The first obtuse marginal has a long 60% disease. The left posterolateral and left PDA has mild luminal irregularities. 3.  Aortography with pelvic runoff: Aortography with pelvic runoff revealed diffuse calcified atherosclerosis of the aorta and common iliacs, but vessel calibers appeared suitable for Impella placement. 4.  At this point, I broke scrub and talked again to the patient and family and due to the patient's advanced age and poor surgical candidacy, we elected to proceed with high-risk left main stenting. 5.  Initially, a 7-Stateless sheath was placed in the right common femoral artery, a 6-Stateless arterial sheath was placed in the left common femoral artery, and a 9.5-Stateless venous sheath was placed in the left common femoral vein. The left common femoral artery was then dilated and an Impella CP was placed into the left ventricle with good output. The patient was anticoagulated with intravenous heparin. 6.  Following this, PTCA and stenting of the left main into the left anterior descending was performed:  A 7-Stateless JL4 was placed through the right side common femoral artery. A 0.014 inch Runthrough was advanced with a fair amount of difficult into the left anterior descending. Several pre-dilatations were performed with 2.0 balloons, 2.5 balloons, and then 3.0 balloons, which were fairly difficult to navigate from the left main into the left anterior descending due to calcification and tortuosity. A GuideLiner was used for support.   After much manipulation, a 2.75 x 26 [place] was able to be delivered into the proximal left anterior descending extending into the left main. Longer stent was unable to be delivered. Following this, a 2.75 x 22 [place] was deployed in an overlapping fashion covering the left main. The entire stented segment was post dilated with a 3.5 mm noncompliant balloon at high atmosphere. A second wire was then advanced through the left circumflex. A balloon side hole dilatation was performed with a 3.0 balloon. Two balloons cannot be put through the guiding catheters and therefore, a final 3.0 balloon was used in the left main to ensure good stent apposition. 7.  After completion of the procedure, the right femoral arterial sheath was sutured into place, a 6-Botswanan sheath in the right radial.  Hemostasis was obtained via TR band. The Impella was securely sutured in place in the left common femoral artery and a PA catheter was placed through the left common femoral vein. PA pressures were 22/2 at the end of the case and the patient was given additional hydration. The patient was then transferred to the intensive care unit for additional care. CONCLUSION:  1. Successful Impella supported PCI of the left main into the proximal LAD. 2.  Normal left ventricular filling pressures with mild aortic stenosis with a peak gradient of 20 mmHg. 3.  Left dominant system with a high grade 80%-90% left main stenosis extending into the left anterior descending, which had a 60% proximal stenosis. There is a small diseased ramus branch. There is moderate disease in the OM branch. The right coronary artery is nondominant with an 80% proximal stenosis. 4.  Aortography revealed suitable vessel size, however, did reveal diffuse calcified atherosclerosis. 5.  Successful placement of an Impella CP through the left common femoral artery.   6.  Successful placement of a PA catheter to the left common femoral vein with a PA pressure of 22/2.  7.  Successful PTCA and stenting of the left main into the left anterior descending with a 2.75 x 26 [place] followed by 2.75 x 22 [place] into the left main. The stents were all post dilated to 3.5 mm at high pressure. A balloon side hole was performed into the left circumflex utilizing a 3.0 balloon with a final balloon in the left anterior descending. Initial 80%-90% stenosis was reduced to 0%. There is prompt RACH 3 opacification of the distal vessels and initial RACH flow was 2.  8. The patient will continue aspirin therapy indefinitely, Brilinta for a minimum of 1 year possibly chronically. Heparin was used during the time of the procedure. 9.  The patient was transferred to the intensive care unit for further management.       Brian Mak MD      [id number]  D:  07/17/2019 18:15  T:  07/17/2019 23:06  JOB #:  [id number]

## 2019-07-18 NOTE — PROGRESS NOTES
MICHELLE PLAN:  Home w/ HH vs Outpt svcs    Reason for Admission:   Pt is an 81 yo male admitted from home for cardiac cath and impella supported PCI LM with ESPERANZA x 2. Pt in CCU overnight with planned removal of impella this evening. Pt's son Michelle Blancas lives w/ him in his 1-story home. Pt is modified indep for mobility and ADLs with a SPC to include driving. Dr. Brissa Erickson - PCP  Dr. Stan Kirk - Cardiologist               RRAT Score:     24             Resources/supports as identified by patient/family:   5 adult children plus grandchildren who all live locally and are supportive. Top Challenges facing patient (as identified by patient/family and CM): Finances/Medication cost?      None indicated              Transportation? Pt drives or family can transport              Support system or lack thereof? Good support                     Living arrangements? Self-care/ADLs/Cognition? Modified indep/Oriented          Current Advanced Directive/Advance Care Plan:  None on file. Pt is a . Son Michelle Blancas and Jenn Alex are listed as Emergency Contacts. Plan for utilizing home health:    Pt had HH/PT after a knee replcmt but does not recall provider. He would be open to [facility] [place] if recommended by MD.                 Transition of Care Plan:   Pt is NPO for impella removal tonight by Dr. John Garza. PT/OT evals when able. NN at PCP, Jaya Kruger, advised of pt's admission via email. CM will follow for dc recommendations - suspect that pt might benefit from HH/PT/OT at dc in light of recent reported falls. Care Management Interventions  PCP Verified by CM: Yes  Palliative Care Criteria Met (RRAT>21 & CHF Dx)?: No  Mode of Transport at Discharge:  Other (see comment)(family)  Transition of Care Consult (CM Consult): Discharge Planning(Pt was assessed for possible dc needs.)  Discharge Durable Medical Equipment: No  Physical Therapy Consult: No  Occupational Therapy Consult: No  Speech Therapy Consult: No  Current Support Network: Relative's Home  Confirm Follow Up Transport: Family  Plan discussed with Pt/Family/Caregiver: Yes  Freedom of Choice Offered: Yes  Discharge Location  Discharge Placement: Home with home health     CIELO Hwang

## 2019-07-18 NOTE — ANESTHESIA PREPROCEDURE EVALUATION
Anesthetic History   No history of anesthetic complications            Review of Systems / Medical History  Patient summary reviewed, nursing notes reviewed and pertinent labs reviewed    Pulmonary    COPD      Smoker      Comments: Former Smoker   Neuro/Psych   Within defined limits           Cardiovascular    Hypertension: well controlled        Dysrhythmias : atrial fibrillation  CAD and hyperlipidemia    Exercise tolerance: <4 METS  Comments: S/p ablation of A.  Fib  1st degree AVB     GI/Hepatic/Renal     GERD: well controlled           Endo/Other      Hypothyroidism: well controlled  Obesity, arthritis and anemia     Other Findings   Comments: Impella left femoral artery  BPH  hx shingles           Physical Exam    Airway  Mallampati: II  TM Distance: > 6 cm  Neck ROM: normal range of motion   Mouth opening: Normal     Cardiovascular  Regular rate and rhythm,  S1 and S2 normal,  no murmur, click, rub, or gallop             Dental    Dentition: Lower partial plate and Full upper dentures     Pulmonary  Breath sounds clear to auscultation               Abdominal  GI exam deferred       Other Findings            Anesthetic Plan    ASA: 2  Anesthesia type: general    Monitoring Plan: BIS      Induction: Intravenous  Anesthetic plan and risks discussed with: Patient          Anesthetic History   No history of anesthetic complications           Review of Systems / Medical History  Patient summary reviewed, nursing notes reviewed and pertinent labs reviewed    Pulmonary  Within defined limits               Neuro/Psych   Within defined limits           Cardiovascular    Hypertension: well controlled        Dysrhythmias         GI/Hepatic/Renal     GERD: well controlled             Endo/Other      Hypothyroidism: well controlled       Other Findings            Physical Exam    Airway  Mallampati: II  TM Distance: > 6 cm  Neck ROM: normal range of motion   Mouth opening: Normal     Cardiovascular  Regular rate and rhythm,  S1 and S2 normal,  no murmur, click, rub, or gallop             Dental    Dentition: Lower partial plate and Upper partial plate     Pulmonary  Breath sounds clear to auscultation               Abdominal  GI exam deferred       Other Findings            Anesthetic Plan    ASA: 2  Anesthesia type: general          Induction: Intravenous  Anesthetic plan and risks discussed with: Patient

## 2019-07-19 NOTE — CARDIO/PULMONARY
Cardiac Rehab Note: chart review     Smoking history assessed. Patient is a former smoker. EF 50%  on 7/17/19 per echo    Patient continues to be confused and drowsy at this time per chart review. CP Rehab will attempt to follow up.

## 2019-07-19 NOTE — PROGRESS NOTES
Vascular Surgery Progress Note  [facility]  [facility]       Subjective:     Mr. Cooper Packer is a 79yo CM with a pmhx significant for ASHD, HTN, HLD, Hypothyroidism, COPD, CKD III, AFib, GERD, and BPH. He was admitted to the hospital following cardiac catheterization PCI of LM into LAD with balloon side hole to LCx and Impella device placement. He is now s/p Impella device removal on 07/18/2019. Overnight he developed delirium with behavioral disturbance and this am his sedated. His groin incision is intact without edema and appropriate erythema.       Nursing Data:     Patient Vitals for the past 24 hrs:   BP Temp Pulse Resp SpO2   07/19/19 1215 (!) 102/37  73 16 97 %   07/19/19 1200 (!) 102/38  72 19 97 %   07/19/19 1145 (!) 93/34  74 18 97 %   07/19/19 1130 95/40 98.4 °F (36.9 °C) 75 20 97 %   07/19/19 1115 120/57  77 18 96 %   07/19/19 1100 107/40 98.5 °F (36.9 °C) 77 21 95 %   07/19/19 1045 117/44 98.4 °F (36.9 °C) 79 20 95 %   07/19/19 1030 125/45 98.5 °F (36.9 °C) 80 21 98 %   07/19/19 1015 113/55 98.5 °F (36.9 °C) 84 19 96 %   07/19/19 1000 105/53 98.5 °F (36.9 °C) 85 16 94 %   07/19/19 0945 107/47 98.4 °F (36.9 °C) 78 17 96 %   07/19/19 0942 (!) 108/39 98.3 °F (36.8 °C) 77 18 96 %   07/19/19 0930 102/44  78 18 97 %   07/19/19 0920 (!) 98/39  78 18 96 %   07/19/19 0918 (!) 98/39 98.4 °F (36.9 °C) 79 18 97 %   07/19/19 0900 103/40  78 17 96 %   07/19/19 0800 94/40 98.5 °F (36.9 °C) 80 19 96 %   07/19/19 0700 100/55  84 19 96 %   07/19/19 0645 102/49  87 20 96 %   07/19/19 0600 105/49 98.4 °F (36.9 °C) 92 20 97 %   07/19/19 0500 110/47  99 20 90 %   07/19/19 0400 129/56 100.1 °F (37.8 °C) (!) 104 26 91 %   07/19/19 0300 130/47  (!) 104 19 92 %   07/19/19 0200 160/54  (!) 119 21 96 %   07/19/19 0100 154/65  (!) 119 26 96 %   07/19/19 0000 155/70 99 °F (37.2 °C) (!) 118 21 93 %   07/18/19 2300 148/40  (!) 104 21 95 %   07/18/19 2240 125/57  (!) 103 25 95 %   07/18/19 2200 140/57  (!) 109 20 94 %   07/18/19 2031 138/51 97.5 °F (36.4 °C) 86 15 96 %   07/18/19 1800 149/50 98.3 °F (36.8 °C) 77 18 100 %   07/18/19 1700 151/51 99.1 °F (37.3 °C) 79 22 97 %   07/18/19 1600 131/43 98.9 °F (37.2 °C) 73 19 97 %   07/18/19 1503 154/50  74     07/18/19 1500 128/77 98.6 °F (37 °C) 71 18 94 %   07/18/19 1400 124/45 98.4 °F (36.9 °C) 69 16 97 %   07/18/19 1300 125/50 98.3 °F (36.8 °C) 70 20 96 %     ---------------------------------------------------------------------------------------------------------    Intake/Output Summary (Last 24 hours) at 7/19/2019 1241  Last data filed at 7/19/2019 1200  Gross per 24 hour   Intake 3009.35 ml   Output 2939 ml   Net 70.35 ml       Exam:     Physical Exam   Constitutional: He appears well-developed and well-nourished. He appears lethargic. HENT:   Head: Normocephalic. Eyes: Pupils are equal, round, and reactive to light. Neck: Normal range of motion. Cardiovascular: Normal rate. An irregularly irregular rhythm present. Pulses:       Femoral pulses are 2+ on the right side, and 2+ on the left side. Feet are warm bilaterally. Pulmonary/Chest: Effort normal. No respiratory distress. Abdominal: He exhibits no distension. Musculoskeletal: Normal range of motion. Neurological: He appears lethargic. Unable to assess orientation due to lethargy. Skin:            Lab Review:     .   Recent Results (from the past 24 hour(s))   POC ACTIVATED CLOTTING TIME    Collection Time: 07/18/19  4:14 PM   Result Value Ref Range    Activated Clotting Time (POC) 169 (H) 79 - 138 SECS   HGB & HCT    Collection Time: 07/19/19 12:23 AM   Result Value Ref Range    HGB 9.0 (L) 12.1 - 17.0 g/dL    HCT 28.1 (L) 36.6 - 50.3 %   HGB & HCT    Collection Time: 07/19/19  5:06 AM   Result Value Ref Range    HGB 8.2 (L) 12.1 - 17.0 g/dL    HCT 24.9 (L) 36.6 - 50.3 %   SAMPLES BEING HELD    Collection Time: 07/19/19  5:06 AM   Result Value Ref Range    SAMPLES BEING HELD PST COMMENT        Add-on orders for these samples will be processed based on acceptable specimen integrity and analyte stability, which may vary by analyte. Assessment/Plan:      Consult problem  ASHD  -on ASA and Plavix and statin. Patient was not taking a BBlocker prior to admission. Angina   -s/p PCI of LM into LAD with balloon side hole to LCx and Impella device placement. -s/p Impella device removal on 07/18/2019  Hypotension with a hx of HTN  -stable on diltiazem and diuretic  Hyperlipidemia   -on statin   Hypothyroidism  -on synthroid   AFib  -rate controlled  -OAG per cardiology      Groin wounds are stable. H&H is stable. Feet are warm and well perfused. Management of comorbid condition per cardiology. Acute problems   Delirium with behavioral disturbance   -Precedex PRN   Plan per pulmonary/critical care    Hematuria  -due to traumatic injury   BPH  -on Flomax and Proscar  -urine cx NGTD  Plan per pulmonary/critical care    Active problems:  COPD  -currently not in exacerbation  CKD III  -at baseline  GERD  -on PPI  Management of comorbid conditions by primary team.    VTE prophylaxis:  Per cardiology     Disposition:   TBD     Vascular surgery signing off. We appreciate the opportunity to participate in the care of Mr. Jesse Huston   Patient should f/u in 3 weeks with Dr. Chris Marquez.   Discharge instructions: wound care and follow up placed on AVS.

## 2019-07-19 NOTE — PROGRESS NOTES
1900 Report received from Rico Iyer RN    2000 Assessment complete. 0000 Reassessment complete. No changes from previous. 0400 Reassessment complete. No changes from previous.     0700 Report given to Rico Iyer RN

## 2019-07-19 NOTE — INTERDISCIPLINARY ROUNDS
Critical care interdisciplinary rounds held on 07/19/2019. Following members present, Pharmacy, Diabetes Treatment, Case Management, Respiratory Therapy, Physical Therapy, Pastoral Care and Nutrition. Led by ELIANE Mathur RN and Dr. Deonte Lindsay and Dr. Liza Bloch. Plan of care discussed. See clinical pathway for plan of care and interventions and desired outcomes.

## 2019-07-19 NOTE — CONSULTS
[facility]    Name:  Sheila Robertson  MR#:  [id number]  :  1931  ACCOUNT #:  [de-identified]  DATE OF SERVICE:  2019    REASON FOR CONSULTATION:  Hematuria. HISTORY OF PRESENT ILLNESS:  The history is obtained from him, but more so his son, as well as the chart. He is not very communicative. He has a history of an enlarged prostate and denies any normal problems urinating, but he is on Flomax and Proscar. He was admitted for progressive angina at a recent catheterization and history of AFib. He had a recent left femoral artery Impella on 2019 with associated anticoagulation and developed gross hematuria in his catheter. This has actually improved. No irrigation has been required and there are no clots in the tubing. Currently, he is on aspirin and Plavix. I believe his Xarelto has been discontinued with the hematuria. PAST MEDICAL HISTORY AND SURGICAL HISTORY:  On the chart were reviewed, as were his medications including excerpts above. ALLERGIES:  HE HAS NO KNOWN ALLERGIES. PHYSICAL EXAMINATION:  GENERAL:  He is sleepy, seems uncomfortable. ABDOMEN:  His abdomen is obese. GENITALIA:  Normal.  There is an indwelling Green[place] catheter and urine has recorded above. A MARLO was not done at this time in this cardiac patient. IMPRESSION:  Obviously, I would prefer no anticoagulants for this problem, but that is not going to happen,  His hematuria is improving. I will order catheter irrigations and catheter removal once he is out of bed and moving, otherwise he is at a high risk for urinary retention. Regarding the anticoagulants, minimal anticoagulation is absolutely necessary at this time, then, progressing from there, but that is Cardiology's decision. If he develops clots and these are not able to be manually irrigated, switch to [address] [place] [address] 2-way Greenberg catheter manually irrigate and reconsult Urology.       STEPHEN MIDDLETON, MD [id number]  D:  07/19/2019 11:14  T:  07/19/2019 12:16  JOB #:  [id number]

## 2019-07-19 NOTE — ANESTHESIA POSTPROCEDURE EVALUATION
Procedure(s):  Removal Impella Device from Femoral Artery. general    Anesthesia Post Evaluation        Patient location during evaluation: PACU  Note status: Adequate. Level of consciousness: responsive to verbal stimuli and sleepy but conscious  Pain management: satisfactory to patient  Airway patency: patent  Anesthetic complications: no  Cardiovascular status: acceptable  Respiratory status: acceptable  Hydration status: acceptable  Comments: +Post-Anesthesia Evaluation and Assessment    Patient: Kay Wellington MRN: [id number]  SSN: [id number]   YOB: 1931  Age: 80 y.o. Sex: male      Cardiovascular Function/Vital Signs    /51   Pulse 86   Temp 36.4 °C (97.5 °F)   Resp 15   Ht 5' 9\" (1.753 m)   Wt 90.7 kg (200 lb)   SpO2 96%   BMI 29.53 kg/m²     Patient is status post Procedure(s):  Removal Impella Device from Femoral Artery. Nausea/Vomiting: Controlled. Postoperative hydration reviewed and adequate. Pain:  Pain Scale 1: Numeric (0 - 10) (07/18/19 1600)  Pain Intensity 1: 0 (07/18/19 1600)   Managed. Neurological Status: At baseline. Mental Status and Level of Consciousness: Arousable. Pulmonary Status:   O2 Device: Nasal cannula (07/18/19 2031)   Adequate oxygenation and airway patent. Complications related to anesthesia: None    Post-anesthesia assessment completed. No concerns. Signed By: Faar Lara MD    7/18/2019  Post anesthesia nausea and vomiting:  controlled      Vitals Value Taken Time   /51 7/18/2019  8:31 PM   Temp 36.4 °C (97.5 °F) 7/18/2019  8:31 PM   Pulse 86 7/18/2019  8:40 PM   Resp 22 7/18/2019  8:40 PM   SpO2 94 % 7/18/2019  8:40 PM   Vitals shown include unvalidated device data.

## 2019-07-19 NOTE — OP NOTES
[address] [facility]  OPERATIVE REPORT    Name:  Chayito Galvan  MR#:  [id number]  :  1931  ACCOUNT #:  [de-identified]  DATE OF SERVICE:  2019    PREOPERATIVE DIAGNOSIS:  Left femoral artery Impella. POSTOPERATIVE DIAGNOSIS:  Left femoral artery Impella. PROCEDURES PERFORMED:  1. Removal of left femoral artery Impella. 2.  Removal of right femoral artery sheath with percutaneous arterial closure. SURGEON:  Maureen Rios MD    ASSISTANT:  None. ANESTHESIA:  General.    COMPLICATIONS:  None. SPECIMENS REMOVED:  None. IMPLANTS:  None. ESTIMATED BLOOD LOSS:  100 mL. DRAINS:  None. INDICATIONS:  The patient is an 80-year-old male who had an Impella assisted coronary intervention. He no longer requires Impella support and presents for surgical removal of the left femoral Impella. He also has a left femoral venous sheath. He also has a 7-Welsh right femoral arterial sheath. Those sheaths will be removed as well. PROCEDURE:  After informed consent was obtained, the patient was given preoperative intravenous antibiotics within one hour of the incision. He was brought to the operating room and after induction of adequate general anesthesia, both groins were prepped and draped as a sterile field including all of the existing sheaths. Attention was first directed to the left groin. A transverse incision was made about 2 cm cephalad to the insertion site of the Impella and the venous sheath. Dissection was carried down to the common femoral artery. A 5-0 Prolene suture was placed around the insertion site. The Impella was then removed and the suture was secured. Two small points of bleeding were controlled with additional 5-0 Prolene suture. The arterial closure was hemostatic.   Interrogation with a hand-held Doppler revealed normal flow proximal and distal to the point of arterial closure and there was a good pulse within the vessel distal to the closure. The wound was irrigated with antibiotic irrigation and was hemostatic. The wound was closed with two layers of Vicryl suture and skin staples. The venous sheath was removed and direct pressure was held with good hemostasis. Attention was directed to the right groin, where the 7-Mohawk sheath was removed over a wire and the site was closed with a Perclose device with good hemostasis. Dry dressings were applied to both sites and the patient was extubated and returned to the intensive care unit in stable condition. All counts were correct.       Silvia Smith MD      [id number]  D:  07/19/2019 0:11  T:  07/19/2019 0:20  JOB #:  [id number]  CC:  MD Hina Stark MD

## 2019-07-19 NOTE — PROGRESS NOTES
[facility] Consult Service Progress NOTE  Pulmonary, Critical Care, and Sleep Medicine    Name: Yolanda Jacobson MRN: [id number]   : 3/13/1931 Hospital: [facility]   Date: 2019  Admission Date: 2019     Hospital Day: 3    Chart and notes reviewed. Data reviewed. I have evaluated and examined the patient. Pt now in CCU following coronary revascularization; \" protected PCI of LM\"    Excerpts from admission and consult notes reviewed as follows:     Pt is 81 yo remote WM remote smoker with HTN, ASCVD, paroxysmal afib who has been having severe JAFFE. Found at cath to have LM disease. DR. Samanta Ro performed a \"protected PCI\" of his LM using an impella. Two stents placed. On Brilinta. Was on Xarelto prior to admission. Pt now in CCU. Not on pressors. Khoa aid urine. No clots. Ross hematuria. No SOB. Getting IV fluid hydration. Impella on P3    Asked to help with CCU medical management. Discussed with dr. Samanta Ro     urine same. Mild SOb last night. No chest pain. Impella P-3. Hgb has dropped from 11.1 to 8.5. CXR reviewed     Impella removed from right fem in OR last evening. Post op in CCU pt became confused, agitated, combative, verbally abusive and physically aggressive; Pt would try to climb out of bed while screaming and striking nursing staff despite using all available agents including IV Haldol, versed, fentanyl over the course of hours. IV precedex added with prn analgesia, physical restraints for pt safety. Pt has some bleeding from groin and is in 3 pont restraints. Pt has no ETOH history. Had not slept for days. Two nights prior had mild disorientation, insomnia. More clots in maradiaga, being irrigated. Off IV heparin      IMPRESSION:   1. Acute delirum  2. CAD severe LM disease  3. S/p PCI LM with ESPERANZA x 2  4. Impella support  5. Hemolysis  6. Hematuria- now with clots  7. anemia  8. Paroxysmal Atrial Fibrillation  9.  H/o Atrail flutter ablation  10. GERD  11. HTN  12. Hypothyroidism  13. H/o COPD but no PFts for review and not on inhalers and not in exacerbation  14.   13. Dyslipidemia  16. Distant smoker  17. Additional workup outlined below  18. Multiorgan dysfunction as outlined above: Pt has one or more acute or chronic illnesses with severe exacerbation with progression or side effects of treatment that poses a threat to life or bodily function  19. Pt is requiring Drug therapy requiring intensive monitoring for toxicity      RECOMMENDATIONS/PLAN:   1. CCU monitoring  2. Consult urology- CBI? 3. Bladder scan  4. Restrain fo rpt safety  5. Sedate with pain meds prn  6. serial H and H, labs in AM  7. If Hgb drops more, may need trasfusion  8. If clots form, may need CBI, urology consult  9. UA with reflex culture  10. Monitor bronchial secretions  11. CXR in AM  12. Hemodynamic monitoring  13. Bronchial hygiene therapy  14. Bronchodilators prn  15. PT, OT when able  16. Pt needs IV fluids with additives and Drug therapy requiring intensive monitoring for toxicity  17. DVT, SUP prophylaxiss  18. Glucose monitoring and insulin management for tight glycemic control  19.  Prescription drug management with home med reconciliation done          [x] High complexity decision making was performed  [x] See my orders for details    Social History     Tobacco Use    Smoking status: Former Smoker     Packs/day: 0.50     Last attempt to quit: 3/28/1963     Years since quittin.3    Smokeless tobacco: Never Used   Substance Use Topics    Alcohol use: Yes     Comment: rare/social-beer      Family History   Problem Relation Age of Onset    Heart Disease Mother     Hypertension Mother     Stroke Mother     Heart Disease Father     Cancer Paternal Grandmother         No Known Allergies     MAR reviewed and pertinent medications noted or modified as needed  Current Facility-Administered Medications   Medication    dexmedeTOMidine ([address]) 400 mcg in 0.9% sodium chloride 100 mL infusion    0.9% sodium chloride infusion 250 mL    0.9% sodium chloride infusion 250 mL    dilTIAZem CD (CARDIZEM CD) capsule 120 mg    heparin (porcine) 2,000 Units in 0.9% sodium chloride 500 mL Irrigation    bacitracin 50,000 Units in sodium chloride irrigation 0.9 % 1,000 mL Irrigation    morphine injection 2 mg    oxyCODONE-acetaminophen (PERCOCET) 5-325 mg per tablet 1 Tab    morphine injection 4 mg    oxyCODONE-acetaminophen (PERCOCET) 5-325 mg per tablet 2 Tab    fentaNYL citrate (PF) injection 25 mcg    midazolam (VERSED) injection 1 mg    ondansetron (ZOFRAN) injection 4 mg    haloperidol lactate (HALDOL) injection 2 mg    aspirin chewable tablet 81 mg    finasteride (PROSCAR) tablet 5 mg    levothyroxine (SYNTHROID) tablet 137 mcg    atorvastatin (LIPITOR) tablet 40 mg    tamsulosin (FLOMAX) capsule 0.8 mg    ticagrelor (BRILINTA) tablet 90 mg    sodium chloride (NS) flush 5-40 mL    sodium chloride (NS) flush 5-40 mL    acetaminophen (TYLENOL) tablet 650 mg    docusate sodium (COLACE) capsule 100 mg    0.9% sodium chloride infusion    pantoprazole (PROTONIX) tablet 40 mg    alcohol 62% (NOZIN) nasal  1 Ampule    LORazepam (ATIVAN) injection 0.5 mg        Vital Signs: Intake/Output: Intake/Output:   Visit Vitals  /55   Pulse 84   Temp 98.4 °F (36.9 °C)   Resp 19   Ht 5' 9\" (1.753 m)   Wt 90.7 kg (200 lb)   SpO2 96%   BMI 29.53 kg/m²    Temp (24hrs), Av.5 °F (36.9 °C), Min:97.5 °F (36.4 °C), Max:100.1 °F (37.8 °C)        Telemetry:normal sinus rhythm;  O2 Device: Nasal cannula  O2 Flow Rate (L/min): 2 l/min    Wt Readings from Last 4 Encounters:   19 90.7 kg (200 lb)   19 90.4 kg (199 lb 3.2 oz)   19 91.4 kg (201 lb 6.4 oz)   19 92.4 kg (203 lb 12.8 oz)          Intake/Output Summary (Last 24 hours) at 2019 0753  Last data filed at 2019 0700  Gross per 24 hour   Intake 3929.15 ml   Output 3359 ml   Net 570.15 ml       Last shift:      No intake/output data recorded. Last 3 shifts: 07/17 1901 - 07/19 0700  In: 5925.4 [I.V.:5461.6]  Out: 4127 [QKEGI:4056]     Physical Exam:    General: restrained elderly  male    HEAD: Normocephalic, without obvious abnormality, atraumatic; bearded   EYES: conjunctivae clear. PERRL,  AN Icteric sclerae   NOSE: nares normal, no drainage, no flaring,    THROAT: mucous membranes dry; Lips, mucosa dry; No Thrush;  crowded airway   Neck: Supple, symmetrical, trachea midline,  No accessory mm use; No Stridor/ cuff leak, No goiter or thyroid tenderness   LYMPH: No abnormally enlarged lymph nodes. in neck or groin   Chest: increased AP diameter; mediastinal tubes in place; pacemaker wires   Lungs: normal air entry   Heart: Regular rate and rhythm ; ; NO edema   Abdomen: soft, nondistended, without guarding, without rebound   :   Greenberg,   gross hematuria   Extremity: negative, clubbing; Impella oout   Neuro:  withdraws to pain; unable to check gait and station; does not follow simple commands   Psych: sedatred; Unable to assess; No agitation; normal affect   Skin: Pallor; ;    Pulses:Bilateral, DP, 2+   Capillary refill: abnormal: ; well perfused, pale,        DATA:    Labs:    Recent Labs     07/19/19  0506 07/19/19  0023 07/18/19  1235 07/18/19  0522 07/18/19  0417 07/17/19  1519   WBC  --   --   --   --  12.7* 8.5   HGB 8.2* 9.0* 9.6*  --  8.5* 11.1*   PLT  --   --   --   --  163 188   INR  --   --   --  1.1  --  1.2*   APTT  --   --   --  >130.0*  --   --      Recent Labs     07/18/19  0417 07/17/19  1519    144   K 4.2 4.2   * 115*   CO2 21 21   * 122*   BUN 35* 37*   CREA 1.22 1.31*   CA 8.6 8.0*   ALB 2.6*  --    SGOT 16  --    ALT 13  --      No results for input(s): PH, PCO2, PO2, HCO3, FIO2 in the last 72 hours. No results for input(s): CPK, CKNDX, TROIQ in the last 72 hours.     No lab exists for component: CPKMB  No results found for: BNPP, BNP   Lab Results   Component Value Date/Time    Culture result: MRSA NOT PRESENT 05/05/2017 07:18 AM    Culture result:  05/05/2017 07:18 AM         Screening of patient nares for MRSA is for surveillance purposes and, if positive, to facilitate isolation considerations in high risk settings. It is not intended for automatic decolonization interventions per se as regimens are not sufficiently effective to warrant routine use. Culture result:  03/28/2014 10:50 AM     MRSA NOT PRESENT      Screening of patient nares for MRSA is for surveillance purposes and, if positive, to facilitate isolation considerations in high risk settings. It is not intended for automatic decolonization interventions per se as regimens are not sufficiently effective to warrant routine use. Lab Results   Component Value Date/Time    TSH 0.233 (L) 09/12/2018 01:45 PM       Imaging:      CXR Results  (Last 48 hours)               07/18/19 0543  XR CHEST PORT Final result    Impression:  IMPRESSION: Ragena Bola and Howard placements. No pneumothorax or overt pulmonary   edema. Narrative:  EXAM: Portable CXR. 0518 hours. INDICATION: Heart failure. impella/pre op surgery       COMPARISON: 7/11/2019. FINDINGS:   Beltrami Brass tip has been placed with tip coiled in the main pulmonary artery. Ragena Bola   has been placed. There is no pneumothorax. Heart size is top normal. There is no   overt pulmonary edema. There is no pulmonary infiltrate or apparent pleural   effusion. Results from [place] encounter on 07/17/19   XR CHEST PORT    Narrative EXAM: Portable CXR. 0518 hours. INDICATION: Heart failure. impella/pre op surgery    COMPARISON: 7/11/2019. FINDINGS:  Beltrami Brass tip has been placed with tip coiled in the main pulmonary artery. Ragena Bola  has been placed. There is no pneumothorax. Heart size is top normal. There is no  overt pulmonary edema.  There is no pulmonary infiltrate or apparent pleural  effusion. Impression IMPRESSION: IMPELLA and Hiko placements. No pneumothorax or overt pulmonary  edema. Results from [place] encounter on 07/17/19   XR CHEST PORT    Narrative EXAM: Portable CXR. 0518 hours. INDICATION: Heart failure. impella/pre op surgery    COMPARISON: 7/11/2019. FINDINGS:  Ala  tip has been placed with tip coiled in the main pulmonary artery. Lendel Laya  has been placed. There is no pneumothorax. Heart size is top normal. There is no  overt pulmonary edema. There is no pulmonary infiltrate or apparent pleural  effusion. Impression IMPRESSION: IMPELLA and Hiko placements. No pneumothorax or overt pulmonary  edema. Results from [place] encounter on 07/11/19   XR CHEST PA LAT    Narrative EXAM: XR CHEST PA LAT    INDICATION: preop    COMPARISON: Chest x-ray 3/28/2014. FINDINGS: PA and lateral radiographs of the chest demonstrate hyperinflated and  hyperlucent lungs. No focal infiltrate, nodule, or mass. The cardiac and  mediastinal contours and pulmonary vascularity are normal. Atherosclerotic  calcifications affect the aortic arch and the thoracic aorta is tortuous. The  chest wall structures and visualized upper abdomen show no acute findings with  incidental note of degenerative spine and shoulder changes as well as diffuse  osteopenia. Impression IMPRESSION: No acute findings. COPD and additional chronic changes as above. Results from Appointment encounter on 04/22/19   XR SPINE LUMB 2 OR 3 V    Narrative EXAM: XR SPINE LUMB 2 OR 3 V    INDICATION: Acute bilateral lower back pain with bilateral sciatica    COMPARISON: None. FINDINGS: AP, lateral and spot lateral views of the lumbar spine. There is a dextroconvex rotary lumbar curvature. The vertebral body heights are  well-preserved. There is 8 mm anterolisthesis of L4 relative to L5. Spondylitic  changes are present in the upper lumbar spine.  Spondylitic change with disc  space narrowing noted at L3-4 and L4-5. Facet arthropathy is a dominant feature  at the lower 3 levels. Vascular calcification is noted of the aortoiliac system. Mild constipation is present. Impression IMPRESSION:      Grade 1 anterolisthesis of L4 on a degenerative basis. Probable central canal stenosis at L4-5 and L5-S1 on a multifactorial basis. Lower lumbar facet arthropathy   Mild scoliosis  Mild constipation             Results from [place] encounter on 04/03/18   CT HEAD WO CONT    Narrative EXAMINATION:  CT HEAD WO CONT    CLINICAL INFORMATION:  Head injury mild or moderate acute, no neurological  deficit  COMPARISON:  None. TECHNIQUE: Routine axial head CT was performed. IV contrast was not  administered. Sagittal and coronal reconstructions were generated. CT dose reduction was achieved through use of a standardized protocol tailored  for this examination and automatic exposure control for dose modulation. FINDINGS:  EXTRA-AXIAL SPACES:  Normal   INTRACRANIAL HEMORRHAGE:  None. VENTRICULAR SYSTEM:  Normal for age. BASAL CISTERNS:  Normal.  CEREBRAL PARENCHYMA:  Mild white matter hypodensity likely due to age and  intracranial small vessel disease. Probable prominent perivascular space in the  left inferior basal ganglia region. MIDLINE SHIFT:  None. CEREBELLUM:  Normal.  BRAINSTEM: Normal.  CALVARIUM: Normal.  PARANASAL SINUSES AND MASTOID AIR CELLS: Clear. VISUALIZED ORBITS: Normal.  SELLA: Normal.  SKULL BASE: Normal.      Impression IMPRESSION:  No acute findings. Age-related changes.          This care involved high complexity medical decision making: I personally:  · Reviewed the flowsheet and previous days notes  · Reviewed and summarized records or history from previous days note or discussions with staff, family  · Parenteral controlled substances - Reviewed/ Adjusted / Myrna Forts / Started  · High Risk Drug therapy requiring intensive monitoring for toxicity: eg steroids, pressors, antibiotics  · Reviewed and/or ordered Clinical lab tests  · Reviewed and/or ordered Radiology tests  · Reviewed and/or ordered of Medicine tests  · Independently visualized radiologic Images  · Reviewed the patients ECG / Telemetry  ·     My assessment/management discussed with: Cardiology, Nursing, Respiratory Therapy for coordination of care    Pt's condition is acute and unstable requiring inpatient hospitalization. This care involved high complexity decision making in order to assess, support vital system function, and to treat this degree of vital organ system failure, and to prevent further deterioration of the patients condition.      Simeon Peterson MD

## 2019-07-19 NOTE — PROGRESS NOTES
0700 Bedside and Verbal shift change report given to Manuelito Choi (oncoming nurse) by [employer] (offgoing nurse). Report included the following information SBAR, Kardex, OR Summary, Procedure Summary, Intake/Output, MAR, Recent Results and Cardiac Rhythm NSR.   0745 DR Jenny Lewis in to assess pt, reviewed plan of care and hematuria throughout the night requiring irrigation for obstructing clots, currently draining corine urine without clots. Pt remains restrained for pt and staff safety  0800 pt assessed per flowsheet. 0830 Dr Brar in to assess pt. Groin sites \"OK\", good distal pulses, agitation improved with redirection from Dr Brar. Maradiaga remains clear corine. Reviewed plan of care, will increase bed mobility, po intake, will consult urology for the gross hematuria with clots over night  0900 spoke with [facility] from urology. Plan to keep maradiaga for a few days due to trauma, bleeding and pt's age. Urology consult will be seen this afternoon  0925 unit 1 of 1 PRBC's started, precedex stopped  0945 IDR with Dr Jordana England and team complete  1010 pt awake and non compliant with safety restrictions precedex restarted after pt kicking with many attempts out of the bed. 1050 Dr Cordero Pac in to assess pt. Will d/c maradiaga once urine is clear with decreased confusion and pt able to and willing to follow commands. Will rack urine at that time  Son has been in to see pt.  1100 pt much more awake, alert and cooperative. Pt willing to take po meds, drink some juice- maikol well. Pt requesting to have restraints removed with verbal contract for safety noted. Pt also allowed repositioning in the bed. Left groin site remains unchanged, no new drainage, palp distal pulses. Urine clear yellow after lasix, no blood noted  1200 pt reassessed per flowsheet, remains drowsy  1215 blood transfusion complete, pt maikol well. Maradiaga and Precedex d/c'd  1400 pt awake.  Remains confused yet much more pleasant  1515 pt able to stand to void (60ml clear yellow- racked), though he was slightly dizzy with position change. He sat at bedside after voiding to eat his lunch, maikol well. Pt assisted back to bed after meal, pain meds changed to PO. Pt able to assist with repositioning in the bed, maikol well. Groin sites intact, left remains tender to touch, no new drainage, palpable distal pulses  [phone] son, grandson and daughters in to visit pt, all pleased with his progression. Transfer orders received from Dr Diana Beck to go to tele bed  1600 pt reassessed per flowsheet, confusion improving gradually  1800 Dr Jaycee Basilio called for updates. New orders received. Pt much more oriented, still disoriented to time   1900 Bedside and Verbal shift change report given to Wanda (oncoming nurse) by John Duarte (offgoing nurse). Report included the following information SBAR, Kardex, Procedure Summary, Intake/Output, MAR, Recent Results and Cardiac Rhythm NSR.

## 2019-07-19 NOTE — BRIEF OP NOTE
BRIEF OPERATIVE NOTE    Date of Procedure: 7/18/2019   Preoperative Diagnosis: Impella left femoral artery  Postoperative Diagnosis: * No post-op diagnosis entered *    Procedure(s):  Removal Impella Device from Femoral Artery  Surgeon(s) and Role:     * Mendoza Wood MD - Primary         Surgical Assistant: None    Surgical Staff:  Circ-1: Harvinder Varela  Scrub Tech-1: Francoise Florian  Surg Asst-1: Negar CARVER  Event Time In Time Out   Incision Start 07/18/2019 1928    Incision Close 07/18/2019 2011      Anesthesia: General   Estimated Blood Loss: 100 cc  Specimens: * No specimens in log *   Findings: Impella removed. Venous sheath removed. Rt CFA sheath removed and closed w/ Perclose.    Complications: None  Implants: * No implants in log *

## 2019-07-19 NOTE — DISCHARGE INSTRUCTIONS
[address] [facility], [address]   [phone]  [address]    [email or web address]    Patient Discharge Instructions    Buck Jiang / [id number] : 3/13/1931    Admitted 2019 Discharged: 2019       · It is important that you take the medication exactly as they are prescribed. · Keep your medication in the bottles provided by the pharmacist and keep a list of the medication names, dosages, and times to be taken in your wallet. · Do not take other medications without consulting your doctor. BRING ALL OF YOUR MEDICINES TO YOUR OFFICE VISIT. Follow-up with Manny Benson MD in 2 weeks and Dr. Kenyon Jacob in 3 weeks. Cardiac Catheterization  Discharge Instructions     Do not drive, operate any machinery, or sign any legal documents for 24 hours after your procedure. You must have someone to drive you home.  You may take a shower 24 hours after your cardiac catheterization. Be sure to get the dressing wet and then remove it; gently wash the area with warm soapy water. Pat dry and leave open right groin open to air. Left groin should have a dry dressing change daily. To help prevent infections, be sure to keep the sites clean and dry. No lotions, creams, powders, ointments, etc. in the cath site for approximately 3 weeks.  Do not take a tub bath, get in a hot tub or swimming pool for approximately 3 weeks or until staples have been removed.  No strenuous activity or heavy lifting over 10 lbs. for 7 days.  Drink plenty of fluids for 24-48 hours after your cath to flush the contrast dye from your kidneys. No alcoholic beverages for 24 hours. You may resume your previous diet (low fat, low cholesterol) after your cath.  After your sites, some bruising or discomfort is common during the healing process. Tylenol, 1-2 tablets every 6 hours as needed, is recommended if you experience any discomfort.   If you experience any signs or symptoms of infection such as fever, chills, or poorly healing incision, persistent tenderness or swelling in the groin, redness and/or warmth to the touch, numbness, significant tingling or pain at the groin site or affected extremity, rash, drainage from the cath site, or if the leg feels tight or swollen, call Dr. Anil Herndon at [phone].  If bleeding at the sites occurs, take a clean gauze pad and apply direct pressure to the groin just above the puncture site. Call 911 immediately, and continue to apply direct pressure until an ambulance gets to your location.  You may return to work once cleared by Dr. Anil Herndon. Information obtained by :  I understand that if any problems occur once I am at home I am to contact my physician. I understand and acknowledge receipt of the instructions indicated above. R.N.'s Signature                                                                  Date/Time                                                                                                                                              Patient or Representative Signature                                                          Date/Time      Asuncion Keys MD             [address] [facility], [address]    [phone]  [address] [facility], [address] [facility]    [email or web address]
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
## 2019-07-19 NOTE — PROGRESS NOTES
Progress Note      7/19/2019 2:51 PM  NAME: [employer]   MRN:  [id number]   Admit Diagnosis: Chest pain, unspecified type [R07.9]  Atherosclerosis of native coronary artery [I25.10]     Problem List:     - Progressive angina, cath 7/2019 with impella supported PCI of LM into LAD with balloon side hole to LCx  - Normal EF, mild AS  - Prox afib, currently in sinus  - Hx of aflutter ablation  - HTN  - Dyslipidemia  - Hypothyroid  - GERD  - BPH  - Distant tobacco  , lives alone, ADLs, occasionally using cane  Son:  Ulises De La Cruz. [phone]        Assessment/Plan:     - Impella placement  - PCI of LM  - Currently euvolemic  - Cr stable  - Post operative anemia due to expected blood loss. Hg decreasing, on anticoagulation, for surgery today, Tx 1U 7/18 with lasix 20mg. Tx 1U 7/19 with lasix 20mg. - hematuria, improving as anticoagulation decreasing but still pink. - Stop xarelto, currently in sinus, given age and hematuria will not tolerate triple anticoagulation  - Cont ASA  - Change added brilinta to plavix for ease of use. - Hold diltiazem, intolerant of beta blocker in past  - Hold ARB, resume on discharge  - Lasix IV today, resume home lasix tomorrow, decrease fluids as tolerates po.  - Cont statin    - Cont proscar and flomax    7/18:  Impella removed. At night severe delerium now on precedex. 7/19:  Cath sites look good. Hematuria improving but still pink. Consult urology, goal would be to d/c maradiaga later today. Tx 1U PRBC. If able to orient, tolerate PO, dc maradiaga, ambulate, goal would be get home as soon as possible. [x]       High complexity decision making was performed in this patient at high risk for decompensation with multiple organ involvement. Subjective:     [employer] denies chest pain, dyspnea, confused. Discussed with RN events overnight.      Review of Systems:    Symptom Y/N Comments  Symptom Y/N Comments   Fever/Chills N   Chest Pain N    Poor Appetite N   Edema N    Cough N   Abdominal Pain N    Sputum N   Joint Pain N    SOB/JAFFE N   Pruritis/Rash N    Nausea/vomit N   Tolerating PT/OT     Diarrhea N   Tolerating Diet Y    Constipation N   Other       Could NOT obtain due to:      Objective:      Physical Exam:    Last 24hrs VS reviewed since prior progress note. Most recent are:    Visit Vitals  BP 94/40   Pulse 80   Temp 98.4 °F (36.9 °C)   Resp 19   Ht 5' 9\" (1.753 m)   Wt 90.7 kg (200 lb)   SpO2 96%   BMI 29.53 kg/m²       Intake/Output Summary (Last 24 hours) at 7/19/2019 6003  Last data filed at 7/19/2019 0700  Gross per 24 hour   Intake 3807.95 ml   Output 3299 ml   Net 508.95 ml        General Appearance: Well developed, well nourished, alert & oriented x 3 some confusion, but wants to go home,    no acute distress. Ears/Nose/Mouth/Throat: Hearing grossly normal.  Neck: Supple. Chest: Lungs clear to auscultation bilaterally. Cardiovascular: Regular rate and rhythm, S1S2 normal, no murmur. Abdomen: Soft, non-tender, bowel sounds are active. Extremities: No edema bilaterally. Skin: Warm and dry. PMH/SH reviewed - no change compared to H&P    Data Review    Telemetry: normal sinus rhythm     Lab Data Personally Reviewed:    Recent Labs     07/19/19  0506 07/19/19  0023  07/18/19  0417 07/17/19  1519   WBC  --   --   --  12.7* 8.5   HGB 8.2* 9.0*   < > 8.5* 11.1*   HCT 24.9* 28.1*   < > 26.0* 34.3*   PLT  --   --   --  163 188    < > = values in this interval not displayed. Recent Labs     07/18/19  0522 07/17/19  1519   INR 1.1 1.2*   PTP 11.4* 12.4*   APTT >130.0*  --       Recent Labs     07/18/19  0417 07/17/19  1519    144   K 4.2 4.2   * 115*   CO2 21 21   BUN 35* 37*   CREA 1.22 1.31*   * 122*   CA 8.6 8.0*     No results for input(s): CPK, CKNDX, TROIQ in the last 72 hours.     No lab exists for component: CPKMB  Lab Results   Component Value Date/Time    Cholesterol, total 117 06/12/2019 02:00 PM HDL Cholesterol 35 06/12/2019 02:00 PM    LDL, calculated 65 06/12/2019 02:00 PM    Triglyceride 86 06/12/2019 02:00 PM    CHOL/HDL Ratio 3 06/12/2019 02:00 PM       Recent Labs     07/18/19  0417   SGOT 16   AP 65   TP 5.1*   ALB 2.6*   GLOB 2.5     No results for input(s): PH, PCO2, PO2 in the last 72 hours.     Medications Personally Reviewed:    Current Facility-Administered Medications   Medication Dose Route Frequency    dexmedeTOMidine (PRECEDEX) 400 mcg in 0.9% sodium chloride 100 mL infusion  0.2-1.4 mcg/kg/hr IntraVENous TITRATE    clopidogrel (PLAVIX) tablet 300 mg  300 mg Oral NOW    [START ON 7/20/2019] clopidogrel (PLAVIX) tablet 75 mg  75 mg Oral DAILY    furosemide (LASIX) injection 20 mg  20 mg IntraVENous ONCE    acetaminophen (TYLENOL) tablet 650 mg  650 mg Oral ONCE    0.9% sodium chloride infusion 250 mL  250 mL IntraVENous PRN    dilTIAZem CD (CARDIZEM CD) capsule 120 mg  120 mg Oral DAILY    bacitracin 50,000 Units in sodium chloride irrigation 0.9 % 1,000 mL Irrigation    PRN    fentaNYL citrate (PF) injection 25 mcg  25 mcg IntraVENous Q2H PRN    ondansetron (ZOFRAN) injection 4 mg  4 mg IntraVENous Q6H PRN    haloperidol lactate (HALDOL) injection 2 mg  2 mg IntraVENous Q2H PRN    aspirin chewable tablet 81 mg  81 mg Oral DAILY    finasteride (PROSCAR) tablet 5 mg  5 mg Oral PCD    levothyroxine (SYNTHROID) tablet 137 mcg  137 mcg Oral ACB    atorvastatin (LIPITOR) tablet 40 mg  40 mg Oral DAILY    tamsulosin (FLOMAX) capsule 0.8 mg  0.8 mg Oral DAILY    sodium chloride (NS) flush 5-40 mL  5-40 mL IntraVENous Q8H    sodium chloride (NS) flush 5-40 mL  5-40 mL IntraVENous PRN    acetaminophen (TYLENOL) tablet 650 mg  650 mg Oral Q4H PRN    docusate sodium (COLACE) capsule 100 mg  100 mg Oral BID    0.9% sodium chloride infusion  100 mL/hr IntraVENous CONTINUOUS    pantoprazole (PROTONIX) tablet 40 mg  40 mg Oral ACB    alcohol 62% (NOZIN) nasal  1 Ampule  1 Ampule Topical Q12H    LORazepam (ATIVAN) injection 0.5 mg  0.5 mg IntraVENous Q6H PRN         Asuncion Keys MD

## 2019-07-19 NOTE — PERIOP NOTES
No swelling or hemorrhage observed at either groin sites following access removal. Patient transported back to CCU in stable condition with clean, dry dressings.  cw
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
## 2019-07-20 NOTE — PROGRESS NOTES
TRANSFER - IN REPORT:    Verbal report received from Kerrie(name) on Buck Jiang  being received from CCU(unit) for routine progression of care      Report consisted of patients Situation, Background, Assessment and   Recommendations(SBAR). Information from the following report(s) SBAR, Kardex, Procedure Summary and Cardiac Rhythm (NSR) was reviewed with the receiving nurse. Opportunity for questions and clarification was provided. Assessment completed upon patients arrival to unit and care assumed.

## 2019-07-20 NOTE — PROGRESS NOTES
0700 Bedside and Verbal shift change report given to Manuelito Choi (oncoming nurse) by Rosaura Wilson (offgoing nurse). Report included the following information SBAR, Kardex, Intake/Output, MAR, Recent Results and Cardiac Rhythm NSR.   0800 pt assessed per flowsheet. Pt oriented and appropriate this am.  0830 bath with linen change complete, pt maikol well with mod JAFFE without hypoxia noted during personal care. 0900 Dr Sherin Kingston called to check status of pt, updates provided, aware and in agreement with transfer to tele bed  0930 PT/OT consults complete, recommend PT upon D/C. inpt is he goes home over the weekend, possibly home health if he is able to progress prior to d/c. Pt aware and in agreement, stating that the last he had PT it really helped and he has noticed a big decline in his strength and balance over the past month or so. 0945 TRANSFER - OUT REPORT:    Verbal report given to Rabia(name) on Kasandra Denver  being transferred to [facility] room 2221(unit) for routine progression of care       Report consisted of patients Situation, Background, Assessment and   Recommendations(SBAR). Information from the following report(s) SBAR, Kardex, Procedure Summary, Intake/Output, MAR, Recent Results and Cardiac Rhythm NSR was reviewed with the receiving nurse. Lines:   Peripheral IV 07/17/19 Distal;Right Basilic (Active)   Site Assessment Clean, dry, & intact 7/19/2019  8:00 PM   Phlebitis Assessment 0 7/19/2019  8:00 PM   Infiltration Assessment 0 7/19/2019  8:00 PM   Dressing Status Clean, dry, & intact 7/19/2019  8:00 PM   Dressing Type Transparent 7/19/2019  8:00 PM   Hub Color/Line Status Pink 7/19/2019  8:00 PM   Action Taken Open ports on tubing capped 7/19/2019  8:00 PM   Alcohol Cap Used Yes 7/19/2019  8:00 PM        Opportunity for questions and clarification was provided.       Patient transported with:   Monitor  Registered Nurse

## 2019-07-20 NOTE — PROGRESS NOTES
Problem: Mobility Impaired (Adult and Pediatric)  Goal: *Acute Goals and Plan of Care (Insert Text)  Description  Physical Therapy Goals  Initiated 7/20/2019  1. Patient will move from supine to sit and sit to supine , scoot up and down and roll side to side in bed with independence within 7 day(s). 2.  Patient will transfer from bed to chair and chair to bed with modified independence using the least restrictive device within 7 day(s). 3.  Patient will perform sit to stand with modified independence within 7 day(s). 4.  Patient will ambulate with modified independence for 200 feet with the least restrictive device within 7 day(s). 5.  Patient will ascend/descend 3 stairs with 1 handrail(s) with modified independence within 7 day(s). Outcome: Not Met   PHYSICAL THERAPY EVALUATION  Patient: Nataliia Yu (26 y.o. male)  Date: 7/20/2019  Primary Diagnosis: Chest pain, unspecified type [R07.9]  Atherosclerosis of native coronary artery [I25.10]  Procedure(s) (LRB):  Left Femoral Artery  Impella Device Removal; Right Groin Cordis removal (Bilateral) 2 Days Post-Op   Precautions: falls       ASSESSMENT :  Based on the objective data described below, the patient presents with generalized weakness and impaired functional mobility, balance, endurance and activity tolerance with increasing SOB with minimal exertion. O2 sats remained stable throughout tx session but BP noted to be generally low. Patient requiring CGA for transfers and ambulation. Patient only able to ambulate 30 feet today limited secondary to increased dyspnea. At baseline patient reports modified independence with all mobility using cane for ambulation. He is very active at baseline and is still driving, however he reports declining functional status recently secondary to increasing SOB. Patient would benefit from further therapy following discharge. Depending on further progress with therapy, recommend SNF vs HHPT and OT.  Patient lives with son, but son has multiple health issues and is not likely able to assist patient at home. Patient will benefit from skilled intervention to address the above impairments. Patients rehabilitation potential is considered to be Good  Factors which may influence rehabilitation potential include:   ? None noted  ? Mental ability/status  ? Medical condition  ? Home/family situation and support systems  ? Safety awareness  ? Pain tolerance/management  ? Other:      PLAN :  Recommendations and Planned Interventions:  ?           Bed Mobility Training             ? Neuromuscular Re-Education  ? Transfer Training                   ? Orthotic/Prosthetic Training  ? Gait Training                         ? Modalities  ? Therapeutic Exercises           ? Edema Management/Control  ? Therapeutic Activities            ? Patient and Family Training/Education  ? Other (comment):    Frequency/Duration: Patient will be followed by physical therapy  5 times a week to address goals. Discharge Recommendations: To Be Determined by progress in therapy. At this time recommend SNF vs HHPT. Further Equipment Recommendations for Discharge: has appropriate equipment      SUBJECTIVE:   Patient stated I'm so short of breath I can't do much anymore.     OBJECTIVE DATA SUMMARY:   HISTORY:    Past Medical History:   Diagnosis Date    Arrhythmia     A-fib    GERD (gastroesophageal reflux disease)     Hypertension     Other ill-defined conditions(799.89)     BPH    Other ill-defined conditions(799.89)     lipids    Other ill-defined conditions(799.89)     shingles hx    Thyroid disease     Unspecified adverse effect of anesthesia      Past Surgical History:   Procedure Laterality Date    HX CATARACT REMOVAL      HX HEART CATHETERIZATION      ablation    HX HEENT      scar tissue removed/laser    HX HERNIA REPAIR  05/05/2017    Alex Womack MD    HX ORTHOPAEDIC      left foot fx--hardware    HX ORTHOPAEDIC  4/9/14    Right total knee arthroplasty    HX OTHER SURGICAL      prostate bx     Prior Level of Function/Home Situation: patient reports modified independence with all mobility and ADLs; active at baseline and using [facility] for ambulation. Patient reports slowly decline in mobility and endurance with increasing SOB recently      Home Situation  Home Environment: Private residence  # Steps to Enter: 4  Rails to Enter: Yes  Hand Rails : Left  One/Two Story Residence: One story(sunken living room)  Living Alone: No  Support Systems: Child(toño)  Patient Expects to be Discharged to[de-identified] Private residence  Current DME Used/Available at Home: Shower chair, Grab bars, Walker, rolling  Tub or Shower Type: Shower    EXAMINATION/PRESENTATION/DECISION MAKING:   Critical Behavior:  Neurologic State: Alert  Orientation Level: Oriented X4  Cognition: Appropriate decision making, Appropriate for age attention/concentration, Appropriate safety awareness, Follows commands  Safety/Judgement: Awareness of environment, Insight into deficits  Hearing:   Auditory  Auditory Impairment: Hard of hearing, bilateral    Range Of Motion:  AROM: Generally decreased, functional                       Strength:    Strength: Generally decreased, functional                    Tone & Sensation:                  Sensation: Intact               Coordination:  Coordination: Within functional limits  Vision:   Acuity: Within Defined Limits  Corrective Lenses: Reading glasses  Functional Mobility:  Bed Mobility:           Scooting: Supervision  Transfers:  Sit to Stand: Contact guard assistance  Stand to Sit: Contact guard assistance        Bed to Chair: Contact guard assistance(ambulating with cardiac cart)              Balance:   Sitting: Intact  Standing: Impaired  Standing - Static: Good(with support of cardiac cart, fair unsupported)  Standing - Dynamic : Fair(with support of cardiac cart. )  Ambulation/Gait Training:  Distance (ft): 30 Feet (ft)  Assistive Device: Gait belt(cardiac cart)  Ambulation - Level of Assistance: Contact guard assistance        Gait Abnormalities: Decreased step clearance;Trunk sway increased        Base of Support: Widened     Speed/Izabella: Pace decreased (<100 feet/min); Shuffled; Slow  Step Length: Left shortened;Right shortened      Gait is slow and unsteady demonstrating short shuffled steps and increased trunk sway; increasing SOB also noted but O2 sats remained stable       Functional Measure:  Barthel Index:    Bathin  Bladder: 10  Bowels: 10  Groomin  Dressin  Feeding: 10  Mobility: 0  Stairs: 0  Toilet Use: 5  Transfer (Bed to Chair and Back): 10  Total: 50/100       Percentage of impairment   0%   1-19%   20-39%   40-59%   60-79%   80-99%   100%   Barthel Score 0-100 100 99-80 79-60 59-40 20-39 1-19   0     The Barthel ADL Index: Guidelines  1. The index should be used as a record of what a patient does, not as a record of what a patient could do. 2. The main aim is to establish degree of independence from any help, physical or verbal, however minor and for whatever reason. 3. The need for supervision renders the patient not independent. 4. A patient's performance should be established using the best available evidence. Asking the patient, friends/relatives and nurses are the usual sources, but direct observation and common sense are also important. However direct testing is not needed. 5. Usually the patient's performance over the preceding 24-48 hours is important, but occasionally longer periods will be relevant. 6. Middle categories imply that the patient supplies over 50 per cent of the effort. 7. Use of aids to be independent is allowed. Marlene Nice., Barthel, D.W. ([id number]). Functional evaluation: the Barthel Index. [facility] [address] [facility] (14)2.   LUIS Atkinson, Goran Frazier., Debbie Fierro., Genny Ren. (1999). Measuring the change indisability after inpatient rehabilitation; comparison of the responsiveness of the Barthel Index and Functional Coles Measure. Journal of Neurology, Neurosurgery, and Psychiatry, 66(4), 198-963. LORENA Aguila, ALESSANDRO Madrigal, & Herber Ceja M.A. (2004.) Assessment of post-stroke quality of life in cost-effectiveness studies: The usefulness of the Barthel Index and the EuroQoL-5D. Quality of Life Research, 13, 285-37              Pain:  Pain Scale 1: Numeric (0 - 10)  Pain Intensity 1: 0              Activity Tolerance:   Patient Vitals for the past 8 hrs:   Temp Pulse Resp BP SpO2   07/20/19 1126 99.3 °F (37.4 °C) 72 18 118/47 98 %   07/20/19 1022 98.2 °F (36.8 °C) 79 16 132/59 94 %   07/20/19 0917    96/44    07/20/19 0916  95  96/44    07/20/19 0914    107/68    07/20/19 0912    (!) 112/36    07/20/19 0900  77 17 (!) 107/39 97 %   07/20/19 0800 98.6 °F (37 °C) 89 25 138/75 96 %   07/20/19 0700  77 16 124/51 95 %       Please refer to the flowsheet for vital signs taken during this treatment. After treatment:   ?         Patient left in no apparent distress sitting up in chair  ? Patient left in no apparent distress in bed  ? Call bell left within reach  ? Nursing notified  ? Caregiver present  ? Bed alarm activated    COMMUNICATION/EDUCATION:   The patients plan of care was discussed with: Occupational Therapist and Registered Nurse. ?         Fall prevention education was provided and the patient/caregiver indicated understanding. ? Patient/family have participated as able in goal setting and plan of care. ?         Patient/family agree to work toward stated goals and plan of care. ?         Patient understands intent and goals of therapy, but is neutral about his/her participation. ? Patient is unable to participate in goal setting and plan of care.     Thank you for this referral.  Guadalupe Tsai, PT   Time Calculation: 22 mins

## 2019-07-20 NOTE — PROGRESS NOTES
[facility]  Pulmonary, Critical Care, and Sleep Medicine    Name: Buck Jiang MRN: [id number]   : 3/13/1931 Hospital: [facility]   Date: 2019        IMPRESSION:           IMPRESSION:     1. CAD severe LM disease  2. S/p PCI LM with ESPERANZA x 2  3. Impella  - removed  4. Hematuria- resolved  5. anemia  6. Paroxysmal Atrial Fibrillation s/p ablation  7. delerium improved  8. Hx GERD, htn hypothyroid copd  9.        RECOMMENDATIONS/PLAN: 1.     2. Hemodynamic monitoring  3. Bronchial hygiene therapy  4. Bronchodilators prn  5. PT, OT when able  6. Pt needs IV fluids with additives and Drug therapy requiring intensive monitoring for toxicity  7. DVT, SUP prophylaxiss  8. Glucose monitoring and insulin management for tight glycemic control  9. Prescription drug management with home med reconciliation done             [x] High complexity decision making was performed  [x] See my orders for details     Social History            Tobacco Use    Smoking status: Former Smoker       Packs/day: 0.50       Last attempt to quit: 3/28/1963       Years since quittin.3    Smokeless tobacco: Never Used   Substance Use Topics    Alcohol use:  Yes       Comment: rare/social-beer            Family History   Problem Relation Age of Onset    Heart Disease Mother      Hypertension Mother      Stroke Mother      Heart Disease Father      Cancer Paternal Grandmother           No Known Allergies      MAR reviewed and pertinent medications noted or modified as needed      Current Facility-Administered Medications   Medication    dexmedeTOMidine (PRECEDEX) 400 mcg in 0.9% sodium chloride 100 mL infusion    0.9% sodium chloride infusion 250 mL    0.9% sodium chloride infusion 250 mL    dilTIAZem CD (CARDIZEM CD) capsule 120 mg    heparin (porcine) 2,000 Units in 0.9% sodium chloride 500 mL Irrigation    bacitracin 50,000 Units in sodium chloride irrigation 0.9 % 1,000 mL Irrigation    morphine injection 2 mg    oxyCODONE-acetaminophen (PERCOCET) 5-325 mg per tablet 1 Tab    morphine injection 4 mg    oxyCODONE-acetaminophen (PERCOCET) 5-325 mg per tablet 2 Tab    fentaNYL citrate (PF) injection 25 mcg    midazolam (VERSED) injection 1 mg    ondansetron (ZOFRAN) injection 4 mg    haloperidol lactate (HALDOL) injection 2 mg    aspirin chewable tablet 81 mg    finasteride (PROSCAR) tablet 5 mg    levothyroxine (SYNTHROID) tablet 137 mcg    atorvastatin (LIPITOR) tablet 40 mg    tamsulosin (FLOMAX) capsule 0.8 mg    ticagrelor (BRILINTA) tablet 90 mg    sodium chloride (NS) flush 5-40 mL    sodium chloride (NS) flush 5-40 mL    acetaminophen (TYLENOL) tablet 650 mg    docusate sodium (COLACE) capsule 100 mg    0.9% sodium chloride infusion    pantoprazole (PROTONIX) tablet 40 mg    alcohol 62% (NOZIN) nasal  1 Ampule    LORazepam (ATIVAN) injection 0.5 mg         Vital Signs: Intake/Output: Intake/Output:   Visit Vitals  /55   Pulse 84   Temp 98.4 °F (36.9 °C)   Resp 19   Ht 5' 9\" (1.753 m)   Wt 90.7 kg (200 lb)   SpO2 96%   BMI 29.53 kg/m²    Temp (24hrs), Av.5 °F (36.9 °C), Min:97.5 °F (36.4 °C), Max:100.1 °F (37.8 °C)         Telemetry:normal sinus rhythm;  O2 Device: Nasal cannula  O2 Flow Rate (L/min): 2 l/min         Wt Readings from Last 4 Encounters:   19 90.7 kg (200 lb)   19 90.4 kg (199 lb 3.2 oz)   19 91.4 kg (201 lb 6.4 oz)   19 92.4 kg (203 lb 12.8 oz)              Intake/Output Summary (Last 24 hours) at 2019 0753  Last data filed at 2019 0700      Gross per 24 hour   Intake 3929.15 ml   Output 3359 ml   Net 570.15 ml         Last shift:      No intake/output data recorded.     Last 3 shifts:  1901 -  0700  In: 5925.4 [I.V.:5461.6]  Out: 4127 [[facility]:2956]      Physical Exam:                  General: restrained elderly  male                  HEAD: Normocephalic, without obvious abnormality, atraumatic; bearded                 EYES: conjunctivae clear. PERRL,  AN Icteric sclerae                 NOSE: nares normal, no drainage, no flaring,                  THROAT: mucous membranes dry; Lips, mucosa dry; No Thrush;  crowded airway                 Neck: Supple, symmetrical, trachea midline,  No accessory mm use; No Stridor/ cuff leak, No goiter or thyroid tenderness                 LYMPH: No abnormally enlarged lymph nodes. in neck or groin                 Chest: increased AP diameter; mediastinal tubes in place; pacemaker wires                 Lungs: normal air entry                 Heart: Regular rate and rhythm ; ; NO edema                 Abdomen: soft, nondistended, without guarding, without rebound                 :   Greenberg,   gross hematuria                 Extremity: negative, clubbing; Impella oout                 Neuro:  withdraws to pain; unable to check gait and station; does not follow simple commands                 Psych: sedatred; Unable to assess; No agitation; normal affect                 Skin: Pallor; ;                  Pulses:Bilateral, DP, 2+                 Capillary refill: abnormal: ; well perfused, pale,           DATA:     Labs:              Recent Labs     07/19/19  0506 07/19/19  0023 07/18/19  1235 07/18/19  0522 07/18/19  0417 07/17/19  1519   WBC  --   --   --   --  12.7* 8.5   HGB 8.2* 9.0* 9.6*  --  8.5* 11.1*   PLT  --   --   --   --  163 188   INR  --   --   --  1.1  --  1.2*   APTT  --   --   --  >130.0*  --   --            Recent Labs     07/18/19  0417 07/17/19  1519    144   K 4.2 4.2   * 115*   CO2 21 21   * 122*   BUN 35* 37*   CREA 1.22 1.31*   CA 8.6 8.0*   ALB 2.6*  --    SGOT 16  --    ALT 13  --       No results for input(s): PH, PCO2, PO2, HCO3, FIO2 in the last 72 hours.   No results for input(s): CPK, CKNDX, TROIQ in the last 72 hours.     No lab exists for component: CPKMB  No results found for: BNPP, BNP          Lab Results   Component Value Date/Time     Culture result: MRSA NOT PRESENT 05/05/2017 07:18 AM     Culture result:   05/05/2017 07:18 AM           Screening of patient nares for MRSA is for surveillance purposes and, if positive, to facilitate isolation considerations in high risk settings. It is not intended for automatic decolonization interventions per se as regimens are not sufficiently effective to warrant routine use.     Culture result:   03/28/2014 10:50 AM       MRSA NOT PRESENT      Screening of patient nares for MRSA is for surveillance purposes and, if positive, to facilitate isolation considerations in high risk settings. It is not intended for automatic decolonization interventions per se as regimens are not sufficiently effective to warrant routine use.            Lab Results   Component Value Date/Time     TSH 0.233 (L) 09/12/2018 01:45 PM         Imaging:              CXR Results  (Last 48 hours)                 07/18/19 0543   XR CHEST PORT Final result     Impression:   IMPRESSION: Diane Delmy and [place] placements. No pneumothorax or overt pulmonary   edema.        Narrative:   EXAM: Portable CXR. 0518 hours. INDICATION: Heart failure. impella/pre op surgery       COMPARISON: 7/11/2019. FINDINGS:   Aneta Evener tip has been placed with tip coiled in the main pulmonary artery. Diane Delmy   has been placed. There is no pneumothorax. Heart size is top normal. There is no   overt pulmonary edema. There is no pulmonary infiltrate or apparent pleural   effusion.                      Results from Hospital Encounter encounter on 07/17/19   XR CHEST PORT     Narrative EXAM: Portable CXR. 0518 hours.       INDICATION: Heart failure. impella/pre op surgery     COMPARISON: 7/11/2019.     FINDINGS:  [place] tip has been placed with tip coiled in the main pulmonary artery. Diane Delmy  has been placed. There is no pneumothorax. Heart size is top normal. There is no  overt pulmonary edema.  There is no pulmonary infiltrate or apparent pleural  effusion.        Impression IMPRESSION: IMPELLA and [place] placements. No pneumothorax or overt pulmonary  edema.           Results from Hospital Encounter encounter on 07/17/19   XR CHEST PORT     Narrative EXAM: Portable CXR. 0518 hours.       INDICATION: Heart failure. impella/pre op surgery     COMPARISON: 7/11/2019.     FINDINGS:  [place] tip has been placed with tip coiled in the main pulmonary artery. Diane Delmy  has been placed. There is no pneumothorax. Heart size is top normal. There is no  overt pulmonary edema. There is no pulmonary infiltrate or apparent pleural  effusion.        Impression IMPRESSION: IMPELLA and [place] placements. No pneumothorax or overt pulmonary  edema. Results from Hospital Encounter encounter on 07/11/19   XR CHEST PA LAT     Narrative EXAM: XR CHEST PA LAT     INDICATION: preop     COMPARISON: Chest x-ray 3/28/2014.     FINDINGS: PA and lateral radiographs of the chest demonstrate hyperinflated and  hyperlucent lungs. No focal infiltrate, nodule, or mass. The cardiac and  mediastinal contours and pulmonary vascularity are normal. Atherosclerotic  calcifications affect the aortic arch and the thoracic aorta is tortuous. The  chest wall structures and visualized upper abdomen show no acute findings with  incidental note of degenerative spine and shoulder changes as well as diffuse  osteopenia.         Impression IMPRESSION: No acute findings. COPD and additional chronic changes as above. Results from Appointment encounter on 04/22/19   XR SPINE LUMB 2 OR 3 V     Narrative EXAM: XR SPINE LUMB 2 OR 3 V     INDICATION: Acute bilateral lower back pain with bilateral sciatica     COMPARISON: None.     FINDINGS: AP, lateral and spot lateral views of the lumbar spine.      There is a dextroconvex rotary lumbar curvature. The vertebral body heights are  well-preserved. There is 8 mm anterolisthesis of L4 relative to L5.  Spondylitic  changes are present in the upper lumbar spine. Spondylitic change with disc  space narrowing noted at L3-4 and L4-5. Facet arthropathy is a dominant feature  at the lower 3 levels. Vascular calcification is noted of the aortoiliac system. Mild constipation is present.        Impression IMPRESSION:       Grade 1 anterolisthesis of L4 on a degenerative basis. Probable central canal stenosis at L4-5 and L5-S1 on a multifactorial basis. Lower lumbar facet arthropathy   Mild scoliosis  Mild constipation                       Results from [place] encounter on 04/03/18   CT HEAD WO CONT     Narrative EXAMINATION:  CT HEAD WO CONT     CLINICAL INFORMATION:  Head injury mild or moderate acute, no neurological  deficit  COMPARISON:  None. TECHNIQUE: Routine axial head CT was performed. IV contrast was not  administered. Sagittal and coronal reconstructions were generated. CT dose reduction was achieved through use of a standardized protocol tailored  for this examination and automatic exposure control for dose modulation.     FINDINGS:  EXTRA-AXIAL SPACES:  Normal   INTRACRANIAL HEMORRHAGE:  None. VENTRICULAR SYSTEM:  Normal for age. BASAL CISTERNS:  Normal.  CEREBRAL PARENCHYMA:  Mild white matter hypodensity likely due to age and  intracranial small vessel disease. Probable prominent perivascular space in the  left inferior basal ganglia region. MIDLINE SHIFT:  None. CEREBELLUM:  Normal.  BRAINSTEM: Normal.  CALVARIUM: Normal.  PARANASAL SINUSES AND MASTOID AIR CELLS: Clear. VISUALIZED ORBITS: Normal.  SELLA: Normal.  SKULL BASE: Normal.        Impression IMPRESSION:  No acute findings.  Age-related changes.            This care involved high complexity medical decision making: I personally:  · Reviewed the flowsheet and previous days notes  · Reviewed and summarized records or history from previous days note or discussions with staff, family  · Parenteral controlled substances - Reviewed/ Adjusted / Myrna Forts / Started  · High Risk Drug therapy requiring intensive monitoring for toxicity: eg steroids, pressors, antibiotics  · Reviewed and/or ordered Clinical lab tests  · Reviewed and/or ordered Radiology tests  · Reviewed and/or ordered of Medicine tests  · Independently visualized radiologic Images  · Reviewed the patients ECG / Telemetry  ·       My assessment/management discussed with: Cardiology, Nursing, Respiratory Therapy for coordination of care     Pt's condition is acute and unstable requiring inpatient hospitalization. This care involved high complexity decision making in order to assess, support vital system function, and to treat this degree of vital organ system failure, and to prevent further deterioration of the patients condition.      Stephan Cantu MD        ·       PLAN:   ·      Subjective/Interval History:   I have reviewed the flowsheet and previous days notes. ROS:no cp or bleeding    Objective:   Vital Signs:    Visit Vitals  /41   Pulse 73   Temp 98.4 °F (36.9 °C)   Resp 10   Ht 5' 9\" (1.753 m)   Wt 90.7 kg (200 lb)   SpO2 93%   BMI 29.53 kg/m²       O2 Device: Room air   O2 Flow Rate (L/min): 1 l/min   Temp (24hrs), Av.4 °F (36.9 °C), Min:98.2 °F (36.8 °C), Max:98.5 °F (36.9 °C)       Intake/Output:   Last shift:      No intake/output data recorded.   Last 3 shifts:  1901 -  0700  In: 4254.7 [P.O.:1060; I.V.:2534.7]  Out: 2804 [Urine:2504]    Intake/Output Summary (Last 24 hours) at 2019 0824  Last data filed at 2019 8286  Gross per 24 hour   Intake 2093.75 ml   Output 1415 ml   Net 678.75 ml        Physical Exam:    General: []Ill appearing [] []    []No acute distress [] []      HEENT: []Icteric []PERRL []    []Anicteric Mucosa:[]moist   []dry []      Resp: []Wheeze []Clear to ascultation bilaterally []Accessory muscle use    []Rales []Chest tube:  []Air leak []    []Ronchi []Crepitus []   []      CV: []Regular []Tachycardia []    []Irregular []Bradycardic []    []Murmur []S3 [] []Edema []S4 []      GI: []Soft  []NG Tube Bowel sounds: []present []absent    []Firm []PEG []    []Distended  []      : []Greenberg []Hematuria []    []Clear urine []Edema []      MSK:    []SCDs []Edema []    []No deformities [] []      Skin: []Warm []Dry  []    []Cool []Moist []    []Hot  []Diaphoretic []    []Rash  []Cyanosis []      N-psych: []Sedated  []Oriented []Agitated    []Alert  Follows commands:   []Yes  []No []      Devices: []PICC []Trach []Chest tube: Air leak? []Y/[]N    []Central Line []PA Catheter []          DATA:  Labs:  Recent Labs     07/19/19  0506 07/19/19  0023 07/18/19  1235 07/18/19  0417 07/17/19  1519   WBC  --   --   --  12.7* 8.5   HGB 8.2* 9.0* 9.6* 8.5* 11.1*   HCT 24.9* 28.1* 28.7* 26.0* 34.3*   PLT  --   --   --  163 188     Recent Labs     07/18/19  0522 07/18/19  0417 07/17/19  1519   NA  --  143 144   K  --  4.2 4.2   CL  --  116* 115*   CO2  --  21 21   GLU  --  121* 122*   BUN  --  35* 37*   CREA  --  1.22 1.31*   CA  --  8.6 8.0*   ALB  --  2.6*  --    TBILI  --  0.5  --    SGOT  --  16  --    ALT  --  13  --    INR 1.1  --  1.2*     No results for input(s): PH, PCO2, PO2, HCO3, FIO2 in the last 72 hours.     Imaging:  []I have personally reviewed the patients radiographs  []Radiographs reviewed with radiologist   []No change from prior, tubes and lines in adequate position  []Improved   []Worsening    Vic Ingram MD

## 2019-07-20 NOTE — PROGRESS NOTES
Progress Note      7/20/2019   NAME: Malia Rivera   MRN:  [id number]   Admit Diagnosis: Chest pain, unspecified type [R07.9]  Atherosclerosis of native coronary artery [I25.10]     Problem List:     - Progressive angina, cath 7/2019 with impella supported PCI of LM into LAD with balloon side hole to LCx  - Normal EF, mild AS  - Parox afib, currently in sinus  - Hx of aflutter ablation  - HTN  - Dyslipidemia  - Hypothyroid  - GERD  - BPH  - Distant tobacco    , lives alone, ADLs, occasionally using cane  Son:  Cary March the III. [phone]        Assessment/Plan:     Prior to 7/18:  - Impella placement  - PCI of LM  - Currently euvolemic  - Cr stable  - Post operative anemia due to expected blood loss. Hgb decreasing, on anticoagulation, for surgery, Tx 1U 7/18 with lasix 20mg. Tx 1U 7/19 with lasix 20mg. - hematuria, improving as anticoagulation decreasing but still pink  - Stopped xarelto, currently in sinus, given age and hematuria will not tolerate triple anticoagulation  - Cont ASA  - Changed added brilinta to plavix for ease of use  - Hold diltiazem, intolerant of beta blocker in past  - Hold ARB, resume on discharge  - Resume home lasix today, decrease fluids as tolerates po.  - Cont statin  - Cont proscar and flomax      7/18:  Impella removed. At night severe delirium now on precedex. 7/19:  Cath sites look good. Hematuria improving but still pink. Consult urology, goal would be to d/c maradiaga later today. Tx 1U PRBC. If able to orient, tolerate PO, dc maradiaga, ambulate, goal would be get home as soon as possible. 7/20:  Continue current plan of dual antiplatelet therapy, following Hgb, urology consult appreciated. Per Dr. Lela Mclaughlin:  Joanne Mayo will order catheter irrigations and catheter removal once he is out of bed and moving, otherwise he is at a high risk for urinary retention.   Regarding the anticoagulants, minimal anticoagulation is absolutely necessary at this time, then, progressing from there, but that is Cardiology's decision. \"  Better mentation, Dionicio is out, hasn't ambulated yet, family concerned about taking him home this weekend. Still some work to be done, and need to make sure he holds his Hgb. [x]       High complexity decision making was performed in this patient at high risk for decompensation with multiple organ involvement. Subjective:     Kasandra Denver denies chest pain, dyspnea, less confused. Discussed with RN events overnight. Review of Systems:    Symptom Y/N Comments  Symptom Y/N Comments   Fever/Chills N   Chest Pain N    Poor Appetite N   Edema N    Cough N   Abdominal Pain N    Sputum N   Joint Pain N    SOB/JAFFE N   Pruritis/Rash N    Nausea/vomit N   Tolerating PT/OT     Diarrhea N   Tolerating Diet Y    Constipation N   Other       Could NOT obtain due to:      Objective:      Physical Exam:    Last 24hrs VS reviewed since prior progress note. Most recent are:    Visit Vitals  /52 (BP 1 Location: Right arm, BP Patient Position: At rest)   Pulse 75   Temp 98.2 °F (36.8 °C)   Resp 18   Ht 5' 9\" (1.753 m)   Wt 90.7 kg (200 lb)   SpO2 97%   BMI 29.53 kg/m²       Intake/Output Summary (Last 24 hours) at 7/20/2019 1444  Last data filed at 7/20/2019 1246  Gross per 24 hour   Intake 1670 ml   Output 810 ml   Net 860 ml        General Appearance: Well developed, well nourished, alert & oriented x 3 some confusion, no acute distress. Ears/Nose/Mouth/Throat: Hearing grossly normal.  Neck: Supple. Chest: Lungs clear to auscultation bilaterally. Cardiovascular: Regular rate and rhythm, S1S2 normal, no murmur. Abdomen: Soft, non-tender, bowel sounds are active. Extremities: No edema bilaterally. Skin: Warm and dry.     PMH/SH reviewed - no change compared to H&P    Data Review    Telemetry: normal sinus rhythm     Lab Data Personally Reviewed:    Recent Labs     07/19/19  0506 07/19/19  0023  07/18/19  0417 07/17/19  1519   WBC  --   --   -- 12.7* 8.5   HGB 8.2* 9.0*   < > 8.5* 11.1*   HCT 24.9* 28.1*   < > 26.0* 34.3*   PLT  --   --   --  163 188    < > = values in this interval not displayed. Recent Labs     07/18/19  0522 07/17/19  1519   INR 1.1 1.2*   PTP 11.4* 12.4*   APTT >130.0*  --       Recent Labs     07/18/19  0417 07/17/19  1519    144   K 4.2 4.2   * 115*   CO2 21 21   BUN 35* 37*   CREA 1.22 1.31*   * 122*   CA 8.6 8.0*     No results for input(s): CPK, CKNDX, TROIQ in the last 72 hours. No lab exists for component: CPKMB  Lab Results   Component Value Date/Time    Cholesterol, total 117 06/12/2019 02:00 PM    HDL Cholesterol 35 06/12/2019 02:00 PM    LDL, calculated 65 06/12/2019 02:00 PM    Triglyceride 86 06/12/2019 02:00 PM    CHOL/HDL Ratio 3 06/12/2019 02:00 PM       Recent Labs     07/18/19  0417   SGOT 16   AP 65   TP 5.1*   ALB 2.6*   GLOB 2.5     No results for input(s): PH, PCO2, PO2 in the last 72 hours.     Medications Personally Reviewed:    Current Facility-Administered Medications   Medication Dose Route Frequency    clopidogrel (PLAVIX) tablet 75 mg  75 mg Oral DAILY    furosemide (LASIX) tablet 40 mg  40 mg Oral DAILY    oxyCODONE-acetaminophen (PERCOCET) 5-325 mg per tablet 1 Tab  1 Tab Oral Q4H PRN    0.9% sodium chloride infusion 250 mL  250 mL IntraVENous PRN    dilTIAZem CD (CARDIZEM CD) capsule 120 mg  120 mg Oral DAILY    bacitracin 50,000 Units in sodium chloride irrigation 0.9 % 1,000 mL Irrigation    PRN    ondansetron (ZOFRAN) injection 4 mg  4 mg IntraVENous Q6H PRN    haloperidol lactate (HALDOL) injection 2 mg  2 mg IntraVENous Q2H PRN    aspirin chewable tablet 81 mg  81 mg Oral DAILY    finasteride (PROSCAR) tablet 5 mg  5 mg Oral PCD    levothyroxine (SYNTHROID) tablet 137 mcg  137 mcg Oral ACB    atorvastatin (LIPITOR) tablet 40 mg  40 mg Oral DAILY    tamsulosin (FLOMAX) capsule 0.8 mg  0.8 mg Oral DAILY    sodium chloride (NS) flush 5-40 mL  5-40 mL IntraVENous Q8H    sodium chloride (NS) flush 5-40 mL  5-40 mL IntraVENous PRN    acetaminophen (TYLENOL) tablet 650 mg  650 mg Oral Q4H PRN    docusate sodium (COLACE) capsule 100 mg  100 mg Oral BID    pantoprazole (PROTONIX) tablet 40 mg  40 mg Oral ACB    LORazepam (ATIVAN) injection 0.5 mg  0.5 mg IntraVENous Q6H PRN         Alec Hardy MD

## 2019-07-20 NOTE — PROGRESS NOTES
Problem: Self Care Deficits Care Plan (Adult)  Goal: *Acute Goals and Plan of Care (Insert Text)  Description  Occupational Therapy Goals  Initiated 7/20/2019  1. Patient will perform grooming standing at sink for 5 minutes with independence within 7 day(s). 2.  Patient will perform upper body dressing with independence within 7 day(s). 3.  Patient will perform lower body dressing with independence within 7 day(s). 4.  Patient will perform toilet transfers with modified independence within 7 day(s). 5.  Patient will perform all aspects of toileting with independence within 7 day(s). 6.  Patient will perform ADL setup with independence within 7 day(s). Outcome: Progressing Towards Goal      OCCUPATIONAL THERAPY EVALUATION  Patient: Tyrell Monroy (63 y.o. male)  Date: 7/20/2019  Primary Diagnosis: Chest pain, unspecified type [R07.9]  Atherosclerosis of native coronary artery [I25.10]  Procedure(s) (LRB):  Left Femoral Artery  Impella Device Removal; Right Groin Cordis removal (Bilateral) 2 Days Post-Op   Precautions: falls       ASSESSMENT :  Based on the objective data described below, the patient presents with GW, poor activity tolerance and decreased balance which is impairing his functional independence. Patient's BP was hypotensive t/o session, initially demonstrating improvement s/p standing and stepping in place, but declining again s/p ambulation out into gutierrez with PT. He is functioning below his independent to mod I baseline, now performing ADLs at an independent to min A level and is supervision to CGA for functional mobility. Patient will benefit from skilled intervention to address the above impairments and he may need SNF rehab at discharge. Patients rehabilitation potential is considered to be Good  Factors which may influence rehabilitation potential include:   ? None noted  ? Mental ability/status  ? Medical condition  ?              Home/family situation and support systems  ? Safety awareness  ? Pain tolerance/management  ? Other:      PLAN :  Recommendations and Planned Interventions:  ?               Self Care Training                  ? Therapeutic Activities  ? Functional Mobility Training    ? Cognitive Retraining  ? Therapeutic Exercises           ? Endurance Activities  ? Balance Training                   ? Neuromuscular Re-Education  ? Visual/Perceptual Training     ? Home Safety Training  ? Patient Education                 ? Family Training/Education  ? Other (comment):    Frequency/Duration: Patient will be followed by occupational therapy 5 times a week to address goals. Discharge Recommendations: [facility] [address] and PT pending progress. Further Equipment Recommendations for Discharge: none      OBJECTIVE DATA SUMMARY:     Past Medical History:   Diagnosis Date    Arrhythmia     A-fib    GERD (gastroesophageal reflux disease)     Hypertension     Other ill-defined conditions(799.89)     BPH    Other ill-defined conditions(799.89)     lipids    Other ill-defined conditions(799.89)     shingles hx    Thyroid disease     Unspecified adverse effect of anesthesia      Past Surgical History:   Procedure Laterality Date    HX CATARACT REMOVAL      HX HEART CATHETERIZATION      ablation    HX HEENT      scar tissue removed/laser    HX HERNIA REPAIR  05/05/2017    Maye Womack MD    HX ORTHOPAEDIC      left foot fx--hardware    HX ORTHOPAEDIC  4/9/14    Right total knee arthroplasty    HX OTHER SURGICAL      prostate bx     Prior Level of Function/Home Situation: Patient reports ambulating with a cane mod I, and that he performs ADLs and IADLs at an independent to mod I level. He is still driving.  He has had a recent decline is his activity tolerance leading up to this admission  Expanded or extensive additional review of patient history:     Home Situation  Home Environment: Private residence  # Steps to Enter: 4  Rails to Enter: Yes  Hand Rails : Left  One/Two Story Residence: One story(sunken living room)  Living Alone: No  Support Systems: Child(toño)  Patient Expects to be Discharged to[de-identified] Private residence  Current DME Used/Available at Home: Shower chair, Grab bars, Walker, rolling  Tub or Shower Type: Shower  ? Right hand dominant   ? Left hand dominant  Cognitive/Behavioral Status:  Neurologic State: Alert  Orientation Level: Oriented X4  Cognition: Appropriate decision making; Appropriate for age attention/concentration; Appropriate safety awareness; Follows commands        Safety/Judgement: Awareness of environment; Insight into deficits    Vision/Perceptual:    Acuity: Within Defined Limits    Corrective Lenses: Reading glasses  Range of Motion:  AROM: Generally decreased, functional     Strength:  Strength: Generally decreased, functional  Coordination:  Coordination: Within functional limits      Balance:  Sitting: Intact  Standing: Impaired  Standing - Static: Good(with support of cardiac cart, fair unsupported)  Standing - Dynamic : Fair(with support of cardiac cart. )  Functional Mobility and Transfers for ADLs:  Bed Mobility:   Presented up in chair        Scooting: Supervision  Transfers:  Sit to Stand: Contact guard assistance     Bed to Chair: Contact guard assistance(ambulating with cardiac cart)     ADL Assessment:  Feeding: Independent    Oral Facial Hygiene/Grooming: Contact guard assistance    Bathing: Minimum assistance    Upper Body Dressing: Minimum assistance    Lower Body Dressing: Minimum assistance    Toileting: Minimum assistance                Functional Measure:  Barthel Index:    Bathin  Bladder: 10  Bowels: 10  Groomin  Dressin  Feeding: 10  Mobility: 0  Stairs: 0  Toilet Use: 5  Transfer (Bed to Chair and Back): 10  Total: 50       Barthel and G-code impairment scale:  Percentage of impairment CH  0% CI  1-19% CJ  20-39% CK  40-59% CL  60-79% CM  80-99% CN  100%   Barthel Score 0-100 100 99-80 79-60 59-40 20-39 1-19   0   Barthel Score 0-20 20 17-19 13-16 9-12 5-8 1-4 0      The Barthel ADL Index: Guidelines  1. The index should be used as a record of what a patient does, not as a record of what a patient could do. 2. The main aim is to establish degree of independence from any help, physical or verbal, however minor and for whatever reason. 3. The need for supervision renders the patient not independent. 4. A patient's performance should be established using the best available evidence. Asking the patient, friends/relatives and nurses are the usual sources, but direct observation and common sense are also important. However direct testing is not needed. 5. Usually the patient's performance over the preceding 24-48 hours is important, but occasionally longer periods will be relevant. 6. Middle categories imply that the patient supplies over 50 per cent of the effort. 7. Use of aids to be independent is allowed. Renay Farrell., Barthel, D.W. (3095). Functional evaluation: the Barthel Index. [address] [facility] (14)2. Heath Whaley ping LUIS Greenwood, Matthias Gasca., Janet Delgadillo.[facility], [address] [place] [address] (1999). Measuring the change indisability after inpatient rehabilitation; comparison of the responsiveness of the Barthel Index and Functional [place] Measure. Journal of Neurology, Neurosurgery, and Psychiatry, 66(4), 110-847. Carlene Trejo, N.J.A, ALESSANDRO Madrigal, & Sandra Skaggs, M.A. (2004.) Assessment of post-stroke quality of life in cost-effectiveness studies: The usefulness of the Barthel Index and the EuroQoL-5D. Quality of Life Research, 13, 589-75       ADL Intervention and task modifications:  Initiated dressing ADL, transfer and safety training.          Pain:  Pain Scale 1: Numeric (0 - 10)  Pain Intensity 1: 0 Activity Tolerance:   Generally hypotensive t/o evaluation. SOB, but SpO2 and HR were stable  Overall poor activity tolerance. After treatment:   ? Patient left in no apparent distress sitting up in chair  ? Patient left in no apparent distress in bed  ? Call bell left within reach  ? Nursing notified  ? Caregiver present  ? Bed alarm activated    COMMUNICATION/EDUCATION:   The patients plan of care was discussed with: Physical Therapist and Registered Nurse.  ? Home safety education was provided and the patient/caregiver indicated understanding. ? Patient/family have participated as able in goal setting and plan of care. ? Patient/family agree to work toward stated goals and plan of care. ? Patient understands intent and goals of therapy, but is neutral about his/her participation. ? Patient is unable to participate in goal setting and plan of care. This patients plan of care is appropriate for delegation to [facility] [place].     Thank you for this referral.  Vinod Hoffman, OTR/L  Time Calculation: 23 mins

## 2019-07-20 NOTE — PROGRESS NOTES
Bedside and Verbal shift change report GIVEN TO Davian Maravilla RN. Report included the following information SBAR, Kardex, ED Summary, Procedure Summary, Intake/Output, MAR, Recent Results and Cardiac Rhythm (NSR). [facility] NURSING NOTE   Admission Date 7/17/2019   Admission Diagnosis Chest pain, unspecified type [R07.9]  Atherosclerosis of native coronary artery [I25.10]   Consults IP CONSULT TO VASCULAR SURGERY  IP CONSULT TO UROLOGY  IP CONSULT TO UROLOGY      Cardiac Monitoring [x] Yes [] No      Purposeful Hourly Rounding [x] Yes    Lupillo Score Total Score: 4   Lupillo score 3 or > [x] Bed Alarm [] Avasys [] 1:1 sitter [] Patient refused (Signed refusal form in chart)   Berry Score Berry Score: 19   Berry score 14 or < [] PMT consult [] Wound Care consult    []  Specialty bed  [] Nutrition consult      Influenza Vaccine Received Flu Vaccine for Current Season (usually Sept-March): Not Flu Season           Oxygen needs? [x] Room air Oxygen @  []1L    []2L    []3L   []4L    []5L   []6L via  NC   Chronic home O2 use?  [] Yes [x] No  Perform O2 challenge test and document in progress note using smartphrase (.Homeoxygen)      Last bowel movement        Urinary Catheter [REMOVED] Urinary Catheter 07/17/19 Greenberg - Temperature-Indications for Use: Accurate measurement of urinary output     [REMOVED] Urinary Catheter 07/17/19 Greenberg - Temperature-Urine Output (mL): 175 ml     LDAs               Peripheral IV 07/17/19 Distal;Right Basilic (Active)   Site Assessment Clean, dry, & intact 7/20/2019  2:56 PM   Phlebitis Assessment 0 7/20/2019  2:56 PM   Infiltration Assessment 0 7/20/2019  2:56 PM   Dressing Status Clean, dry, & intact 7/20/2019  2:56 PM   Dressing Type Transparent 7/20/2019  2:56 PM   Hub Color/Line Status Pink;Flushed 7/20/2019  2:56 PM   Action Taken Open ports on tubing capped 7/20/2019  8:00 AM   Alcohol Cap Used Yes 7/20/2019  8:00 AM                         Readmission Risk Assessment Tool Score High Risk            24       Total Score        3 Has Seen PCP in Last 6 Months (Yes=3, No=0)    5 Pt. Coverage (Medicare=5 , Medicaid, or Self-Pay=4)    16 Charlson Comorbidity Score (Age + Comorbid Conditions)        Criteria that do not apply:    . Living with Significant Other. Assisted Living. LTAC. SNF.  or   Rehab    Patient Length of Stay (>5 days = 3)    IP Visits Last 12 Months (1-3=4, 4=9, >4=11)       Expected Length of Stay 5d 4h   Actual Length of Stay 3

## 2019-07-21 NOTE — PROGRESS NOTES
Received report from Escobar Lancaster RN. Assumed care of patient. 0545 Pt's bed alarm going off. Pt walking in room, unsteady, confused. Pt unsure of where he is, or who he is. Reoriented patient to name, place, time. Pt's gown off, urinated on floor, telemetry box on floor. Pt refused to have telemetry put back on. Pt assisted back to bed, grudgingly, but states \"leave me alone, and don't put that monitor on me\".

## 2019-07-21 NOTE — PROGRESS NOTES
Problem: Falls - Risk of  Goal: *Absence of Falls  Description  Document Marisabel Payment Fall Risk and appropriate interventions in the flowsheet. Outcome: Progressing Towards Goal  Note:   Fall Risk Interventions:  Mobility Interventions: Bed/chair exit alarm    Mentation Interventions: Bed/chair exit alarm    Medication Interventions: Bed/chair exit alarm    Elimination Interventions: Call light in reach, Bed/chair exit alarm, Elevated toilet seat, Patient to call for help with toileting needs, Urinal in reach    History of Falls Interventions: Bed/chair exit alarm         Problem: Pressure Injury - Risk of  Goal: *Prevention of pressure injury  Description  Document Berry Scale and appropriate interventions in the flowsheet. Outcome: Progressing Towards Goal  Note:   Pressure Injury Interventions:  Sensory Interventions: Assess changes in LOC    Moisture Interventions: Absorbent underpads    Activity Interventions: Increase time out of bed, Pressure redistribution bed/mattress(bed type), PT/OT evaluation    Mobility Interventions: Pressure redistribution bed/mattress (bed type), PT/OT evaluation, Turn and reposition approx.  every two hours(pillow and wedges)    Nutrition Interventions: Document food/fluid/supplement intake, Discuss nutritional consult with provider    Friction and Shear Interventions: Lift sheet, Lift team/patient mobility team, Minimize layers

## 2019-07-21 NOTE — PROGRESS NOTES
Bedside shift change report GIVEN TO Rafaela Urbina RN. Report included the following information SBAR and Kardex. SIGNIFICANT CHANGES DURING SHIFT:    0600- pt complaing of SOB, spo2 96, RR 18 unlabored and clear sounding, pt denies any chest pain or discomfort, pt placed on 0.5L NC for comfort, will continue to monitor. CONCERNS TO ADDRESS WITH MD:          [facility] NURSING NOTE   Admission Date 7/17/2019   Admission Diagnosis Chest pain, unspecified type [R07.9]  Atherosclerosis of native coronary artery [I25.10]   Consults IP CONSULT TO VASCULAR SURGERY  IP CONSULT TO UROLOGY  IP CONSULT TO UROLOGY      Cardiac Monitoring [x] Yes [] No      Purposeful Hourly Rounding [x] Yes    Lupillo Score Total Score: 4   Lupillo score 3 or > [x] Bed Alarm [] Avasys [] 1:1 sitter [] Patient refused (Signed refusal form in chart)   Berry Score Berry Score: 18   Berry score 14 or < [] PMT consult [] Wound Care consult    []  Specialty bed  [] Nutrition consult      Influenza Vaccine Received Flu Vaccine for Current Season (usually Sept-March): Not Flu Season           Oxygen needs? [x] Room air Oxygen @  []1L    []2L    []3L   []4L    []5L   []6L via  NC   Chronic home O2 use?  [] Yes [] No  Perform O2 challenge test and document in progress note using smartphrase (.Homeoxygen)      Last bowel movement Last Bowel Movement Date: 07/16/19      Urinary Catheter [REMOVED] Urinary Catheter 07/17/19 Greenberg - Temperature-Indications for Use: Accurate measurement of urinary output     [REMOVED] Urinary Catheter 07/17/19 Greenberg - Temperature-Urine Output (mL): 175 ml     LDAs               Peripheral IV 07/17/19 Distal;Right Basilic (Active)   Site Assessment Clean, dry, & intact 7/21/2019  2:57 AM   Phlebitis Assessment 0 7/21/2019  2:57 AM   Infiltration Assessment 0 7/21/2019  2:57 AM   Dressing Status Clean, dry, & intact 7/21/2019  2:57 AM   Dressing Type Transparent 7/21/2019  2:57 AM   Hub Color/Line Status Flushed 7/21/2019  2:57 AM   Action Taken Open ports on tubing capped 7/20/2019  8:00 AM   Alcohol Cap Used Yes 7/20/2019  8:00 AM                         Readmission Risk Assessment Tool Score High Risk            24       Total Score        3 Has Seen PCP in Last 6 Months (Yes=3, No=0)    5 Pt. Coverage (Medicare=5 , Medicaid, or Self-Pay=4)    16 Charlson Comorbidity Score (Age + Comorbid Conditions)        Criteria that do not apply:    . Living with Significant Other. Assisted Living. LTAC. SNF.  or   Rehab    Patient Length of Stay (>5 days = 3)    IP Visits Last 12 Months (1-3=4, 4=9, >4=11)       Expected Length of Stay 5d 4h   Actual Length of Stay 4

## 2019-07-21 NOTE — PROGRESS NOTES
Progress Note      7/21/2019   NAME: Sherwin Cosby   MRN:  [id number]   Admit Diagnosis: Chest pain, unspecified type [R07.9]  Atherosclerosis of native coronary artery [I25.10]     Problem List:     - Progressive angina, cath 7/2019 with impella supported PCI of LM into LAD with balloon side hole to LCx  - Normal EF, mild AS  - Parox afib, currently in sinus  - Hx of aflutter ablation  - HTN  - Dyslipidemia  - Hypothyroid  - GERD  - BPH  - Distant tobacco    , lives alone, ADLs, occasionally using cane  Son:  Frank Glez. [phone]        Assessment/Plan:     Prior to 7/18:  - Impella placement  - PCI of LM  - Currently euvolemic  - Cr stable  - Post operative anemia due to expected blood loss. Hgb decreasing, on anticoagulation, for surgery, Tx 1U 7/18 with lasix 20mg. Tx 1U 7/19 with lasix 20mg. - hematuria, improving as anticoagulation decreasing but still pink  - Stopped xarelto, currently in sinus, given age and hematuria will not tolerate triple anticoagulation  - Cont ASA  - Changed added brilinta to plavix for ease of use  - Hold diltiazem, intolerant of beta blocker in past  - Hold ARB, resume on discharge  - Resume home lasix today, decrease fluids as tolerates po.  - Cont statin  - Cont proscar and flomax      7/18:  Impella removed. At night severe delirium now on precedex. 7/19:  Cath sites look good. Hematuria improving but still pink. Consult urology, goal would be to d/c maradiaga later today. Tx 1U PRBC. If able to orient, tolerate PO, dc maradiaga, ambulate, goal would be get home as soon as possible. 7/20:  Continue current plan of dual antiplatelet therapy, following Hgb, urology consult appreciated. Per Dr. Owens Spurling:  Gisselle Shaver will order catheter irrigations and catheter removal once he is out of bed and moving, otherwise he is at a high risk for urinary retention.   Regarding the anticoagulants, minimal anticoagulation is absolutely necessary at this time, then, progressing from there, but that is Cardiology's decision. \"  Better mentation, Dionicio is out, hasn't ambulated yet, family concerned about taking him home this weekend. Still some work to be done, and need to make sure he holds his Hgb.    7/21:  Hgb 8.1, very dyspneic with activity. Still with some hematuria, but now brown-colored. Will transfuse 1u prbc, continue mobilization, etc.  Appetite is getting better. [x]       High complexity decision making was performed in this patient at high risk for decompensation with multiple organ involvement. Subjective:     Tyrell Monroy denies chest pain. Some dyspnea. No confusion. Discussed with RN events overnight. Review of Systems:    Symptom Y/N Comments  Symptom Y/N Comments   Fever/Chills N   Chest Pain N    Poor Appetite N   Edema N    Cough N   Abdominal Pain N    Sputum N   Joint Pain N    SOB/JAFFE Y   Pruritis/Rash N    Nausea/vomit N   Tolerating PT/OT     Diarrhea N   Tolerating Diet Y    Constipation N   Other       Could NOT obtain due to:      Objective:      Physical Exam:    Last 24hrs VS reviewed since prior progress note. Most recent are:    Visit Vitals  /43 (BP 1 Location: Left arm, BP Patient Position: Sitting)   Pulse 64   Temp 98.4 °F (36.9 °C)   Resp 20   Ht 5' 9\" (1.753 m)   Wt 89.8 kg (197 lb 14.2 oz)   SpO2 98%   BMI 29.22 kg/m²       Intake/Output Summary (Last 24 hours) at 7/21/2019 1313  Last data filed at 7/21/2019 1044  Gross per 24 hour   Intake 200 ml   Output 1125 ml   Net -925 ml        General Appearance: Well developed, well nourished, alert & oriented x 3 some confusion, no acute distress. Ears/Nose/Mouth/Throat: Hearing grossly normal.  Neck: Supple. Chest: Lungs clear to auscultation bilaterally. Cardiovascular: Regular rate and rhythm, S1S2 normal, no murmur. Abdomen: Soft, non-tender, bowel sounds are active. Extremities: No edema bilaterally. Skin: Warm and dry.     PMH/SH reviewed - no change compared to H&P    Data Review    Telemetry: normal sinus rhythm     Lab Data Personally Reviewed:    Recent Labs     07/21/19  0324 07/19/19  0506   WBC 10.7  --    HGB 8.1* 8.2*   HCT 24.9* 24.9*   *  --      No results for input(s): INR, PTP, APTT in the last 72 hours. No lab exists for component: INREXT, INREXT   No results for input(s): NA, K, CL, CO2, BUN, CREA, GLU, CA, MG in the last 72 hours. No results for input(s): CPK, CKNDX, TROIQ in the last 72 hours. No lab exists for component: CPKMB  Lab Results   Component Value Date/Time    Cholesterol, total 117 06/12/2019 02:00 PM    HDL Cholesterol 35 06/12/2019 02:00 PM    LDL, calculated 65 06/12/2019 02:00 PM    Triglyceride 86 06/12/2019 02:00 PM    CHOL/HDL Ratio 3 06/12/2019 02:00 PM       No results for input(s): SGOT, GPT, AP, TBIL, TP, ALB, GLOB, GGT, AML, LPSE in the last 72 hours. No lab exists for component: AMYP, HLPSE  No results for input(s): PH, PCO2, PO2 in the last 72 hours.     Medications Personally Reviewed:    Current Facility-Administered Medications   Medication Dose Route Frequency    clopidogrel (PLAVIX) tablet 75 mg  75 mg Oral DAILY    furosemide (LASIX) tablet 40 mg  40 mg Oral DAILY    oxyCODONE-acetaminophen (PERCOCET) 5-325 mg per tablet 1 Tab  1 Tab Oral Q4H PRN    0.9% sodium chloride infusion 250 mL  250 mL IntraVENous PRN    dilTIAZem CD (CARDIZEM CD) capsule 120 mg  120 mg Oral DAILY    bacitracin 50,000 Units in sodium chloride irrigation 0.9 % 1,000 mL Irrigation    PRN    ondansetron (ZOFRAN) injection 4 mg  4 mg IntraVENous Q6H PRN    haloperidol lactate (HALDOL) injection 2 mg  2 mg IntraVENous Q2H PRN    aspirin chewable tablet 81 mg  81 mg Oral DAILY    finasteride (PROSCAR) tablet 5 mg  5 mg Oral PCD    levothyroxine (SYNTHROID) tablet 137 mcg  137 mcg Oral ACB    atorvastatin (LIPITOR) tablet 40 mg  40 mg Oral DAILY    tamsulosin (FLOMAX) capsule 0.8 mg  0.8 mg Oral DAILY    sodium chloride (NS) flush 5-40 mL  5-40 mL IntraVENous Q8H    sodium chloride (NS) flush 5-40 mL  5-40 mL IntraVENous PRN    acetaminophen (TYLENOL) tablet 650 mg  650 mg Oral Q4H PRN    docusate sodium (COLACE) capsule 100 mg  100 mg Oral BID    pantoprazole (PROTONIX) tablet 40 mg  40 mg Oral ACB    LORazepam (ATIVAN) injection 0.5 mg  0.5 mg IntraVENous Q6H PRN         Bebeto Quintero MD

## 2019-07-21 NOTE — PROGRESS NOTES
RAPID RESPONSE TEAM    Rounded on patient due to recent transfer out of CCU on 7/20. Discussed with primary RN, Cherelle Castro. No acute concerns. No patient complaints. Vitals stable. MEWS 1. No RRT interventions indicated at this time. RN encouraged to call with any questions or concerns.     1955 \[facility] [place] [facility]\""  Rapid Response RN  Judy De Leóns

## 2019-07-22 NOTE — PROGRESS NOTES
MICHELLE plan: Pt will discharge to [facility] today, transported by his family in a personal vehicle at 3:00 pm. Pt will receive short-term rehabilitation services prior to returning home where he lives with his son. [facility] received authorization from pt's insurance. Per family, pt will not qualify for Medicaid as he makes too much money and has a LTC policy already in place. No need for a Medicaid screening or UAI. CM sent In Basket message to EDGAR Prosper Jerome to inform of MICHELLE plan. No further concerns indicated at this time. Nursing will need to call report to [facility] at . Please send AVS, MAR, and any written prescriptions to facility in SNF envelope. Medicare pt has received, reviewed, and signed 2nd IM letter informing them of their right to appeal the discharge. Signed copy has been placed on pt bedside chart. Care Management Interventions  PCP Verified by CM: Yes  Palliative Care Criteria Met (RRAT>21 & CHF Dx)?: No  Mode of Transport at Discharge:  Other (see comment)(family)  Transition of Care Consult (CM Consult): SNF([facility])  Partner SNF: Yes  MyChart Signup: No  Discharge Durable Medical Equipment: No  Physical Therapy Consult: Yes  Occupational Therapy Consult: Yes  Speech Therapy Consult: No  Current Support Network: Relative's Home  Confirm Follow Up Transport: Family  Plan discussed with Pt/Family/Caregiver: Yes  Freedom of Choice Offered: Yes  Discharge Location  Discharge Placement: Skilled nursing facility([facility])    CIELO Sparrow  Care Manager  [phone]

## 2019-07-22 NOTE — PROGRESS NOTES
MICHELLE: SNF vs. HH    CM met with pt and pt's daughter/MAGDA at the bedside to dicsuss d/c planning. Pt is considering SNF vs HH. CM will follow-up on decision soon. Betsey Liao, MSW  Care Manager  [phone]      UPDATE 1:19 PM  Pt and family have decided on SNF and have chosen [address] [place] [address],[address] as first choice and [facility] as second choice. FOC signed and placed on chart. Referral sent via [facility]. Pt's insurance will have to authorize this. It is possible insurance will not verify in time for pt to discharge today. Will continue to follow.

## 2019-07-22 NOTE — PROGRESS NOTES
Hospital to [facility] SBAR Handoff - Fallon Shock                                                                        80 y.o.   male    [facility]   Room: 2221/01    [facility]MONARY [facility]  Unit Phone# :  Bigg Mireles  [facility]  [address]  [place] [address]  Dept: [phone]  Loc: [phone]                    SITUATION     Admitted:  7/17/2019         Attending Provider:  Db Bloom MD       Consultations:  IP CONSULT TO VASCULAR SURGERY  IP CONSULT TO UROLOGY  IP CONSULT TO UROLOGY    PCP:  Birder Krabbe, MD   [phone]    Treatment Team: Attending Provider: Db Bloom MD; Utilization Review: Regis Celeste; Care Manager: CIELO Moses; Primary Nurse: Jumana Pham; Care Manager: Alejandro Monahan    Admitting Dx:  Chest pain, unspecified type [R07.9]  Atherosclerosis of native coronary artery [I25.10]       Principal Problem: <principal problem not specified>    4 Days Post-Op of   Procedure(s):  Left Femoral Artery  Impella Device Removal; Right Groin Cordis removal   BY: Ian Ramacahndran MD             ON: 7/18/2019                  Code Status: Full Code                Advance Directives:   Advance Care Planning 7/17/2019   Patient's Healthcare Decision Maker is: -   Primary Decision Maker Name -   Primary Decision Maker Phone Number -   Primary Decision Maker Relationship to Patient -   Confirm Advance Directive None   Patient Would Like to Complete Advance Directive No    (Send w/patient)   Yes Not W Pt       Isolation:  There are currently no Active Isolations       MDRO: No current active infections    Pain Medications given:  N/A    Last dose: N/A at  N/A    Special Equipment needed: yes  Type of equipment: cane/walker         BACKGROUND     Allergies:  No Known Allergies    Past Medical History:   Diagnosis Date    Arrhythmia     A-fib    GERD (gastroesophageal reflux disease)     Hypertension     Other ill-defined conditions(799.89)     BPH    Other ill-defined conditions(799.89)     lipids    Other ill-defined conditions(799.89)     shingles hx    Thyroid disease     Unspecified adverse effect of anesthesia        Past Surgical History:   Procedure Laterality Date    HX CATARACT REMOVAL      HX HEART CATHETERIZATION      ablation    HX HEENT      scar tissue removed/laser    HX HERNIA REPAIR  05/05/2017    Vern Womack MD    HX ORTHOPAEDIC      left foot fx--hardware    HX ORTHOPAEDIC  4/9/14    Right total knee arthroplasty    HX OTHER SURGICAL      prostate bx       Medications Prior to Admission   Medication Sig    aspirin 81 mg chewable tablet Take 81 mg by mouth daily.  naproxen (NAPROSYN) 500 mg tablet Take 500 mg by mouth two (2) times daily (with meals).  rivaroxaban (XARELTO) 15 mg tab tablet Take 1 Tab by mouth daily (with breakfast).  tamsulosin (FLOMAX) 0.4 mg capsule TAKE 2 CAPSULES DAILY    levothyroxine (SYNTHROID) 137 mcg tablet Take 137 mcg by mouth Daily (before breakfast).  esomeprazole (NEXIUM) 40 mg capsule Take 1 Cap by mouth daily.  simvastatin (ZOCOR) 40 mg tablet Take 1 Tab by mouth nightly.  losartan (COZAAR) 50 mg tablet Take 1 Tab by mouth daily.  dilTIAZem ER (CARDIZEM LA) 120 mg tablet Take 1 Tab by mouth daily.  furosemide (LASIX) 40 mg tablet 1 daily (Patient taking differently: Take 40 mg by mouth daily.)    finasteride (PROSCAR) 5 mg tablet Take 1 Tab by mouth daily (after dinner).        Hard scripts included in transfer packet no    Vaccinations:    Immunization History   Administered Date(s) Administered    Influenza High Dose Vaccine PF 12/12/2017    Influenza Vaccine 10/27/2014, 12/09/2015, 10/20/2016    Influenza Vaccine (Tri) Adjuvanted 11/06/2018    Pneumococcal Conjugate (PCV-13) 09/01/2015    Pneumococcal Vaccine (Unspecified Type) 10/11/2004, 01/01/2014    Tdap 04/03/2018       Readmission Risks: Known Risks: Fall        The Charlson CoMorbitiy Index tool is an evidenced based tool that has more automatic generated information. The tool looks at many different items such as the age of the patient, how many times they were admitted in the last calendar year, current length of stay in the hospital and their diagnosis. All of these items are pulled automatically from information documented in the chart from various places and will generate a score that predicts whether a patient is at low (less than 13), medium (13-20) or high (21 or greater) risk of being readmitted.         ASSESSMENT                Temp: 97.8 °F (36.6 °C) (07/22/19 1032) Pulse (Heart Rate): 66 (07/22/19 1102)     Resp Rate: 22 (07/22/19 1032)           BP: 125/46 (07/22/19 1102)     O2 Sat (%): 97 % (07/22/19 1032)     Weight: 89.8 kg (197 lb 14.2 oz)    Height: 5' 9\" (175.3 cm) (07/17/19 1550)       If above not within 1 hour of discharge:    BP:_____  P:____  R:____ T:_____ O2 Sat: ___%  O2: ______    Active Orders   Diet    DIET CARDIAC Regular         Orientation: oriented to time, place, person and situation     Active Behaviors: None                                   Active Lines/Drains:  (Peg Tube / Greenberg / CL or S/L?): no    Urinary Status: Voiding     Last BM: Last Bowel Movement Date: 07/16/19     Skin Integrity: Incision (comment)(bilateral groin sites intact)             Mobility: Slightly limited   Weight Bearing Status: WBAT (Weight Bearing as Tolerated)      Gait Training  Assistive Device: Gait belt(cardiac cart)  Ambulation - Level of Assistance: Contact guard assistance  Distance (ft): 30 Feet (ft)         Lab Results   Component Value Date/Time    Glucose 121 (H) 07/18/2019 04:17 AM    Hemoglobin A1c 5.8 (H) 06/12/2019 01:58 PM    INR 1.1 07/18/2019 05:22 AM    INR 1.2 (H) 07/17/2019 03:19 PM    HGB 8.1 (L) 07/21/2019 03:24 AM    HGB 8.2 (L) 07/19/2019 05:06 AM        RECOMMENDATION     See After Visit Summary (AVS) for:  · Discharge instructions  · After [address]   · Special equipment needed (entered pre-discharge by Care Management)  · Medication Reconciliation    · Follow up Appointment(s)         Report given/sent by:  Mandy Back                    Verbal report given to: [address]  FAXED to:           Estimated discharge time:  7/22/2019 at

## 2019-07-22 NOTE — DISCHARGE SUMMARY
Cardiology Discharge Summary             Patient ID:  Nadege Lawrence  [id number]  22 y.o.  3/13/1931    Admit Date: 7/17/2019     Discharge Date: 7/22/2019   Admitting Physician: Joesph Zhong MD   Discharge Physician: Joesph Zhong MD                  Problem List:      - Progressive angina, cath 7/2019 with impella supported PCI of LM into LAD with balloon side hole to LCx  - Normal EF, mild AS  - Prox afib, currently in sinus  - Hx of aflutter ablation  - HTN  - Dyslipidemia  - Hypothyroid  - GERD  - BPH  - Distant tobacco  , lives alone, ADLs, occasionally using cane  Son:  Joe Randall. [phone]                     Assessment/Plan:          - Impella placement  - PCI of LM  - Currently euvolemic  - Cr stable  - Post operative anemia due to expected blood loss. Hg decreasing, on anticoagulation, for surgery today, Tx 1U 7/18 with lasix 20mg. Tx 1U 7/19 with lasix 20mg. Tx1U 7/21  - hematuria, improving as anticoagulation decreasing but still pink.     - Stop xarelto, currently in sinus, given age and hematuria will not tolerate triple anticoagulation  - Cont ASA  - Change added brilinta to plavix for ease of use. - Hold diltiazem, intolerant of beta blocker in past  - Hold ARB, resume on discharge  - Lasix IV today, resume home lasix tomorrow, decrease fluids as tolerates po.  - Cont statin     - Cont proscar and flomax     7/18:  Impella removed. At night severe delerium now on precedex.     7/19:  Cath sites look good. Hematuria improving but still pink. Consult urology, goal would be to d/c hiren later today. Tx 1U PRBC.        7/20:  Progressing.     7/21:  Tx 1 U PRBC     7/22:  Progressing.     Mild delerium at night. Feels well this AM.  Will have him have breakfast.   Ambulate. Discussed with son, stressed compliance with meds in particular plavix.   Family will stay with him at night initially.     Patient does feel better with ambulation, but took three people to help with gait, will plan SNF for rehab prior to home. > 30 min coordinating discharge.             [x]       High complexity decision making was performed in this patient at high risk for decompensation with multiple organ involvement. Consults:  Intensive care  Urology    Hospital Course and Discharge Exam:    On the day of discharge, ambulatory and taking oral.  All questions answered. Aware of signs and symptoms warranting urgent medical follow-up or calling 911. Visit Vitals  /46   Pulse 66   Temp 97.8 °F (36.6 °C)   Resp 22   Ht 5' 9\" (1.753 m)   Wt 89.8 kg (197 lb 14.2 oz)   SpO2 97%   BMI 29.22 kg/m²       Physical Exam:  Nondiaphoretic, not in acute distress. Unlabored, clear to auscultation bilaterally. Regular rate and rhythm, no murmur, rub, or gallop. No JVD or peripheral edema. Palpable radial pulses bilaterally. Groin site(s) OK. No cyanosis. Skin warm and dry. Awake, appropriate, neuro grossly nonfocal.  Ambulatory.     Lab Results   Component Value Date/Time    WBC 10.7 07/21/2019 03:24 AM    Hemoglobin (POC) 13.9 05/05/2017 07:15 AM    HGB (POC) 13.9 09/12/2018 01:45 PM    HGB 8.1 (L) 07/21/2019 03:24 AM    Hematocrit (POC) 41 05/05/2017 07:15 AM    HCT (POC) 42 09/12/2018 01:45 PM    HCT 24.9 (L) 07/21/2019 03:24 AM    PLATELET 307 (L) 59/09/4892 03:24 AM    MCV 90.9 07/21/2019 03:24 AM     Lab Results   Component Value Date/Time    Sodium 143 07/18/2019 04:17 AM    Potassium 4.2 07/18/2019 04:17 AM    Chloride 116 (H) 07/18/2019 04:17 AM    CO2 21 07/18/2019 04:17 AM    Anion gap 6 07/18/2019 04:17 AM    Glucose 121 (H) 07/18/2019 04:17 AM    BUN 35 (H) 07/18/2019 04:17 AM    Creatinine 1.22 07/18/2019 04:17 AM    BUN/Creatinine ratio 29 (H) 07/18/2019 04:17 AM    GFR est AA >60 07/18/2019 04:17 AM    GFR est non-AA 56 (L) 07/18/2019 04:17 AM    Calcium 8.6 07/18/2019 04:17 AM     Lab Results   Component Value Date/Time    .00 06/12/2019 02:00 PM     Lab Results   Component Value Date/Time    TSH 0.233 (L) 09/12/2018 01:45 PM     Lab Results   Component Value Date/Time    Cholesterol, total 117 06/12/2019 02:00 PM    Cholesterol (POC) 107 06/18/2018 01:25 PM    HDL Cholesterol 35 06/12/2019 02:00 PM    HDL Cholesterol (POC) 38 06/18/2018 01:25 PM    LDL Cholesterol (POC) 49 06/18/2018 01:25 PM    LDL, calculated 65 06/12/2019 02:00 PM    VLDL, calculated 22 12/12/2018 01:34 PM    VLDL 17 06/12/2019 02:00 PM    Triglyceride 86 06/12/2019 02:00 PM    Triglycerides (POC) 100 06/18/2018 01:25 PM    CHOL/HDL Ratio 3 06/12/2019 02:00 PM     Lab Results   Component Value Date/Time    Hemoglobin A1c 5.8 (H) 06/12/2019 01:58 PM    Hemoglobin A1c (POC) 5.9 (A) 06/18/2018 01:25 PM             Disposition: SNF    Patient Instructions:   Cannot display discharge medications since this patient is not currently admitted. FOLLOW-UP:       Call the office [phone] to make an appointment for 2 weeks. [address] [facility], [address]    [phone]  [place], [address] [facility]    [email or web address]    Thank you for placing your trust for your heart with the physicians and staff of [facility]. We have long provided [place] with the most advanced cardiovascular care possible using a personalized and caring approach. And we hope to continue this strong tradition well into the future. A heart condition, or the fear of having a heart condition, can be a stressful time for a patient and their family. There are appointments, testing procedures and unfamiliar terms that can cause natural questions and concerns. While we encourage you to speak with your nurse or physician regarding any questions you might have, please consider this web site as a powerful resource you can use at any time. Often, questions or concerns only arise once you've left our office. There are many useful sections on this web site and links to other professional organizations and advocacy groups.  These sources can help answer many questions you might have from the comfort of your own home or office. Again, thank you for considering [facility] as your trusted provider of heart care. Please don't hesitate to let us know if there is anything we can do to enhance your experience with us.      Signed:  Kenneth Louie MD  7/22/2019

## 2019-07-22 NOTE — PROGRESS NOTES
Problem: Falls - Risk of  Goal: *Absence of Falls  Description  Document Divya Girt Fall Risk and appropriate interventions in the flowsheet.   Outcome: Progressing Towards Goal  Note:   Fall Risk Interventions:  Mobility Interventions: Bed/chair exit alarm    Mentation Interventions: Bed/chair exit alarm    Medication Interventions: Bed/chair exit alarm    Elimination Interventions: Call light in reach, Bed/chair exit alarm, Elevated toilet seat, Patient to call for help with toileting needs, Urinal in reach    History of Falls Interventions: Bed/chair exit alarm

## 2019-07-22 NOTE — PROGRESS NOTES
Transition of Care Plan to SNF/Rehab    SNF/Rehab Transition:  Patient has been accepted to [facility] and meets criteria for admission. Patient will transported by his family and expected to leave at 3:00 pm.    Communication to Patient/Family:  Met with patient and daughters (identified care giver) and they are agreeable to the transition plan. Communication to SNF/Rehab:  Bedside RN, Bolivar, has been notified to update the transition plan to the facility and call report (phone number [phone]). Discharge information has been updated on the AVS.     Discharge instructions to be fax'd to facility at [facility] # [phone]). SNF/Rehab Transition:  Patient to follow-up with Home Health: [facility]  PCP/Specialist: Dr. Rosa M Saleem (PCP), Dr. Madina Hopper (Cardiologist)  Ambulatory Care Management: Chidi Coffman    Reviewed and confirmed with facility, [facility] can manage the patient care needs for the following:     Jessica Farris with (X) only those applicable:    Medication:  [x]  Medications will be available at the facility  []  IV Antibiotics  []  Controlled Substance - hard copy to be sent with patient   []  Weekly Labs   Documents:  [x] Hard RX  [x] MAR  [x] Kardex  [x] AVS  [x]Transfer Summary  [x]Discharge   Equipment:  []  CPAP/BiPAP  []  Wound Vacuum  []  Greenberg or Urinary Device  []  PICC/Central Line  []  Nebulizer  []  Ventilator   Treatment:  []Isolation (for MRSA, VRE, etc.)  []Surgical Drain Management  []Tracheostomy Care  []Dressing Changes  []Dialysis with transportation and chair time  []PEG Care  []Oxygen  []Daily Weights for Heart Failure   Dietary:  [x]Any diet limitations - cardiac regular diet  []Tube Feedings   []Total Parenteral Management (TPN)   Eligible for Medicaid Long Term Services and Supports  Yes:  [] Eligible for medical assistance or will become eligible within 180 days and UAI completed. [] Provider/Patient and/or support system has requested screening.   [] UAI copy provided to patient or responsible party  [] UAI unavailable at discharge will send once processed to SNF provider. [] UAI unavailable at discharged mailed to patient  No:   [] Private pay and is not financially eligible for Medicaid within the next 180 days. [] Reside out-of-state. [] A residents of a state owned/operated facility that is licensed  by [address] [facility] or [facility]  [] Enrollment in [facility] hospice services  [] [address] [place] [address]  [x] Patient /Family declines to have screening completed or provide financial information for screening     Financial Resources:  [] Medicaid    [] Initiated and application pending   [] Full coverage     Advanced Care Plan:  [x]Surrogate Decision Maker of Care  []POA   [x]Communicated Code Status - Full   Other  - Please see AVS for follow-up needs. This pt will need to see Dr. Sanjana Thomas for staple removal and Dr. Margarita Hutchison for cardiology follow-up.       CIELO Traore  Care Manager  [phone]

## 2019-07-22 NOTE — PROGRESS NOTES
Progress Note      7/22/2019 2:51 PM  NAME: Yolanda Jacobson   MRN:  [id number]   Admit Diagnosis: Chest pain, unspecified type [R07.9]  Atherosclerosis of native coronary artery [I25.10]     Problem List:     - Progressive angina, cath 7/2019 with impella supported PCI of LM into LAD with balloon side hole to LCx  - Normal EF, mild AS  - Prox afib, currently in sinus  - Hx of aflutter ablation  - HTN  - Dyslipidemia  - Hypothyroid  - GERD  - BPH  - Distant tobacco  , lives alone, ADLs, occasionally using cane  Son:  Mary Ellen Melara III. [phone]        Assessment/Plan:     - Impella placement  - PCI of LM  - Currently euvolemic  - Cr stable  - Post operative anemia due to expected blood loss. Hg decreasing, on anticoagulation, for surgery today, Tx 1U 7/18 with lasix 20mg. Tx 1U 7/19 with lasix 20mg. Tx1U 7/21  - hematuria, improving as anticoagulation decreasing but still pink. - Stop xarelto, currently in sinus, given age and hematuria will not tolerate triple anticoagulation  - Cont ASA  - Change added brilinta to plavix for ease of use. - Hold diltiazem, intolerant of beta blocker in past  - Hold ARB, resume on discharge  - Lasix IV today, resume home lasix tomorrow, decrease fluids as tolerates po.  - Cont statin    - Cont proscar and flomax    7/18:  Impella removed. At night severe delerium now on precedex. 7/19:  Cath sites look good. Hematuria improving but still pink. Consult urology, goal would be to d/c hiren later today. Tx 1U PRBC.       7/20:  Progressing.    7/21:  Tx 1 U PRBC    7/22:  Progressing. Mild delerium at night. Feels well this AM.  Will have him have breakfast.   Ambulate. Discussed with son, stressed compliance with meds in particular plavix. Family will stay with him at night initially. Patient does feel better with ambulation, but took three people to help with gait, will plan SNF for rehab prior to home.           [x]       High complexity decision making was performed in this patient at high risk for decompensation with multiple organ involvement. Subjective:     Sherly Wasserman denies chest pain, dyspnea, confused. Discussed with RN events overnight. Review of Systems:    Symptom Y/N Comments  Symptom Y/N Comments   Fever/Chills N   Chest Pain N    Poor Appetite N   Edema N    Cough N   Abdominal Pain N    Sputum N   Joint Pain N    SOB/JAFFE N   Pruritis/Rash N    Nausea/vomit N   Tolerating PT/OT     Diarrhea N   Tolerating Diet Y    Constipation N   Other       Could NOT obtain due to:      Objective:      Physical Exam:    Last 24hrs VS reviewed since prior progress note. Most recent are:    Visit Vitals  /51 (BP 1 Location: Left arm, BP Patient Position: At rest)   Pulse 79   Temp 98 °F (36.7 °C)   Resp 20   Ht 5' 9\" (1.753 m)   Wt 89.8 kg (197 lb 14.2 oz)   SpO2 99%   BMI 29.22 kg/m²       Intake/Output Summary (Last 24 hours) at 7/22/2019 0756  Last data filed at 7/22/2019 0440  Gross per 24 hour   Intake    Output 2275 ml   Net -2275 ml        General Appearance: Well developed, well nourished, alert & oriented x 3 some confusion, but wants to go home,    no acute distress. Ears/Nose/Mouth/Throat: Hearing grossly normal.  Neck: Supple. Chest: Lungs clear to auscultation bilaterally. Cardiovascular: Regular rate and rhythm, S1S2 normal, no murmur. Abdomen: Soft, non-tender, bowel sounds are active. Extremities: No edema bilaterally. Skin: Warm and dry. PMH/SH reviewed - no change compared to H&P    Data Review    Telemetry: normal sinus rhythm     Lab Data Personally Reviewed:    Recent Labs     07/21/19  0324   WBC 10.7   HGB 8.1*   HCT 24.9*   *     No results for input(s): INR, PTP, APTT in the last 72 hours. No lab exists for component: INREXT, INREXT   No results for input(s): NA, K, CL, CO2, BUN, CREA, GLU, CA, MG in the last 72 hours. No results for input(s): CPK, CKNDX, TROIQ in the last 72 hours.     No lab exists for component: CPKMB  Lab Results   Component Value Date/Time    Cholesterol, total 117 06/12/2019 02:00 PM    HDL Cholesterol 35 06/12/2019 02:00 PM    LDL, calculated 65 06/12/2019 02:00 PM    Triglyceride 86 06/12/2019 02:00 PM    CHOL/HDL Ratio 3 06/12/2019 02:00 PM       No results for input(s): SGOT, GPT, AP, TBIL, TP, ALB, GLOB, GGT, AML, LPSE in the last 72 hours. No lab exists for component: AMYP, HLPSE  No results for input(s): PH, PCO2, PO2 in the last 72 hours.     Medications Personally Reviewed:    Current Facility-Administered Medications   Medication Dose Route Frequency    polyethylene glycol (MIRALAX) packet 17 g  17 g Oral DAILY    0.9% sodium chloride infusion 250 mL  250 mL IntraVENous PRN    clopidogrel (PLAVIX) tablet 75 mg  75 mg Oral DAILY    furosemide (LASIX) tablet 40 mg  40 mg Oral DAILY    oxyCODONE-acetaminophen (PERCOCET) 5-325 mg per tablet 1 Tab  1 Tab Oral Q4H PRN    0.9% sodium chloride infusion 250 mL  250 mL IntraVENous PRN    dilTIAZem CD (CARDIZEM CD) capsule 120 mg  120 mg Oral DAILY    bacitracin 50,000 Units in sodium chloride irrigation 0.9 % 1,000 mL Irrigation    PRN    ondansetron (ZOFRAN) injection 4 mg  4 mg IntraVENous Q6H PRN    haloperidol lactate (HALDOL) injection 2 mg  2 mg IntraVENous Q2H PRN    aspirin chewable tablet 81 mg  81 mg Oral DAILY    finasteride (PROSCAR) tablet 5 mg  5 mg Oral PCD    levothyroxine (SYNTHROID) tablet 137 mcg  137 mcg Oral ACB    atorvastatin (LIPITOR) tablet 40 mg  40 mg Oral DAILY    tamsulosin (FLOMAX) capsule 0.8 mg  0.8 mg Oral DAILY    sodium chloride (NS) flush 5-40 mL  5-40 mL IntraVENous Q8H    sodium chloride (NS) flush 5-40 mL  5-40 mL IntraVENous PRN    acetaminophen (TYLENOL) tablet 650 mg  650 mg Oral Q4H PRN    docusate sodium (COLACE) capsule 100 mg  100 mg Oral BID    pantoprazole (PROTONIX) tablet 40 mg  40 mg Oral ACB    LORazepam (ATIVAN) injection 0.5 mg  0.5 mg IntraVENous Q6H PRN Yefri Rodrigues MD

## 2019-07-23 NOTE — PROGRESS NOTES
Transition of Care Coordination/Hospital to Post Acute Facility:     Date/Time:  2019 10:37 AM    Patient was admitted to [facility] on 19 for treatment of atherosclerosis of native coronary artery. Patient was discharged 19 to [address] [place] [address],[address] for continuation of care. Inpatient RRAT score: 27    Top Challenges reviewed    No ACP on file         Method of communication with care team :phone     Nurse Navigator(NN) spoke with staff to provide introduction to self and explanation of the Nurse Navigator Role. Verified name and  as patient identifiers. Discussed and reviewed  anticipated length of stay, medication reconciliation    ACP:   Does the patient have a current ACP (including DDNR):  no  Does the post acute facility have a copy of the patients ACP:  no    Medication(s):   New Medications at Discharge: plavix  Changed Medications at Discharge: n/a  Discontinued Medications at Discharge: naproxen, xarelto     PCP/Specialist follow up:   Future Appointments   Date Time Provider Benjamin Garcia   2019  1:00 PM Eleuterio Little MD [address]        Opportunity to ask questions was provided. Contact information was provided for future reference or further questions. Will continue to monitor.

## 2019-08-11 NOTE — ED PROVIDER NOTES
EMERGENCY DEPARTMENT HISTORY AND PHYSICAL EXAM 
 
 
Date: 8/11/2019 Patient Name: Malia Rivera 
Patient Age and Sex: 80 y.o. male History of Presenting Illness Chief Complaint Patient presents with  Shortness of Breath  
  x 1 month, states cardiac stent placement 3 weeks ago, states shortness of breath has worsened since, discharged from [facility] last thursday for rehab History Provided By: Patient HPI: Malia Rivera is an 80-year-old male with a history of CAD, presenting for shortness of breath. Patient states for the past 1 month has had shortness of breath. States that 3 weeks ago, Dr. Garcia [facility] cardiology placed a stent. Despite that has continued to have shortness of breath. Was at [facility] and discharged 3 days ago. Has continued to have worsening shortness of breath. Associated with some mild leg swelling. Also associated with orthopnea and dyspnea on exertion. Denies any cough, fevers, chest pain, abdominal pain, lightheadedness. There are no other complaints, changes, or physical findings at this time. PCP: Odilia Edwards MD 
 
No current facility-administered medications on file prior to encounter. Current Outpatient Medications on File Prior to Encounter Medication Sig Dispense Refill  clopidogrel (PLAVIX) 75 mg tab Take 1 Tab by mouth daily. 90 Tab 3  
 tamsulosin (FLOMAX) 0.4 mg capsule TAKE 2 CAPSULES DAILY 180 Cap 3  
 levothyroxine (SYNTHROID) 137 mcg tablet Take 137 mcg by mouth Daily (before breakfast). 90 Tab 3  
 esomeprazole (NEXIUM) 40 mg capsule Take 1 Cap by mouth daily. 90 Cap 3  
 finasteride (PROSCAR) 5 mg tablet Take 1 Tab by mouth daily (after dinner). 90 Tab 3  
 simvastatin (ZOCOR) 40 mg tablet Take 1 Tab by mouth nightly. 90 Tab 3  
 losartan (COZAAR) 50 mg tablet Take 1 Tab by mouth daily. 90 Tab prn  dilTIAZem ER (CARDIZEM LA) 120 mg tablet Take 1 Tab by mouth daily.  90 Tab 3  
  aspirin 81 mg chewable tablet Take 81 mg by mouth daily. Past History Past Medical History: 
Past Medical History:  
Diagnosis Date  Arrhythmia A-fib  GERD (gastroesophageal reflux disease)  Hypertension  Other ill-defined conditions(799.89) BPH  Other ill-defined conditions(799.89) lipids  Other ill-defined conditions(799.89)   
 shingles hx  Thyroid disease  Unspecified adverse effect of anesthesia Past Surgical History: 
Past Surgical History:  
Procedure Laterality Date  HX CATARACT REMOVAL    
 HX HEART CATHETERIZATION    
 ablation  HX HEART CATHETERIZATION  2019 Stent  HX HEENT    
 scar tissue removed/laser  HX HERNIA REPAIR  [facility] Shanta  
 HX ORTHOPAEDIC    
 left foot fx--hardware  HX ORTHOPAEDIC  14 Right total knee arthroplasty  HX OTHER SURGICAL    
 prostate bx Family History: 
Family History Problem Relation Age of Onset  Heart Disease Mother  Hypertension Mother  Stroke Mother  Heart Disease Father  Cancer Paternal Grandmother Social History: 
Social History Tobacco Use  Smoking status: Former Smoker Packs/day: 0.50 Last attempt to quit: 3/28/1963 Years since quittin.3  Smokeless tobacco: Never Used Substance Use Topics  Alcohol use: Yes Comment: rare/social-beer  Drug use: Yes Types: Prescription, OTC Allergies: 
No Known Allergies Review of Systems Review of Systems Constitutional: Negative for chills and fever. Respiratory: Positive for shortness of breath. Negative for cough. Cardiovascular: Positive for leg swelling. Negative for chest pain. Gastrointestinal: Negative for abdominal pain, constipation, diarrhea, nausea and vomiting. Neurological: Negative for weakness and numbness. All other systems reviewed and are negative.  
  
 
Physical Exam  
Physical Exam  
 Constitutional: He is oriented to person, place, and time. He appears well-developed and well-nourished. HENT:  
Head: Normocephalic and atraumatic. Eyes: Conjunctivae and EOM are normal.  
Neck: Normal range of motion. Neck supple. Cardiovascular: Normal rate and regular rhythm. Pulmonary/Chest: Effort normal and breath sounds normal. No respiratory distress. He has no wheezes. He has no rales. Abdominal: Soft. He exhibits no distension. There is no tenderness. Musculoskeletal: Normal range of motion. He exhibits edema. Trace edema in right lower extremity 1+ pitting edema in left lower extremity Neurological: He is alert and oriented to person, place, and time. Skin: Skin is warm and dry. Psychiatric: He has a normal mood and affect. Nursing note and vitals reviewed. Diagnostic Study Results Labs - Recent Results (from the past 12 hour(s)) EKG, 12 LEAD, INITIAL Collection Time: 08/11/19  4:17 PM  
Result Value Ref Range Ventricular Rate 72 BPM  
 Atrial Rate 72 BPM  
 P-R Interval 208 ms QRS Duration 90 ms Q-T Interval 390 ms QTC Calculation (Bezet) 427 ms Calculated P Axis 15 degrees Calculated R Axis 20 degrees Calculated T Axis 34 degrees Diagnosis Sinus rhythm with premature supraventricular complexes and with occasional  
premature ventricular complexes and fusion complexes Nonspecific ST abnormality CBC WITH AUTOMATED DIFF Collection Time: 08/11/19  4:35 PM  
Result Value Ref Range WBC 6.8 4.1 - 11.1 K/uL  
 RBC 3.35 (L) 4.10 - 5.70 M/uL HGB 9.7 (L) 12.1 - 17.0 g/dL HCT 30.8 (L) 36.6 - 50.3 % MCV 91.9 80.0 - 99.0 FL  
 MCH 29.0 26.0 - 34.0 PG  
 MCHC 31.5 30.0 - 36.5 g/dL  
 RDW 14.5 11.5 - 14.5 % PLATELET 046 749 - 659 K/uL MPV 11.2 8.9 - 12.9 FL  
 NRBC 0.0 0  WBC ABSOLUTE NRBC 0.00 0.00 - 0.01 K/uL NEUTROPHILS 57 32 - 75 % LYMPHOCYTES 21 12 - 49 % MONOCYTES 15 (H) 5 - 13 % EOSINOPHILS 6 0 - 7 % BASOPHILS 1 0 - 1 % IMMATURE GRANULOCYTES 0 0.0 - 0.5 % ABS. NEUTROPHILS 3.9 1.8 - 8.0 K/UL  
 ABS. LYMPHOCYTES 1.4 0.8 - 3.5 K/UL  
 ABS. MONOCYTES 1.0 0.0 - 1.0 K/UL  
 ABS. EOSINOPHILS 0.4 0.0 - 0.4 K/UL  
 ABS. BASOPHILS 0.0 0.0 - 0.1 K/UL  
 ABS. IMM. GRANS. 0.0 0.00 - 0.04 K/UL  
 DF AUTOMATED METABOLIC PANEL, COMPREHENSIVE Collection Time: 08/11/19  4:35 PM  
Result Value Ref Range Sodium 142 136 - 145 mmol/L Potassium 4.6 3.5 - 5.1 mmol/L Chloride 108 97 - 108 mmol/L  
 CO2 29 21 - 32 mmol/L Anion gap 5 5 - 15 mmol/L Glucose 113 (H) 65 - 100 mg/dL BUN 35 (H) 6 - 20 MG/DL Creatinine 1.58 (H) 0.70 - 1.30 MG/DL  
 BUN/Creatinine ratio 22 (H) 12 - 20 GFR est AA 50 (L) >60 ml/min/1.73m2 GFR est non-AA 42 (L) >60 ml/min/1.73m2 Calcium 9.0 8.5 - 10.1 MG/DL Bilirubin, total 0.4 0.2 - 1.0 MG/DL  
 ALT (SGPT) 19 12 - 78 U/L  
 AST (SGOT) 17 15 - 37 U/L Alk. phosphatase 89 45 - 117 U/L Protein, total 6.8 6.4 - 8.2 g/dL Albumin 3.0 (L) 3.5 - 5.0 g/dL Globulin 3.8 2.0 - 4.0 g/dL A-G Ratio 0.8 (L) 1.1 - 2.2 NT-PRO BNP Collection Time: 08/11/19  4:35 PM  
Result Value Ref Range NT pro-BNP 2,224 (H) <450 PG/ML  
TROPONIN I Collection Time: 08/11/19  4:35 PM  
Result Value Ref Range Troponin-I, Qt. <0.05 <0.05 ng/mL TYPE & SCREEN Collection Time: 08/11/19  4:37 PM  
Result Value Ref Range Crossmatch Expiration 08/14/2019 ABO/Rh(D) A POSITIVE Antibody screen NEG Radiologic Studies -  
CT CHEST WO CONT Final Result IMPRESSION:   
Bilateral dependent atelectasis. Otherwise, clear lungs. Mild cardiomegaly. XR CHEST PORT Final Result IMPRESSION: No acute process. CT Results  (Last 48 hours) 08/11/19 1956  CT CHEST WO CONT Final result Impression:  IMPRESSION:   
Bilateral dependent atelectasis. Otherwise, clear lungs. Mild cardiomegaly. Narrative:  EXAM:  CT thorax without contrast  
   
INDICATION:  Shortness of breath. Leg swelling. COMPARISON: Chest radiograph 8/11/2019 TECHNIQUE: Helical CT of the chest is performed without intravenous contrast.  
Coronal and sagittal reformatted images are obtained. CT dose reduction was  
achieved through use of a standardized protocol tailored for this examination  
and automatic exposure control for dose modulation. FINDINGS:   
The thyroid gland is not visualized. The aorta and main pulmonary artery are  
normal in caliber. There is aortic and coronary artery atherosclerosis. There is  
mild cardiomegaly. There is no pericardial effusion. There are no enlarged axillary, mediastinal, or hilar lymph nodes. There is no lung nodule, mass, or consolidation. There is bilateral dependent  
atelectasis. There is no pleural effusion or pneumothorax. The central airways  
are clear. In the limited images of the upper abdomen, the partially imaged solid organs  
are unremarkable. The osseous structures are age-appropriate. CXR Results  (Last 48 hours) 08/11/19 1721  XR CHEST PORT Final result Impression:  IMPRESSION: No acute process. Narrative:  EXAM:  CR chest portable INDICATION: Chest pain COMPARISON: 7/18/2019. TECHNIQUE: Portable AP upright chest view at 1642 hours FINDINGS: Cardiac monitoring wires overlie the thorax. The cardia mediastinal  
contours are stable. The lungs and pleural spaces are clear. There is no  
pneumothorax. The bones and upper abdomen are stable. Medical Decision Making I am the first provider for this patient. I reviewed the vital signs, available nursing notes, past medical history, past surgical history, family history and social history. Vital Signs-Reviewed the patient's vital signs. Patient Vitals for the past 12 hrs: 
 Temp Pulse Resp BP SpO2 08/11/19 1745  70 23 128/69 96 % 08/11/19 1650     93 % 08/11/19 1645  70 17 124/52 94 % 08/11/19 1634    134/51   
08/11/19 1611 98 °F (36.7 °C) 78 19 127/54 96 % Records Reviewed: Nursing Notes and Old Medical Records Provider Notes (Medical Decision Making):  
Patient presenting with worsening shortness of breath over the past 1 month despite stent placement. Differential includes heart failure, new ACS, pneumonia, PE, bronchitis. Will get labs, EKG and chest x-ray. Chest x-ray negative will consider CTA chest. 
 
ED Course:  
Initial assessment performed. The patients presenting problems have been discussed, and they are in agreement with the care plan formulated and outlined with them. I have encouraged them to ask questions as they arise throughout their visit. ED Course as of Aug 11 2136 Sun Aug 11, 2019  
1746 Patient's hemoglobin normal, troponin negative but proBNP is elevated. Chest x-ray was read negative for any pneumonia or pulmonary edema but given my high suspicion with the orthopnea, will get CT of the chest.  Unfortunately patient unable to get CTA but low probability for PE.  
 [JS] ED Course User Index [JS] Ramesh Adames MD  
 
Critical Care Time:  
0 Disposition: 
Discharge Note: The patient has been re-evaluated and is ready for discharge. Reviewed available results with patient. Counseled patient on diagnosis and care plan. Patient has expressed understanding, and all questions have been answered. Patient agrees with plan and agrees to follow up as recommended, or to return to the ED if their symptoms worsen. Discharge instructions have been provided and explained to the patient, along with reasons to return to the ED. PLAN: 
Discharge Medication List as of 8/11/2019  8:33 PM  
  
CONTINUE these medications which have CHANGED Details  
furosemide (LASIX) 20 mg tablet Take 1 Tab by mouth daily for 5 days. , Normal, Disp-5 Tab, R-0  
  
  
 CONTINUE these medications which have NOT CHANGED Details  
clopidogrel (PLAVIX) 75 mg tab Take 1 Tab by mouth daily. , Print, Disp-90 Tab, R-3  
  
tamsulosin (FLOMAX) 0.4 mg capsule TAKE 2 CAPSULES DAILY, Normal, Disp-180 Cap, R-3  
  
levothyroxine (SYNTHROID) 137 mcg tablet Take 137 mcg by mouth Daily (before breakfast). , Normal, Disp-90 Tab, R-3  
  
esomeprazole (NEXIUM) 40 mg capsule Take 1 Cap by mouth daily. , Normal, Disp-90 Cap, R-3  
  
finasteride (PROSCAR) 5 mg tablet Take 1 Tab by mouth daily (after dinner). , Normal, Disp-90 Tab, R-3  
  
simvastatin (ZOCOR) 40 mg tablet Take 1 Tab by mouth nightly., Normal, Disp-90 Tab, R-3  
  
losartan (COZAAR) 50 mg tablet Take 1 Tab by mouth daily. , Normal, Disp-90 Tab, R-prn  
  
dilTIAZem ER (CARDIZEM LA) 120 mg tablet Take 1 Tab by mouth daily. , Normal, Disp-90 Tab, R-3  
  
aspirin 81 mg chewable tablet Take 81 mg by mouth daily. , Historical Med 2. Follow-up Information Follow up With Specialties Details Why Contact Info Nevaeh Hughes MD Cardiology Schedule an appointment as soon as possible for a visit  [id number] [facility] 
[phone] 3. Return to ED if worse Diagnosis Clinical Impression:  
1. SOB (shortness of breath) Attestations: 
 
Chi Arteaga M.D. Please note that this dictation was completed with Embarr Downs, the [employer] voice recognition software. Quite often unanticipated grammatical, syntax, homophones, and other interpretive errors are inadvertently transcribed by the computer software. Please disregard these errors. Please excuse any errors that have escaped final proofreading. Thank you.

## 2019-08-11 NOTE — ED NOTES
Pt arrives to ED today with SOB that has been going on for about a month. Pt states that SOB occurs at rest and upon exertion. Pt had a stent placed 3 weeks ago and was recently d/c from [facility]. Pt denies chest pain, n/v, and cough. Pt taking blood thinners. Pt currently in bed resting. Monitor x3.

## 2019-08-12 NOTE — DISCHARGE INSTRUCTIONS

## 2019-08-13 PROBLEM — D64.9 ANEMIA: Status: ACTIVE | Noted: 2019-01-01

## 2019-08-14 NOTE — PATIENT INSTRUCTIONS

## 2019-08-14 NOTE — PROGRESS NOTES
Chief Complaint Patient presents with  Transitions Of Care ED [facility] for cardiac stent and sent to [facility] 8/8/19 went to [facility] 8/11/19 for SOB Visit Vitals BP 98/46 (BP 1 Location: Right arm, BP Patient Position: Sitting) Pulse 79 Temp 97.6 °F (36.4 °C) (Oral) Resp 20 Ht 5' 9\" (1.753 m) Wt 186 lb (84.4 kg) SpO2 95% BMI 27.47 kg/m² 1. Have you been to the ER, urgent care clinic since your last visit? Hospitalized since your last visit? [facility] 7/18/19 for cardiac stent and then sent to [facility] 8/11/19 ED [facility] for SOB 2. Have you seen or consulted any other health care providers outside of the [address] [place] [address] since your last visit? Include any pap smears or colon screening.  No

## 2019-08-14 NOTE — PROGRESS NOTES
Transitions Of Care [facility], [facility] for cardiac stent and sent to [facility] 8/8/19 went to [facility] 8/11/19 for SOB) HPI: 
Tyrell Monroy is a 80y.o. year old male who is here for a follow up visit for hospitalization transition of care. He was last seen by me on 6/14/2019. Discharged on: From the hospital [facility] on 7/22/2019 to rehab at [facility] and discharge from [facility] on 8/8/2019 Diagnosis in hospital: 1. ASCVD with PCI of left main into LAD with balloon sidehole to the left circumflex with Impella support 2. Paroxysmal atrial fib 3. History of atrial flutter ablation 4. Hypertension 5. Postoperative anemia due to blood loss transfuse 1 unit 7/18 and transfuse second unit 7/19 with third unit transfused on 7/21 6. GERD 7. BPH Complications in hospital: Requirement of Impella support for PCI of left main, transfusion 3 units packed red blood cells 4 postop anemia Medication changes: Discontinue Xarelto, start Plavix 75 daily, Discharge Summary reviewed. Today 8/14/2019 He reports the following: When he was discharged from rehab on 8/8 really seem to be doing pretty well and really did not have any problem until the night of 8/11 after eating dinner he became short of breath and went to the emergency room for evaluation. He again had shortness of breath after eating last night and almost called 911 but the symptoms seem to go away. He notes no shortness of breath during the daytime. He denies any chest pain, palpitations, PND, orthopnea or other cardiorespiratory complaints. His appetite is poor but he has no nausea vomiting. He has no diarrhea. He denies any  complaints. He did have some problems with hematuria during hospitalization. He has no other problems on complete review of systems other than just general weakness.  
    My review of the emergency room records from August 11 revealed that he had a BUN of 35 creatinine 1.58. Pro BNP was markedly elevated at 2224, hemoglobin was 9.7 compared to hemoglobin of 8.1 at time of discharge from the hospital.  He had a chest x-ray revealed no acute process but chest CT did reveal some bilateral apical atelectasis and mild cardiomegaly. He was felt to be in congestive heart failure and Lasix was increased to 40 mg twice daily from 40 mg daily. Visit Vitals BP 98/46 (BP 1 Location: Right arm, BP Patient Position: Sitting) Pulse 79 Temp 97.6 °F (36.4 °C) (Oral) Resp 20 Ht 5' 9\" (1.753 m) Wt 186 lb (84.4 kg) SpO2 95% BMI 27.47 kg/m² Historical Data Past Medical History:  
Diagnosis Date  Arrhythmia A-fib  GERD (gastroesophageal reflux disease)  Hypertension  Other ill-defined conditions(799.89) BPH  Other ill-defined conditions(799.89) lipids  Other ill-defined conditions(799.89)   
 shingles hx  Thyroid disease  Unspecified adverse effect of anesthesia Past Surgical History:  
Procedure Laterality Date  HX CATARACT REMOVAL    
 HX HEART CATHETERIZATION    
 ablation  HX HEART CATHETERIZATION  07/18/2019 Stent  HX HEENT    
 scar tissue removed/laser  HX HERNIA REPAIR  05/05/2017 Hailey Womack MD  
 HX ORTHOPAEDIC    
 left foot fx--hardware  HX ORTHOPAEDIC  4/9/14 Right total knee arthroplasty  HX OTHER SURGICAL    
 prostate bx Outpatient Encounter Medications as of 8/14/2019 Medication Sig Dispense Refill  furosemide (LASIX) 40 mg tablet Take 1 Tab by mouth daily. 30 Tab 0  clopidogrel (PLAVIX) 75 mg tab Take 1 Tab by mouth daily. 90 Tab 3  
 aspirin 81 mg chewable tablet Take 81 mg by mouth daily.  tamsulosin (FLOMAX) 0.4 mg capsule TAKE 2 CAPSULES DAILY 180 Cap 3  
 levothyroxine (SYNTHROID) 137 mcg tablet Take 137 mcg by mouth Daily (before breakfast).  90 Tab 3  
  esomeprazole (NEXIUM) 40 mg capsule Take 1 Cap by mouth daily. 90 Cap 3  
 finasteride (PROSCAR) 5 mg tablet Take 1 Tab by mouth daily (after dinner). 90 Tab 3  
 simvastatin (ZOCOR) 40 mg tablet Take 1 Tab by mouth nightly. 90 Tab 3  
 losartan (COZAAR) 50 mg tablet Take 1 Tab by mouth daily. 90 Tab prn  dilTIAZem ER (CARDIZEM LA) 120 mg tablet Take 1 Tab by mouth daily. 90 Tab 3  furosemide (LASIX) 40 mg tablet Take 1 Tab by mouth two (2) times a day for 5 days. 10 Tab 0 No facility-administered encounter medications on file as of 2019. No Known Allergies Social History Socioeconomic History  Marital status:  Spouse name: Not on file  Number of children: Not on file  Years of education: Not on file  Highest education level: Not on file Occupational History  Not on file Social Needs  Financial resource strain: Not on file  Food insecurity:  
  Worry: Not on file Inability: Not on file  Transportation needs:  
  Medical: Not on file Non-medical: Not on file Tobacco Use  Smoking status: Former Smoker Packs/day: 0.50 Last attempt to quit: 3/28/1963 Years since quittin.3  Smokeless tobacco: Never Used Substance and Sexual Activity  Alcohol use: Yes Comment: rare/social-beer  Drug use: Yes Types: Prescription, OTC  Sexual activity: Not on file Lifestyle  Physical activity:  
  Days per week: Not on file Minutes per session: Not on file  Stress: Not on file Relationships  Social connections:  
  Talks on phone: Not on file Gets together: Not on file Attends Bahai service: Not on file Active member of club or organization: Not on file Attends meetings of clubs or organizations: Not on file Relationship status: Not on file  Intimate partner violence:  
  Fear of current or ex partner: Not on file Emotionally abused: Not on file Physically abused: Not on file Forced sexual activity: Not on file Other Topics Concern  Not on file Social History Narrative  Not on file REVIEW OF SYSTEMS: 
General: negative for - chills or fever. Possible weakness ENT: negative for - headaches, nasal congestion or tinnitus Eyes: no blurred or visual changes Neck: No stiffness or swollen nodes Respiratory: negative for - cough, hemoptysis or wheezing. Positive episodic shortness of breath Cardiovascular : negative for - chest pain, edema, palpitations or shortness of breath Gastrointestinal: negative for - abdominal pain, blood in stools, heartburn or nausea/vomiting Genito-Urinary: no dysuria, trouble voiding, or hematuria Musculoskeletal: negative for - gait disturbance, joint pain, joint stiffness or joint swelling Neurological: no TIA or stroke symptoms Hematologic: no bruises, no bleeding Lymphatic: no swollen glands Integument: no lumps, mole changes, nail changes or rash Endocrine:no malaise/lethargy poly uria or polydipsia or unexpected weight changes Visit Vitals BP 98/46 (BP 1 Location: Right arm, BP Patient Position: Sitting) Pulse 79 Temp 97.6 °F (36.4 °C) (Oral) Resp 20 Ht 5' 9\" (1.753 m) Wt 186 lb (84.4 kg) SpO2 95% BMI 27.47 kg/m² CONSTITUTIONAL: Frail appearing pale white male, appears age appropriate HEAD: normocephalic, atraumatic EYES: sclera anicteric, PERRL, EOMI 
ENMT:moist mucous membranes, pharynx clear Nares: w/o erythema or edema NECK: supple. Thyroid normal, No JVD or bruits RESPIRATORY: Chest: clear to ascultation and percussion with mild decreased breath sounds CARDIOVASCULAR: Heart: regular rate and rhythm no murmurs, rubs or gallops, PMI not displaced, no thrill GASTROINTESTINAL: Abdomen: non distended, soft, non-tender, bowel sounds normal 
HEMATOLOGIC: no petechiae or purpura LYMPHATIC: no lymphadenopathy MUSCULOSKELETAL: Extremities: no edema or active synovitis, pulse 1+ BACK; no point or CVAT INTEGUMENT: No unusual rashes or suspicious skin lesions noted. Nails appear normal. 
NEUROLOGIC: non-focal exam  
MENTAL STATUS: alert and oriented, appropriate affect PSYCHIATRIC: normal affect ASSESSMENT:  
1. ASCVD (arteriosclerotic cardiovascular disease) 2. Paroxysmal atrial fibrillation (HCC) 3. Stage 3 chronic kidney disease ([facility] [address] 4. Chronic obstructive pulmonary disease, unspecified COPD type ([facility] [address] 5. Anemia, unspecified type 6. Primary osteoarthritis involving multiple joints 7. SOB (shortness of breath) Impression 1. ASCVD with recent stent of left main and proximal LAD and circumflex 2. Paroxysmal atrial fibrillation now in sinus rhythm EKG obtained today is sinus rhythm without acute process. 3.  CKD stage III repeat status is pending I reviewed his labs from the ER that were done on the 11th with a BUN of 35 and creatinine 1.58 and repeat status pending today. 4.  COPD that is currently stable with hyperinflation noted on chest x-ray today I am going to place him on a pro-air inhaler in thought that some of his shortness of breath he has had the last few days has been related to his oxygenation. O2 sat today on room air is 95% at rest. 
5.  Anemia hemoglobin in the ER was 9.7 up from 8.1 at hospital discharge so we will see what it is today but I do not think that is playing a significant role in his shortness of breath. 6.  DJD that is currently stable 7. Shortness of breath I think this is related to underlying lung disease with a component from congestive heart failure as his pro BNP in the emergency room was 2822 although clearly some of that could be related to the fact that he has renal failure and certainly component of congestive failure.   We will continue with the Lasix at 40 mg twice daily today tomorrow and the next day and then go back to 40 mg once daily. A chest x-ray obtained today reveal some mild atelectasis in the bases but no significant cardiomegaly no effusions. He does not have peripheral edema. As noted we will see if the Pro Air inhaler helps the shortness of breath 8. Hypertension I think blood pressure is more than adequately treated blood pressure by the nurse 98/46 and by me 112/70 for someone to discontinue his losartan and will continue his Lasix as noted as well as his Cardizem Extremely high complexity patient with high complexity decision making with high risk of repeat hospitalization. Very close follow-up to be done. I did discuss this in detail with his son present with him today. I have tentatively set him up to see me in 10 days with a clear understanding I will see him sooner if there is a problem and if I am not available in office the nurse practitioner will see him. I we will see him within the next 2 days if necessary. We will see how he does with the addition of the inhaler and we will call with lab results. PLAN: 
. Orders Placed This Encounter  XR CHEST PA LAT  CBC WITH AUTOMATED DIFF  
 METABOLIC PANEL, BASIC ([facility] In-House)  AMB POC EKG ROUTINE W/ 12 LEADS, INTER & REP  
 
 
 
ATTENTION:  
This medical record was transcribed using an electronic medical records system. Although proofread, it may and can contain electronic and spelling errors. Other human spelling and other errors may be present. Corrections may be executed at a later time. Please feel free to contact us for any clarifications as needed. Follow-up and Dispositions · Return in about 10 days (around 8/24/2019). Nenita Thornton MD 
 
Orders Placed This Encounter  XR CHEST PA LAT Standing Status:   Future Standing Expiration Date:   8/14/2020 Order Specific Question:   Reason for Exam  
  Answer:   SOB  CBC WITH AUTOMATED DIFF  
  [facility]  AMB POC EKG ROUTINE W/ 12 LEADS, INTER & REP Order Specific Question:   Reason for Exam: Answer:   ASCVD No results found for any visits on 08/14/19. I have reviewed the patient's medical history in detail and updated the computerized patient record. We had a prolonged discussion about these complex clinical issues and went over the various important aspects to consider. All questions were answered. Advised him to call back or return to office if symptoms do not improve, change in nature, or persist. 
 
He was given an after visit summary or informed of [email or web address] Access which includes patient instructions, diagnoses, current medications, & vitals. He expressed understanding with the diagnosis and plan.

## 2019-08-15 NOTE — PROGRESS NOTES
Lab results remarkable for kidneys being consistent with a little dehydration so at this point I would switch the Lasix back to just once daily.

## 2019-08-19 NOTE — TELEPHONE ENCOUNTER
Spoke to daughter regarding this call. Is now only taking the fluid pill once a day since 8-. Informed her that his recent lab report did show dehydration. Offered 8:10 am appointment in the morning but had another appointment tomorrow afternoon to see his specialists and did not want to go out two times tomorrow. Did take an appointment 8- to see Dr. Narayan Churchill. Also offered [facility] as an option as well. States he did not feel up go going there today.

## 2019-08-19 NOTE — TELEPHONE ENCOUNTER
Patient is weak, was taken off Blood Pressure Medications  When Patient stands up his blood pressure drops lower. Wants us to call cardiologist to try to get an earlier appointment.     Call back [phone]

## 2019-08-19 NOTE — PROGRESS NOTES
Lab results remarkable for kidneys being consistent with a little dehydration so at this point I would switch the Lasix back to just once daily. Spoke to the daughter regarding this and had only increased this to two a day for 5 days. States he finished doing this 8- but still feels very weak. Scheduled for f/up 8- which was the soonest appointment I could get in the office. Offered [facility] as well.

## 2019-08-21 NOTE — PROGRESS NOTES
This note will not be viewable in [address] Abril Boateng is a 80 y.o. male and presents with Hypotension and Decreased Appetite  . Subjective:  Mr. Otis Seth is seen in the office today at the request of the family due to problems with low blood pressures, progressive weakness, poor p.o. intake, orthostatic dizziness and failure to thrive. The patient has multiple medical problems including hypertension associated with chronic kidney disease, paroxysmal atrial fibrillation, coronary artery disease with previous stents, anemia. Patient had been seen in the emergency room recently and diuretics were increased however over the last week his Lasix was reduced to 1 daily. Labs obtained 1 week ago showed a BUN of 44 and a creatinine elevated at 2.4. In addition his hemoglobin was 11.1. The family has been checking his blood pressures at home and notes some of the recordings are around 90 systolic. The patient has been unable to drink much in the way of fluids over this past week. He denies exertional chest pain, PND, orthopnea or lower extremity edema. He did have a fall within the last day or 2 due to his weakness.     Past Medical History:   Diagnosis Date    Arrhythmia     A-fib    GERD (gastroesophageal reflux disease)     Hypertension     Other ill-defined conditions(799.89)     BPH    Other ill-defined conditions(799.89)     lipids    Other ill-defined conditions(799.89)     shingles hx    Thyroid disease     Unspecified adverse effect of anesthesia      Past Surgical History:   Procedure Laterality Date    HX CATARACT REMOVAL      HX HEART CATHETERIZATION      ablation    HX HEART CATHETERIZATION  07/18/2019    Stent    HX HEENT      scar tissue removed/laser    HX HERNIA REPAIR  05/05/2017    Queenie Womack MD    HX ORTHOPAEDIC      left foot fx--hardware    HX ORTHOPAEDIC  4/9/14    Right total knee arthroplasty    HX OTHER SURGICAL      prostate bx     No Known Allergies  Current Outpatient Medications   Medication Sig Dispense Refill    albuterol (PROAIR HFA) 90 mcg/actuation inhaler Take 2 Puffs by inhalation every four (4) hours as needed for Wheezing or Shortness of Breath. 1 Inhaler 3    furosemide (LASIX) 40 mg tablet Take 1 Tab by mouth daily. 90 Tab 3    dilTIAZem ER (CARDIZEM LA) 120 mg tablet Take 1 Tab by mouth daily. 90 Tab 3    clopidogrel (PLAVIX) 75 mg tab Take 1 Tab by mouth daily. 90 Tab 3    aspirin 81 mg chewable tablet Take 81 mg by mouth daily.  tamsulosin (FLOMAX) 0.4 mg capsule TAKE 2 CAPSULES DAILY 180 Cap 3    levothyroxine (SYNTHROID) 137 mcg tablet Take 137 mcg by mouth Daily (before breakfast). 90 Tab 3    esomeprazole (NEXIUM) 40 mg capsule Take 1 Cap by mouth daily. 90 Cap 3    finasteride (PROSCAR) 5 mg tablet Take 1 Tab by mouth daily (after dinner). 90 Tab 3    simvastatin (ZOCOR) 40 mg tablet Take 1 Tab by mouth nightly.  80 Tab 3     Social History     Socioeconomic History    Marital status:      Spouse name: Not on file    Number of children: Not on file    Years of education: Not on file    Highest education level: Not on file   Tobacco Use    Smoking status: Former Smoker     Packs/day: 0.50     Last attempt to quit: 3/28/1963     Years since quittin.3    Smokeless tobacco: Never Used   Substance and Sexual Activity    Alcohol use: Yes     Comment: rare/social-beer    Drug use: Yes     Types: Prescription, OTC     Family History   Problem Relation Age of Onset    Heart Disease Mother     Hypertension Mother     Stroke Mother     Heart Disease Father     Cancer Paternal Grandmother        Health Maintenance   Topic Date Due    Shingrix Vaccine Age 50> (1 of 2) 1981    GLAUCOMA SCREENING Q2Y  1996    Influenza Age 9 to Adult  2019    MEDICARE YEARLY EXAM  2019    DTaP/Tdap/Td series (2 - Td) 2028    Pneumococcal 65+ years  Completed        Review of Systems  Constitutional: Positive for anorexia with poor p.o. intake, weakness and fatigue  Eyes:   negative for visual disturbance and irritation  ENT:   negative for tinnitus,sore throat,nasal congestion,ear pain,hoarseness  Respiratory:  negative for cough, hemoptysis, dyspnea,wheezing  CV:   negative for chest pain, palpitations, lower extremity edema  GI:   negative for nausea, vomiting, diarrhea, abdominal pain,melena  Endo:               negative for polyuria,polydipsia,polyphagia,heat intolerance  Genitourinary: negative for frequency, dysuria and hematuria  Integumentary: negative for rash and pruritus  Hematologic:  negative for easy bruising and gum/nose bleeding  Musculoskel: negative for myalgias, arthralgias, back pain, muscle weakness, joint pain  Neurological:  Positive for weakness and orthostatic dizziness  Behavl/Psych: negative for feelings of anxiety, depression, mood changes  ROS otherwise negative      Objective:  Visit Vitals  /54 (BP 1 Location: Right arm, BP Patient Position: Sitting)   Pulse 75   Temp 98.2 °F (36.8 °C) (Oral)   Resp 16   Ht 5' 9\" (1.753 m)   Wt 182 lb 3.2 oz (82.6 kg)   SpO2 95%   BMI 26.91 kg/m²     Body mass index is 26.91 kg/m². Physical Exam:   General appearance - alert, well appearing, and in no distress  Mental status - alert, oriented to person, place, and time  EYE-MILLY, EOMI,conjunctiva normal bilaterally, lids normal  ENT-ENT exam normal, no neck nodes or sinus tenderness  Nose - normal and patent, no erythema,  Or discharge   Mouth - mucous membranes moist, pharynx normal without lesions  Neck - supple, no significant adenopathy or bruit  Chest - clear to auscultation, no wheezes, rales or rhonchi.   Heart - normal rate, regular rhythm, normal S1, S2, no murmurs, rubs, clicks or gallops   Abdomen - soft, nontender, nondistended, no masses or organomegaly  Lymph- no adenopathy palpable  Ext-peripheral pulses normal, no pedal edema, no clubbing or cyanosis  Skin-Warm and dry. no hyperpigmentation, vitiligo, or suspicious lesions  Neuro -alert, oriented, normal speech, no focal findings or movement disorder noted      Assessment/Plan:  Diagnoses and all orders for this visit:    Hypotension, unspecified hypotension type  -     CBC WITH AUTOMATED DIFF  -     METABOLIC PANEL, BASIC  -     URINALYSIS W/MICROSCOPIC    Hypertension with renal disease    Paroxysmal atrial fibrillation (HCC)    ASCVD (arteriosclerotic cardiovascular disease)    Anemia, unspecified type        Other instructions: The patient's medications were reviewed and reconciled. Stat labs and a urinalysis have been obtained    The patient is doing poorly and failing to thrive. I suspect his renal function is worse and that he will likely need to be admitted for hydration and medication adjustment. Further recommendations pending results of the above    Follow-up and Dispositions    · Return for As previously scheduled. I have reviewed with the patient details of the assessment and plan and all questions were answered. Relevent patient education was performed. The most recent lab findings were reviewed with the patient. An After Visit Summary was printed and given to the patient.     Perez Lester MD

## 2019-08-21 NOTE — PATIENT INSTRUCTIONS
Low Blood Pressure: Care Instructions  Your Care Instructions    Blood pressure is a measurement of the force of the blood against the walls of the blood vessels during and after each beat of the heart. Low blood pressure is also called hypotension. It means that your blood pressure is much lower than normal. Some people, especially young, slim women, may have slightly low blood pressure without symptoms. But in many people, low blood pressure can cause symptoms such as feeling dizzy or lightheaded. When your blood pressure is too low, your heart, brain, and other organs do not get enough blood. Low blood pressure can be caused by many things, including heart problems and some medicines. Diabetes that is not under control can cause your blood pressure to drop. And so can a severe allergic reaction or infection. Another cause is dehydration, which is when your body loses too much fluid. Treatment for low blood pressure depends on the cause. Follow-up care is a key part of your treatment and safety. Be sure to make and go to all appointments, and call your doctor if you are having problems. It's also a good idea to know your test results and keep a list of the medicines you take. How can you care for yourself at home? · Drink plenty of fluids, enough so that your urine is light yellow or clear like water. If you have kidney, heart, or liver disease and have to limit fluids, talk with your doctor before you increase the amount of fluids you drink. · Be safe with medicines. Call your doctor if you think you are having a problem with your medicine. You will get more details on the specific medicines your doctor prescribes. · Stand up or get out of bed very slowly to allow your body to adjust.  · Get plenty of rest.  · Do not smoke. Smoking increases your risk of heart attack. If you need help quitting, talk to your doctor about stop-smoking programs and medicines.  These can increase your chances of quitting for good. · Limit alcohol to 2 drinks a day for men and 1 drink a day for women. Alcohol may interfere with your medicine. In addition, alcohol can make your low blood pressure worse by causing your body to lose water. When should you call for help? Call 911 anytime you think you may need emergency care. For example, call if:    · You passed out (lost consciousness).    Call your doctor now or seek immediate medical care if:    · You are dizzy or lightheaded, or you feel like you may faint.    Watch closely for changes in your health, and be sure to contact your doctor if you have any problems. Where can you learn more? Go to http://frank-lyubov.info/. Enter C304 in the search box to learn more about \"Low Blood Pressure: Care Instructions. \"  Current as of: July 22, 2018  Content Version: 12.1  © 5381-2402 [employer]. Care instructions adapted under license by [employer] (which disclaims liability or warranty for this information). If you have questions about a medical condition or this instruction, always ask your healthcare professional. [address] any warranty or liability for your use of this information.

## 2019-08-21 NOTE — PROGRESS NOTES
Brigitte Luna is a 80 y.o. male presenting for Hypotension and Decreased Appetite  . 1. Have you been to the ER, urgent care clinic since your last visit? Hospitalized since your last visit? No    2. Have you seen or consulted any other health care providers outside of the [address] [place] [address] since your last visit? Include any pap smears or colon screening. Dr Ese Mcpherson- 8/20/19 for staple removal.    Fall Risk Assessment, last 12 mths 8/14/2019   Able to walk? Yes   Fall in past 12 months? No   Fall with injury? -   Number of falls in past 12 months -   Fall Risk Score -         Abuse Screening Questionnaire 9/12/2017   Do you ever feel afraid of your partner? N   Are you in a relationship with someone who physically or mentally threatens you? N   Is it safe for you to go home? Y       3 most recent PHQ Screens 8/14/2019   Little interest or pleasure in doing things Not at all   Feeling down, depressed, irritable, or hopeless Not at all   Total Score PHQ 2 0       There are no discontinued medications.

## 2019-08-21 NOTE — PROGRESS NOTES
Kidney function is actually better than it was 1 week ago. Would hold his diuretic for a couple of days until he starts drinking more fluids. Urinalysis did not appear infected.   Should follow-up with Dr. Stephon Shetty next week

## 2019-08-24 NOTE — PROGRESS NOTES
Chief Complaint   Patient presents with    Hypertension     10 day follow up    Blood sugar problem    COPD    Benign Prostatic Hypertrophy       SUBJECTIVE:    Sundeep Peterson is a 80 y.o. male who is brought in by his daughter today for evaluation of severe weakness. He was hospitalized in July and discharged on July 22 to rehab. During the hospitalization he had left main disease and was treated with a stent extending in his LAD with a sidehole to his circumflex. Postoperative he did develop significant anemia and was transfused 3 units of packed blood cells. He was seen in follow-up by me on August 14 at which time his hemoglobin was up to 11.1 but his BUN was 44 with a creatinine of 2.4. He returned here on the 21st at which time his creatinine had improved to 1.8 and BUN to 31 however he remained very weak. At that time his hemoglobin was 12. He returns today noting he has become weaker each day since seen here on the 21st and is to the point now we can do hardly anything without having to lay down because he is weak. He notes no chest pain, shortness of breath, palpitations, PND or other complaints except extreme weakness with no energy to do anything. He also has a very poor appetite but notes no nausea vomiting. Current Outpatient Medications   Medication Sig Dispense Refill    albuterol (PROAIR HFA) 90 mcg/actuation inhaler Take 2 Puffs by inhalation every four (4) hours as needed for Wheezing or Shortness of Breath. 1 Inhaler 3    furosemide (LASIX) 40 mg tablet Take 1 Tab by mouth daily. 90 Tab 3    dilTIAZem ER (CARDIZEM LA) 120 mg tablet Take 1 Tab by mouth daily. 90 Tab 3    clopidogrel (PLAVIX) 75 mg tab Take 1 Tab by mouth daily. 90 Tab 3    aspirin 81 mg chewable tablet Take 81 mg by mouth daily.  tamsulosin (FLOMAX) 0.4 mg capsule TAKE 2 CAPSULES DAILY 180 Cap 3    levothyroxine (SYNTHROID) 137 mcg tablet Take 137 mcg by mouth Daily (before breakfast).  90 Tab 3  esomeprazole (NEXIUM) 40 mg capsule Take 1 Cap by mouth daily. 90 Cap 3    finasteride (PROSCAR) 5 mg tablet Take 1 Tab by mouth daily (after dinner). 90 Tab 3    simvastatin (ZOCOR) 40 mg tablet Take 1 Tab by mouth nightly. 80 Tab 3     Past Medical History:   Diagnosis Date    Arrhythmia     A-fib    GERD (gastroesophageal reflux disease)     Hypertension     Other ill-defined conditions(799.89)     BPH    Other ill-defined conditions(799.89)     lipids    Other ill-defined conditions(799.89)     shingles hx    Thyroid disease     Unspecified adverse effect of anesthesia      Past Surgical History:   Procedure Laterality Date    HX CATARACT REMOVAL      HX HEART CATHETERIZATION      ablation    HX HEART CATHETERIZATION  07/18/2019    Stent    HX HEENT      scar tissue removed/laser    HX HERNIA REPAIR  05/05/2017    Jenny Womack MD    HX ORTHOPAEDIC      left foot fx--hardware    HX ORTHOPAEDIC  4/9/14    Right total knee arthroplasty    HX OTHER SURGICAL      prostate bx     No Known Allergies    REVIEW OF SYSTEMS:  General: negative for - chills or fever, or weight loss or gain  ENT: negative for - headaches, nasal congestion or tinnitus  Eyes: no blurred or visual changes  Neck: No stiffness or swollen nodes  Respiratory: negative for - cough, hemoptysis, shortness of breath or wheezing.   Extreme weakness with activity  Cardiovascular : negative for - chest pain, edema, palpitations or shortness of breath  Gastrointestinal: negative for - abdominal pain, blood in stools, heartburn or nausea/vomiting  Genito-Urinary: no dysuria, trouble voiding, or hematuria  Musculoskeletal: negative for - gait disturbance, joint pain, joint stiffness or joint swelling  Neurological: no TIA or stroke symptoms  Hematologic: no bruises, no bleeding  Lymphatic: no swollen glands  Integument: no lumps, mole changes, nail changes or rash  Endocrine:no malaise/lethargy poly uria or polydipsia or unexpected weight changes        Social History     Socioeconomic History    Marital status:      Spouse name: Not on file    Number of children: Not on file    Years of education: Not on file    Highest education level: Not on file   Tobacco Use    Smoking status: Former Smoker     Packs/day: 0.50     Last attempt to quit: 3/28/1963     Years since quittin.3    Smokeless tobacco: Never Used   Substance and Sexual Activity    Alcohol use: Yes     Comment: rare/social-beer    Drug use: Yes     Types: Prescription, OTC     Family History   Problem Relation Age of Onset    Heart Disease Mother     Hypertension Mother     Stroke Mother     Heart Disease Father     Cancer Paternal Grandmother        OBJECTIVE:     Visit Vitals  /60 (BP 1 Location: Right arm, BP Patient Position: Sitting)   Pulse 81   Temp 98.3 °F (36.8 °C) (Oral)   Resp 22   Ht 5' 9\" (1.753 m)   Wt 182 lb 6.4 oz (82.7 kg)   SpO2 96%   BMI 26.94 kg/m²     CONSTITUTIONAL:   well nourished but pale appearing white male there appears to be chronically ill  EYES: sclera anicteric, PERRL, EOMI  ENMT:nares clear, moist mucous membranes, pharynx clear  NECK: supple. Thyroid normal, No JVD or bruits  RESPIRATORY: Chest: clear to ascultation and percussion, normal inspiratory effort  CARDIOVASCULAR: Heart: regular rate and rhythm no murmurs, rubs or gallops, PMI not displaced, No thrills  GASTROINTESTINAL: Abdomen: non distended, soft, non tender, bowel sounds normal  HEMATOLOGIC: no purpura, petechiae or bruising  LYMPHATIC: No lymph node enlargemant  MUSCULOSKELETAL: Extremities: no edema or active synovitis, pulse 1+   INTEGUMENT: No unusual rashes or suspicious skin lesions noted. Nails appear normal.  PERIPHERAL VASCULAR: normal pulses femoral, PT and DP  NEUROLOGIC: non-focal exam, A & O X 3  PSYCHIATRIC:, appropriate affect     ASSESSMENT:   1. Hypertension with renal disease    2. ASCVD (arteriosclerotic cardiovascular disease)    3. Paroxysmal atrial fibrillation (HCC)    4. Stage 3 chronic kidney disease ([address]    5. Acute on chronic diastolic congestive heart failure (HCC)      Impression  1. Hypertension blood pressure is actually a little better since his Lasix was discontinued on the 21st.  2.  ASCVD with recent stent placement I am not sure that is not playing some role in his extreme weakness  3. Paroxysmal atrial fibrillation EKG obtained today  4. CKD stage III repeat status pending  5. Question CHF although chest x-ray does not show evidence of this  He is certainly very frail and difficult getting around and is at high risk of falls I do not think I have any choice but they admitted to the hospital for further extensive evaluation. See admit note for details. PLAN:  .  Orders Placed This Encounter    XR CHEST PA LAT    CBC WITH AUTOMATED DIFF    METABOLIC PANEL, BASIC ([facility])    AMB POC EKG ROUTINE W/ 12 LEADS, INTER & REP         ATTENTION:   This medical record was transcribed using an electronic medical records system. Although proofread, it may and can contain electronic and spelling errors. Other human spelling and other errors may be present. Corrections may be executed at a later time. Please feel free to contact us for any clarifications as needed. No results found for any visits on 08/26/19. Ninfa Claude, MD    The patient verbalized understanding of the problems and plans as explained.

## 2019-08-26 PROBLEM — I50.33 ACUTE ON CHRONIC DIASTOLIC CONGESTIVE HEART FAILURE (HCC): Status: ACTIVE | Noted: 2019-01-01

## 2019-08-26 PROBLEM — R53.1 WEAKNESS GENERALIZED: Status: ACTIVE | Noted: 2019-01-01

## 2019-08-26 NOTE — PATIENT INSTRUCTIONS
Arthritis: Care Instructions  Your Care Instructions  Arthritis, also called osteoarthritis, is a breakdown of the cartilage that cushions your joints. When the cartilage wears down, your bones rub against each other. This causes pain and stiffness. Many people have some arthritis as they age. Arthritis most often affects the joints of the spine, hands, hips, knees, or feet. You can take simple measures to protect your joints, ease your pain, and help you stay active. Follow-up care is a key part of your treatment and safety. Be sure to make and go to all appointments, and call your doctor if you are having problems. It's also a good idea to know your test results and keep a list of the medicines you take. How can you care for yourself at home? · Stay at a healthy weight. Being overweight puts extra strain on your joints. · Talk to your doctor or physical therapist about exercises that will help ease joint pain. ? Stretch. You may enjoy gentle forms of yoga to help keep your joints and muscles flexible. ? Walk instead of jog. Other types of exercise that are less stressful on the joints include riding a bicycle, swimming, julia chi, or water exercise. ? Lift weights. Strong muscles help reduce stress on your joints. Stronger thigh muscles, for example, take some of the stress off of the knees and hips. Learn the right way to lift weights so you do not make joint pain worse. · Take your medicines exactly as prescribed. Call your doctor if you think you are having a problem with your medicine. · Take pain medicines exactly as directed. ? If the doctor gave you a prescription medicine for pain, take it as prescribed. ? If you are not taking a prescription pain medicine, ask your doctor if you can take an over-the-counter medicine. · Use a cane, crutch, walker, or another device if you need help to get around. These can help rest your joints.  You also can use other things to make life easier, such as a higher toilet seat and padded handles on kitchen utensils. · Do not sit in low chairs, which can make it hard to get up. · Put heat or cold on your sore joints as needed. Use whichever helps you most. You also can take turns with hot and cold packs. ? Apply heat 2 or 3 times a day for 20 to 30 minutesusing a heating pad, hot shower, or hot packto relieve pain and stiffness. ? Put ice or a cold pack on your sore joint for 10 to 20 minutes at a time. Put a thin cloth between the ice and your skin. When should you call for help? Call your doctor now or seek immediate medical care if:    · You have sudden swelling, warmth, or pain in any joint.     · You have joint pain and a fever or rash.     · You have such bad pain that you cannot use a joint.    Watch closely for changes in your health, and be sure to contact your doctor if:    · You have mild joint symptoms that continue even with more than 6 weeks of care at home.     · You have stomach pain or other problems with your medicine. Where can you learn more? Go to http://frank-lyubov.info/. Enter J194 in the search box to learn more about \"Arthritis: Care Instructions. \"  Current as of: April 1, 2019  Content Version: 12.1  © 8114-4307 [employer]. Care instructions adapted under license by [employer] (which disclaims liability or warranty for this information). If you have questions about a medical condition or this instruction, always ask your healthcare professional. [place] [address] any warranty or liability for your use of this information.

## 2019-08-26 NOTE — Clinical Note
TRANSFER - OUT REPORT:  
 
Verbal report given to: Elyce Pallas, RN. Report consisted of patient's Situation, Background, Assessment and  
Recommendations(SBAR). Opportunity for questions and clarification was provided. Patient transported to: [facility].

## 2019-08-26 NOTE — PROGRESS NOTES
1720: Patient in room MD Fam Mclaughlin notified. No orders in currently. Dr. Fam Mclaughlin office stated that Dr. Fam Mclaughlin putting in orders now.     1730: Orders placed

## 2019-08-26 NOTE — H&P
ADMISSION NOTE    NAME:  Abril Boateng   :   3/13/1931   MRN:   [id number]     Date/Time:  2019 5:11 PM  Subjective:   CHIEF COMPLAINT: Generalized weakness    HISTORY OF PRESENT ILLNESS:     Stephen Montana is a 80 y.o.  white male who is brought in by his daughter today for evaluation of severe weakness. He was hospitalized in July and discharged on  to rehab. During the hospitalization he had left main disease and was treated with a stent extending in his LAD with a sidehole to his circumflex. Postoperative he did develop significant anemia and was transfused 3 units of packed blood cells. He was seen in follow-up by me on  at which time his hemoglobin was up to 11.1 but his BUN was 44 with a creatinine of 2.4. He returned here on the  at which time his creatinine had improved to 1.8 and BUN to 31 however he remained very weak. At that time his hemoglobin was 12. He returns today noting he has become weaker each day since seen here on the  despite stopping his diuretic and is to the point now he can do hardly anything without having to lay down because he is weak. He notes no chest pain, shortness of breath, palpitations, PND or other complaints except extreme weakness with no energy to do anything. He also has a very poor appetite but notes no nausea vomiting. He denies any other GI or  complaints. He denies any headaches, dizziness or neurologic complaints except extreme weakness. He has no other complaints on complete review of systems.         Past Medical History:   Diagnosis Date    Arrhythmia     A-fib    GERD (gastroesophageal reflux disease)     Hypertension     Other ill-defined conditions(799.89)     BPH    Other ill-defined conditions(799.89)     lipids    Other ill-defined conditions(799.89)     shingles hx    Thyroid disease     Unspecified adverse effect of anesthesia         Past Surgical History:   Procedure Laterality Date    HX CATARACT REMOVAL  HX HEART CATHETERIZATION      ablation    HX HEART CATHETERIZATION  2019    Stent    HX HEENT      scar tissue removed/laser    HX HERNIA REPAIR  2017    Yojana Womack MD    HX ORTHOPAEDIC      left foot fx--hardware    HX ORTHOPAEDIC  14    Right total knee arthroplasty    HX OTHER SURGICAL      prostate bx       Social History     Tobacco Use    Smoking status: Former Smoker     Packs/day: 0.50     Last attempt to quit: 3/28/1963     Years since quittin.4    Smokeless tobacco: Never Used   Substance Use Topics    Alcohol use: Yes     Comment: rare/social-beer        Family History   Problem Relation Age of Onset    Heart Disease Mother     Hypertension Mother     Stroke Mother     Heart Disease Father     Cancer Paternal Grandmother         No Known Allergies     Prior to Admission medications    Medication Sig Start Date End Date Taking? Authorizing Provider   albuterol (PROAIR HFA) 90 mcg/actuation inhaler Take 2 Puffs by inhalation every four (4) hours as needed for Wheezing or Shortness of Breath. 19   Chuckie Torrez MD   furosemide (LASIX) 40 mg tablet Take 1 Tab by mouth daily. 19   Chuckie Torrez MD   dilTIAZem ER (CARDIZEM LA) 120 mg tablet Take 1 Tab by mouth daily. 19   Chuckie Torrez MD   clopidogrel (PLAVIX) 75 mg tab Take 1 Tab by mouth daily. 19   Chuckie Torrez MD   aspirin 81 mg chewable tablet Take 81 mg by mouth daily. Provider, Historical   tamsulosin (FLOMAX) 0.4 mg capsule TAKE 2 CAPSULES DAILY 3/22/19   Chuckie Torrez MD   levothyroxine (SYNTHROID) 137 mcg tablet Take 137 mcg by mouth Daily (before breakfast). 3/22/19   Chuckie Torrez MD   esomeprazole (NEXIUM) 40 mg capsule Take 1 Cap by mouth daily. 3/22/19   Chuckie Torrez MD   finasteride (PROSCAR) 5 mg tablet Take 1 Tab by mouth daily (after dinner).  18   Chuckie Torrez MD   simvastatin (ZOCOR) 40 mg tablet Take 1 Tab by mouth nightly. 12/12/18   George Weber MD       REVIEW OF SYSTEMS:     []Total of 12 systems reviewed as follows:  Constitutional:  negative for fevers, chills, anorexia and weight loss. Positive extreme weakness  Eyes:   negative for visual disturbance and irritation  ENT:   negative for hearing loss, tinnitus, nasal congestion, epistaxis, sore throat  Neck:              negative for stiffness or swollen glands  Respiratory:  negative for cough, hemoptysis, pleurisy/chest pain, wheezing or dyspnea on exertion  Cards:   negative for chest pain, palpitations, orthopnea, PND, lower extremity edema  GI:   negative for dysphagia, nausea, vomiting, diarrhea, constipation and abdominal pain  Genitourinary: negative for frequency, dysuria and hematuria  Integument:  negative for rash and pruritus  Hematologic:  negative for easy bruising and bleeding  Lymphatic:      negative for swollen lymph nodes or night sweats  Musculoskel: negative for myalgias, arthralgias, back pain and muscle weakness  Neurological:  negative for headaches, dizziness, vertigo, memory problems and gait problems  Behavl/Psych:  negative for anxiety, depression and illegal drug usage      Objective:   VITALS:    Visit Vitals  /47 (BP 1 Location: Right arm, BP Patient Position: At rest)   Pulse 77   Temp 99.2 °F (37.3 °C)   Resp 21   SpO2 96%       PHYSICAL EXAM:   General:    Alert, cooperative, no distress, appears stated age. Head:   Normocephalic, without obvious abnormality, atraumatic. Eyes:   Conjunctivae/corneas clear. PERRL  Nose:  Nares normal. No drainage or sinus tenderness. Throat:    Lips, mucosa, and tongue normal.  No Thrush  Neck:  Supple, symmetrical,  no adenopathy, thyroid: non tender    no carotid bruit and no JVD. Back:    Symmetric,  No CVA tenderness. Lungs:   Clear to auscultation bilaterally. No Wheezing or Rhonchi. No rales. Chest wall:  No tenderness or deformity. No Accessory muscle use.   Heart: Regular rate and rhythm,  no murmur, rub or gallop. Abdomen:   Soft, non-tender. Not distended. Bowel sounds normal. No masses  Extremities: Extremities normal, atraumatic, No cyanosis. No edema. No clubbing  Skin:     Texture, turgor normal. No rashes or lesions. Not Jaundiced  Lymph nodes: Cervical, supraclavicular normal.  Psych:  Good insight. Not depressed. Not anxious or agitated. Neurologic: EOMs intact. No facial asymmetry. No aphasia or slurred speech. Normal   strength, Alert and oriented X 3. LAB DATA REVIEWED:    No results found for this or any previous visit (from the past 24 hour(s)). Assessment/Plan:      Active Problems:    Hypertension with renal disease (8/21/2017)      Gastroesophageal reflux disease without esophagitis (8/21/2017)      COPD (chronic obstructive pulmonary disease) ([facility] (8/21/2017)      Stage 3 chronic kidney disease ([facility] (8/21/2017)      Atrial fibrillation ([facility] (8/21/2017)      ASCVD (arteriosclerotic cardiovascular disease) (8/21/2017)      Overview: Catheterization 6/11/2004 80% stenosis nondominant RCA and 30% stenosis of       LAD with a notation of some left main disease but no percentage given. No       mention of angioplasty or stent made      Acquired hypothyroidism (8/21/2017)      Anemia (8/13/2019)      Weakness generalized (8/26/2019)       ___________________________________________________  PLAN:    Risk of deterioration:  []Low    []Moderate  [x]High    1. Start gentle IV hydration and watch fluid status closely  2. Continue without diuretic  3. Check renal function electrolytes and follow  4. Telemetry monitoring  5. Cardiology consultation  6. Recheck CBC as he did have significant anemia during last hospitalization all the last hemoglobin was 12  7. Check thyroid studies with history of hypothyroidism  8. PT and OT evaluation and treatment  9.   May need rehab on discharge again  Further work-up treatment and evaluation pending her initial findings.     Prophylaxis:  [x]Lovenox  []Coumadin  []Hep SQ  []SCDs  []H2B/PPI    Disposition:  [x]Home w/ Family   []HH PT,OT,RN   [x]SNF/LTC   []SAH/Rehab    Discussed Code Status:    [x]Full Code      []DNR     ___________________________________________________    Care Plan discussed with:    [x]Patient   [x]Family   []Specialist :    ___________________________________________________  Admitting Physician: Prachi Bahena MD

## 2019-08-26 NOTE — PROGRESS NOTES
Chief Complaint   Patient presents with    Hypertension     10 day follow up    Blood sugar problem    COPD    Benign Prostatic Hypertrophy     Visit Vitals  /60 (BP 1 Location: Right arm, BP Patient Position: Sitting)   Pulse 81   Temp 98.3 °F (36.8 °C) (Oral)   Resp 22   Ht 5' 9\" (1.753 m)   Wt 182 lb 6.4 oz (82.7 kg)   SpO2 96%   BMI 26.94 kg/m²     1. Have you been to the ER, urgent care clinic since your last visit? Hospitalized since your last visit? No    2. Have you seen or consulted any other health care providers outside of the [address] [place] [address] since your last visit? Include any pap smears or colon screening.  No

## 2019-08-26 NOTE — Clinical Note
Pt in room, procedure delayed due to emergency in another lab. Pt remains in hospital bed for comfort. Pt placed on cardiac monitor, SPO2, and BP monitoring.

## 2019-08-27 PROBLEM — I35.0 NONRHEUMATIC AORTIC VALVE STENOSIS: Status: ACTIVE | Noted: 2019-01-01

## 2019-08-27 NOTE — PROGRESS NOTES
Pharmacy - Enoxaparin (Lovenox®) Monitoring      Indication: DVT prophylaxis     Current Dose: Enoxaparin 30 mg subcutaneously every 24 hours    Creatinine Clearance (mL/min): 34 mL/min (ideal body weight)    Current Weight: 82.7 Kg (BMI 27 kg/m^2)    Labs:  Recent Labs     08/1934   CREA 1.50*   HGB 10.1*        Wt Readings from Last 1 Encounters:   08/26/19 82.7 kg (182 lb 6.4 oz)     Ht Readings from Last 1 Encounters:   08/26/19 175.3 cm (69\")       Impression/Plan:   Will change to enoxaparin 40 mg subcutaneously every 24 hours per P&T VTE prophlyaxis protocol for normal weight patients with CrCl >30 ml/min      ThanksCinthia, PHARMD      [email or web address]

## 2019-08-27 NOTE — PROGRESS NOTES
Problem: Falls - Risk of  Goal: *Absence of Falls  Description  Document Fannie Starks Fall Risk and appropriate interventions in the flowsheet. Outcome: Progressing Towards Goal  Note:   Fall Risk Interventions:  Mobility Interventions: Bed/chair exit alarm         Medication Interventions: Bed/chair exit alarm    Elimination Interventions: Bed/chair exit alarm    History of Falls Interventions: Bed/chair exit alarm         Problem: Pressure Injury - Risk of  Goal: *Prevention of pressure injury  Description  Document Berry Scale and appropriate interventions in the flowsheet.   Outcome: Progressing Towards Goal  Note:   Pressure Injury Interventions:  Sensory Interventions: Assess changes in LOC    Moisture Interventions: Absorbent underpads    Activity Interventions: Pressure redistribution bed/mattress(bed type)    Mobility Interventions: Pressure redistribution bed/mattress (bed type)    Nutrition Interventions: Document food/fluid/supplement intake    Friction and Shear Interventions: Minimize layers

## 2019-08-27 NOTE — PROGRESS NOTES
Problem: Mobility Impaired (Adult and Pediatric)  Goal: *Acute Goals and Plan of Care (Insert Text)  Description  FUNCTIONAL STATUS PRIOR TO ADMISSION: Patient required minimal assistance for basic and instrumental ADLs. HOME SUPPORT PRIOR TO ADMISSION: The patient lived with son and daughter in law who assisted patient with ADLs and mobility as needed since coming home from [address] [place] [address]. He uses a walker most of the time, but had only needed a cane prior to recent hospitalizations. Lives in a 1 story home with 3 steps to enter with Rail on the left side. Reported falls in the last year. Physical Therapy Goals  Initiated 8/27/2019  1. Patient will move from supine to sit and sit to supine , scoot up and down and roll side to side in bed with minimal contact guard/standby assist within 7 day(s). 2.  Patient will transfer from bed to chair and chair to bed with minimal contact guard assist using the least restrictive device within 7 day(s). 3.  Patient will perform sit to stand with standby assistance/set-up within 7 day(s). 4.  Patient will ambulate with contact guard assist for 75 feet with the least restrictive device within 7 day(s). 5.  Patient will ascend/descend 3 stairs with left handrail(s) with minimal assistance x 1 within 7 day(s). Outcome: Progressing Towards Goal   PHYSICAL THERAPY EVALUATION  Patient: Veronica Montoya (89 y.o. male)  Date: 8/27/2019  Primary Diagnosis: ASCVD (arteriosclerotic cardiovascular disease) [I25.10]        Precautions:  Fall, Skin      ASSESSMENT  Based on the objective data described below, the patient presents with generalized weakness, decreased activity tolerance, decreased standing balance and impaired mobility skills following rehospitalization for failure to thrive, weakness and sob yesterday. He had been admitted last month for cardiac stent placement and was discharge to rehab on 7/22/19.  He is currently a high fall risk and unsafe to perform oob mobility and ADLs unassisted. Current Level of Function Impacting Discharge (mobility/balance): min a x 1 for supine to sit and sit to stand transfers. Gait limited to 20 feet(10 feet x 2) using RW and min a x 1. Gait is slow, decreased step lengths and unsteady with turns. Functional Outcome Measure: The patient scored 45/100 on the Barthel outcome measure which is indicative of high level of functional impairment. Other factors to consider for discharge: will need 24/7 assistance/supervision initially for safety if going home. Patient will benefit from skilled therapy intervention to address the above noted impairments. PLAN :  Recommendations and Planned Interventions: bed mobility training, transfer training, gait training, therapeutic exercises, patient and family training/education and therapeutic activities      Frequency/Duration: Patient will be followed by physical therapy:  4 times a week to address goals.     Recommendation for discharge: (in order for the patient to meet his/her long term goals)  Therapy up to 5 days/week in SNF setting or intensive home health therapy program    This discharge recommendation:  Has not yet been discussed the attending provider and/or case management    Equipment recommendations for successful discharge (if) home: patient owns DME required for discharge       OBJECTIVE DATA SUMMARY:   HISTORY:    Past Medical History:   Diagnosis Date    Arrhythmia     A-fib    GERD (gastroesophageal reflux disease)     Hypertension     Other ill-defined conditions(799.89)     BPH    Other ill-defined conditions(799.89)     lipids    Other ill-defined conditions(799.89)     shingles hx    Thyroid disease     Unspecified adverse effect of anesthesia      Past Surgical History:   Procedure Laterality Date    HX CATARACT REMOVAL      HX HEART CATHETERIZATION      ablation    HX HEART CATHETERIZATION  07/18/2019    Stent    HX HEENT      scar tissue removed/laser    HX HERNIA REPAIR  05/05/2017    Karen Womack MD    HX ORTHOPAEDIC      left foot fx--hardware    HX ORTHOPAEDIC  4/9/14    Right total knee arthroplasty    HX OTHER SURGICAL      prostate bx       Personal factors and/or comorbidities impacting plan of care: recurrent hosptializations    Home Situation  Home Environment: Private residence  # Steps to Enter: 3  Rails to Enter: Yes  Hand Rails : Left  One/Two Story Residence: One story  Living Alone: No  Support Systems: Friends \ neighbors, Family member(s)  Patient Expects to be Discharged to[de-identified] Private residence  Current DME Used/Available at Home: Marney Seal, straight, Walker, rolling, Grab bars  Tub or Shower Type: Shower    EXAMINATION/PRESENTATION/DECISION MAKING:   Critical Behavior:  Neurologic State: Drowsy  Orientation Level: Oriented to person, Oriented to place, Oriented to situation  Cognition: Follows commands  Safety/Judgement: Fall prevention  Hearing: Auditory  Auditory Impairment: Hard of hearing, bilateral  Skin:  no findings  Edema: none noted  Range Of Motion:  AROM: Generally decreased, functional           PROM: Generally decreased, functional           Strength:    Strength: Generally decreased, functional                    Tone & Sensation:   Tone: Normal              Sensation: Intact               Coordination:  Coordination: Within functional limits  Vision:      Functional Mobility:  Bed Mobility:  Rolling: Minimum assistance;Assist x1  Supine to Sit: Minimum assistance;Assist x1  Sit to Supine: Minimum assistance;Assist x2  Scooting: Contact guard assistance  Transfers:  Sit to Stand: Minimum assistance;Assist x2  Stand to Sit: Minimum assistance;Assist x2        Bed to Chair: Minimum assistance;Assist x2              Balance:   Sitting: Impaired; Without support  Sitting - Static: Good (unsupported)  Sitting - Dynamic: Fair (occasional)  Standing: Impaired; Without support  Standing - Static: Fair;Constant support  Standing - Dynamic : Fair;Constant support  Ambulation/Gait Training:  Distance (ft): 20 Feet (ft)  Assistive Device: Gait belt;Walker, rolling  Ambulation - Level of Assistance: Minimal assistance;Assist x1     Gait Description (WDL): Exceptions to WDL  Gait Abnormalities: Decreased step clearance; Path deviations; Step to gait        Base of Support: Widened     Speed/Izabella: Slow  Step Length: Left shortened;Right shortened                    Therapeutic Exercises:   Supine - ankle pumps, heel slides and hip abd. All x 5 reps each side. Functional Measure:  Barthel Index:    Bathin  Bladder: 10  Bowels: 10  Groomin  Dressin  Feedin  Mobility: 0  Stairs: 0  Toilet Use: 5  Transfer (Bed to Chair and Back): 10  Total: 45/100       The Barthel ADL Index: Guidelines  1. The index should be used as a record of what a patient does, not as a record of what a patient could do. 2. The main aim is to establish degree of independence from any help, physical or verbal, however minor and for whatever reason. 3. The need for supervision renders the patient not independent. 4. A patient's performance should be established using the best available evidence. Asking the patient, friends/relatives and nurses are the usual sources, but direct observation and common sense are also important. However direct testing is not needed. 5. Usually the patient's performance over the preceding 24-48 hours is important, but occasionally longer periods will be relevant. 6. Middle categories imply that the patient supplies over 50 per cent of the effort. 7. Use of aids to be independent is allowed. Fely Watson., Barthel, D.W. (0731). Functional evaluation: the Barthel Index. [facility] (14)2. LUIS Landeros, Jennie Diamond., Tl Asif.Juan Manuel, [address] ().  Measuring the change indisability after inpatient rehabilitation; comparison of the responsiveness of the Barthel Index and Functional [place] Measure. Journal of Neurology, Neurosurgery, and Psychiatry, 66(4), 137-096. LORENA German, ALESSANDRO Madrigal, & Karoline Carlos M.A. (2004.) Assessment of post-stroke quality of life in cost-effectiveness studies: The usefulness of the Barthel Index and the EuroQoL-5D. Quality of Life Research, 15, 517-54        Physical Therapy Evaluation Charge Determination   History Examination Presentation Decision-Making   HIGH Complexity :3+ comorbidities / personal factors will impact the outcome/ POC  HIGH Complexity : 4+ Standardized tests and measures addressing body structure, function, activity limitation and / or participation in recreation  MEDIUM Complexity : Evolving with changing characteristics  Other outcome measures Barthel  MEDIUM      Based on the above components, the patient evaluation is determined to be of the following complexity level: MEDIUM    Pain Rating:  No complaints this session    Activity Tolerance:   Poor and requires frequent rest breaks  Please refer to the flowsheet for vital signs taken during this treatment. After treatment patient left in no apparent distress:   Supine in bed, Heels elevated for pressure relief, Call bell within reach and Side rails x 3    COMMUNICATION/EDUCATION:   The patients plan of care was discussed with: Occupational Therapist and Registered Nurse. Fall prevention education was provided and the patient/caregiver indicated understanding. and Patient understands intent and goals of therapy, but is neutral about his/her participation.     Thank you for this referral.  Maikel Velez, PT   Time Calculation: 23 mins

## 2019-08-27 NOTE — PROGRESS NOTES
PROGRESS NOTE    NAME:  Haroldo Durand   :   3/13/1931   MRN:   [id number]     Date/Time:  2019 7:33 AM  Subjective:   History:  Chart reviewed and patient seen and examined and D/W Dr. Evon Damian and his nurse this AM and all events noted. He was brought in to the office by his daughter yesterday for evaluation of severe weakness. [facility] was hospitalized in July and discharged on  to rehab. Toma Granton the hospitalization he had left main coronary disease and was treated with a stent extending in his LAD with a sidehole to his circumflex.  Postoperative he did develop significant anemia and was transfused 3 units of packed blood cells.  He was seen in follow-up by me on  at which time his hemoglobin was up to 11.1 but his BUN was 44 with a creatinine of 2.4. [facility] returned to the office on the  at which time his creatinine had improved to 1.8 and BUN to 31 however he remained very weak.  At that time his hemoglobin was 12.  He returned yesterday noting he had become weaker each day since seen at the office on the  despite stopping his diuretic and he was to the point that he could do hardly anything without having to lay down because he was so weak. With that history and profound weakness on exam and high fall risk it was felt prudent to admit him for workup and treatment.     He notes no chest pain, shortness of breath, palpitations, PND or other cardiac or respiratory complaints except extreme weakness with no energy to do anything.  He also has a very poor appetite but notes no nausea or vomiting. He denies any other GI or  complaints. He denies any headaches, dizziness or neurologic complaints except extreme weakness.   He has no other complaints on complete review of systems      Medications reviewed:  Current Facility-Administered Medications   Medication Dose Route Frequency    albuterol (PROVENTIL HFA, VENTOLIN HFA, PROAIR HFA) inhaler 2 Puff  2 Puff Inhalation Q4H PRN    aspirin chewable tablet 81 mg  81 mg Oral DAILY    clopidogrel (PLAVIX) tablet 75 mg  75 mg Oral DAILY    dilTIAZem CD (CARDIZEM CD) capsule 120 mg  120 mg Oral DAILY    pantoprazole (PROTONIX) tablet 40 mg  40 mg Oral DAILY    finasteride (PROSCAR) tablet 5 mg  5 mg Oral PCD    levothyroxine (SYNTHROID) tablet 137 mcg  137 mcg Oral ACB    pravastatin (PRAVACHOL) tablet 40 mg  40 mg Oral DAILY    tamsulosin (FLOMAX) capsule 0.4 mg  0.4 mg Oral DAILY    sodium chloride (NS) flush 5-40 mL  5-40 mL IntraVENous PRN    acetaminophen (TYLENOL) tablet 650 mg  650 mg Oral Q4H PRN    enoxaparin (LOVENOX) injection 30 mg  30 mg SubCUTAneous Q24H    dextrose 5 % - 0.45% NaCl infusion  50 mL/hr IntraVENous CONTINUOUS        Objective:   Vitals:  Visit Vitals  /69 (BP 1 Location: Right arm, BP Patient Position: At rest)   Pulse 76   Temp 99.3 °F (37.4 °C)   Resp 20   SpO2 98%      O2 Device: Room air Temp (24hrs), Av.1 °F (37.3 °C), Min:98.3 °F (36.8 °C), Max:99.4 °F (37.4 °C)      Last 24hr Input/Output:    Intake/Output Summary (Last 24 hours) at 2019 0756  Last data filed at 2019 0321  Gross per 24 hour   Intake 524.16 ml   Output 125 ml   Net 399.16 ml        PHYSICAL EXAM:  General:     Alert, cooperative, no distress but very frail appearance, appears stated age. Head:    Normocephalic, without obvious abnormality, atraumatic. Eyes:    Conjunctivae/corneas pale. PERRLA  Nose:   Nares normal. No drainage or sinus tenderness. Throat:     Lips, mucosa, and tongue normal.  No Thrush  Neck:   Supple, symmetrical,  no adenopathy, thyroid: non tender     no carotid bruit and no JVD. Back:     Symmetric,  No CVA tenderness. Lungs:    Clear to auscultation bilaterally except dry basilar scattered rales. No Wheezing or Rhonchi. No rales. Heart:    Regular rate and rhythm,  no murmur, rub or gallop. Abdomen:    Soft, non-tender. Not distended.   Bowel sounds normal. No masses  Extremities: Extremities normal, atraumatic, No cyanosis. No edema. No clubbing  Lymph nodes:  Cervical, supraclavicular normal.  Neurologic:  General non-focal decreased strength, Alert and oriented X 3. Skin:                 No rash      Lab Data Reviewed:    Recent Results (from the past 24 hour(s))   CK W/ CKMB & INDEX    Collection Time: 08/26/19  7:34 PM   Result Value Ref Range    CK 41 39 - 308 U/L    CK - MB <1.0 <3.6 NG/ML    CK-MB Index Cannot be calculated 0.0 - 2.5     TROPONIN I    Collection Time: 08/26/19  7:34 PM   Result Value Ref Range    Troponin-I, Qt. 0.06 (H) <6.04 ng/mL   METABOLIC PANEL, COMPREHENSIVE    Collection Time: 08/26/19  7:34 PM   Result Value Ref Range    Sodium 134 (L) 136 - 145 mmol/L    Potassium 4.3 3.5 - 5.1 mmol/L    Chloride 101 97 - 108 mmol/L    CO2 27 21 - 32 mmol/L    Anion gap 6 5 - 15 mmol/L    Glucose 152 (H) 65 - 100 mg/dL    BUN 24 (H) 6 - 20 MG/DL    Creatinine 1.50 (H) 0.70 - 1.30 MG/DL    BUN/Creatinine ratio 16 12 - 20      GFR est AA 54 (L) >60 ml/min/1.73m2    GFR est non-AA 44 (L) >60 ml/min/1.73m2    Calcium 9.0 8.5 - 10.1 MG/DL    Bilirubin, total 0.6 0.2 - 1.0 MG/DL    ALT (SGPT) 58 12 - 78 U/L    AST (SGOT) 57 (H) 15 - 37 U/L    Alk.  phosphatase 83 45 - 117 U/L    Protein, total 6.3 (L) 6.4 - 8.2 g/dL    Albumin 2.4 (L) 3.5 - 5.0 g/dL    Globulin 3.9 2.0 - 4.0 g/dL    A-G Ratio 0.6 (L) 1.1 - 2.2     CBC WITH AUTOMATED DIFF    Collection Time: 08/26/19  7:34 PM   Result Value Ref Range    WBC 10.6 4.1 - 11.1 K/uL    RBC 3.59 (L) 4.10 - 5.70 M/uL    HGB 10.1 (L) 12.1 - 17.0 g/dL    HCT 31.5 (L) 36.6 - 50.3 %    MCV 87.7 80.0 - 99.0 FL    MCH 28.1 26.0 - 34.0 PG    MCHC 32.1 30.0 - 36.5 g/dL    RDW 14.9 (H) 11.5 - 14.5 %    PLATELET 502 573 - 423 K/uL    MPV 11.3 8.9 - 12.9 FL    NRBC 0.0 0  WBC    ABSOLUTE NRBC 0.00 0.00 - 0.01 K/uL    NEUTROPHILS 86 (H) 32 - 75 %    LYMPHOCYTES 7 (L) 12 - 49 %    MONOCYTES 5 5 - 13 %    EOSINOPHILS 1 0 - 7 %    BASOPHILS 1 0 - 1 %    IMMATURE GRANULOCYTES 0 0.0 - 0.5 %    ABS. NEUTROPHILS 9.2 (H) 1.8 - 8.0 K/UL    ABS. LYMPHOCYTES 0.7 (L) 0.8 - 3.5 K/UL    ABS. MONOCYTES 0.5 0.0 - 1.0 K/UL    ABS. EOSINOPHILS 0.1 0.0 - 0.4 K/UL    ABS. BASOPHILS 0.1 0.0 - 0.1 K/UL    ABS. IMM. GRANS. 0.0 0.00 - 0.04 K/UL    DF MANUAL      RBC COMMENTS ANISOCYTOSIS  1+       T4, FREE    Collection Time: 08/26/19  7:34 PM   Result Value Ref Range    T4, Free 1.5 0.8 - 1.5 NG/DL   TSH 3RD GENERATION    Collection Time: 08/26/19  7:34 PM   Result Value Ref Range    TSH 0.07 (L) 0.36 - 3.74 uIU/mL   EKG, 12 LEAD, INITIAL    Collection Time: 08/26/19  8:45 PM   Result Value Ref Range    Ventricular Rate 74 BPM    Atrial Rate 74 BPM    P-R Interval 204 ms    QRS Duration 100 ms    Q-T Interval 382 ms    QTC Calculation (Bezet) 424 ms    Calculated P Axis 72 degrees    Calculated R Axis 18 degrees    Calculated T Axis 42 degrees    Diagnosis       Normal sinus rhythm  When compared with ECG of 11-AUG-2019 16:17,  fusion complexes are no longer present  premature ventricular complexes are no longer present  premature supraventricular complexes are no longer present           Assessment/Plan:     Active Problems:    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 No       mention of angioplasty or stent made      Acquired hypothyroidism (8/21/2017)      Anemia (8/13/2019)      Weakness generalized (8/26/2019)       ___________________________________________________  PLAN:    1.   Continue gentle IV hydration and watch fluid status closely  2. Continue without diuretic  3. Follow renal function and electrolytes  (24/1.5 on adm)  4. Telemetry monitoring  5. Cardiology consultation, D/W Dr. Fausto Kirkland this AM  6. Follow CBC as he did have significant anemia during last hospitalization all the last hemoglobin  At office on 8/21was 12  7. Checked thyroid studies with history of hypothyroidism, normal  8. Echocardiogram  9. PT and OT evaluation and treatment  10.   May need rehab on discharge again      40 minutes spent in direct care of this complex patient today        ___________________________________________________    Attending Physician: Coni Rodriguez MD

## 2019-08-27 NOTE — PROGRESS NOTES
Bedside and Verbal shift change report GIVEN TO Garry Nicholas RN. Report included the following information SBAR, Kardex, Intake/Output and Cardiac Rhythm NSR.         SIGNIFICANT CHANGES DURING SHIFT:        CONCERNS TO ADDRESS WITH MD:            [facility] NURSING NOTE   Admission Date 8/26/2019   Admission Diagnosis ASCVD (arteriosclerotic cardiovascular disease) [I25.10]   Consults IP CONSULT TO CARDIOLOGY      Cardiac Monitoring [x] Yes [] No      Purposeful Hourly Rounding [x] Yes    Lupillo Score Total Score: 4   Lupillo score 3 or > [] Bed Alarm [] Avasys [] 1:1 sitter [] Patient refused (Signed refusal form in chart)   Berry Score Berry Score: 16   Berry score 14 or < [] PMT consult [] Wound Care consult    []  Specialty bed  [] Nutrition consult      Influenza Vaccine Received Flu Vaccine for Current Season (usually Sept-March): Not Flu Season           Oxygen needs? [x] Room air Oxygen @  []1L    []2L    []3L   []4L    []5L   []6L via  NC   Chronic home O2 use?  [] Yes [] No  Perform O2 challenge test and document in progress note using smartphrase (.Homeoxygen)      Last bowel movement        Urinary Catheter             LDAs               Peripheral IV 08/26/19 Right Antecubital (Active)   Site Assessment Clean, dry, & intact 8/27/2019  3:21 AM   Phlebitis Assessment 0 8/27/2019  3:21 AM   Infiltration Assessment 0 8/27/2019  3:21 AM   Dressing Status Clean, dry, & intact 8/27/2019  3:21 AM   Dressing Type Transparent 8/27/2019  3:21 AM   Hub Color/Line Status Pink 8/27/2019  3:21 AM       Peripheral IV 08/26/19 Right Hand (Active)   Site Assessment Clean, dry, & intact 8/27/2019  3:21 AM   Phlebitis Assessment 0 8/27/2019  3:21 AM   Infiltration Assessment 0 8/27/2019  3:21 AM   Dressing Status Clean, dry, & intact 8/27/2019  3:21 AM   Dressing Type Transparent 8/27/2019  3:21 AM   Hub Color/Line Status Blue 8/27/2019  3:21 AM                         Readmission Risk Assessment Tool Score High Risk 28       Total Score        3 Has Seen PCP in Last 6 Months (Yes=3, No=0)    4 IP Visits Last 12 Months (1-3=4, 4=9, >4=11)    5 Pt. Coverage (Medicare=5 , Medicaid, or Self-Pay=4)    16 Charlson Comorbidity Score (Age + Comorbid Conditions)        Criteria that do not apply:    . Living with Significant Other. Assisted Living. LTAC. SNF.  or   Rehab    Patient Length of Stay (>5 days = 3)       Expected Length of Stay - - -   Actual Length of Stay 1

## 2019-08-27 NOTE — PROGRESS NOTES
Bedside and Verbal shift change report GIVEN TO Cipriano Garcia RN. Report included the following information SBAR, Kardex and Cardiac Rhythm NSR.         SIGNIFICANT CHANGES DURING SHIFT:        CONCERNS TO ADDRESS WITH MD:            [facility] NURSING NOTE   Admission Date 8/26/2019   Admission Diagnosis ASCVD (arteriosclerotic cardiovascular disease) [I25.10]   Consults IP CONSULT TO CARDIOLOGY      Cardiac Monitoring [x] Yes [] No      Purposeful Hourly Rounding [x] Yes    Lupillo Score Total Score: 4   Lupillo score 3 or > [x] Bed Alarm [] Avasys [] 1:1 sitter [] Patient refused (Signed refusal form in chart)   Berry Score Berry Score: 16   Berry score 14 or < [] PMT consult [] Wound Care consult    []  Specialty bed  [] Nutrition consult      Influenza Vaccine Received Flu Vaccine for Current Season (usually Sept-March): Not Flu Season           Oxygen needs? [x] Room air Oxygen @  []1L    []2L    []3L   []4L    []5L   []6L via  NC   Chronic home O2 use?  [] Yes [] No  Perform O2 challenge test and document in progress note using smartphrase (.Homeoxygen)      Last bowel movement        Urinary Catheter             LDAs               Peripheral IV 08/26/19 Right Antecubital (Active)   Site Assessment Clean, dry, & intact 8/27/2019  3:21 AM   Phlebitis Assessment 0 8/27/2019  3:21 AM   Infiltration Assessment 0 8/27/2019  3:21 AM   Dressing Status Clean, dry, & intact 8/27/2019  3:21 AM   Dressing Type Transparent 8/27/2019  3:21 AM   Hub Color/Line Status Pink 8/27/2019  3:21 AM       Peripheral IV 08/26/19 Right Hand (Active)   Site Assessment Clean, dry, & intact 8/27/2019  3:21 AM   Phlebitis Assessment 0 8/27/2019  3:21 AM   Infiltration Assessment 0 8/27/2019  3:21 AM   Dressing Status Clean, dry, & intact 8/27/2019  3:21 AM   Dressing Type Transparent 8/27/2019  3:21 AM   Hub Color/Line Status Blue 8/27/2019  3:21 AM                         Readmission Risk Assessment Tool Score High Risk            28       Total Score        3 Has Seen PCP in Last 6 Months (Yes=3, No=0)    4 IP Visits Last 12 Months (1-3=4, 4=9, >4=11)    5 Pt. Coverage (Medicare=5 , Medicaid, or Self-Pay=4)    16 Charlson Comorbidity Score (Age + Comorbid Conditions)        Criteria that do not apply:    . Living with Significant Other. Assisted Living. LTAC. SNF.  or   Rehab    Patient Length of Stay (>5 days = 3)       Expected Length of Stay - - -   Actual Length of Stay 1

## 2019-08-27 NOTE — PROGRESS NOTES
Bedside shift change report GIVEN TO Romy Bauman RN. Report included the following information SBAR. SIGNIFICANT CHANGES DURING SHIFT:          CONCERNS TO ADDRESS WITH MD:              [address] NURSING NOTE   Admission Date 8/26/2019   Admission Diagnosis ASCVD (arteriosclerotic cardiovascular disease) [I25.10]   Consults IP CONSULT TO CARDIOLOGY      Cardiac Monitoring [x] Yes [] No      Purposeful Hourly Rounding [x] Yes    Lupillo Score Total Score: 4   Lupillo score 3 or > [x] Bed Alarm [] Avasys [] 1:1 sitter [] Patient refused (Signed refusal form in chart)   Berry Score Berry Score: 16   Berry score 14 or < [] PMT consult [] Wound Care consult    []  Specialty bed  [] Nutrition consult      Influenza Vaccine Received Flu Vaccine for Current Season (usually Sept-March): Not Flu Season           Oxygen needs? [x] Room air Oxygen @  []1L    []2L    []3L   []4L    []5L   []6L via  NC   Chronic home O2 use?  [] Yes [] No  Perform O2 challenge test and document in progress note using smart[employer]acher / [employer] (.Homeoxygen)      Last bowel movement        Urinary Catheter             LDAs               Peripheral IV 08/26/19 Right Antecubital (Active)   Site Assessment Clean, dry, & intact 8/27/2019  3:21 AM   Phlebitis Assessment 0 8/27/2019  3:21 AM   Infiltration Assessment 0 8/27/2019  3:21 AM   Dressing Status Clean, dry, & intact 8/27/2019  3:21 AM   Dressing Type Transparent 8/27/2019  3:21 AM   Hub Color/Line Status Pink 8/27/2019  3:21 AM       Peripheral IV 08/26/19 Right Hand (Active)   Site Assessment Clean, dry, & intact 8/27/2019  3:21 AM   Phlebitis Assessment 0 8/27/2019  3:21 AM   Infiltration Assessment 0 8/27/2019  3:21 AM   Dressing Status Clean, dry, & intact 8/27/2019  3:21 AM   Dressing Type Transparent 8/27/2019  3:21 AM   Hub Color/Line Status Blue 8/27/2019  3:21 AM                         Readmission Risk Assessment Tool Score High Risk            28       Total Score        3 Has Seen PCP in Last 6 Months (Yes=3, No=0)    4 IP Visits Last 12 Months (1-3=4, 4=9, >4=11)    5 Pt. Coverage (Medicare=5 , Medicaid, or Self-Pay=4)    16 Charlson Comorbidity Score (Age + Comorbid Conditions)        Criteria that do not apply:    . Living with Significant Other. Assisted Living. LTAC. SNF.  or   Rehab    Patient Length of Stay (>5 days = 3)       Expected Length of Stay - - -   Actual Length of Stay 1

## 2019-08-27 NOTE — PROGRESS NOTES
Reviewed    Hemodynamics stable     Labs OK     No acute findings.      Dr Jamie Landry to see in AM

## 2019-08-27 NOTE — PROGRESS NOTES
Progress Note      8/27/2019 8:35 AM  NAME: Haleigh Henderson   MRN:  [id number]   Admit Diagnosis: ASCVD (arteriosclerotic cardiovascular disease) [I25.10]                Problem List:      Weakness, failure to thrive. - Cath 7/2019 with impella supported PCI of LM into LAD with balloon side hole to LCx  - Normal EF, mild AS  - Prox afib, currently in sinus  - Hx of aflutter ablation  - HTN  - Dyslipidemia  - Hypothyroid  - GERD  - BPH  - Distant tobacco  , lives alone, ADLs, occasionally using cane  Son:  Francisco.   [phone]                     Assessment/Plan:          No chest pain. Euvolemic to dry  Sinus    Prior Cath reviewed, would be early for stent issue. Unclear. Echo with normal EF, mild to mod AS. Check UA with reflex cultre. ?low grade fevers    - Cont ASA  - Cont plavix  - Cont diltiazem, intolerant of beta blocker in past  - BP on lower side, will add low dose midodrine.  - Holding lasix, would also hold fluids  - Cont statin     - Cont proscar and flomax    PT/OT    Cardiac cath would not be without risk. Discussed with family.                [x]       High complexity decision making was performed in this patient at high risk for decompensation with multiple organ involvement. Subjective:     Haleigh Henderson denies chest pain, dyspnea. Discussed with RN events overnight. Review of Systems:    Symptom Y/N Comments  Symptom Y/N Comments   Fever/Chills N   Chest Pain N    Poor Appetite Y   Edema N    Cough N   Abdominal Pain N    Sputum N   Joint Pain N    SOB/JAFFE N   Pruritis/Rash N    Nausea/vomit N   Tolerating PT/OT Y    Diarrhea N   Tolerating Diet Y    Constipation N   Other       Could NOT obtain due to:      Objective:      Physical Exam:    Last 24hrs VS reviewed since prior progress note.  Most recent are:    Visit Vitals  /69 (BP 1 Location: Right arm, BP Patient Position: At rest)   Pulse 76   Temp 99.3 °F (37.4 °C)   Resp 20   SpO2 98% Intake/Output Summary (Last 24 hours) at 8/27/2019 0835  Last data filed at 8/27/2019 1632  Gross per 24 hour   Intake 764.16 ml   Output 125 ml   Net 639.16 ml        General Appearance: Well developed, well nourished, alert & oriented x 3,    no acute distress. Ears/Nose/Mouth/Throat: Hearing grossly normal.  Neck: Supple. Chest: Lungs clear to auscultation bilaterally. Cardiovascular: Regular rate and rhythm, S1S2 normal, no murmur. Abdomen: Soft, non-tender, bowel sounds are active. Extremities: No edema bilaterally. Skin: Warm and dry. PMH/SH reviewed - no change compared to H&P    Data Review    Telemetry: normal sinus rhythm     Lab Data Personally Reviewed:    Recent Labs     08/1934   WBC 10.6   HGB 10.1*   HCT 31.5*        No results for input(s): INR, PTP, APTT in the last 72 hours. No lab exists for component: INREXT   Recent Labs     08/1934   *   K 4.3      CO2 27   BUN 24*   CREA 1.50*   *   CA 9.0     Recent Labs     08/1934   CPK 41   CKNDX Cannot be calculated   TROIQ 0.06*     Lab Results   Component Value Date/Time    Cholesterol, total 117 06/12/2019 02:00 PM    HDL Cholesterol 35 06/12/2019 02:00 PM    LDL, calculated 65 06/12/2019 02:00 PM    Triglyceride 86 06/12/2019 02:00 PM    CHOL/HDL Ratio 3 06/12/2019 02:00 PM       Recent Labs     08/1934   SGOT 57*   AP 83   TP 6.3*   ALB 2.4*   GLOB 3.9     No results for input(s): PH, PCO2, PO2 in the last 72 hours.     Medications Personally Reviewed:    Current Facility-Administered Medications   Medication Dose Route Frequency    albuterol (PROVENTIL HFA, VENTOLIN HFA, PROAIR HFA) inhaler 2 Puff  2 Puff Inhalation Q4H PRN    aspirin chewable tablet 81 mg  81 mg Oral DAILY    clopidogrel (PLAVIX) tablet 75 mg  75 mg Oral DAILY    dilTIAZem CD (CARDIZEM CD) capsule 120 mg  120 mg Oral DAILY    pantoprazole (PROTONIX) tablet 40 mg  40 mg Oral DAILY    finasteride (PROSCAR) tablet 5 mg  5 mg Oral PCD    levothyroxine (SYNTHROID) tablet 137 mcg  137 mcg Oral ACB    pravastatin (PRAVACHOL) tablet 40 mg  40 mg Oral DAILY    tamsulosin (FLOMAX) capsule 0.4 mg  0.4 mg Oral DAILY    sodium chloride (NS) flush 5-40 mL  5-40 mL IntraVENous PRN    acetaminophen (TYLENOL) tablet 650 mg  650 mg Oral Q4H PRN    enoxaparin (LOVENOX) injection 30 mg  30 mg SubCUTAneous Q24H    dextrose 5 % - 0.45% NaCl infusion  50 mL/hr IntraVENous CONTINUOUS         Varghese Fothergill, MD

## 2019-08-28 NOTE — PROGRESS NOTES
PROGRESS NOTE    NAME:  Haroldo Durand   :   3/13/1931   MRN:   [id number]     Date/Time:  2019 7:36 AM  Subjective:   History:  Chart reviewed and patient seen and examined and D/W Dr. Evon Damian and his nurse this AM and all events noted. He was brought in to the office by his daughter on  for evaluation of severe weakness. Norma Dalton was hospitalized in July and discharged on  to rehab. Toma Granton the hospitalization he had left main coronary disease and was treated with a stent extending in his LAD with a sidehole to his circumflex.  Postoperative he did develop significant anemia and was transfused 3 units of packed blood cells.  He was seen in follow-up by me on  at which time his hemoglobin was up to 11.1 but his BUN was 44 with a creatinine of 2.4. Norma Datlon returned to the office on the  at which time his creatinine had improved to 1.8 and BUN to 31 however he remained very weak.  At that time his hemoglobin was 12.  He returned on  noting he had become weaker each day since seen at the office on the  despite stopping his diuretic and he was to the point that he could do hardly anything without having to lay down because he was so weak. With that history and profound weakness on exam and high fall risk it was felt prudent to admit him for workup and treatment.     He notes no chest pain, shortness of breath, palpitations, PND or other cardiac or respiratory complaints except extreme weakness with no energy to do anything. He does have a history of Afib and went into rapid Afib last PM and is now on a cardizem drip with rate controlled  He also has a very poor appetite but notes no nausea or vomiting. He denies any other GI or  complaints except voiding difficulty this AM.  He denies any headaches, dizziness or neurologic complaints except extreme weakness.   He has no other complaints on complete review of systems      Medications reviewed:  Current Facility-Administered Medications   Medication Dose Route Frequency    dilTIAZem (CARDIZEM) 125 mg in dextrose 5% 125 mL infusion  5 mg/hr IntraVENous CONTINUOUS    polyethylene glycol (MIRALAX) packet 17 g  17 g Oral DAILY    enoxaparin (LOVENOX) injection 40 mg  40 mg SubCUTAneous Q24H    midodrine (PROAMITINE) tablet 5 mg  5 mg Oral TID WITH MEALS    albuterol (PROVENTIL HFA, VENTOLIN HFA, PROAIR HFA) inhaler 2 Puff  2 Puff Inhalation Q4H PRN    aspirin chewable tablet 81 mg  81 mg Oral DAILY    clopidogrel (PLAVIX) tablet 75 mg  75 mg Oral DAILY    dilTIAZem CD (CARDIZEM CD) capsule 120 mg  120 mg Oral DAILY    pantoprazole (PROTONIX) tablet 40 mg  40 mg Oral DAILY    finasteride (PROSCAR) tablet 5 mg  5 mg Oral PCD    levothyroxine (SYNTHROID) tablet 137 mcg  137 mcg Oral ACB    pravastatin (PRAVACHOL) tablet 40 mg  40 mg Oral DAILY    tamsulosin (FLOMAX) capsule 0.4 mg  0.4 mg Oral DAILY    sodium chloride (NS) flush 5-40 mL  5-40 mL IntraVENous PRN    acetaminophen (TYLENOL) tablet 650 mg  650 mg Oral Q4H PRN        Objective:   Vitals:  Visit Vitals  /87   Pulse (!) 106   Temp 100.2 °F (37.9 °C)   Resp 20   Ht 5' 9\" (1.753 m)   Wt 182 lb 5.1 oz (82.7 kg)   SpO2 96%   BMI 26.92 kg/m²      O2 Device: Room air Temp (24hrs), Av.6 °F (37.6 °C), Min:99.1 °F (37.3 °C), Max:100.2 °F (37.9 °C)      Last 24hr Input/Output:    Intake/Output Summary (Last 24 hours) at 2019 0736  Last data filed at 2019 5127  Gross per 24 hour   Intake 510 ml   Output 750 ml   Net -240 ml        PHYSICAL EXAM:  General:     Alert, cooperative, no distress but very frail appearance, appears stated age. Head:    Normocephalic, without obvious abnormality, atraumatic. Eyes:    Conjunctivae/corneas pale. PERRLA  Nose:   Nares normal. No drainage or sinus tenderness.   Throat:     Lips, mucosa, and tongue normal.  No Thrush  Neck:   Supple, symmetrical,  no adenopathy, thyroid: non tender     no carotid bruit and no JVD.  Back:     Symmetric,  No CVA tenderness. Lungs:    Clear to auscultation bilaterally except dry basilar scattered rales. No Wheezing or Rhonchi. No rales. Heart:    Regular rate and rhythm,  no murmur, rub or gallop. Abdomen:    Soft, non-tender. Not distended. Bowel sounds normal. No masses  Extremities:  Extremities normal, atraumatic, No cyanosis. No edema. No clubbing  Lymph nodes:  Cervical, supraclavicular normal.  Neurologic:  General non-focal decreased strength, Alert and oriented X 3.    Skin:                 No rash      Lab Data Reviewed:    Recent Results (from the past 24 hour(s))   ECHO ADULT COMPLETE    Collection Time: 08/27/19  1:33 PM   Result Value Ref Range    Aortic Valve Systolic Peak Velocity 135.14 cm/s    AoV VTI 74.30 cm    Aortic Valve Area by Continuity of Peak Velocity 0.9 cm2    Aortic Valve Area by Continuity of VTI 0.9 cm2    AoV PG 43.0 mmHg    LVIDd 4.03 (A) 4.2 - 5.9 cm    LVPWd 1.05 (A) 0.6 - 1.0 cm    LVIDs 3.01 cm    IVSd 1.02 (A) 0.6 - 1.0 cm    LVOT d 1.86 cm    LVOT Peak Velocity 112.49 cm/s    LVOT Peak Gradient 5.1 mmHg    LVOT VTI 25.67 cm    MVA (PHT) 2.4 cm2    MV A Isaac 107.77 cm/s    MV E Isaac 104.78 cm/s    MV E/A 0.97     RVIDd 3.60 cm    TV PG 0.9 mmHg    Aortic Valve Systolic Mean Gradient 76.3 mmHg    LA Vol 4C 57.56 18 - 58 mL    LV Mass .2 88 - 224 g    LV Mass AL Index 78.1 49 - 115 g/m2    RVSP 40.5 mmHg    Est. RA Pressure 10.0 mmHg    Mitral Regurgitant Peak Velocity 264.20 cm/s    Mitral Valve E Wave Deceleration Time 317.3 ms    Mitral Valve Pressure Half-time 92.0 ms    Triscuspid Valve Regurgitation Peak Gradient 30.5 mmHg    Tricuspid Valve Max Velocity 46.94 cm/s    LVOT SV 70.1 ml    TR Max Velocity 276.10 cm/s    PASP 40.5 mmHg    LA Vol Index 28.98 16 - 28 ml/m2    MR Peak Gradient 27.9 mmHg   EKG, 12 LEAD, SUBSEQUENT    Collection Time: 08/28/19  2:26 AM   Result Value Ref Range    Ventricular Rate 89 BPM    Atrial Rate 104 BPM QRS Duration 100 ms    Q-T Interval 332 ms    QTC Calculation (Bezet) 403 ms    Calculated R Axis 36 degrees    Calculated T Axis 26 degrees    Diagnosis       Atrial fibrillation  Nonspecific ST abnormality , probably digitalis effect  When compared with ECG of 26-AUG-2019 20:45,  Atrial fibrillation has replaced Sinus rhythm     METABOLIC PANEL, BASIC    Collection Time: 08/28/19  2:32 AM   Result Value Ref Range    Sodium 134 (L) 136 - 145 mmol/L    Potassium 4.2 3.5 - 5.1 mmol/L    Chloride 102 97 - 108 mmol/L    CO2 26 21 - 32 mmol/L    Anion gap 6 5 - 15 mmol/L    Glucose 110 (H) 65 - 100 mg/dL    BUN 19 6 - 20 MG/DL    Creatinine 1.32 (H) 0.70 - 1.30 MG/DL    BUN/Creatinine ratio 14 12 - 20      GFR est AA >60 >60 ml/min/1.73m2    GFR est non-AA 51 (L) >60 ml/min/1.73m2    Calcium 9.0 8.5 - 10.1 MG/DL   CBC WITH AUTOMATED DIFF    Collection Time: 08/28/19  2:32 AM   Result Value Ref Range    WBC 10.6 4.1 - 11.1 K/uL    RBC 3.78 (L) 4.10 - 5.70 M/uL    HGB 10.8 (L) 12.1 - 17.0 g/dL    HCT 33.5 (L) 36.6 - 50.3 %    MCV 88.6 80.0 - 99.0 FL    MCH 28.6 26.0 - 34.0 PG    MCHC 32.2 30.0 - 36.5 g/dL    RDW 14.9 (H) 11.5 - 14.5 %    PLATELET 791 114 - 333 K/uL    MPV 10.7 8.9 - 12.9 FL    NRBC 0.0 0  WBC    ABSOLUTE NRBC 0.00 0.00 - 0.01 K/uL    NEUTROPHILS 75 32 - 75 %    LYMPHOCYTES 12 12 - 49 %    MONOCYTES 11 5 - 13 %    EOSINOPHILS 1 0 - 7 %    BASOPHILS 0 0 - 1 %    IMMATURE GRANULOCYTES 1 (H) 0.0 - 0.5 %    ABS. NEUTROPHILS 8.0 1.8 - 8.0 K/UL    ABS. LYMPHOCYTES 1.3 0.8 - 3.5 K/UL    ABS. MONOCYTES 1.2 (H) 0.0 - 1.0 K/UL    ABS. EOSINOPHILS 0.1 0.0 - 0.4 K/UL    ABS. BASOPHILS 0.0 0.0 - 0.1 K/UL    ABS. IMM.  GRANS. 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 No       mention of angioplasty or stent made      Acquired hypothyroidism (8/21/2017)      Anemia (8/13/2019)      Nonrheumatic aortic valve stenosis (8/27/2019)      Overview: Mild to moderate by echocardiogram 8/27/2019       ___________________________________________________  PLAN:    1. Continue gentle IV hydration and watch fluid status closely  2. Continue without diuretic  3. Follow renal function and electrolytes  (24/1.5 on adm), now 19/1.32  4. Telemetry monitoring now with Afib  5. Cardizem drip  6. Cardiology consultation note reviewed and agree with high risk for any cardiac interventions, D/W Dr. Pierce Skill   6. Follow CBC as he did have significant anemia during last hospitalization all the last hemoglobin  At office on 8/21was 12 and now 10.8 so unlikely playing any role  7. Checked thyroid studies with history of hypothyroidism, normal  8. Echocardiogram is normal  9. PT and OT evaluation and treatment notes reviewed and walked only 20 ft yesterday  10. May need rehab on discharge again  11. Urine culture with retention  12. Continue Flomax and proscar  13. May need antibiotic depending on wht urine looks like, unfortunately specimen from straight cath not sent  14.   If continues with voiding problem then Urology consult and Catheter, 400 cc PVR this AM      35 minutes spent in direct care of this complex patient today        ___________________________________________________    Attending Physician: Martin Edwards MD

## 2019-08-28 NOTE — PROGRESS NOTES
Bedside shift change report GIVEN TO José Miguel Collins RN. Report included the following information SBAR, Kardex and MAR. SIGNIFICANT CHANGES DURING SHIFT:  At [address] patient converted from NSR to afib. An EKG and vital signs were taken at this time - heart rate running between mid 90's-120's; blood pressure 133/64; patient asymptomatic. On call cardiologist was contacted and placed a verbal telephone order for 5 mg/hr of cardizem. At 0300 patient voided 200 mL, around 0400 patient attempted to urinate but was unable to, patient was bladder scanned resulting 450 mL being retained. Received orders for straight cath, attempted twice - first attempt no urine; second attempt - able to drain urine, urine draining extremely slow. Will speak to MD about urology consult. CONCERNS TO ADDRESS WITH MD:            [address] NURSING NOTE   Admission Date 8/26/2019   Admission Diagnosis ASCVD (arteriosclerotic cardiovascular disease) [I25.10]   Consults IP CONSULT TO CARDIOLOGY      Cardiac Monitoring [x] Yes [] No      Purposeful Hourly Rounding [x] Yes    Lupillo Score Total Score: 4   Lupillo score 3 or > [x] Bed Alarm [] Avasys [] 1:1 sitter [] Patient refused (Signed refusal form in chart)   Berry Score Berry Score: 17   Berry score 14 or < [] PMT consult [] Wound Care consult    []  Specialty bed  [] Nutrition consult      Influenza Vaccine Received Flu Vaccine for Current Season (usually Sept-March): Not Flu Season           Oxygen needs? [x] Room air Oxygen @  []1L    []2L    []3L   []4L    []5L   []6L via  NC   Chronic home O2 use?  [] Yes [] No  Perform O2 challenge test and document in progress note using [employer] (.Homeoxygen)      Last bowel movement Last Bowel Movement Date: 08/27/19      Urinary Catheter             LDAs               Peripheral IV 08/26/19 Right Antecubital (Active)   Site Assessment Clean, dry, & intact 8/28/2019  4:51 AM   Phlebitis Assessment 0 8/28/2019  4:51 AM   Infiltration Assessment 0 8/28/2019  4:51 AM   Dressing Status Clean, dry, & intact 8/28/2019  4:51 AM   Dressing Type Transparent 8/28/2019  4:51 AM   Hub Color/Line Status Pink 8/28/2019  4:51 AM       Peripheral IV 08/26/19 Right Hand (Active)   Site Assessment Clean, dry, & intact 8/27/2019  3:00 PM   Phlebitis Assessment 0 8/27/2019  3:00 PM   Infiltration Assessment 0 8/27/2019  3:00 PM   Dressing Status Clean, dry, & intact 8/27/2019  3:00 PM   Dressing Type Transparent;Tape 8/27/2019  3:00 PM   Hub Color/Line Status Blue;Flushed;Patent 8/27/2019  3:00 PM                         Readmission Risk Assessment Tool Score High Risk            28       Total Score        3 Has Seen PCP in Last 6 Months (Yes=3, No=0)    4 IP Visits Last 12 Months (1-3=4, 4=9, >4=11)    5 Pt. Coverage (Medicare=5 , Medicaid, or Self-Pay=4)    16 Charlson Comorbidity Score (Age + Comorbid Conditions)        Criteria that do not apply:    . Living with Significant Other. Assisted Living. LTAC. SNF.  or   Rehab    Patient Length of Stay (>5 days = 3)       Expected Length of Stay 2d 14h   Actual Length of Stay 2

## 2019-08-28 NOTE — PROGRESS NOTES
Bedside and Verbal shift change report GIVEN TO BRAULIO sherwood. Report included the following information SBAR, Kardex, ED Summary, Procedure Summary, Intake/Output, MAR and Recent Results    856: dr Barbara Day states if pt has not voided by noon to place maradiaga. If difficulty placing maradiaga, consult uro. 1020: rn notified dr Patience Gill of pt's mild lethargy and BPs. md states stop cardizen gtt and continue to monitor. 1145:pt unable to void. 118: mike rr rn notified of pt's c/o neck/r shoulder pain   125: rn paged dr Patience Gill  134: cristine, rr rn assessed pt. No new orders at this time. 2: dr Patience Gill notified of above situation, pt's vitals, symptoms and almost immediate retraction of pain without intervention. No new orders.  Cath for 11 tomorrow am.

## 2019-08-28 NOTE — PROGRESS NOTES
Occupational Therapy Note:    Chart reviewed, spoke with pt's nurse. Attempted to see pt for OT session. Pt agreeable to participate but reported not feeling well and was mildly lethargic. BP assessed in supine 2 times and found to be 86/48. Pt repositioned in bed with total A, nurse informed and present with pt. Session aborted. Will continue to follow. Thank you.     Kareem Patricia, OTR/SHADE

## 2019-08-28 NOTE — PROGRESS NOTES
08/28/19 1034   Vital Signs   Temp 98.9 °F (37.2 °C)   Temp Source Oral   Pulse (Heart Rate) 80   Heart Rate Source Monitor   Resp Rate 23   O2 Sat (%) 94 %   Level of Consciousness Alert   BP 93/41   MAP (Calculated) (!) 58   BP 1 Method Automatic   BP 1 Location Right arm   BP Patient Position At rest   MEWS Score 3   Oxygen Therapy   O2 Device Room air   tachypnea and soft BP driving MEWs. Pt received midodrine for BP and is not demonstrating other signs of respiratory instability. Will monitor closely.

## 2019-08-28 NOTE — PROGRESS NOTES
Problem: Falls - Risk of  Goal: *Absence of Falls  Description  Document Yisel Cousue Fall Risk and appropriate interventions in the flowsheet. Outcome: Progressing Towards Goal  Note:   Fall Risk Interventions:  Mobility Interventions: Bed/chair exit alarm         Medication Interventions: Bed/chair exit alarm, Evaluate medications/consider consulting pharmacy    Elimination Interventions: Bed/chair exit alarm, Call light in reach    History of Falls Interventions: Bed/chair exit alarm         Problem: Pressure Injury - Risk of  Goal: *Prevention of pressure injury  Description  Document Berry Scale and appropriate interventions in the flowsheet.   Outcome: Progressing Towards Goal  Note:   Pressure Injury Interventions:  Sensory Interventions: Assess changes in LOC    Moisture Interventions: Absorbent underpads, Apply protective barrier, creams and emollients    Activity Interventions: Pressure redistribution bed/mattress(bed type)    Mobility Interventions: Pressure redistribution bed/mattress (bed type)    Nutrition Interventions: Document food/fluid/supplement intake, Discuss nutritional consult with provider, Offer support with meals,snacks and hydration    Friction and Shear Interventions: Minimize layers

## 2019-08-28 NOTE — PROGRESS NOTES
Progress Note      8/28/2019 8:35 AM  NAME: Astrid Fuentes   MRN:  [id number]   Admit Diagnosis: ASCVD (arteriosclerotic cardiovascular disease) [I25.10]                Problem List:      Weakness, failure to thrive. - Cath 7/2019 with impella supported PCI of LM into LAD with balloon side hole to LCx  - Normal EF, mild AS  - Prox afib, currently in sinus  - Hx of aflutter ablation  - HTN  - Dyslipidemia  - Hypothyroid  - GERD  - BPH  - Distant tobacco  , lives alone, ADLs, occasionally using cane  Son:  Francisco.   [phone]                     Assessment/Plan:          No chest pain. Euvolemic to dry  Prox afib noted    Prior Cath reviewed, would be early for stent issue. Unclear. Echo with normal EF, mild to mod AS. Check UA with reflex cultre. ?low grade fevers    - Likely add eliquis after cath  - Cont ASA  - Cont plavix  - Stop dilt po given low bp, intolerant of beta blocker in past, add amiodarone  - Stop dilt ggt once back in sinus  - BP on lower side, Cont added low dose midodrine.  - Holding lasix, would also hold fluids  - Cont statin     - Cont proscar and flomax    PT/OT    Long discussion with patient and family, plan cath tomorrow. All risk/benefits/alternatives discussed.                [x]       High complexity decision making was performed in this patient at high risk for decompensation with multiple organ involvement. Subjective:     Astrid Fuentes denies chest pain, dyspnea. Discussed with RN events overnight. Review of Systems:    Symptom Y/N Comments  Symptom Y/N Comments   Fever/Chills N   Chest Pain N    Poor Appetite Y   Edema N    Cough N   Abdominal Pain N    Sputum N   Joint Pain N    SOB/JAFFE N   Pruritis/Rash N    Nausea/vomit N   Tolerating PT/OT Y    Diarrhea N   Tolerating Diet Y    Constipation N   Other       Could NOT obtain due to:      Objective:      Physical Exam:    Last 24hrs VS reviewed since prior progress note.  Most recent are:    Visit Vitals  /55 (BP 1 Location: Right arm, BP Patient Position: At rest)   Pulse 83   Temp 99.5 °F (37.5 °C)   Resp 20   Ht 5' 9\" (1.753 m)   Wt 82.7 kg (182 lb 5.1 oz)   SpO2 95%   BMI 26.92 kg/m²       Intake/Output Summary (Last 24 hours) at 8/28/2019 0803  Last data filed at 8/28/2019 6590  Gross per 24 hour   Intake 510 ml   Output 750 ml   Net -240 ml        General Appearance: Well developed, well nourished, alert & oriented x 3,    no acute distress. Ears/Nose/Mouth/Throat: Hearing grossly normal.  Neck: Supple. Chest: Lungs clear to auscultation bilaterally. Cardiovascular: Regular rate and rhythm, S1S2 normal, no murmur. Abdomen: Soft, non-tender, bowel sounds are active. Extremities: No edema bilaterally. Skin: Warm and dry. PMH/SH reviewed - no change compared to H&P    Data Review    Telemetry: normal sinus rhythm     Lab Data Personally Reviewed:    Recent Labs     08/28/19 0232 08/1934   WBC 10.6 10.6   HGB 10.8* 10.1*   HCT 33.5* 31.5*    219     No results for input(s): INR, PTP, APTT in the last 72 hours. No lab exists for component: Tran Yang   Recent Labs     08/28/19 0232 08/1934   * 134*   K 4.2 4.3    101   CO2 26 27   BUN 19 24*   CREA 1.32* 1.50*   * 152*   CA 9.0 9.0     Recent Labs     08/1934   CPK 41   CKNDX Cannot be calculated   TROIQ 0.06*     Lab Results   Component Value Date/Time    Cholesterol, total 117 06/12/2019 02:00 PM    HDL Cholesterol 35 06/12/2019 02:00 PM    LDL, calculated 65 06/12/2019 02:00 PM    Triglyceride 86 06/12/2019 02:00 PM    CHOL/HDL Ratio 3 06/12/2019 02:00 PM       Recent Labs     08/1934   SGOT 57*   AP 83   TP 6.3*   ALB 2.4*   GLOB 3.9     No results for input(s): PH, PCO2, PO2 in the last 72 hours.     Medications Personally Reviewed:    Current Facility-Administered Medications   Medication Dose Route Frequency    dilTIAZem (CARDIZEM) 125 mg in dextrose 5% 125 mL infusion  5 mg/hr IntraVENous CONTINUOUS    amiodarone (CORDARONE) tablet 400 mg  400 mg Oral TID    [START ON 9/1/2019] amiodarone (CORDARONE) tablet 200 mg  200 mg Oral DAILY    polyethylene glycol (MIRALAX) packet 17 g  17 g Oral DAILY    enoxaparin (LOVENOX) injection 40 mg  40 mg SubCUTAneous Q24H    midodrine (PROAMITINE) tablet 5 mg  5 mg Oral TID WITH MEALS    albuterol (PROVENTIL HFA, VENTOLIN HFA, PROAIR HFA) inhaler 2 Puff  2 Puff Inhalation Q4H PRN    aspirin chewable tablet 81 mg  81 mg Oral DAILY    clopidogrel (PLAVIX) tablet 75 mg  75 mg Oral DAILY    pantoprazole (PROTONIX) tablet 40 mg  40 mg Oral DAILY    finasteride (PROSCAR) tablet 5 mg  5 mg Oral PCD    levothyroxine (SYNTHROID) tablet 137 mcg  137 mcg Oral ACB    pravastatin (PRAVACHOL) tablet 40 mg  40 mg Oral DAILY    tamsulosin (FLOMAX) capsule 0.4 mg  0.4 mg Oral DAILY    sodium chloride (NS) flush 5-40 mL  5-40 mL IntraVENous PRN    acetaminophen (TYLENOL) tablet 650 mg  650 mg Oral Q4H PRN         Manny Benson MD

## 2019-08-28 NOTE — PROGRESS NOTES
Chart reviewed. Patient hypotensive this morning. Checked with patients RN who recommends deferring PT this afternoon due to continued hypotension and increased HR at rest. Patient to have cardiac cath tomorrow. PT will continue to follow this patient.     Kimmie Arriola, PT

## 2019-08-28 NOTE — PROGRESS NOTES
Reason for Admission:   ASCVD (Ateriosclerotic cardiovascular disease), Shortness of breath               RRAT Score: 28 High risk                 Resources/supports as identified by patient/family:   Pt has supportive family to include daughters and son                Top Challenges facing patient (as identified by patient/family and CM): Finances/Medication cost?   No issues with finances or medication cost. Pt has [employer]? Pt does not drive. Pt's family transports when necessary. Support system or lack thereof? Pt has supportive family                     Living arrangements? Pt resides with his son in a one level home with a ramp in the front and 5 [address] from side entrance. Self-care/ADLs/Cognition? Pt needs assistance with ADL's and IADL's such as cooking, grooming and clothing. Pt was alert and oriented. Current Advanced Directive/Advance Care Plan:  Pt is FULL code. Pt does not have an ACP on file. Plan for utilizing home health: Pt has not had home health in the past. Pt is receptive to utilizing home health at d/c. Transition of Care Plan:   Hiro Vaca  Follow-up Appointments  2nd IM Letter    CM met with pt and pt's daughter at bedside to discuss d/c plan. CM verified pt's demographics, insurance and PCP. Pt is a 79 y/o  male admitted to [facility] on 8/26/19 for ASCVD (Ateriosclerotic cardiovascular disease) and Shortness of breath. Pt's PCP is Dr. Rio Luna. Pt sees PCP every 6 weeks. Pt uses [facility] pharmacy on [address] [place] [address] for Rx. Pt resides with his son in a one level home with a ramp in the front and 5 [address] from side entrance. Pt does not drive. Pt's family assist with transportation when needed. Pt needs assistance with ADL's and IADL's such as cooking, grooming and clothing.  Pt has a cane, walker, wheelchair, nebulizer and  bars. No home health or acute inpatient rehab in the past. Pt has been to [facility] in the past. Pt is FULL code. Pt does not have an ACP on file. Pt's family member will transport at d/c.     CM discussed with pt and pt's family about SNF and home health. Pt's daughter expressed that she did not know if she wanted her father to go back to a skilled nursing facility. CM discussed with pt and pt's daughter there options at d/c. CM provide pt and pt's daughter with a list of SNF and home health agencies. Care Management Interventions  PCP Verified by CM: Yes(Pt's PCP is Dr. Yamel Ward. Pt sees PCP every 6 weeks. )  Palliative Care Criteria Met (RRAT>21 & CHF Dx)?: No  Mode of Transport at Discharge: Other (see comment)(Pt's family member will transport at d/c )  Transition of Care Consult (CM Consult): Discharge Planning  Discharge Durable Medical Equipment: No(Pt has walker, cane, wheelchair, nebulizer, and  bars. )  Physical Therapy Consult: Yes  Occupational Therapy Consult: Yes  Speech Therapy Consult: No  Current Support Network: Relative's Home(Pt resides with his son in a one level home with a ramp in the front and [address] from side entrance. )  Confirm Follow Up Transport: Family(Pt does not drive. Pt's family transports when necessary. )  Discharge Location  Discharge Placement: (Home with home health vs home with follow-up appointments)    CM will continue to follow patient for discharge planning needs and arrange for services as deemed necessary.     Eldon Patterson [address]  [phone]

## 2019-08-29 NOTE — PROGRESS NOTES
PROGRESS NOTE    NAME:  Malia Rivera   :   3/13/1931   MRN:   [id number]     Date/Time:  2019 7:30 AM  Subjective:   History:  Chart reviewed and patient seen and examined and D/W Dr. Bianca Ruano, his daughter and his nurse this AM and all events noted. He was brought in to the office by his daughter on  for evaluation of severe weakness. Kailyn Hankins was hospitalized in July and discharged on  to rehab. [place] the hospitalization he had left main coronary disease and was treated with a stent extending in his LAD with a sidehole to his circumflex.  Postoperative he did develop significant anemia and was transfused 3 units of packed blood cells.  He was seen in follow-up by me on  at which time his hemoglobin was up to 11.1 but his BUN was 44 with a creatinine of 2.4. Kailyn Hankins returned to the office on the  at which time his creatinine had improved to 1.8 and BUN to 31 however he remained very weak.  At that time his hemoglobin was 12.  He returned on  noting he had become weaker each day since seen at the office on the  despite stopping his diuretic and he was to the point that he could do hardly anything without having to lay down because he was so weak. With that history and profound weakness on exam and high fall risk it was felt prudent to admit him for workup and treatment.     He notes no chest pain, shortness of breath, palpitations, PND or other cardiac or respiratory complaints except extreme weakness with no energy to do anything. He does have a history of Afib and went into rapid Afib  PM and is now on po Amiodarone after converted to NSR on Cardizem drip. Kailyn Hankins also has a very poor appetite but notes no nausea or vomiting. He denies any other GI or  complaints except voiding difficulty and now maradiaga placed. He denies any headaches, dizziness or neurologic complaints except extreme weakness.   He has no other complaints on complete review of systems      Medications reviewed:  Current Facility-Administered Medications   Medication Dose Route Frequency    amiodarone (CORDARONE) tablet 400 mg  400 mg Oral TID    [START ON 2019] amiodarone (CORDARONE) tablet 200 mg  200 mg Oral DAILY    doxycycline (VIBRA-TABS) tablet 100 mg  100 mg Oral Q12H    polyethylene glycol (MIRALAX) packet 17 g  17 g Oral DAILY    enoxaparin (LOVENOX) injection 40 mg  40 mg SubCUTAneous Q24H    midodrine (PROAMITINE) tablet 5 mg  5 mg Oral TID WITH MEALS    albuterol (PROVENTIL HFA, VENTOLIN HFA, PROAIR HFA) inhaler 2 Puff  2 Puff Inhalation Q4H PRN    aspirin chewable tablet 81 mg  81 mg Oral DAILY    clopidogrel (PLAVIX) tablet 75 mg  75 mg Oral DAILY    pantoprazole (PROTONIX) tablet 40 mg  40 mg Oral DAILY    finasteride (PROSCAR) tablet 5 mg  5 mg Oral PCD    levothyroxine (SYNTHROID) tablet 137 mcg  137 mcg Oral ACB    pravastatin (PRAVACHOL) tablet 40 mg  40 mg Oral DAILY    tamsulosin (FLOMAX) capsule 0.4 mg  0.4 mg Oral DAILY    sodium chloride (NS) flush 5-40 mL  5-40 mL IntraVENous PRN    acetaminophen (TYLENOL) tablet 650 mg  650 mg Oral Q4H PRN        Objective:   Vitals:  Visit Vitals  BP (!) 128/91   Pulse 88   Temp 99.1 °F (37.3 °C)   Resp 20   Ht 5' 9\" (1.753 m)   Wt 182 lb 5.1 oz (82.7 kg)   SpO2 94%   BMI 26.92 kg/m²      O2 Device: Room air Temp (24hrs), Av.7 °F (37.1 °C), Min:97.5 °F (36.4 °C), Max:99.5 °F (37.5 °C)      Last 24hr Input/Output:    Intake/Output Summary (Last 24 hours) at 2019 0730  Last data filed at 2019 0455  Gross per 24 hour   Intake 610 ml   Output 600 ml   Net 10 ml        PHYSICAL EXAM:  General:     Alert, cooperative, no distress but very frail appearance, appears stated age. Head:    Normocephalic, without obvious abnormality, atraumatic. Eyes:    Conjunctivae/corneas pale. PERRLA  Nose:   Nares normal. No drainage or sinus tenderness.   Throat:     Lips, mucosa, and tongue normal.  No Thrush  Neck:   Supple, symmetrical,  no adenopathy, thyroid: non tender     no carotid bruit and no JVD. Back:     Symmetric,  No CVA tenderness. Lungs:    Clear to auscultation bilaterally except dry basilar scattered rales. No Wheezing or Rhonchi. No rales. Heart:    Regular rate and rhythm,  no murmur, rub or gallop. Abdomen:    Soft, non-tender. Not distended. Bowel sounds normal. No masses  Extremities:  Extremities normal, atraumatic, No cyanosis. No edema. No clubbing  Lymph nodes:  Cervical, supraclavicular normal.  Neurologic:  General non-focal decreased strength, Alert and oriented X 3.    Skin:                 No rash      Lab Data Reviewed:    Recent Results (from the past 24 hour(s))   URINALYSIS W/MICROSCOPIC    Collection Time: 08/28/19 12:27 PM   Result Value Ref Range    Color DARK YELLOW      Appearance CLOUDY (A) CLEAR      Specific gravity 1.019 1.003 - 1.030      pH (UA) 5.5 5.0 - 8.0      Protein TRACE (A) NEG mg/dL    Glucose NEGATIVE  NEG mg/dL    Ketone NEGATIVE  NEG mg/dL    Blood LARGE (A) NEG      Urobilinogen 0.2 0.2 - 1.0 EU/dL    Nitrites NEGATIVE  NEG      Leukocyte Esterase SMALL (A) NEG      WBC 5-10 0 - 4 /hpf    RBC >100 (H) 0 - 5 /hpf    Epithelial cells FEW FEW /lpf    Bacteria NEGATIVE  NEG /hpf    Mucus TRACE (A) NEG /lpf    Hyaline cast 0-2 0 - 5 /lpf   BILIRUBIN, CONFIRM    Collection Time: 08/28/19 12:27 PM   Result Value Ref Range    Bilirubin UA, confirm NEGATIVE  NEG     EKG, 12 LEAD, INITIAL    Collection Time: 08/28/19  1:25 PM   Result Value Ref Range    Ventricular Rate 104 BPM    Atrial Rate 104 BPM    QRS Duration 96 ms    Q-T Interval 302 ms    QTC Calculation (Bezet) 397 ms    Calculated R Axis 17 degrees    Calculated T Axis 41 degrees    Diagnosis       Atrial fibrillation with rapid ventricular response  Nonspecific ST abnormality  When compared with ECG of 28-AUG-2019 02:26,  No significant change was found  Confirmed by Steven Melgoza ([id number]) on 8/28/2019 3:40:21 PM CBC WITH AUTOMATED DIFF    Collection Time: 08/29/19  5:07 AM   Result Value Ref Range    WBC 12.6 (H) 4.1 - 11.1 K/uL    RBC 4.19 4. 10 - 5.70 M/uL    HGB 11.5 (L) 12.1 - 17.0 g/dL    HCT 37.2 36.6 - 50.3 %    MCV 88.8 80.0 - 99.0 FL    MCH 27.4 26.0 - 34.0 PG    MCHC 30.9 30.0 - 36.5 g/dL    RDW 15.4 (H) 11.5 - 14.5 %    PLATELET 587 699 - 786 K/uL    MPV 10.8 8.9 - 12.9 FL    NRBC 0.0 0  WBC    ABSOLUTE NRBC 0.00 0.00 - 0.01 K/uL    NEUTROPHILS 77 (H) 32 - 75 %    LYMPHOCYTES 11 (L) 12 - 49 %    MONOCYTES 10 5 - 13 %    EOSINOPHILS 1 0 - 7 %    BASOPHILS 0 0 - 1 %    IMMATURE GRANULOCYTES 1 (H) 0.0 - 0.5 %    ABS. NEUTROPHILS 9.7 (H) 1.8 - 8.0 K/UL    ABS. LYMPHOCYTES 1.4 0.8 - 3.5 K/UL    ABS. MONOCYTES 1.3 (H) 0.0 - 1.0 K/UL    ABS. EOSINOPHILS 0.1 0.0 - 0.4 K/UL    ABS. BASOPHILS 0.0 0.0 - 0.1 K/UL    ABS. IMM.  GRANS. 0.1 (H) 0.00 - 0.04 K/UL    DF AUTOMATED     METABOLIC PANEL, BASIC    Collection Time: 08/29/19  5:07 AM   Result Value Ref Range    Sodium 137 136 - 145 mmol/L    Potassium 4.5 3.5 - 5.1 mmol/L    Chloride 104 97 - 108 mmol/L    CO2 26 21 - 32 mmol/L    Anion gap 7 5 - 15 mmol/L    Glucose 114 (H) 65 - 100 mg/dL    BUN 23 (H) 6 - 20 MG/DL    Creatinine 1.52 (H) 0.70 - 1.30 MG/DL    BUN/Creatinine ratio 15 12 - 20      GFR est AA 53 (L) >60 ml/min/1.73m2    GFR est non-AA 44 (L) >60 ml/min/1.73m2    Calcium 9.6 8.5 - 10.1 MG/DL         Assessment/Plan:     Principal Problem:    Weakness generalized (8/26/2019)    Active Problems:    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 No       mention of angioplasty or stent made      Acquired hypothyroidism (8/21/2017)      Anemia (8/13/2019)      Nonrheumatic aortic valve stenosis (8/27/2019)      Overview: Mild to moderate by echocardiogram 8/27/2019       ___________________________________________________  PLAN:    1. Now off IV hydration and watch fluid status closely  2. Continue without diuretic  3. Follow renal function and electrolytes  (24/1.5 on adm), now 23/1.52  4. Telemetry monitoring now with recent Afib  5. Cardizem drip now changed to Amiodarone PO  6. Cardiology consultation note reviewed and agree with high risk for any cardiac interventions, but plans for cath today, D/W Dr. Margarita Hutchison and I agree  6. Follow CBC as he did have significant anemia during last hospitalization all the last hemoglobin  At office on 8/21was 12 and now 11.5 so unlikely playing any role  7. Checked thyroid studies with history of hypothyroidism, normal  8. Echocardiogram is normal  9. PT and OT evaluation and treatment notes reviewed and walked only 20 ft yesterday  10. May need rehab on discharge again  11. Urine culture with retention  12. Continue Flomax and Proscar  13. Started Doxycycline for possible Urinary infection, 5-10 WBC on UA  14.   If continues with voiding problem then Urology consult and now has Catheter, 400 cc PVR yesterday AM        ___________________________________________________    Attending Physician: Debora Arevalo MD

## 2019-08-29 NOTE — PROGRESS NOTES
Chart reviewed and spoke to patients nurse. She recommends deferring PT today due to patient not doing well and will be going for cardiac cath later today. PT will continue to follow this patient.     Demetri Burch, PT

## 2019-08-29 NOTE — PROGRESS NOTES
Patient off floor for cath. Bedside shift report given to Jose [facility]. SBAR and Kardex discussed.

## 2019-08-29 NOTE — PROGRESS NOTES
0730    Received report from Christopher Finn, [facility] and Dawood Gilliam RN. Report included SBAR, Kardex, MAR, Recent results, and I/O.    0800    Patient is laying in bed, warm to the touch but no fever. Patient complaining of mild SOB, oxygen saturation is 93% on room air. Lungs diminished. Patient is alert to self and place but not time. WBC elevated to 12.6 from 10.6 yesterday. Notified Dr. Neville Greenfield, received orders for repeat CXR.    Radha Sheikh    Paged Dr. Norbert Stuart to notify of change in condition. Benjamin Ortizi with Dr. Norbert Stuart. Delmy Ruano he will see patient prior to cath.

## 2019-08-29 NOTE — PROGRESS NOTES
Progress Note      8/29/2019 8:35 AM  NAME: Abril Boateng   MRN:  [id number]   Admit Diagnosis: ASCVD (arteriosclerotic cardiovascular disease) [I25.10]                Problem List:      Weakness, failure to thrive. - Cath 7/2019 with impella supported PCI of LM into LAD with balloon side hole to LCx, cath 8/29/2019 with patent LM stent.  - Normal EF, mild AS  - Prox afib, currently in sinus  - Hx of aflutter ablation  - HTN  - Dyslipidemia  - Hypothyroid  - GERD  - BPH  - Distant tobacco  , lives alone, ADLs, occasionally using cane  Son:  Francisco.   [phone]                     Assessment/Plan:          No chest pain. Brief atypical shoulder pain. Euvolemic to dry  Prox afib noted    Prior Cath reviewed, would be early for stent issue. Echo with normal EF, mild to mod AS. Urine culture pending. ?low grade fevers    - Likely add eliquis after cath  - Cont ASA  - Cont plavix  - Stop dilt po given low bp, intolerant of beta blocker in past, cont added amiodarone  - BP on lower side, Cont added low dose midodrine.  - Holding lasix, would also hold fluids  - Cont statin     - Cont proscar and flomax    PT/OT    Long discussion with patient and family, plan cath today. All risk/benefits/alternatives discussed.                [x]       High complexity decision making was performed in this patient at high risk for decompensation with multiple organ involvement. Subjective:     Abril Boateng denies chest pain, dyspnea. Discussed with RN events overnight.      Review of Systems:    Symptom Y/N Comments  Symptom Y/N Comments   Fever/Chills N   Chest Pain N    Poor Appetite Y   Edema N    Cough N   Abdominal Pain N    Sputum N   Joint Pain N    SOB/JAFFE N   Pruritis/Rash N    Nausea/vomit N   Tolerating PT/OT Y    Diarrhea N   Tolerating Diet Y    Constipation N   Other       Could NOT obtain due to:      Objective:      Physical Exam:    Last 24hrs VS reviewed since prior progress note. Most recent are:    Visit Vitals  BP (!) 128/91   Pulse 88   Temp 99.1 °F (37.3 °C)   Resp 20   Ht 5' 9\" (1.753 m)   Wt 82.7 kg (182 lb 5.1 oz)   SpO2 94%   BMI 26.92 kg/m²       Intake/Output Summary (Last 24 hours) at 8/29/2019 8365  Last data filed at 8/29/2019 0455  Gross per 24 hour   Intake 610 ml   Output 1000 ml   Net -390 ml        General Appearance: Well developed, well nourished, alert & oriented x 3,    no acute distress. Ears/Nose/Mouth/Throat: Hearing grossly normal.  Neck: Supple. Chest: Lungs clear to auscultation bilaterally. Cardiovascular: Regular rate and rhythm, S1S2 normal, no murmur. Abdomen: Soft, non-tender, bowel sounds are active. Extremities: No edema bilaterally. Skin: Warm and dry. PMH/SH reviewed - no change compared to H&P    Data Review    Telemetry: normal sinus rhythm     Lab Data Personally Reviewed:    Recent Labs     08/29/19  0507 08/28/19 0232   WBC 12.6* 10.6   HGB 11.5* 10.8*   HCT 37.2 33.5*    204     No results for input(s): INR, PTP, APTT in the last 72 hours. No lab exists for component: Klae Red   Recent Labs     08/29/19  0507 08/28/19  0232 08/1934    134* 134*   K 4.5 4.2 4.3    102 101   CO2 26 26 27   BUN 23* 19 24*   CREA 1.52* 1.32* 1.50*   * 110* 152*   CA 9.6 9.0 9.0     Recent Labs     08/1934   CPK 41   CKNDX Cannot be calculated   TROIQ 0.06*     Lab Results   Component Value Date/Time    Cholesterol, total 117 06/12/2019 02:00 PM    HDL Cholesterol 35 06/12/2019 02:00 PM    LDL, calculated 65 06/12/2019 02:00 PM    Triglyceride 86 06/12/2019 02:00 PM    CHOL/HDL Ratio 3 06/12/2019 02:00 PM       Recent Labs     08/1934   SGOT 57*   AP 83   TP 6.3*   ALB 2.4*   GLOB 3.9     No results for input(s): PH, PCO2, PO2 in the last 72 hours.     Medications Personally Reviewed:    Current Facility-Administered Medications   Medication Dose Route Frequency    amiodarone (CORDARONE) tablet 400 mg  400 mg Oral TID    [START ON 9/1/2019] amiodarone (CORDARONE) tablet 200 mg  200 mg Oral DAILY    doxycycline (VIBRA-TABS) tablet 100 mg  100 mg Oral Q12H    polyethylene glycol (MIRALAX) packet 17 g  17 g Oral DAILY    enoxaparin (LOVENOX) injection 40 mg  40 mg SubCUTAneous Q24H    midodrine (PROAMITINE) tablet 5 mg  5 mg Oral TID WITH MEALS    albuterol (PROVENTIL HFA, VENTOLIN HFA, PROAIR HFA) inhaler 2 Puff  2 Puff Inhalation Q4H PRN    aspirin chewable tablet 81 mg  81 mg Oral DAILY    clopidogrel (PLAVIX) tablet 75 mg  75 mg Oral DAILY    pantoprazole (PROTONIX) tablet 40 mg  40 mg Oral DAILY    finasteride (PROSCAR) tablet 5 mg  5 mg Oral PCD    levothyroxine (SYNTHROID) tablet 137 mcg  137 mcg Oral ACB    pravastatin (PRAVACHOL) tablet 40 mg  40 mg Oral DAILY    tamsulosin (FLOMAX) capsule 0.4 mg  0.4 mg Oral DAILY    sodium chloride (NS) flush 5-40 mL  5-40 mL IntraVENous PRN    acetaminophen (TYLENOL) tablet 650 mg  650 mg Oral Q4H PRN         Edgar Talbot MD

## 2019-08-29 NOTE — PROGRESS NOTES
Bedside shift change report GIVEN TO Julian Jones RN. Report included the following information SBAR, Kardex and MAR. SIGNIFICANT CHANGES DURING SHIFT:        CONCERNS TO ADDRESS WITH MD:            [facility] NURSING NOTE   Admission Date 8/26/2019   Admission Diagnosis ASCVD (arteriosclerotic cardiovascular disease) [I25.10]   Consults IP CONSULT TO CARDIOLOGY      Cardiac Monitoring [x] Yes [] No      Purposeful Hourly Rounding [x] Yes    Lupillo Score Total Score: 4   Lupillo score 3 or > [x] Bed Alarm [] Avasys [] 1:1 sitter [] Patient refused (Signed refusal form in chart)   Berry Score Berry Score: 17   Berry score 14 or < [] PMT consult [] Wound Care consult    []  Specialty bed  [] Nutrition consult      Influenza Vaccine Received Flu Vaccine for Current Season (usually Sept-March): Not Flu Season           Oxygen needs? [x] Room air Oxygen @  []1L    []2L    []3L   []4L    []5L   []6L via  NC   Chronic home O2 use?  [] Yes [] No  Perform O2 challenge test and document in progress note using smartphrase (.Homeoxygen)      Last bowel movement Last Bowel Movement Date: 08/27/19      Urinary Catheter Urinary Catheter 08/28/19 2- way;Coude-Indications for Use: Acute urinary retention/bladder outlet obstruction     Urinary Catheter 08/28/19 2- way;Coude-Urine Output (mL): 200 ml     LDAs               Peripheral IV 08/26/19 Right Antecubital (Active)   Site Assessment Clean, dry, & intact 8/28/2019  7:36 PM   Phlebitis Assessment 0 8/28/2019  7:36 PM   Infiltration Assessment 0 8/28/2019  7:36 PM   Dressing Status Clean, dry, & intact 8/28/2019  7:36 PM   Dressing Type Transparent 8/28/2019  7:36 PM   Hub Color/Line Status Pink 8/28/2019  7:36 PM       Peripheral IV 08/26/19 Right Hand (Active)   Site Assessment Clean, dry, & intact 8/28/2019  3:17 PM   Phlebitis Assessment 0 8/28/2019  3:17 PM   Infiltration Assessment 0 8/28/2019  3:17 PM   Dressing Status Clean, dry, & intact 8/28/2019  3:17 PM   Dressing Type Transparent;Tape 8/28/2019  3:17 PM   Hub Color/Line Status Pink;Flushed 8/28/2019  3:17 PM                         Readmission Risk Assessment Tool Score High Risk            28       Total Score        3 Has Seen PCP in Last 6 Months (Yes=3, No=0)    4 IP Visits Last 12 Months (1-3=4, 4=9, >4=11)    5 Pt. Coverage (Medicare=5 , Medicaid, or Self-Pay=4)    16 Charlson Comorbidity Score (Age + Comorbid Conditions)        Criteria that do not apply:    . Living with Significant Other. Assisted Living. LTAC. SNF.  or   Rehab    Patient Length of Stay (>5 days = 3)       Expected Length of Stay 2d 14h   Actual Length of Stay 3

## 2019-08-29 NOTE — PROGRESS NOTES
Chart reviewed and spoke to patients nurse. She recommends deferring OT today due to patient not doing well and will be going for cardiac cath later today. OT will continue to follow this patient.

## 2019-08-29 NOTE — PROGRESS NOTES
Problem: Falls - Risk of  Goal: *Absence of Falls  Description  Document Kaseymadelyn Sepulveda Fall Risk and appropriate interventions in the flowsheet.   Outcome: Progressing Towards Goal  Note:   Fall Risk Interventions:  Mobility Interventions: Bed/chair exit alarm         Medication Interventions: Bed/chair exit alarm, Patient to call before getting OOB    Elimination Interventions: Bed/chair exit alarm, Call light in reach, Patient to call for help with toileting needs    History of Falls Interventions: Bed/chair exit alarm

## 2019-08-29 NOTE — PROGRESS NOTES
Pt arrived from [facility] with TR band on. Right radial site dry and intact with no hematoma. Family at bedside. Pt has no c/o pain and VSS.

## 2019-08-29 NOTE — ROUTINE PROCESS
1520: Report received from Ian May, [address]: Pt ambulated in room with assistance of two RN's, tolerated fairly well but is quite weak.

## 2019-08-29 NOTE — PROGRESS NOTES
Problem: Falls - Risk of  Goal: *Absence of Falls  Description  Document Clayton Mathewsable Fall Risk and appropriate interventions in the flowsheet. 8/28/2019 2256 by Purnima Nj  Outcome: Progressing Towards Goal  Note:   Fall Risk Interventions:  Mobility Interventions: Bed/chair exit alarm, Patient to call before getting OOB         Medication Interventions: Bed/chair exit alarm, Evaluate medications/consider consulting pharmacy, Patient to call before getting OOB    Elimination Interventions: Bed/chair exit alarm, Call light in reach    History of Falls Interventions: Bed/chair exit alarm, Door open when patient unattended      8/28/2019 2255 by Purnima Nj  Outcome: Progressing Towards Goal  Note:   Fall Risk Interventions:  Mobility Interventions: Bed/chair exit alarm, Patient to call before getting OOB         Medication Interventions: Bed/chair exit alarm, Evaluate medications/consider consulting pharmacy, Patient to call before getting OOB    Elimination Interventions: Bed/chair exit alarm, Call light in reach    History of Falls Interventions: Bed/chair exit alarm, Door open when patient unattended         Problem: Patient Education: Go to Patient Education Activity  Goal: Patient/Family Education  Outcome: Progressing Towards Goal     Problem: Pressure Injury - Risk of  Goal: *Prevention of pressure injury  Description  Document Berry Scale and appropriate interventions in the flowsheet.   8/28/2019 2256 by Purnima Nj  Outcome: Progressing Towards Goal  Note:   Pressure Injury Interventions:  Sensory Interventions: Assess changes in LOC, Assess need for specialty bed    Moisture Interventions: Absorbent underpads, Apply protective barrier, creams and emollients    Activity Interventions: Assess need for specialty bed, Chair cushion    Mobility Interventions: Float heels, HOB 30 degrees or less    Nutrition Interventions: Document food/fluid/supplement intake, Discuss nutritional consult with provider, Offer support with meals,snacks and hydration    Friction and Shear Interventions: Apply protective barrier, creams and emollients, Feet elevated on foot rest             8/28/2019 2255 by Jo Ann Black  Outcome: Progressing Towards Goal  Note:   Pressure Injury Interventions:  Sensory Interventions: Assess changes in LOC, Assess need for specialty bed    Moisture Interventions: Absorbent underpads, Apply protective barrier, creams and emollients    Activity Interventions: Assess need for specialty bed, Chair cushion    Mobility Interventions: Float heels, HOB 30 degrees or less    Nutrition Interventions: Document food/fluid/supplement intake, Discuss nutritional consult with provider, Offer support with meals,snacks and hydration    Friction and Shear Interventions: Apply protective barrier, creams and emollients, Feet elevated on foot rest

## 2019-08-30 NOTE — PROGRESS NOTES
PROGRESS NOTE    NAME:  Nataliia Yu   :   3/13/1931   MRN:   [id number]     Date/Time:  2019 7:19 AM  Subjective:   History:  Chart reviewed and patient seen and examined and D/W Dr. Linda Del Rio, his son and his nurse this AM and all events noted. He was brought in to the office by his daughter on  for evaluation of severe weakness. Cesilia Hahn was hospitalized in July and discharged on  to rehab. Nic Pateberg the hospitalization he had left main coronary disease and was treated with a stent extending in his LAD with a sidehole to his circumflex.  Postoperative he did develop significant anemia and was transfused 3 units of packed blood cells.  He was seen in follow-up by me on  at which time his hemoglobin was up to 11.1 but his BUN was 44 with a creatinine of 2.4. Cesilia Hahn returned to the office on the  at which time his creatinine had improved to 1.8 and BUN to 31 however he remained very weak.  At that time his hemoglobin was 12.  He returned on  noting he had become weaker each day since seen at the office on the  despite stopping his diuretic and he was to the point that he could do hardly anything without having to lay down because he was so weak. With that history and profound weakness on exam and high fall risk it was felt prudent to admit him for workup and treatment.     He notes no chest pain, shortness of breath, palpitations, PND or other cardiac or respiratory complaints except extreme weakness with no energy to do anything. He does have a history of Afib and went into rapid Afib  PM and is now on po Amiodarone after converted to NSR on Cardizem drip. Cesilia Hahn also has a very poor appetite but notes no nausea or vomiting. He denies any other GI or  complaints except voiding difficulty and now maradiaga placed 2 days ago but will DC it today and increase Flomax. Also on Doxycycline now for probable Prostatitis despite neg urine cx.   He denies any headaches, dizziness or neurologic complaints except extreme weakness.   He has no other complaints on complete review of systems      Medications reviewed:  Current Facility-Administered Medications   Medication Dose Route Frequency    sodium chloride (NS) flush 5-40 mL  5-40 mL IntraVENous Q8H    sodium chloride (NS) flush 5-40 mL  5-40 mL IntraVENous PRN    naloxone (NARCAN) injection 0.4 mg  0.4 mg IntraVENous PRN    ondansetron (ZOFRAN) injection 4 mg  4 mg IntraVENous Q4H PRN    amiodarone (CORDARONE) tablet 400 mg  400 mg Oral TID    [START ON 2019] amiodarone (CORDARONE) tablet 200 mg  200 mg Oral DAILY    doxycycline (VIBRA-TABS) tablet 100 mg  100 mg Oral Q12H    polyethylene glycol (MIRALAX) packet 17 g  17 g Oral DAILY    enoxaparin (LOVENOX) injection 40 mg  40 mg SubCUTAneous Q24H    midodrine (PROAMITINE) tablet 5 mg  5 mg Oral TID WITH MEALS    albuterol (PROVENTIL HFA, VENTOLIN HFA, PROAIR HFA) inhaler 2 Puff  2 Puff Inhalation Q4H PRN    aspirin chewable tablet 81 mg  81 mg Oral DAILY    clopidogrel (PLAVIX) tablet 75 mg  75 mg Oral DAILY    pantoprazole (PROTONIX) tablet 40 mg  40 mg Oral DAILY    finasteride (PROSCAR) tablet 5 mg  5 mg Oral PCD    levothyroxine (SYNTHROID) tablet 137 mcg  137 mcg Oral ACB    pravastatin (PRAVACHOL) tablet 40 mg  40 mg Oral DAILY    tamsulosin (FLOMAX) capsule 0.4 mg  0.4 mg Oral DAILY    sodium chloride (NS) flush 5-40 mL  5-40 mL IntraVENous PRN    acetaminophen (TYLENOL) tablet 650 mg  650 mg Oral Q4H PRN        Objective:   Vitals:  Visit Vitals  /48   Pulse 60   Temp 98.1 °F (36.7 °C)   Resp 15   Ht 5' 9\" (1.753 m)   Wt 182 lb 5.1 oz (82.7 kg)   SpO2 97%   BMI 26.92 kg/m²    O2 Flow Rate (L/min): 2 l/min O2 Device: Nasal cannula Temp (24hrs), Av.7 °F (37.1 °C), Min:98.1 °F (36.7 °C), Max:99.8 °F (37.7 °C)      Last 24hr Input/Output:    Intake/Output Summary (Last 24 hours) at 2019  Last data filed at 2019 0718  Gross per 24 hour   Intake  Output 825 ml   Net -825 ml        PHYSICAL EXAM:  General:     Alert, cooperative, no distress but very frail appearance, appears stated age. Head:    Normocephalic, without obvious abnormality, atraumatic. Eyes:    Conjunctivae/corneas pale. PERRLA  Nose:   Nares normal. No drainage or sinus tenderness. Throat:     Lips, mucosa, and tongue normal.  No Thrush  Neck:   Supple, symmetrical,  no adenopathy, thyroid: non tender     no carotid bruit and no JVD. Back:     Symmetric,  No CVA tenderness. Lungs:    Clear to auscultation bilaterally except dry basilar scattered rales. No Wheezing or Rhonchi. No rales. Heart:    Regular rate and rhythm,  no murmur, rub or gallop. Abdomen:    Soft, non-tender. Not distended. Bowel sounds normal. No masses  Extremities:  Extremities normal, atraumatic, No cyanosis. No edema. No clubbing  Lymph nodes:  Cervical, supraclavicular normal.  Neurologic:  General non-focal decreased strength, Alert and oriented X 3. Skin:                 No rash      Lab Data Reviewed:    Recent Results (from the past 24 hour(s))   CBC WITH AUTOMATED DIFF    Collection Time: 08/30/19  4:32 AM   Result Value Ref Range    WBC 11.4 (H) 4.1 - 11.1 K/uL    RBC 3.65 (L) 4.10 - 5.70 M/uL    HGB 10.2 (L) 12.1 - 17.0 g/dL    HCT 32.0 (L) 36.6 - 50.3 %    MCV 87.7 80.0 - 99.0 FL    MCH 27.9 26.0 - 34.0 PG    MCHC 31.9 30.0 - 36.5 g/dL    RDW 15.3 (H) 11.5 - 14.5 %    PLATELET 313 682 - 861 K/uL    MPV 11.2 8.9 - 12.9 FL    NRBC 0.0 0  WBC    ABSOLUTE NRBC 0.00 0.00 - 0.01 K/uL    NEUTROPHILS 76 (H) 32 - 75 %    LYMPHOCYTES 11 (L) 12 - 49 %    MONOCYTES 11 5 - 13 %    EOSINOPHILS 1 0 - 7 %    BASOPHILS 0 0 - 1 %    IMMATURE GRANULOCYTES 1 (H) 0.0 - 0.5 %    ABS. NEUTROPHILS 8.6 (H) 1.8 - 8.0 K/UL    ABS. LYMPHOCYTES 1.3 0.8 - 3.5 K/UL    ABS. MONOCYTES 1.3 (H) 0.0 - 1.0 K/UL    ABS. EOSINOPHILS 0.2 0.0 - 0.4 K/UL    ABS. BASOPHILS 0.0 0.0 - 0.1 K/UL    ABS. IMM.  GRANS. 0.1 (H) 0.00 - 0.04 K/UL    DF AUTOMATED     METABOLIC PANEL, BASIC    Collection Time: 08/30/19  4:32 AM   Result Value Ref Range    Sodium 137 136 - 145 mmol/L    Potassium 4.3 3.5 - 5.1 mmol/L    Chloride 104 97 - 108 mmol/L    CO2 28 21 - 32 mmol/L    Anion gap 5 5 - 15 mmol/L    Glucose 98 65 - 100 mg/dL    BUN 24 (H) 6 - 20 MG/DL    Creatinine 1.45 (H) 0.70 - 1.30 MG/DL    BUN/Creatinine ratio 17 12 - 20      GFR est AA 56 (L) >60 ml/min/1.73m2    GFR est non-AA 46 (L) >60 ml/min/1.73m2    Calcium 9.1 8.5 - 10.1 MG/DL         Assessment/Plan:     Principal Problem:    Weakness generalized (8/26/2019)    Active Problems:    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 No       mention of angioplasty or stent made      Acquired hypothyroidism (8/21/2017)      Anemia (8/13/2019)      Nonrheumatic aortic valve stenosis (8/27/2019)      Overview: Mild to moderate by echocardiogram 8/27/2019       ___________________________________________________  PLAN:    1. Now off IV hydration and watch fluid status closely  2. Continue without diuretic  3. Follow renal function and electrolytes  (24/1.5 on adm), now 24/1.45  4. Telemetry monitoring now with recent Afib  5. Cardizem drip now changed to Amiodarone PO  6. Cardiology consultation note reviewed and D/W Dr. Bucky Small re: ess. normal cath  6. Follow CBC as he did have significant anemia during last hospitalization all the last hemoglobin  At office on 8/21was 12 and now 10.2 so unlikely playing any role  7.   Checked thyroid studies with history of hypothyroidism, normal  8. Echocardiogram is normal  9. PT and OT evaluation and treatment notes reviewed and walked only 20 ft with last session  10. May need rehab on discharge again  11. Urine culture with retention is neg but still likely Prostatitis so continue Doxycycline, WBC decreased to 11.4 from 12.6  12. Continue Flomax and Proscar with increase Flomax dose  13. Continue low dose Midodrine for BP and off Cardizem and diuretic  14. DC hiren  15.   Will need Rehab on DC        ___________________________________________________    Attending Physician: Elisa Toscano MD

## 2019-08-30 NOTE — PROGRESS NOTES
Problem: Self Care Deficits Care Plan (Adult)  Goal: *Acute Goals and Plan of Care (Insert Text)  Description    FUNCTIONAL STATUS PRIOR TO ADMISSION: Patient was modified independent using Viviana [employer] for functional mobility, yet had progressed to using a [address] prior to recent hospitalizations. Required Min A with basic and IADLs. Reported falls in the last year. HOME SUPPORT PRIOR TO ADMISSION: The patient lived with son and daughter in law who assisted patient with ADLs and mobility PRN after discharge from Rehab. Occupational Therapy Goals  Initiated 8/27/2019  1. Patient will perform grooming with supervision/set-up within 7 day(s). 2.  Patient will perform bathing with minimal assistance/contact guard assist within 7 day(s). 3.  Patient will perform lower body dressing with minimal assistance/contact guard assist within 7 day(s). 4.  Patient will perform toilet transfers with supervision/set-up within 7 day(s). 5.  Patient will perform all aspects of toileting with independence within 7 day(s). 6.  Patient will participate in upper extremity therapeutic exercise/activities with supervision/set-up for 5 minutes within 7 day(s). 7.  Patient will utilize energy conservation techniques during functional activities with verbal cues within 7 day(s). Outcome: Progressing Towards Goal   OCCUPATIONAL THERAPY TREATMENT  Patient: Adarsh Poe (43 y.o. male)  Date: 8/30/2019  Diagnosis: ASCVD (arteriosclerotic cardiovascular disease) [I25.10] Weakness generalized  Procedure(s) (LRB):  CORONARY ANGIOGRAPHY (N/A) 1 Day Post-Op  Precautions: Fall, Skin  Chart, occupational therapy assessment, plan of care, and goals were reviewed. ASSESSMENT  Based on the objective data described below, Pt presents with improving mobility and mildly improved activity tolerance over prior sessions with regard to BP remaining stable.  Pt was most limited d/t JAFFE and verbalized exhaustion following toileting this session. He was assisted back to chair to complete hand hygiene using hand wipes seated versus attempting to stand and wash hands d/t fatigue. Overall Pt is progressing well toward the above listed goals, yet recommend short rehab stay to maximize his activity tolerance for standing ADLs given level of fatigue/exhaustion this session after x1 seated ADL and brief ambulation from bed to toilet. Current Level of Function Impacting Discharge (ADLs): CGA-Min A with transfers, CGA with standing ADLs. Supervision for monitoring activity tolerance. Other factors to consider for discharge: Pts son (who lives with him) is unable to assist Pt d/t his own heart condition. PLAN :  Patient continues to benefit from skilled intervention to address the above impairments. Continue treatment per established plan of care. to address goals. Recommend with staff: Mobilize Pt to bathroom vs BSC/urinal now    Recommendation for discharge: (in order for the patient to meet his/her long term goals)  Therapy up to 5 days/week in SNF setting    This discharge recommendation:  Has been made in collaboration with the attending provider and/or case management    Equipment recommendations for successful discharge (if) home: to be determined by rehab facility       SUBJECTIVE:   Patient stated I worked at [employer] for [employer] for years. . I loved every minute of it\"    OBJECTIVE DATA SUMMARY:   Cognitive/Behavioral Status:  Neurologic State: Alert  Orientation Level: Oriented X4  Cognition: Appropriate decision making; Appropriate for age attention/concentration; Appropriate safety awareness; Follows commands  Perception: Appears intact  Perseveration: No perseveration noted  Safety/Judgement: Awareness of environment;Good awareness of safety precautions; Fall prevention(weakness)    Functional Mobility and Transfers for ADLs:  Bed Mobility:  Rolling: Stand-by assistance  Supine to Sit: Contact guard assistance  Scooting: Stand-by assistance    Transfers:  Sit to Stand: Contact guard assistance  Functional Transfers  Bathroom Mobility: Contact guard assistance  Toilet Transfer : Contact guard assistance  Bed to Chair: Minimum assistance;Assist x1;Adaptive equipment(RW)    Balance:  Sitting: Impaired  Sitting - Static: Good (unsupported)  Sitting - Dynamic: Fair (occasional)  Standing: Impaired  Standing - Static: Fair;Constant support  Standing - Dynamic : Fair;Constant support    ADL Intervention:     Instructed Pt to self-monitor any feelings of dizziness/lightheadedness prior to mobilizing d/t hypotension this admission. Following toileting, Pt accepted instruction/education for completing hand hygiene seated vs standing at sink d/t JAFFE, as this was the furthest the Pt has mobilize this admission d/t issues w/ hypotension. Pt instructed to remain OOB in chair for all meals and to request assist with mobilizing to bathroom using RW as much as possible vs using urinal (if he can) to maximize activity tolerance. Pt verbalized understanding. Grooming  Washing Hands: Set-up(seated, using hand wipes d/t fatigue/JAFFE s/p toileting)    Lower Body Dressing Assistance  Socks: Modified independent  Position Performed: Seated edge of bed  Cues: Renee Bloom  Bladder Hygiene: Supervision  Clothing Management: Contact guard assistance    Cognitive Retraining  Safety/Judgement: Awareness of environment;Good awareness of safety precautions; Fall prevention(weakness)    Pain:  None    Activity Tolerance:   Fair, Poor and requires rest breaks  Please refer to the flowsheet for vital signs taken during this treatment.     After treatment patient left in no apparent distress:   Sitting in chair, Call bell within reach and Caregiver / family present    COMMUNICATION/COLLABORATION:   The patients plan of care was discussed with: Physical Therapist, Registered Nurse and Patient and      [facility], OTR/L  Time Calculation: 22 mins

## 2019-08-30 NOTE — PROGRESS NOTES
Bedside shift change report given to Aixa Pittman RN (oncoming nurse) by Claudia Portillo RN (offgoing nurse). Report included the following information SBAR, Kardex, ED Summary, Procedure Summary, Intake/Output, MAR, Recent Results and Cardiac Rhythm NSR.

## 2019-08-30 NOTE — PROGRESS NOTES
Problem: Mobility Impaired (Adult and Pediatric)  Goal: *Acute Goals and Plan of Care (Insert Text)  Description  FUNCTIONAL STATUS PRIOR TO ADMISSION: Patient required minimal assistance for basic and instrumental ADLs. HOME SUPPORT PRIOR TO ADMISSION: The patient lived with son and daughter in law who assisted patient with ADLs and mobility as needed since coming home from [address] [place] [address]. He uses a walker most of the time, but had only needed a cane prior to recent hospitalizations. Lives in a 1 story home with 3 steps to enter with Rail on the left side. Reported falls in the last year. Physical Therapy Goals  Initiated 8/27/2019  1. Patient will move from supine to sit and sit to supine , scoot up and down and roll side to side in bed with minimal contact guard/standby assist within 7 day(s). 2.  Patient will transfer from bed to chair and chair to bed with minimal contact guard assist using the least restrictive device within 7 day(s). 3.  Patient will perform sit to stand with standby assistance/set-up within 7 day(s). 4.  Patient will ambulate with contact guard assist for 75 feet with the least restrictive device within 7 day(s). 5.  Patient will ascend/descend 3 stairs with left handrail(s) with minimal assistance x 1 within 7 day(s). Outcome: Progressing Towards Goal   PHYSICAL THERAPY TREATMENT  Patient: Abril Boateng (67 y.o. male)  Date: 8/30/2019  Diagnosis: ASCVD (arteriosclerotic cardiovascular disease) [I25.10] Weakness generalized  Procedure(s) (LRB):  CORONARY ANGIOGRAPHY (N/A) 1 Day Post-Op  Precautions: Fall, Skin  Chart, physical therapy assessment, plan of care and goals were reviewed. ASSESSMENT  Based on the objective data described below, BP was more stable during position changes than last session. See below for details.   Vitals:    08/30/19 1226 08/30/19 1230 08/30/19 1233 08/30/19 1239   BP: 106/53 116/51 103/49 114/50   BP 1 Location:       BP Patient Position: Supine Sitting Standing Sitting;Post activity  Comment: post toileting   Pulse:       Resp:       Temp:       SpO2:       Weight:       Height:         Patient was more alert and able to better engage in PT session. Needed less assistance with bed mobility, transfers and gait distance increased. Will likely need rehab post hospitalization due to not having a caregiver who can assist him once home. Patient reported feeling exhausted after PT session, but agreed to sit up in the chair with family in the room. Current Level of Function Impacting Discharge (mobility/balance): cg assistance for supine to sit and sit to stand transfers. Ambulation needed min a x 1 using RW for 15 feet x 2. Assistance needed for slight L posterior cog to maintain stability. He needs cues to improve motor planning, to back up to chair all the way and reach back before sitting down. Other factors to consider for discharge: his son who lives with patient is disabled due to heart condition. PLAN :  Patient continues to benefit from skilled intervention to address the above impairments. Continue treatment per established plan of care. to address goals. Recommendation for discharge: (in order for the patient to meet his/her long term goals)  Therapy up to 5 days/week in SNF setting    This discharge recommendation:  Has been made in collaboration with the attending provider and/or case management    Equipment recommendations for successful discharge (if) home: patient owns DME required for discharge       SUBJECTIVE:   Patient stated I feel tired.     OBJECTIVE DATA SUMMARY:   Critical Behavior:  Neurologic State: Alert  Orientation Level: Oriented X4  Cognition: Appropriate decision making, Appropriate for age attention/concentration, Appropriate safety awareness, Follows commands  Safety/Judgement: Awareness of environment, Good awareness of safety precautions, Fall prevention(weakness)  Functional Mobility Training:  Bed Mobility:  Rolling: Stand-by assistance  Supine to Sit: Contact guard assistance     Scooting: Stand-by assistance        Transfers:  Sit to Stand: Contact guard assistance  Stand to Sit: Contact guard assistance        Bed to Chair: Minimum assistance;Assist x1;Adaptive equipment(RW)                    Balance:  Sitting: Impaired  Sitting - Static: Good (unsupported)  Sitting - Dynamic: Fair (occasional)  Standing: Impaired  Standing - Static: Fair;Constant support  Standing - Dynamic : Fair;Constant support  Ambulation/Gait Training:  Distance (ft): 30 Feet (ft)(15 ft x 2 with sitting rest)  Assistive Device: Walker, rolling;Gait belt  Ambulation - Level of Assistance: Minimal assistance;Assist x1        Gait Abnormalities: Trunk sway increased              Speed/Izabella: Slow  Step Length: Right shortened;Left shortened                 Therapeutic Exercises:   Supine - ankle pumps, quad sets, glut sets, heel slides. All x 3-10 reps as tolerated in each lower extremity. Pain Rating:  No complaints this session. Activity Tolerance:   Poor and requires frequent rest breaks  Please refer to the flowsheet for vital signs taken during this treatment.     After treatment patient left in no apparent distress:   Sitting in chair, Call bell within reach and Caregiver / family present    COMMUNICATION/COLLABORATION:   The patients plan of care was discussed with: Occupational Therapist, Registered Nurse and     May Bowden, PT   Time Calculation: 24 mins

## 2019-08-30 NOTE — PROGRESS NOTES
Progress Note      8/30/2019 8:35 AM  NAME: Haroldo Durand   MRN:  [id number]   Admit Diagnosis: ASCVD (arteriosclerotic cardiovascular disease) [I25.10]                Problem List:      Weakness, failure to thrive. - Cath 7/2019 with impella supported PCI of LM into LAD with balloon side hole to LCx, cath 8/29/2019 with patent LM stent.  - Normal EF, mild AS  - Prox afib, currently in sinus  - Hx of aflutter ablation  - HTN  - Dyslipidemia  - Hypothyroid  - GERD  - BPH  - Distant tobacco  , lives alone, ADLs, occasionally using cane  Son:  Francisco.   [phone]                     Assessment/Plan:          No chest pain. Brief atypical shoulder pain. Cath with patent stents  Euvolemic so far, echo with normal EF, mild to mod AS  Prox afib back in sinus, will use amiodarone, high risk for triple anticoagulation    - Hold on anticoagulation given age  - Cont ASA  - Cont plavix  - Stop dilt po given low bp, intolerant of beta blocker in past, cont added amiodarone change to 200mg daily   - Cont added low dose midodrine.  - Holding lasix, would also hold fluids  - Cont statin     - Cont proscar and flomax    PT/OT  Likely will need rehab.                [x]       High complexity decision making was performed in this patient at high risk for decompensation with multiple organ involvement. Subjective:     Haroldo Durand denies chest pain, dyspnea. Discussed with RN events overnight. Review of Systems:    Symptom Y/N Comments  Symptom Y/N Comments   Fever/Chills N   Chest Pain N    Poor Appetite Y   Edema N    Cough N   Abdominal Pain N    Sputum N   Joint Pain N    SOB/JAFFE N   Pruritis/Rash N    Nausea/vomit N   Tolerating PT/OT Y    Diarrhea N   Tolerating Diet Y    Constipation N   Other       Could NOT obtain due to:      Objective:      Physical Exam:    Last 24hrs VS reviewed since prior progress note.  Most recent are:    Visit Vitals  /48   Pulse 77   Temp 98.2 °F (36.8 °C)   Resp 16   Ht 5' 9\" (1.753 m)   Wt 82.7 kg (182 lb 5.1 oz)   SpO2 98%   BMI 26.92 kg/m²       Intake/Output Summary (Last 24 hours) at 8/30/2019 0931  Last data filed at 8/30/2019 3365  Gross per 24 hour   Intake 380 ml   Output 1750 ml   Net -1370 ml        General Appearance: Well developed, well nourished, alert & oriented x 3,    no acute distress. Ears/Nose/Mouth/Throat: Hearing grossly normal.  Neck: Supple. Chest: Lungs clear to auscultation bilaterally. Cardiovascular: Regular rate and rhythm, S1S2 normal, no murmur. Abdomen: Soft, non-tender, bowel sounds are active. Extremities: No edema bilaterally. Skin: Warm and dry. PMH/SH reviewed - no change compared to H&P    Data Review    Telemetry: normal sinus rhythm     Lab Data Personally Reviewed:    Recent Labs     08/30/19  0432 08/29/19  0507   WBC 11.4* 12.6*   HGB 10.2* 11.5*   HCT 32.0* 37.2    241     No results for input(s): INR, PTP, APTT in the last 72 hours. No lab exists for component: Tin Recinos   Recent Labs     08/30/19  0432 08/29/19  0507 08/28/19  0232    137 134*   K 4.3 4.5 4.2    104 102   CO2 28 26 26   BUN 24* 23* 19   CREA 1.45* 1.52* 1.32*   GLU 98 114* 110*   CA 9.1 9.6 9.0     No results for input(s): CPK, CKNDX, TROIQ in the last 72 hours. No lab exists for component: CPKMB  Lab Results   Component Value Date/Time    Cholesterol, total 117 06/12/2019 02:00 PM    HDL Cholesterol 35 06/12/2019 02:00 PM    LDL, calculated 65 06/12/2019 02:00 PM    Triglyceride 86 06/12/2019 02:00 PM    CHOL/HDL Ratio 3 06/12/2019 02:00 PM       No results for input(s): SGOT, GPT, AP, TBIL, TP, ALB, GLOB, GGT, AML, LPSE in the last 72 hours. No lab exists for component: AMYP, HLPSE  No results for input(s): PH, PCO2, PO2 in the last 72 hours.     Medications Personally Reviewed:    Current Facility-Administered Medications   Medication Dose Route Frequency    tamsulosin (FLOMAX) capsule 0.8 mg  0.8 mg Oral DAILY    temazepam (RESTORIL) capsule 15 mg  15 mg Oral QHS    [START ON 8/31/2019] amiodarone (CORDARONE) tablet 200 mg  200 mg Oral DAILY    sodium chloride (NS) flush 5-40 mL  5-40 mL IntraVENous Q8H    sodium chloride (NS) flush 5-40 mL  5-40 mL IntraVENous PRN    naloxone (NARCAN) injection 0.4 mg  0.4 mg IntraVENous PRN    ondansetron (ZOFRAN) injection 4 mg  4 mg IntraVENous Q4H PRN    doxycycline (VIBRA-TABS) tablet 100 mg  100 mg Oral Q12H    polyethylene glycol (MIRALAX) packet 17 g  17 g Oral DAILY    enoxaparin (LOVENOX) injection 40 mg  40 mg SubCUTAneous Q24H    midodrine (PROAMITINE) tablet 5 mg  5 mg Oral TID WITH MEALS    albuterol (PROVENTIL HFA, VENTOLIN HFA, PROAIR HFA) inhaler 2 Puff  2 Puff Inhalation Q4H PRN    aspirin chewable tablet 81 mg  81 mg Oral DAILY    clopidogrel (PLAVIX) tablet 75 mg  75 mg Oral DAILY    pantoprazole (PROTONIX) tablet 40 mg  40 mg Oral DAILY    finasteride (PROSCAR) tablet 5 mg  5 mg Oral PCD    levothyroxine (SYNTHROID) tablet 137 mcg  137 mcg Oral ACB    pravastatin (PRAVACHOL) tablet 40 mg  40 mg Oral DAILY    sodium chloride (NS) flush 5-40 mL  5-40 mL IntraVENous PRN    acetaminophen (TYLENOL) tablet 650 mg  650 mg Oral Q4H PRN         Zeke Anderson MD

## 2019-08-30 NOTE — PROGRESS NOTES
Cm acknowledged patient on this unit. Chart review. Anticipate need for rehab. Most recent PTOT notes reflect SNF rehab as recommended discharge plan. CM met with patient and his son, present at bedside. SNF list left. They would like to discuss which facility with family. 1200  CM spoke with Admissions Coordinator at [facility]. They are participating with patient's insurance and patient will require authorization. No referral submitted at this time. Waiting for family decision. 1253  CM met with family. FOC signed for [facility]. PTOT notes anticipated soon. They have evaluate patient and are recommending short term rehab. Family can transport when patient is ready for discharge. 1400  Chart review. PT note present. CM submitted referral to [facility]. Will update regarding OT note as soon as available. 1515  OT note available. CM called [facility] to inform. Confirmed receipt of referral with Irene Montiel. CM will continue to follow.     [id number] [facility]  Ext [phone]

## 2019-08-31 NOTE — PROGRESS NOTES
Problem: Falls - Risk of  Goal: *Absence of Falls  Description  Document Alejo Fells Fall Risk and appropriate interventions in the flowsheet. Outcome: Progressing Towards Goal  Note:   Fall Risk Interventions:  Mobility Interventions: Patient to call before getting OOB, Utilize walker, cane, or other assistive device    Mentation Interventions: Bed/chair exit alarm    Medication Interventions: Patient to call before getting OOB    Elimination Interventions: Call light in reach    History of Falls Interventions: Investigate reason for fall         Problem: Patient Education: Go to Patient Education Activity  Goal: Patient/Family Education  Outcome: Progressing Towards Goal     Problem: Pressure Injury - Risk of  Goal: *Prevention of pressure injury  Description  Document Berry Scale and appropriate interventions in the flowsheet.   Outcome: Progressing Towards Goal  Note:   Pressure Injury Interventions:  Sensory Interventions: Assess changes in LOC    Moisture Interventions: Absorbent underpads    Activity Interventions: Increase time out of bed    Mobility Interventions: HOB 30 degrees or less    Nutrition Interventions: Document food/fluid/supplement intake    Friction and Shear Interventions: Lift sheet, Apply protective barrier, creams and emollients                Problem: Patient Education: Go to Patient Education Activity  Goal: Patient/Family Education  Outcome: Progressing Towards Goal     Problem: Breathing Pattern - Ineffective  Goal: *Absence of hypoxia  Outcome: Progressing Towards Goal     Problem: Patient Education: Go to Patient Education Activity  Goal: Patient/Family Education  Outcome: Progressing Towards Goal     Problem: Patient Education: Go to Patient Education Activity  Goal: Patient/Family Education  Outcome: Progressing Towards Goal

## 2019-08-31 NOTE — PROGRESS NOTES
INTERNAL MEDICINE ATTENDING NOTE    S: Mr. Sravani Hua was seen by me today during rounds. At this time, he is resting comfortably. The patient has no new complaints today. ROS otherwise unremarkable except as noted elsewhere. O: Blood pressure 124/78, pulse (!) 51, temperature 98 °F (36.7 °C), resp. rate 14, height 5' 9\" (1.753 m), weight 182 lb 5.1 oz (82.7 kg), SpO2 99 %. Gen: Patient is in no acute distress. Lungs: CTAB. Heart: RRR. Abd: S, NT, ND, BS present. Extremities: Warm. Recent Results (from the past 12 hour(s))   CBC WITH AUTOMATED DIFF    Collection Time: 08/31/19  4:38 AM   Result Value Ref Range    WBC 11.0 4.1 - 11.1 K/uL    RBC 3.66 (L) 4.10 - 5.70 M/uL    HGB 10.3 (L) 12.1 - 17.0 g/dL    HCT 31.8 (L) 36.6 - 50.3 %    MCV 86.9 80.0 - 99.0 FL    MCH 28.1 26.0 - 34.0 PG    MCHC 32.4 30.0 - 36.5 g/dL    RDW 15.3 (H) 11.5 - 14.5 %    PLATELET 729 807 - 424 K/uL    MPV 10.4 8.9 - 12.9 FL    NRBC 0.0 0  WBC    ABSOLUTE NRBC 0.00 0.00 - 0.01 K/uL    NEUTROPHILS 71 32 - 75 %    LYMPHOCYTES 15 12 - 49 %    MONOCYTES 10 5 - 13 %    EOSINOPHILS 3 0 - 7 %    BASOPHILS 0 0 - 1 %    IMMATURE GRANULOCYTES 1 (H) 0.0 - 0.5 %    ABS. NEUTROPHILS 7.8 1.8 - 8.0 K/UL    ABS. LYMPHOCYTES 1.7 0.8 - 3.5 K/UL    ABS. MONOCYTES 1.1 (H) 0.0 - 1.0 K/UL    ABS. EOSINOPHILS 0.3 0.0 - 0.4 K/UL    ABS. BASOPHILS 0.0 0.0 - 0.1 K/UL    ABS. IMM.  GRANS. 0.1 (H) 0.00 - 0.04 K/UL    DF AUTOMATED     METABOLIC PANEL, BASIC    Collection Time: 08/31/19  4:38 AM   Result Value Ref Range    Sodium 138 136 - 145 mmol/L    Potassium 4.5 3.5 - 5.1 mmol/L    Chloride 105 97 - 108 mmol/L    CO2 27 21 - 32 mmol/L    Anion gap 6 5 - 15 mmol/L    Glucose 101 (H) 65 - 100 mg/dL    BUN 27 (H) 6 - 20 MG/DL    Creatinine 1.36 (H) 0.70 - 1.30 MG/DL    BUN/Creatinine ratio 20 12 - 20      GFR est AA 60 (L) >60 ml/min/1.73m2    GFR est non-AA 49 (L) >60 ml/min/1.73m2    Calcium 9.3 8.5 - 10.1 MG/DL   GLUCOSE, POC    Collection Time: 08/31/19  7:40 AM   Result Value Ref Range    Glucose (POC) 103 (H) 65 - 100 mg/dL    Performed by Rashad CARVER: Principal Problem:    Weakness generalized (8/26/2019)    Active Problems:    Acquired hypothyroidism (8/21/2017)      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 No       mention of angioplasty or stent made      Anemia (8/13/2019)      Nonrheumatic aortic valve stenosis (8/27/2019)      Overview: Mild to moderate by echocardiogram 8/27/2019    P: Cardiology following; s/p cath, as above. Off IVF. Watch electrolytes and renal function. Looking into inpatient PT/OT at discharge.       Akash Sultana MD, FACP  Best contact is via Pager: [phone], or via hospital  at [phone]

## 2019-09-01 NOTE — PROGRESS NOTES
Problem: Falls - Risk of  Goal: *Absence of Falls  Description  Document Jefeangela Love Fall Risk and appropriate interventions in the flowsheet. Outcome: Progressing Towards Goal  Note:   Fall Risk Interventions:  Mobility Interventions: Communicate number of staff needed for ambulation/transfer, OT consult for ADLs, Patient to call before getting OOB, PT Consult for assist device competence, Strengthening exercises (ROM-active/passive), Utilize walker, cane, or other assistive device    Mentation Interventions: Bed/chair exit alarm    Medication Interventions: Assess postural VS orthostatic hypotension, Evaluate medications/consider consulting pharmacy, Patient to call before getting OOB, Teach patient to arise slowly    Elimination Interventions: Call light in reach, Patient to call for help with toileting needs, Toileting schedule/hourly rounds, Toilet paper/wipes in reach, Urinal in reach    History of Falls Interventions: Consult care management for discharge planning, Door open when patient unattended, Room close to nurse's station, Vital signs minimum Q4HRs X 24 hrs (comment for end date), Assess for delayed presentation/identification of injury for 48 hrs (comment for end date), Evaluate medications/consider consulting pharmacy         Problem: Patient Education: Go to Patient Education Activity  Goal: Patient/Family Education  Outcome: Progressing Towards Goal     Problem: Pressure Injury - Risk of  Goal: *Prevention of pressure injury  Description  Document Berry Scale and appropriate interventions in the flowsheet. Outcome: Progressing Towards Goal  Note:   Pressure Injury Interventions:  Sensory Interventions: Assess changes in LOC    Moisture Interventions: Absorbent underpads    Activity Interventions: Increase time out of bed    Mobility Interventions: Turn and reposition approx.  every two hours(pillow and wedges)    Nutrition Interventions: Document food/fluid/supplement intake    Friction and Shear Interventions: Lift sheet, Apply protective barrier, creams and emollients                Problem: Patient Education: Go to Patient Education Activity  Goal: Patient/Family Education  Outcome: Progressing Towards Goal     Problem: Breathing Pattern - Ineffective  Goal: *Absence of hypoxia  Outcome: Progressing Towards Goal     Problem: Patient Education: Go to Patient Education Activity  Goal: Patient/Family Education  Outcome: Progressing Towards Goal     Problem: Patient Education: Go to Patient Education Activity  Goal: Patient/Family Education  Outcome: Progressing Towards Goal     Problem: Pain  Goal: *Control of Pain  Outcome: Progressing Towards Goal  Goal: *PALLIATIVE CARE:  Alleviation of Pain  Outcome: Progressing Towards Goal     Problem: Patient Education: Go to Patient Education Activity  Goal: Patient/Family Education  Outcome: Progressing Towards Goal     Problem: Patient Education: Go to Patient Education Activity  Goal: Patient/Family Education  Outcome: Progressing Towards Goal     Problem: Cath Lab Procedures: Post-Cath Day 1  Goal: Off Pathway (Use only if patient is Off Pathway)  Outcome: Progressing Towards Goal  Goal: Activity/Safety  Outcome: Progressing Towards Goal  Goal: Diagnostic Test/Procedures  Outcome: Progressing Towards Goal  Goal: Nutrition/Diet  Outcome: Progressing Towards Goal  Goal: Discharge Planning  Outcome: Progressing Towards Goal  Goal: Medications  Outcome: Progressing Towards Goal  Goal: Respiratory  Outcome: Progressing Towards Goal  Goal: Treatments/Interventions/Procedures  Outcome: Progressing Towards Goal  Goal: Psychosocial  Outcome: Progressing Towards Goal     Problem: Cath Lab Procedures: Discharge Outcomes  Goal: *Stable cardiac rhythm  Outcome: Progressing Towards Goal  Goal: *Hemodynamically stable  Outcome: Progressing Towards Goal  Goal: *Optimal pain control at patient's stated goal  Outcome: Progressing Towards Goal  Goal: *Pulses palpable, skin color within defined limits, skin temperature warm  Outcome: Progressing Towards Goal  Goal: *Lungs clear or at baseline  Outcome: Progressing Towards Goal  Goal: *Demonstrates ability to perform prescribed activity without shortness of breath or discomfort  Outcome: Progressing Towards Goal  Goal: *Verbalizes home exercise program, activity guidelines, cardiac precautions  Outcome: Progressing Towards Goal  Goal: *Verbalizes understanding and describes prescribed diet  Outcome: Progressing Towards Goal  Goal: *Verbalizes understanding and describes medication purposes and frequencies  Outcome: Progressing Towards Goal  Goal: *Identifies cardiac risk factors  Outcome: Progressing Towards Goal  Goal: *No signs and symptoms of infection or wound complications  Outcome: Progressing Towards Goal  Goal: *Anxiety reduced or absent  Outcome: Progressing Towards Goal  Goal: *Verbalizes and demonstrates incision care  Outcome: Progressing Towards Goal  Goal: *Understands and describes signs and symptoms to report to providers(Stroke Metric)  Outcome: Progressing Towards Goal  Goal: *Describes follow-up/return visits to physicians  Outcome: Progressing Towards Goal  Goal: *Describes available resources and support systems  Outcome: Progressing Towards Goal  Goal: *Influenza immunization  Outcome: Progressing Towards Goal  Goal: *Pneumococcal immunization  Outcome: Progressing Towards Goal

## 2019-09-01 NOTE — PROGRESS NOTES
INTERNAL MEDICINE ATTENDING NOTE    S: Mr. Malia Rivera was seen by me today during rounds. At this time, he is resting comfortably. Complaining of L eye irritation and crusting / drainage--requests drops. ROS otherwise unremarkable except as noted elsewhere. O: Blood pressure 123/50, pulse 64, temperature 97.4 °F (36.3 °C), resp. rate 17, height 5' 9\" (1.753 m), weight 182 lb 5.1 oz (82.7 kg), SpO2 98 %. Gen: Patient is in no acute distress. HEENT: Slight L conjunctival injection. Lungs: CTAB. Heart: RRR. Abd: S, NT, ND, BS present. Extremities: Warm. Recent Results (from the past 12 hour(s))   CBC WITH AUTOMATED DIFF    Collection Time: 09/01/19  2:24 AM   Result Value Ref Range    WBC 10.2 4.1 - 11.1 K/uL    RBC 3.63 (L) 4.10 - 5.70 M/uL    HGB 10.1 (L) 12.1 - 17.0 g/dL    HCT 32.8 (L) 36.6 - 50.3 %    MCV 90.4 80.0 - 99.0 FL    MCH 27.8 26.0 - 34.0 PG    MCHC 30.8 30.0 - 36.5 g/dL    RDW 15.5 (H) 11.5 - 14.5 %    PLATELET 487 950 - 036 K/uL    MPV 10.9 8.9 - 12.9 FL    NRBC 0.0 0  WBC    ABSOLUTE NRBC 0.00 0.00 - 0.01 K/uL    NEUTROPHILS 73 32 - 75 %    LYMPHOCYTES 15 12 - 49 %    MONOCYTES 9 5 - 13 %    EOSINOPHILS 2 0 - 7 %    BASOPHILS 0 0 - 1 %    IMMATURE GRANULOCYTES 1 (H) 0.0 - 0.5 %    ABS. NEUTROPHILS 7.4 1.8 - 8.0 K/UL    ABS. LYMPHOCYTES 1.6 0.8 - 3.5 K/UL    ABS. MONOCYTES 1.0 0.0 - 1.0 K/UL    ABS. EOSINOPHILS 0.3 0.0 - 0.4 K/UL    ABS. BASOPHILS 0.0 0.0 - 0.1 K/UL    ABS. IMM.  GRANS. 0.1 (H) 0.00 - 0.04 K/UL    DF AUTOMATED     METABOLIC PANEL, BASIC    Collection Time: 09/01/19  2:24 AM   Result Value Ref Range    Sodium 139 136 - 145 mmol/L    Potassium 4.6 3.5 - 5.1 mmol/L    Chloride 107 97 - 108 mmol/L    CO2 25 21 - 32 mmol/L    Anion gap 7 5 - 15 mmol/L    Glucose 101 (H) 65 - 100 mg/dL    BUN 26 (H) 6 - 20 MG/DL    Creatinine 1.43 (H) 0.70 - 1.30 MG/DL    BUN/Creatinine ratio 18 12 - 20      GFR est AA 57 (L) >60 ml/min/1.73m2    GFR est non-AA 47 (L) >60 ml/min/1.73m2    Calcium 9.0 8.5 - 10.1 MG/DL   GLUCOSE, POC    Collection Time: 09/01/19  7:07 AM   Result Value Ref Range    Glucose (POC) 103 (H) 65 - 100 mg/dL    Performed by John Pennington         A: Principal Problem:    Weakness generalized (8/26/2019)    Active Problems:    Acquired hypothyroidism (8/21/2017)      Hypertension with renal disease (8/21/2017)      Gastroesophageal reflux disease without esophagitis (8/21/2017)      COPD (chronic obstructive pulmonary disease) ([facility] [address] (8/21/2017)      Stage 3 chronic kidney disease ([facility] [address] (8/21/2017)      Atrial fibrillation ([facility] [address] (8/21/2017)      ASCVD (arteriosclerotic cardiovascular disease) (8/21/2017)      Overview: Catheterization 6/11/2004 80% stenosis nondominant RCA and 30% stenosis of       LAD with a notation of some left main disease but no percentage given. No       mention of angioplasty or stent made      Anemia (8/13/2019)      Nonrheumatic aortic valve stenosis (8/27/2019)      Overview: Mild to moderate by echocardiogram 8/27/2019    P: Cardiology following. Off IVF. Watch electrolytes and renal function. Looking into inpatient PT/OT at discharge.            Fredy Kennedy MD, FACP  Best contact is via Pager: [phone], or via hospital  at [phone]

## 2019-09-01 NOTE — PROGRESS NOTES
An [facility] 27 report from [address]-  Pt ambulated to bathroom with walker to void. Tolerated well. 2015-  Pt continues to c/o not being able to have a BM. Pt requests to get a suppository. Pt states that he uses suppositories on a regular bases to have BMs at home. 2034-  Message sent to [facility] hospitalist regarding getting pt an order for Dulcolax suppository. Awaiting order at this time. 2045-  Was informed that pt is not covered by hospitalist.  Now have hospital  paging Dr. Rossy Diaz for suppository order. Awaiting return call. 2057-  Rec'd order for Dulcolax suppository per rectum. Noted when placing suppository that pt had formed stool in rectum. Pt tolerated placement of suppository without difficulty. 2150-  Pt refuses night medications \"until after the effects of suppository has worn off\". Will continue to check with patient regarding medications. 2305-  Pt has been up to bathroom multiple times. Pt has had BMs but pt states that \"it's not much\". Will continue to monitor pt. Pt has agreed to take PM medications at this time. Reinforced to pt that there is a bed pad under him in case he \"has and accident\" while sleeping. Pt verbalized understanding. 0255-  Pt incont of large amount of soft stool. Pt cleaned as well as linen and gown. Tolerated well. Reassessment done with vital signs. BSC currently at bedside. Bed alarm is on bed and is activated. 1664-  Assisted pt up to bathroom for BM. Tolerated well. Assisted back to bed. Radha Parks-  Report given to Frankie Meza RN. She will assume care of pt.

## 2019-09-01 NOTE — PROGRESS NOTES
INTERNAL MEDICINE ATTENDING NOTE    S: Mr. Barbara Cordero was seen by me today during rounds. At this time, he is resting comfortably. The patient has no new complaints today. ROS otherwise unremarkable except as noted elsewhere. O: Blood pressure 123/50, pulse 64, temperature 97.4 °F (36.3 °C), resp. rate 17, height 5' 9\" (1.753 m), weight 182 lb 5.1 oz (82.7 kg), SpO2 98 %. Gen: Patient is in no acute distress. Lungs: CTAB. Heart: RRR. Abd: S, NT, ND, BS present. Extremities: Warm. Recent Results (from the past 12 hour(s))   CBC WITH AUTOMATED DIFF    Collection Time: 09/01/19  2:24 AM   Result Value Ref Range    WBC 10.2 4.1 - 11.1 K/uL    RBC 3.63 (L) 4.10 - 5.70 M/uL    HGB 10.1 (L) 12.1 - 17.0 g/dL    HCT 32.8 (L) 36.6 - 50.3 %    MCV 90.4 80.0 - 99.0 FL    MCH 27.8 26.0 - 34.0 PG    MCHC 30.8 30.0 - 36.5 g/dL    RDW 15.5 (H) 11.5 - 14.5 %    PLATELET 982 222 - 432 K/uL    MPV 10.9 8.9 - 12.9 FL    NRBC 0.0 0  WBC    ABSOLUTE NRBC 0.00 0.00 - 0.01 K/uL    NEUTROPHILS 73 32 - 75 %    LYMPHOCYTES 15 12 - 49 %    MONOCYTES 9 5 - 13 %    EOSINOPHILS 2 0 - 7 %    BASOPHILS 0 0 - 1 %    IMMATURE GRANULOCYTES 1 (H) 0.0 - 0.5 %    ABS. NEUTROPHILS 7.4 1.8 - 8.0 K/UL    ABS. LYMPHOCYTES 1.6 0.8 - 3.5 K/UL    ABS. MONOCYTES 1.0 0.0 - 1.0 K/UL    ABS. EOSINOPHILS 0.3 0.0 - 0.4 K/UL    ABS. BASOPHILS 0.0 0.0 - 0.1 K/UL    ABS. IMM.  GRANS. 0.1 (H) 0.00 - 0.04 K/UL    DF AUTOMATED     METABOLIC PANEL, BASIC    Collection Time: 09/01/19  2:24 AM   Result Value Ref Range    Sodium 139 136 - 145 mmol/L    Potassium 4.6 3.5 - 5.1 mmol/L    Chloride 107 97 - 108 mmol/L    CO2 25 21 - 32 mmol/L    Anion gap 7 5 - 15 mmol/L    Glucose 101 (H) 65 - 100 mg/dL    BUN 26 (H) 6 - 20 MG/DL    Creatinine 1.43 (H) 0.70 - 1.30 MG/DL    BUN/Creatinine ratio 18 12 - 20      GFR est AA 57 (L) >60 ml/min/1.73m2    GFR est non-AA 47 (L) >60 ml/min/1.73m2    Calcium 9.0 8.5 - 10.1 MG/DL   GLUCOSE, POC    Collection Time: 09/01/19  7:07 AM   Result Value Ref Range    Glucose (POC) 103 (H) 65 - 100 mg/dL    Performed by Rachel Hill         A: Principal Problem:    Weakness generalized (8/26/2019)    Active Problems:    Acquired hypothyroidism (8/21/2017)      Hypertension with renal disease (8/21/2017)      Gastroesophageal reflux disease without esophagitis (8/21/2017)      COPD (chronic obstructive pulmonary disease) ([facility] [address] (8/21/2017)      Stage 3 chronic kidney disease ([facility] [address] (8/21/2017)      Atrial fibrillation ([facility] [address] (8/21/2017)      ASCVD (arteriosclerotic cardiovascular disease) (8/21/2017)      Overview: Catheterization 6/11/2004 80% stenosis nondominant RCA and 30% stenosis of       LAD with a notation of some left main disease but no percentage given. No       mention of angioplasty or stent made      Anemia (8/13/2019)      Nonrheumatic aortic valve stenosis (8/27/2019)      Overview: Mild to moderate by echocardiogram 8/27/2019    P: Cardiology following. Off IVF. Watch electrolytes and renal function. Looking into inpatient PT/OT at discharge.       Sheri Bales MD, FACP  Best contact is via Pager: [phone], or via hospital  at [phone]

## 2019-09-01 NOTE — PROGRESS NOTES
Bedside shift change report given to Guera Bates RN (oncoming nurse) by Louise Arthur RN (offgoing nurse). Report included the following information SBAR, Kardex, Procedure Summary, Intake/Output, MAR, Accordion, Recent Results, Med Rec Status, Cardiac Rhythm NSR, Sinus Rashaun Ouch, Alarm Parameters , Pre Procedure Checklist, Procedure Verification, Quality Measures and Dual Neuro Assessment.

## 2019-09-01 NOTE — PROGRESS NOTES
Bedside shift change report given to Eder Ochoa RN (oncoming nurse) by Kayley Walker RN (offgoing nurse). Report included the following information SBAR, Kardex, Procedure Summary, Intake/Output, MAR, Accordion, Recent Results, Med Rec Status, Cardiac Rhythm NSR, Sinus Ilsa Corrente, Alarm Parameters , Pre Procedure Checklist, Procedure Verification, Quality Measures and Dual Neuro Assessment.

## 2019-09-01 NOTE — PROGRESS NOTES
Progress Note      9/1/2019 8:35 AM  NAME: Fallon Shock   MRN:  [id number]   Admit Diagnosis: ASCVD (arteriosclerotic cardiovascular disease) [I25.10]                Problem List:      Weakness, failure to thrive. - Cath 7/2019 with impella supported PCI of LM into LAD with balloon side hole to LCx, cath 8/29/2019 with patent LM stent.  - Normal EF, mild AS  - Prox afib, currently in sinus  - Hx of aflutter ablation  - HTN  - Dyslipidemia  - Hypothyroid  - GERD  - BPH  - Distant tobacco  , lives alone, ADLs, occasionally using cane  Son:  Francisco.   [phone]                     Assessment/Plan:          No chest pain. Brief atypical shoulder pain. Cath with patent stents  Euvolemic so far, echo with normal EF, mild to mod AS  Prox afib back in sinus, will use amiodarone, high risk for triple anticoagulation    - Hold on anticoagulation given age  - Cont ASA  - Cont plavix  - Stop dilt po given low bp, intolerant of beta blocker in past, cont added amiodarone change to 200mg daily   - Cont added low dose midodrine.  - Holding lasix, would also hold fluids  - Cont statin     - Cont proscar and flomax    PT/OT  Stable from cardiovascular perspective. Likely will need rehab.                [x]       High complexity decision making was performed in this patient at high risk for decompensation with multiple organ involvement. Subjective:     Flalon Shock denies chest pain, dyspnea. Discussed with RN events overnight. Review of Systems:    Symptom Y/N Comments  Symptom Y/N Comments   Fever/Chills N   Chest Pain N    Poor Appetite Y   Edema N    Cough N   Abdominal Pain N    Sputum N   Joint Pain N    SOB/JAFFE N   Pruritis/Rash N    Nausea/vomit N   Tolerating PT/OT Y    Diarrhea N   Tolerating Diet Y    Constipation N   Other       Could NOT obtain due to:      Objective:      Physical Exam:    Last 24hrs VS reviewed since prior progress note.  Most recent are:    Visit Vitals  /50   Pulse 64   Temp 97.4 °F (36.3 °C)   Resp 17   Ht 5' 9\" (1.753 m)   Wt 82.7 kg (182 lb 5.1 oz)   SpO2 98%   BMI 26.92 kg/m²       Intake/Output Summary (Last 24 hours) at 9/1/2019 0849  Last data filed at 9/1/2019 0453  Gross per 24 hour   Intake 680 ml   Output 350 ml   Net 330 ml        General Appearance: Well developed, well nourished, alert & oriented x 3,    no acute distress. Ears/Nose/Mouth/Throat: Hearing grossly normal.  Neck: Supple. Chest: Lungs clear to auscultation bilaterally. Cardiovascular: Regular rate and rhythm, S1S2 normal, no murmur. Abdomen: Soft, non-tender, bowel sounds are active. Extremities: No edema bilaterally. Skin: Warm and dry. PMH/SH reviewed - no change compared to H&P    Data Review    Telemetry: normal sinus rhythm     Lab Data Personally Reviewed:    Recent Labs     09/01/19 0224 08/31/19 0438   WBC 10.2 11.0   HGB 10.1* 10.3*   HCT 32.8* 31.8*    258     No results for input(s): INR, PTP, APTT in the last 72 hours. No lab exists for component: Lizzette Kirby   Recent Labs     09/01/19 0224 08/31/19 0438 08/30/19 0432    138 137   K 4.6 4.5 4.3    105 104   CO2 25 27 28   BUN 26* 27* 24*   CREA 1.43* 1.36* 1.45*   * 101* 98   CA 9.0 9.3 9.1     No results for input(s): CPK, CKNDX, TROIQ in the last 72 hours. No lab exists for component: CPKMB  Lab Results   Component Value Date/Time    Cholesterol, total 117 06/12/2019 02:00 PM    HDL Cholesterol 35 06/12/2019 02:00 PM    LDL, calculated 65 06/12/2019 02:00 PM    Triglyceride 86 06/12/2019 02:00 PM    CHOL/HDL Ratio 3 06/12/2019 02:00 PM       No results for input(s): SGOT, GPT, AP, TBIL, TP, ALB, GLOB, GGT, AML, LPSE in the last 72 hours. No lab exists for component: AMYP, HLPSE  No results for input(s): PH, PCO2, PO2 in the last 72 hours.     Medications Personally Reviewed:    Current Facility-Administered Medications   Medication Dose Route Frequency    tamsulosin (FLOMAX) capsule 0.8 mg  0.8 mg Oral DAILY    temazepam (RESTORIL) capsule 15 mg  15 mg Oral QHS    amiodarone (CORDARONE) tablet 200 mg  200 mg Oral DAILY    doxycycline (VIBRAMYCIN) 100 mg in 0.9% sodium chloride (MBP/ADV) 100 mL  100 mg IntraVENous Q12H    sodium chloride (NS) flush 5-40 mL  5-40 mL IntraVENous Q8H    sodium chloride (NS) flush 5-40 mL  5-40 mL IntraVENous PRN    naloxone (NARCAN) injection 0.4 mg  0.4 mg IntraVENous PRN    ondansetron (ZOFRAN) injection 4 mg  4 mg IntraVENous Q4H PRN    polyethylene glycol (MIRALAX) packet 17 g  17 g Oral DAILY    enoxaparin (LOVENOX) injection 40 mg  40 mg SubCUTAneous Q24H    midodrine (PROAMITINE) tablet 5 mg  5 mg Oral TID WITH MEALS    albuterol (PROVENTIL HFA, VENTOLIN HFA, PROAIR HFA) inhaler 2 Puff  2 Puff Inhalation Q4H PRN    aspirin chewable tablet 81 mg  81 mg Oral DAILY    clopidogrel (PLAVIX) tablet 75 mg  75 mg Oral DAILY    pantoprazole (PROTONIX) tablet 40 mg  40 mg Oral DAILY    finasteride (PROSCAR) tablet 5 mg  5 mg Oral PCD    levothyroxine (SYNTHROID) tablet 137 mcg  137 mcg Oral ACB    pravastatin (PRAVACHOL) tablet 40 mg  40 mg Oral DAILY    sodium chloride (NS) flush 5-40 mL  5-40 mL IntraVENous PRN    acetaminophen (TYLENOL) tablet 650 mg  650 mg Oral Q4H PRN         Karyn Heller MD

## 2019-09-02 NOTE — PROGRESS NOTES
Progress Note      9/2/2019 8:35 AM  NAME: Evaristo Mercer   MRN:  [id number]   Admit Diagnosis: ASCVD (arteriosclerotic cardiovascular disease) [I25.10]                Problem List:      Weakness, failure to thrive. - Cath 7/2019 with impella supported PCI of LM into LAD with balloon side hole to LCx, cath 8/29/2019 with patent LM stent.  - Normal EF, mild AS  - Prox afib, currently in sinus  - Hx of aflutter ablation  - HTN  - Dyslipidemia  - Hypothyroid  - GERD  - BPH  - Distant tobacco  , lives alone, ADLs, occasionally using cane  Son:  Francisco.   [phone]                     Assessment/Plan:          No chest pain. Brief atypical shoulder pain. Cath with patent stents  Euvolemic so far, echo with normal EF, mild to mod AS  Prox afib back in sinus, will use amiodarone, high risk for triple anticoagulation    - Hold on anticoagulation given age  - Cont ASA  - Cont plavix  - Stop dilt po given low bp, intolerant of beta blocker in past, cont added amiodarone change to 200mg daily   - Cont added low dose midodrine.  - Holding lasix, would also hold fluids  - Cont statin     - Cont proscar and flomax    PT/OT  Stable from cardiovascular perspective. Likely will need rehab.     May transfer to second floor Tele.          [x]       High complexity decision making was performed in this patient at high risk for decompensation with multiple organ involvement. Subjective:     Evaristo Mercer denies chest pain, dyspnea. Discussed with RN events overnight. Review of Systems:    Symptom Y/N Comments  Symptom Y/N Comments   Fever/Chills N   Chest Pain N    Poor Appetite Y   Edema N    Cough N   Abdominal Pain N    Sputum N   Joint Pain N    SOB/JAFFE N   Pruritis/Rash N    Nausea/vomit N   Tolerating PT/OT Y    Diarrhea N   Tolerating Diet Y    Constipation N   Other       Could NOT obtain due to:      Objective:      Physical Exam:    Last 24hrs VS reviewed since prior progress note. Most recent are:    Visit Vitals  /65 (BP 1 Location: Right arm, BP Patient Position: Sitting)   Pulse 70   Temp 97.8 °F (36.6 °C)   Resp 20   Ht 5' 9\" (1.753 m)   Wt 82.7 kg (182 lb 5.1 oz)   SpO2 98%   BMI 26.92 kg/m²       Intake/Output Summary (Last 24 hours) at 9/2/2019 0806  Last data filed at 9/1/2019 2316  Gross per 24 hour   Intake 830 ml   Output    Net 830 ml        General Appearance: Well developed, well nourished, alert & oriented x 3,    no acute distress. Ears/Nose/Mouth/Throat: Hearing grossly normal.  Neck: Supple. Chest: Lungs clear to auscultation bilaterally. Cardiovascular: Regular rate and rhythm, S1S2 normal, no murmur. Abdomen: Soft, non-tender, bowel sounds are active. Extremities: No edema bilaterally. Skin: Warm and dry. PMH/SH reviewed - no change compared to H&P    Data Review    Telemetry: normal sinus rhythm     Lab Data Personally Reviewed:    Recent Labs     09/02/19 0413 09/01/19 0224   WBC 10.4 10.2   HGB 10.6* 10.1*   HCT 33.9* 32.8*    323     No results for input(s): INR, PTP, APTT in the last 72 hours. No lab exists for component: Abel Spicer   Recent Labs     09/02/19 0413 09/01/19 0224 08/31/19  0438    139 138   K 4.6 4.6 4.5    107 105   CO2 28 25 27   BUN 28* 26* 27*   CREA 1.45* 1.43* 1.36*   GLU 97 101* 101*   CA 9.3 9.0 9.3     No results for input(s): CPK, CKNDX, TROIQ in the last 72 hours. No lab exists for component: CPKMB  Lab Results   Component Value Date/Time    Cholesterol, total 117 06/12/2019 02:00 PM    HDL Cholesterol 35 06/12/2019 02:00 PM    LDL, calculated 65 06/12/2019 02:00 PM    Triglyceride 86 06/12/2019 02:00 PM    CHOL/HDL Ratio 3 06/12/2019 02:00 PM       No results for input(s): SGOT, GPT, AP, TBIL, TP, ALB, GLOB, GGT, AML, LPSE in the last 72 hours. No lab exists for component: AMYP, HLPSE  No results for input(s): PH, PCO2, PO2 in the last 72 hours.     Medications Personally Reviewed:    Current Facility-Administered Medications   Medication Dose Route Frequency    ciprofloxacin HCl (CILOXAN) 0.3 % ophthalmic solution 1 Drop  1 Drop Left Eye QID    bisacodyl (DULCOLAX) suppository 10 mg  10 mg Rectal DAILY PRN    tamsulosin (FLOMAX) capsule 0.8 mg  0.8 mg Oral DAILY    temazepam (RESTORIL) capsule 15 mg  15 mg Oral QHS    amiodarone (CORDARONE) tablet 200 mg  200 mg Oral DAILY    doxycycline (VIBRAMYCIN) 100 mg in 0.9% sodium chloride (MBP/ADV) 100 mL  100 mg IntraVENous Q12H    sodium chloride (NS) flush 5-40 mL  5-40 mL IntraVENous Q8H    sodium chloride (NS) flush 5-40 mL  5-40 mL IntraVENous PRN    naloxone (NARCAN) injection 0.4 mg  0.4 mg IntraVENous PRN    ondansetron (ZOFRAN) injection 4 mg  4 mg IntraVENous Q4H PRN    polyethylene glycol (MIRALAX) packet 17 g  17 g Oral DAILY    enoxaparin (LOVENOX) injection 40 mg  40 mg SubCUTAneous Q24H    midodrine (PROAMITINE) tablet 5 mg  5 mg Oral TID WITH MEALS    albuterol (PROVENTIL HFA, VENTOLIN HFA, PROAIR HFA) inhaler 2 Puff  2 Puff Inhalation Q4H PRN    aspirin chewable tablet 81 mg  81 mg Oral DAILY    clopidogrel (PLAVIX) tablet 75 mg  75 mg Oral DAILY    pantoprazole (PROTONIX) tablet 40 mg  40 mg Oral DAILY    finasteride (PROSCAR) tablet 5 mg  5 mg Oral PCD    levothyroxine (SYNTHROID) tablet 137 mcg  137 mcg Oral ACB    pravastatin (PRAVACHOL) tablet 40 mg  40 mg Oral DAILY    sodium chloride (NS) flush 5-40 mL  5-40 mL IntraVENous PRN    acetaminophen (TYLENOL) tablet 650 mg  650 mg Oral Q4H PRN         Mary Carmen Burton MD

## 2019-09-02 NOTE — PROGRESS NOTES
Initial Nutrition Assessment:    INTERVENTIONS/RECOMMENDATIONS:   · Continue cardiac diet    ASSESSMENT:   Chart reviewed, medically noted for Weakness generalized and PMH shown below. Assessing pt due to LOS. Pt has experience severe weight loss of 18 lbs (9% BW) over the past 2 months. Pt report poor PO intake during this time period due to lack of appetite. He was also having constipation however he had a BM last night. He has eaten well over the past several days with documentation of % meals consumed. Physically he has mild temporal muscle wasting.  Pt meets ASPEN criteria for acute severe malnutrition,    Meets Criteria for Acute Malnutrition   [x] Severe Malnutrition, as evidenced by:   [] Moderate muscle wasting, loss of subcutaneous fat   [x] Nutritional intake of <50% of recommended intake for >5 days   [x] Weight loss of >1-2% in 1 week, >5% in 1 month, >7.5% in 3 months, or >10% in 6 months   [] Moderate-severe edema   []Moderate Malnutrition, as evidenced by:   [x] Mild muscle wasting, loss of subcutaneous fat   [] Nutritional intake <75% of recommended intake for >1 week   [] Weight loss of 1-2% in 1 week, 5% in 1 month, 7.5% in 3 months, or 10% in 6 months   [] Mild edema      Past Medical History:   Diagnosis Date    Arrhythmia     A-fib    GERD (gastroesophageal reflux disease)     Hypertension     Other ill-defined conditions(799.89)     BPH    Other ill-defined conditions(799.89)     lipids    Other ill-defined conditions(799.89)     shingles hx    Thyroid disease     Unspecified adverse effect of anesthesia        Diet Order: Cardiac  % Eaten:    Patient Vitals for the past 72 hrs:   % Diet Eaten   09/02/19 0812 100 %   09/01/19 1741 50 %   09/01/19 1518 50 %   09/01/19 0821 90 %   08/31/19 1453 100 %   08/31/19 0912 90 %   08/30/19 1816 100 %   08/30/19 1239 75 %     Pertinent Medications: [x]Reviewed: PPI, miralax,   Pertinent Labs: [x]Reviewed:   Food Allergies: [x]NKFA  []Other Last BM:  9/2  Edema:        []RUE   []LUE   [x]RLE 1+  [x]LLE  1+    Pressure Injury:      [] Stage I   [] Stage II   [] Stage III   [] Stage IV      Wt Readings from Last 30 Encounters:   08/27/19 82.7 kg (182 lb 5.1 oz)   08/26/19 82.7 kg (182 lb 6.4 oz)   08/21/19 82.6 kg (182 lb 3.2 oz)   08/14/19 84.4 kg (186 lb)   08/11/19 87 kg (191 lb 12.8 oz)   07/21/19 89.8 kg (197 lb 14.2 oz)   06/14/19 90.4 kg (199 lb 3.2 oz)   06/12/19 91.4 kg (201 lb 6.4 oz)   04/22/19 92.4 kg (203 lb 12.8 oz)   03/12/19 89.4 kg (197 lb 3.2 oz)   02/15/19 92.1 kg (203 lb)   01/22/19 90.7 kg (200 lb)   12/12/18 90 kg (198 lb 6.4 oz)   11/06/18 89.7 kg (197 lb 12.8 oz)   09/12/18 89.1 kg (196 lb 6.4 oz)   06/18/18 89.6 kg (197 lb 9.6 oz)   04/11/18 92.7 kg (204 lb 6.4 oz)   04/03/18 92.7 kg (204 lb 5.9 oz)   03/19/18 93 kg (205 lb)   12/12/17 92.1 kg (203 lb)   09/12/17 91.6 kg (202 lb)   05/23/17 87.8 kg (193 lb 9.6 oz)   05/05/17 88.5 kg (195 lb 1.7 oz)   03/30/17 89.4 kg (197 lb 3.2 oz)   03/28/14 92.5 kg (204 lb)       Anthropometrics:   Height: 5' 9\" (175.3 cm) Weight: 82.7 kg (182 lb 5.1 oz)   IBW (%IBW):   ( ) UBW (%UBW):   (  %)   Last Weight Metrics:  Weight Loss Metrics 8/27/2019 8/26/2019 8/26/2019 8/26/2019 8/21/2019 8/14/2019 8/11/2019   Today's Wt 182 lb 5.1 oz - 182 lb 6.4 oz - 182 lb 3.2 oz 186 lb 191 lb 12.8 oz   BMI - 26.92 kg/m2 - 26.94 kg/m2 26.91 kg/m2 27.47 kg/m2 28.32 kg/m2       BMI: Body mass index is 26.92 kg/m². This BMI is indicative of:   []Underweight    []Normal    [x]Overweight    [] Obesity   [] Extreme Obesity (BMI>40)     Estimated Nutrition Needs (Based on):   1775 Kcals/day(BMR: 1475 x 1.2) , 80 g(1 g/kg) Protein  Carbohydrate:  At Least 130 g/day  Fluids: 1775 mL/day (1ml/kcal) or per primary team    NUTRITION DIAGNOSES:   Problem:  Unintended weight loss      Etiology: related to decreased PO intake     Signs/Symptoms: as evidenced by 18 lbs (9%) x  2months      NUTRITION INTERVENTIONS:  Meals/Snacks: General/healthful diet                  GOAL:   consume >50% of meals in 3-5 days    LEARNING NEEDS (Diet, Food/Nutrient-Drug Interaction):    [x] None Identified   [] Identified and Education Provided/Documented   [] Identified and Pt declined/was not appropriate     Cultureal, Latter day, OR Ethnic Dietary Needs:    [x] None Identified   [] Identified and Addressed     [x] Interdisciplinary Care Plan Reviewed/Documented    [x] Discharge Planning:   Heart healthy diet     MONITORING /EVALUATION:      Food/Nutrient Intake Outcomes:  Total energy intake  Physical Signs/Symptoms Outcomes: Weight/weight change, Electrolyte and renal profile, GI    NUTRITION RISK:    [] High              [x] Moderate           []  Low  []  Minimal/Uncompromised    PT SEEN FOR:    []  MD Consult: []Calorie Count      []Diabetic Diet Education        []Diet Education     []Electrolyte Management     []General Nutrition Management and Supplements     []Management of Tube Feeding     []TPN Recommendations    []  RN Referral:  []MST score >=2     []Enteral/Parenteral Nutrition PTA     []Pregnant: Gestational DM or Multigestation     []Pressure Ulcer/Wound Care needs        []  Low BMI  [x]  LOS Referral       Reva Brenner RDN  Pager [phone]  Weekend Pager [phone]

## 2019-09-02 NOTE — PROGRESS NOTES
INTERNAL MEDICINE ATTENDING NOTE    S: Mr. Timbo Bowen was seen by me today during rounds. At this time, he is resting comfortably. The patient has no new complaints today. He had a blow out stool yesterday after Mag Citrate. ROS otherwise unremarkable except as noted elsewhere. O: Blood pressure 129/47, pulse 68, temperature 97.6 °F (36.4 °C), resp. rate 18, height 5' 9\" (1.753 m), weight 182 lb 5.1 oz (82.7 kg), SpO2 97 %. Gen: Patient is in no acute distress. Lungs: CTAB. Heart: RRR. Abd: S, NT, ND, BS present. Extremities: Warm. Recent Results (from the past 12 hour(s))   CBC WITH AUTOMATED DIFF    Collection Time: 09/02/19  4:13 AM   Result Value Ref Range    WBC 10.4 4.1 - 11.1 K/uL    RBC 3.89 (L) 4.10 - 5.70 M/uL    HGB 10.6 (L) 12.1 - 17.0 g/dL    HCT 33.9 (L) 36.6 - 50.3 %    MCV 87.1 80.0 - 99.0 FL    MCH 27.2 26.0 - 34.0 PG    MCHC 31.3 30.0 - 36.5 g/dL    RDW 15.4 (H) 11.5 - 14.5 %    PLATELET 609 197 - 659 K/uL    MPV 10.5 8.9 - 12.9 FL    NRBC 0.0 0  WBC    ABSOLUTE NRBC 0.00 0.00 - 0.01 K/uL    NEUTROPHILS 73 32 - 75 %    LYMPHOCYTES 15 12 - 49 %    MONOCYTES 9 5 - 13 %    EOSINOPHILS 2 0 - 7 %    BASOPHILS 0 0 - 1 %    IMMATURE GRANULOCYTES 1 (H) 0.0 - 0.5 %    ABS. NEUTROPHILS 7.5 1.8 - 8.0 K/UL    ABS. LYMPHOCYTES 1.5 0.8 - 3.5 K/UL    ABS. MONOCYTES 0.9 0.0 - 1.0 K/UL    ABS. EOSINOPHILS 0.2 0.0 - 0.4 K/UL    ABS. BASOPHILS 0.0 0.0 - 0.1 K/UL    ABS. IMM.  GRANS. 0.1 (H) 0.00 - 0.04 K/UL    DF AUTOMATED     METABOLIC PANEL, BASIC    Collection Time: 09/02/19  4:13 AM   Result Value Ref Range    Sodium 140 136 - 145 mmol/L    Potassium 4.6 3.5 - 5.1 mmol/L    Chloride 107 97 - 108 mmol/L    CO2 28 21 - 32 mmol/L    Anion gap 5 5 - 15 mmol/L    Glucose 97 65 - 100 mg/dL    BUN 28 (H) 6 - 20 MG/DL    Creatinine 1.45 (H) 0.70 - 1.30 MG/DL    BUN/Creatinine ratio 19 12 - 20      GFR est AA 56 (L) >60 ml/min/1.73m2    GFR est non-AA 46 (L) >60 ml/min/1.73m2    Calcium 9.3 8.5 - 10.1 MG/DL        A: Principal Problem:    Weakness generalized (8/26/2019)    Active Problems:    Acquired hypothyroidism (8/21/2017)      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 No       mention of angioplasty or stent made      Anemia (8/13/2019)      Nonrheumatic aortic valve stenosis (8/27/2019)      Overview: Mild to moderate by echocardiogram 8/27/2019    P: Cardiology following. Off IVF. Watch electrolytes and renal function. Looking into inpatient PT/OT at discharge.       Georges Rand MD, FACP  Best contact is via Pager: [phone], or via hospital  at [phone]

## 2019-09-02 NOTE — PROGRESS NOTES
Problem: Falls - Risk of  Goal: *Absence of Falls  Description  Document Maurilio Boeck Fall Risk and appropriate interventions in the flowsheet. Outcome: Progressing Towards Goal  Note:   Fall Risk Interventions:  Mobility Interventions: Patient to call before getting OOB    Mentation Interventions: Adequate sleep, hydration, pain control, Door open when patient unattended, Increase mobility, More frequent rounding, Room close to nurse's station    Medication Interventions: Teach patient to arise slowly    Elimination Interventions: Call light in reach    History of Falls Interventions: Consult care management for discharge planning, Door open when patient unattended, Vital signs minimum Q4HRs X 24 hrs (comment for end date)         Problem: Pressure Injury - Risk of  Goal: *Prevention of pressure injury  Description  Document Berry Scale and appropriate interventions in the flowsheet.   Outcome: Progressing Towards Goal  Note:   Pressure Injury Interventions:  Sensory Interventions: Assess changes in LOC    Moisture Interventions: Absorbent underpads    Activity Interventions: Increase time out of bed    Mobility Interventions: HOB 30 degrees or less    Nutrition Interventions: Document food/fluid/supplement intake    Friction and Shear Interventions: Lift sheet, Apply protective barrier, creams and emollients                Problem: Breathing Pattern - Ineffective  Goal: *Absence of hypoxia  Outcome: Progressing Towards Goal     Problem: Patient Education: Go to Patient Education Activity  Goal: Patient/Family Education  Outcome: Progressing Towards Goal     Problem: Patient Education: Go to Patient Education Activity  Goal: Patient/Family Education  Outcome: Progressing Towards Goal     Problem: Pain  Goal: *Control of Pain  Outcome: Progressing Towards Goal

## 2019-09-03 NOTE — PROGRESS NOTES
Progress Note      9/3/2019 8:35 AM  NAME: Tyrell Monroy   MRN:  [id number]   Admit Diagnosis: ASCVD (arteriosclerotic cardiovascular disease) [I25.10]                Problem List:      Weakness, failure to thrive. - Cath 7/2019 with impella supported PCI of LM into LAD with balloon side hole to LCx, cath 8/29/2019 with patent LM stent.  - Normal EF, mild AS  - Prox afib, currently in sinus  - Hx of aflutter ablation  - HTN  - Dyslipidemia  - Hypothyroid  - GERD  - BPH  - Distant tobacco  , lives alone, ADLs, occasionally using cane  Son:  Francisco.   [phone]                     Assessment/Plan:          No chest pain. Brief atypical shoulder pain. Cath with patent stents  Euvolemic so far, echo with normal EF, mild to mod AS  Prox afib back in sinus, will use amiodarone, high risk for triple anticoagulation    - Hold on anticoagulation given age  - Cont ASA  - Cont plavix  - Stop dilt po given low bp, intolerant of beta blocker in past, cont added amiodarone change to 200mg daily   - Cont added low dose midodrine.  - Holding lasix, would also hold fluids  - Cont statin     - Cont proscar and flomax    PT/OT  Stable from cardiovascular perspective. Likely will need rehab.     May transfer to second floor Tele.          [x]       High complexity decision making was performed in this patient at high risk for decompensation with multiple organ involvement. Subjective:     Tyrell Monroy denies chest pain, dyspnea. Discussed with RN events overnight. Review of Systems:    Symptom Y/N Comments  Symptom Y/N Comments   Fever/Chills N   Chest Pain N    Poor Appetite Y   Edema N    Cough N   Abdominal Pain N    Sputum N   Joint Pain N    SOB/JAFFE N   Pruritis/Rash N    Nausea/vomit N   Tolerating PT/OT Y    Diarrhea N   Tolerating Diet Y    Constipation N   Other       Could NOT obtain due to:      Objective:      Physical Exam:    Last 24hrs VS reviewed since prior progress note. Most recent are:    Visit Vitals  /71 (BP 1 Location: Left arm, BP Patient Position: At rest)   Pulse (!) 56   Temp 97.5 °F (36.4 °C)   Resp 16   Ht 5' 9\" (1.753 m)   Wt 82.7 kg (182 lb 5.1 oz)   SpO2 96%   BMI 26.92 kg/m²       Intake/Output Summary (Last 24 hours) at 9/3/2019 0851  Last data filed at 9/3/2019 0411  Gross per 24 hour   Intake 820 ml   Output 52 ml   Net 768 ml        General Appearance: Well developed, well nourished, alert & oriented x 3,    no acute distress. Ears/Nose/Mouth/Throat: Hearing grossly normal.  Neck: Supple. Chest: Lungs clear to auscultation bilaterally. Cardiovascular: Regular rate and rhythm, S1S2 normal, no murmur. Abdomen: Soft, non-tender, bowel sounds are active. Extremities: No edema bilaterally. Skin: Warm and dry. PMH/SH reviewed - no change compared to H&P    Data Review    Telemetry: normal sinus rhythm     Lab Data Personally Reviewed:    Recent Labs     09/03/19  0149 09/02/19  0413   WBC 10.4 10.4   HGB 10.1* 10.6*   HCT 32.4* 33.9*    337     No results for input(s): INR, PTP, APTT in the last 72 hours. No lab exists for component: Claudetta Basque   Recent Labs     09/03/19  0149 09/02/19 0413 09/01/19  0224    140 139   K 4.7 4.6 4.6    107 107   CO2 27 28 25   BUN 25* 28* 26*   CREA 1.48* 1.45* 1.43*   GLU 95 97 101*   CA 9.0 9.3 9.0     No results for input(s): CPK, CKNDX, TROIQ in the last 72 hours. No lab exists for component: CPKMB  Lab Results   Component Value Date/Time    Cholesterol, total 117 06/12/2019 02:00 PM    HDL Cholesterol 35 06/12/2019 02:00 PM    LDL, calculated 65 06/12/2019 02:00 PM    Triglyceride 86 06/12/2019 02:00 PM    CHOL/HDL Ratio 3 06/12/2019 02:00 PM       No results for input(s): SGOT, GPT, AP, TBIL, TP, ALB, GLOB, GGT, AML, LPSE in the last 72 hours. No lab exists for component: AMYP, HLPSE  No results for input(s): PH, PCO2, PO2 in the last 72 hours.     Medications Personally Reviewed:    Current Facility-Administered Medications   Medication Dose Route Frequency    ciprofloxacin HCl (CILOXAN) 0.3 % ophthalmic solution 1 Drop  1 Drop Left Eye QID    bisacodyl (DULCOLAX) suppository 10 mg  10 mg Rectal DAILY PRN    tamsulosin (FLOMAX) capsule 0.8 mg  0.8 mg Oral DAILY    temazepam (RESTORIL) capsule 15 mg  15 mg Oral QHS    amiodarone (CORDARONE) tablet 200 mg  200 mg Oral DAILY    doxycycline (VIBRAMYCIN) 100 mg in 0.9% sodium chloride (MBP/ADV) 100 mL  100 mg IntraVENous Q12H    sodium chloride (NS) flush 5-40 mL  5-40 mL IntraVENous Q8H    sodium chloride (NS) flush 5-40 mL  5-40 mL IntraVENous PRN    naloxone (NARCAN) injection 0.4 mg  0.4 mg IntraVENous PRN    ondansetron (ZOFRAN) injection 4 mg  4 mg IntraVENous Q4H PRN    polyethylene glycol (MIRALAX) packet 17 g  17 g Oral DAILY    enoxaparin (LOVENOX) injection 40 mg  40 mg SubCUTAneous Q24H    midodrine (PROAMITINE) tablet 5 mg  5 mg Oral TID WITH MEALS    albuterol (PROVENTIL HFA, VENTOLIN HFA, PROAIR HFA) inhaler 2 Puff  2 Puff Inhalation Q4H PRN    aspirin chewable tablet 81 mg  81 mg Oral DAILY    clopidogrel (PLAVIX) tablet 75 mg  75 mg Oral DAILY    pantoprazole (PROTONIX) tablet 40 mg  40 mg Oral DAILY    finasteride (PROSCAR) tablet 5 mg  5 mg Oral PCD    levothyroxine (SYNTHROID) tablet 137 mcg  137 mcg Oral ACB    pravastatin (PRAVACHOL) tablet 40 mg  40 mg Oral DAILY    sodium chloride (NS) flush 5-40 mL  5-40 mL IntraVENous PRN    acetaminophen (TYLENOL) tablet 650 mg  650 mg Oral Q4H PRN         Kenneth Louie MD

## 2019-09-03 NOTE — PROGRESS NOTES
0700: Received bedside shift change report from night shiftJacques. Pt ambulated to and from bathroom with the walker x 1 assist. Pt Sinus Hans, BP:147/71, 96% O2 on RA. R radial site CDI, no bleeding or hematoma, open to air. Pt in bed, no complaints.

## 2019-09-03 NOTE — PROGRESS NOTES
[facility] called to inform this CM patient has been approvied for SNF at [facility]. Facility is aware. Auth # [id number]  CB information Husam Nighat [phone]    Chart review and review with nursing. patient informed Catalino Castro that he is concerned that attending will not follow at facility. CM will call attending to inform of auth for services and patient's concerns.     Elda Rene RN CM  Ext [phone]

## 2019-09-03 NOTE — PROGRESS NOTES
[facility] called to inform this CM patient has been approvied for SNF at [facility]. Facility is aware. Auth # [id number]  CB information Coretta Arreola [phone]    Chart review and review with nursing. patient informed Darrell Camarillo that he is concerned that attending will not follow at facility. CM will call attending to inform of auth for services and patient's concerns. 850am  CM reviewed with attending. Attending informed CM he hsa note that he needs to do a P2P to qualify patient for IP. Does this need to be resolved prior to discharge? Cm confirmed with attending he does not have privileges at [facility] and [facility]. Failed attempt to contact [facility]. CB information to Joana at [facility] is not a valid contact. Cm called [facility]. Daniel Campbell and Mili Tobin not available. CM sent communication to UR to verify IP status. 900am  CM received callback from [facility]. UR informed this CM that most recent order confirmed as IP. However, [facility] has denied IP status for this hospital stay. Requires P2P. Attending was informed. Review of P2P request.  P2P expires today. Ref # [id number]  [phone]    910am  Mili Tobin at [facility] provided [address] [place] [address] [phone] as contact. This CM called and left message. 915am  CM reviewed P2P and expiration date with attending. Discharge to be delayed at this time. 1000am  Barrier to discharge -  CM discussed with patient that Dr. IRMA SHEIKH does not have privileges at [facility] and [address] [place] [address]. Patient verbalized understanding. He prefers to have Dr. IRMA SHEIKH continue with his care at [facility]. [facility] is the facility that [facility] has privileges. Patient verbalized understanding. He would like to discuss with his daughter prior to this CM taking any action. CM has reviewed discharge plan with CM Supervisor. 1009am  Skye ADHIKARI from [facility] informed this CM that denial of IP claim at [facility] has no bearing on Auth for SNF.       CM also reviewed process should patient choose a different facility. We would follow normal procedure for new referral.  A new Authorization would be generated at that time. 1114am  Patient has informed CM he would like to go [facility] to maintain PCP following at facility. FOC signed. Referral submitted. Wanda from PT informed. Patient is schedule to be re-evaluated today. Aidee to be informed of change in MICHELLE. [id number] has accepted patient. Gerri Whittaker is pending. CM will continue to follow.     Mavis Ponce RN CM  Ext [phone]

## 2019-09-03 NOTE — PROGRESS NOTES
PROGRESS NOTE    NAME:  Nadege Lawrence   :   3/13/1931   MRN:   [id number]     Date/Time:  9/3/2019 7:35 AM  Subjective:   History:  Chart reviewed and patient seen and examined and D/W his nurse this AM and all events noted. He was brought in to the office by his daughter on  for evaluation of severe weakness. Katarina Uriostegui was hospitalized in July and discharged on  to rehab. Nicole Muscat the hospitalization he had left main coronary disease and was treated with a stent extending in his LAD with a sidehole to his circumflex.  Postoperative he did develop significant anemia and was transfused 3 units of packed blood cells.  He was seen in follow-up by me on  at which time his hemoglobin was up to 11.1 but his BUN was 44 with a creatinine of 2.4. Katarina Uriostegui returned to the office on the  at which time his creatinine had improved to 1.8 and BUN to 31 however he remained very weak.  At that time his hemoglobin was 12.  He returned on  noting he had become weaker each day since seen at the office on the  despite stopping his diuretic and he was to the point that he could do hardly anything without having to lay down because he was so weak. With that history and profound weakness on exam and high fall risk it was felt prudent to admit him for workup and treatment.     He notes no chest pain, shortness of breath, palpitations, PND or other cardiac or respiratory complaints except extreme weakness with no energy to do anything. He does have a history of Afib and went into rapid Afib  PM and is now on po Amiodarone after converted to NSR on Cardizem drip. Katarina Uriostegui also has a very poor appetite but notes no nausea or vomiting. He denies any other GI or  complaints except voiding difficulty and had maradiaga placed but will DCed it on  and increased Flomax. Also on Doxycycline now for probable Prostatitis despite neg urine cx.   He denies any headaches, dizziness or neurologic complaints except extreme weakness.   He has no other complaints on complete review of systems except had constipation and now after mag citrate c/o of some diarrhea (expected)      Medications reviewed:  Current Facility-Administered Medications   Medication Dose Route Frequency    ciprofloxacin HCl (CILOXAN) 0.3 % ophthalmic solution 1 Drop  1 Drop Left Eye QID    bisacodyl (DULCOLAX) suppository 10 mg  10 mg Rectal DAILY PRN    tamsulosin (FLOMAX) capsule 0.8 mg  0.8 mg Oral DAILY    temazepam (RESTORIL) capsule 15 mg  15 mg Oral QHS    amiodarone (CORDARONE) tablet 200 mg  200 mg Oral DAILY    doxycycline (VIBRAMYCIN) 100 mg in 0.9% sodium chloride (MBP/ADV) 100 mL  100 mg IntraVENous Q12H    sodium chloride (NS) flush 5-40 mL  5-40 mL IntraVENous Q8H    sodium chloride (NS) flush 5-40 mL  5-40 mL IntraVENous PRN    naloxone (NARCAN) injection 0.4 mg  0.4 mg IntraVENous PRN    ondansetron (ZOFRAN) injection 4 mg  4 mg IntraVENous Q4H PRN    polyethylene glycol (MIRALAX) packet 17 g  17 g Oral DAILY    enoxaparin (LOVENOX) injection 40 mg  40 mg SubCUTAneous Q24H    midodrine (PROAMITINE) tablet 5 mg  5 mg Oral TID WITH MEALS    albuterol (PROVENTIL HFA, VENTOLIN HFA, PROAIR HFA) inhaler 2 Puff  2 Puff Inhalation Q4H PRN    aspirin chewable tablet 81 mg  81 mg Oral DAILY    clopidogrel (PLAVIX) tablet 75 mg  75 mg Oral DAILY    pantoprazole (PROTONIX) tablet 40 mg  40 mg Oral DAILY    finasteride (PROSCAR) tablet 5 mg  5 mg Oral PCD    levothyroxine (SYNTHROID) tablet 137 mcg  137 mcg Oral ACB    pravastatin (PRAVACHOL) tablet 40 mg  40 mg Oral DAILY    sodium chloride (NS) flush 5-40 mL  5-40 mL IntraVENous PRN    acetaminophen (TYLENOL) tablet 650 mg  650 mg Oral Q4H PRN        Objective:   Vitals:  Visit Vitals  /71 (BP 1 Location: Left arm, BP Patient Position: At rest)   Pulse (!) 56   Temp 97.5 °F (36.4 °C)   Resp 16   Ht 5' 9\" (1.753 m)   Wt 182 lb 5.1 oz (82.7 kg)   SpO2 96%   BMI 26.92 kg/m²    O2 Flow Rate (L/min): 2 l/min O2 Device: Room air Temp (24hrs), Av.5 °F (36.4 °C), Min:97.4 °F (36.3 °C), Max:97.6 °F (36.4 °C)      Last 24hr Input/Output:    Intake/Output Summary (Last 24 hours) at 9/3/2019 0735  Last data filed at 9/3/2019 0411  Gross per 24 hour   Intake 1030 ml   Output 352 ml   Net 678 ml        PHYSICAL EXAM:  General:     Alert, cooperative, no distress but very frail appearance, appears stated age. Head:    Normocephalic, without obvious abnormality, atraumatic. Eyes:    Conjunctivae/corneas pale. PERRLA  Nose:   Nares normal. No drainage or sinus tenderness. Throat:     Lips, mucosa, and tongue normal.  No Thrush  Neck:   Supple, symmetrical,  no adenopathy, thyroid: non tender     no carotid bruit and no JVD. Back:     Symmetric,  No CVA tenderness. Lungs:    Clear to auscultation bilaterally except dry basilar scattered rales. No Wheezing or Rhonchi. No rales. Heart:    Regular rate and rhythm,  no murmur, rub or gallop. Abdomen:    Soft, non-tender. Not distended. Bowel sounds normal. No masses  Extremities:  Extremities normal, atraumatic, No cyanosis. No edema. No clubbing  Lymph nodes:  Cervical, supraclavicular normal.  Neurologic:  General non-focal decreased strength, Alert and oriented X 3.    Skin:                 No rash      Lab Data Reviewed:    Recent Results (from the past 24 hour(s))   CBC WITH AUTOMATED DIFF    Collection Time: 19  1:49 AM   Result Value Ref Range    WBC 10.4 4.1 - 11.1 K/uL    RBC 3.64 (L) 4.10 - 5.70 M/uL    HGB 10.1 (L) 12.1 - 17.0 g/dL    HCT 32.4 (L) 36.6 - 50.3 %    MCV 89.0 80.0 - 99.0 FL    MCH 27.7 26.0 - 34.0 PG    MCHC 31.2 30.0 - 36.5 g/dL    RDW 15.4 (H) 11.5 - 14.5 %    PLATELET 954 443 - 997 K/uL    MPV 10.5 8.9 - 12.9 FL    NRBC 0.0 0  WBC    ABSOLUTE NRBC 0.00 0.00 - 0.01 K/uL    NEUTROPHILS 72 32 - 75 %    LYMPHOCYTES 16 12 - 49 %    MONOCYTES 8 5 - 13 %    EOSINOPHILS 3 0 - 7 %    BASOPHILS 0 0 - 1 %    IMMATURE GRANULOCYTES 1 (H) 0.0 - 0.5 %    ABS. NEUTROPHILS 7.5 1.8 - 8.0 K/UL    ABS. LYMPHOCYTES 1.6 0.8 - 3.5 K/UL    ABS. MONOCYTES 0.9 0.0 - 1.0 K/UL    ABS. EOSINOPHILS 0.3 0.0 - 0.4 K/UL    ABS. BASOPHILS 0.0 0.0 - 0.1 K/UL    ABS. IMM. GRANS. 0.1 (H) 0.00 - 0.04 K/UL    DF AUTOMATED     METABOLIC PANEL, BASIC    Collection Time: 09/03/19  1:49 AM   Result Value Ref Range    Sodium 138 136 - 145 mmol/L    Potassium 4.7 3.5 - 5.1 mmol/L    Chloride 106 97 - 108 mmol/L    CO2 27 21 - 32 mmol/L    Anion gap 5 5 - 15 mmol/L    Glucose 95 65 - 100 mg/dL    BUN 25 (H) 6 - 20 MG/DL    Creatinine 1.48 (H) 0.70 - 1.30 MG/DL    BUN/Creatinine ratio 17 12 - 20      GFR est AA 54 (L) >60 ml/min/1.73m2    GFR est non-AA 45 (L) >60 ml/min/1.73m2    Calcium 9.0 8.5 - 10.1 MG/DL         Assessment/Plan:     Principal Problem:    Weakness generalized (8/26/2019)    Active Problems:    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 [address] (8/21/2017)      Stage 3 chronic kidney disease (HCC) (8/21/2017)      Atrial fibrillation (HCC) (8/21/2017)      ASCVD (arteriosclerotic cardiovascular disease) (8/21/2017)      Overview: Catheterization 6/11/2004 80% stenosis nondominant RCA and 30% stenosis of       LAD with a notation of some left main disease but no percentage given. No       mention of angioplasty or stent made      Acquired hypothyroidism (8/21/2017)      Anemia (8/13/2019)      Nonrheumatic aortic valve stenosis (8/27/2019)      Overview: Mild to moderate by echocardiogram 8/27/2019       ___________________________________________________  PLAN:    1. Now off IV hydration and watch fluid status closely  2. Continue without diuretic  3. Follow renal function and electrolytes  (24/1.5 on adm), now 25/1.48  4. Telemetry monitoring now with recent Afib  5. Cardizem drip now changed to Amiodarone PO  6. Cardiology consultation note reviewed and D/W Dr. Evon Damian re: ess. normal cath  6. Follow CBC as he did have significant anemia during last hospitalization all the last hemoglobin  At office on 8/21was 12 and now 10.1 so unlikely playing any role  7. Checked thyroid studies with history of hypothyroidism, normal  8. Echocardiogram is normal  9. PT and OT evaluation and treatment notes reviewed and walked only 20 ft with last session  10. Will need rehab on discharge again  11. Urine culture with retention is neg but still likely Prostatitis so continue Doxycycline, WBC decreased to 10.4 from 12.6  12. Continue Flomax and Proscar with increased Flomax dose  13. Continue low dose Midodrine for BP and off Cardizem and diuretic  14.   DCed hiren        ___________________________________________________    Attending Physician: Franci Pires MD

## 2019-09-03 NOTE — PROGRESS NOTES
Bedside and Verbal shift change report given to Taylor Wood (oncoming nurse) by Tyree Mckoy (offgoing nurse). Report included the following information SBAR, Kardex, Intake/Output, MAR, Accordion and Recent Results.

## 2019-09-03 NOTE — PROGRESS NOTES
Problem: Self Care Deficits Care Plan (Adult)  Goal: *Acute Goals and Plan of Care (Insert Text)  Description    FUNCTIONAL STATUS PRIOR TO ADMISSION: Patient was modified independent using [place] for functional mobility, yet had progressed to using a [address] prior to recent hospitalizations. Required assist intermittently with LB ADLs and IADLs. Reported falls in the last year. HOME SUPPORT PRIOR TO ADMISSION: The patient lived with son and daughter in law. Per Pt, his son is unable to provide physical assist d/t his own cardiac condition. Occupational Therapy Goals    Re-Assessed 9/3/2019:   1. Patient will perform grooming tasks standing at sink, including item retrieval, for at least 10 minutes with modified independece within 7 day(s). 2.  Patient will perform standing aspects of lower body dressing with modified indepenence within 7 day(s). 3.  Patient will perform toilet transfers with modified indepenence within 7 day(s). 4.  Patient will perform all aspects of toileting with modified independence within 7 day(s). 5.  Patient will perform safe item retrieval from high/low surfaces in prep for ADLs with Supervision using least restrictive device within 7 days. 6.  Patient will participate in upper extremity therapeutic exercise/activities with supervision/set-up for 5 minutes within 7 day(s). 7.  Patient will utilize energy conservation techniques during functional activities with verbal cues within 7 day(s). Initiated 8/27/2019;   1. Patient will perform grooming with supervision/set-up within 7 day(s). Met; Upgraded  2. Patient will perform bathing with minimal assistance/contact guard assist within 7 day(s). Met; Discontinued  3. Patient will perform lower body dressing with minimal assistance/contact guard assist within 7 day(s). Met; Upgraded  4. Patient will perform toilet transfers with supervision/set-up within 7 day(s). Not met; Continued  5.   Patient will perform all aspects of toileting with independence within 7 day(s). Not met; Continued  6. Patient will participate in upper extremity therapeutic exercise/activities with supervision/set-up for 5 minutes within 7 day(s). Met; Upgraded  7. Patient will utilize energy conservation techniques during functional activities with verbal cues within 7 day(s). Continued    Outcome: Progressing Towards Goal   OCCUPATIONAL THERAPY TREATMENT/WEEKLY RE-EVALUATION  Patient: Malia Rivera (16 y.o. male)  Date: 9/3/2019  Diagnosis: ASCVD (arteriosclerotic cardiovascular disease) [I25.10] Weakness generalized  Procedure(s) (LRB):  CORONARY ANGIOGRAPHY (N/A) 5 Days Post-Op  Precautions: Fall, Skin  Chart, occupational therapy assessment, plan of care, and goals were reviewed. ASSESSMENT  Based on the objective data described below, Pt demonstrates improving activity tolerance during standing ADLs this session. He is currently completing seated aspects of ADLs with Mod I and CGA with standing aspects. Overall Pt is most limited d/t decreased dynamic standing balance during ADL tasks. Continue to recommend short rehab stay at discharge to maximize his strength/endurance required for ADLs and IADLs. Pt met 3/7 of the above listed goals this reporting period. Goals have been revised accordingly. Current Level of Function Impacting Discharge (ADLs): CGA-SBA with transfers and standing ADLs using RW. Mod I with seated aspects of ADLs. Other factors to consider for discharge: Pts son who lives with him is unable to assist Pt PRN at discharge d/t his own cardiac condition. PLAN :  Goals have been updated based on progression since last assessment. Patient continues to benefit from skilled intervention to address the above impairments. Continue to follow patient 3 times a week to address goals.     Recommend with staff: None    Recommendation for discharge: (in order for the patient to meet his/her long term goals)  Therapy up to 5 days/week in SNF setting    This discharge recommendation:  Has been made in collaboration with the attending provider and/or case management    Equipment recommendations for successful discharge (if) home: to be determined by rehab facility       SUBJECTIVE:   Patient stated I'm worried about my balance. .. I feel safe with that walker\"    OBJECTIVE DATA SUMMARY:   Cognitive/Behavioral Status:  Neurologic State: Alert  Orientation Level: Oriented X4  Cognition: Follows commands             Functional Mobility and Transfers for ADLs:  Bed Mobility:  Rolling: Stand-by assistance  Supine to Sit: Stand-by assistance  Sit to Supine: Stand-by assistance  Scooting: Stand-by assistance    Transfers:  Sit to Stand: Contact guard assistance  Functional Transfers  Bathroom Mobility: Contact guard assistance  Bed to Chair: Contact guard assistance    Balance:  Sitting: Intact; Without support  Standing: Impaired; With support  Standing - Static: Good  Standing - Dynamic : Good    ADL Intervention:     Pt was able to complete standing grooming task, including item retrieval from shelf in bathroom, for approximately 5 minutes with c/o minimal fatigue. Pt instructed to continue completing OOB functional tasks to maximize activity tolerance. Educated Pt on typical progression of interventions at [facility] rehab including facilitating UB/LB strengthening, standing balance, and facilitating his independence with performing his dynamic standing ADL/IADL routine. Grooming  Washing Hands: Stand-by assistance(standing at sink)  Brushing Teeth: Stand-by assistance(standing at commode)    Lower Body Dressing Assistance  Socks: Modified independent    Pain:  0/10    Activity Tolerance:   Fair and requires rest breaks  Please refer to the flowsheet for vital signs taken during this treatment.     After treatment patient left in no apparent distress:   Supine in bed, Call bell within reach and Caregiver / family present    COMMUNICATION/COLLABORATION:   The patients plan of care was discussed with: Physical Therapist, Registered Nurse and Patient     Nancy Medina OTR/L  Time Calculation: 14 mins

## 2019-09-03 NOTE — PROGRESS NOTES
Bedside shift change report given to Katheryn Jacome RN (oncoming nurse) by Steven Bass RN (offgoing nurse). Report included the following information SBAR, Kardex, Procedure Summary, Intake/Output, MAR, Accordion, Recent Results, Med Rec Status, Cardiac Rhythm NSR, Sinus [place], Alarm Parameters , Pre Procedure Checklist, Procedure Verification, Quality Measures and Dual Neuro Assessment.

## 2019-09-03 NOTE — PROGRESS NOTES
1249: texted MD regarding stopping Imdur ER due to headahces pt was having. MD said it was ok to stop the med. Patient ambulated in hallway without difficulty. Dressing CDI. No complaints. Discharge instructions reviewed with patient; to be discharged to home with family. Site care instructions reviewed; site(s) CDI. Patient instructed on which medications to continue, which to start, and which to stop. Prescriptions given to patient. Medication info provided and reviewed with patient. Follow-up appointment information given; follow-up appointment to be made by patient. IV and tele box removed. Opportunity for questions provided; all questions answered. All belongings returned. Patient wheeled to front door via wheelchair by nurse; to be transported home by wife.

## 2019-09-03 NOTE — PROGRESS NOTES
Problem: Mobility Impaired (Adult and Pediatric)  Goal: *Acute Goals and Plan of Care (Insert Text)  Description  FUNCTIONAL STATUS PRIOR TO ADMISSION: Patient required minimal assistance for basic and instrumental ADLs. HOME SUPPORT PRIOR TO ADMISSION: The patient lived with son and daughter in law who assisted patient with ADLs and mobility as needed since coming home from [place]. He uses a walker most of the time, but had only needed a cane prior to recent hospitalizations. Lives in a 1 story home with 3 steps to enter with Rail on the left side. Reported falls in the last year. Physical Therapy Goals  Initiated 8/27/2019  1. Patient will move from supine to sit and sit to supine , scoot up and down and roll side to side in bed with minimal contact guard/standby assist within 7 day(s). 2.  Patient will transfer from bed to chair and chair to bed with minimal contact guard assist using the least restrictive device within 7 day(s). 3.  Patient will perform sit to stand with standby assistance/set-up within 7 day(s). 4.  Patient will ambulate with contact guard assist for 75 feet with the least restrictive device within 7 day(s). 5.  Patient will ascend/descend 3 stairs with left handrail(s) with minimal assistance x 1 within 7 day(s). Physical Therapy Goals  Revised 9/3/2019  1. Patient will move from supine to sit and sit to supine , scoot up and down and roll side to side in bed with independence within 7 day(s). 2.  Patient will transfer from bed to chair and chair to bed with modified independence using the least restrictive device within 7 day(s). 3.  Patient will perform sit to stand with modified independence within 7 day(s). 4.  Patient will ambulate with modified independence for 150 feet with the least restrictive device within 7 day(s). 5.  Patient will ascend/descend 5 stairs with one sided handrail(s) with modified independence within 7 day(s).      Outcome: Progressing Towards Goal   PHYSICAL THERAPY TREATMENT: WEEKLY REASSESSMENT  Patient: Crow Perry (1 y.o. male)  Date: 9/3/2019  Primary Diagnosis: ASCVD (arteriosclerotic cardiovascular disease) [I25.10]  Procedure(s) (LRB):  CORONARY ANGIOGRAPHY (N/A) 5 Days Post-Op   Precautions:   Fall, Skin      ASSESSMENT  Based on the objective data described below, the patient presents with improved strength, improved functional mobility, impaired balance, and unsteady gait. Patient is making good progress with therapy. He is improving his gait distance and with safe use of RW. Minimal verbal cues required for functional mobility. Continues to have decreased endurance although no SOB noted. VSS on RA. Patient's progression toward goals since last assessment: patient met 3/5 goals and revised goals appropriately. Current Level of Function Impacting Discharge (mobility/balance): required SBA-CGA for all functional mobility with RW. Ambulated 70 feet in hallway with RW. Other factors to consider for discharge: endurance, recent SNF         PLAN :  Goals have been updated based on progression since last assessment. Patient continues to benefit from skilled intervention to address the above impairments. Recommendations and Planned Interventions: bed mobility training, transfer training, gait training, therapeutic exercises, patient and family training/education and therapeutic activities      Frequency/Duration: Patient will be followed by physical therapy:  3 times a week to address goals.     Recommendation for discharge: (in order for the patient to meet his/her long term goals)  Therapy up to 5 days/week in SNF setting    This discharge recommendation:  Has been made in collaboration with the attending provider and/or case management    Equipment recommendations for successful discharge (if) home: to be determined by rehab facility         SUBJECTIVE:   Patient stated I feel like I could do that walk again today.    OBJECTIVE DATA SUMMARY:   HISTORY:    Past Medical History:   Diagnosis Date    Arrhythmia     A-fib    GERD (gastroesophageal reflux disease)     Hypertension     Other ill-defined conditions(799.89)     BPH    Other ill-defined conditions(799.89)     lipids    Other ill-defined conditions(799.89)     shingles hx    Thyroid disease     Unspecified adverse effect of anesthesia      Past Surgical History:   Procedure Laterality Date    HX CATARACT REMOVAL      HX HEART CATHETERIZATION      ablation    HX HEART CATHETERIZATION  07/18/2019    Stent    HX HEENT      scar tissue removed/laser    HX HERNIA REPAIR  05/05/2017    Carola Womack MD    HX ORTHOPAEDIC      left foot fx--hardware    HX ORTHOPAEDIC  4/9/14    Right total knee arthroplasty    HX OTHER SURGICAL      prostate bx       Personal factors and/or comorbidities impacting plan of care: fall risk, endurance    Home Situation  Home Environment: Private residence  # Steps to Enter: 3  Rails to Enter: Yes  Hand Rails : Left  One/Two Story Residence: One story  Living Alone: No  Support Systems: Friends \ neighbors, Family member(s)  Patient Expects to be Discharged to[de-identified] Private residence  Current DME Used/Available at Home: Frosty Henrry, straight, Walker, rolling, Grab bars  Tub or Shower Type: Shower    EXAMINATION/PRESENTATION/DECISION MAKING:   Critical Behavior:  Neurologic State: Alert  Orientation Level: Oriented X4  Cognition: Follows commands  Safety/Judgement: Awareness of environment, Good awareness of safety precautions, Fall prevention(weakness)  Hearing:   Auditory  Auditory Impairment: Hard of hearing, bilateral  Skin:    Edema:   Range Of Motion:  AROM: Within functional limits           PROM: Within functional limits           Strength:    Strength: Generally decreased, functional                    Tone & Sensation:   Tone: Normal              Sensation: Intact               Coordination:  Coordination: Within functional limits  Vision: Functional Mobility:  Bed Mobility:  Rolling: Stand-by assistance  Supine to Sit: Stand-by assistance  Sit to Supine: Stand-by assistance  Scooting: Stand-by assistance  Transfers:  Sit to Stand: Contact guard assistance  Stand to Sit: Contact guard assistance        Bed to Chair: Contact guard assistance              Balance:   Sitting: Intact; Without support  Standing: Impaired; With support  Standing - Static: Good  Standing - Dynamic : Good  Ambulation/Gait Training:  Distance (ft): 70 Feet (ft)  Assistive Device: Gait belt;Walker, rolling  Ambulation - Level of Assistance: Contact guard assistance        Gait Abnormalities: Decreased step clearance;Shuffling gait        Base of Support: Widened     Speed/Izabella: Pace decreased (<100 feet/min)  Step Length: Right shortened;Left shortened                Therapeutic Exercises:   Discussed previous BLE exercises seated and supine that patient learned from rehab. Reviewed and encouraged 3 x 10 each day. Activity Tolerance:   Good and requires rest breaks  Please refer to the flowsheet for vital signs taken during this treatment. After treatment patient left in no apparent distress:   Supine in bed, Call bell within reach and Caregiver / family present    COMMUNICATION/EDUCATION:   The patients plan of care was discussed with: Occupational Therapist, Registered Nurse and . Fall prevention education was provided and the patient/caregiver indicated understanding., Patient/family have participated as able in goal setting and plan of care. and Patient/family agree to work toward stated goals and plan of care.     Thank you for this referral.  Juancho Knapp, PT, DPT   Time Calculation: 14 mins

## 2019-09-03 NOTE — PROGRESS NOTES
Bedside shift change report given to *** (oncoming nurse) by Rodrigue Jones RN (offgoing nurse). Report included the following information SBAR, Kardex, ED Summary, Procedure Summary, Intake/Output and Cardiac Rhythm ***.

## 2019-09-03 NOTE — PROGRESS NOTES
Bedside shift change report given to Alonzo Marie RN (oncoming nurse) by Wilhelminia Lefort, RN (offgoing nurse). Report included the following information SBAR, Kardex, Procedure Summary, Intake/Output, MAR, Accordion, Recent Results, Med Rec Status, Cardiac Rhythm NSR, Sinus Lynann Komal, Alarm Parameters , Pre Procedure Checklist, Procedure Verification, Quality Measures and Dual Neuro Assessment.

## 2019-09-03 NOTE — PROGRESS NOTES
Problem: Falls - Risk of  Goal: *Absence of Falls  Description  Document Angella Late Fall Risk and appropriate interventions in the flowsheet. Outcome: Progressing Towards Goal  Note:   Fall Risk Interventions:  Mobility Interventions: Assess mobility with egress test, Bed/chair exit alarm, Communicate number of staff needed for ambulation/transfer, OT consult for ADLs, Patient to call before getting OOB, PT Consult for mobility concerns, PT Consult for assist device competence, Strengthening exercises (ROM-active/passive), Utilize walker, cane, or other assistive device    Mentation Interventions: Adequate sleep, hydration, pain control, Bed/chair exit alarm, Evaluate medications/consider consulting pharmacy, Eyeglasses and hearing aids, Increase mobility, More frequent rounding, Room close to nurse's station, Self-releasing belt, Toileting rounds, Update white board    Medication Interventions: Assess postural VS orthostatic hypotension, Bed/chair exit alarm, Evaluate medications/consider consulting pharmacy, Patient to call before getting OOB, Teach patient to arise slowly    Elimination Interventions: Bed/chair exit alarm, Call light in reach, Patient to call for help with toileting needs, Toileting schedule/hourly rounds    History of Falls Interventions: Bed/chair exit alarm, Consult care management for discharge planning, Door open when patient unattended, Investigate reason for fall, Utilize gait belt for transfer/ambulation, Vital signs minimum Q4HRs X 24 hrs (comment for end date), Assess for delayed presentation/identification of injury for 48 hrs (comment for end date)         Problem: Pressure Injury - Risk of  Goal: *Prevention of pressure injury  Description  Document Berry Scale and appropriate interventions in the flowsheet.   Outcome: Progressing Towards Goal  Note:   Pressure Injury Interventions:  Sensory Interventions: Assess changes in LOC, Assess need for specialty bed, Keep linens dry and wrinkle-free, Discuss PT/OT consult with provider, Minimize linen layers, Maintain/enhance activity level, Monitor skin under medical devices, Pressure redistribution bed/mattress (bed type), Turn and reposition approx. every two hours (pillows and wedges if needed)    Moisture Interventions: Absorbent underpads    Activity Interventions: PT/OT evaluation, Pressure redistribution bed/mattress(bed type), Increase time out of bed, Assess need for specialty bed    Mobility Interventions: Turn and reposition approx.  every two hours(pillow and wedges), PT/OT evaluation    Nutrition Interventions: Document food/fluid/supplement intake    Friction and Shear Interventions: Minimize layers, Transferring/repositioning devices                Problem: Breathing Pattern - Ineffective  Goal: *Absence of hypoxia  Outcome: Progressing Towards Goal     Problem: Patient Education: Go to Patient Education Activity  Goal: Patient/Family Education  Outcome: Progressing Towards Goal     Problem: Patient Education: Go to Patient Education Activity  Goal: Patient/Family Education  Outcome: Progressing Towards Goal     Problem: Pain  Goal: *Control of Pain  Outcome: Progressing Towards Goal

## 2019-09-03 NOTE — PROGRESS NOTES
Spiritual Care Assessment/Progress Note  [facility]      NAME: Timbo Bowen      MRN: [id number]  AGE: 80 y.o. SEX: male  Congregation Affiliation: Anglican   Language: English     9/3/2019     Total Time (in minutes): 13     Spiritual Assessment begun in [facility] through conversation with:         [x]Patient        [x] Family    [] Friend(s)        Reason for Consult: Initial/Spiritual assessment, patient floor     Spiritual beliefs: (Please include comment if needed)     [x] Identifies with a leandro tradition: Anglican        [] Supported by a leandro community:            [] Claims no spiritual orientation:           [] Seeking spiritual identity:                [] Adheres to an individual form of spirituality:           [] Not able to assess:                           Identified resources for coping:      [x] Prayer                               [] Music                  [] Guided Imagery     [x] Family/friends                 [] Pet visits     [] Devotional reading                         [] Unknown     [] Other:                                               Interventions offered during this visit: (See comments for more details)    Patient Interventions: Affirmation of emotions/emotional suffering, Initial visit, Catharsis/review of pertinent events in supportive environment, Prayer (assurance of), Normalization of emotional/spiritual concerns     Family/Friend(s):  Affirmation of emotions/emotional suffering, Affirmation of leandro, Prayer (assurance of)     Plan of Care:     [x] Support spiritual and/or cultural needs    [] Support AMD and/or advance care planning process      [] Support grieving process   [] Coordinate Rites and/or Rituals    [] Coordination with community clergy   [] No spiritual needs identified at this time   [] Detailed Plan of Care below (See Comments)  [] Make referral to Music Therapy  [] Make referral to Pet Therapy     [] Make referral to Addiction services  [] Make referral to [facility]  [] Make referral to Spiritual Care Partner  [] No future visits requested        [x] Follow up visits as needed     Comments:  made initial visit to patients room on Cardio for spiritual assessment. Patient was lying in bed and the patients daughter and son on law were in the room. Patient said he is feeling better, but will now have to go to rehab. He didn't sound to excited about going there.  provided words of encouragement to the patient. The patient lives with his son and has another daughter, besides the one that was visiting today. Patient was thankful for the visit.  provided pastoral care and support during the visit. Patient identifies as [facility] will follow up as needed.     Bill Dugan MDiv  Pager: [phone]

## 2019-09-04 NOTE — PROGRESS NOTES
CM reached out to [facility] SN[facility] to verify insurance authorization. CM spoke to Yatahey with admissions, pt is accepted to [facility]. CM updated Yatahey that pt will DC from [facility] at approx. 1200. Son, Ana Mclaughlin to transport at DC. [facility] Nursing updated and will call report:  [facility]  [phone]    Room 205A     Transition of Care Plan to SNF/Rehab    SNF/Rehab Transition:  Patient has been accepted to [facility] and meets criteria for admission. Patient will transported by [facility] and expected to leave by 1200. Communication to Patient/Family:  Met with patient and Ana Mclaughlin - Son (identified care giver) and they are agreeable to the transition plan. Communication to SNF/Rehab:  Bedside RN, Noel Rogers, has been notified to update the transition plan to the facility and call report (phone number [phone]). Discharge information has been updated on the AVS.       SNF/Rehab Transition:  Patient to follow-up with Home Health:  [facility], other ,none)  PCP/Specialist:  Dr. Shilpa Foster Management: Maribeth Dominguez    Reviewed and confirmed with facility, Yatahey,  they can manage the patient care needs for the following:     [facility] with (X) only those applicable:    Medication:  [x]  Medications will be available at the facility  []  IV Antibiotics   []  Controlled Substance - hard copy to be sent with patient   []  Weekly Labs   Documents:  [] Hard RX  [x] MAR  [x] Kardex  [x] AVS  []Transfer Summary  []Discharge   Equipment:  []  CPAP/BiPAP  []  Wound Vacuum  []  Greenberg or Urinary Device  []  PICC/Central Line  []  Nebulizer  []  Ventilator   Treatment:  []Isolation (for MRSA, VRE, etc.)  []Surgical Drain Management  []Tracheostomy Care  []Dressing Changes  []Dialysis with transportation and chair time   []PEG Care  []Oxygen  []Daily Weights for Heart Failure   Dietary:  [x]Any diet limitations   []Tube Feedings   []Total Parenteral Management (TPN)   Eligible for Medicaid Long Term Services and Supports  Yes:  [] Eligible for medical assistance or will become eligible within 180 days and UAI completed. [] Provider/Patient and/or support system has requested screening. [] UAI copy provided to patient or responsible party,   [] UAI unavailable at discharge will send once processed to SNF provider. [] UAI unavailable at discharged mailed to patient  No:   [] Private pay and is not financially eligible for Medicaid within the next 180 days. [] Reside out-of-state. [] A residents of a state owned/operated facility that is licensed  by [place] [address] [employer] or [facility]  [] Enrollment in [facility] hospice services  [] [address]  [x] Patient /Family declines to have screening completed or provide financial information for screening     Financial Resources:  Medicaid    [] Initiated and application pending   [] Full coverage     Advanced Care Plan:  []Surrogate Decision Maker of Care  []POA  []Communicated Code Status  Full   Other  Cardiac diet if available   Care Management Interventions  PCP Verified by CM: Yes  Palliative Care Criteria Met (RRAT>21 & CHF Dx)?: No  Mode of Transport at Discharge: Other (see comment)(son Vipin to transport at [place])  Transition of Care Consult (CM Consult): Discharge Planning, SNF([facility])  Discharge Durable Medical Equipment: No(Pt has walker, cane, wheelchair, nebulizer, and  bars. )  Physical Therapy Consult: Yes  Occupational Therapy Consult: Yes  Speech Therapy Consult: No  Current Support Network: Relative's Home(Pt resides with his son in a one level home with a ramp in the front and [address] from side entrance. )  Confirm Follow Up Transport: Family(Pt does not drive.  Pt's family transports when necessary. )  Plan discussed with Pt/Family/Caregiver: Yes  Discharge Location  Discharge Placement: Skilled nursing facility([facility])

## 2019-09-04 NOTE — DISCHARGE SUMMARY
Manuel Yefri Shepard     Name:  Sacha Alvarez  MR#:  [id number]  :  1931  ACCOUNT #:  [de-identified]  ADMIT DATE:  2019  DISCHARGE DATE:  2019    FINAL DIAGNOSES:  1. Weakness generalized. 2.  Hypertension. 3.  Paroxysmal atrial fibrillation with atrial fibrillation during this hospitalization with rapid ventricular response. 4.  Chronic obstructive pulmonary disease. 5.  Gastroesophageal reflux disease. 6.  Chronic kidney disease, stage III. 7.  Atherosclerotic cardiovascular disease, status post recent cardiac catheterization with 80% stenosis of left main and circumflex, which was treated with stent with a side hole to the circumflex and had a prior cardiac catheterization in  with 80% right coronary stenosis treated with stent. 8.  Hypothyroidism. 9.  Anemia. 10.  Nonrheumatic mild to moderate aortic stenosis by echocardiogram on 2019. CONSULTATIONS:  Cardiology, Dr. Derick Anna. PROCEDURES:  1. Cardiac catheterization. 2.  2D echocardiogram.    COMPLICATIONS:  None. For details of admission history and physical, please see admit note. HOSPITAL COURSE:  Briefly, the patient is an 41-year-old white male, who had recently had a hospitalization with significant left main disease treated with stent as noted to the left main extending into the LAD with a side hole to the circumflex and post stent placement, spent some time in the ICU and had significant drop in his hemoglobin requiring 3 units of blood and then was subsequently transferred to [facility]. He seemed to do well at the time of discharge from the nursing facility and had progressive deterioration from that point, was brought back to the office after having two other prior visits and was subsequently admitted to the hospital for further workup and evaluation.   He was placed on telemetry and telemetry revealed conversion to atrial fibrillation with rapid ventricular response and he was initially placed on Cardizem drip, which converted him to sinus rhythm; however, he had significant drop in his blood pressure with Cardizem and thus it was stopped and replaced with p.o. amiodarone. He did have a significant blood pressure issue when he first presented and was started on midodrine, which was continued during the hospitalization. He continued with significant symptoms after conversion to sinus rhythm and thus had a repeat cardiac catheterization, which revealed that the stents done recently was still widely patent, so he was continued on aspirin and Plavix. He did have a fever spike after that with some problems with urinary retention and was treated for prostatitis with IV doxycycline, which will be converted to p.o. today at the time of discharge. At this point, he has made some progress with physical therapy and will be discharged to physical therapy rehab at [facility]. DISCHARGE MEDICATIONS:  His discharge medications will consists of discontinuation of Cardizem and Lasix. He will be started on doxycycline 100 b.i.d. for an additional 10 days as well as amiodarone 200 mg daily and ProAmatine 5 mg three times daily. His other medications which were present on discharge that will be continued consists of aspirin 81 mg daily, Plavix 75 mg daily, ProAir inhaler 2 puff q.i.d. p.r.n., Nexium 40 mg daily, Proscar 5 mg daily, Flomax 0.8 daily, levothyroxine 0.137 daily, Zocor 40 mg daily. DISCHARGE INSTRUCTIONS:  He will have followup by me at LTAC, located within [facility] and subsequently upon discharge home, will have followup by me at the office.         Camille Jacobo MD      [id number]  D:  09/04/2019 7:40  T:  09/04/2019 9:13  JOB #:  [id number]  CC:  Evangelista Dhaliwal MD

## 2019-09-04 NOTE — PROGRESS NOTES
1935-  Rec'd bedside report from [facility]. I will assume care of pt.    2255-  Assisted pt up to bathroom to void. Pt utilized rolling walker. Pt back to bed without difficulty. Will continue to monitor. 18- Assisted pt getting out of bed. Pt ambulated to bathroom using walker. Tolerated well. Pt is now back in bed and stated that he was going to try and get some more sleep. 2421-  Bedside shift report given to BRAULIO Menendez. He will assume care of pt.

## 2019-09-04 NOTE — PROGRESS NOTES
Problem: Falls - Risk of  Goal: *Absence of Falls  Description  Document Paco Cladera Fall Risk and appropriate interventions in the flowsheet. Outcome: Progressing Towards Goal  Note:   Fall Risk Interventions:  Mobility Interventions: Assess mobility with egress test, Communicate number of staff needed for ambulation/transfer, Patient to call before getting OOB, Utilize walker, cane, or other assistive device    Mentation Interventions: Adequate sleep, hydration, pain control, Door open when patient unattended, Eyeglasses and hearing aids, Increase mobility, Update white board    Medication Interventions: Patient to call before getting OOB, Teach patient to arise slowly    Elimination Interventions: Call light in reach, Patient to call for help with toileting needs, Stay With Me (per policy), Toilet paper/wipes in reach    History of Falls Interventions: Consult care management for discharge planning, Door open when patient unattended, Room close to nurse's station, Utilize gait belt for transfer/ambulation         Problem: Pressure Injury - Risk of  Goal: *Prevention of pressure injury  Description  Document Berry Scale and appropriate interventions in the flowsheet.   Outcome: Progressing Towards Goal  Note:   Pressure Injury Interventions:  Sensory Interventions: Assess changes in LOC    Moisture Interventions: Absorbent underpads    Activity Interventions: PT/OT evaluation    Mobility Interventions: HOB 30 degrees or less, Float heels    Nutrition Interventions: Document food/fluid/supplement intake    Friction and Shear Interventions: HOB 30 degrees or less                Problem: Breathing Pattern - Ineffective  Goal: *Absence of hypoxia  Outcome: Progressing Towards Goal     Problem: Patient Education: Go to Patient Education Activity  Goal: Patient/Family Education  Outcome: Progressing Towards Goal     Problem: Pain  Goal: *Control of Pain  Outcome: Progressing Towards Goal

## 2019-09-04 NOTE — PROGRESS NOTES
Report called to [facility]. Patient ambulated in hallway without difficulty. Dressing CDI. No complaints. Discharge instructions reviewed with patient; to be discharged to home with family. Site care instructions reviewed; site(s) CDI. Patient instructed on which medications to continue, which to start, and which to stop. Prescriptions given to patient. Medication info provided and reviewed with patient. Follow-up appointment information given; follow-up appointment to be made by patient. IV and tele box removed. Opportunity for questions provided; all questions answered. All belongings returned. Patient wheeled to front door via wheelchair by nurse; to be transported home by wife.

## 2019-09-04 NOTE — TELEPHONE ENCOUNTER
RX refill request from the patient/pharmacy. Patient last seen 09- with labs, and next appt. scheduled for 09-  Requested Prescriptions     Pending Prescriptions Disp Refills    doxycycline (VIBRAMYCIN) 100 mg capsule 20 Cap 0     Sig: Take 1 Cap by mouth two (2) times a day for 10 days.  midodrine (PROAMITINE) 5 mg tablet 90 Tab 5     Sig: Take 1 Tab by mouth three (3) times daily (with meals) for 30 days.  amiodarone (CORDARONE) 200 mg tablet 30 Tab 5     Sig: Take 1 Tab by mouth daily.  temazepam (RESTORIL) 15 mg capsule 30 Cap 2     Sig: Take 1 Cap by mouth nightly as needed for Sleep. Max Daily Amount: 15 mg.   .

## 2019-09-04 NOTE — PROGRESS NOTES
Progress Note      9/4/2019 8:35 AM  NAME: Astrid Fuentes   MRN:  [id number]   Admit Diagnosis: ASCVD (arteriosclerotic cardiovascular disease) [I25.10]                Problem List:      Weakness, failure to thrive. - Cath 7/2019 with impella supported PCI of LM into LAD with balloon side hole to LCx, cath 8/29/2019 with patent LM stent.  - Normal EF, mild AS  - Prox afib, currently in sinus  - Hx of aflutter ablation  - HTN  - Dyslipidemia  - Hypothyroid  - GERD  - BPH  - Distant tobacco  , lives alone, ADLs, occasionally using cane  Son:  Francisco.   [phone]                     Assessment/Plan:          No chest pain. Brief atypical shoulder pain. Cath with patent stents  Euvolemic so far, echo with normal EF, mild to mod AS  Prox afib back in sinus, will use amiodarone, high risk for triple anticoagulation    - Hold on anticoagulation given age  - Cont ASA  - Cont plavix  - Stop dilt po given low bp, intolerant of beta blocker in past, cont added amiodarone change to 200mg daily   - Cont added low dose midodrine.  - Holding lasix, would also hold fluids  - Cont statin     - Cont proscar and flomax    PT/OT  Stable from cardiovascular perspective.     May transfer to second floor Tele. Awaiting transfer to [facility].          [x]       High complexity decision making was performed in this patient at high risk for decompensation with multiple organ involvement. Subjective:     Astrid Fuentes denies chest pain, dyspnea. Discussed with RN events overnight.      Review of Systems:    Symptom Y/N Comments  Symptom Y/N Comments   Fever/Chills N   Chest Pain N    Poor Appetite Y   Edema N    Cough N   Abdominal Pain N    Sputum N   Joint Pain N    SOB/JAFFE N   Pruritis/Rash N    Nausea/vomit N   Tolerating PT/OT Y    Diarrhea N   Tolerating Diet Y    Constipation N   Other       Could NOT obtain due to:      Objective:      Physical Exam:    Last 24hrs VS reviewed since prior progress note. Most recent are:    Visit Vitals  /67 (BP 1 Location: Right arm)   Pulse 60   Temp 97.9 °F (36.6 °C)   Resp 18   Ht 5' 9\" (1.753 m)   Wt 82.7 kg (182 lb 5.1 oz)   SpO2 98%   BMI 26.92 kg/m²       Intake/Output Summary (Last 24 hours) at 9/4/2019 0920  Last data filed at 9/4/2019 0444  Gross per 24 hour   Intake 120 ml   Output    Net 120 ml        General Appearance: Well developed, well nourished, alert & oriented x 3,    no acute distress. Ears/Nose/Mouth/Throat: Hearing grossly normal.  Neck: Supple. Chest: Lungs clear to auscultation bilaterally. Cardiovascular: Regular rate and rhythm, S1S2 normal, no murmur. Abdomen: Soft, non-tender, bowel sounds are active. Extremities: No edema bilaterally. Skin: Warm and dry. PMH/ reviewed - no change compared to H&P    Data Review    Telemetry: normal sinus rhythm     Lab Data Personally Reviewed:    Recent Labs     09/04/19 0440 09/03/19  0149   WBC 9.2 10.4   HGB 10.1* 10.1*   HCT 32.4* 32.4*    347     No results for input(s): INR, PTP, APTT in the last 72 hours. No lab exists for component: Tin Recinos   Recent Labs     09/04/19 0440 09/03/19  0149 09/02/19  0413    138 140   K 4.5 4.7 4.6    106 107   CO2 27 27 28   BUN 22* 25* 28*   CREA 1.32* 1.48* 1.45*   GLU 79 95 97   CA 9.3 9.0 9.3     No results for input(s): CPK, CKNDX, TROIQ in the last 72 hours. No lab exists for component: CPKMB  Lab Results   Component Value Date/Time    Cholesterol, total 117 06/12/2019 02:00 PM    HDL Cholesterol 35 06/12/2019 02:00 PM    LDL, calculated 65 06/12/2019 02:00 PM    Triglyceride 86 06/12/2019 02:00 PM    CHOL/HDL Ratio 3 06/12/2019 02:00 PM       No results for input(s): SGOT, GPT, AP, TBIL, TP, ALB, GLOB, GGT, AML, LPSE in the last 72 hours. No lab exists for component: AMYP, HLPSE  No results for input(s): PH, PCO2, PO2 in the last 72 hours.     Medications Personally Reviewed:    Current Facility-Administered Medications   Medication Dose Route Frequency    ciprofloxacin HCl (CILOXAN) 0.3 % ophthalmic solution 1 Drop  1 Drop Left Eye QID    bisacodyl (DULCOLAX) suppository 10 mg  10 mg Rectal DAILY PRN    tamsulosin (FLOMAX) capsule 0.8 mg  0.8 mg Oral DAILY    temazepam (RESTORIL) capsule 15 mg  15 mg Oral QHS    amiodarone (CORDARONE) tablet 200 mg  200 mg Oral DAILY    doxycycline (VIBRAMYCIN) 100 mg in 0.9% sodium chloride (MBP/ADV) 100 mL  100 mg IntraVENous Q12H    sodium chloride (NS) flush 5-40 mL  5-40 mL IntraVENous Q8H    sodium chloride (NS) flush 5-40 mL  5-40 mL IntraVENous PRN    naloxone (NARCAN) injection 0.4 mg  0.4 mg IntraVENous PRN    ondansetron (ZOFRAN) injection 4 mg  4 mg IntraVENous Q4H PRN    polyethylene glycol (MIRALAX) packet 17 g  17 g Oral DAILY    enoxaparin (LOVENOX) injection 40 mg  40 mg SubCUTAneous Q24H    midodrine (PROAMITINE) tablet 5 mg  5 mg Oral TID WITH MEALS    albuterol (PROVENTIL HFA, VENTOLIN HFA, PROAIR HFA) inhaler 2 Puff  2 Puff Inhalation Q4H PRN    aspirin chewable tablet 81 mg  81 mg Oral DAILY    clopidogrel (PLAVIX) tablet 75 mg  75 mg Oral DAILY    pantoprazole (PROTONIX) tablet 40 mg  40 mg Oral DAILY    finasteride (PROSCAR) tablet 5 mg  5 mg Oral PCD    levothyroxine (SYNTHROID) tablet 137 mcg  137 mcg Oral ACB    pravastatin (PRAVACHOL) tablet 40 mg  40 mg Oral DAILY    sodium chloride (NS) flush 5-40 mL  5-40 mL IntraVENous PRN    acetaminophen (TYLENOL) tablet 650 mg  650 mg Oral Q4H PRN         Jannie Ryan MD

## 2019-09-04 NOTE — DISCHARGE INSTRUCTIONS
Doctor Jessica [address]  [phone]      Patient Discharge Instructions    Sherwin Cosby / [id number] : 3/13/1931    Admitted 2019 Discharged: 2019     Principal Problem:    Weakness generalized (2019)    Active Problems:    Hypertension with renal disease (2017)      Gastroesophageal reflux disease without esophagitis (2017)      COPD (chronic obstructive pulmonary disease) ([facility] (2017)      Stage 3 chronic kidney disease ([facility] (2017)      Atrial fibrillation ([facility] (2017)      ASCVD (arteriosclerotic cardiovascular disease) (2017)      Overview: Catheterization 2004 80% stenosis nondominant RCA and 30% stenosis of       LAD with a notation of some left main disease but no percentage given. No       mention of angioplasty or stent made      Acquired hypothyroidism (2017)      Anemia (2019)      Nonrheumatic aortic valve stenosis (2019)      Overview: Mild to moderate by echocardiogram 2019          No Known Allergies    · It is important that you take the medication exactly as they are prescribed. · Do not take other medications without consulting your doctor. What to do at Next Level of Care    Disposition:  SNF Rehab at [facility]: Cardiac    Recommended activity: Up with assistance and PT          Information obtained by :  I understand that if any problems occur once I am at home I am to contact my physician. I understand and acknowledge receipt of the instructions indicated above.                                                                                                                                            Physician's or R.N.'s Signature                                                                  Date/Time Patient or Representative Signature                                                          Date/Time

## 2019-09-06 NOTE — PROGRESS NOTES
Hospital Discharge Follow-Up      Date/Time:  2019 10:22 AM    Patient was admitted to [facility] on 19 and discharged on 19 for weakness, generalized. The physician discharge summary was available at the time of outreach. Patient was contacted within three business days of discharge. Top Challenges reviewed with the provider   No ACP on file         Method of communication with provider :face to face    Inpatient RRAT score: 32  Was this a readmission? no   Patient stated reason for the readmission: n/a    Nurse Navigator (NN) contacted the patient by telephone to perform post hospital discharge assessment. Verified name and  with patient as identifiers. Provided introduction to self, and explanation of the Nurse Navigator role. Reviewed discharge instructions and red flags with patient who verbalized understanding. Patient given an opportunity to ask questions and does not have any further questions or concerns at this time. The patient agrees to contact the PCP office for questions related to their healthcare. NN provided contact information for future reference. Disease Specific:   N/A    Summary of patient's top problems:  1. PAF w/ RVR - Patient had a recent hospital stay (19-19) with significant left main CAD treated with stent. After that stay, he was discharged to SNF where he did well. After going home he deteriorated, was brought back to PCP office, and admitted this time for further work up. He was placed on telemetry and found to be in atrial fibrillation with RVR. He was started on Cardizem drip and converted to NSR, however his B/P dropped significantly and he was switched to amiodarone by mouth. He had significant issues with blood pressure since admission and had been started on Midodrine. He was still symptomatic in sinus rhythm, and cardiac catheterization was done.   This revealed recent stents to be widely patent, thus he was continued on aspirin and plavix. 2. Fever spike/prostatitis during hospital stay - discharged on doxycycline. 3. Was to discharge to SNF, however patient declined and was discharged home with son, who lives with him. Home Health orders at discharge: [address]: n/a  Date of initial visit: [address] [facility] ordered/company: n/a  Durable Medical Equipment received: uses walker and cane at home    Barriers to care? none    Advance Care Planning:   Does patient have an Advance Directive:  not on file; education provided - patient advised re: Honoring Choices facilitator available in office to assist.  Suggested patient make appointment when he comes in for [address] [place] [address] [address] visit. Medication(s):   New Medications at Discharge: amiodarone, doxycycline, midodrine  Changed Medications at Discharge: n/a  Discontinued Medications at Discharge: diltiazem ER, furosemide    Medication reconciliation was performed with family, who verbalizes understanding of administration of home medications. There were no barriers to obtaining medications identified at this time. Referral to Pharm D needed: no     Current Outpatient Medications   Medication Sig    amiodarone (CORDARONE) 200 mg tablet Take 1 Tab by mouth daily.  doxycycline (VIBRAMYCIN) 100 mg capsule Take 1 Cap by mouth two (2) times a day for 10 days.  midodrine (PROAMITINE) 5 mg tablet Take 1 Tab by mouth three (3) times daily (with meals) for 30 days.  temazepam (RESTORIL) 15 mg capsule Take 1 Cap by mouth nightly as needed for Sleep. Max Daily Amount: 15 mg.    albuterol (PROAIR HFA) 90 mcg/actuation inhaler Take 2 Puffs by inhalation every four (4) hours as needed for Wheezing or Shortness of Breath.  clopidogrel (PLAVIX) 75 mg tab Take 1 Tab by mouth daily.  doxycycline (VIBRA-TABS) 100 mg tablet Take 1 Tab by mouth two (2) times a day.     midodrine (PROAMITINE) 5 mg tablet Take 1 Tab by mouth three (3) times daily (with meals) for 30 days.  aspirin 81 mg chewable tablet Take 81 mg by mouth daily.  tamsulosin (FLOMAX) 0.4 mg capsule TAKE 2 CAPSULES DAILY    levothyroxine (SYNTHROID) 137 mcg tablet Take 137 mcg by mouth Daily (before breakfast).  esomeprazole (NEXIUM) 40 mg capsule Take 1 Cap by mouth daily.  finasteride (PROSCAR) 5 mg tablet Take 1 Tab by mouth daily (after dinner).  simvastatin (ZOCOR) 40 mg tablet Take 1 Tab by mouth nightly. No current facility-administered medications for this visit. Medications Discontinued During This Encounter   Medication Reason    amiodarone (CORDARONE) 961 mg tablet Duplicate Order       [facility] follow up appointment(s):   Future Appointments   Date Time Provider Benjamin Radha   9/11/2019  1:00 PM Ashwini Sy MD [address]      Non-[facility] follow up appointment(s): Dr. Samanta Ro, cardiologist  [facility]:  information provided as a resource       Goals      monitor for signs of  atrial fibrillation or further CAD blockage      9/6/19-NN spoke with patient who was recently discharged. Patient was found to be in atrial fibrillation during hospital stay. Discussed symptoms to be aware of that might indicate he was converting back to atrial fibrillation, such as weakness, lightheadedness, rapid heart rate, shortness of breath or any evidence of chest pain. Advised patient to report any such symptoms to MD.  Patient voiced understanding and will report any such episodes over the next week. He will have sunny visit with PCP on 9/11/19.   NN will check with patient in one week to see how he is progressing. / vs

## 2019-09-10 NOTE — PROGRESS NOTES
No chief complaint on file. HPI:  Marisol West is a 80y.o. year old male who is here for a follow up visit for hospitalization transition of care. He was last seen by me on 8/26/2019 at the office and on 9/4/2019 at time of hospital discharge. Discharged on: 9/4/2019    Diagnosis in hospital:  1. Generalized weakness  2. Hypertension with orthostatic hypotension in the hospital  3. Paroxysmal atrial fibrillation with recurrent during the hospital with rapid ventricular response  4. COPD  5. GERD   6   CKD stage III  7   ASCVD status post recent cardiac cath with 80% left main stenosis and circumflex treated with stent extending from left main to LAD with side hole to circumflex which was still patent by cath during this hospitalization   8   Hypothyroidism   9   Anemia  10. Moderate aortic stenosis per echo 8/2719  11. Prostatitis    Complications in hospital: No complications    Medication changes: Added midodrine 5 mg 3 times daily with meals, discontinued Cardizem replaced with amiodarone 200 mg daily, discontinue Lasix, started doxycycline 100 mg twice daily for 10 days for treatment of a prostate infection other medicines continued    Discharge Summary reviewed. Today 9/11/2019      He reports the following: He is accompanied by his daughter at the appointment today and he feels like since discharge he has been improving his strength on a daily basis. His main issue seems to be not able to sleep. He is taking temazepam 15 mg at bedtime but then tends to wake up at 2 AM and really can get back to sleep. He thinks because he is fatigued from not sleeping is his main issue. He does note some shortness of breath when he lays down but he feels like that is related to the fact that he cannot sleep and if he is sleepy does not thought that would be a problem. He has no shortness of breath during the daytime and no dyspnea on exertion.   He denies any chest pain, palpitations, PND, orthopnea or other cardiorespiratory complaints. There are no GI or  complaints. He notes no headaches, dizziness or neurologic complaints except generalized nonfocal weakness. He has no change of his chronic arthritic complaints. Visit Vitals  /69 (BP 1 Location: Right arm, BP Patient Position: Sitting)   Pulse 64   Temp 97.7 °F (36.5 °C)   Resp 14   Ht 5' 9\" (1.753 m)   Wt 183 lb (83 kg)   SpO2 96%   BMI 27.02 kg/m²       Historical Data    Past Medical History:   Diagnosis Date    Arrhythmia     A-fib    GERD (gastroesophageal reflux disease)     Hypertension     Other ill-defined conditions(799.89)     BPH    Other ill-defined conditions(799.89)     lipids    Other ill-defined conditions(799.89)     shingles hx    Thyroid disease     Unspecified adverse effect of anesthesia        Past Surgical History:   Procedure Laterality Date    HX CATARACT REMOVAL      HX HEART CATHETERIZATION      ablation    HX HEART CATHETERIZATION  07/18/2019    Stent    HX HEENT      scar tissue removed/laser    HX HERNIA REPAIR  05/05/2017    Trevon Womack MD    HX ORTHOPAEDIC      left foot fx--hardware    HX ORTHOPAEDIC  4/9/14    Right total knee arthroplasty    HX OTHER SURGICAL      prostate bx       Outpatient Encounter Medications as of 9/11/2019   Medication Sig Dispense Refill    amiodarone (CORDARONE) 200 mg tablet Take 1 Tab by mouth daily. 30 Tab prn    doxycycline (VIBRA-TABS) 100 mg tablet Take 1 Tab by mouth two (2) times a day. 20 Tab 0    midodrine (PROAMITINE) 5 mg tablet Take 1 Tab by mouth three (3) times daily (with meals) for 30 days. 90 Tab 0    temazepam (RESTORIL) 15 mg capsule Take 1 Cap by mouth nightly as needed for Sleep. Max Daily Amount: 15 mg. 30 Cap 2    albuterol (PROAIR HFA) 90 mcg/actuation inhaler Take 2 Puffs by inhalation every four (4) hours as needed for Wheezing or Shortness of Breath.  1 Inhaler 3    clopidogrel (PLAVIX) 75 mg tab Take 1 Tab by mouth daily. 90 Tab 3    aspirin 81 mg chewable tablet Take 81 mg by mouth daily.  tamsulosin (FLOMAX) 0.4 mg capsule TAKE 2 CAPSULES DAILY 180 Cap 3    levothyroxine (SYNTHROID) 137 mcg tablet Take 137 mcg by mouth Daily (before breakfast). 90 Tab 3    esomeprazole (NEXIUM) 40 mg capsule Take 1 Cap by mouth daily. 90 Cap 3    finasteride (PROSCAR) 5 mg tablet Take 1 Tab by mouth daily (after dinner). 90 Tab 3    simvastatin (ZOCOR) 40 mg tablet Take 1 Tab by mouth nightly. 90 Tab 3     No facility-administered encounter medications on file as of 2019.          No Known Allergies     Social History     Socioeconomic History    Marital status:      Spouse name: Not on file    Number of children: Not on file    Years of education: Not on file    Highest education level: Not on file   Occupational History    Not on file   Social Needs    Financial resource strain: Not on file    Food insecurity:     Worry: Not on file     Inability: Not on file    Transportation needs:     Medical: Not on file     Non-medical: Not on file   Tobacco Use    Smoking status: Former Smoker     Packs/day: 0.50     Last attempt to quit: 3/28/1963     Years since quittin.3    Smokeless tobacco: Never Used   Substance and Sexual Activity    Alcohol use: Yes     Comment: rare/social-beer    Drug use: Yes     Types: Prescription, OTC    Sexual activity: Not on file   Lifestyle    Physical activity:     Days per week: Not on file     Minutes per session: Not on file    Stress: Not on file   Relationships    Social connections:     Talks on phone: Not on file     Gets together: Not on file     Attends Anabaptist service: Not on file     Active member of club or organization: Not on file     Attends meetings of clubs or organizations: Not on file     Relationship status: Not on file    Intimate partner violence:     Fear of current or ex partner: Not on file     Emotionally abused: Not on file     Physically abused: Not on file     Forced sexual activity: Not on file   Other Topics Concern    Not on file   Social History Narrative    Not on file      REVIEW OF SYSTEMS:  General: negative for - chills or fever. Positive generalized weakness and some insomnia issues  ENT: negative for - headaches, nasal congestion or tinnitus  Eyes: no blurred or visual changes  Neck: No stiffness or swollen nodes  Respiratory: negative for - cough, hemoptysis, shortness of breath or wheezing. Some shortness of breath when explained and he can get to sleep at night  Cardiovascular : negative for - chest pain, edema, palpitations or shortness of breath  Gastrointestinal: negative for - abdominal pain, blood in stools, heartburn or nausea/vomiting  Genito-Urinary: no dysuria, trouble voiding, or hematuria  Musculoskeletal: negative for - gait disturbance, joint pain, joint stiffness or joint swelling  Neurological: no TIA or stroke symptoms. Generalized nonfocal weakness  Hematologic: no bruises, no bleeding  Lymphatic: no swollen glands  Integument: no lumps, mole changes, nail changes or rash  Endocrine:no malaise/lethargy poly uria or polydipsia or unexpected weight changes      Visit Vitals  /69 (BP 1 Location: Right arm, BP Patient Position: Sitting)   Pulse 64   Temp 97.7 °F (36.5 °C)   Resp 14   Ht 5' 9\" (1.753 m)   Wt 183 lb (83 kg)   SpO2 96%   BMI 27.02 kg/m²     CONSTITUTIONAL: well , well nourished, appears age appropriate  HEAD: normocephalic, atraumatic  EYES: sclera anicteric, PERRL, EOMI  ENMT:moist mucous membranes, pharynx clear          Nares: w/o erythema or edema  NECK: supple.  Thyroid normal, No JVD or bruits  RESPIRATORY: Chest: clear to ascultation and percussion   CARDIOVASCULAR: Heart: regular rate and rhythm 2/6 systolic murmur without rubs or gallops, PMI not displaced, no thrill  GASTROINTESTINAL: Abdomen: non distended, soft, non-tender, bowel sounds normal  HEMATOLOGIC: no petechiae or purpura  LYMPHATIC: no lymphadenopathy  MUSCULOSKELETAL: Extremities: no edema or active synovitis, pulse 1+. Ambulating with walker  BACK; no point or CVAT  INTEGUMENT: No unusual rashes or suspicious skin lesions noted. Nails appear normal.  NEUROLOGIC: non-focal exam   MENTAL STATUS: alert and oriented, appropriate affect   PSYCHIATRIC: normal affect    ASSESSMENT:   1. Weakness generalized    2. Anemia, unspecified type    3. Paroxysmal atrial fibrillation (HCC)    4. ASCVD (arteriosclerotic cardiovascular disease)    5. Chronic obstructive pulmonary disease, unspecified COPD type ([facility]    6. Hypertension with renal disease      Impression  1. Generalized weakness I think that will improve with time  2. Anemia we will see what that status is today and make sure that is not playing some role in his shortness of breath  3. Paroxysmal atrial fibrillation on amiodarone and he has heart rate is normal today. 4   ASCVD clinically stable continue aspirin and Plavix  5. COPD that is stable O2 sat on room air is 96% at rest  6. Hypertension that is controlled  7. Insomnia I will increase his temazepam to 30 mg at bedtime as he is to take 2 of 50 mg pills  I will call with lab results and make further recommendations. Follow-up scheduled again for 1 week or sooner if there is a problem. All of the above discussed with his daughter present with her today. High complexity decision making on this high risk patient with high risk of decompensation repeat hospitalization. PLAN:  .  Orders Placed This Encounter    CBC WITH AUTOMATED DIFF    METABOLIC PANEL, BASIC ([place])         ATTENTION:   This medical record was transcribed using an electronic medical records system. Although proofread, it may and can contain electronic and spelling errors. Other human spelling and other errors may be present. Corrections may be executed at a later time.   Please feel free to contact us for any clarifications as needed. Follow-up and Dispositions    · Return in about 1 week (around 9/18/2019). Kenneth Narvaez MD    Orders Placed This Encounter    CBC WITH AUTOMATED DIFF    METABOLIC PANEL, BASIC ([address])          No results found for any visits on 09/11/19. I have reviewed the patient's medical history in detail and updated the computerized patient record. We had a prolonged discussion about these complex clinical issues and went over the various important aspects to consider. All questions were answered. Advised him to call back or return to office if symptoms do not improve, change in nature, or persist.    He was given an after visit summary or informed of [facility] which includes patient instructions, diagnoses, current medications, & vitals. He expressed understanding with the diagnosis and plan.

## 2019-09-11 NOTE — PATIENT INSTRUCTIONS

## 2019-09-17 NOTE — PROGRESS NOTES
Chief Complaint   Patient presents with    Hypertension    Blood sugar problem    COPD    Leg Swelling     left leg swelling/redness    Fall     pt has had 2 falls in 2 weeks       SUBJECTIVE:    Donna Crane is a 80 y.o. male who returns in follow-up for his medical problems include hypertension, CKD, atrial fibrillation, ASCVD, GERD, COPD, and other medical problems. He did have 2 falls within the last 2 weeks but did not injure himself. He has noted some new problem of swelling in his left leg. He has no pain associated with this. He did not injure that when he fell. He denies any chest pain, change of his chronic dyspnea, PND, orthopnea or other cardiorespiratory complaints except for the swelling in his left leg. He denies any GI or  complaints. He denies any headaches, dizziness or neurologic complaints. He has some chronic unchanged arthritic complaints. He has no other complaints on complete review of systems. Current Outpatient Medications   Medication Sig Dispense Refill    rivaroxaban (XARELTO) 20 mg tab tablet Take 1 Tab by mouth daily (with breakfast). 30 Tab 2    amiodarone (CORDARONE) 200 mg tablet Take 1 Tab by mouth daily. 30 Tab prn    doxycycline (VIBRA-TABS) 100 mg tablet Take 1 Tab by mouth two (2) times a day. 20 Tab 0    midodrine (PROAMITINE) 5 mg tablet Take 1 Tab by mouth three (3) times daily (with meals) for 30 days. 90 Tab 0    temazepam (RESTORIL) 15 mg capsule Take 1 Cap by mouth nightly as needed for Sleep. Max Daily Amount: 15 mg. 30 Cap 2    albuterol (PROAIR HFA) 90 mcg/actuation inhaler Take 2 Puffs by inhalation every four (4) hours as needed for Wheezing or Shortness of Breath. 1 Inhaler 3    clopidogrel (PLAVIX) 75 mg tab Take 1 Tab by mouth daily. 90 Tab 3    aspirin 81 mg chewable tablet Take 81 mg by mouth daily.       tamsulosin (FLOMAX) 0.4 mg capsule TAKE 2 CAPSULES DAILY 180 Cap 3    levothyroxine (SYNTHROID) 137 mcg tablet Take 137 mcg by mouth Daily (before breakfast). 90 Tab 3    esomeprazole (NEXIUM) 40 mg capsule Take 1 Cap by mouth daily. 90 Cap 3    finasteride (PROSCAR) 5 mg tablet Take 1 Tab by mouth daily (after dinner). 90 Tab 3    simvastatin (ZOCOR) 40 mg tablet Take 1 Tab by mouth nightly.  80 Tab 3     Past Medical History:   Diagnosis Date    Arrhythmia     A-fib    GERD (gastroesophageal reflux disease)     Hypertension     Other ill-defined conditions(799.89)     BPH    Other ill-defined conditions(799.89)     lipids    Other ill-defined conditions(799.89)     shingles hx    Thyroid disease     Unspecified adverse effect of anesthesia      Past Surgical History:   Procedure Laterality Date    HX CATARACT REMOVAL      HX HEART CATHETERIZATION      ablation    HX HEART CATHETERIZATION  07/18/2019    Stent    HX HEENT      scar tissue removed/laser    HX HERNIA REPAIR  05/05/2017    Severo Grime Dougherty,MD    HX ORTHOPAEDIC      left foot fx--hardware    HX ORTHOPAEDIC  4/9/14    Right total knee arthroplasty    HX OTHER SURGICAL      prostate bx     No Known Allergies    REVIEW OF SYSTEMS:  General: negative for - chills or fever, or weight loss or gain  ENT: negative for - headaches, nasal congestion or tinnitus  Eyes: no blurred or visual changes  Neck: No stiffness or swollen nodes  Respiratory: negative for - cough, hemoptysis, change of his chronic shortness of breath or wheezing  Cardiovascular : negative for - chest pain, edema, palpitations or shortness of breath except new swelling of his left leg from his knee down without pain  Gastrointestinal: negative for - abdominal pain, blood in stools, heartburn or nausea/vomiting  Genito-Urinary: no dysuria, trouble voiding, or hematuria  Musculoskeletal: negative for - gait disturbance, joint pain, joint stiffness or joint swelling  Neurological: no TIA or stroke symptoms  Hematologic: no bruises, no bleeding  Lymphatic: no swollen glands  Integument: no lumps, mole changes, nail changes or rash  Endocrine:no malaise/lethargy poly uria or polydipsia or unexpected weight changes        Social History     Socioeconomic History    Marital status:      Spouse name: Not on file    Number of children: Not on file    Years of education: Not on file    Highest education level: Not on file   Tobacco Use    Smoking status: Former Smoker     Packs/day: 0.50     Last attempt to quit: 3/28/1963     Years since quittin.5    Smokeless tobacco: Never Used   Substance and Sexual Activity    Alcohol use: Yes     Comment: rare/social-beer    Drug use: Yes     Types: Prescription, OTC     Family History   Problem Relation Age of Onset    Heart Disease Mother     Hypertension Mother     Stroke Mother     Heart Disease Father     Cancer Paternal Grandmother        OBJECTIVE:     Visit Vitals  /70 (BP 1 Location: Left arm, BP Patient Position: Sitting)   Pulse 69   Temp 97.8 °F (36.6 °C) (Oral)   Resp 19   Ht 5' 9\" (1.753 m)   Wt 181 lb 9.6 oz (82.4 kg)   SpO2 98%   BMI 26.82 kg/m²     CONSTITUTIONAL:   well nourished, appears age appropriate  EYES: sclera anicteric, PERRL, EOMI  ENMT:nares clear, moist mucous membranes, pharynx clear  NECK: supple. Thyroid normal, No JVD or bruits  RESPIRATORY: Chest: clear to ascultation and percussion, normal inspiratory effort  CARDIOVASCULAR: Heart: regular rate and rhythm no murmurs, rubs or gallops, PMI not displaced, No thrills  GASTROINTESTINAL: Abdomen: non distended, soft, non tender, bowel sounds normal  HEMATOLOGIC: no purpura, petechiae or bruising  LYMPHATIC: No lymph node enlargemant  MUSCULOSKELETAL: Extremities: no edema or active synovitis, pulse 1+. Ambulating with a walker  INTEGUMENT: No unusual rashes or suspicious skin lesions noted. Nails appear normal.  PERIPHERAL VASCULAR: normal pulses femoral, PT and DP.   2+ edema left lower extremity without erythema increased temperature and no venous cords palpated. Negative Homans sign  NEUROLOGIC: non-focal exam, A & O X 3  PSYCHIATRIC:, appropriate affect     ASSESSMENT:   1. Weakness generalized    2. Hypertension with renal disease    3. Paroxysmal atrial fibrillation (HCC)    4. ASCVD (arteriosclerotic cardiovascular disease)    5. Chronic obstructive pulmonary disease, unspecified COPD type ([facility] [address]    6. Anemia, unspecified type    7. Edema, unspecified type    8. [facility]    9. Acute deep vein thrombosis (DVT) of distal vein of left lower extremity (HCC)      Impression  1. Generalized weakness that is slowly improving and I did offer to set him up for home physical therapy but he has declined that. 2.  Hypertension that is controlled  3. Paroxysmal atrial fibrillation appears to be in sinus rhythm now. 4   ASCVD clinically stable status post recent stent of left main into the LAD with a side-port to the circumflex and recent cath revealed that that was patent  5. COPD stable  6. Anemia repeat status pending  7. Unilateral edema I think we need to rule out a DVT next venous Dopplers today  8. Peripheral vascular disease seems to be stable  I will recheck a myself again in 2 weeks or sooner should there be a problem. All the above discussed with his daughter present with him today. Addendum:  I received a call from the vascular lab confirming DVT left lower extremity so I am going to place him on Xarelto 20 mg daily. I will talk to his daughter to make sure they watch closely for bleeding since he is going to be on double anticoagulation with Plavix and Xarelto. PLAN:  .  Orders Placed This Encounter    POC VENOUS DUPLEX    CBC WITH AUTOMATED DIFF    METABOLIC PANEL, BASIC ([place] [facility])    rivaroxaban (XARELTO) 20 mg tab tablet         ATTENTION:   This medical record was transcribed using an electronic medical records system. Although proofread, it may and can contain electronic and spelling errors.   Other human spelling and other errors may be present. Corrections may be executed at a later time. Please feel free to contact us for any clarifications as needed. Follow-up and Dispositions    · Return in about 2 weeks (around 10/2/2019). No results found for any visits on 09/18/19. Coni Rodriguez MD    The patient verbalized understanding of the problems and plans as explained.

## 2019-09-18 PROBLEM — R60.9 EDEMA: Status: ACTIVE | Noted: 2019-01-01

## 2019-09-18 PROBLEM — I73.9 PERIPHERAL VASCULAR DISEASE (HCC): Status: ACTIVE | Noted: 2019-01-01

## 2019-09-18 PROBLEM — I82.4Z2 ACUTE DEEP VEIN THROMBOSIS (DVT) OF DISTAL VEIN OF LEFT LOWER EXTREMITY (HCC): Status: ACTIVE | Noted: 2019-01-01

## 2019-09-18 NOTE — PATIENT INSTRUCTIONS
Anemia: Care Instructions Your Care Instructions Anemia is a low level of red blood cells, which carry oxygen throughout your body. Many things can cause anemia. Lack of iron is one of the most common causes. Your body needs iron to make hemoglobin, a substance in red blood cells that carries oxygen from the lungs to your body's cells. Without enough iron, the body produces fewer and smaller red blood cells. As a result, your body's cells do not get enough oxygen, and you feel tired and weak. And you may have trouble concentrating. Bleeding is the most common cause of a lack of iron. You may have heavy menstrual bleeding or bleeding caused by conditions such as ulcers, hemorrhoids, or cancer. Regular use of aspirin or other anti-inflammatory medicines (such as ibuprofen) also can cause bleeding in some people. A lack of iron in your diet also can cause anemia, especially at times when the body needs more iron, such as during pregnancy, infancy, and the teen years. Your doctor may have prescribed iron pills. It may take several months of treatment for your iron levels to return to normal. Your doctor also may suggest that you eat foods that are rich in iron, such as meat and beans. There are many other causes of anemia. It is not always due to a lack of iron. Finding the specific cause of your anemia will help your doctor find the right treatment for you. Follow-up care is a key part of your treatment and safety. Be sure to make and go to all appointments, and call your doctor if you are having problems. It's also a good idea to know your test results and keep a list of the medicines you take. How can you care for yourself at home? · Take your medicines exactly as prescribed. Call your doctor if you think you are having a problem with your medicine. · If your doctor recommends iron pills, take them as directed: ? Try to take the pills on an empty stomach about 1 hour before or 2 hours after meals. But you may need to take iron with food to avoid an upset stomach. ? Do not take antacids or drink milk or caffeine drinks (such as coffee, tea, or cola) at the same time or within 2 hours of the time that you take your iron. They can make it hard for your body to absorb the iron. ? Vitamin C (from food or supplements) helps your body absorb iron. Try taking iron pills with a glass of orange juice or some other food that is high in vitamin C, such as citrus fruits. ? Iron pills may cause stomach problems, such as heartburn, nausea, diarrhea, constipation, and cramps. Be sure to drink plenty of fluids, and include fruits, vegetables, and fiber in your diet each day. Iron pills often make your bowel movements dark or green. ? If you forget to take an iron pill, do not take a double dose of iron the next time you take a pill. ? Keep iron pills out of the reach of small children. An overdose of iron can be very dangerous. · Follow your doctor's advice about eating iron-rich foods. These include red meat, shellfish, poultry, eggs, beans, raisins, whole-grain bread, and leafy green vegetables. · Steam vegetables to help them keep their iron content. When should you call for help? Call 911 anytime you think you may need emergency care. For example, call if: 
  · You have symptoms of a heart attack. These may include: 
? Chest pain or pressure, or a strange feeling in the chest. 
? Sweating. ? Shortness of breath. ? Nausea or vomiting. ? Pain, pressure, or a strange feeling in the back, neck, jaw, or upper belly or in one or both shoulders or arms. ? Lightheadedness or sudden weakness. ? A fast or irregular heartbeat. After you call 911, the  may tell you to chew 1 adult-strength or 2 to 4 low-dose aspirin. Wait for an ambulance. Do not try to drive yourself.  
  · You passed out (lost consciousness).  
 Call your doctor now or seek immediate medical care if:   · You have new or increased shortness of breath.  
  · You are dizzy or lightheaded, or you feel like you may faint.  
  · Your fatigue and weakness continue or get worse.  
  · You have any abnormal bleeding, such as: 
? Nosebleeds. ? Vaginal bleeding that is different (heavier, more frequent, at a different time of the month) than what you are used to. 
? Bloody or black stools, or rectal bleeding. ? Bloody or pink urine.  
 Watch closely for changes in your health, and be sure to contact your doctor if: 
  · You do not get better as expected. Where can you learn more? Go to http://frank-lyubov.info/. Enter R301 in the search box to learn more about \"Anemia: Care Instructions. \" Current as of: March 28, 2019 Content Version: 12.1 © 6484-9572 [employer]. Care instructions adapted under license by [employer] (which disclaims liability or warranty for this information). If you have questions about a medical condition or this instruction, always ask your healthcare professional. [place] [address] any warranty or liability for your use of this information.

## 2019-09-18 NOTE — PROGRESS NOTES
Chief Complaint Patient presents with  Hypertension  Blood sugar problem  COPD  Leg Swelling  
  left leg swelling/redness  Fall  
  pt has had 2 falls in 2 weeks Visit Vitals /70 (BP 1 Location: Left arm, BP Patient Position: Sitting) Pulse 69 Temp 97.8 °F (36.6 °C) (Oral) Resp 19 Ht 5' 9\" (1.753 m) Wt 181 lb 9.6 oz (82.4 kg) SpO2 98% BMI 26.82 kg/m² 1. Have you been to the ER, urgent care clinic since your last visit? Hospitalized since your last visit? No 
 
2. Have you seen or consulted any other health care providers outside of the [facility] since your last visit? Include any pap smears or colon screening. Dr. Karen Espinoza dr dx with cataract

## 2019-09-19 NOTE — PROGRESS NOTES
Goals  monitor for signs of  atrial fibrillation or further CAD blockage 9/19/19-Patient attended sunny visit on 9/11/19 with PCP. He was also seen yesterday, 9/18/19 by PCP. Patient reported generalized weakness. PCP offered to order home PT, however patient declined. Patient did have some left leg swelling. Venous dopplers were ordered, and showed DVT in LLE. Patient started on Xarelto in addition to Plavix he was already on. Family instructed to watch patient closely for bleeding. / NN will check back on patient next week. / vs 
 
9/6/19-NN spoke with patient who was recently discharged. Patient was found to be in atrial fibrillation during hospital stay. Discussed symptoms to be aware of that might indicate he was converting back to atrial fibrillation, such as weakness, lightheadedness, rapid heart rate, shortness of breath or any evidence of chest pain. Advised patient to report any such symptoms to MD.  Patient voiced understanding and will report any such episodes over the next week. He will have sunny visit with PCP on 9/11/19.   NN will check with patient in one week to see how he is progressing. / vs

## 2019-09-20 NOTE — TELEPHONE ENCOUNTER
RX refill request from the patient/pharmacy. Patient last seen 09- with labs, and next appt. scheduled for 10-    Requested Prescriptions     Pending Prescriptions Disp Refills    temazepam (RESTORIL) 15 mg capsule 60 Cap 5     Sig: Take two at bedtime. Julia Busch

## 2019-09-26 PROBLEM — D64.9 SYMPTOMATIC ANEMIA: Status: ACTIVE | Noted: 2019-01-01

## 2019-09-26 NOTE — H&P
Hospitalist Admission Note NAME: Thuan Drew :  3/13/1931 MRN:  [id number] Date/Time:  2019 5:43 PM 
 
Patient PCP: Amanda Almodovar MD 
______________________________________________________________________ Given the patient's current clinical presentation, I have a high level of concern for decompensation if discharged from the emergency department. Complex decision making was performed, which includes reviewing the patient's available past medical records, laboratory results, and x-ray films. My assessment of this patient's clinical condition and my plan of care is as follows. Assessment / Plan: 
Symptomatic anemia secondary to GI bleed Orthostatic hypotension LLE DVT diagnosed last week, on Xarelto CAD with PCI in July Hx of aflutter s/p albation 
-Admit to Telemetry 
-GI Consult -IV PPI BID 
-Transfuse 1 unit pRBC given pt's hx of CAD with recent stent placed in July 
-Monitor Hgb q12hrs 
-Keep NPO MN 
-Daily orthostatic 
-In the interim, give FLD 
-Will have to hold Xarelto - last dose was yesteday 
-Will continue ASA and Plavix given recent stent 
-When last seen by Cardiology in July - pt was noted to be \"high risk for triple anticoagulation\" -CT Head: IMPRESSION: 1. No acute intracranial abnormality. 2. Microvascular ischemic and other stable age-related change. -CXR: IMPRESSION:  1. No evidence of an acute cardiopulmonary process. -BNP elevated - will hold off on Lasix at this point as pt's BP is borderline and pt without any respiratory distress 
-Continue with Midodrine BPH Hypothyroidism HLD 
HTN 
-Continue with home meds Code Status: DNR Surrogate Decision Maker: Daughter DVT Prophylaxis: None GI Prophylaxis: not indicated Baseline: Independent ADLs Subjective: CHIEF COMPLAINT: dizziness, weakness, fatigue, pale appearing, fall HISTORY OF PRESENT ILLNESS:    
 Gricel Vizcaino is a 80 y.o.  male who presents with CC listed above. Pt has had multiple admissions in the past 3 months. Family states that he was diagnosed with a DVT last week and started on Xarelto. Family also state that the pt had a \"good weekend\" but starting on Monday he started to develop the symptoms noted above. Patient states that when he got up in the middle the night to go to the bathroom that he fell and denies any head injury or loss of consciousness but states he just feels weak. Family notes that he looked pale today and they took him to his cardiologist for follow-up appointment recommended to come to the emergency room for further evaluation because his blood pressure was low on arrival in the office. Pt denies seeing any blood in his stool, and denies noticing any change in color. Pt currently denies any CP or SOB. He does endorse dizziness We were asked to admit for work up and evaluation of the above problems. Past Medical History:  
Diagnosis Date  Arrhythmia A-fib  GERD (gastroesophageal reflux disease)  Hypertension  Other ill-defined conditions(799.89) BPH  Other ill-defined conditions(799.89) lipids  Other ill-defined conditions(799.89)   
 shingles hx  Thyroid disease  Unspecified adverse effect of anesthesia Past Surgical History:  
Procedure Laterality Date  HX CATARACT REMOVAL    
 HX HEART CATHETERIZATION    
 ablation  HX HEART CATHETERIZATION  2019 Stent  HX HEENT    
 scar tissue removed/laser  HX HERNIA REPAIR  2017 Edis Womack MD  
 HX ORTHOPAEDIC    
 left foot fx--hardware  HX ORTHOPAEDIC  14 Right total knee arthroplasty  HX OTHER SURGICAL    
 prostate bx Social History Tobacco Use  Smoking status: Former Smoker Packs/day: 0.50 Last attempt to quit: 3/28/1963 Years since quittin.8  Smokeless tobacco: Never Used Substance Use Topics  Alcohol use: Yes Comment: rare/social-beer Family History Problem Relation Age of Onset  Heart Disease Mother  Hypertension Mother  Stroke Mother  Heart Disease Father  Cancer Paternal Grandmother No Known Allergies Prior to Admission medications Medication Sig Start Date End Date Taking? Authorizing Provider  
temazepam (RESTORIL) 15 mg capsule Take two at bedtime. 9/20/19  Yes Mindi Mac MD  
rivaroxaban [place] Plan) 20 mg tab tablet Take 1 Tab by mouth daily (with breakfast). 9/18/19  Yes Mindi Mac MD  
amiodarone (CORDARONE) 200 mg tablet Take 1 Tab by mouth daily. 9/4/19  Yes Mindi Mac MD  
midodrine (PROAMITINE) 5 mg tablet Take 1 Tab by mouth three (3) times daily (with meals) for 30 days. 9/4/19 10/4/19 Yes Mindi Mac MD  
clopidogrel (PLAVIX) 75 mg tab Take 1 Tab by mouth daily. 8/14/19  Yes Mindi Mac MD  
aspirin 81 mg chewable tablet Take 81 mg by mouth daily. Yes Provider, Historical  
tamsulosin (FLOMAX) 0.4 mg capsule TAKE 2 CAPSULES DAILY 3/22/19  Yes Mindi Mac MD  
levothyroxine (SYNTHROID) 137 mcg tablet Take 137 mcg by mouth Daily (before breakfast). 3/22/19  Yes Mindi Mac MD  
esomeprazole (NEXIUM) 40 mg capsule Take 1 Cap by mouth daily. 3/22/19  Yes Mindi Mac MD  
finasteride (PROSCAR) 5 mg tablet Take 1 Tab by mouth daily (after dinner). 12/12/18  Yes Mindi Mac MD  
simvastatin (ZOCOR) 40 mg tablet Take 1 Tab by mouth nightly. 12/12/18  Yes Mindi Mac MD  
albuterol [facility] HFA) 90 mcg/actuation inhaler Take 2 Puffs by inhalation every four (4) hours as needed for Wheezing or Shortness of Breath. 8/14/19   Mindi Mac MD  
 
 
REVIEW OF SYSTEMS:    
I am not able to complete the review of systems because: The patient is intubated and sedated The patient has altered mental status due to his acute medical problems The patient has baseline aphasia from prior stroke(s) The patient has baseline dementia and is not reliable historian The patient is in acute medical distress and unable to provide information Total of 12 systems reviewed as follows:   
   POSITIVE= underlined text  Negative = text not underlined General:  fever, chills, sweats, generalized weakness, weight loss/gain,  
   loss of appetite Eyes:    blurred vision, eye pain, loss of vision, double vision ENT:    rhinorrhea, pharyngitis Respiratory:   cough, sputum production, SOB, JAFFE, wheezing, pleuritic pain  
Cardiology:   chest pain, palpitations, orthopnea, PND, edema, syncope Gastrointestinal:  abdominal pain , N/V, diarrhea, dysphagia, constipation, bleeding Genitourinary:  frequency, urgency, dysuria, hematuria, incontinence Muskuloskeletal :  arthralgia, myalgia, back pain Hematology:  easy bruising, nose or gum bleeding, lymphadenopathy Dermatological: rash, ulceration, pruritis, color change / jaundice Endocrine:   hot flashes or polydipsia Neurological:  headache, dizziness, confusion, focal weakness, paresthesia, Speech difficulties, memory loss, gait difficulty Psychological: Feelings of anxiety, depression, agitation Objective: VITALS:   
Visit Vitals /43 (BP 1 Location: Left arm, BP Patient Position: Sitting) Pulse 76 Temp 98.8 °F (37.1 °C) Resp 16 Ht 5' 9\" (1.753 m) Wt 82.6 kg (182 lb) SpO2 100% BMI 26.88 kg/m² PHYSICAL EXAM: 
 
General:    Alert, cooperative, no distress, appears stated age. HEENT: Atraumatic, anicteric sclerae, pale conjunctivae No oral ulcers, mucosa moist, throat clear, dentition fair Neck:  Supple, symmetrical,  thyroid: non tender Lungs:   Clear to auscultation bilaterally. No Wheezing or Rhonchi. No rales. Chest wall:  No tenderness  No Accessory muscle use. Heart:   Regular  rhythm,  No  murmur   No edema Abdomen:   Soft, non-tender. Not distended. Bowel sounds normal 
Extremities: No cyanosis. No clubbing,   
  Skin turgor normal, Capillary refill normal, Radial dial pulse 2+ Skin:     +Pale. Not Jaundiced  No rashes Psych:  Good insight. Not depressed. Not anxious or agitated. Neurologic: EOMs intact. No facial asymmetry. No aphasia or slurred speech. Symmetrical strength, Sensation grossly intact. Alert and oriented X 4.  
 
_______________________________________________________________________ Care Plan discussed with: 
  Comments Patient x Family  x   
RN x Care Manager Consultant:     
_______________________________________________________________________ Expected  Disposition:  
Home with Family HH/PT/OT/RN x  
SNF/LTC   
LES[facility]   
________________________________________________________________________ TOTAL TIME:  55 Minutes Critical Care Provided     Minutes non procedure based Comments  
 x Reviewed previous records  
>50% of visit spent in counseling and coordination of care  Discussion with patient and/or family and questions answered 
  
 
________________________________________________________________________ Signed: Liam Santos MD 
 
Procedures: see electronic medical records for all procedures/Xrays and details which were not copied into this note but were reviewed prior to creation of Plan. LAB DATA REVIEWED:   
Recent Results (from the past 24 hour(s)) EKG, 12 LEAD, INITIAL Collection Time: 09/26/19  2:50 PM  
Result Value Ref Range Ventricular Rate 74 BPM  
 Atrial Rate 74 BPM  
 P-R Interval 204 ms QRS Duration 96 ms  
 Q-T Interval 398 ms QTC Calculation (Bezet) 441 ms Calculated P Axis 49 degrees Calculated R Axis 43 degrees Calculated T Axis 30 degrees Diagnosis ** Poor data quality, interpretation may be adversely affected Normal sinus rhythm Nonspecific ST abnormality When compared with ECG of 28-AUG-2019 13:25, Sinus rhythm has replaced Atrial fibrillation CBC WITH AUTOMATED DIFF Collection Time: 09/26/19  3:02 PM  
Result Value Ref Range WBC 7.1 4.1 - 11.1 K/uL  
 RBC 2.93 (L) 4.10 - 5.70 M/uL HGB 8.4 (L) 12.1 - 17.0 g/dL HCT 26.3 (L) 36.6 - 50.3 % MCV 89.8 80.0 - 99.0 FL  
 MCH 28.7 26.0 - 34.0 PG  
 MCHC 31.9 30.0 - 36.5 g/dL  
 RDW 16.7 (H) 11.5 - 14.5 % PLATELET 292 522 - 709 K/uL MPV 10.6 8.9 - 12.9 FL  
 NRBC 0.0 0  WBC ABSOLUTE NRBC 0.00 0.00 - 0.01 K/uL NEUTROPHILS 78 (H) 32 - 75 % LYMPHOCYTES 10 (L) 12 - 49 % MONOCYTES 12 5 - 13 % EOSINOPHILS 0 0 - 7 % BASOPHILS 0 0 - 1 % IMMATURE GRANULOCYTES 0 0.0 - 0.5 % ABS. NEUTROPHILS 5.5 1.8 - 8.0 K/UL  
 ABS. LYMPHOCYTES 0.7 (L) 0.8 - 3.5 K/UL  
 ABS. MONOCYTES 0.9 0.0 - 1.0 K/UL  
 ABS. EOSINOPHILS 0.0 0.0 - 0.4 K/UL  
 ABS. BASOPHILS 0.0 0.0 - 0.1 K/UL  
 ABS. IMM. GRANS. 0.0 0.00 - 0.04 K/UL  
 DF AUTOMATED    
 RBC COMMENTS ANISOCYTOSIS 1+ 
    
 RBC COMMENTS POLYCHROMASIA PRESENT 
    
METABOLIC PANEL, COMPREHENSIVE Collection Time: 09/26/19  3:02 PM  
Result Value Ref Range Sodium 136 136 - 145 mmol/L Potassium 4.3 3.5 - 5.1 mmol/L Chloride 106 97 - 108 mmol/L  
 CO2 24 21 - 32 mmol/L Anion gap 6 5 - 15 mmol/L Glucose 111 (H) 65 - 100 mg/dL BUN 33 (H) 6 - 20 MG/DL Creatinine 1.79 (H) 0.70 - 1.30 MG/DL  
 BUN/Creatinine ratio 18 12 - 20 GFR est AA 44 (L) >60 ml/min/1.73m2 GFR est non-AA 36 (L) >60 ml/min/1.73m2 Calcium 8.9 8.5 - 10.1 MG/DL Bilirubin, total 0.6 0.2 - 1.0 MG/DL  
 ALT (SGPT) 16 12 - 78 U/L  
 AST (SGOT) 17 15 - 37 U/L Alk. phosphatase 80 45 - 117 U/L Protein, total 6.5 6.4 - 8.2 g/dL Albumin 2.8 (L) 3.5 - 5.0 g/dL Globulin 3.7 2.0 - 4.0 g/dL A-G Ratio 0.8 (L) 1.1 - 2.2 NT-PRO BNP Collection Time: 09/26/19  3:02 PM  
Result Value Ref Range  NT pro-BNP 4,368 (H) <450 PG/ML  
TROPONIN I  
 Collection Time: 09/26/19  3:02 PM  
Result Value Ref Range Troponin-I, Qt. <0.05 <0.05 ng/mL SAMPLES BEING HELD Collection Time: 09/26/19  3:03 PM  
Result Value Ref Range SAMPLES BEING HELD  1 ZE   
 COMMENT Add-on orders for these samples will be processed based on acceptable specimen integrity and analyte stability, which may vary by analyte. URINALYSIS W/ RFLX MICROSCOPIC Collection Time: 09/26/19  4:17 PM  
Result Value Ref Range Color YELLOW/STRAW Appearance CLEAR CLEAR Specific gravity 1.018 1.003 - 1.030    
 pH (UA) 5.5 5.0 - 8.0 Protein NEGATIVE  NEG mg/dL Glucose NEGATIVE  NEG mg/dL Ketone NEGATIVE  NEG mg/dL Bilirubin NEGATIVE  NEG Blood NEGATIVE  NEG Urobilinogen 1.0 0.2 - 1.0 EU/dL Nitrites NEGATIVE  NEG Leukocyte Esterase NEGATIVE  NEG    
OCCULT BLOOD, STOOL Collection Time: 09/26/19  5:10 PM  
Result Value Ref Range Occult blood, stool POSITIVE (A) NEG PROTHROMBIN TIME + INR Collection Time: 09/26/19  5:10 PM  
Result Value Ref Range INR 1.1 0.9 - 1.1 Prothrombin time 11.7 (H) 9.0 - 11.1 sec

## 2019-09-26 NOTE — PROGRESS NOTES
TRANSFER - IN REPORT: 
 
Verbal report received from Sherie(name) on Karla Ernst  being received from ED(unit) for routine progression of care Report consisted of patients Situation, Background, Assessment and  
Recommendations(SBAR). Information from the following report(s) SBAR, ED Summary, MAR, Cardiac Rhythm NSR and Quality Measures was reviewed with the receiving nurse. Opportunity for questions and clarification was provided. Assessment completed upon patients arrival to unit and care assumed.

## 2019-09-26 NOTE — ED NOTES
TRANSFER - OUT REPORT: 
 
Verbal report given to [address] [place] [address] Street, RN(name) on [employer]  being transferred to [facility](unit) for routine progression of care Report consisted of patients Situation, Background, Assessment and  
Recommendations(SBAR). Information from the following report(s) SBAR, Kardex, ED Summary, [facility] and Recent Results was reviewed with the receiving nurse. Lines:  
Peripheral IV 09/26/19 Left Antecubital (Active) Site Assessment Clean, dry, & intact 9/26/2019  2:58 PM  
Phlebitis Assessment 0 9/26/2019  2:58 PM  
Infiltration Assessment 0 9/26/2019  2:58 PM  
Dressing Status Clean, dry, & intact 9/26/2019  2:58 PM  
Dressing Type Transparent 9/26/2019  2:58 PM  
Hub Color/Line Status Green;Flushed 9/26/2019  2:58 PM  
Action Taken Blood drawn 9/26/2019  2:58 PM  
   
Peripheral IV 09/26/19 Right Antecubital (Active) Site Assessment Clean, dry, & intact 9/26/2019  6:16 PM  
Phlebitis Assessment 0 9/26/2019  6:16 PM  
Infiltration Assessment 0 9/26/2019  6:16 PM  
Dressing Status Clean, dry, & intact 9/26/2019  6:16 PM  
Dressing Type Tape;Transparent 9/26/2019  6:16 PM  
Hub Color/Line Status Pink;Flushed;Patent 9/26/2019  6:16 PM  
  
 
Opportunity for questions and clarification was provided. Patient transported with: 
 Transport

## 2019-09-26 NOTE — PROGRESS NOTES
CM visited pt. in room, introduced self and role. Pt verbalized understanding. CM verified demographic information at bedside. Per pt address is incorrect, error to house numbers, CM alerted registration. Transition of Care Plan:     
Pt In ED awaiting IP admission Disposition Home with family vs home with  services PT eval and treat Pt would benefit from PT consult while admitted Pt would benefit from dispatch health resources at [place] Please verify pt address is correct on demographics prior to DC Pt will need 2nd IM letter at [facility] Reason for Readmission:   Orthostatic Hypotension, weakness, Anemia Previous Admission:  8/26/19 -9/4/19 generalized weakness, HTN, Paroxysmal Afib with RVR, cardiac catheterization. RRAT Score and Risk Level:     28 Level of Readmission:   Level one Care Conference scheduled:   CM can discuss during [facility] Resources/supports as identified by patient/family:    
    
Top Challenges facing patient (as identified by patient/family and CM): Finances    No concerns at this time  [employer]. Medication Costs    Pt uses [facility] for acute medications, pt uses [facility] mail order for maintenance medications. Transportation    Family provides transportation, daughter Maximus Jean-Baptiste will be available to transport at [place] per pt. Living arrangements  Pt lives with son in a 1 story home with 4 [address] residence. Pt stated to  Address in incorrect - Correct address: 
[address]. [id number] CM alerted registration of error. Self-care/ADLs/Cognition    Pt is alert and oriented x 4, pt states at baseline he is independent of ADL's/IADL's, pt states he has Rolling walker, cane and shower chair for DME. Pt was to attend [facility] from [facility] on 9/4/19, pt went to SNF facility found he would share a room and left facility and returned home with family. Current Advanced Directive/Advance Care Plan:  DNR, no ACP on file Plan for utilizing home health:  Pt is resistant to home health service, pt has stated to CM he feels he \"has gone back\" since discharge 9/4/19. CM educated pt on importance of following up with physical therapy at discharge to help with stamina as well as safety in regards to frequent fall pt stated he is having in the home. Pt states he has gone from cane to walker since DC home last admission. Care Management Interventions PCP Verified by CM: Yes(follow up scheduled 10/2/19) Last Visit to PCP: 09/18/19 Mode of Transport at Discharge: Other (see comment)(daughter sulaiman to transport at DC) Transition of Care Consult (CM Consult): Discharge Planning Discharge Durable Medical Equipment: No(cane, rolling walker, shower chair) Physical Therapy Consult: No 
Occupational Therapy Consult: No 
Speech Therapy Consult: No 
Current Support Network: Own Home, Family Lives [place], Other(pt lives with son) Confirm Follow Up Transport: Family(family transport to all apts.) Plan discussed with Pt/Family/Caregiver: Yes(CM spoke to pt and family at bedside) Discharge Location Discharge Placement: Home with family assistance(pt declines home health at this time) CM to remain available for support as needed Cheryle Rilee. RN, BSN [facility] [phone]

## 2019-09-26 NOTE — ED NOTES
Orthostatics performed. Results are as follows Lying HR 73 /37 Sitting HR 88 BP 90/61 Standing HR 92 BP 74 systolic Patient denies dizziness at this time. Dr. Bjorn Robertson made aware.

## 2019-09-26 NOTE — ED NOTES
Assumed care of patient at this time. Patient reports \"just feeling bad\" since yesterday. He states he fell yesterday because he lost his footing d/t feeling unsteady. He says he normally uses a walker but is now too scared because he is so unsteady. He saw his cardiologist today for a follow up and was told to come here because he was pale and weak. Pt currently being treated for DVT with 3 blood thinners. He denies rectal bleeding or blood in the urine. Patient denies abdominal pain, headache, nausea, vomiting or diarrhea at this time. Family at bedside.  Pt on the monitor x3

## 2019-09-26 NOTE — ED PROVIDER NOTES
EMERGENCY DEPARTMENT HISTORY AND PHYSICAL EXAM 
     
 
Date: 9/26/2019 Patient Name: Carole Joshi History of Presenting Illness Chief Complaint Patient presents with  Dizziness  
  pt reports dizziness off and on for a couple of months, worse since last night, was seen by cardiology today and sent for blood work due to being pale History Provided By: Patient HPI: Carole Joshi is a 80 y.o. male, pmhx pretension, GERD, high cholesterol, A. fib on anticoagulant recently diagnosed with DVT and placed on Xarelto, who presents ambulatory with family to the ED c/o weakness Family notes that he is been doing well and feeling well up until this week. They note that over the weekend he was up walking laps to get some rehab and exercise. Yesterday he appeared fatigued and then when his family checked on him today he stated he was dizzy and falling. Patient states that when he got up in the middle the night to go to the bathroom that he fell and denies any head injury or loss of consciousness but states he just feels weak. Family notes that he looked pale today and they took him to his cardiologist for follow-up appointment recommended to come to the emergency room for further evaluation because his blood pressure was low on arrival in the office. Currently patient complains only of weakness and denies any head pain, neck pain, back pain, chest pain, abdominal pain, nausea and vomiting as well as any bloody stools. He also notes that he is only dizzy when he is walking around but he sitting still he does not feel symptoms. PCP: Kendall Valentino MD  
Cards: Sandy Magana Allergies: nkda Social Hx: Former tobacco, social EtOH, -Illicit Drugs There are no other complaints, changes, or physical findings at this time. Current Facility-Administered Medications Medication Dose Route Frequency Provider Last Rate Last Dose  sodium chloride 0.9 % bolus infusion 1,000 mL  1,000 mL IntraVENous ONCE Dolly Reagan MD      
 pantoprazole (PROTONIX) injection 40 mg  40 mg IntraVENous NOW Dolly Reagan MD      
 
Current Outpatient Medications Medication Sig Dispense Refill  temazepam (RESTORIL) 15 mg capsule Take two at bedtime. 60 Cap 5  
 rivaroxaban (XARELTO) 20 mg tab tablet Take 1 Tab by mouth daily (with breakfast). 30 Tab 2  
 amiodarone (CORDARONE) 200 mg tablet Take 1 Tab by mouth daily. 30 Tab prn  midodrine (PROAMITINE) 5 mg tablet Take 1 Tab by mouth three (3) times daily (with meals) for 30 days. 90 Tab 0  clopidogrel (PLAVIX) 75 mg tab Take 1 Tab by mouth daily. 90 Tab 3  
 aspirin 81 mg chewable tablet Take 81 mg by mouth daily.  tamsulosin (FLOMAX) 0.4 mg capsule TAKE 2 CAPSULES DAILY 180 Cap 3  
 levothyroxine (SYNTHROID) 137 mcg tablet Take 137 mcg by mouth Daily (before breakfast). 90 Tab 3  
 esomeprazole (NEXIUM) 40 mg capsule Take 1 Cap by mouth daily. 90 Cap 3  
 finasteride (PROSCAR) 5 mg tablet Take 1 Tab by mouth daily (after dinner). 90 Tab 3  
 simvastatin (ZOCOR) 40 mg tablet Take 1 Tab by mouth nightly. 90 Tab 3  
 albuterol (PROAIR HFA) 90 mcg/actuation inhaler Take 2 Puffs by inhalation every four (4) hours as needed for Wheezing or Shortness of Breath. 1 Inhaler 3 Past History Past Medical History: 
Past Medical History:  
Diagnosis Date  Arrhythmia A-fib  GERD (gastroesophageal reflux disease)  Hypertension  Other ill-defined conditions(799.89) BPH  Other ill-defined conditions(799.89) lipids  Other ill-defined conditions(799.89)   
 shingles hx  Thyroid disease  Unspecified adverse effect of anesthesia Past Surgical History: 
Past Surgical History:  
Procedure Laterality Date  HX CATARACT REMOVAL    
 HX HEART CATHETERIZATION    
 ablation  HX HEART CATHETERIZATION  07/18/2019 Stent  HX HEENT    
 scar tissue removed/laser  HX HERNIA REPAIR  2017 Wayne Womack MD  
 HX ORTHOPAEDIC    
 left foot fx--hardware  HX ORTHOPAEDIC  14 Right total knee arthroplasty  HX OTHER SURGICAL    
 prostate bx Family History: 
Family History Problem Relation Age of Onset  Heart Disease Mother  Hypertension Mother  Stroke Mother  Heart Disease Father  Cancer Paternal Grandmother Social History: 
Social History Tobacco Use  Smoking status: Former Smoker Packs/day: 0.50 Last attempt to quit: 3/28/1963 Years since quittin.8  Smokeless tobacco: Never Used Substance Use Topics  Alcohol use: Yes Comment: rare/social-beer  Drug use: Yes Types: Prescription, OTC Allergies: 
No Known Allergies Review of Systems Review of Systems Constitutional: Negative for activity change, appetite change, chills, fever and unexpected weight change. HENT: Negative for congestion. Eyes: Negative for pain and visual disturbance. Respiratory: Negative for cough and shortness of breath. Cardiovascular: Negative for chest pain. Gastrointestinal: Negative for abdominal pain, diarrhea, nausea and vomiting. Genitourinary: Negative for dysuria. Musculoskeletal: Negative for back pain. Skin: Negative for rash. Neurological: Positive for dizziness and weakness. Negative for headaches. Physical Exam  
Physical Exam  
Constitutional: He is oriented to person, place, and time. He appears well-developed and well-nourished. This is a generally weak appearing elderly male currently in mild to moderate distress with borderline low blood pressure at 96 systolic over 56 diastolic HENT:  
Head: Normocephalic and atraumatic. Mouth/Throat: Oropharynx is clear and moist.  
Eyes: Pupils are equal, round, and reactive to light. Conjunctivae and EOM are normal. Right eye exhibits no discharge. Left eye exhibits no discharge. Neck: Normal range of motion. Neck supple. Cardiovascular: Normal rate, regular rhythm and normal heart sounds. No murmur heard. Pulmonary/Chest: Effort normal and breath sounds normal. No respiratory distress. He has no wheezes. He has no rales. Abdominal: Soft. Bowel sounds are normal. He exhibits no distension. There is no tenderness. There is no rebound and no guarding. Genitourinary:  
Genitourinary Comments: Dark blood appearing stains noted around the rectum; vault full of stool that is dark in color but without any bright red blood Musculoskeletal: Normal range of motion. He exhibits no edema, tenderness or deformity. Neurological: He is alert and oriented to person, place, and time. No cranial nerve deficit. He exhibits normal muscle tone. Skin: Skin is warm and dry. No rash noted. He is not diaphoretic. Poor skin turgor Nursing note and vitals reviewed. Diagnostic Study Results Labs - Recent Results (from the past 12 hour(s)) EKG, 12 LEAD, INITIAL Collection Time: 09/26/19  2:50 PM  
Result Value Ref Range Ventricular Rate 74 BPM  
 Atrial Rate 74 BPM  
 P-R Interval 204 ms QRS Duration 96 ms  
 Q-T Interval 398 ms QTC Calculation (Bezet) 441 ms Calculated P Axis 49 degrees Calculated R Axis 43 degrees Calculated T Axis 30 degrees Diagnosis ** Poor data quality, interpretation may be adversely affected Normal sinus rhythm Nonspecific ST abnormality When compared with ECG of 28-AUG-2019 13:25, Sinus rhythm has replaced Atrial fibrillation CBC WITH AUTOMATED DIFF Collection Time: 09/26/19  3:02 PM  
Result Value Ref Range WBC 7.1 4.1 - 11.1 K/uL  
 RBC 2.93 (L) 4.10 - 5.70 M/uL HGB 8.4 (L) 12.1 - 17.0 g/dL HCT 26.3 (L) 36.6 - 50.3 % MCV 89.8 80.0 - 99.0 FL  
 MCH 28.7 26.0 - 34.0 PG  
 MCHC 31.9 30.0 - 36.5 g/dL  
 RDW 16.7 (H) 11.5 - 14.5 % PLATELET 808 023 - 876 K/uL MPV 10.6 8.9 - 12.9 FL  
 NRBC 0.0 0  WBC ABSOLUTE NRBC 0.00 0.00 - 0.01 K/uL NEUTROPHILS 78 (H) 32 - 75 % LYMPHOCYTES 10 (L) 12 - 49 % MONOCYTES 12 5 - 13 % EOSINOPHILS 0 0 - 7 % BASOPHILS 0 0 - 1 % IMMATURE GRANULOCYTES 0 0.0 - 0.5 % ABS. NEUTROPHILS 5.5 1.8 - 8.0 K/UL  
 ABS. LYMPHOCYTES 0.7 (L) 0.8 - 3.5 K/UL  
 ABS. MONOCYTES 0.9 0.0 - 1.0 K/UL  
 ABS. EOSINOPHILS 0.0 0.0 - 0.4 K/UL  
 ABS. BASOPHILS 0.0 0.0 - 0.1 K/UL  
 ABS. IMM. GRANS. 0.0 0.00 - 0.04 K/UL  
 DF AUTOMATED    
 RBC COMMENTS ANISOCYTOSIS 1+ 
    
 RBC COMMENTS POLYCHROMASIA PRESENT 
    
METABOLIC PANEL, COMPREHENSIVE Collection Time: 09/26/19  3:02 PM  
Result Value Ref Range Sodium 136 136 - 145 mmol/L Potassium 4.3 3.5 - 5.1 mmol/L Chloride 106 97 - 108 mmol/L  
 CO2 24 21 - 32 mmol/L Anion gap 6 5 - 15 mmol/L Glucose 111 (H) 65 - 100 mg/dL BUN 33 (H) 6 - 20 MG/DL Creatinine 1.79 (H) 0.70 - 1.30 MG/DL  
 BUN/Creatinine ratio 18 12 - 20 GFR est AA 44 (L) >60 ml/min/1.73m2 GFR est non-AA 36 (L) >60 ml/min/1.73m2 Calcium 8.9 8.5 - 10.1 MG/DL Bilirubin, total 0.6 0.2 - 1.0 MG/DL  
 ALT (SGPT) 16 12 - 78 U/L  
 AST (SGOT) 17 15 - 37 U/L Alk. phosphatase 80 45 - 117 U/L Protein, total 6.5 6.4 - 8.2 g/dL Albumin 2.8 (L) 3.5 - 5.0 g/dL Globulin 3.7 2.0 - 4.0 g/dL A-G Ratio 0.8 (L) 1.1 - 2.2 NT-PRO BNP Collection Time: 09/26/19  3:02 PM  
Result Value Ref Range NT pro-BNP 4,368 (H) <450 PG/ML  
TROPONIN I Collection Time: 09/26/19  3:02 PM  
Result Value Ref Range Troponin-I, Qt. <0.05 <0.05 ng/mL SAMPLES BEING HELD Collection Time: 09/26/19  3:03 PM  
Result Value Ref Range SAMPLES BEING HELD  1 ZE   
 COMMENT Add-on orders for these samples will be processed based on acceptable specimen integrity and analyte stability, which may vary by analyte. URINALYSIS W/ RFLX MICROSCOPIC Collection Time: 09/26/19  4:17 PM  
Result Value Ref Range Color YELLOW/STRAW Appearance CLEAR CLEAR Specific gravity 1.018 1.003 - 1.030    
 pH (UA) 5.5 5.0 - 8.0 Protein NEGATIVE  NEG mg/dL Glucose NEGATIVE  NEG mg/dL Ketone NEGATIVE  NEG mg/dL Bilirubin NEGATIVE  NEG Blood NEGATIVE  NEG Urobilinogen 1.0 0.2 - 1.0 EU/dL Nitrites NEGATIVE  NEG Leukocyte Esterase NEGATIVE  NEG Radiologic Studies -  
CT HEAD WO CONT Final Result IMPRESSION:   
1. No acute intracranial abnormality. 2. Microvascular ischemic and other stable age-related change. XR CHEST PORT Final Result IMPRESSION:   
1. No evidence of an acute cardiopulmonary process. CT Results  (Last 48 hours) 09/26/19 1557  CT HEAD WO CONT Final result Impression:  IMPRESSION:   
1. No acute intracranial abnormality. 2. Microvascular ischemic and other stable age-related change. Narrative:  EXAM: CT HEAD WO CONT INDICATION: fall on blood thinners. Dizziness for 2 months worse last night. COMPARISON: 4/3/2018. CONTRAST: None. TECHNIQUE: Unenhanced CT of the head was performed using 5 mm images. Brain and  
bone windows were generated. CT dose reduction was achieved through use of a  
standardized protocol tailored for this examination and automatic exposure  
control for dose modulation. FINDINGS:  
Generalized prominence of cerebral sulci and ventricles is increased but stable  
and commensurate with age. . Periventricular white matter low-density is stable. Kota Ravinder There is no intracranial hemorrhage, extra-axial collection, mass, mass effect  
or midline shift. The basilar cisterns are open. No acute infarct is  
identified. The bone windows demonstrate no abnormalities. The visualized  
portions of the paranasal sinuses and mastoid air cells are clear. CXR Results  (Last 48 hours) 09/26/19 1543  XR CHEST PORT Final result  Impression:  IMPRESSION:   
 1.  No evidence of an acute cardiopulmonary process. Narrative:  EXAM:  XR CHEST PORT INDICATION: hypotension, weakness COMPARISON: 8/29/2019. TECHNIQUE: Single frontal view of the chest.  
   
FINDINGS: No lobar consolidation, pleural effusion, or pneumothorax. The  
cardiomediastinal silhouette measures within normal limits for technique. Atherosclerotic calcifications of the aorta. No acute or aggressive osseous  
lesion. Medical Decision Making I am the first provider for this patient. I reviewed the vital signs, available nursing notes, past medical history, past surgical history, family history and social history. Vital Signs-Reviewed the patient's vital signs. Patient Vitals for the past 12 hrs: 
 Temp Pulse Resp BP SpO2  
09/26/19 1442 98.8 °F (37.1 °C) 76 16 104/43 100 % Pulse Oximetry Analysis - 100% on RA Cardiac Monitor:  
Rate: 76bpm 
Rhythm: Normal Sinus Rhythm Records Reviewed: Nursing Notes, Old Medical Records, Previous Radiology Studies and Previous Laboratory Studies Provider Notes (Medical Decision Making): MDM: Elderly male with hypotension and orthostasis presenting with weakness and fall. His neurologic exam is nonfocal with low suspicion for posterior stroke causing his symptoms. He appears volume depleted as likely cause. Await results for complete evaluation; with IV fluids and monitoring until results of been obtained. ED Course:  
Initial assessment performed. The patients presenting problems have been discussed, and they are in agreement with the care plan formulated and outlined with them. I have encouraged them to ask questions as they arise throughout their visit. EKG interpretation: (Preliminary) Rhythm: Normal sinus rhythm at a rate of 74; Axis normal; QRS interval normal; AK interval normal; nonspecific ST abnormalities PROGRESS NOTE: 
4:30 PM 
 Pt appears unchanged and is sleeping. Blood pressure currently 104/43. IV fluids have yet to be started. Given his drop in hemoglobin a rectal was completed and he does have a dark stool noted with some old blood appearing around the rectal vault. Will initiate Protonix and admit for further GI evaluation. CONSULT NOTE:  
4:48 PM 
Karly Rodriguez MD spoke with Dr Kishan Dunlap Specialty: Hospitalist 
Discussed pt's hx, disposition, and available diagnostic and imaging results. Reviewed care plans. Consultant agrees with plans as outlined. He will evaluate the pt for admission. 6:10 PM 
Discussed case again with hospitalist Dr. Kishan Dunlap. He requests to go ahead and start transfusion of the patient at this time given continued hypotension. Blood has been ordered and will be given as soon as possible. Critical Care Time: CRITICAL CARE NOTE : 
6:30 PM 
IMPENDING DETERIORATION -Airway, Respiratory, Cardiovascular, CNS, Metabolic, Renal and Hepatic ASSOCIATED RISK FACTORS - Hypotension, Shock, Hypoxia, Dysrhythmia, Metabolic changes and Dehydration MANAGEMENT- Bedside Assessment and Supervision of Care INTERPRETATION -  Blood Pressure INTERVENTIONS - hemodynamic mngmt, Metobolic interventions and transfusion CASE REVIEW - Hospitalist, Nursing and Family TREATMENT RESPONSE -Stable PERFORMED BY - Self NOTES   : 
I have spent 45 minutes of critical care time involved in lab review, consultations with specialist, family decision- making, bedside attention and documentation. During this entire length of time I was immediately available to the patient. Freddie Kumar. MD Tez 
 
 
 
Diagnosis Clinical Impression: 1. Orthostatic hypotension 2. Weakness 3. Anemia, unspecified type PLAN: 
Admission to the internal medicine service for further care and evaluation Please note, this dictation was completed with Pantry, the FRUCT voice recognition software. Quite often unanticipated grammatical, syntax, homophones, and other interpretive errors are inadvertently transcribed by the computer software. Please disregard these errors. Please excuse any errors that have escaped final proof reading.

## 2019-09-27 NOTE — PROGRESS NOTES
Problem: Falls - Risk of 
Goal: *Absence of Falls Description Document Ivan Carrel Fall Risk and appropriate interventions in the flowsheet. Outcome: Progressing Towards Goal 
Note:  
Fall Risk Interventions: 
  
 
  
 
Medication Interventions: Bed/chair exit alarm, Patient to call before getting OOB, Teach patient to arise slowly Elimination Interventions: Bed/chair exit alarm, Call light in reach, Urinal in reach History of Falls Interventions: Bed/chair exit alarm, Investigate reason for fall Problem: Patient Education: Go to Patient Education Activity Goal: Patient/Family Education Outcome: Progressing Towards Goal 
  
Problem: Anemia Care Plan (Adult and Pediatric) Goal: *Labs within defined limits Outcome: Progressing Towards Goal 
Goal: *Tolerates increased activity Outcome: Progressing Towards Goal

## 2019-09-27 NOTE — PROGRESS NOTES
Bedside shift change report given to Shira Hernandez RN (oncoming nurse) by Kimberly Armas RN (offgoing nurse). Report included the following information SBAR, Kardex, MAR and Recent Results.

## 2019-09-27 NOTE — PROCEDURES
Esophagogastroduodenoscopy Procedure Note Sreedhar Grace 
3/13/1931 
[id number] Indication: GI blood loss anemia on anticoagulation Endoscopist: Muriel Hendrickson MD 
 
Referring Provider:  Kal Yu MD 
 
Sedation:  MAC anesthesia Propofol Procedure Details: After infomed consent was obtained for the procedure, with all risks and benefits of procedure explained the patient was taken to the endoscopy suite and placed in the left lateral decubitus position. Following sequential administration of sedation as per above, the endoscope was inserted into the mouth and advanced under direct vision to second portion of the duodenum. A careful inspection was made as the gastroscope was withdrawn, including a retroflexed view of the proximal stomach; findings and interventions are described below. Findings:  
 
Esophagus: - Normal esophageal mucosa. - Z line normal at 40 cm from incisors. Stomach: + There was a single gastric spot that appeared to be a possible AVM, nonbleeding. We elected to place a single hemoclip on site. No bleeding encountered. Remainder of stomach with normal mucosa. Duodenum:  
- Normal mucosa to 2nd portion. Therapies:  See above Specimen: none Specimens were collected as described and send to the laboratory. Complications:   None were encountered during the procedure. EBL:  None. Recommendations:  
-Would proceed with colonoscopy after cessation of plavix for 4-5 days 
-advance diet 
-serial H/H Muriel Hendrickson MD 
9/27/2019  10:46 AM

## 2019-09-27 NOTE — PROGRESS NOTES
TRANSFER - OUT REPORT: 
 
Verbal report given to Evon(name) on Roxanna Nguyen  being transferred to [facility](unit) for routine progression of care Report consisted of patients Situation, Background, Assessment and  
Recommendations(SBAR). Information from the following report(s) SBAR, Procedure Summary and Recent Results was reviewed with the receiving nurse. Lines:  
Peripheral IV 09/26/19 Left Antecubital (Active) Site Assessment Clean, dry, & intact 9/26/2019  9:29 PM  
Phlebitis Assessment 0 9/26/2019  9:29 PM  
Infiltration Assessment 0 9/26/2019  9:29 PM  
Dressing Status Clean, dry, & intact 9/26/2019  9:29 PM  
Dressing Type Transparent 9/26/2019  9:29 PM  
Hub Color/Line Status Green; Infusing 9/26/2019  9:29 PM  
Action Taken Blood drawn 9/26/2019  2:58 PM  
   
Peripheral IV 09/26/19 Right Antecubital (Active) Site Assessment Clean, dry, & intact 9/26/2019  9:29 PM  
Phlebitis Assessment 0 9/26/2019  9:29 PM  
Infiltration Assessment 0 9/26/2019  9:29 PM  
Dressing Status Clean, dry, & intact 9/26/2019  9:29 PM  
Dressing Type Transparent 9/26/2019  9:29 PM  
Hub Color/Line Status Pink;Flushed 9/26/2019  9:29 PM  
Alcohol Cap Used Yes 9/26/2019  9:29 PM  
  
 
Opportunity for questions and clarification was provided.

## 2019-09-27 NOTE — PROGRESS NOTES
Liz Dallas paged from [phone].  stated she would page her the information. Dr. Mary Kate Webb is on vacation this is whom they stated was taking his patients this week. Supervisor Patricia notified of situation she stated to call back as office would be closing soon. This writer called office back and spoke to [place] who said she had paged Dr. Liz Dallas as per directions. More[place]

## 2019-09-27 NOTE — PROGRESS NOTES
Bedside and Verbal shift change report given to Ozzy Kee (oncoming nurse) by Latrice Triana RN (offgoing nurse). Report included the following information SBAR, Kardex, Intake/Output, MAR, Recent Results and Med Rec Status

## 2019-09-27 NOTE — PROGRESS NOTES
INTERNAL MEDICINE ATTENDING NOTE 
 
S: Mr. Karla Ernst was seen by me today during rounds. At this time, he is resting comfortably. The patient has no new complaints today. ROS otherwise unremarkable except as noted elsewhere. O: Blood pressure 135/55, pulse 78, temperature 99.7 °F (37.6 °C), resp. rate 18, height 5' 9\" (1.753 m), weight 182 lb (82.6 kg), SpO2 96 %. Gen: Patient is a pale WM in no acute distress. Lungs: CTAB. Heart: RRR. Abd: S, NT, ND, BS present. Extremities: Warm. Recent Results (from the past 12 hour(s)) HGB & HCT Collection Time: 09/27/19  2:33 AM  
Result Value Ref Range HGB 8.2 (L) 12.1 - 17.0 g/dL HCT 26.2 (L) 36.6 - 50.3 % A / P: 
1. Symptomatic anemia (9/26/2019): s/p transfusion. GI consulted and EGD planned. 2. L LE DVT: On xarelto--hold for now. Resume when able. 3. HTN: BP okay. Continue meds. 4. Hypothyroidism: Continue usual meds. 5. DNR Stephanie Sanchez MD, David Sandhu Best contact is via Pager: [phone], or via hospital  at [phone]

## 2019-09-27 NOTE — ANESTHESIA POSTPROCEDURE EVALUATION
Procedure(s): ESOPHAGOGASTRODUODENOSCOPY (EGD) RESOLUTION CLIP. total IV anesthesia Anesthesia Post Evaluation Patient location during evaluation: PACU Note status: Adequate. Level of consciousness: responsive to verbal stimuli and sleepy but conscious Pain management: satisfactory to patient Airway patency: patent Anesthetic complications: no 
Cardiovascular status: acceptable Respiratory status: acceptable Hydration status: acceptable Comments: +Post-Anesthesia Evaluation and Assessment Patient: Malia Rivera MRN: [id number]  SSN: [id number] YOB: 1931  Age: 80 y.o. Sex: male Cardiovascular Function/Vital Signs /49 (BP 1 Location: Right arm, BP Patient Position: At rest)   Pulse 70   Temp 37.1 °C (98.8 °F)   Resp 20   Ht 5' 9\" (1.753 m)   Wt 82.6 kg (181 lb 15.8 oz)   SpO2 93%   BMI 26.88 kg/m² Patient is status post Procedure(s): ESOPHAGOGASTRODUODENOSCOPY (EGD) RESOLUTION CLIP. Nausea/Vomiting: Controlled. Postoperative hydration reviewed and adequate. Pain: 
Pain Scale 1: Visual (09/27/19 1114) Pain Intensity 1: 0 (09/27/19 1114) Managed. Neurological Status: At baseline. Mental Status and Level of Consciousness: Arousable. Pulmonary Status:  
O2 Device: Room air (09/27/19 1114) Adequate oxygenation and airway patent. Complications related to anesthesia: None Post-anesthesia assessment completed. No concerns. Signed By: Debbie Alonso DO  
 9/27/2019 Post anesthesia nausea and vomiting:  controlled Vitals Value Taken Time /49 9/27/2019 11:46 AM  
Temp 37.1 °C (98.8 °F) 9/27/2019 11:46 AM  
Pulse 70 9/27/2019 11:46 AM  
Resp 20 9/27/2019 11:46 AM  
SpO2 93 % 9/27/2019 11:46 AM

## 2019-09-27 NOTE — CONSULTS
Gastroenterology Consultation Note Dr. Severiano Clos NAME: Kellen Vincent : 3/13/1931 MRN: [id number] PCP: Daniela Hankins MD 
Date/Time:  2019 10:09 AM 
Subjective:  
REASON FOR CONSULT:     
Luzma Abdul is a 80 y.o.  male who I was asked to see for anemia and dark stools. Patient brought to ER yesterday after appt at cardiologist.  He is on Plavix for h/o CAD s/p stent in July and then recent LLE DVT started on xarelto a week ago. In ER Hgb 8.4 down from 11.9 baseline a week ago. MARLO showed black stool. No hematemesis. NO recent EGD/Colonoscopy/GI bleed. He is on ASA/PLAVIX/Xarelto. No abdominal pain. He had a CT head for a fall at home that was negative. Current Hgb 8.2 this am. 
 
 
Past Medical History:  
Diagnosis Date  Arrhythmia A-fib  GERD (gastroesophageal reflux disease)  Hypertension  Other ill-defined conditions(799.89) BPH  Other ill-defined conditions(799.89) lipids  Other ill-defined conditions(799.89)   
 shingles hx  Thyroid disease  Unspecified adverse effect of anesthesia Past Surgical History:  
Procedure Laterality Date  HX CATARACT REMOVAL    
 HX HEART CATHETERIZATION    
 ablation  HX HEART CATHETERIZATION  2019 Stent  HX HEENT    
 scar tissue removed/laser  HX HERNIA REPAIR  2017 Khang Womack MD  
 HX ORTHOPAEDIC    
 left foot fx--hardware  HX ORTHOPAEDIC  14 Right total knee arthroplasty  HX OTHER SURGICAL    
 prostate bx Social History Tobacco Use  Smoking status: Former Smoker Packs/day: 0.50 Last attempt to quit: 3/28/1963 Years since quittin.8  Smokeless tobacco: Never Used Substance Use Topics  Alcohol use: Yes Comment: rare/social-beer Family History Problem Relation Age of Onset  Heart Disease Mother  Hypertension Mother  Stroke Mother  Heart Disease Father  Cancer Paternal Grandmother No Known Allergies Home Medications: 
Prior to Admission Medications Prescriptions Last Dose Informant Patient Reported? Taking? albuterol (PROAIR HFA) 90 mcg/actuation inhaler   No No  
Sig: Take 2 Puffs by inhalation every four (4) hours as needed for Wheezing or Shortness of Breath. amiodarone (CORDARONE) 200 mg tablet 9/25/2019 at Unknown time  No Yes Sig: Take 1 Tab by mouth daily. aspirin 81 mg chewable tablet 9/25/2019 at Unknown time  Yes Yes Sig: Take 81 mg by mouth daily. clopidogrel (PLAVIX) 75 mg tab 9/25/2019 at Unknown time  No Yes Sig: Take 1 Tab by mouth daily. esomeprazole (NEXIUM) 40 mg capsule 9/25/2019 at Unknown time  No Yes Sig: Take 1 Cap by mouth daily. finasteride (PROSCAR) 5 mg tablet 9/25/2019 at Unknown time  No Yes Sig: Take 1 Tab by mouth daily (after dinner). levothyroxine (SYNTHROID) 137 mcg tablet 9/25/2019 at Unknown time  No Yes Sig: Take 137 mcg by mouth Daily (before breakfast). midodrine (PROAMITINE) 5 mg tablet 9/25/2019 at Unknown time  No Yes Sig: Take 1 Tab by mouth three (3) times daily (with meals) for 30 days. rivaroxaban (XARELTO) 20 mg tab tablet 9/25/2019 at Unknown time  No Yes Sig: Take 1 Tab by mouth daily (with breakfast). simvastatin (ZOCOR) 40 mg tablet 9/25/2019 at Unknown time  No Yes Sig: Take 1 Tab by mouth nightly. tamsulosin (FLOMAX) 0.4 mg capsule 9/25/2019 at Unknown time  No Yes Sig: TAKE 2 CAPSULES DAILY  
temazepam (RESTORIL) 15 mg capsule 9/25/2019 at Unknown time  No Yes Sig: Take two at bedtime. Facility-Administered Medications: None Hospital medications: 
Current Facility-Administered Medications Medication Dose Route Frequency  pantoprazole (PROTONIX) 40 mg in sodium chloride 0.9% 10 mL injection  40 mg IntraVENous Q12H  
 amiodarone (CORDARONE) tablet 200 mg  200 mg Oral DAILY  aspirin chewable tablet 81 mg  81 mg Oral DAILY  clopidogrel (PLAVIX) tablet 75 mg  75 mg Oral DAILY  finasteride (PROSCAR) tablet 5 mg  5 mg Oral PCD  levothyroxine (SYNTHROID) tablet 137 mcg  137 mcg Oral ACB  midodrine (PROAMITINE) tablet 5 mg  5 mg Oral TID WITH MEALS  simvastatin (ZOCOR) tablet 40 mg  40 mg Oral QHS  tamsulosin (FLOMAX) capsule 0.4 mg  0.4 mg Oral DAILY  temazepam (RESTORIL) capsule 30 mg  30 mg Oral QHS  sodium chloride (NS) flush 5-40 mL  5-40 mL IntraVENous Q8H  
 sodium chloride (NS) flush 5-40 mL  5-40 mL IntraVENous PRN  
 ondansetron (ZOFRAN) injection 4 mg  4 mg IntraVENous Q4H PRN  
 0.9% sodium chloride infusion 250 mL  250 mL IntraVENous PRN  
 acetaminophen (TYLENOL) tablet 650 mg  650 mg Oral Q4H PRN  
 
REVIEW OF SYSTEMS:   
Review of Systems - History obtained from chart review and the patient General ROS: positive for  - fatigue Psychological ROS: negative Hematological and Lymphatic ROS: negative for - bleeding problems or blood clots Respiratory ROS: no cough, shortness of breath, or wheezing Cardiovascular ROS: no chest pain or dyspnea on exertion Gastrointestinal ROS: see HPI Genito-Urinary ROS: no dysuria, trouble voiding, or hematuria Musculoskeletal ROS: negative Neurological ROS: no TIA or stroke symptoms Dermatological ROS: negative Objective: VITALS:   
Visit Vitals /55 (BP 1 Location: Right arm, BP Patient Position: At rest) Pulse 78 Temp 99.7 °F (37.6 °C) Resp 18 Ht 5' 9\" (1.753 m) Wt 82.6 kg (182 lb) SpO2 96% BMI 26.88 kg/m² Temp (24hrs), Av.3 °F (37.4 °C), Min:98.1 °F (36.7 °C), Max:99.7 °F (37.6 °C) PHYSICAL EXAM:  
General:    Elderly WM with some confusion but oriented x 2, pallor + Head:   Normocephalic, without obvious abnormality, atraumatic. Eyes:   Conjunctivae clear, anicteric sclerae. Pupils are equal 
Nose:  Nares normal. No drainage or sinus tenderness. Throat:    Lips, mucosa, and tongue normal.  No Thrush Neck:  Supple, symmetrical,  no adenopathy, thyroid: non tender Back:    Symmetric,  No CVA tenderness. Lungs:   CTA bilaterally. No wheezing/rhonchi/rales. Heart:   Regular rate and rhythm,  no murmur, rub or gallop. Abdomen:   Soft, non-tender. Not distended. Bowel sounds normal. No masses. No hepatosplenomegaly. No shifting dullness. Rectal:  Per ER exam:Dark blood appearing stains noted around the rectum; vault full of stool that is dark in color but without any bright red blood Extremities: No cyanosis. No edema. No clubbing Skin:     Texture, turgor normal. No rashes/lesions/jaundice Lymph: Cervical, supraclavicular normal. 
Psych:  Not depressed. Not anxious or agitated. Neurologic: No facial asymmetry. No aphasia or slurred speech normal strength, A/O X 3. LAB DATA REVIEWED:   
Lab Results Component Value Date/Time WBC 7.1 09/26/2019 03:02 PM  
 Hemoglobin (POC) 13.9 05/05/2017 07:15 AM  
 HGB (POC) 13.9 09/12/2018 01:45 PM  
 HGB 8.2 (L) 09/27/2019 02:33 AM  
 Hematocrit (POC) 41 05/05/2017 07:15 AM  
 HCT (POC) 42 09/12/2018 01:45 PM  
 HCT 26.2 (L) 09/27/2019 02:33 AM  
 PLATELET 287 75/56/0693 03:02 PM  
 MCV 89.8 09/26/2019 03:02 PM  
 
Lab Results Component Value Date/Time ALT (SGPT) 16 09/26/2019 03:02 PM  
 AST (SGOT) 17 09/26/2019 03:02 PM  
 Alk. phosphatase 80 09/26/2019 03:02 PM  
 Bilirubin, total 0.6 09/26/2019 03:02 PM  
 
Lab Results Component Value Date/Time Sodium 136 09/26/2019 03:02 PM  
 Potassium 4.3 09/26/2019 03:02 PM  
 Chloride 106 09/26/2019 03:02 PM  
 CO2 24 09/26/2019 03:02 PM  
 Anion gap 6 09/26/2019 03:02 PM  
 Glucose 111 (H) 09/26/2019 03:02 PM  
 BUN 33 (H) 09/26/2019 03:02 PM  
 Creatinine 1.79 (H) 09/26/2019 03:02 PM  
 BUN/Creatinine ratio 18 09/26/2019 03:02 PM  
 GFR est AA 44 (L) 09/26/2019 03:02 PM  
 GFR est non-AA 36 (L) 09/26/2019 03:02 PM  
 Calcium 8.9 09/26/2019 03:02 PM  
 
No results found for: LPSE Lab Results Component Value Date/Time INR 1.1 09/26/2019 05:10 PM  
 INR 1.1 07/18/2019 05:22 AM  
 INR 1.2 (H) 07/17/2019 03:19 PM  
 Prothrombin time 11.7 (H) 09/26/2019 05:10 PM  
 Prothrombin time 11.4 (H) 07/18/2019 05:22 AM  
 Prothrombin time 12.4 (H) 07/17/2019 03:19 PM  
 
 
Impression: 
Melena Acute GI blood loss anemia CAD s/p stent placement in 7/2019 A flutter s/p ablation Chronic anticoagulatio non ASA/Plavix/Xarelto LLE DVT Plan: Pt presents with acute GI bleeding with melena when his xarelto was added to his ASA/plavix and likely precipitated a GI bleed. UGI bleeding from esophagitis, AVM, PUD possible and will proceed with EGD. Thereafter consider eventual colonoscopy for evalution of LGI bleeding. His Xarelto on hold for procedures. ___________________________________________________ Care Plan discussed with: 
  [x]    Patient   []    Family   [x]    Nursing   [x]    Attending 
 ___________________________________________________ GI: Tricia Dai MD

## 2019-09-27 NOTE — ANESTHESIA PREPROCEDURE EVALUATION
Anesthetic History No history of anesthetic complications Review of Systems / Medical History Patient summary reviewed, nursing notes reviewed and pertinent labs reviewed Pulmonary COPD Comments: Former smoker, quit in 1963 Neuro/Psych Within defined limits Cardiovascular Hypertension: well controlled Dysrhythmias : atrial fibrillation CAD, cardiac stents and hyperlipidemia Exercise tolerance: <4 METS Comments: S/p ablation of A. Fib 1st degree AVB Hx of  impella left femoral artery in 7-2019, removed 9-26-19 EKG:  
 
9-27-19 ECHO:60% EF, mild AS, mild Pulmonary HTN  
GI/Hepatic/Renal 
  
GERD: well controlled Renal disease (Stage 3 CKD) Comments: Melena, hgb 8.2 Endo/Other Hypothyroidism: well controlled Obesity, arthritis and anemia Other Findings Comments: Impella left femoral artery BPH 
hx shingles Physical Exam 
 
Airway Mallampati: II 
TM Distance: > 6 cm Neck ROM: normal range of motion Mouth opening: Normal 
 
 Cardiovascular Regular rate and rhythm,  S1 and S2 normal,  no murmur, click, rub, or gallop Dental 
 
Dentition: Lower partial plate and Full upper dentures Pulmonary Breath sounds clear to auscultation Abdominal 
GI exam deferred Other Findings Anesthetic Plan ASA: 3 Anesthesia type: total IV anesthesia Induction: Intravenous Anesthetic plan and risks discussed with: Patient Anesthetic History No history of anesthetic complications Review of Systems / Medical History Patient summary reviewed, nursing notes reviewed and pertinent labs reviewed Pulmonary Within defined limits Neuro/Psych Within defined limits Cardiovascular Hypertension: well controlled Dysrhythmias GI/Hepatic/Renal 
  
GERD: well controlled Endo/Other Hypothyroidism: well controlled Other Findings Physical Exam 
 
Airway Mallampati: II 
TM Distance: > 6 cm Neck ROM: normal range of motion Mouth opening: Normal 
 
 Cardiovascular Regular rate and rhythm,  S1 and S2 normal,  no murmur, click, rub, or gallop Dental 
 
Dentition: Lower partial plate and Upper partial plate Pulmonary Breath sounds clear to auscultation Abdominal 
GI exam deferred Other Findings Anesthetic Plan ASA: 2 Anesthesia type: general 
 
 
 
 
Induction: Intravenous Anesthetic plan and risks discussed with: Patient

## 2019-09-27 NOTE — PERIOP NOTES
[facility] 
3/13/1931 
[id number] Situation: 
Verbal report received from: Evon santizo 
Procedure: Procedure(s): ESOPHAGOGASTRODUODENOSCOPY (EGD) RESOLUTION CLIP Background: 
 
Preoperative diagnosis: melena Postoperative diagnosis: duodenitis, AVM :  Dr. Lokesh Martins Assistant(s): Endoscopy Technician-1: Garfield Galvan Endoscopy RN-1: Yecenia Ramos RN Endoscopy RN-2: Marilia Peck RN Specimens: * No specimens in log * H. Pylori  no Assessment: 
Intra-procedure medications Anesthesia gave intra-procedure sedation and medications, see anesthesia flow sheet yes Intravenous fluids: NS@ Thora Haagensen Vital signs stable Abdominal assessment: round and soft Recommendati. Return to floor Family or Friend n/a Permission to share finding with family or friend yes and n/a

## 2019-09-28 NOTE — PROGRESS NOTES
1000: Cardiology consult called. Dr Anali Concepcion on call. Pt c/o some shortness of breath. States he takes albuterol inhaler at home as needed, pts son states it was prescribed for possible COPD, but never true diagnosis. Pt also wondering if he can be on regular diet now instead of GI lite. Call made out to dr Marvin Yuan office. Did not receive page back. Call made out to [facility], to ask about diet and pts hgb of 7.7, as well as pt co SOB. MD on call did call back. Stated pt could be on regular diet, and to recheck h&h at 1330, if hgb is <7.0 then pt would need transfusion. Pts oxygen 93% on room air. Placed pt on 2L NC for comfort at this time. Pt has been feeling weak throughout the day, pt slept through most of the day, but was awake and sitting up in chair for all meals. States he is no longer short of breath. Pt BP on lower side. Dr Anali Concepcion aware of pt being orthostatic, will continue midodrine and continue to monitor. 1930: Bedside and Verbal shift change report given to Terry Melendez RN (oncoming nurse) by Franklyn Rosen (offgoing nurse). Report given with SBAR, Kardex, Intake/Output, MAR and Recent Results.

## 2019-09-28 NOTE — PROGRESS NOTES
Bedside and Verbal shift change report given to [address] (oncoming nurse) by Tone Mojica RN (offgoing nurse). Report included the following information SBAR, Kardex, Intake/Output, MAR, Accordion, Recent Results and Med Rec Status.

## 2019-09-28 NOTE — CONSULTS
Cardiology Consult Note CC:  Fran Templeton MD 
Reason for consult:  CAD  Requesting MD:  Dr. Lam Lino Admit Date: 9/26/2019   Today's Date: 9/28/2019 Cardiologist:  Dr Monalisa Horton. 
    
 Cardiac Assessment/Plan:  
- Cath 7/2019 with Stella Crowell supported PCI of LM into LAD with balloon side hole to LCx,  
 *cath 8/29/2019 with patent LM stent. On ASA/plavix. - Recent echo with normal EF, mild to mod AS 
- Prox afib, currently in sinus; Amiodarone started recently; felt to be high risk for triple anticoagulation - Hx of aflutter ablation 
- HTN: Resolved: - Low BPs more recently; intolerant of bblockers in past; dilt stopped earlier this month; Low dose midodrine added - Dyslipidemia: on statin - Hypothyroid - GERD 
- BPH (chronic proscar and flomax) - Distant tobacco 
 
+ U/S for DVT 9/18/19; AC started; Noted to have fatigue/weakness at recent f/u visit: found to have anemia. Rec: He should remain on plavix for LM stenting; If able from a GI standpoint, he should be on anticoagulation for recent DVT;  
if he cant be on [facility], would consider a IVC filter if appropriate. If AC is started, would stop asa and continue plavix. He should remain in amio for his PAFib (none seen so far). He should remain on midodrine for his orthostatic Sx. Hospital Problem List: 
Active Problems: 
  Symptomatic anemia (9/26/2019) 
 
  
____________________________________________________________________ Desmond Nolasco is a 80 y.o. male presents with Symptomatic anemia [D64.9]. As noted in H&P: \"88 y.o.  male who presents with CC listed above. Pt has had multiple admissions in the past 3 months. Family states that he was diagnosed with a DVT last week and started on Xarelto. Family also state that the pt had a \"good weekend\" but starting on Monday he started to develop the symptoms noted above.  Patient states that when he got up in the middle the night to go to the bathroom that he fell and denies any head injury or loss of consciousness but states he just feels weak.  Family notes that he looked pale today and they took him to his cardiologist for follow-up appointment recommended to come to the emergency room for further evaluation because his blood pressure was low on arrival in the office. Pt denies seeing any blood in his stool, and denies noticing any change in color. Pt currently denies any CP or SOB. He does endorse dizziness\" 
 
______________________________________________________________________ The patient reports dyspnea; no CP. No PND, orthopnea, palpitations, syncope, peripheral edema. No current complaints. EC/26: NSR Tele: sinus CXR: \"No evidence of an acute cardiopulmonary process\" Notable labs: Hg 7.7 from 8.4 from 11.9. Cr 1.71 from 1.8 from 1.3. TSH 0.07 2019. Nl troponin; proBNP 4k. He remains on ASA/plavix. +orthostatics per nursing. 
_____________________________________________________________________ Patient Active Problem List  
 Diagnosis Date Noted  Symptomatic anemia 2019  Edema 2019  Peripheral vascular disease ([facility] 2019  Acute deep vein thrombosis (DVT) of distal vein of left lower extremity ([facility] 2019  Nonrheumatic aortic valve stenosis 2019  Weakness generalized 2019  Anemia 2019  Acute bilateral low back pain with bilateral sciatica 2019  Primary osteoarthritis of right hip 02/15/2019  Class 1 obesity due to excess calories without serious comorbidity with body mass index (BMI) of 30.0 to 30.9 in adult 2018  Frequent falls 2017  Medicare annual wellness visit, subsequent 2017  Primary osteoarthritis involving multiple joints 2017  Cancer of the skin, basal cell 2017  Elevated prostate specific antigen (PSA) 2017  Hyperkalemia 2017  Shingles 08/21/2017  Acquired hypothyroidism 08/21/2017  Hypertension with renal disease 08/21/2017  Mixed hyperlipidemia 08/21/2017  Gout 08/21/2017  Glucose intolerance (impaired glucose tolerance) 08/21/2017  Gastroesophageal reflux disease without esophagitis 08/21/2017  COPD (chronic obstructive pulmonary disease) ([facility] 08/21/2017  Stage 3 chronic kidney disease ([facility] 08/21/2017  BPH (benign prostatic hyperplasia) 08/21/2017  Atrial fibrillation ([facility] 08/21/2017  ASCVD (arteriosclerotic cardiovascular disease) 08/21/2017 Past Medical History:  
Diagnosis Date  Arrhythmia A-fib  GERD (gastroesophageal reflux disease)  Hypertension  Other ill-defined conditions(799.89) BPH  Other ill-defined conditions(799.89) lipids  Other ill-defined conditions(799.89)   
 shingles hx  Thyroid disease  Unspecified adverse effect of anesthesia Past Surgical History:  
Procedure Laterality Date  HX CATARACT REMOVAL    
 HX HEART CATHETERIZATION    
 ablation  HX HEART CATHETERIZATION  07/18/2019 Stent  HX HEENT    
 scar tissue removed/laser  HX HERNIA REPAIR  05/05/2017 Viola Womack MD  
 HX ORTHOPAEDIC    
 left foot fx--hardware  HX ORTHOPAEDIC  4/9/14 Right total knee arthroplasty  HX OTHER SURGICAL    
 prostate bx No Known Allergies Family History Problem Relation Age of Onset  Heart Disease Mother  Hypertension Mother  Stroke Mother  Heart Disease Father  Cancer Paternal Grandmother Social History Socioeconomic History  Marital status:  Spouse name: Not on file  Number of children: Not on file  Years of education: Not on file  Highest education level: Not on file Occupational History  Not on file Social Needs  Financial resource strain: Not on file  Food insecurity:  
  Worry: Not on file Inability: Not on file  Transportation needs:  
  Medical: Not on file Non-medical: Not on file Tobacco Use  Smoking status: Former Smoker Packs/day: 0.50 Last attempt to quit: 3/28/1963 Years since quittin.8  Smokeless tobacco: Never Used Substance and Sexual Activity  Alcohol use: Yes Comment: rare/social-beer  Drug use: Yes Types: Prescription, OTC  Sexual activity: Not on file Lifestyle  Physical activity:  
  Days per week: Not on file Minutes per session: Not on file  Stress: Not on file Relationships  Social connections:  
  Talks on phone: Not on file Gets together: Not on file Attends [employer] service: Not on file Active member of club or organization: Not on file Attends meetings of clubs or organizations: Not on file Relationship status: Not on file  Intimate partner violence:  
  Fear of current or ex partner: Not on file Emotionally abused: Not on file Physically abused: Not on file Forced sexual activity: Not on file Other Topics Concern  Not on file Social History Narrative  Not on file Current Facility-Administered Medications Medication Dose Route Frequency  sodium chloride (NS) flush 5-40 mL  5-40 mL IntraVENous Q8H  
 sodium chloride (NS) flush 5-40 mL  5-40 mL IntraVENous PRN  pantoprazole (PROTONIX) 40 mg in sodium chloride 0.9% 10 mL injection  40 mg IntraVENous Q12H  
 amiodarone (CORDARONE) tablet 200 mg  200 mg Oral DAILY  aspirin chewable tablet 81 mg  81 mg Oral DAILY  clopidogrel (PLAVIX) tablet 75 mg  75 mg Oral DAILY  finasteride (PROSCAR) tablet 5 mg  5 mg Oral PCD  levothyroxine (SYNTHROID) tablet 137 mcg  137 mcg Oral ACB  midodrine (PROAMITINE) tablet 5 mg  5 mg Oral TID WITH MEALS  simvastatin (ZOCOR) tablet 40 mg  40 mg Oral QHS  tamsulosin (FLOMAX) capsule 0.4 mg  0.4 mg Oral DAILY  temazepam (RESTORIL) capsule 30 mg  30 mg Oral QHS  ondansetron (ZOFRAN) injection 4 mg  4 mg IntraVENous Q4H PRN  
 0.9% sodium chloride infusion 250 mL  250 mL IntraVENous PRN  
 acetaminophen (TYLENOL) tablet 650 mg  650 mg Oral Q4H PRN No reported FHx of early CAD or SCD Prior to Admission Medications: 
Prior to Admission medications Medication Sig Start Date End Date Taking? Authorizing Provider  
temazepam (RESTORIL) 15 mg capsule Take two at bedtime. 9/20/19  Yes Mariah Cortes MD  
rivaroxaban Veleria Marines) 20 mg tab tablet Take 1 Tab by mouth daily (with breakfast). 9/18/19  Yes Mariah Cortes MD  
amiodarone (CORDARONE) 200 mg tablet Take 1 Tab by mouth daily. 9/4/19  Yes Mariah Cortes MD  
midodrine (PROAMITINE) 5 mg tablet Take 1 Tab by mouth three (3) times daily (with meals) for 30 days. 9/4/19 10/4/19 Yes Mariah Cortes MD  
clopidogrel (PLAVIX) 75 mg tab Take 1 Tab by mouth daily. 8/14/19  Yes Mariah Cortes MD  
aspirin 81 mg chewable tablet Take 81 mg by mouth daily. Yes Provider, Historical  
tamsulosin (FLOMAX) 0.4 mg capsule TAKE 2 CAPSULES DAILY 3/22/19  Yes Mariah Cortes MD  
levothyroxine (SYNTHROID) 137 mcg tablet Take 137 mcg by mouth Daily (before breakfast). 3/22/19  Yes Mariah Cortes MD  
esomeprazole (NEXIUM) 40 mg capsule Take 1 Cap by mouth daily. 3/22/19  Yes Mariah Cortes MD  
finasteride (PROSCAR) 5 mg tablet Take 1 Tab by mouth daily (after dinner). 12/12/18  Yes Mariah Cortes MD  
simvastatin (ZOCOR) 40 mg tablet Take 1 Tab by mouth nightly. 12/12/18  Yes Mariah Cortes MD  
albuterol [facility] HFA) 90 mcg/actuation inhaler Take 2 Puffs by inhalation every four (4) hours as needed for Wheezing or Shortness of Breath. 8/14/19   Mariah Cortes MD  
 
 
 Review of Symptoms: As noted in H&P: 
\"Total of 12 systems reviewed as follows:   
                                    POSITIVE= underlined text  Negative = text not underlined General:                     fever, chills, sweats, generalized weakness, weight loss/gain,  
                                    loss of appetite Eyes:                           blurred vision, eye pain, loss of vision, double vision ENT:                            rhinorrhea, pharyngitis Respiratory:               cough, sputum production, SOB, JAFFE, wheezing, pleuritic pain  
Cardiology:                chest pain, palpitations, orthopnea, PND, edema, syncope Gastrointestinal:       abdominal pain , N/V, diarrhea, dysphagia, constipation, bleeding Genitourinary:           frequency, urgency, dysuria, hematuria, incontinence Muskuloskeletal :      arthralgia, myalgia, back pain Hematology:              easy bruising, nose or gum bleeding, lymphadenopathy Dermatological:         rash, ulceration, pruritis, color change / jaundice Endocrine:                 hot flashes or polydipsia Neurological:             headache, dizziness, confusion, focal weakness, paresthesia, Speech difficulties, memory loss, gait difficulty Psychological:          Feelings of anxiety, depression, agitation\". 24 hr VS reviewed, overall VSSAF Temp (24hrs), Av.8 °F (37.1 °C), Min:98.4 °F (36.9 °C), Max:99.1 °F (37.3 °C) Patient Vitals for the past 8 hrs: 
 Pulse  
19 1116 65  
19 1106 66  
19 0757 66 Patient Vitals for the past 8 hrs: 
 Resp  
19 1106 16  
19 0757 17 Patient Vitals for the past 8 hrs: 
 BP  
19 1116 104/55  
19 1106 100/42  
19 0757 113/46 Intake/Output Summary (Last 24 hours) at 2019 1141 Last data filed at 2019 1855 Gross per 24 hour Intake  Output 850 ml Net -850 ml Physical Exam (complete single organ system exam) Cons: The patient is no distress. Appears stated age. HEENT: Normal conjunctivae and palate. No xanthelasma. Neck: Flat JVP without appreciable HJR. Resp: Normal respiratory effort with clear lungs bilaterally. CV: Regular rate and rhythm. PMI not palpated. Normal S1,S2 No gallop or rubs appreciated. AS murmur appreciated. Intact carotid upstroke bilaterally without appreciated bruits. Abdominal aorta not palpated; no abdominal bruit noted. Intact pedal pulses. No peripheral edema. GI: No abd mass noted, soft; no organomegaly noted. Bowel sounds present. Muscular:  No significant kyphosis. Strength WNL for age. Ext: No cyanosis, clubbing, or stigmata of peripheral embolization. Derm: No ulcers or stasis dermatitis of lower extremities. Neuro: Sleepy but arouses to voice; seems oriented x 3;  Grossly non-focal. Normal mood and affect. Labs:  
Recent Results (from the past 24 hour(s)) HGB & HCT Collection Time: 09/27/19  1:59 PM  
Result Value Ref Range HGB 8.4 (L) 12.1 - 17.0 g/dL HCT 26.0 (L) 36.6 - 50.3 % METABOLIC PANEL, BASIC Collection Time: 09/28/19  1:35 AM  
Result Value Ref Range Sodium 138 136 - 145 mmol/L Potassium 4.0 3.5 - 5.1 mmol/L Chloride 109 (H) 97 - 108 mmol/L  
 CO2 24 21 - 32 mmol/L Anion gap 5 5 - 15 mmol/L Glucose 93 65 - 100 mg/dL BUN 36 (H) 6 - 20 MG/DL Creatinine 1.71 (H) 0.70 - 1.30 MG/DL  
 BUN/Creatinine ratio 21 (H) 12 - 20 GFR est AA 46 (L) >60 ml/min/1.73m2 GFR est non-AA 38 (L) >60 ml/min/1.73m2 Calcium 8.2 (L) 8.5 - 10.1 MG/DL  
CBC W/O DIFF Collection Time: 09/28/19  1:35 AM  
Result Value Ref Range WBC 7.3 4.1 - 11.1 K/uL  
 RBC 2.78 (L) 4.10 - 5.70 M/uL HGB 7.7 (L) 12.1 - 17.0 g/dL HCT 24.7 (L) 36.6 - 50.3 % MCV 88.8 80.0 - 99.0 FL  
 MCH 27.7 26.0 - 34.0 PG  
 MCHC 31.2 30.0 - 36.5 g/dL  
 RDW 17.1 (H) 11.5 - 14.5 % PLATELET 431 483 - 109 K/uL MPV 10.4 8.9 - 12.9 FL  
 NRBC 0.0 0  WBC ABSOLUTE NRBC 0.00 0.00 - 0.01 K/uL Recent Labs  
  09/26/19 
1502 TROIQ <0.05 Recent Labs  
  09/28/19 
0135 09/27/19 94 31 11 09/27/19 
0233 09/26/19 
1502   --   --  136  
K 4.0  --   --  4.3 *  --   --  106 CO2 24  --   --  24 BUN 36*  --   --  33* CREA 1.71*  --   --  1.79* GFRAA 46*  --   --  44* GLU 93  --   --  111* CA 8.2*  --   --  8.9 ALB  --   --   --  2.8* WBC 7.3  --   --  7.1 HGB 7.7* 8.4* 8.2* 8.4* HCT 24.7* 26.0* 26.2* 26.3*  
  --   --  217 Jeanne Castillo MD

## 2019-09-28 NOTE — PROGRESS NOTES
General Daily Progress Note Admit Date: 9/26/2019 Hospital day 3 Subjective:  
 
Patient has no complaint of shortness of breath, chest pain, abdominal pain. NO leg pain. .. Medication side effects: gi bleeding. Current Facility-Administered Medications Medication Dose Route Frequency  sodium chloride (NS) flush 5-40 mL  5-40 mL IntraVENous Q8H  
 sodium chloride (NS) flush 5-40 mL  5-40 mL IntraVENous PRN  pantoprazole (PROTONIX) 40 mg in sodium chloride 0.9% 10 mL injection  40 mg IntraVENous Q12H  
 amiodarone (CORDARONE) tablet 200 mg  200 mg Oral DAILY  aspirin chewable tablet 81 mg  81 mg Oral DAILY  clopidogrel (PLAVIX) tablet 75 mg  75 mg Oral DAILY  finasteride (PROSCAR) tablet 5 mg  5 mg Oral PCD  levothyroxine (SYNTHROID) tablet 137 mcg  137 mcg Oral ACB  midodrine (PROAMITINE) tablet 5 mg  5 mg Oral TID WITH MEALS  simvastatin (ZOCOR) tablet 40 mg  40 mg Oral QHS  tamsulosin (FLOMAX) capsule 0.4 mg  0.4 mg Oral DAILY  temazepam (RESTORIL) capsule 30 mg  30 mg Oral QHS  ondansetron (ZOFRAN) injection 4 mg  4 mg IntraVENous Q4H PRN  
 0.9% sodium chloride infusion 250 mL  250 mL IntraVENous PRN  
 acetaminophen (TYLENOL) tablet 650 mg  650 mg Oral Q4H PRN Review of Systems Pertinent items are noted in HPI. Objective:  
 
Patient Vitals for the past 8 hrs: 
 BP Temp Pulse Resp SpO2  
09/28/19 0239 136/66 99.1 °F (37.3 °C) 76 18 97 % No intake/output data recorded. 09/26 1901 - 09/28 0700 In: 100 [I.V.:100] Out: 1275 [[facility]:4623] Physical Exam:  
Visit Vitals /66 Pulse 76 Temp 99.1 °F (37.3 °C) Resp 18 Ht 5' 9\" (1.753 m) Wt 181 lb 15.8 oz (82.6 kg) SpO2 97% BMI 26.88 kg/m² Lungs: clear to auscultation bilaterally Heart: regular rate and rhythm, S1, S2 normal, no murmur, click, rub or gallop Abdomen: soft, non-tender. Bowel sounds normal. No masses,  no organomegaly Extremities: extremities normal, atraumatic, no cyanosis or edema ECG: normal sinus rhythm Data Review Recent Results (from the past 24 hour(s)) HGB & HCT Collection Time: 09/27/19  1:59 PM  
Result Value Ref Range HGB 8.4 (L) 12.1 - 17.0 g/dL HCT 26.0 (L) 36.6 - 50.3 % METABOLIC PANEL, BASIC Collection Time: 09/28/19  1:35 AM  
Result Value Ref Range Sodium 138 136 - 145 mmol/L Potassium 4.0 3.5 - 5.1 mmol/L Chloride 109 (H) 97 - 108 mmol/L  
 CO2 24 21 - 32 mmol/L Anion gap 5 5 - 15 mmol/L Glucose 93 65 - 100 mg/dL BUN 36 (H) 6 - 20 MG/DL Creatinine 1.71 (H) 0.70 - 1.30 MG/DL  
 BUN/Creatinine ratio 21 (H) 12 - 20 GFR est AA 46 (L) >60 ml/min/1.73m2 GFR est non-AA 38 (L) >60 ml/min/1.73m2 Calcium 8.2 (L) 8.5 - 10.1 MG/DL  
CBC W/O DIFF Collection Time: 09/28/19  1:35 AM  
Result Value Ref Range WBC 7.3 4.1 - 11.1 K/uL  
 RBC 2.78 (L) 4.10 - 5.70 M/uL HGB 7.7 (L) 12.1 - 17.0 g/dL HCT 24.7 (L) 36.6 - 50.3 % MCV 88.8 80.0 - 99.0 FL  
 MCH 27.7 26.0 - 34.0 PG  
 MCHC 31.2 30.0 - 36.5 g/dL  
 RDW 17.1 (H) 11.5 - 14.5 % PLATELET 292 242 - 458 K/uL MPV 10.4 8.9 - 12.9 FL  
 NRBC 0.0 0  WBC ABSOLUTE NRBC 0.00 0.00 - 0.01 K/uL Assessment:  
 
Active Problems: 
  Symptomatic anemia (9/26/2019) Plan: 1. Symptomatic anemia (9/26/2019): s/p transfusion. sp EGD- altho final report not in chart yet. Continue high dose protonix for now. 2. L LE DVT: On xarelto--hold for now. Resume when able. 3. Triple therapy for anticoagulation. Recent DVT, hx A fib, recent cardiac stents. Cardiology to see. Would also be reasonable to ask vascular surgery to see if an IVC filter would be reasonable. Could also consider changing xarelto to coumadin and try to keep inr close to 1.8-2.0. Difficult problem. 4. HTN: BP okay. Continue meds. 5. Hypothyroidism: Continue usual meds.   
6. DNR

## 2019-09-29 PROBLEM — K31.819 AVM (ARTERIOVENOUS MALFORMATION) OF STOMACH, ACQUIRED: Status: ACTIVE | Noted: 2019-01-01

## 2019-09-29 PROBLEM — K92.1 GASTROINTESTINAL HEMORRHAGE WITH MELENA: Status: ACTIVE | Noted: 2019-01-01

## 2019-09-29 NOTE — PROGRESS NOTES
[address] 
[address] [facility], [address] GI PROGRESS NOTE 
 
    NAME:   Josesito Garcia :    3/13/1931 MRN:    [id number] Assessment/Plan 1. 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 Acute blood loss anemia Hb 7.0 being transfused 1 unit PRBC. 2. CAD on ASA, Plavix 3. A fib Patient Active Problem List  
Diagnosis Code  Cancer of the skin, basal cell C44.91  
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  
 Gout M10.9  Glucose intolerance (impaired glucose tolerance) R73.02  
 Gastroesophageal reflux disease without esophagitis K21.9  
 COPD (chronic obstructive pulmonary disease) ([facility]) J44.9  Stage 3 chronic kidney disease (HCC) N18.3  BPH (benign prostatic hyperplasia) N40.0  Atrial fibrillation ([facility]) I48.91  
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  
 Acute bilateral low back pain with bilateral sciatica M54.42, M54.41  
 Anemia D64.9  Weakness generalized R53.1  Nonrheumatic aortic valve stenosis I35.0  Edema R60.9  Peripheral vascular disease (HCC) I73.9  Acute deep vein thrombosis (DVT) of distal vein of left lower extremity (HCC) I82.4Z2  Symptomatic anemia D64.9 Subjective:  
 
Josesito Garcia is a 80 y.o.  male who was admitted with GI bleed Review of Systems:unable to obtain Objective: VITALS:  
 Last 24hrs VS reviewed since prior hospitalist progress note. Most recent are: 
Visit Vitals BP 94/51 (BP 1 Location: Right arm, BP Patient Position: At rest) Pulse 64 Temp 98.7 °F (37.1 °C) Resp 16 Ht 5' 9\" (1.753 m) Wt 82.6 kg (181 lb 15.8 oz) SpO2 98% BMI 26.88 kg/m² Intake/Output Summary (Last 24 hours) at 9/29/2019 1038 Last data filed at 9/29/2019 8398 Gross per 24 hour Intake  Output 250 ml Net -250 ml PHYSICAL EXAM: 
General   Elderly male, appears stated age, in no acute distress EENT  Normocephalic, Atraumatic, PERRLA, EOMI, sclera pale Neck   Supple without nodes or mass. No thyromegaly or bruit Respiratory   Clear To Auscultation bilaterally - no wheezes, rales, rhonchi, or crackles Cardiology  Irregular Rate and Rythmn  - no murmurs, rubs or gallops Abdominal  Soft, non-tender, non-distended, positive bowel sounds, no hepatosplenomegaly, no palpable mass Extremities  No clubbing, cyanosis, or edema. Pulses intact. Skin  Normal skin turgor. No rashes or skin ulcers noted Neurological  No focal neurological deficits noted Psychological  Oriented x 3. Flat affect. Lab Data Recent Results (from the past 12 hour(s)) HGB & HCT Collection Time: 09/29/19  1:27 AM  
Result Value Ref Range HGB 7.0 (L) 12.1 - 17.0 g/dL HCT 22.2 (L) 36.6 - 50.3 % Medications Reviewed PMH/ reviewed - no change compared to H&P Attending Physician: Luiza Joshi MD  
Date/Time:  9/29/2019 
 
________________________________________________________________________

## 2019-09-29 NOTE — PROGRESS NOTES
Cardiology Progress Note 9/29/2019     Admit Date: 9/26/2019 Admit Diagnosis: Symptomatic anemia [D64.9]  CC: none currently Cardiologist:  Dr Amie Eng. 
    
   
 Cardiac Assessment/Plan:   
- Cath 7/2019 with Carson Pierre supported PCI of LM into LAD with balloon side hole to LCx,  
 *cath 8/29/2019 with patent LM stent. On ASA/plavix. - Recent echo with normal EF, mild to mod AS 
- Prox afib, currently in sinus; Amiodarone started recently; felt to be high risk for triple anticoagulation - Hx of aflutter ablation 
- HTN: Resolved: - Low BPs more recently; intolerant of bblockers in past; dilt stopped earlier this month; Low dose midodrine added - Dyslipidemia: on statin - Hypothyroid - GERD 
- BPH (chronic proscar and flomax) - Distant tobacco 
  
+ U/S for DVT 9/18/19; AC started; Noted to have fatigue/weakness at recent f/u visit: found to have anemia. 
  
Rec: He should remain on plavix for LM stenting; If able from a GI standpoint, he should be on anticoagulation for recent DVT;  
if he cant be on [facility], would consider a IVC filter if appropriate. If AC is started, would stop asa and continue plavix. 
  
He should remain in amio for his PAFib (none seen so far). He should remain on midodrine for his orthostatic Sx.  
  
9/29: Cont asa/plavix noted; BP stable; NSR on tele. Hg 7; no chems today. No new cardiac recs. Hospital Problem List: 
Active Problems: 
  Symptomatic anemia (9/26/2019)   
   
____________________________________________________________________ Roxanna Nguyen is a 80 y.o. male presents with Symptomatic anemia [D64.9].   
As noted in H&P: \"88 y.o.   male who presents with CC listed above. Pt has had multiple admissions in the past 3 months. Family states that he was diagnosed with a DVT last week and started on Xarelto.  Family also state that the pt had a \"good weekend\" but starting on Monday he started to develop the symptoms noted above. Patient states that when he got up in the middle the night to go to the bathroom that he fell and denies any head injury or loss of consciousness but states he just feels weak.  Family notes that he looked pale today and they took him to his cardiologist for follow-up appointment recommended to come to the emergency room for further evaluation because his blood pressure was low on arrival in the office. Pt denies seeing any blood in his stool, and denies noticing any change in color. Pt currently denies any CP or SOB. He does endorse dizziness\" 
  
______________________________________________________________________ 
  
As previously noted: \"The patient reports dyspnea; no CP. 
  
No PND, orthopnea, palpitations, syncope, peripheral edema. No current complaints. 
  
EC/26: NSR Tele: sinus CXR: \"No evidence of an acute cardiopulmonary process\" 
  
Notable labs: Hg 7.7 from 8.4 from 11.9. Cr 1.71 from 1.8 from 1.3. TSH 0.07 2019. Nl troponin; proBNP 4k. 
  
He remains on ASA/plavix. +orthostatics per nursing. \" For other plans, see orders. Hospital problem list  
Active Hospital Problems Diagnosis Date Noted  Symptomatic anemia 2019 Subjective: Veneda Dials reports Chest pain X none  consistent with:  Non-cardiac CP Atypical CP None now  On going  Anginal CP Dyspnea X none  at rest  with exertion    
    improved  unchanged  worse PND X none  overnight Orthopnea X none  improved  unchanged  worse Presyncope X none  improved  unchanged  worse Ambulated in hallway without symptoms   Yes Ambulated in room without symptoms  Yes Objective:  
 Physical Exam: 
Overall VSSAF;   
Visit Vitals BP 99/54 (BP 1 Location: Left arm, BP Patient Position: At rest) Pulse 60 Temp 98.5 °F (36.9 °C) Resp 18 Ht 5' 9\" (1.753 m) Wt 82.6 kg (181 lb 15.8 oz) SpO2 97% BMI 26.88 kg/m² Temp (24hrs), Av.4 °F (36.9 °C), Min:97.9 °F (36.6 °C), Max:98.7 °F (37.1 °C) Patient Vitals for the past 8 hrs: 
 Pulse  
19 1323 60  
19 1221 68  
19 1123 (!) 59  
19 1053 63  
19 1038 64  
19 1008 64  
19 0908 81 Patient Vitals for the past 8 hrs: 
 Resp  
19 1323 18  
19 1038 18  
19 1008 16  
19 0908 16 Patient Vitals for the past 8 hrs: 
 BP  
19 1323 99/54  
19 1221 109/58  
19 1123 103/53  
19 1053 112/53  
19 1038 111/56  
19 1008 94/51  
19 0908 94/47 Intake/Output Summary (Last 24 hours) at 2019 1442 Last data filed at 2019 1332 Gross per 24 hour Intake 310 ml Output 450 ml Net -140 ml General Appearance: Well developed, elderly/chronically ill, no acute distress. Ears/Nose/Mouth/Throat:   Normal MM; anicteric. JVP: WNL Resp:   clear to auscultation bilaterally. Nl resp effort. Cardiovascular:  RRR, S1, S2 normal, no new murmur. No gallop or rub. Abdomen:   Soft, non-tender, bowel sounds are present. Extremities: No edema bilaterally. Skin: 
Neuro: Warm and dry. A/O x3, grossly nonfocal  
cath site intact w/o hematoma or new bruit; distal pulse unchanged  Yes Data Review:    
Telemetry independently reviewed x sinus  chronic afib  parox afib  NSVT  
 
ECG independently reviewed  NSR  afib  no significant changes  NSST-Tw chgs  
x no new ECG provided for review Lab results reviewed as noted below. Current medications reviewed as noted below. No results for input(s): PH, PCO2, PO2 in the last 72 hours. Recent Labs  
  19 
1502 TROIQ <0.05 Recent Labs  
  19 
0127 19 
1348 19 
0135  19 
1502 NA  --   --  138  --  136 K  --   --  4.0  --  4.3 CL  --   --  109*  --  106 CO2  --   --  24  --  24 BUN  --   --  36*  --  33* CREA  --   --  1.71*  --  1.79* GFRAA  --   --  46*  --  44* GLU  --   --  93  --  111* CA  --   --  8.2*  --  8.9 ALB  --   --   --   --  2.8* WBC  --   --  7.3  --  7.1 HGB 7.0* 7.5* 7.7*   < > 8.4* HCT 22.2* 23.2* 24.7*   < > 26.3*  
PLT  --   --  180  --  217  
 < > = values in this interval not displayed. Lab Results Component Value Date/Time Cholesterol, total 117 06/12/2019 02:00 PM  
 HDL Cholesterol 35 06/12/2019 02:00 PM  
 LDL, calculated 65 06/12/2019 02:00 PM  
 Triglyceride 86 06/12/2019 02:00 PM  
 CHOL/HDL Ratio 3 06/12/2019 02:00 PM  
 
Recent Labs  
  09/26/19 
1502 SGOT 17  
AP 80  
TP 6.5 ALB 2.8*  
GLOB 3.7 Recent Labs  
  09/26/19 
1710 INR 1.1 PTP 11.7* No components found for: [place] Current Facility-Administered Medications Medication Dose Route Frequency  0.9% sodium chloride infusion 250 mL  250 mL IntraVENous PRN  
 PEG 3350-Electrolytes (GO-LYTELY) SUSPENSION 4,000 mL  4,000 mL Oral ONCE  
 sodium chloride (NS) flush 5-40 mL  5-40 mL IntraVENous Q8H  
 sodium chloride (NS) flush 5-40 mL  5-40 mL IntraVENous PRN  pantoprazole (PROTONIX) 40 mg in sodium chloride 0.9% 10 mL injection  40 mg IntraVENous Q12H  
 amiodarone (CORDARONE) tablet 200 mg  200 mg Oral DAILY  aspirin chewable tablet 81 mg  81 mg Oral DAILY  clopidogrel (PLAVIX) tablet 75 mg  75 mg Oral DAILY  finasteride (PROSCAR) tablet 5 mg  5 mg Oral PCD  levothyroxine (SYNTHROID) tablet 137 mcg  137 mcg Oral ACB  midodrine (PROAMITINE) tablet 5 mg  5 mg Oral TID WITH MEALS  simvastatin (ZOCOR) tablet 40 mg  40 mg Oral QHS  tamsulosin (FLOMAX) capsule 0.4 mg  0.4 mg Oral DAILY  temazepam (RESTORIL) capsule 30 mg  30 mg Oral QHS  ondansetron (ZOFRAN) injection 4 mg  4 mg IntraVENous Q4H PRN  
 0.9% sodium chloride infusion 250 mL  250 mL IntraVENous PRN  
 acetaminophen (TYLENOL) tablet 650 mg  650 mg Oral Q4H PRN Ethan Urbina MD

## 2019-09-29 NOTE — PROGRESS NOTES
Bedside and Verbal shift change report given to Marlee Rhodes RN (oncoming nurse) by Manjinder mAbrose (offgoing nurse). Report given with SBAR, Kardex, Intake/Output, MAR and Recent Results.

## 2019-09-29 NOTE — PROGRESS NOTES
[address], [address] [place] [address] GI PROGRESS NOTE 
 
    NAME:   Binta Vazquez :    3/13/1931 MRN:    [id number] Assessment/Plan 1. 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 Acute blood loss anemia Hb 7.5 2. CAD on ASA, Plavix 3. A fib Patient Active Problem List  
Diagnosis Code  Cancer of the skin, basal cell C44.91  
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  
 Gout M10.9  Glucose intolerance (impaired glucose tolerance) R73.02  
 Gastroesophageal reflux disease without esophagitis K21.9  
 COPD (chronic obstructive pulmonary disease) ([facility]) J44.9  Stage 3 chronic kidney disease ([facility]) N18.3  BPH (benign prostatic hyperplasia) N40.0  Atrial fibrillation ([facility]) I48.91  
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  
 Acute bilateral low back pain with bilateral sciatica M54.42, M54.41  
 Anemia D64.9  Weakness generalized R53.1  Nonrheumatic aortic valve stenosis I35.0  Edema R60.9  Peripheral vascular disease (HCC) I73.9  Acute deep vein thrombosis (DVT) of distal vein of left lower extremity ([facility]) I82.4Z2  Symptomatic anemia D64.9 Subjective:  
 
Binta Vazquez is a 80 y.o.  male who was admitted with GI bleed Review of Systems:unable to obtain Objective: VITALS:  
Last 24hrs VS reviewed since prior hospitalist progress note. Most recent are: Visit Vitals /50 Pulse 62 Temp 98.2 °F (36.8 °C) Resp 20 Ht 5' 9\" (1.753 m) Wt 82.6 kg (181 lb 15.8 oz) SpO2 94% BMI 26.88 kg/m² Intake/Output Summary (Last 24 hours) at 9/28/2019 2015 Last data filed at 9/28/2019 8911 Gross per 24 hour Intake  Output 650 ml Net -650 ml PHYSICAL EXAM: 
General   Elderly male, appears stated age, in no acute distress EENT  Normocephalic, Atraumatic, PERRLA, EOMI, sclera pale Neck   Supple without nodes or mass. No thyromegaly or bruit Respiratory   Clear To Auscultation bilaterally - no wheezes, rales, rhonchi, or crackles Cardiology  Irregular Rate and Rythmn  - no murmurs, rubs or gallops Abdominal  Soft, non-tender, non-distended, positive bowel sounds, no hepatosplenomegaly, no palpable mass Extremities  No clubbing, cyanosis, or edema. Pulses intact. Skin  Normal skin turgor. No rashes or skin ulcers noted Neurological  No focal neurological deficits noted Psychological  Oriented x 3. Flat affect. Lab Data Recent Results (from the past 12 hour(s)) HGB & HCT Collection Time: 09/28/19  1:48 PM  
Result Value Ref Range HGB 7.5 (L) 12.1 - 17.0 g/dL HCT 23.2 (L) 36.6 - 50.3 % Medications Reviewed PMH/ reviewed - no change compared to H&P Attending Physician: Katya Rueda MD  
Date/Time:  9/28/2019 
 
________________________________________________________________________

## 2019-09-29 NOTE — PROGRESS NOTES
General Daily Progress Note Admit Date: 9/26/2019 Hospital day 4 Subjective:  
 
Patient has no complaint of chest pain. Mild dyspnea. Seen with son this am, who says father was somewhat out of it yesterday. .. Medication side effects: gi bleeding. Current Facility-Administered Medications Medication Dose Route Frequency  0.9% sodium chloride infusion 250 mL  250 mL IntraVENous PRN  
 PEG 3350-Electrolytes (GO-LYTELY) SUSPENSION 4,000 mL  4,000 mL Oral ONCE  
 sodium chloride (NS) flush 5-40 mL  5-40 mL IntraVENous Q8H  
 sodium chloride (NS) flush 5-40 mL  5-40 mL IntraVENous PRN  pantoprazole (PROTONIX) 40 mg in sodium chloride 0.9% 10 mL injection  40 mg IntraVENous Q12H  
 amiodarone (CORDARONE) tablet 200 mg  200 mg Oral DAILY  aspirin chewable tablet 81 mg  81 mg Oral DAILY  clopidogrel (PLAVIX) tablet 75 mg  75 mg Oral DAILY  finasteride (PROSCAR) tablet 5 mg  5 mg Oral PCD  levothyroxine (SYNTHROID) tablet 137 mcg  137 mcg Oral ACB  midodrine (PROAMITINE) tablet 5 mg  5 mg Oral TID WITH MEALS  simvastatin (ZOCOR) tablet 40 mg  40 mg Oral QHS  tamsulosin (FLOMAX) capsule 0.4 mg  0.4 mg Oral DAILY  temazepam (RESTORIL) capsule 30 mg  30 mg Oral QHS  ondansetron (ZOFRAN) injection 4 mg  4 mg IntraVENous Q4H PRN  
 0.9% sodium chloride infusion 250 mL  250 mL IntraVENous PRN  
 acetaminophen (TYLENOL) tablet 650 mg  650 mg Oral Q4H PRN Review of Systems Pertinent items are noted in HPI. Objective:  
 
Patient Vitals for the past 8 hrs: 
 BP Temp Pulse Resp SpO2  
09/29/19 0134 105/52 98.3 °F (36.8 °C) 83 18 94 % No intake/output data recorded. 09/27 1901 - 09/29 0700 In: -  
Out: 725 [Urine:725] Physical Exam:  
Visit Vitals /52 Pulse 83 Temp 98.3 °F (36.8 °C) Resp 18 Ht 5' 9\" (1.753 m) Wt 181 lb 15.8 oz (82.6 kg) SpO2 94% BMI 26.88 kg/m² Lungs: clear to auscultation bilaterally Heart: regular rate and rhythm, S1, S2 normal, no murmur, click, rub or gallop Abdomen: soft, non-tender. Bowel sounds normal. No masses,  no organomegaly Extremities: extremities normal, atraumatic, no cyanosis or edema ECG: normal sinus rhythm Data Review Recent Results (from the past 24 hour(s)) HGB & HCT Collection Time: 09/28/19  1:48 PM  
Result Value Ref Range HGB 7.5 (L) 12.1 - 17.0 g/dL HCT 23.2 (L) 36.6 - 50.3 % HGB & HCT Collection Time: 09/29/19  1:27 AM  
Result Value Ref Range HGB 7.0 (L) 12.1 - 17.0 g/dL HCT 22.2 (L) 36.6 - 50.3 % Assessment:  
 
Active Problems: 
  Symptomatic anemia (9/26/2019) Plan: 1. Symptomatic anemia (9/26/2019): s/p transfusion.  sp EGD- AVM clipped. Continue high dose protonix for now. for colonoscopy this am. Hgb 7.0 this am, as pt is somewhat short of breath and orthostatic, will transfuse one unit this am.  
2. L LE DVT: On xarelto--hold for now. Resume when able. 3. Triple therapy for anticoagulation. Recent DVT, hx A fib, recent cardiac stents. Cardiology to see. Will ask vascular surgery to see tomorrow  if an IVC filter would be reasonable. Could also consider changing xarelto to coumadin and try to keep inr close to 1.8-2.0. Difficult problem. 4. Hypotension- on midodrine. 5. Hypothyroidism: Continue usual meds.   
6. DNR

## 2019-09-29 NOTE — PROGRESS NOTES
Bedside and Verbal shift change report given to [address] [place] (oncoming nurse) by Julio César Mondragon RN (offgoing nurse). Report included the following information SBAR, Kardex, Intake/Output, MAR, Accordion, Recent Results and Med Rec Status.

## 2019-09-30 NOTE — PROGRESS NOTES
0246:Pt shivering. VS temp 100.4,/99,,%. Tylenol 650 mg  Po given. No other complains for now 0413: Temp rechecked and was 99. Bedside and Verbal shift change report given to [address] (oncoming nurse) by Andrew Curran RN (offgoing nurse). Report included the following information SBAR, Kardex, Intake/Output, MAR, Accordion and Recent Results.

## 2019-09-30 NOTE — PROCEDURES
Colonoscopy Procedure Note Indications:   Acute GI bleeding Referring Physician: Siomara Adair MD 
Anesthesia/Sedation: MAC anesthesia Propofol Endoscopist:  Dr. Swati Parekh Procedure in Detail: 
Informed consent was obtained for the procedure, including sedation. Risks of perforation, hemorrhage, adverse drug reaction, and aspiration were discussed. The patient was placed in the left lateral decubitus position. Based on the pre-procedure assessment, including review of the patient's medical history, medications, allergies, and review of systems, he had been deemed to be an appropriate candidate for moderate sedation; he was therefore sedated with the medications listed above. The patient was monitored continuously with ECG tracing, pulse oximetry, blood pressure monitoring, and direct observations. A rectal examination was performed. The [id number] was inserted into the rectum and advanced under direct vision to the terminal ileum. The quality of the colonic preparation was adequate. A careful inspection was made as the colonoscope was withdrawn, including a retroflexed view of the rectum; findings and interventions are described below. Appropriate photodocumentation was obtained. Findings:  
 
1. Scope advanced to the cecum and terminal ileum. 2.  There was normal mucosa throughout the colon. 3.  No blood seen. 4.  There were (2) small sessile polyps in the cecum s/p cold snare removal with a single clip placed on one site. 5.  Scattered diverticulosis of the sigmoid colon. 6.  Small internal hemorrhoids Therapies:  See above Specimen: Specimens were collected as described above and sent to pathology. Complications: None were encountered during the procedure. EBL: < 10 ml. Recommendations:  
-proceed with SB pill cam 
-AM CBC 
-advance diet Signed By: Tricia Dai MD 
                      September 30, 2019

## 2019-09-30 NOTE — PROGRESS NOTES
Bedside shift change report given to Letty Payne RN (oncoming nurse) by Silverio Christine RN 
 (offgoing nurse). Report included the following information SBAR, Kardex, ED Summary, Intake/Output and Recent Results.

## 2019-09-30 NOTE — PERIOP NOTES
TRANSFER - OUT REPORT: 
 
Verbal report given to Evon RAMSEY(name) on [employer]  being transferred to [facility](unit) for routine progression of care Report consisted of patients Situation, Background, Assessment and  
Recommendations(SBAR). Information from the following report(s) Procedure Summary was reviewed with the receiving nurse. Lines:  
Peripheral IV 09/26/19 Right Antecubital (Active) Site Assessment Clean, dry, & intact 9/30/2019  7:35 AM  
Phlebitis Assessment 0 9/30/2019  7:35 AM  
Infiltration Assessment 0 9/30/2019  7:35 AM  
Dressing Status Clean, dry, & intact 9/30/2019  7:35 AM  
Dressing Type Transparent 9/30/2019  7:35 AM  
Hub Color/Line Status Pink;Capped 9/30/2019  7:35 AM  
Alcohol Cap Used Yes 9/30/2019  7:35 AM  
   
Peripheral IV 09/29/19 Anterior;Right Forearm (Active) Site Assessment Clean, dry, & intact 9/30/2019 11:21 AM  
Phlebitis Assessment 0 9/30/2019 11:21 AM  
Infiltration Assessment 0 9/30/2019 11:21 AM  
Dressing Status Clean, dry, & intact 9/30/2019 11:21 AM  
Dressing Type Tape;Transparent 9/30/2019 11:21 AM  
Hub Color/Line Status Pink 9/30/2019 11:21 AM  
Alcohol Cap Used Yes 9/30/2019  7:35 AM  
  
 
Opportunity for questions and clarification was provided.    
 
Patient transported with:

## 2019-09-30 NOTE — OP NOTES
[place] 
OPERATIVE REPORT 
  
Name: Sharon Dillard 
MR#:  [id number] :  [id number] DATE OF SERVICE:  2019 
  
  
PREOPERATIVE DIAGNOSIS:  Left femoral DVT 
  
POSTOPERATIVE DIAGNOSIS: Left femoral DVT 
  
PROCEDURE PERFORMED: 1.  IVC filter placement (Wesley Zach) 
  Bandar Caldwell MD. 
  
ANESTHESIA:  MAC 
  
COMPLICATIONS:  None. 
  
SPECIMENS REMOVED:  n/a 
  
IMPLANTS:  [employer] Celect 
  
ESTIMATED BLOOD LOSS:  minimal 
  INDICATIONS:  The patient is a 80year-old male with left femoral DVT initially on anticoagulation but which had to be stopped because of GI bleed. Consulted today because of contraindication to anticoagulation with femoral DVT. Discussed risks and benefits of IVC filter placement and patient and his family wanted to proceed. Consent was obtained from the daughter as patient was still recovering from the sedation from his colonoscopy earlier today.  
  
  
PROCEDURE:  The patient was taken to the operating room and placed on the operating table in the supine position. After induction of adequate IV sedation, the patient was prepped and draped in the usual sterile fashion. The area in the right groin was infiltrated with 1% lidocaine. The right femoral vein was percutaneously cannulated without difficulty and a Glidewire easily inserted. This was carefully followed under fluoroscopy through the iliac vein into the inferior vena cava. A small incision was made at the insertion of the wire. The needle was removed and the Wesley Zach dilator and sheath  was then threaded over the Glidewire. Again, this was followed under fluoroscopy into the inferior vena cava. The level of L3 was identified. The wire was removed and a cavogram was performed through the sheath that noted patent inferior vena cava below the level of the renal veins.   The dilator was removed and the Wesley Zach filter was placed into the sheath. The sheath was then pulled back, and the filter released at approximately the level of L3. This was completed expanded. The sheath was removed and 3-0 nylon suture placed at the insertion site. The sponge and needle count was correct. The patient tolerated the procedure well.   He was awakened in the operating room and taken to the recovery room in stable condition without any apparent complications. 
Roman Marina MD

## 2019-09-30 NOTE — PERIOP NOTES
TRANSFER - IN REPORT: 
 
Verbal report received from Maribel Thomas RN (name) on Lennie Hernandez  being received from Medical Telemetry (unit) for ordered procedure Report consisted of patients Situation, Background, Assessment and  
Recommendations(SBAR). Information from the following report(s) SBAR, Kardex, Procedure Summary, MAR, Recent Results and Cardiac Rhythm NSR with PACs was reviewed with the receiving nurse. Opportunity for questions and clarification was provided. Assessment completed upon patients arrival to unit and care assumed. Leopoldo Lory, NP placed note in chart about procedure and has note to obtain consent and order is in for consent. Ask that Maribel Thomas RN, patients nurse on floor to have consent signed.

## 2019-09-30 NOTE — ANESTHESIA POSTPROCEDURE EVALUATION
Procedure(s): 
COLONOSCOPY 
ENDOSCOPIC POLYPECTOMY RESOLUTION CLIP. total IV anesthesia Anesthesia Post Evaluation Patient location during evaluation: PACU Note status: Adequate. Level of consciousness: responsive to verbal stimuli and sleepy but conscious Pain management: satisfactory to patient Airway patency: patent Anesthetic complications: no 
Cardiovascular status: acceptable Respiratory status: acceptable Hydration status: acceptable Comments: +Post-Anesthesia Evaluation and Assessment Patient: Timbo Bowen MRN: [id number]  SSN: [id number] YOB: 1931  Age: 80 y.o. Sex: male Cardiovascular Function/Vital Signs /52   Pulse 68   Temp 37.1 °C (98.7 °F)   Resp 24   Ht 5' 9\" (1.753 m)   Wt 82.6 kg (181 lb 15.8 oz)   SpO2 97%   BMI 26.88 kg/m² Patient is status post Procedure(s): 
COLONOSCOPY 
ENDOSCOPIC POLYPECTOMY RESOLUTION CLIP. Nausea/Vomiting: Controlled. Postoperative hydration reviewed and adequate. Pain: 
Pain Scale 1: Numeric (0 - 10) (09/30/19 1231) Pain Intensity 1: 0 (09/30/19 1231) Managed. Neurological Status: At baseline. Mental Status and Level of Consciousness: Arousable. Pulmonary Status:  
O2 Device: Room air (09/30/19 1240) Adequate oxygenation and airway patent. Complications related to anesthesia: None Post-anesthesia assessment completed. No concerns. Signed By: Mesha Dalton MD  
 9/30/2019 Post anesthesia nausea and vomiting:  controlled Vitals Value Taken Time /51 9/30/2019 12:42 PM  
Temp 37.1 °C (98.7 °F) 9/30/2019 12:31 PM  
Pulse 67 9/30/2019 12:41 PM  
Resp 22 9/30/2019 12:41 PM  
SpO2 96 % 9/30/2019 12:41 PM  
Vitals shown include unvalidated device data.

## 2019-09-30 NOTE — PERIOP NOTES
Anesthesia reports 70mg Propofol, 40mg Lidocaine and 450mL NS given during procedure. Received report from anesthesia staff on vital signs and status of patient.

## 2019-09-30 NOTE — ANESTHESIA PREPROCEDURE EVALUATION
Anesthetic History No history of anesthetic complications Review of Systems / Medical History Patient summary reviewed, nursing notes reviewed and pertinent labs reviewed Pulmonary COPD Comments: Former smoker, quit in 1963 Neuro/Psych Within defined limits Cardiovascular Hypertension: well controlled Dysrhythmias : atrial fibrillation CAD, cardiac stents and hyperlipidemia Exercise tolerance: <4 METS Comments: S/p ablation of A. Fib 1st degree AVB Hx of  impella left femoral artery in 7-2019, removed 9-26-19 EKG:  
 
9-27-19 ECHO:60% EF, mild AS, mild Pulmonary HTN  
GI/Hepatic/Renal 
  
GERD: well controlled Renal disease (Stage 3 CKD) Comments: Melena, hgb 8.2 Endo/Other Hypothyroidism: well controlled Obesity, arthritis and anemia Other Findings Comments: Impella left femoral artery - now removed BPH 
hx shingles Physical Exam 
 
Airway Mallampati: II 
TM Distance: > 6 cm Neck ROM: normal range of motion Mouth opening: Normal 
 
 Cardiovascular Regular rate and rhythm,  S1 and S2 normal,  no murmur, click, rub, or gallop Dental 
 
Dentition: Lower partial plate and Full upper dentures Pulmonary Breath sounds clear to auscultation Abdominal 
GI exam deferred Other Findings Anesthetic Plan ASA: 3 Anesthesia type: total IV anesthesia and general 
 
 
 
 
Induction: Intravenous Anesthetic plan and risks discussed with: Patient Propofol MAC Anesthetic History No history of anesthetic complications Review of Systems / Medical History Patient summary reviewed, nursing notes reviewed and pertinent labs reviewed Pulmonary Within defined limits Neuro/Psych Within defined limits Cardiovascular Hypertension: well controlled Dysrhythmias GI/Hepatic/Renal 
  
GERD: well controlled Endo/Other Hypothyroidism: well controlled Other Findings Physical Exam 
 
Airway Mallampati: II 
TM Distance: > 6 cm Neck ROM: normal range of motion Mouth opening: Normal 
 
 Cardiovascular Regular rate and rhythm,  S1 and S2 normal,  no murmur, click, rub, or gallop Dental 
 
Dentition: Lower partial plate and Upper partial plate Pulmonary Breath sounds clear to auscultation Abdominal 
GI exam deferred Other Findings Anesthetic Plan ASA: 2 Anesthesia type: general 
 
 
 
 
Induction: Intravenous Anesthetic plan and risks discussed with: Patient

## 2019-09-30 NOTE — PROGRESS NOTES
Pt transported to room 3259 via stretcher and transporter. Pt in stable condition on discharge from endoscopy. Jagdeep Padilla

## 2019-09-30 NOTE — ANESTHESIA PREPROCEDURE EVALUATION
Anesthetic History No history of anesthetic complications Review of Systems / Medical History Patient summary reviewed, nursing notes reviewed and pertinent labs reviewed Pulmonary COPD Comments: Former smoker, quit in 1963 Neuro/Psych Within defined limits Pertinent negatives: Neuromuscular disease:  
 
 Cardiovascular Hypertension: well controlled Dysrhythmias : atrial fibrillation CAD, cardiac stents and hyperlipidemia Exercise tolerance: <4 METS Comments: LLE DVT 
 
S/P ablation of A. Fib 1st degree AVB Hx of  impella left femoral artery in 7-2019, removed 9-26-19 EKG:  
 
9-27-19 ECHO:60% EF, mild AS, mild Pulmonary HTN  
GI/Hepatic/Renal 
  
GERD: well controlled Renal disease (Stage 3 CKD): CRI Comments: Melena, hgb 8.2 Endo/Other Hypothyroidism: well controlled Arthritis and anemia Pertinent negatives: No obesity and morbid obesity Other Findings Comments: Impella left femoral artery - now removed BPH 
hx shingles Physical Exam 
 
Airway Mallampati: II 
TM Distance: > 6 cm Neck ROM: normal range of motion Mouth opening: Normal 
 
 Cardiovascular Regular rate and rhythm,  S1 and S2 normal,  no murmur, click, rub, or gallop Dental 
 
Dentition: Lower partial plate and Full upper dentures Pulmonary Breath sounds clear to auscultation Abdominal 
GI exam deferred Other Findings Anesthetic Plan ASA: 3 Anesthesia type: total IV anesthesia, MAC and general - backup Monitoring Plan: BIS Induction: Intravenous Anesthetic plan and risks discussed with: Patient Anesthetic History No history of anesthetic complications Review of Systems / Medical History Patient summary reviewed, nursing notes reviewed and pertinent labs reviewed Pulmonary Within defined limits Neuro/Psych Within defined limits Cardiovascular Hypertension: well controlled Dysrhythmias GI/Hepatic/Renal 
  
GERD: well controlled Endo/Other Hypothyroidism: well controlled Other Findings Physical Exam 
 
Airway Mallampati: II 
TM Distance: > 6 cm Neck ROM: normal range of motion Mouth opening: Normal 
 
 Cardiovascular Regular rate and rhythm,  S1 and S2 normal,  no murmur, click, rub, or gallop Dental 
 
Dentition: Lower partial plate and Upper partial plate Pulmonary Breath sounds clear to auscultation Abdominal 
GI exam deferred Other Findings Anesthetic Plan ASA: 2 Anesthesia type: general 
 
 
 
 
Induction: Intravenous Anesthetic plan and risks discussed with: Patient

## 2019-09-30 NOTE — PROGRESS NOTES
Cardiology Progress Note 9/30/2019     Admit Date: 9/26/2019 Admit Diagnosis: Symptomatic anemia [D64.9]  CC: none currently Cardiologist:  Dr Addison Donaldson. 
    
 Cardiac Assessment/Plan:   
- Cath 7/2019 with Dread العلي supported PCI of LM into LAD with balloon side hole to LCx,  
 *cath 8/29/2019 with patent LM stent. On ASA/plavix. - Recent echo with normal EF, mild to mod AS 
- Prox afib, currently in sinus; Amiodarone started recently; felt to be high risk for triple anticoagulation - Hx of aflutter ablation 
- HTN: Resolved: - Low BPs more recently; intolerant of bblockers in past; dilt stopped earlier this month; Low dose midodrine added - Dyslipidemia: on statin - Hypothyroid - GERD 
- BPH (chronic proscar and flomax) - Distant tobacco 
  
+ U/S for DVT 9/18/19; AC started; Noted to have fatigue/weakness at recent f/u visit: found to have anemia; EGD revealed AVM that was clipped, colonoscopy pending. 
  
Rec: He should remain on plavix for LM stenting (and asa OR AC); If able from a GI standpoint, he should be on anticoagulation for recent DVT;  
if he cant be on [facility] [place], would consider a IVC filter if appropriate. If AC is started, would stop asa and continue plavix. 
  
He should remain in amio for his PAFib (none seen so far). He should remain on midodrine for his orthostatic Sx.  
  
9/29: Cont asa/plavix noted; BP stable; NSR on tele. Hg 7; no chems today. No new cardiac recs. 9/30: BP stable; NSR on tele; Hg 10.1; Cr 1.58 Cont asa/plavix noted; No new cardiac rec. Dr Addison Donaldson is back tomorrow. Hospital Problem List: 
Active Problems:   Symptomatic anemia (9/26/2019)  
____________________________________________________________________ Maurilio Diaz is a 80 y.o. male presents with Symptomatic anemia [D64.9].   
As noted in H&P: \"88 y.o.   male who presents with CC listed above. Pt has had multiple admissions in the past 3 months.  Family states that he was diagnosed with a DVT last week and started on Xarelto. Family also state that the pt had a \"good weekend\" but starting on Monday he started to develop the symptoms noted above. Patient states that when he got up in the middle the night to go to the bathroom that he fell and denies any head injury or loss of consciousness but states he just feels weak.  Family notes that he looked pale today and they took him to his cardiologist for follow-up appointment recommended to come to the emergency room for further evaluation because his blood pressure was low on arrival in the office. Pt denies seeing any blood in his stool, and denies noticing any change in color. Pt currently denies any CP or SOB. He does endorse dizziness\" 
  
______________________________________________________________________ 
  
As previously noted: \"The patient reports dyspnea; no CP. 
  
No PND, orthopnea, palpitations, syncope, peripheral edema. No current complaints. 
  
EC/26: NSR Tele: sinus CXR: \"No evidence of an acute cardiopulmonary process\" 
  
Notable labs: Hg 7.7 from 8.4 from 11.9. Cr 1.71 from 1.8 from 1.3. TSH 0.07 2019. Nl troponin; proBNP 4k. 
  
He remains on ASA/plavix. +orthostatics per nursing. \" For other plans, see orders. Hospital problem list  
Active Hospital Problems Diagnosis Date Noted  Gastrointestinal hemorrhage with melena 2019  AVM (arteriovenous malformation) of stomach, acquired 2019  Symptomatic anemia 2019  Acute deep vein thrombosis (DVT) of distal vein of left lower extremity ([address] 2019  Nonrheumatic aortic valve stenosis 2019 Mild to moderate by echocardiogram 2019  ASCVD (arteriosclerotic cardiovascular disease) 2017 Catheterization 2004 80% stenosis nondominant RCA and 30% stenosis of LAD with a notation of some left main disease but no percentage given. No mention of angioplasty or stent made  COPD (chronic obstructive pulmonary disease) ([facility] 2017  Stage 3 chronic kidney disease ([facility] 2017  Glucose intolerance (impaired glucose tolerance) 2017  Atrial fibrillation ([facility] 2017 Subjective: Lennie Hernandez reports Chest pain X none  consistent with:  Non-cardiac CP Atypical CP None now  On going  Anginal CP Dyspnea X none  at rest  with exertion    
    improved  unchanged  worse PND X none  overnight Orthopnea X none  improved  unchanged  worse Presyncope X none  improved  unchanged  worse Ambulated in hallway without symptoms   Yes Ambulated in room without symptoms  Yes Objective:  
 Physical Exam: 
Overall VSSAF;   
Visit Vitals /56 (BP 1 Location: Left arm, BP Patient Position: At rest) Pulse 82 Temp 98.3 °F (36.8 °C) Resp 18 Ht 5' 9\" (1.753 m) Wt 82.6 kg (181 lb 15.8 oz) SpO2 93% BMI 26.88 kg/m² Temp (24hrs), Av.5 °F (36.9 °C), Min:97.7 °F (36.5 °C), Max:100.4 °F (38 °C) Patient Vitals for the past 8 hrs: 
 Pulse 19 0737 82  
19 0413 89  
19 0246 100 Patient Vitals for the past 8 hrs: 
 Resp  
19 0737 18  
19 0246 20 Patient Vitals for the past 8 hrs: 
 BP  
19 0737 118/56  
19 0413 112/41  
19 0246 (!) 140/99 Intake/Output Summary (Last 24 hours) at 2019 4340 Last data filed at 2019 7102 Gross per 24 hour Intake 3910 ml Output 550 ml Net 3360 ml General Appearance: Well developed, elderly/chronically ill, no acute distress. Ears/Nose/Mouth/Throat:   Normal MM; anicteric. JVP: WNL Resp:   clear to auscultation bilaterally. Nl resp effort. Cardiovascular:  RRR, S1, S2 normal, no new murmur. No gallop or rub. Abdomen:   Soft, non-tender, bowel sounds are present. Extremities: No edema bilaterally. Skin: 
Neuro: Warm and dry.  
A/O x3, grossly nonfocal  
 cath site intact w/o hematoma or new bruit; distal pulse unchanged  Yes Data Review:    
Telemetry independently reviewed x sinus  chronic afib  parox afib  NSVT  
 
ECG independently reviewed  NSR  afib  no significant changes  NSST-Tw chgs  
x no new ECG provided for review Lab results reviewed as noted below. Current medications reviewed as noted below. No results for input(s): PH, PCO2, PO2 in the last 72 hours. No results for input(s): CPK, CKMB, CKNDX, TROIQ in the last 72 hours. Recent Labs  
  09/30/19 
0314 09/29/19 
1610 09/29/19 
0127  09/28/19 
0135   --   --   --  138  
K 3.8  --   --   --  4.0  
  --   --   --  109* CO2 24  --   --   --  24 BUN 24*  --   --   --  36* CREA 1.58*  --   --   --  1.71* GFRAA 50*  --   --   --  46* *  --   --   --  93  
CA 8.8  --   --   --  8.2* WBC  --   --   --   --  7.3 HGB 10.1* 8.2* 7.0*   < > 7.7* HCT 32.4* 26.1* 22.2*   < > 24.7*  
PLT  --   --   --   --  180  
 < > = values in this interval not displayed. Lab Results Component Value Date/Time Cholesterol, total 117 06/12/2019 02:00 PM  
 HDL Cholesterol 35 06/12/2019 02:00 PM  
 LDL, calculated 65 06/12/2019 02:00 PM  
 Triglyceride 86 06/12/2019 02:00 PM  
 CHOL/HDL Ratio 3 06/12/2019 02:00 PM  
 
No results for input(s): SGOT, GPT, AP, TBIL, TP, ALB, GLOB, GGT, AML, LPSE in the last 72 hours. No lab exists for component: AMYP, HLPSE No results for input(s): INR, PTP, APTT, INREXT, INREXT in the last 72 hours. No components found for: [place] Current Facility-Administered Medications Medication Dose Route Frequency  0.9% sodium chloride infusion 250 mL  250 mL IntraVENous PRN  
 sodium chloride (NS) flush 5-40 mL  5-40 mL IntraVENous Q8H  
 sodium chloride (NS) flush 5-40 mL  5-40 mL IntraVENous PRN  pantoprazole (PROTONIX) 40 mg in sodium chloride 0.9% 10 mL injection  40 mg IntraVENous Q12H  amiodarone (CORDARONE) tablet 200 mg  200 mg Oral DAILY  aspirin chewable tablet 81 mg  81 mg Oral DAILY  clopidogrel (PLAVIX) tablet 75 mg  75 mg Oral DAILY  finasteride (PROSCAR) tablet 5 mg  5 mg Oral PCD  levothyroxine (SYNTHROID) tablet 137 mcg  137 mcg Oral ACB  midodrine (PROAMITINE) tablet 5 mg  5 mg Oral TID WITH MEALS  simvastatin (ZOCOR) tablet 40 mg  40 mg Oral QHS  tamsulosin (FLOMAX) capsule 0.4 mg  0.4 mg Oral DAILY  temazepam (RESTORIL) capsule 30 mg  30 mg Oral QHS  ondansetron (ZOFRAN) injection 4 mg  4 mg IntraVENous Q4H PRN  
 0.9% sodium chloride infusion 250 mL  250 mL IntraVENous PRN  
 acetaminophen (TYLENOL) tablet 650 mg  650 mg Oral Q4H PRN Taisha Lo MD

## 2019-09-30 NOTE — PERIOP NOTES
1725-Handoff Report from Operating Room to PACU Report received from Heide Xiao and Miles Pratt CRNA regarding [place]. Surgeon(s): 
Darrick Wiley MD  And Procedure(s) (LRB): VENA CAVA FILTER INSERTION (N/A)  confirmed  
with allergies and dressings discussed. Anesthesia type, drugs, patient history, complications, estimated blood loss, vital signs, intake and output, and last pain medication, lines, reversal medications and temperature were reviewed. 1750- TRANSFER - OUT REPORT: 
 
Verbal report given to Jyoti Albrecht RN(name) on [place]  being transferred to [facility](unit) for routine post - op Report consisted of patients Situation, Background, Assessment and  
Recommendations(SBAR). Information from the following report(s) SBAR, Kardex, OR Summary, Procedure Summary, Intake/Output, MAR and Recent Results was reviewed with the receiving nurse. Opportunity for questions and clarification was provided. Patient transported with: 
 Registered Nursex2 
monitor

## 2019-09-30 NOTE — BRIEF OP NOTE
BRIEF OPERATIVE NOTE Date of Procedure: 9/30/2019 Preoperative Diagnosis: filter Postoperative Diagnosis: * No post-op diagnosis entered * Procedure(s): VENA CAVA FILTER INSERTION Surgeon(s) and Role: 
   * Raul Jordan MD - Primary Surgical Staff: 
Circ-1: Bryce Esparza Circ-Intern: Milton Montenegro RN Scrub Tech-1: Simone Pittman Scrub Tech-Relief: Elvi Toscano Surg Asst-1: Angelica Florian Event Time In Time Out Incision Start 1701 Incision Close 1717 Anesthesia: General  
Estimated Blood Loss: 5ccs Specimens: * No specimens in log * Findings: normal size cava, insertion of cook celect filter below renal vein Complications: none Implants: * No implants in log *

## 2019-09-30 NOTE — ANESTHESIA POSTPROCEDURE EVALUATION
Procedure(s): VENA CAVA FILTER INSERTION. general 
 
Anesthesia Post Evaluation Patient location during evaluation: PACU Note status: Adequate. Level of consciousness: responsive to verbal stimuli and sleepy but conscious Pain management: satisfactory to patient Airway patency: patent Anesthetic complications: no 
Cardiovascular status: acceptable Respiratory status: acceptable Hydration status: acceptable Comments: +Post-Anesthesia Evaluation and Assessment Patient: Dutch Hester MRN: [id number]  SSN: [id number] YOB: 1931  Age: 80 y.o. Sex: male Cardiovascular Function/Vital Signs BP (!) 98/36   Pulse 66   Temp 36.7 °C (98.1 °F)   Resp 19   Ht 5' 9\" (1.753 m)   Wt 82.6 kg (181 lb 15.8 oz)   SpO2 97%   BMI 26.88 kg/m² Patient is status post Procedure(s): VENA CAVA FILTER INSERTION. Nausea/Vomiting: Controlled. Postoperative hydration reviewed and adequate. Pain: 
Pain Scale 1: Numeric (0 - 10) (09/30/19 1735) Pain Intensity 1: 0 (09/30/19 1735) Managed. Neurological Status:  
Neuro (WDL): Exceptions to WDL (09/30/19 1730) At baseline. Mental Status and Level of Consciousness: Arousable. Pulmonary Status:  
O2 Device: Nasal cannula (09/30/19 1735) Adequate oxygenation and airway patent. Complications related to anesthesia: None Post-anesthesia assessment completed. No concerns. Signed By: Kary Madrid MD  
 9/30/2019 Post anesthesia nausea and vomiting:  controlled Vitals Value Taken Time BP 98/36 9/30/2019  5:35 PM  
Temp 36.7 °C (98.1 °F) 9/30/2019  5:29 PM  
Pulse 66 9/30/2019  5:45 PM  
Resp 19 9/30/2019  5:45 PM  
SpO2 97 % 9/30/2019  5:45 PM

## 2019-09-30 NOTE — PERIOP NOTES
Patient given urinal.  He voided 75 ml of corine, clear urine. He is hot. Warming blankets removed at his request.  Covered with single sheet.

## 2019-09-30 NOTE — PROGRESS NOTES
KARIME Kim Covering for Dr. Saumya Bose [id number] [address] [facility] Brief Progress Note Patient drank 3.6 L of prep with 400ml left. He admits to multiple liquid darker stools and unable to make it to the bathroom at times. He has been NPO this morning. Temp of 100.4 at 0246, otherwise afebrile this morning. Feeling alright. He is in agreement with proceeding with colonoscopy today, proceed as planned.   
 
KARIME Kim 
09/30/19 
9:55 AM

## 2019-09-30 NOTE — CONSULTS
Vascular Surgery Consult Note 9/30/2019 Subjective:  
 
Mr. Messi Pino is a 81yo CM with a pmhx significant for ASHD, HTN, HLD, Hypothyroidism, COPD, CKD III, AFib, GERD, and BPH. He was admitted to the hospital in July 2019 following cardiac catheterization PCI of LM into LAD with balloon side hole to LCx and Impella device placement. He is known to our service from an Impella device removal on 07/18/2019. Since that admission he was readmitted to the hospital from 08/26/2019-09/04/2019 w/ AFib and prostatitis. On 09/18/2019 he presented for a duplex of the BLE due to swelling and was diagnosed w/ a LLE DVT. He was started on Xarelto. He continues on ASA and Plavix for ESPERANZA placement. On this occasion he was referred to the emergency room after presenting to his cardiologist's weak, hypotensive, and pale w/ a hx of falls. He reported melena. He was found to be anemic on arrival and he was transfused. He had an EGD on 09/27/2019. He was diagnosed with duodenitis and a stomach AVM and is s/p clipping. Despite this he continued to have persistent anemia and a colonoscopy is planned for later today. His Xarelto has been held since admission. He has received a total of 3 units of PRBCs since admission. Past Medical History LLE DVT 
-9/2019 on Xarelto ASHD 
-ESPERANZA placement last 07/2019 on ASA/Plavix Hypertension Hyperlipidemia Hypothyroidism COPD 
CKD III PAFib 
-hx of ablation  
-formerly was not taking OAG for this purpose GERD Stomach AVM 
-s/p clipping 9/2019 Duodenitis BPH Prostatitis Past Surgical History in addition to above Cataract removal  
HEENT-scar tissue removed Hernia repair Left foot ORIF Right total knee arthroplasty Prostate biopsy Family History Problem Relation Age of Onset  Heart Disease Mother  Hypertension Mother  Stroke Mother  Heart Disease Father  Cancer Paternal Grandmother Social History Tobacco Use  
  Smoking status: Former Smoker Packs/day: 0.50 Last attempt to quit: 3/28/1963 Years since quittin.8  Smokeless tobacco: Never Used Substance Use Topics  Alcohol use: Yes Comment: rare/social-beer Patient lives w/ his son. Prior to presenting he was ambulating w/ an unsteady gait and a walker. He has been in a SNF for rehabilitation twice since 2019. Prior to Admission medications Medication Sig Start Date End Date Taking? Authorizing Provider  
temazepam (RESTORIL) 15 mg capsule Take two at bedtime. 19  Yes Emerson Deleon MD  
rivaroxaban [facility]) 20 mg tab tablet Take 1 Tab by mouth daily (with breakfast). 19  Yes Emerson Deleon MD  
amiodarone (CORDARONE) 200 mg tablet Take 1 Tab by mouth daily. 19  Yes Emerson Deleon MD  
midodrine (PROAMITINE) 5 mg tablet Take 1 Tab by mouth three (3) times daily (with meals) for 30 days. 9/4/19 10/4/19 Yes Emerson Deleon MD  
clopidogrel (PLAVIX) 75 mg tab Take 1 Tab by mouth daily. 19  Yes Emerson Deleon MD  
aspirin 81 mg chewable tablet Take 81 mg by mouth daily. Yes Provider, Historical  
tamsulosin (FLOMAX) 0.4 mg capsule TAKE 2 CAPSULES DAILY 3/22/19  Yes Emerson Deleon MD  
levothyroxine (SYNTHROID) 137 mcg tablet Take 137 mcg by mouth Daily (before breakfast). 3/22/19  Yes Emerson Deleon MD  
esomeprazole (NEXIUM) 40 mg capsule Take 1 Cap by mouth daily. 3/22/19  Yes Emerson Deleon MD  
finasteride (PROSCAR) 5 mg tablet Take 1 Tab by mouth daily (after dinner). 18  Yes Emerson Deleon MD  
simvastatin (ZOCOR) 40 mg tablet Take 1 Tab by mouth nightly. 18  Yes Emerson Deleon MD  
albuterol [facility]) 90 mcg/actuation inhaler Take 2 Puffs by inhalation every four (4) hours as needed for Wheezing or Shortness of Breath. 19   Emerson Deleon MD  
 
No Known Allergies Review of Systems Constitutional: Positive for activity change, appetite change and fatigue. Negative for chills, diaphoresis and fever. HENT: Negative. Negative for congestion. Eyes: Negative for visual disturbance. Respiratory: Positive for shortness of breath. Negative for cough. Cardiovascular: Positive for leg swelling. Negative for chest pain. Gastrointestinal: Negative for nausea and vomiting. Endocrine: Negative for polydipsia and polyuria. Genitourinary: Negative. Musculoskeletal: Positive for gait problem and myalgias. Skin: Positive for color change and pallor. Allergic/Immunologic: Negative for immunocompromised state. Neurological: Positive for weakness. Hematological: Negative. Psychiatric/Behavioral: Negative. Objective:  
 
 
Patient Vitals for the past 24 hrs: 
 BP Temp Pulse Resp SpO2  
09/30/19 0737 118/56 98.3 °F (36.8 °C) 82 18 93 % 09/30/19 0413 112/41 99 °F (37.2 °C) 89  93 % 09/30/19 0246 (!) 140/99 100.4 °F (38 °C) 100 20 100 % 09/29/19 2305 113/57 97.8 °F (36.6 °C) 69 18 95 % 09/29/19 2000 123/65 97.7 °F (36.5 °C) 65 18 94 % 09/29/19 1516 119/59 97.9 °F (36.6 °C) 60 18 96 %  
09/29/19 1323 99/54 98.5 °F (36.9 °C) 60 18 97 % 09/29/19 1221 109/58  68  96 %  
09/29/19 1123 103/53  (!) 59  96 %  
09/29/19 1053 112/53 98.6 °F (37 °C) 63  98 %  
09/29/19 1038 111/56 98.5 °F (36.9 °C) 64 18 96 %  
09/29/19 1008 94/51 98.7 °F (37.1 °C) 64 16 98 % Physical Exam  
Constitutional: He is oriented to person, place, and time. Frail, elderly CM in NAD. He appears weak and chronically ill. HENT:  
Head: Normocephalic and atraumatic. Eyes: Pupils are equal, round, and reactive to light. EOM are normal.  
Neck: Normal range of motion. Neck supple. Cardiovascular: Normal rate and regular rhythm. Pulmonary/Chest: Effort normal. No respiratory distress. Abdominal: Soft. He exhibits no distension. Musculoskeletal: Normal range of motion. Neurological: He is alert and oriented to person, place, and time. Skin: There is pallor. Psychiatric: His behavior is normal. Judgment and thought content normal.  
 
Pertinent Test Results:  
Recent Results (from the past 24 hour(s)) HGB & HCT Collection Time: 09/29/19  4:10 PM  
Result Value Ref Range HGB 8.2 (L) 12.1 - 17.0 g/dL HCT 26.1 (L) 36.6 - 50.3 % GLUCOSE, POC Collection Time: 09/30/19  2:55 AM  
Result Value Ref Range Glucose (POC) 119 (H) 65 - 100 mg/dL Performed by Hla Pantoja   
HGB & HCT Collection Time: 09/30/19  3:14 AM  
Result Value Ref Range HGB 10.1 (L) 12.1 - 17.0 g/dL HCT 32.4 (L) 36.6 - 50.3 % METABOLIC PANEL, BASIC Collection Time: 09/30/19  3:14 AM  
Result Value Ref Range Sodium 139 136 - 145 mmol/L Potassium 3.8 3.5 - 5.1 mmol/L Chloride 108 97 - 108 mmol/L  
 CO2 24 21 - 32 mmol/L Anion gap 7 5 - 15 mmol/L Glucose 115 (H) 65 - 100 mg/dL BUN 24 (H) 6 - 20 MG/DL Creatinine 1.58 (H) 0.70 - 1.30 MG/DL  
 BUN/Creatinine ratio 15 12 - 20 GFR est AA 50 (L) >60 ml/min/1.73m2 GFR est non-AA 42 (L) >60 ml/min/1.73m2 Calcium 8.8 8.5 - 10.1 MG/DL Assessmen/Plan:  
 
Consult problem Upper GIB with recent hx of LLE DVT We will plan for filter placement later today. Please keep patient NPO following EGD. Obtain consent. Continue to hold Xarelto. Active problems: 
Stomach AVM 
-s/p clipping 09/27/2019 Acute blood loss anemia 
-s/p 3 units of PRBCs 
-slow to improve post procedure  
-colonoscopy planned for later today Duodenitis GERD 
-on PPI Plan per gastroenterology ASHD 
-ESPERANZA placement last 07/2019  
-ASA/Plavix Hypotension with hx of HTN Hyperlipidemia Hypothyroidism PAFib 
-hx of ablation  
-patient not on OAG for this due to multiple risk factors that outweigh benefit. Plan per cardiology COPD 
-currently not in exacerbation CKD III 
-@ baseline BPH 
 Management of comorbid conditions by primary team. 
 
VTE Prophylaxis: SCDs Disposition: SNF Signed By: Wilbert Alex NP September 30, 2019

## 2019-09-30 NOTE — PROGRESS NOTES
PROGRESS NOTE 
 
NAME:  Geo Oliver :   3/13/1931 MRN:   [id number] Date/Time:  2019 5:38 AM 
Subjective:  
History:  Chart reviewed and patient seen and examined this AM and D/W his nurse and his son at length and all events noted. He was admitted with symptomatic anemia with Hgb fall from 11.9 to 8.4 on admission and since admission to 7.0 requiring 1 U PRBC. He was found on admission to have hemocult positive melanotic stool w/o reno bleeding noted. EGD on  was remarkable for an AVM which was clipped. He had noted no increased frequency of BMs PTA. He denies abdominal pain or other GI c/o and has no  c/o. He has no cardiac or respiratory c/o. He has no neurologic c/o except generalized weakness and has no other c/o on complete ROS. Medications reviewed: 
Current Facility-Administered Medications Medication Dose Route Frequency  0.9% sodium chloride infusion 250 mL  250 mL IntraVENous PRN  
 sodium chloride (NS) flush 5-40 mL  5-40 mL IntraVENous Q8H  
 sodium chloride (NS) flush 5-40 mL  5-40 mL IntraVENous PRN  pantoprazole (PROTONIX) 40 mg in sodium chloride 0.9% 10 mL injection  40 mg IntraVENous Q12H  
 amiodarone (CORDARONE) tablet 200 mg  200 mg Oral DAILY  aspirin chewable tablet 81 mg  81 mg Oral DAILY  clopidogrel (PLAVIX) tablet 75 mg  75 mg Oral DAILY  finasteride (PROSCAR) tablet 5 mg  5 mg Oral PCD  levothyroxine (SYNTHROID) tablet 137 mcg  137 mcg Oral ACB  midodrine (PROAMITINE) tablet 5 mg  5 mg Oral TID WITH MEALS  simvastatin (ZOCOR) tablet 40 mg  40 mg Oral QHS  tamsulosin (FLOMAX) capsule 0.4 mg  0.4 mg Oral DAILY  temazepam (RESTORIL) capsule 30 mg  30 mg Oral QHS  ondansetron (ZOFRAN) injection 4 mg  4 mg IntraVENous Q4H PRN  
 0.9% sodium chloride infusion 250 mL  250 mL IntraVENous PRN  
 acetaminophen (TYLENOL) tablet 650 mg  650 mg Oral Q4H PRN Objective:  
Vitals: 
Visit Vitals /41 (BP 1 Location: Left arm, BP Patient Position: At rest) Pulse 89 Temp 99 °F (37.2 °C) Resp 20 Ht 5' 9\" (1.753 m) Wt 181 lb 15.8 oz (82.6 kg) SpO2 93% BMI 26.88 kg/m² O2 Flow Rate (L/min): 2 l/min O2 Device: Nasal cannula Temp (24hrs), Av.6 °F (37 °C), Min:97.7 °F (36.5 °C), Max:100.4 °F (38 °C) Last 24hr Input/Output: 
 
Intake/Output Summary (Last 24 hours) at 2019 6742 Last data filed at 2019 0000 Gross per 24 hour Intake 3910 ml Output 375 ml Net 3535 ml PHYSICAL EXAM: 
General:     Alert, cooperative, no distress, appears stated age. Head:    Normocephalic, without obvious abnormality, atraumatic. Eyes:    Conjunctivae/corneas clear. PERRLA Nose:   Nares normal. No drainage or sinus tenderness. Throat:     Lips, mucosa, and tongue normal.  No Thrush Neck:   Supple, symmetrical,  no adenopathy, thyroid: non tender 
   no carotid bruit and no JVD. Back:     Symmetric,  No CVA tenderness. Lungs:    Clear to auscultation bilaterally. No Wheezing or Rhonchi. No rales. Heart:    Regular rate and rhythm,  no murmur, rub or gallop. Abdomen:    Soft, non-tender. Not distended. Bowel sounds normal. No masses Extremities:  Extremities normal, atraumatic, No cyanosis. No edema. No clubbing Lymph nodes:  Cervical, supraclavicular normal. 
Neurologic:  Normal strength, Alert and oriented X 3. Skin:                No rash Lab Data Reviewed: 
 
Recent Results (from the past 24 hour(s)) HGB & HCT Collection Time: 19  4:10 PM  
Result Value Ref Range HGB 8.2 (L) 12.1 - 17.0 g/dL HCT 26.1 (L) 36.6 - 50.3 % GLUCOSE, POC Collection Time: 19  2:55 AM  
Result Value Ref Range Glucose (POC) 119 (H) 65 - 100 mg/dL Performed by Mili Oneal   
HGB & HCT Collection Time: 19  3:14 AM  
Result Value Ref Range HGB 10.1 (L) 12.1 - 17.0 g/dL HCT 32.4 (L) 36.6 - 50.3 % METABOLIC PANEL, BASIC  
 Collection Time: 09/30/19  3:14 AM  
Result Value Ref Range Sodium 139 136 - 145 mmol/L Potassium 3.8 3.5 - 5.1 mmol/L Chloride 108 97 - 108 mmol/L  
 CO2 24 21 - 32 mmol/L Anion gap 7 5 - 15 mmol/L Glucose 115 (H) 65 - 100 mg/dL BUN 24 (H) 6 - 20 MG/DL Creatinine 1.58 (H) 0.70 - 1.30 MG/DL  
 BUN/Creatinine ratio 15 12 - 20 GFR est AA 50 (L) >60 ml/min/1.73m2 GFR est non-AA 42 (L) >60 ml/min/1.73m2 Calcium 8.8 8.5 - 10.1 MG/DL Assessment/Plan:  
 
Principal Problem: 
  Gastrointestinal hemorrhage with melena (9/29/2019) Active Problems: 
  Glucose intolerance (impaired glucose tolerance) (8/21/2017) COPD [facility]chronic obstructive [facility] disease) ([facility] (8/21/2017) Stage 3 chronic kidney disease ([facility] (8/21/2017) Atrial fibrillation ([facility] (8/21/2017) ASCVD (arteriosclerotic cardiovascular disease) (8/21/2017) Overview: Catheterization 6/11/2004 80% stenosis nondominant RCA and 30% stenosis of  
    LAD with a notation of some left main disease but no percentage given. No  
    mention of angioplasty or stent made Nonrheumatic aortic valve stenosis (8/27/2019) Overview: Mild to moderate by echocardiogram 8/27/2019 Acute deep vein thrombosis (DVT) of distal vein of left lower extremity ([facility] (9/18/2019) Symptomatic anemia (9/26/2019) AVM (arteriovenous malformation) of stomach, acquired (9/29/2019) 
 
  
___________________________________________________ PLAN: 
 
1. Symptomatic anemia (9/26/2019): s/p transfusion.  sp EGD- AVM clipped. Continue high dose protonix for now. for colonoscopy this am. Hgb 7.0 yesterday am, as pt was somewhat short of breath and orthostatic, transfused one unit. 2. L LE DVT: On xarelto--hold for now. Resume when able.  
3. Triple therapy for anticoagulation. Recent DVT, hx A fib, recent cardiac stents.  Cardiology note reviewed and agree to stop ASA as on Plavix although likely the culprit for his GI Bleed, but recent stents. 4. Will ask vascular surgery to see  if an IVC filter would be reasonable. 5. Could also consider changing xarelto to coumadin and try to keep inr close to 1.8-2.0. Difficult problem.  
6. Hypotension- on midodrine. 7. PAF on Amiodarone 8. Hypothyroidism: Continue usual meds. 9. DNR 
 
 
35 minutes spent in direct care of this extremely high complexity patient today 
 
 
___________________________________________________ Attending Physician: Tiburcio Paredes MD

## 2019-10-01 NOTE — PROGRESS NOTES
0220:Pt  requesting for extra covers stating that \" I am shaking again and feeling very cold\" Pt observed to be shaking. Extra covers provided and Vs  taken : /80, temp 100 oral, HR 99,  SAT 88 on room air 
0225:Pt put on 2 L NC, VS : /88 ,HR 91, Temp 100.0 oral, Sat 100 %. Tylenol 650 mg PO given 0244: Pt now sleeping, no more sharing Bedside and Verbal shift change report given to Tirso Kaye (oncoming nurse) by Edy Elmore RN (offgoing nurse). Report included the following information SBAR, Kardex, Intake/Output, MAR, Accordion, Recent Results and Med Rec Status.

## 2019-10-01 NOTE — PROGRESS NOTES
Problem: Mobility Impaired (Adult and Pediatric) Goal: *Acute Goals and Plan of Care (Insert Text) Description FUNCTIONAL STATUS PRIOR TO ADMISSION: Patient was modified independent using a Rolling walker for functional mobility. HOME SUPPORT PRIOR TO ADMISSION: The patient lived with his son and has multiple daughters that live close by. Physical Therapy Goals Initiated 10/1/2019 1. Patient will move from supine to sit and sit to supine  in bed with supervision/set-up within 7 day(s). 2.  Patient will transfer from bed to chair and chair to bed with supervision/set-up using the least restrictive device within 7 day(s). 3.  Patient will perform sit to stand with supervision/set-up within 7 day(s). 4.  Patient will ambulate with supervision/set-up for 200 feet with the least restrictive device within 7 day(s). 5.  Patient will ascend/descend 4 stairs with 1 handrail(s) with modified independence within 7 day(s). Outcome: Progressing Towards Goal 
 PHYSICAL THERAPY EVALUATION Patient: Ernesto Soriano (83 y.o. male) Date: 10/1/2019 Primary Diagnosis: Symptomatic anemia [D64.9] Procedure(s) (LRB): 
CAPSULE (N/A) Day of Surgery Precautions:     
 
 
ASSESSMENT Based on the objective data described below, the patient presents with low but stable BP with activity, decreased endurance, impaired strength, and endurance following admission for symptomatic anemia. He endorses little energy today and c/o fatigue throughout the session. BP remained stable with the low 100's with position changes making it safe to transfer to a chair. He reports walking long distances with his rolling walker prior to admission ([id number]' in an am) and anticipate good progress towards goals as his activity tolerance improves. Deferred gait training out of the room today after multiple days of being supine and labile BP during session.  He has good family support and will work towards home with home support and HHPT with additional gait tolerance. Current Level of Function Impacting Discharge (mobility/balance): moderate assist for supine to sit and sit to stand Other factors to consider for discharge: alone during the day but family is \"always in and out\" per his daughter Patient will benefit from skilled therapy intervention to address the above noted impairments. PLAN : 
Recommendations and Planned Interventions: bed mobility training, transfer training, gait training and therapeutic exercises Frequency/Duration: Patient will be followed by physical therapy:  5 times a week to address goals. Recommendation for discharge: (in order for the patient to meet his/her long term goals) Physical therapy at least 2 days/week in the home This discharge recommendation: 
Has not yet been discussed the attending provider and/or case management Equipment recommendations for successful discharge (if) home: patient owns DME required for discharge SUBJECTIVE:  
Patient stated I am so tired.  OBJECTIVE DATA SUMMARY:  
HISTORY:   
Past Medical History:  
Diagnosis Date Arrhythmia A-fib GERD (gastroesophageal reflux disease) Hypertension Other ill-defined conditions(799.89) BPH Other ill-defined conditions(799.89) lipids Other ill-defined conditions(799.89)   
 shingles hx Thyroid disease Unspecified adverse effect of anesthesia Past Surgical History:  
Procedure Laterality Date COLONOSCOPY N/A 9/30/2019 COLONOSCOPY performed by Gene Aragon MD at [facility] ENDOSCOPY  
 HX CATARACT REMOVAL    
 HX HEART CATHETERIZATION    
 ablation HX HEART CATHETERIZATION  07/18/2019 Stent HX HEENT    
 scar tissue removed/laser HX HERNIA REPAIR  05/05/2017 Edis Womack MD  
 HX ORTHOPAEDIC    
 left foot fx--hardware HX ORTHOPAEDIC  4/9/14 Right total knee arthroplasty  HX OTHER SURGICAL    
 prostate bx  
 
 
 Personal factors and/or comorbidities impacting plan of care:  
 
Home Situation Home Environment: Private residence One/Two Story Residence: Two story Living Alone: No 
Support Systems: Child(toño)(son) Patient Expects to be Discharged to[de-identified] Private residence Current DME Used/Available at Home: Wheelchair, [address], rolling, Shower chair, Grab bars, [address], [place], straight EXAMINATION/PRESENTATION/DECISION MAKING:  
Critical Behavior: 
Neurologic State: Alert Orientation Level: Oriented X4 Cognition: Follows commands Hearing: Auditory Auditory Impairment: Hard of hearing, bilateral 
Skin:   
Edema:  
Range Of Motion: 
AROM: Within functional limits Strength:   
Strength: Generally decreased, functional 
  
  
  
  
  
  
Tone & Sensation:  
Tone: Normal 
  
  
  
  
Sensation: Intact Coordination: 
Coordination: Within functional limits Vision:  
  
Functional Mobility: 
Bed Mobility: 
  
Supine to Sit: Moderate assistance; Additional time Sit to Supine: Contact guard assistance Transfers: 
  
Stand to Sit: Moderate assistance; Additional time Balance:  
Sitting: Intact Standing: Impaired Standing - Static: Fair Standing - Dynamic : Fair Ambulation/Gait Training: 
Distance (ft): 4 Feet (ft) Assistive Device: Gait belt;Walker, rolling Ambulation - Level of Assistance: Minimal assistance Gait Description (WDL): Exceptions to [facility] Gait Abnormalities: Decreased step clearance;Shuffling gait Base of Support: Narrowed Speed/Izabella: Shuffled Therapeutic Exercises:  
Seated ankle pumps, LAQs, marches, and resisted hip abd x10 each, encouraged additional sets in the chair Functional Measure: 
 
 
  
Physical Therapy Evaluation Charge Determination History Examination Presentation Decision-Making MEDIUM  Complexity : 1-2 comorbidities / personal factors will impact the outcome/ POC  MEDIUM Complexity : 3 Standardized tests and measures addressing body structure, function, activity limitation and / or participation in recreation  LOW Complexity : Stable, uncomplicated  LOW Complexity : FOTO score of  Based on the above components, the patient evaluation is determined to be of the following complexity level: LOW Pain Rating: 
No c/o Activity Tolerance:  
Fair and BP low but stable in low 100s Please refer to the flowsheet for vital signs taken during this treatment. After treatment patient left in no apparent distress:  
Sitting in chair, Call bell within reach and Caregiver / family present COMMUNICATION/EDUCATION:  
The patients plan of care was discussed with: Registered Nurse. Fall prevention education was provided and the patient/caregiver indicated understanding., Patient/family have participated as able in goal setting and plan of care. and Patient/family agree to work toward stated goals and plan of care. Thank you for this referral. 
Dutch Esquivel, PT, DPT Time Calculation: 33 mins

## 2019-10-01 NOTE — PROGRESS NOTES
PROGRESS NOTE 
 
NAME:  Josesito Garcia :   3/13/1931 MRN:   [id number] Date/Time:  10/1/2019 7:45 AM 
Subjective:  
History:  Chart reviewed and patient seen and examined this AM and D/W his nurse and his son at length and all events noted. He was admitted with symptomatic anemia with Hgb falling from 11.9 to 8.4 on admission and since admission to 7.0 requiring 1 U PRBC. He was found on admission to have hemocult positive melanotic stool w/o reno bleeding noted. EGD on  was remarkable for an AVM which was clipped and Colonoscopy yesterday with 2 sessile polyps w/o obvious bleeding site. He had noted no increased frequency of BMs PTA. He denies abdominal pain or other GI c/o and has no  c/o. He has no cardiac or respiratory c/o. He has no neurologic c/o except generalized weakness and has been more confused overnight last night and has no other c/o on complete ROS. Medications reviewed: 
Current Facility-Administered Medications Medication Dose Route Frequency  sodium chloride (NS) flush 5-40 mL  5-40 mL IntraVENous Q8H  
 sodium chloride (NS) flush 5-40 mL  5-40 mL IntraVENous PRN  
 sodium chloride (NS) flush 5-40 mL  5-40 mL IntraVENous Q8H  
 sodium chloride (NS) flush 5-40 mL  5-40 mL IntraVENous PRN  
 lidocaine (PF) (XYLOCAINE) 10 mg/mL (1 %) injection 0.1 mL  0.1 mL SubCUTAneous PRN  
 lactated Ringers infusion  25 mL/hr IntraVENous CONTINUOUS  
 0.9% sodium chloride infusion 250 mL  250 mL IntraVENous PRN  
 sodium chloride (NS) flush 5-40 mL  5-40 mL IntraVENous Q8H  
 sodium chloride (NS) flush 5-40 mL  5-40 mL IntraVENous PRN  pantoprazole (PROTONIX) 40 mg in sodium chloride 0.9% 10 mL injection  40 mg IntraVENous Q12H  
 amiodarone (CORDARONE) tablet 200 mg  200 mg Oral DAILY  aspirin chewable tablet 81 mg  81 mg Oral DAILY  clopidogrel (PLAVIX) tablet 75 mg  75 mg Oral DAILY  finasteride (PROSCAR) tablet 5 mg  5 mg Oral PCD  
  levothyroxine (SYNTHROID) tablet 137 mcg  137 mcg Oral ACB  midodrine (PROAMITINE) tablet 5 mg  5 mg Oral TID WITH MEALS  simvastatin (ZOCOR) tablet 40 mg  40 mg Oral QHS  tamsulosin (FLOMAX) capsule 0.4 mg  0.4 mg Oral DAILY  temazepam (RESTORIL) capsule 30 mg  30 mg Oral QHS  ondansetron (ZOFRAN) injection 4 mg  4 mg IntraVENous Q4H PRN  
 0.9% sodium chloride infusion 250 mL  250 mL IntraVENous PRN  
 acetaminophen (TYLENOL) tablet 650 mg  650 mg Oral Q4H PRN Objective:  
Vitals: 
Visit Vitals /85 (BP 1 Location: Left arm) Pulse 91 Temp 100.1 °F (37.8 °C) Resp 20 Ht 5' 9\" (1.753 m) Wt 181 lb 15.8 oz (82.6 kg) SpO2 100% BMI 26.88 kg/m² O2 Flow Rate (L/min): 2 l/min O2 Device: Nasal cannula Temp (24hrs), Av.6 °F (37 °C), Min:97.7 °F (36.5 °C), Max:100.1 °F (37.8 °C) Last 24hr Input/Output: 
 
Intake/Output Summary (Last 24 hours) at 10/1/2019 0745 Last data filed at 10/1/2019 1162 Gross per 24 hour Intake 622.92 ml Output 850 ml Net -227.08 ml PHYSICAL EXAM: 
General:     Alert, cooperative, no distress but confused this AM, appears stated age. Head:    Normocephalic, without obvious abnormality, atraumatic. Eyes:    Conjunctivae/corneas clear. PERRLA Nose:   Nares normal. No drainage or sinus tenderness. Throat:     Lips, mucosa, and tongue normal.  No Thrush Neck:   Supple, symmetrical,  no adenopathy, thyroid: non tender 
   no carotid bruit and no JVD. Back:     Symmetric,  No CVA tenderness. Lungs:    Clear to auscultation bilaterally. No Wheezing or Rhonchi. No rales. Heart:    Regular rate and rhythm,  no murmur, rub or gallop. Abdomen:    Soft, non-tender. Not distended. Bowel sounds normal. No masses Extremities:  Extremities normal, atraumatic, No cyanosis. No edema. No clubbing Lymph nodes:  Cervical, supraclavicular normal. 
Neurologic:  Normal strength, Alert but confused. Skin:                 No rash Lab Data Reviewed: 
 
Recent Results (from the past 24 hour(s)) HGB & HCT Collection Time: 09/30/19  6:37 PM  
Result Value Ref Range HGB 8.5 (L) 12.1 - 17.0 g/dL HCT 26.8 (L) 36.6 - 50.3 % HGB & HCT Collection Time: 10/01/19  4:34 AM  
Result Value Ref Range HGB 8.5 (L) 12.1 - 17.0 g/dL HCT 27.4 (L) 36.6 - 50.3 % METABOLIC PANEL, BASIC Collection Time: 10/01/19  4:34 AM  
Result Value Ref Range Sodium 140 136 - 145 mmol/L Potassium 3.8 3.5 - 5.1 mmol/L Chloride 110 (H) 97 - 108 mmol/L  
 CO2 24 21 - 32 mmol/L Anion gap 6 5 - 15 mmol/L Glucose 107 (H) 65 - 100 mg/dL BUN 19 6 - 20 MG/DL Creatinine 1.55 (H) 0.70 - 1.30 MG/DL  
 BUN/Creatinine ratio 12 12 - 20 GFR est AA 52 (L) >60 ml/min/1.73m2 GFR est non-AA 43 (L) >60 ml/min/1.73m2 Calcium 8.7 8.5 - 10.1 MG/DL Assessment/Plan:  
 
Principal Problem: 
  Gastrointestinal hemorrhage with melena (9/29/2019) Active Problems: 
  Glucose intolerance (impaired glucose tolerance) (8/21/2017) COPD (chronic obstructive pulmonary disease) ([facility] (8/21/2017) [facility] (8/21/2017) Atrial fibrillation ([facility] (8/21/2017) ASCVD (arteriosclerotic cardiovascular disease) (8/21/2017) Overview: Catheterization 6/11/2004 80% stenosis nondominant RCA and 30% stenosis of  
    LAD with a notation of some left main disease but no percentage given. No  
    mention of angioplasty or stent made Nonrheumatic aortic valve stenosis (8/27/2019) Overview: Mild to moderate by echocardiogram 8/27/2019 Acute deep vein thrombosis (DVT) of distal vein of left lower extremity ([facility] (9/18/2019) Symptomatic anemia (9/26/2019) AVM (arteriovenous malformation) of stomach, acquired (9/29/2019) 
 
  
___________________________________________________ PLAN: 
 
1. Symptomatic anemia (9/26/2019): s/p transfusion.  sp EGD- AVM clipped. Continue high dose protonix for now. for colonoscopy this am. Hgb 7.0 on 9/29 am, as pt was somewhat short of breath and orthostatic, transfused one unit. Now 8.5 2. L LE DVT: Off Xarelto and filter placed yesterday.  
3. Triple therapy for anticoagulation. Recent DVT, hx A fib, recent cardiac stents. Cardiology note reviewed and agree to stop ASA as on Plavix although likely the culprit for his GI Bleed, but recent stents. 4. Appreciate vascular surgery help 5. Pillcam today 6. Hypotension- on midodrine, BP still low so increase to 10 TID. 7. PAF on Amiodarone 8. Hypothyroidism: Continue usual meds 9. With LGF check WBC, UA and CXR now 10. Likely will need SNF Rehab which I D/W son today. 11. DNR 
 
 
35 minutes spent in direct care of this extremely high complexity patient today 
 
 
___________________________________________________ Attending Physician: Sean Parra MD

## 2019-10-01 NOTE — PROGRESS NOTES
Progress Note 
 
 
10/1/2019 2:43 PM 
NAME: Karla Ernst MRN:  [id number] Admit Diagnosis: Symptomatic anemia [D64.9]             VSNQLHE List:  
  
Recent DVT, started on anticoagulation, now with GI bleed 
  
- Cath 7/2019 with impella supported PCI of LM into LAD with balloon side hole to LCx, cath 8/29/2019 with patent LM stent. 
- Normal EF, mild AS 
- Prox afib, currently in sinus - Hx of aflutter ablation 
- HTN 
- Dyslipidemia - Hypothyroid - GERD 
- BPH 
- Distant tobacco 
- DVT 
- GI bleed , lives alone, ADLs, occasionally using cane Son: Dorota Gutierrez.   [phone] 
   
  
            Assessment/Plan:      
  
No chest pain. Euvolemic so far, echo with normal EF, mild to mod AS Prox afib back in sinus, will use amiodarone, high risk for triple anticoagulation DVT now with IVC filter 
  
- Holding anticoagulation - Currently still on aspirin, at this point would cont asa or low dose xarelto, not both 
- Cont plavix, this can not be stopped. - Cont amiodarone - Cont  midodrine. 
- Cont statin 
  
- Cont proscar and flomax 
  
PT/OT Stable from cardiovascular perspective. 
   
  
  
 [x]       High complexity decision making was performed in this patient at high risk for decompensation with multiple organ involvement. Subjective:  
 
Karla Ernst denies chest pain, dyspnea. Discussed with RN events overnight. Review of Systems: 
 
Symptom Y/N Comments  Symptom Y/N Comments Fever/Chills N   Chest Pain N Poor Appetite N   Edema N   
Cough N   Abdominal Pain N Sputum N   Joint Pain N   
SOB/JAFFE N   Pruritis/Rash N   
Nausea/vomit N   Tolerating PT/OT Y Diarrhea N   Tolerating Diet Y Constipation N   Other Could NOT obtain due to:   
 
Objective:  
  
Physical Exam: 
 
Last 24hrs VS reviewed since prior progress note. Most recent are: 
 
Visit Vitals /46 Pulse 68 Temp 98.2 °F (36.8 °C) Resp 20 Ht 5' 9\" (1.753 m) Wt 82.6 kg (181 lb 15.8 oz) SpO2 95% BMI 26.88 kg/m² Intake/Output Summary (Last 24 hours) at 10/1/2019 1443 Last data filed at 10/1/2019 1322 Gross per 24 hour Intake 412.92 ml Output 675 ml Net -262.08 ml General Appearance: Well developed, well nourished, alert & oriented x 3,  
 no acute distress. Ears/Nose/Mouth/Throat: Hearing grossly normal. 
Neck: Supple. Chest: Lungs clear to auscultation bilaterally. Cardiovascular: Regular rate and rhythm, S1S2 normal, no murmur. Abdomen: Soft, non-tender, bowel sounds are active. Extremities: No edema bilaterally. Skin: Warm and dry. PMH/SH reviewed - no change compared to H&P Data Review Telemetry: normal sinus rhythm Lab Data Personally Reviewed: 
 
Recent Labs 10/01/19 
0434 09/30/19 
1837 WBC 12.7*  --   
HGB 8.6*  8.5* 8.5* HCT 27.7*  27.4* 26.8*  
  -- No results for input(s): INR, PTP, APTT, INREXT in the last 72 hours. Recent Labs 10/01/19 
032 433 92 68 09/30/19 
7844  139  
K 3.8 3.8 * 108 CO2 24 24 BUN 19 24* CREA 1.55* 1.58* * 115* CA 8.7 8.8 No results for input(s): CPK, CKNDX, TROIQ in the last 72 hours. No lab exists for component: CPKMB Lab Results Component Value Date/Time Cholesterol, total 117 06/12/2019 02:00 PM  
 HDL Cholesterol 35 06/12/2019 02:00 PM  
 LDL, calculated 65 06/12/2019 02:00 PM  
 Triglyceride 86 06/12/2019 02:00 PM  
 CHOL/HDL Ratio 3 06/12/2019 02:00 PM  
 
 
No results for input(s): SGOT, GPT, AP, TBIL, TP, ALB, GLOB, GGT, AML, LPSE in the last 72 hours. No lab exists for component: AMYP, HLPSE No results for input(s): PH, PCO2, PO2 in the last 72 hours. Medications Personally Reviewed: 
 
Current Facility-Administered Medications Medication Dose Route Frequency  midodrine (PROAMITINE) tablet 10 mg  10 mg Oral TID WITH MEALS  pravastatin (PRAVACHOL) tablet 80 mg  80 mg Oral QHS  sodium chloride (NS) flush 5-40 mL  5-40 mL IntraVENous Q8H  
 sodium chloride (NS) flush 5-40 mL  5-40 mL IntraVENous PRN  
 sodium chloride (NS) flush 5-40 mL  5-40 mL IntraVENous Q8H  
 sodium chloride (NS) flush 5-40 mL  5-40 mL IntraVENous PRN  
 lidocaine (PF) (XYLOCAINE) 10 mg/mL (1 %) injection 0.1 mL  0.1 mL SubCUTAneous PRN  
 lactated Ringers infusion  25 mL/hr IntraVENous CONTINUOUS  
 0.9% sodium chloride infusion 250 mL  250 mL IntraVENous PRN  
 sodium chloride (NS) flush 5-40 mL  5-40 mL IntraVENous Q8H  
 sodium chloride (NS) flush 5-40 mL  5-40 mL IntraVENous PRN  pantoprazole (PROTONIX) 40 mg in sodium chloride 0.9% 10 mL injection  40 mg IntraVENous Q12H  
 amiodarone (CORDARONE) tablet 200 mg  200 mg Oral DAILY  aspirin chewable tablet 81 mg  81 mg Oral DAILY  clopidogrel (PLAVIX) tablet 75 mg  75 mg Oral DAILY  finasteride (PROSCAR) tablet 5 mg  5 mg Oral PCD  levothyroxine (SYNTHROID) tablet 137 mcg  137 mcg Oral ACB  tamsulosin (FLOMAX) capsule 0.4 mg  0.4 mg Oral DAILY  temazepam (RESTORIL) capsule 30 mg  30 mg Oral QHS  ondansetron (ZOFRAN) injection 4 mg  4 mg IntraVENous Q4H PRN  
 0.9% sodium chloride infusion 250 mL  250 mL IntraVENous PRN  
 acetaminophen (TYLENOL) tablet 650 mg  650 mg Oral Q4H PRN Rosita Trujillo MD

## 2019-10-01 NOTE — PROGRESS NOTES
Spiritual Care Assessment/Progress Note [address] 
 
 
NAME: Franko Funk      MRN: [id number] AGE: 80 y.o. SEX: male Restoration Affiliation: Voodoo  
Language: English  
 
10/1/2019     Total Time (in minutes): 10 Spiritual Assessment begun in [facility] 3 MED TELE through conversation with: 
  
    []Patient        [] Family    [] Friend(s) Reason for Consult: Initial/Spiritual assessment, patient floor Spiritual beliefs: (Please include comment if needed) 
   [] Identifies with a leandro tradition:     
   [] Supported by a leandro community:        
   [] Claims no spiritual orientation:       
   [] Seeking spiritual identity:            
   [] Adheres to an individual form of spirituality:       
   [x] Not able to assess:                   
 
    
Identified resources for coping:  
   [] Prayer                           
   [] Music                  [] Guided Imagery 
   [] Family/friends                 [] Pet visits [] Devotional reading                         [x] Unknown 
   [] Other:                                      
 
 
Interventions offered during this visit: (See comments for more details) Patient Interventions: Initial visit Plan of Care: 
 
 [x] Support spiritual and/or cultural needs  
 [] Support AMD and/or advance care planning process    
 [] Support grieving process 
 [] Coordinate Rites and/or Rituals  
 [] Coordination with community clergy [] No spiritual needs identified at this time 
 [] Detailed Plan of Care below (See Comments)  [] Make referral to Music Therapy 
[] Make referral to Pet Therapy    
[] Make referral to Addiction services 
[] Make referral to [facility] 
[] Make referral to Spiritual Care Partner 
[] No future visits requested       
[] Follow up visits as needed Comments:   Attempted visit on Med Tele for spiritual assessment. No family/friends present. Patient was engaged with PT.   Chaplains will return as able for assessment. [facility], [facility] Paging [facility]  [phone])

## 2019-10-01 NOTE — PROGRESS NOTES
R groin suture removed. No issues after IVC filter replacement. Please call back if patient able to go back on anticoagulation so I can remove IVC filter.

## 2019-10-01 NOTE — PROGRESS NOTES
Bedside and Verbal shift change report given to Denia Bruno RN (oncoming nurse) by Erla Hodgkins (offgoing nurse). Report given with SBAR, Kardex, Intake/Output, MAR and Recent Results.

## 2019-10-01 NOTE — PROGRESS NOTES
Gastroenterology Daily Progress Note Rahat Fuller PA-C covering for Dr. Meliza Vasquez) [facility] Admit Date: 9/26/2019 Subjective:   
Patient is doing alright, tired of testing. He denies any pain. No further BM since colonoscopy. No N/V. Swallowed capsule this morning without any difficulty. No ssx of bleeding noted. Labs done today show hgb 8.6, WBC 12.7 Colonoscopy done on 9/30: Findings:  
  
1.  Scope advanced to the cecum and terminal ileum. 2.  There was normal mucosa throughout the colon. 3.  No blood seen. 4.  There were (2) small sessile polyps in the cecum s/p cold snare removal with a single clip placed on one site. 5.  Scattered diverticulosis of the sigmoid colon. 6.  Small internal hemorrhoids Current Facility-Administered Medications Medication Dose Route Frequency  midodrine (PROAMITINE) tablet 10 mg  10 mg Oral TID WITH MEALS  pravastatin (PRAVACHOL) tablet 80 mg  80 mg Oral QHS  sodium chloride (NS) flush 5-40 mL  5-40 mL IntraVENous Q8H  
 sodium chloride (NS) flush 5-40 mL  5-40 mL IntraVENous PRN  
 sodium chloride (NS) flush 5-40 mL  5-40 mL IntraVENous Q8H  
 sodium chloride (NS) flush 5-40 mL  5-40 mL IntraVENous PRN  
 lidocaine (PF) (XYLOCAINE) 10 mg/mL (1 %) injection 0.1 mL  0.1 mL SubCUTAneous PRN  
 lactated Ringers infusion  25 mL/hr IntraVENous CONTINUOUS  
 0.9% sodium chloride infusion 250 mL  250 mL IntraVENous PRN  
 sodium chloride (NS) flush 5-40 mL  5-40 mL IntraVENous Q8H  
 sodium chloride (NS) flush 5-40 mL  5-40 mL IntraVENous PRN  pantoprazole (PROTONIX) 40 mg in sodium chloride 0.9% 10 mL injection  40 mg IntraVENous Q12H  
 amiodarone (CORDARONE) tablet 200 mg  200 mg Oral DAILY  aspirin chewable tablet 81 mg  81 mg Oral DAILY  clopidogrel (PLAVIX) tablet 75 mg  75 mg Oral DAILY  finasteride (PROSCAR) tablet 5 mg  5 mg Oral PCD  
  levothyroxine (SYNTHROID) tablet 137 mcg  137 mcg Oral ACB  tamsulosin (FLOMAX) capsule 0.4 mg  0.4 mg Oral DAILY  temazepam (RESTORIL) capsule 30 mg  30 mg Oral QHS  ondansetron (ZOFRAN) injection 4 mg  4 mg IntraVENous Q4H PRN  
 0.9% sodium chloride infusion 250 mL  250 mL IntraVENous PRN  
 acetaminophen (TYLENOL) tablet 650 mg  650 mg Oral Q4H PRN Objective:  
 
Visit Vitals /53 (BP Patient Position: At rest) Pulse 71 Temp 98.1 °F (36.7 °C) Resp 20 Ht 5' 9\" (1.753 m) Wt 82.6 kg (181 lb 15.8 oz) SpO2 94% BMI 26.88 kg/m² Blood pressure 102/53, pulse 71, temperature 98.1 °F (36.7 °C), resp. rate 20, height 5' 9\" (1.753 m), weight 82.6 kg (181 lb 15.8 oz), SpO2 94 %. No intake/output data recorded. 09/29 1901 - 10/01 0700 In: 4222.9 [P.O.:3600; I.V.:622.9] Out: 850 [Urine:850] Intake/Output Summary (Last 24 hours) at 10/1/2019 1058 Last data filed at 10/1/2019 2661 Gross per 24 hour Intake 622.92 ml Output 675 ml Net -52.08 ml Physical Exam:  
 
 
General: Pale WM, NAD Chest:  CTA, No rhonchi, rales or rubs. Heart: S1, S2, RRR 
GI: Soft, NT, ND + bowel sounds Extremities: no edema Labs:  
 
 
Recent Results (from the past 24 hour(s)) HGB & HCT Collection Time: 09/30/19  6:37 PM  
Result Value Ref Range HGB 8.5 (L) 12.1 - 17.0 g/dL HCT 26.8 (L) 36.6 - 50.3 % HGB & HCT Collection Time: 10/01/19  4:34 AM  
Result Value Ref Range HGB 8.5 (L) 12.1 - 17.0 g/dL HCT 27.4 (L) 36.6 - 50.3 % METABOLIC PANEL, BASIC Collection Time: 10/01/19  4:34 AM  
Result Value Ref Range Sodium 140 136 - 145 mmol/L Potassium 3.8 3.5 - 5.1 mmol/L Chloride 110 (H) 97 - 108 mmol/L  
 CO2 24 21 - 32 mmol/L Anion gap 6 5 - 15 mmol/L Glucose 107 (H) 65 - 100 mg/dL BUN 19 6 - 20 MG/DL Creatinine 1.55 (H) 0.70 - 1.30 MG/DL  
 BUN/Creatinine ratio 12 12 - 20 GFR est AA 52 (L) >60 ml/min/1.73m2 GFR est non-AA 43 (L) >60 ml/min/1.73m2 Calcium 8.7 8.5 - 10.1 MG/DL  
CBC WITH AUTOMATED DIFF Collection Time: 10/01/19  4:34 AM  
Result Value Ref Range WBC 12.7 (H) 4.1 - 11.1 K/uL  
 RBC 3.07 (L) 4.10 - 5.70 M/uL HGB 8.6 (L) 12.1 - 17.0 g/dL HCT 27.7 (L) 36.6 - 50.3 % MCV 90.2 80.0 - 99.0 FL  
 MCH 28.0 26.0 - 34.0 PG  
 MCHC 31.0 30.0 - 36.5 g/dL  
 RDW 16.4 (H) 11.5 - 14.5 % PLATELET 106 348 - 838 K/uL MPV 11.3 8.9 - 12.9 FL  
 NRBC 0.0 0  WBC ABSOLUTE NRBC 0.00 0.00 - 0.01 K/uL NEUTROPHILS 86 (H) 32 - 75 % LYMPHOCYTES 6 (L) 12 - 49 % MONOCYTES 7 5 - 13 % EOSINOPHILS 1 0 - 7 % BASOPHILS 0 0 - 1 % IMMATURE GRANULOCYTES 0 0.0 - 0.5 % ABS. NEUTROPHILS 10.9 (H) 1.8 - 8.0 K/UL  
 ABS. LYMPHOCYTES 0.8 0.8 - 3.5 K/UL  
 ABS. MONOCYTES 0.9 0.0 - 1.0 K/UL  
 ABS. EOSINOPHILS 0.1 0.0 - 0.4 K/UL  
 ABS. BASOPHILS 0.0 0.0 - 0.1 K/UL  
 ABS. IMM. GRANS. 0.1 (H) 0.00 - 0.04 K/UL  
 DF AUTOMATED    
LABRCNT(wbc:2,hgb:2,hct:2,plt:2,) Recent Labs 10/01/19 
032 433 92 68 09/30/19 
4365  139  
K 3.8 3.8 * 108 CO2 24 24 BUN 19 24* CREA 1.55* 1.58* * 115* CA 8.7 8.8 LABRCNT(sgot:3,gpt:3,ap:3,tbiL:3,TP:3,ALB:3,GLOB:3,ggt:3,aml:3,amyp:3,lpse:3,hlpse:3)No results for input(s): INR, PTP, APTT, INREXT in the last 72 hours. No results for input(s): SGOT, GPT, AP, TBIL, TP, ALB, GLOB, GGT, AML, LPSE in the last 72 hours. No lab exists for component: AMYP, HLPSEBRIEFLAB(B12,FOL,FOLAT,RBCF)No results found for: FOL, RBCFLABRCNT(CPK:3,CpKMB:3,ckndx:3,troiq:3)No components found for: GLPOCBRIEFLAB(CHOL,CHOLX,CHOLP,CHLST,CHOLV,HDL,HDLC,HDLP,LDL,DLDL,LDLC,DLDLP,TGL,TGLX,TRIGL,TRIGP,CHHD,CHHDX)No results for input(s): PH, PCO2, PO2 in the last 72 hours. LABRCNT(CPK:3,CpKMB:3,ckndx:3,troiq:3)KARIME Trotter No results for input(s): CPK, CKNDX, TROIQ in the last 72 hours.  
 
No lab exists for component: KARIME Minor 
 
 
 Problem List:  
 
Principal Problem: 
  Gastrointestinal hemorrhage with melena (9/29/2019) Active Problems: 
  Glucose intolerance (impaired glucose tolerance) (8/21/2017) COPD (chronic obstructive pulmonary disease) ([facility] (8/21/2017) Stage 3 chronic kidney disease ([facility] (8/21/2017) Atrial fibrillation ([facility] (8/21/2017) ASCVD (arteriosclerotic cardiovascular disease) (8/21/2017) Overview: Catheterization 6/11/2004 80% stenosis nondominant RCA and 30% stenosis of  
    LAD with a notation of some left main disease but no percentage given. No  
    mention of angioplasty or stent made Nonrheumatic aortic valve stenosis (8/27/2019) Overview: Mild to moderate by echocardiogram 8/27/2019 Acute deep vein thrombosis (DVT) [facility] distal [facility] (9/18/2019) Symptomatic anemia (9/26/2019) AVM (arteriovenous malformation) of stomach, acquired (9/29/2019) Impression: 
Melena Acute GI blood loss anemia CAD s/p stent placement in 7/2019 A flutter s/p ablation Chronic anticoagulatio non ASA/Plavix/Xarelto LLE DVT Gastric AVM- nonbleeding Colon polyps (2) in cecum s/p polypectomy, path pending Plan: No source of bleeding on colonoscopy. Swallowed capsule today, study should conclude later this afternoon. Hgb is stable, continue to monitor. Further management pending results of capsule study. Path pending from polypectomy. KARIME Rajan 
 
10/1/2019 
10:58 AM  
[address] [facility], [address] [id number] Loc: [phone]

## 2019-10-01 NOTE — PROGRESS NOTES
Transition of Care Plan:     
Therapy recommends SNF at d/c. Will discuss with pt. Pt would benefit from PT consult while admitted Pt would benefit from dispatch health resources at [place] Needs DDNR prior to d/c. Nurse Navigator//[facility] notified of admission Pt will need 2nd IM letter at [facility] called and left a VM for dtr Maximus Jean-Baptiste regarding SNF choice.

## 2019-10-02 PROBLEM — J18.9 PNEUMONIA DUE TO INFECTIOUS ORGANISM: Status: ACTIVE | Noted: 2019-01-01

## 2019-10-02 NOTE — PROGRESS NOTES
Transition of Care Plan:     
Therapy recommends SNF at d/c.  
-Discussed with MD who wants to pursue [address] [facility] SNF due to pt wanting a private room 
-MD anticipates Friday d/c if stable Needs DDNR prior to d/c. Nurse Navigator//[facility] [employer] notified of admission Pt will need 2nd IM letter at [address]

## 2019-10-02 NOTE — PROGRESS NOTES
Initial Nutrition Assessment: 
 
INTERVENTIONS/RECOMMENDATIONS:  
· Meals/Snacks: General/healthful diet: continue diet order · Supplements: Commercial supplement: Ensure Clear BID ASSESSMENT:  
10/2 chart reviewed. Medically noted for: GI hemorrhage, CKD, glu intolerance, skin CA. Dietetic intern visited pt to assess PO intake and appetite. Pt reports to poor PO and appetite. This contradicts with nursing input of % of meals eaten. Pt reports to no n/v/d, abdominal pain, or wt loss. Intern recommended Ensure Clear BID to support nutritional needs. Pt was agreeable. Diet Order: Cardiac 
% Eaten:   
Patient Vitals for the past 72 hrs: 
 % Diet Eaten 10/02/19 0914 80 % 10/01/19 1322 75 % Pertinent Medications: [x]Reviewed []Other: protonix Pertinent Labs: [x]Reviewed []Other: hgb (7.5), hct (24.9), cl (109), creat (1.32), Ca (8.4) Food Allergies: [x]None []Other Last BM: 9/30   []Active     []Hyperactive  [x]Hypoactive       [] Absent BS Skin:    [] Intact   [x] Incision  [] Breakdown  [] Other: Anthropometrics:  
Height: 5' 9\" (175.3 cm) Weight: 82.6 kg (181 lb 15.8 oz) IBW (%IBW):   ( ) UBW (%UBW):   (  %) Last Weight Metrics: 
Weight Loss Metrics 9/27/2019 9/18/2019 9/11/2019 8/27/2019 8/26/2019 8/26/2019 8/26/2019 Today's Wt 181 lb 15.8 oz 181 lb 9.6 oz 183 lb 182 lb 5.1 oz - 182 lb 6.4 oz -  
BMI 26.88 kg/m2 26.82 kg/m2 27.02 kg/m2 - 26.92 kg/m2 - 26.94 kg/m2 BMI: Body mass index is 26.88 kg/m². This BMI is indicative of: 
 []Underweight    []Normal    [x]Overweight    [] Obesity   [] Extreme Obesity (BMI>40) Estimated Nutrition Needs (Based on):  
1915 Kcals/day(BMR 1473 X AF 1.3) , 83 g(82.6 kg X 1) Protein Carbohydrate: At Least 130 g/day  Fluids: 1915 mL/day (1ml/kcal) NUTRITION DIAGNOSES:  
Problem:  Inadequate protein-energy intake Etiology: related to poor PO and appetite Signs/Symptoms: as evidenced by pt report of inadequate PO    
 
 NUTRITION INTERVENTIONS: 
Meals/Snacks: General/healthful diet   Supplements: Commercial supplement GOAL:  
PO intake >50% of meals in 3-5 days LEARNING NEEDS (Diet, Food/Nutrient-Drug Interaction):  
 [x] None Identified 
 [] Identified and Education Provided/Documented 
 [] Identified and Pt declined/was not appropriate Cultureal, Voodoo, OR Ethnic Dietary Needs:  
 [x] None Identified 
 [] Identified and Addressed 
 
 [x] Interdisciplinary Care Plan Reviewed/Documented  
 [x] Discharge Planning: general healthy diet MONITORING /EVALUATION:  
  
Food/Nutrient Intake Outcomes: Total energy intake Physical Signs/Symptoms Outcomes: Weight/weight change, Electrolyte and renal profile, Glucose profile, GI 
 
NUTRITION RISK:  
 [x] Patient At Nutritional Risk  
 [] Patient Not At Nutritional Risk PT SEEN FOR:  
 []  MD Consult: []Calorie Count []Diabetic Diet Education []Diet Education []Electrolyte Management []General Nutrition Management and Supplements []Management of Tube Feeding []TPN Recommendations []  RN Referral:  []MST score >=2 
   []Enteral/Parenteral Nutrition PTA []Pregnant: Gestational DM or Multigestation 
   []Pressure Ulcer/Wound Care needs 
     
[]  Low BMI [x]  ANDRES Mitchell Pager [phone] Weekend Pager [phone]

## 2019-10-02 NOTE — PROGRESS NOTES
Bedside shift change report given to Alma Ogden (oncoming nurse) by Devon Mayorga (offgoing nurse). Report included the following information SBAR, Kardex, Procedure Summary, Intake/Output, MAR and Recent Results.

## 2019-10-02 NOTE — PROGRESS NOTES
Gastroenterology Daily Progress Note (Yann Steele PA-C covering for Dr. Haile Stewart) [facility] Admit Date: 9/26/2019 Subjective:   
Patient is doing alright today, he is a little more fatigued but eating lunch. No pain, N/V. Denies any BM thus far since colonoscopy, does not think he passed capsule but has not had much to eat. No s/sx of bleeding. Started on rocephin for possible early pneumonia seen on Xray. Hgb decreased to 7.5 today, WBC increased to 13.8 Capsule study completed yesterday report pending Current Facility-Administered Medications Medication Dose Route Frequency  midodrine (PROAMITINE) tablet 10 mg  10 mg Oral TID WITH MEALS  pravastatin (PRAVACHOL) tablet 80 mg  80 mg Oral QHS  cefTRIAXone (ROCEPHIN) 1 g in 0.9% sodium chloride (MBP/ADV) 50 mL  1 g IntraVENous Q24H  
 sodium chloride (NS) flush 5-40 mL  5-40 mL IntraVENous Q8H  
 sodium chloride (NS) flush 5-40 mL  5-40 mL IntraVENous PRN  
 sodium chloride (NS) flush 5-40 mL  5-40 mL IntraVENous Q8H  
 sodium chloride (NS) flush 5-40 mL  5-40 mL IntraVENous PRN  
 lidocaine (PF) (XYLOCAINE) 10 mg/mL (1 %) injection 0.1 mL  0.1 mL SubCUTAneous PRN  
 0.9% sodium chloride infusion 250 mL  250 mL IntraVENous PRN  
 sodium chloride (NS) flush 5-40 mL  5-40 mL IntraVENous Q8H  
 sodium chloride (NS) flush 5-40 mL  5-40 mL IntraVENous PRN  pantoprazole (PROTONIX) 40 mg in sodium chloride 0.9% 10 mL injection  40 mg IntraVENous Q12H  
 amiodarone (CORDARONE) tablet 200 mg  200 mg Oral DAILY  clopidogrel (PLAVIX) tablet 75 mg  75 mg Oral DAILY  finasteride (PROSCAR) tablet 5 mg  5 mg Oral PCD  levothyroxine (SYNTHROID) tablet 137 mcg  137 mcg Oral ACB  tamsulosin (FLOMAX) capsule 0.4 mg  0.4 mg Oral DAILY  temazepam (RESTORIL) capsule 30 mg  30 mg Oral QHS  ondansetron (ZOFRAN) injection 4 mg  4 mg IntraVENous Q4H PRN  
  0.9% sodium chloride infusion 250 mL  250 mL IntraVENous PRN  
 acetaminophen (TYLENOL) tablet 650 mg  650 mg Oral Q4H PRN Objective:  
 
Visit Vitals /53 (BP 1 Location: Left arm, BP Patient Position: At rest;Supine) Pulse 64 Temp 98.8 °F (37.1 °C) Resp 17 Ht 5' 9\" (1.753 m) Wt 82.6 kg (181 lb 15.8 oz) SpO2 96% BMI 26.88 kg/m² Blood pressure 112/53, pulse 64, temperature 98.8 °F (37.1 °C), resp. rate 17, height 5' 9\" (1.753 m), weight 82.6 kg (181 lb 15.8 oz), SpO2 96 %. 10/02 0701 - 10/02 1900 In: 600 [P.O.:300; I.V.:300] Out: 100 [Urine:100] 09/30 1901 - 10/02 0700 In: 362.9 [P.O.:240; I.V.:122.9] Out: 1000 [Urine:1000] Intake/Output Summary (Last 24 hours) at 10/2/2019 1151 Last data filed at 10/2/2019 1135 Gross per 24 hour Intake 890 ml Output 500 ml Net 390 ml Physical Exam:  
 
 
General: Elderly WM, NAD, pale conjuctiva Chest:  CTA, No rhonchi, rales or rubs. Heart: S1, S2, RRR 
GI: Soft, NT, ND + bowel sounds Extremities: no edema Labs:  
 
 
Recent Results (from the past 24 hour(s)) URINALYSIS W/MICROSCOPIC Collection Time: 10/01/19  6:25 PM  
Result Value Ref Range Color YELLOW/STRAW Appearance CLEAR CLEAR Specific gravity 1.026 1.003 - 1.030    
 pH (UA) 5.5 5.0 - 8.0 Protein NEGATIVE  NEG mg/dL Glucose NEGATIVE  NEG mg/dL Ketone NEGATIVE  NEG mg/dL Bilirubin NEGATIVE  NEG Blood NEGATIVE  NEG Urobilinogen 0.2 0.2 - 1.0 EU/dL Nitrites NEGATIVE  NEG Leukocyte Esterase NEGATIVE  NEG    
 WBC 0-4 0 - 4 /hpf  
 RBC 0-5 0 - 5 /hpf Epithelial cells FEW FEW /lpf Bacteria NEGATIVE  NEG /hpf Hyaline cast 0-2 0 - 5 /lpf HGB & HCT Collection Time: 10/01/19  6:25 PM  
Result Value Ref Range HGB 9.5 (L) 12.1 - 17.0 g/dL HCT 30.7 (L) 36.6 - 50.3 % METABOLIC PANEL, BASIC Collection Time: 10/02/19  5:11 AM  
Result Value Ref Range  Sodium 138 136 - 145 mmol/L  
 Potassium 3.7 3.5 - 5.1 mmol/L Chloride 109 (H) 97 - 108 mmol/L  
 CO2 23 21 - 32 mmol/L Anion gap 6 5 - 15 mmol/L Glucose 107 (H) 65 - 100 mg/dL BUN 17 6 - 20 MG/DL Creatinine 1.37 (H) 0.70 - 1.30 MG/DL  
 BUN/Creatinine ratio 12 12 - 20 GFR est AA 59 (L) >60 ml/min/1.73m2 GFR est non-AA 49 (L) >60 ml/min/1.73m2 Calcium 8.4 (L) 8.5 - 10.1 MG/DL  
CBC WITH AUTOMATED DIFF Collection Time: 10/02/19  5:11 AM  
Result Value Ref Range WBC 13.8 (H) 4.1 - 11.1 K/uL  
 RBC 2.72 (L) 4.10 - 5.70 M/uL HGB 7.5 (L) 12.1 - 17.0 g/dL HCT 24.9 (L) 36.6 - 50.3 % MCV 91.5 80.0 - 99.0 FL  
 MCH 27.6 26.0 - 34.0 PG  
 MCHC 30.1 30.0 - 36.5 g/dL  
 RDW 16.2 (H) 11.5 - 14.5 % PLATELET 679 259 - 683 K/uL MPV 10.6 8.9 - 12.9 FL  
 NRBC 0.0 0  WBC ABSOLUTE NRBC 0.00 0.00 - 0.01 K/uL NEUTROPHILS 78 (H) 32 - 75 % LYMPHOCYTES 11 (L) 12 - 49 % MONOCYTES 9 5 - 13 % EOSINOPHILS 1 0 - 7 % BASOPHILS 0 0 - 1 % IMMATURE GRANULOCYTES 1 (H) 0.0 - 0.5 % ABS. NEUTROPHILS 10.7 (H) 1.8 - 8.0 K/UL  
 ABS. LYMPHOCYTES 1.5 0.8 - 3.5 K/UL  
 ABS. MONOCYTES 1.3 (H) 0.0 - 1.0 K/UL  
 ABS. EOSINOPHILS 0.1 0.0 - 0.4 K/UL  
 ABS. BASOPHILS 0.0 0.0 - 0.1 K/UL  
 ABS. IMM. GRANS. 0.1 (H) 0.00 - 0.04 K/UL  
 DF AUTOMATED    
LABRCNT(wbc:2,hgb:2,hct:2,plt:2,) Recent Labs 10/02/19 
5334 10/01/19 
0434 09/30/19 
0763  140 139  
K 3.7 3.8 3.8 * 110* 108 CO2 23 24 24 BUN 17 19 24* CREA 1.37* 1.55* 1.58* * 107* 115* CA 8.4* 8.7 8.8 LABRCNT(sgot:3,gpt:3,ap:3,tbiL:3,TP:3,ALB:3,GLOB:3,ggt:3,aml:3,amyp:3,lpse:3,hlpse:3)No results for input(s): INR, PTP, APTT, INREXT, INREXT in the last 72 hours. No results for input(s): SGOT, GPT, AP, TBIL, TP, ALB, GLOB, GGT, AML, LPSE in the last 72 hours.  
 
No lab exists for component: AMYP, HLPSEBRIEFLAB(B12,FOL,FOLAT,RBCF)No results found for: FOL, RBCFLABRCNT(CPK:3,CpKMB:3,ckndx:3,troiq:3)No components found for: GLPOCBRIEFLAB(CHOL,CHOLX,CHOLP,CHLST,CHOLV,HDL,HDLC,HDLP,LDL,DLDL,LDLC,DLDLP,TGL,TGLX,TRIGL,TRIGP,CHHD,CHHDX)No results for input(s): PH, PCO2, PO2 in the last 72 hours. LABRCNT(CPK:3,CpKMB:3,ckndx:3,troiq:3)KARIME Pennington No results for input(s): CPK, CKNDX, TROIQ in the last 72 hours. No lab exists for component: KARIME Alvarez Problem List:  
 
Principal Problem: 
  Gastrointestinal hemorrhage with melena (9/29/2019) Active Problems: 
  Glucose intolerance (impaired glucose tolerance) (8/21/2017) COPD (chronic obstructive pulmonary disease) ([facility] [address] (8/21/2017) Stage 3 chronic kidney disease ([facility] [address] (8/21/2017) Atrial fibrillation ([facility] [address] (8/21/2017) ASCVD (arteriosclerotic cardiovascular disease) (8/21/2017) Overview: Catheterization 6/11/2004 80% stenosis nondominant RCA and 30% stenosis of  
    LAD with a notation of some left main disease but no percentage given. No  
    mention of angioplasty or stent made Nonrheumatic aortic valve stenosis (8/27/2019) Overview: Mild to moderate by echocardiogram 8/27/2019 Acute deep vein thrombosis (DVT) of distal vein of left lower extremity ([facility] [address] (9/18/2019) Symptomatic anemia (9/26/2019) AVM (arteriovenous malformation) of stomach, acquired (9/29/2019) Pneumonia due to infectious organism (10/2/2019) Overview: LLL 10/1/2019 Impression: 
Melena Acute GI blood loss anemia CAD s/p stent placement in 7/2019 A flutter s/p ablation Chronic anticoagulatio non ASA/Plavix/Xarelto LLE DVT Gastric AVM- nonbleeding Colon polyps (2) in cecum s/p polypectomy, path pending Pneumoia Plan: 
Capsule study completed yesterday, report pending. No s/sx of bleeding however hgb decreased to 7.5 today. Newly dx pneumonia on CXR. - Results of capsule to determine further management 
- Continue to monitor hgb, transfuse as needed - Cont. abx per primary care team for pneumonia - Continue to monitor stools KARIME Keller 
 
10/2/2019 
12:15 PM 
 
[address] [facility], [address] [address] Loc: [phone]

## 2019-10-02 NOTE — PROGRESS NOTES
Physical Therapy Chart reviewed and spoke with nursing. Patient with drop in Hgb and is now 7.5. Nursing reporting patient with increased fatigue today and did not tolerate OOB to chair this am. Requesting therapy be deferred today. Will follow back tomorrow.  
Danielle Osei, PT

## 2019-10-02 NOTE — PROGRESS NOTES
PROGRESS NOTE 
 
NAME:  Sonia Huynh :   3/13/1931 MRN:   [id number] Date/Time:  10/2/2019 7:40 AM 
Subjective:  
History:  Chart reviewed and patient seen and examined this AM and D/W his nurse and his son at length and all events noted. He was admitted with symptomatic anemia with Hgb falling from 11.9 to 8.4 on admission and since admission to 7.0 requiring 1 U PRBC. He was found on admission to have hemocult positive melanotic stool w/o reno bleeding noted. EGD on  was remarkable for an AVM which was clipped and Colonoscopy on  with 2 sessile polyps w/o obvious bleeding site. He had noted no increased frequency of BMs PTA. He denies abdominal pain or other GI c/o and has no  c/o. He has no cardiac or respiratory c/o. He has no neurologic c/o except generalized weakness and has been a little more confused at night. He has no other c/o on complete ROS. Medications reviewed: 
Current Facility-Administered Medications Medication Dose Route Frequency  midodrine (PROAMITINE) tablet 10 mg  10 mg Oral TID WITH MEALS  pravastatin (PRAVACHOL) tablet 80 mg  80 mg Oral QHS  cefTRIAXone (ROCEPHIN) 1 g in 0.9% sodium chloride (MBP/ADV) 50 mL  1 g IntraVENous Q24H  
 sodium chloride (NS) flush 5-40 mL  5-40 mL IntraVENous Q8H  
 sodium chloride (NS) flush 5-40 mL  5-40 mL IntraVENous PRN  
 sodium chloride (NS) flush 5-40 mL  5-40 mL IntraVENous Q8H  
 sodium chloride (NS) flush 5-40 mL  5-40 mL IntraVENous PRN  
 lidocaine (PF) (XYLOCAINE) 10 mg/mL (1 %) injection 0.1 mL  0.1 mL SubCUTAneous PRN  
 lactated Ringers infusion  25 mL/hr IntraVENous CONTINUOUS  
 0.9% sodium chloride infusion 250 mL  250 mL IntraVENous PRN  
 sodium chloride (NS) flush 5-40 mL  5-40 mL IntraVENous Q8H  
 sodium chloride (NS) flush 5-40 mL  5-40 mL IntraVENous PRN  pantoprazole (PROTONIX) 40 mg in sodium chloride 0.9% 10 mL injection  40 mg IntraVENous Q12H  amiodarone (CORDARONE) tablet 200 mg  200 mg Oral DAILY  aspirin chewable tablet 81 mg  81 mg Oral DAILY  clopidogrel (PLAVIX) tablet 75 mg  75 mg Oral DAILY  finasteride (PROSCAR) tablet 5 mg  5 mg Oral PCD  levothyroxine (SYNTHROID) tablet 137 mcg  137 mcg Oral ACB  tamsulosin (FLOMAX) capsule 0.4 mg  0.4 mg Oral DAILY  temazepam (RESTORIL) capsule 30 mg  30 mg Oral QHS  ondansetron (ZOFRAN) injection 4 mg  4 mg IntraVENous Q4H PRN  
 0.9% sodium chloride infusion 250 mL  250 mL IntraVENous PRN  
 acetaminophen (TYLENOL) tablet 650 mg  650 mg Oral Q4H PRN Objective:  
Vitals: 
Visit Vitals /58 (BP 1 Location: Left arm, BP Patient Position: At rest;Supine) Pulse 68 Temp 98.6 °F (37 °C) Resp 18 Ht 5' 9\" (1.753 m) Wt 181 lb 15.8 oz (82.6 kg) SpO2 95% BMI 26.88 kg/m² O2 Flow Rate (L/min): 2 l/min O2 Device: Nasal cannula Temp (24hrs), Av.7 °F (37.1 °C), Min:98.1 °F (36.7 °C), Max:99.6 °F (37.6 °C) Last 24hr Input/Output: 
 
Intake/Output Summary (Last 24 hours) at 10/2/2019 0747 Last data filed at 10/2/2019 5447 Gross per 24 hour Intake 590 ml Output 400 ml Net 190 ml PHYSICAL EXAM: 
General:     Alert, cooperative, no distress but confused this AM, appears stated age. Head:    Normocephalic, without obvious abnormality, atraumatic. Eyes:    Conjunctivae/corneas clear. PERRLA Nose:   Nares normal. No drainage or sinus tenderness. Throat:     Lips, mucosa, and tongue normal.  No Thrush Neck:   Supple, symmetrical,  no adenopathy, thyroid: non tender 
   no carotid bruit and no JVD. Back:     Symmetric,  No CVA tenderness. Lungs:    Clear to auscultation bilaterally. No Wheezing or Rhonchi. No rales. Heart:    Regular rate and rhythm,  no murmur, rub or gallop. Abdomen:    Soft, non-tender. Not distended. Bowel sounds normal. No masses Extremities:  Extremities normal, atraumatic, No cyanosis. No edema. No clubbing Lymph nodes:  Cervical, supraclavicular normal. 
Neurologic:  Normal strength, Alert but confused. Skin:                 No rash Lab Data Reviewed: 
 
Recent Results (from the past 24 hour(s)) URINALYSIS W/MICROSCOPIC Collection Time: 10/01/19  6:25 PM  
Result Value Ref Range Color YELLOW/STRAW Appearance CLEAR CLEAR Specific gravity 1.026 1.003 - 1.030    
 pH (UA) 5.5 5.0 - 8.0 Protein NEGATIVE  NEG mg/dL Glucose NEGATIVE  NEG mg/dL Ketone NEGATIVE  NEG mg/dL Bilirubin NEGATIVE  NEG Blood NEGATIVE  NEG Urobilinogen 0.2 0.2 - 1.0 EU/dL Nitrites NEGATIVE  NEG Leukocyte Esterase NEGATIVE  NEG    
 WBC 0-4 0 - 4 /hpf  
 RBC 0-5 0 - 5 /hpf Epithelial cells FEW FEW /lpf Bacteria NEGATIVE  NEG /hpf Hyaline cast 0-2 0 - 5 /lpf HGB & HCT Collection Time: 10/01/19  6:25 PM  
Result Value Ref Range HGB 9.5 (L) 12.1 - 17.0 g/dL HCT 30.7 (L) 36.6 - 50.3 % METABOLIC PANEL, BASIC Collection Time: 10/02/19  5:11 AM  
Result Value Ref Range Sodium 138 136 - 145 mmol/L Potassium 3.7 3.5 - 5.1 mmol/L Chloride 109 (H) 97 - 108 mmol/L  
 CO2 23 21 - 32 mmol/L Anion gap 6 5 - 15 mmol/L Glucose 107 (H) 65 - 100 mg/dL BUN 17 6 - 20 MG/DL Creatinine 1.37 (H) 0.70 - 1.30 MG/DL  
 BUN/Creatinine ratio 12 12 - 20 GFR est AA 59 (L) >60 ml/min/1.73m2 GFR est non-AA 49 (L) >60 ml/min/1.73m2 Calcium 8.4 (L) 8.5 - 10.1 MG/DL  
CBC WITH AUTOMATED DIFF Collection Time: 10/02/19  5:11 AM  
Result Value Ref Range WBC 13.8 (H) 4.1 - 11.1 K/uL  
 RBC 2.72 (L) 4.10 - 5.70 M/uL HGB 7.5 (L) 12.1 - 17.0 g/dL HCT 24.9 (L) 36.6 - 50.3 % MCV 91.5 80.0 - 99.0 FL  
 MCH 27.6 26.0 - 34.0 PG  
 MCHC 30.1 30.0 - 36.5 g/dL  
 RDW 16.2 (H) 11.5 - 14.5 % PLATELET 497 655 - 257 K/uL MPV 10.6 8.9 - 12.9 FL  
 NRBC 0.0 0  WBC ABSOLUTE NRBC 0.00 0.00 - 0.01 K/uL NEUTROPHILS 78 (H) 32 - 75 % LYMPHOCYTES 11 (L) 12 - 49 % MONOCYTES 9 5 - 13 % EOSINOPHILS 1 0 - 7 % BASOPHILS 0 0 - 1 % IMMATURE GRANULOCYTES 1 (H) 0.0 - 0.5 % ABS. NEUTROPHILS 10.7 (H) 1.8 - 8.0 K/UL  
 ABS. LYMPHOCYTES 1.5 0.8 - 3.5 K/UL  
 ABS. MONOCYTES 1.3 (H) 0.0 - 1.0 K/UL  
 ABS. EOSINOPHILS 0.1 0.0 - 0.4 K/UL  
 ABS. BASOPHILS 0.0 0.0 - 0.1 K/UL  
 ABS. IMM. GRANS. 0.1 (H) 0.00 - 0.04 K/UL  
 DF AUTOMATED Assessment/Plan:  
 
Principal Problem: 
  Gastrointestinal hemorrhage with melena (9/29/2019) Active Problems: 
  Glucose intolerance (impaired glucose tolerance) (8/21/2017) COPD (chronic obstructive pulmonary disease) ([facility] [address] (8/21/2017) Stage 3 chronic kidney disease ([facility] [address] (8/21/2017) Atrial fibrillation ([facility] [address] (8/21/2017) ASCVD (arteriosclerotic cardiovascular disease) (8/21/2017) Overview: Catheterization 6/11/2004 80% stenosis nondominant RCA and 30% stenosis of  
    LAD with a notation of some left main disease but no percentage given. No  
    mention of angioplasty or stent made Nonrheumatic aortic valve stenosis (8/27/2019) Overview: Mild to moderate by echocardiogram 8/27/2019 Acute deep vein thrombosis (DVT) of distal vein of left lower extremity ([facility] [address] (9/18/2019) Symptomatic anemia (9/26/2019) AVM (arteriovenous malformation) of stomach, acquired (9/29/2019) Pneumonia due to infectious organism (10/2/2019) Overview: LLL 10/1/2019 
 
  
___________________________________________________ PLAN: 
 
1. Symptomatic anemia (9/26/2019): s/p transfusion.  sp EGD- AVM clipped. Continue high dose protonix for now. for colonoscopy this am. Hgb 7.0 on 9/29 am, as pt was somewhat short of breath and orthostatic, transfused one unit. Now 7.5 2. L LE DVT: Off Xarelto and filter placed on 9/30.  
3. Triple therapy for anticoagulation. Recent DVT, hx A fib, recent cardiac stents.  Cardiology note reviewed and agree to stop ASA as on Plavix although likely the culprit for his GI Bleed, but recent stents. 4. Appreciate vascular surgery help 5. Pillcam yesterday with results pending 6. Hypotension- on midodrine, BP still low as of yesterday so increased to 10 TID. 7. PAF on Amiodarone 8. Hypothyroidism: Continue usual meds 9. With LGF checked WBC, UA and CXR with slight increase WBC and LLL infiltrate started Rocephin last PM 
10. Likely will need SNF Rehab which I D/W  this AM and son yesterday. 11. DNR 
 
 
 
 
 
___________________________________________________ Attending Physician: David Hutchinson MD

## 2019-10-02 NOTE — PROGRESS NOTES
Progress Note 
 
 
10/2/2019 2:43 PM 
NAME: Isrrael Mccormack MRN:  [id number] Admit Diagnosis: Symptomatic anemia [D64.9]             AIYVENOC List:  
  
Recent DVT, started on anticoagulation, now with GI bleed 
  
- Cath 7/2019 with impella supported PCI of LM into LAD with balloon side hole to LCx, cath 8/29/2019 with patent LM stent. 
- Normal EF, mild AS 
- Prox afib, currently in sinus - Hx of aflutter ablation 
- HTN 
- Dyslipidemia - Hypothyroid - GERD 
- BPH 
- Distant tobacco 
- DVT 
- GI bleed , lives alone, ADLs, occasionally using cane Son: Stephanie Panda.   [phone] 
   
  
            Assessment/Plan:      
  
No chest pain. Euvolemic so far, echo with normal EF, mild to mod AS Prox afib back in sinus, will use amiodarone, high risk for triple anticoagulation DVT now with IVC filter 
  
- Holding anticoagulation - Currently still on aspirin, at this point would cont asa or low dose xarelto, not both 
- Cont plavix, this can not be stopped. - Cont amiodarone - Cont  midodrine. 
- Cont statin 
  
- Cont proscar and flomax 
  
PT/OT Stable from cardiovascular perspective. 
   
  
  
 [x]       High complexity decision making was performed in this patient at high risk for decompensation with multiple organ involvement. Subjective:  
 
Isrrael Mccormack denies chest pain, dyspnea. Discussed with RN events overnight. Review of Systems: 
 
Symptom Y/N Comments  Symptom Y/N Comments Fever/Chills N   Chest Pain N Poor Appetite N   Edema N   
Cough N   Abdominal Pain N Sputum N   Joint Pain N   
SOB/JAFFE N   Pruritis/Rash N   
Nausea/vomit N   Tolerating PT/OT Y Diarrhea N   Tolerating Diet Y Constipation N   Other Could NOT obtain due to:   
 
Objective:  
  
Physical Exam: 
 
Last 24hrs VS reviewed since prior progress note. Most recent are: 
 
Visit Vitals /53 (BP 1 Location: Left arm, BP Patient Position: At rest;Supine) Pulse 64 Temp 98.8 °F (37.1 °C) Resp 17 Ht 5' 9\" (1.753 m) Wt 82.6 kg (181 lb 15.8 oz) SpO2 96% BMI 26.88 kg/m² Intake/Output Summary (Last 24 hours) at 10/2/2019 1230 Last data filed at 10/2/2019 1135 Gross per 24 hour Intake 890 ml Output 500 ml Net 390 ml General Appearance: Well developed, well nourished, alert & oriented x 3,  
 no acute distress. Ears/Nose/Mouth/Throat: Hearing grossly normal. 
Neck: Supple. Chest: Lungs clear to auscultation bilaterally. Cardiovascular: Regular rate and rhythm, S1S2 normal, no murmur. Abdomen: Soft, non-tender, bowel sounds are active. Extremities: No edema bilaterally. Skin: Warm and dry. PMH/SH reviewed - no change compared to H&P Data Review Telemetry: normal sinus rhythm Lab Data Personally Reviewed: 
 
Recent Labs 10/02/19 
6463 10/01/19 
1825 10/01/19 
2431 WBC 13.8*  --  12.7* HGB 7.5* 9.5* 8.6*  8.5* HCT 24.9* 30.7* 27.7*  27.4*  
  --  211 No results for input(s): INR, PTP, APTT, INREXT, INREXT in the last 72 hours. Recent Labs 10/02/19 
8152 10/01/19 
0434 09/30/19 
4299  140 139  
K 3.7 3.8 3.8 * 110* 108 CO2 23 24 24 BUN 17 19 24* CREA 1.37* 1.55* 1.58* * 107* 115* CA 8.4* 8.7 8.8 No results for input(s): CPK, CKNDX, TROIQ in the last 72 hours. No lab exists for component: CPKMB Lab Results Component Value Date/Time Cholesterol, total 117 06/12/2019 02:00 PM  
 HDL Cholesterol 35 06/12/2019 02:00 PM  
 LDL, calculated 65 06/12/2019 02:00 PM  
 Triglyceride 86 06/12/2019 02:00 PM  
 CHOL/HDL Ratio 3 06/12/2019 02:00 PM  
 
 
No results for input(s): SGOT, GPT, AP, TBIL, TP, ALB, GLOB, GGT, AML, LPSE in the last 72 hours. No lab exists for component: AMYP, HLPSE No results for input(s): PH, PCO2, PO2 in the last 72 hours. Medications Personally Reviewed: 
 
Current Facility-Administered Medications Medication Dose Route Frequency  midodrine (PROAMITINE) tablet 10 mg  10 mg Oral TID WITH MEALS  pravastatin (PRAVACHOL) tablet 80 mg  80 mg Oral QHS  cefTRIAXone (ROCEPHIN) 1 g in 0.9% sodium chloride (MBP/ADV) 50 mL  1 g IntraVENous Q24H  
 sodium chloride (NS) flush 5-40 mL  5-40 mL IntraVENous Q8H  
 sodium chloride (NS) flush 5-40 mL  5-40 mL IntraVENous PRN  
 sodium chloride (NS) flush 5-40 mL  5-40 mL IntraVENous Q8H  
 sodium chloride (NS) flush 5-40 mL  5-40 mL IntraVENous PRN  
 lidocaine (PF) (XYLOCAINE) 10 mg/mL (1 %) injection 0.1 mL  0.1 mL SubCUTAneous PRN  
 0.9% sodium chloride infusion 250 mL  250 mL IntraVENous PRN  
 sodium chloride (NS) flush 5-40 mL  5-40 mL IntraVENous Q8H  
 sodium chloride (NS) flush 5-40 mL  5-40 mL IntraVENous PRN  pantoprazole (PROTONIX) 40 mg in sodium chloride 0.9% 10 mL injection  40 mg IntraVENous Q12H  
 amiodarone (CORDARONE) tablet 200 mg  200 mg Oral DAILY  clopidogrel (PLAVIX) tablet 75 mg  75 mg Oral DAILY  finasteride (PROSCAR) tablet 5 mg  5 mg Oral PCD  levothyroxine (SYNTHROID) tablet 137 mcg  137 mcg Oral ACB  tamsulosin (FLOMAX) capsule 0.4 mg  0.4 mg Oral DAILY  temazepam (RESTORIL) capsule 30 mg  30 mg Oral QHS  ondansetron (ZOFRAN) injection 4 mg  4 mg IntraVENous Q4H PRN  
 0.9% sodium chloride infusion 250 mL  250 mL IntraVENous PRN  
 acetaminophen (TYLENOL) tablet 650 mg  650 mg Oral Q4H PRN Jessica Locke MD

## 2019-10-02 NOTE — PROGRESS NOTES
CM verified patient has a qualifying hospital stay for [facility]. Ada Goltz Buddie Grieves Care Manager - ED [facility] Facilitator [facility] Phone: [phone] Mobile: [phone]

## 2019-10-03 NOTE — PROGRESS NOTES
Problem: Mobility Impaired (Adult and Pediatric) Goal: *Acute Goals and Plan of Care (Insert Text) Description FUNCTIONAL STATUS PRIOR TO ADMISSION: Patient was modified independent using a Rolling walker for functional mobility. HOME SUPPORT PRIOR TO ADMISSION: The patient lived with his son and has multiple daughters that live close by. Physical Therapy Goals Initiated 10/1/2019 1. Patient will move from supine to sit and sit to supine  in bed with supervision/set-up within 7 day(s). 2.  Patient will transfer from bed to chair and chair to bed with supervision/set-up using the least restrictive device within 7 day(s). 3.  Patient will perform sit to stand with supervision/set-up within 7 day(s). 4.  Patient will ambulate with supervision/set-up for 200 feet with the least restrictive device within 7 day(s). 5.  Patient will ascend/descend 4 stairs with 1 handrail(s) with modified independence within 7 day(s). Outcome: Not Met PHYSICAL THERAPY TREATMENT Patient: Maria Fernanda Raymond (68 y.o. male) Date: 10/3/2019 Diagnosis: Symptomatic anemia [D64.9] Gastrointestinal hemorrhage with melena Procedure(s) (LRB): 
CAPSULE (N/A) 2 Days Post-Op Precautions:  falls Chart, physical therapy assessment, plan of care and goals were reviewed. ASSESSMENT: 
Hgb remains low today but patient cleared for mobility. Patient reporting increased fatigue and noted to demonstrate orthostatic hypotension which limits ability to participate in therapy today. Patient able to perform sit<>stand transfers with min A but not able to tolerate any other mobility today. Continue to recommend further skilled PT following discharge Current Level of Function Impacting Discharge (mobility/balance): min A for transfers Other factors to consider for discharge: orthostatic hypotension PLAN : 
Patient continues to benefit from skilled intervention to address the above impairments. Continue treatment per established plan of care. to address goals. Recommendation for discharge: (in order for the patient to meet his/her long term goals) Therapy up to 5 days/week in SNF setting This discharge recommendation: A follow-up discussion with the attending provider and/or case management is planned Equipment recommendations for successful discharge (if) home: to be determined by rehab facility SUBJECTIVE:  
Patient stated I'm tired.  OBJECTIVE DATA SUMMARY:  
Critical Behavior: 
Neurologic State: Alert Orientation Level: Oriented to person, Oriented to place, Oriented to situation, Disoriented to time Cognition: Follows commands Functional Mobility Training: 
Bed Mobility: 
  
 Deferred; patient sitting in chair with legs elevated upon arrival 
  
  
  
  
Transfers: 
  
Stand to Sit: Minimum assistance;Contact guard assistance;Assist x2 Balance: 
Sitting: Intact Standing: Impaired Standing - Static: Good;Constant support Ambulation/Gait Training: 
  
 Not able to assess today secondary to orthostatic hypotension Activity Tolerance:  
signs and symptoms of orthostatic hypotension Patient Vitals for the past 4 hrs: 
 Temp Pulse Resp BP SpO2  
10/03/19 0941  65  115/60 sitting legs elevated 10/03/19 0939  66  (!) 87/44 sitting post standing 10/03/19 0937  73  (!) 82/54 standing 10/03/19 0933  64  100/47 sitting;post LE exercises 10/03/19 0932  62  (!) 88/46 sitting 10/03/19 0929  (!) 59  113/53 sitting; legs elevated 10/03/19 0709 97.5 °F (36.4 °C) (!) 58 17 138/63 97 % Please refer to the flowsheet for vital signs taken during this treatment. After treatment patient left in no apparent distress:  
Sitting in chair, Call bell within reach and Caregiver / family present COMMUNICATION/COLLABORATION:  
 The patients plan of care was discussed with: Registered Nurse and Rehabilitation Attendant Martell Ivey, PT Time Calculation: 19 mins

## 2019-10-03 NOTE — PROGRESS NOTES
PROGRESS NOTE 
 
NAME:  Bowen Hunter :   3/13/1931 MRN:   [id number] Date/Time:  10/3/2019 7:30 AM 
Subjective:  
History:  Chart reviewed and patient seen and examined this AM and D/W his nurse and his son at length and all events noted. He was admitted with symptomatic anemia with Hgb falling from 11.9 to 8.4 on admission and since admission to 7.0 requiring 1 U PRBC. He was found on admission to have hemocult positive melanotic stool w/o reno bleeding noted. EGD on  was remarkable for an AVM which was clipped and Colonoscopy on  with 2 sessile polyps w/o obvious bleeding site. He also had Pillcam study with some SB AVMs noted but no bleeding. He had noted no increased frequency of BMs PTA. He denies abdominal pain or other GI c/o and has no  c/o. He has no cardiac or respiratory c/o. He has no neurologic c/o except generalized weakness and has been a little more confused at night. He has no other c/o on complete ROS. Medications reviewed: 
Current Facility-Administered Medications Medication Dose Route Frequency  midodrine (PROAMITINE) tablet 10 mg  10 mg Oral TID WITH MEALS  pravastatin (PRAVACHOL) tablet 80 mg  80 mg Oral QHS  cefTRIAXone (ROCEPHIN) 1 g in 0.9% sodium chloride (MBP/ADV) 50 mL  1 g IntraVENous Q24H  
 sodium chloride (NS) flush 5-40 mL  5-40 mL IntraVENous Q8H  
 sodium chloride (NS) flush 5-40 mL  5-40 mL IntraVENous PRN  
 sodium chloride (NS) flush 5-40 mL  5-40 mL IntraVENous Q8H  
 sodium chloride (NS) flush 5-40 mL  5-40 mL IntraVENous PRN  
 lidocaine (PF) (XYLOCAINE) 10 mg/mL (1 %) injection 0.1 mL  0.1 mL SubCUTAneous PRN  
 0.9% sodium chloride infusion 250 mL  250 mL IntraVENous PRN  
 sodium chloride (NS) flush 5-40 mL  5-40 mL IntraVENous Q8H  
 sodium chloride (NS) flush 5-40 mL  5-40 mL IntraVENous PRN  pantoprazole (PROTONIX) 40 mg in sodium chloride 0.9% 10 mL injection  40 mg IntraVENous Q12H  amiodarone (CORDARONE) tablet 200 mg  200 mg Oral DAILY  clopidogrel (PLAVIX) tablet 75 mg  75 mg Oral DAILY  finasteride (PROSCAR) tablet 5 mg  5 mg Oral PCD  levothyroxine (SYNTHROID) tablet 137 mcg  137 mcg Oral ACB  tamsulosin (FLOMAX) capsule 0.4 mg  0.4 mg Oral DAILY  temazepam (RESTORIL) capsule 30 mg  30 mg Oral QHS  ondansetron (ZOFRAN) injection 4 mg  4 mg IntraVENous Q4H PRN  
 0.9% sodium chloride infusion 250 mL  250 mL IntraVENous PRN  
 acetaminophen (TYLENOL) tablet 650 mg  650 mg Oral Q4H PRN Objective:  
Vitals: 
Visit Vitals /63 Pulse (!) 58 Temp 97.5 °F (36.4 °C) Resp 17 Ht 5' 9\" (1.753 m) Wt 181 lb 15.8 oz (82.6 kg) SpO2 97% BMI 26.88 kg/m² O2 Flow Rate (L/min): 2 l/min O2 Device: Nasal cannula Temp (24hrs), Av.8 °F (36.6 °C), Min:97.3 °F (36.3 °C), Max:98.8 °F (37.1 °C) Last 24hr Input/Output: 
 
Intake/Output Summary (Last 24 hours) at 10/3/2019 0730 Last data filed at 10/2/2019 1907 Gross per 24 hour Intake 740 ml Output 250 ml Net 490 ml PHYSICAL EXAM: 
General:     Alert, cooperative, no distress but confused this AM, appears stated age. Head:    Normocephalic, without obvious abnormality, atraumatic. Eyes:    Conjunctivae/corneas clear. PERRLA Nose:   Nares normal. No drainage or sinus tenderness. Throat:     Lips, mucosa, and tongue normal.  No Thrush Neck:   Supple, symmetrical,  no adenopathy, thyroid: non tender 
   no carotid bruit and no JVD. Back:     Symmetric,  No CVA tenderness. Lungs:    Clear to auscultation bilaterally. No Wheezing or Rhonchi. No rales. Heart:    Regular rate and rhythm,  no murmur, rub or gallop. Abdomen:    Soft, non-tender. Not distended. Bowel sounds normal. No masses Extremities:  Extremities normal, atraumatic, No cyanosis. No edema. No clubbing Lymph nodes:  Cervical, supraclavicular normal. 
Neurologic:  Normal strength, Alert but confused. Skin:                 No rash Lab Data Reviewed: 
 
Recent Results (from the past 24 hour(s)) HGB & HCT Collection Time: 10/02/19  4:21 PM  
Result Value Ref Range HGB 8.1 (L) 12.1 - 17.0 g/dL HCT 26.0 (L) 36.6 - 50.3 % METABOLIC PANEL, BASIC Collection Time: 10/03/19  5:12 AM  
Result Value Ref Range Sodium 141 136 - 145 mmol/L Potassium 3.6 3.5 - 5.1 mmol/L Chloride 110 (H) 97 - 108 mmol/L  
 CO2 23 21 - 32 mmol/L Anion gap 8 5 - 15 mmol/L Glucose 100 65 - 100 mg/dL BUN 15 6 - 20 MG/DL Creatinine 1.29 0.70 - 1.30 MG/DL  
 BUN/Creatinine ratio 12 12 - 20 GFR est AA >60 >60 ml/min/1.73m2 GFR est non-AA 53 (L) >60 ml/min/1.73m2 Calcium 8.4 (L) 8.5 - 10.1 MG/DL  
CBC WITH AUTOMATED DIFF Collection Time: 10/03/19  5:12 AM  
Result Value Ref Range WBC 11.0 4.1 - 11.1 K/uL  
 RBC 2.87 (L) 4.10 - 5.70 M/uL HGB 7.9 (L) 12.1 - 17.0 g/dL HCT 25.4 (L) 36.6 - 50.3 % MCV 88.5 80.0 - 99.0 FL  
 MCH 27.5 26.0 - 34.0 PG  
 MCHC 31.1 30.0 - 36.5 g/dL  
 RDW 15.9 (H) 11.5 - 14.5 % PLATELET 623 750 - 597 K/uL MPV 10.6 8.9 - 12.9 FL  
 NRBC 0.0 0  WBC ABSOLUTE NRBC 0.00 0.00 - 0.01 K/uL NEUTROPHILS 71 32 - 75 % LYMPHOCYTES 15 12 - 49 % MONOCYTES 10 5 - 13 % EOSINOPHILS 2 0 - 7 % BASOPHILS 1 0 - 1 % IMMATURE GRANULOCYTES 1 (H) 0.0 - 0.5 % ABS. NEUTROPHILS 7.9 1.8 - 8.0 K/UL  
 ABS. LYMPHOCYTES 1.6 0.8 - 3.5 K/UL  
 ABS. MONOCYTES 1.1 (H) 0.0 - 1.0 K/UL  
 ABS. EOSINOPHILS 0.3 0.0 - 0.4 K/UL  
 ABS. BASOPHILS 0.1 0.0 - 0.1 K/UL  
 ABS. IMM. GRANS. 0.1 (H) 0.00 - 0.04 K/UL  
 DF AUTOMATED Assessment/Plan:  
 
Principal Problem: 
  Gastrointestinal hemorrhage with melena (9/29/2019) Active Problems: 
  Glucose intolerance (impaired glucose tolerance) (8/21/2017) COPD (chronic obstructive pulmonary disease) ([facility] (8/21/2017) Stage 3 chronic kidney disease ([facility] (8/21/2017) Atrial fibrillation ([facility] (8/21/2017) ASCVD (arteriosclerotic cardiovascular disease) (8/21/2017) Overview: Catheterization 6/11/2004 80% stenosis nondominant RCA and 30% stenosis of  
    LAD with a notation of some left main disease but no percentage given. No  
    mention of angioplasty or stent made Nonrheumatic aortic valve stenosis (8/27/2019) Overview: Mild to moderate by echocardiogram 8/27/2019 Acute deep vein thrombosis (DVT) of distal vein of left lower extremity ([facility] (9/18/2019) Symptomatic anemia (9/26/2019) AVM (arteriovenous malformation) of stomach, acquired (9/29/2019) Pneumonia due to infectious organism (10/2/2019) Overview: LLL 10/1/2019 
 
  
___________________________________________________ PLAN: 
 
1. Symptomatic anemia (9/26/2019): s/p transfusion.  sp EGD- AVM clipped. Continue high dose protonix for now.  Hgb 7.0 on 9/29 am, as pt was somewhat short of breath and orthostatic, transfused one unit. Now 7.9 2. L LE DVT: Off Xarelto and filter placed on 9/30.  
3. Triple therapy for anticoagulation. Recent DVT, hx A fib, recent cardiac stents. Cardiology note reviewed and agree to stop ASA as on Plavix although likely the culprit for his GI Bleed, but recent stents. 4. Appreciate vascular surgery help 5. Pillcam 10/1 without noted bleeding, non bleeding AVMs seen 6. Hypotension- on midodrine, increased to 10 TID. 7. PAF on Amiodarone 8. Hypothyroidism: Continue usual meds 9. With LGF checked WBC, UA and CXR with slight increase WBC and LLL infiltrate started Rocephin 10/1 PM 
10. Will need SNF Rehab which I D/W  and patient  this AM and son on 10/1. 11. DNR 12. WBC improved, will re-check CXR 
13. ? D/C to SNF tomorrow or 10/5 14. IV Venofer today 
 
 
 
 
 
___________________________________________________ Attending Physician: Carri James MD

## 2019-10-03 NOTE — PROGRESS NOTES
D/C plans discussed with pt//MD who anticipates d/c to [address] [facility] SNF in 1-2 days 
-await confirmation from SNF that they can accept. -I will see if they can accept if I set up a 9am ambulance

## 2019-10-03 NOTE — PROGRESS NOTES
Bedside and Verbal shift change report given to Dainaa Rock RN (oncoming nurse) by Olimpia Pollock (offgoing nurse). Report given with SBAR, Kardex, Intake/Output, MAR and Recent Results.

## 2019-10-03 NOTE — PROCEDURES
[address] PROCEDURE NOTE Name:  Nguyen Wood 
MR#:  [id number] :  [id number] ACCOUNT #:  [de-identified] DATE OF SERVICE:  10/02/2019 PREOPERATIVE DIAGNOSIS:  Gastrointestinal blood loss anemia. POSTOPERATIVE DIAGNOSES:  Small arteriovenous malformation in small bowel and retained clip in stomach. PROCEDURE PERFORMED:  Small bowel PillCam. 
 
SURGEON:  Cherylene Berber, MD 
 
ASSISTANT:  None. ANESTHESIA:  None. ESTIMATED BLOOD LOSS:  None. SPECIMENS REMOVED:  None. COMPLICATIONS:  None. IMPLANTS:  n/a. INDICATIONS:  Recent GI bleed, on aspirin, Plavix, and Xarelto. DESCRIPTION OF PROCEDURE:  Informed consent was obtained after the risks, benefits and alternatives were explained in detail, including the potential for capsule retention, which could necessitate a surgery in up to 1% of individuals. The patient ingested the capsule in a typical fashion. The total gastric passage time was calculated at 13 minutes. We did see a Hemoclip at 1 minute and 44 seconds, which was from a recent endoscopy. There was no blood near this clip and no blood in the stomach. The first duodenal image was at 14 minutes and 8 seconds. There were subsequently two small nonbleeding AVMs that were tiny in size, one was at 18 minutes and 26 seconds and again at 19 minutes and 11 seconds. Thereafter, there were no further findings in the small bowel. The visualization was adequate, however towards the more distal small bowel, there were some contents in the small bowel, which could impair the visualization, but there was no blood seen, no black material in the small bowel. The capsule did not reach the cecum, however, at the conclusion of the study it still appeared to be in the small bowel. RECOMMENDATIONS:  The patient to remain off of Xarelto post IVC filter placement. To remain on aspirin and Plavix and IV iron as needed.   If overt hemorrhage were to occur, would need deep enteroscopy. Fabiana Alexandre MD 
 
 
[id number] 
D:  10/02/2019 17:32 
T:  10/03/2019 3:07 JOB #:  [id number]

## 2019-10-03 NOTE — PROGRESS NOTES
Progress Note 
 
 
10/3/2019 2:43 PM 
NAME: Lennie Hernandez MRN:  [id number] Admit Diagnosis: Symptomatic anemia [D64.9]             AUXDBJP List:  
  
Recent DVT, started on anticoagulation, now with GI bleed 
  
- Cath 7/2019 with impella supported PCI of LM into LAD with balloon side hole to LCx, cath 8/29/2019 with patent LM stent. 
- Normal EF, mild AS 
- Prox afib, currently in sinus - Hx of aflutter ablation 
- HTN 
- Dyslipidemia - Hypothyroid - GERD 
- BPH 
- Distant tobacco 
- DVT 
- GI bleed , lives alone, ADLs, occasionally using cane Son: Milli Gramajo III.   [phone] 
   
  
            Assessment/Plan:      
  
No chest pain. Euvolemic so far, echo with normal EF, mild to mod AS Prox afib back in sinus, will use amiodarone, high risk for triple anticoagulation DVT now with IVC filter 
  
- Holding anticoagulation - Currently holding aspirin, at this point would cont asa or low dose xarelto, not both 
- Cont plavix, this can not be stopped. - Cont amiodarone - Cont  Midodrine, still with some labile bp. 
- Cont statin 
  
- Cont proscar,  flomax only if needed 
  
PT/OT Stable from cardiovascular perspective. 
   
  
  
 [x]       High complexity decision making was performed in this patient at high risk for decompensation with multiple organ involvement. Subjective:  
 
Lennie Hernandez denies chest pain, dyspnea. Discussed with RN events overnight. Review of Systems: 
 
Symptom Y/N Comments  Symptom Y/N Comments Fever/Chills N   Chest Pain N Poor Appetite N   Edema N   
Cough N   Abdominal Pain N Sputum N   Joint Pain N   
SOB/JAFFE N   Pruritis/Rash N   
Nausea/vomit N   Tolerating PT/OT Y Diarrhea N   Tolerating Diet Y Constipation N   Other Could NOT obtain due to:   
 
Objective:  
  
Physical Exam: 
 
Last 24hrs VS reviewed since prior progress note. Most recent are: 
 
Visit Vitals /63 Pulse (!) 58  
 Temp 97.5 °F (36.4 °C) Resp 17 Ht 5' 9\" (1.753 m) Wt 82.6 kg (181 lb 15.8 oz) SpO2 97% BMI 26.88 kg/m² Intake/Output Summary (Last 24 hours) at 10/3/2019 7217 Last data filed at 10/2/2019 1907 Gross per 24 hour Intake 740 ml Output 250 ml Net 490 ml General Appearance: Well developed, well nourished, alert & oriented x 3,  
 no acute distress. Ears/Nose/Mouth/Throat: Hearing grossly normal. 
Neck: Supple. Chest: Lungs clear to auscultation bilaterally. Cardiovascular: Regular rate and rhythm, S1S2 normal, no murmur. Abdomen: Soft, non-tender, bowel sounds are active. Extremities: No edema bilaterally. Skin: Warm and dry. PMH/SH reviewed - no change compared to H&P Data Review Telemetry: normal sinus rhythm Lab Data Personally Reviewed: 
 
Recent Labs 10/03/19 
0283 10/02/19 
1621 10/02/19 
4228 WBC 11.0  --  13.8* HGB 7.9* 8.1* 7.5* HCT 25.4* 26.0* 24.9*  
  --  216 No results for input(s): INR, PTP, APTT, INREXT, INREXT in the last 72 hours. Recent Labs 10/03/19 
8416 10/02/19 
0359 10/01/19 
7888  138 140  
K 3.6 3.7 3.8 * 109* 110* CO2 23 23 24 BUN 15 17 19 CREA 1.29 1.37* 1.55*  107* 107* CA 8.4* 8.4* 8.7 No results for input(s): CPK, CKNDX, TROIQ in the last 72 hours. No lab exists for component: CPKMB Lab Results Component Value Date/Time Cholesterol, total 117 06/12/2019 02:00 PM  
 HDL Cholesterol 35 06/12/2019 02:00 PM  
 LDL, calculated 65 06/12/2019 02:00 PM  
 Triglyceride 86 06/12/2019 02:00 PM  
 CHOL/HDL Ratio 3 06/12/2019 02:00 PM  
 
 
No results for input(s): SGOT, GPT, AP, TBIL, TP, ALB, GLOB, GGT, AML, LPSE in the last 72 hours. No lab exists for component: AMYP, HLPSE No results for input(s): PH, PCO2, PO2 in the last 72 hours. Medications Personally Reviewed: 
 
Current Facility-Administered Medications Medication Dose Route Frequency  iron sucrose (VENOFER) 500 mg in 0.9% sodium chloride 250 mL IVPB  500 mg IntraVENous ONCE  
 midodrine (PROAMITINE) tablet 10 mg  10 mg Oral TID WITH MEALS  pravastatin (PRAVACHOL) tablet 80 mg  80 mg Oral QHS  cefTRIAXone (ROCEPHIN) 1 g in 0.9% sodium chloride (MBP/ADV) 50 mL  1 g IntraVENous Q24H  
 sodium chloride (NS) flush 5-40 mL  5-40 mL IntraVENous Q8H  
 sodium chloride (NS) flush 5-40 mL  5-40 mL IntraVENous PRN  
 sodium chloride (NS) flush 5-40 mL  5-40 mL IntraVENous Q8H  
 sodium chloride (NS) flush 5-40 mL  5-40 mL IntraVENous PRN  
 lidocaine (PF) (XYLOCAINE) 10 mg/mL (1 %) injection 0.1 mL  0.1 mL SubCUTAneous PRN  
 0.9% sodium chloride infusion 250 mL  250 mL IntraVENous PRN  
 sodium chloride (NS) flush 5-40 mL  5-40 mL IntraVENous Q8H  
 sodium chloride (NS) flush 5-40 mL  5-40 mL IntraVENous PRN  pantoprazole (PROTONIX) 40 mg in sodium chloride 0.9% 10 mL injection  40 mg IntraVENous Q12H  
 amiodarone (CORDARONE) tablet 200 mg  200 mg Oral DAILY  clopidogrel (PLAVIX) tablet 75 mg  75 mg Oral DAILY  finasteride (PROSCAR) tablet 5 mg  5 mg Oral PCD  levothyroxine (SYNTHROID) tablet 137 mcg  137 mcg Oral ACB  tamsulosin (FLOMAX) capsule 0.4 mg  0.4 mg Oral DAILY  temazepam (RESTORIL) capsule 30 mg  30 mg Oral QHS  ondansetron (ZOFRAN) injection 4 mg  4 mg IntraVENous Q4H PRN  
 0.9% sodium chloride infusion 250 mL  250 mL IntraVENous PRN  
 acetaminophen (TYLENOL) tablet 650 mg  650 mg Oral Q4H PRN Carlos Haider MD

## 2019-10-03 NOTE — PROGRESS NOTES
Bedside shift change report given to Matthias Ignacio (oncoming nurse) by Natalio Pittman (offgoing nurse). Report included the following information SBAR, Kardex, Intake/Output, MAR and Recent Results. Pt does get a bit confused overnight, trying to get out of bed, restless, says he is tied to the bed. Alert and oriented this AM. AM labs drawn.

## 2019-10-03 NOTE — PROGRESS NOTES
PILL CAM: 
 
No active bleeding/blood seen in GI tract Hemoclip seen in stomach (2) tiny avms nonbleeding in proximal small bowel Capsule did not reach the cecum. Plan: 
Patient will need chronic Iron Therapy and possible IV iron as well Can continue ASA/Plavix For overt hemorrhage would need enteroscopy Advance diet

## 2019-10-03 NOTE — PROGRESS NOTES
Gastroenterology Daily Progress Note (Clyde Melvin PA-C covering for Dr. Jaimie Mallory) [facility] Admit Date: 9/26/2019 Subjective:   
Patient is doing better than yesterday. No SOB, a little better energy. Reports of confusion overnight but appears alert and oriented today. No BM just yet but passing gas. No abdominal pain, N/V. Tolerating diet. Hgb improved to 7.9 today, WBC decreased to 11 Capsule study showing tiny AVMs in proximal small bowel and previous clip in stomach. Current Facility-Administered Medications Medication Dose Route Frequency  iron sucrose (VENOFER) 500 mg in 0.9% sodium chloride 250 mL IVPB  500 mg IntraVENous ONCE  
 midodrine (PROAMITINE) tablet 10 mg  10 mg Oral TID WITH MEALS  pravastatin (PRAVACHOL) tablet 80 mg  80 mg Oral QHS  cefTRIAXone (ROCEPHIN) 1 g in 0.9% sodium chloride (MBP/ADV) 50 mL  1 g IntraVENous Q24H  
 sodium chloride (NS) flush 5-40 mL  5-40 mL IntraVENous Q8H  
 sodium chloride (NS) flush 5-40 mL  5-40 mL IntraVENous PRN  
 sodium chloride (NS) flush 5-40 mL  5-40 mL IntraVENous Q8H  
 sodium chloride (NS) flush 5-40 mL  5-40 mL IntraVENous PRN  
 lidocaine (PF) (XYLOCAINE) 10 mg/mL (1 %) injection 0.1 mL  0.1 mL SubCUTAneous PRN  
 0.9% sodium chloride infusion 250 mL  250 mL IntraVENous PRN  
 sodium chloride (NS) flush 5-40 mL  5-40 mL IntraVENous Q8H  
 sodium chloride (NS) flush 5-40 mL  5-40 mL IntraVENous PRN  pantoprazole (PROTONIX) 40 mg in sodium chloride 0.9% 10 mL injection  40 mg IntraVENous Q12H  
 amiodarone (CORDARONE) tablet 200 mg  200 mg Oral DAILY  clopidogrel (PLAVIX) tablet 75 mg  75 mg Oral DAILY  finasteride (PROSCAR) tablet 5 mg  5 mg Oral PCD  levothyroxine (SYNTHROID) tablet 137 mcg  137 mcg Oral ACB  tamsulosin (FLOMAX) capsule 0.4 mg  0.4 mg Oral DAILY  temazepam (RESTORIL) capsule 30 mg  30 mg Oral QHS  ondansetron (ZOFRAN) injection 4 mg  4 mg IntraVENous Q4H PRN  
 0.9% sodium chloride infusion 250 mL  250 mL IntraVENous PRN  
 acetaminophen (TYLENOL) tablet 650 mg  650 mg Oral Q4H PRN Objective:  
 
Visit Vitals /63 Pulse (!) 58 Temp 97.5 °F (36.4 °C) Resp 17 Ht 5' 9\" (1.753 m) Wt 82.6 kg (181 lb 15.8 oz) SpO2 97% BMI 26.88 kg/m² Blood pressure 138/63, pulse (!) 58, temperature 97.5 °F (36.4 °C), resp. rate 17, height 5' 9\" (1.753 m), weight 82.6 kg (181 lb 15.8 oz), SpO2 97 %. No intake/output data recorded. 10/01 1901 - 10/03 0700 In: 1403 [P.O.:740; I.V.:350] Out: 450 [Urine:450] Intake/Output Summary (Last 24 hours) at 10/3/2019 0815 Last data filed at 10/2/2019 1907 Gross per 24 hour Intake 740 ml Output 250 ml Net 490 ml Physical Exam:  
 
 
General: Elderly WM, NAD, pale Chest:  CTA, No rhonchi, rales or rubs. Heart: S1, S2, RRR 
GI: Soft, NT, ND + bowel sounds Extremities: no edema Neuro: alert and oriented x3 Labs:  
 
 
Recent Results (from the past 24 hour(s)) HGB & HCT Collection Time: 10/02/19  4:21 PM  
Result Value Ref Range HGB 8.1 (L) 12.1 - 17.0 g/dL HCT 26.0 (L) 36.6 - 50.3 % METABOLIC PANEL, BASIC Collection Time: 10/03/19  5:12 AM  
Result Value Ref Range Sodium 141 136 - 145 mmol/L Potassium 3.6 3.5 - 5.1 mmol/L Chloride 110 (H) 97 - 108 mmol/L  
 CO2 23 21 - 32 mmol/L Anion gap 8 5 - 15 mmol/L Glucose 100 65 - 100 mg/dL BUN 15 6 - 20 MG/DL Creatinine 1.29 0.70 - 1.30 MG/DL  
 BUN/Creatinine ratio 12 12 - 20 GFR est AA >60 >60 ml/min/1.73m2 GFR est non-AA 53 (L) >60 ml/min/1.73m2 Calcium 8.4 (L) 8.5 - 10.1 MG/DL  
CBC WITH AUTOMATED DIFF Collection Time: 10/03/19  5:12 AM  
Result Value Ref Range WBC 11.0 4.1 - 11.1 K/uL  
 RBC 2.87 (L) 4.10 - 5.70 M/uL HGB 7.9 (L) 12.1 - 17.0 g/dL HCT 25.4 (L) 36.6 - 50.3 %  MCV 88.5 80.0 - 99.0 FL  
 MCH 27.5 26.0 - 34.0 PG  
 MCHC 31.1 30.0 - 36.5 g/dL  
 RDW 15.9 (H) 11.5 - 14.5 % PLATELET 464 449 - 245 K/uL MPV 10.6 8.9 - 12.9 FL  
 NRBC 0.0 0  WBC ABSOLUTE NRBC 0.00 0.00 - 0.01 K/uL NEUTROPHILS 71 32 - 75 % LYMPHOCYTES 15 12 - 49 % MONOCYTES 10 5 - 13 % EOSINOPHILS 2 0 - 7 % BASOPHILS 1 0 - 1 % IMMATURE GRANULOCYTES 1 (H) 0.0 - 0.5 % ABS. NEUTROPHILS 7.9 1.8 - 8.0 K/UL  
 ABS. LYMPHOCYTES 1.6 0.8 - 3.5 K/UL  
 ABS. MONOCYTES 1.1 (H) 0.0 - 1.0 K/UL  
 ABS. EOSINOPHILS 0.3 0.0 - 0.4 K/UL  
 ABS. BASOPHILS 0.1 0.0 - 0.1 K/UL  
 ABS. IMM. GRANS. 0.1 (H) 0.00 - 0.04 K/UL  
 DF AUTOMATED    
LABRCNT(wbc:2,hgb:2,hct:2,plt:2,) Recent Labs 10/03/19 
6197 10/02/19 
4735 10/01/19 
8908  138 140  
K 3.6 3.7 3.8 * 109* 110* CO2 23 23 24 BUN 15 17 19 CREA 1.29 1.37* 1.55*  107* 107* CA 8.4* 8.4* 8.7 LABRCNT(sgot:3,gpt:3,ap:3,tbiL:3,TP:3,ALB:3,GLOB:3,ggt:3,aml:3,amyp:3,lpse:3,hlpse:3)No results for input(s): INR, PTP, APTT, INREXT, INREXT in the last 72 hours. No results for input(s): SGOT, GPT, AP, TBIL, TP, ALB, GLOB, GGT, AML, LPSE in the last 72 hours. No lab exists for component: AMYP, HLPSEBRIEFLAB(B12,FOL,FOLAT,RBCF)No results found for: FOL, RBCFLABRCNT(CPK:3,CpKMB:3,ckndx:3,troiq:3)No components found for: GLPOCBRIEFLAB(CHOL,CHOLX,CHOLP,CHLST,CHOLV,HDL,HDLC,HDLP,LDL,DLDL,LDLC,DLDLP,TGL,TGLX,TRIGL,TRIGP,CHHD,CHHDX)No results for input(s): PH, PCO2, PO2 in the last 72 hours. LABRCNT(CPK:3,CpKMB:3,ckndx:3,troiq:3)KARIME Long No results for input(s): CPK, CKNDX, TROIQ in the last 72 hours. No lab exists for component: KARIME Hernandez Problem List:  
 
Principal Problem: 
  Gastrointestinal hemorrhage with melena (9/29/2019) Active Problems: 
  Glucose intolerance (impaired glucose tolerance) (8/21/2017) COPD (chronic obstructive pulmonary disease) ([facility] (8/21/2017) Stage 3 chronic kidney disease ([facility] (8/21/2017) Atrial fibrillation ([facility] (8/21/2017) ASCVD (arteriosclerotic cardiovascular disease) (8/21/2017) Overview: Catheterization 6/11/2004 80% stenosis nondominant RCA and 30% stenosis of  
    LAD with a notation of some left main disease but no percentage given. No  
    mention of angioplasty or stent made Nonrheumatic aortic valve stenosis (8/27/2019) Overview: Mild to moderate by echocardiogram 8/27/2019 Acute deep vein thrombosis (DVT) of distal vein of left lower extremity ([facility] (9/18/2019) Symptomatic anemia (9/26/2019) AVM (arteriovenous malformation) of stomach, acquired (9/29/2019) Pneumonia due to infectious organism (10/2/2019) Overview: LLL 10/1/2019 Impression: 
Melena Acute GI blood loss anemia CAD s/p stent placement in 7/2019 A flutter s/p ablation Chronic anticoagulatio non ASA/Plavix/Xarelto LLE DVT Gastric AVM- non bleeding, clipped Tiny proximal small bowel AVMs Colon polyps (2) in cecum s/p polypectomy, path pending Pneumoia Plan: 
Capsule study completed showing tiny proximal small bowel AVMs, no over s/sx of bleeding. Hgb improved to 7.9 today. - Results of capsule to determine further management 
- Continue to monitor hgb, transfuse as needed - Cont. abx per primary care team for pneumonia 
- Continue to monitor stools, for any acute bleeding would need push enteroscopy for treatment of SB AVMs 
- Would benefit from iron supplementation chronically, IV iron may be of benefit KARIME Rajan 
 
10/3/2019 
8:53 AM 
 
 
[address] [facility], [address] [address] Loc: [phone]

## 2019-10-03 NOTE — PROGRESS NOTES
Spiritual Care Partner Volunteer visited patient in  [address] on October 3, 2019. Documented by: 
AUTUMN Rizzo, [facility]  [facility]  Paging Service  [phone])

## 2019-10-04 NOTE — PROGRESS NOTES
Bedside shift change report given to [facility] (oncoming nurse) by Radha Castro (offgoing nurse). Report included the following information SBAR, Kardex, Intake/Output, MAR and Recent Results. Consistently gets more confused at night, restless and trying to get up. Am labs drawn. Possible d/c today.

## 2019-10-04 NOTE — PROGRESS NOTES
Problem: Mobility Impaired (Adult and Pediatric) Goal: *Acute Goals and Plan of Care (Insert Text) Description FUNCTIONAL STATUS PRIOR TO ADMISSION: Patient was modified independent using a Rolling walker for functional mobility. HOME SUPPORT PRIOR TO ADMISSION: The patient lived with his son and has multiple daughters that live close by. Physical Therapy Goals Initiated 10/1/2019 1. Patient will move from supine to sit and sit to supine  in bed with supervision/set-up within 7 day(s). 2.  Patient will transfer from bed to chair and chair to bed with supervision/set-up using the least restrictive device within 7 day(s). 3.  Patient will perform sit to stand with supervision/set-up within 7 day(s). 4.  Patient will ambulate with supervision/set-up for 200 feet with the least restrictive device within 7 day(s). 5.  Patient will ascend/descend 4 stairs with 1 handrail(s) with modified independence within 7 day(s). Outcome: Progressing Towards Goal 
  
PHYSICAL THERAPY TREATMENT Patient: Kellen Vincent (33 y.o. male) Date: 10/4/2019 Diagnosis: Symptomatic anemia [D64.9] Gastrointestinal hemorrhage with melena Procedure(s) (LRB): 
CAPSULE (N/A) 3 Days Post-Op Precautions:  Fall ; ortho. Hypo. Chart, physical therapy assessment, plan of care and goals were reviewed. ASSESSMENT Patient continues with skilled PT services and is progressing towards goals. Pt is progressing well today medically with Hgb. increase and additional Iron on board on arrival... Pt remains with ortho. Hypo. Without symptoms today of dizziness or lightheadedness and able to compensate back to baseline concerning BP after short distance gait efforts in room (see bolded numbers below). Pt fatigued afterwards due to inactivity for several days secondary to medical status. Pt is motivated for rehab. Efforts and is anticipating return to PLOF after rehab. Stay. Patient Vitals for the past 4 hrs: Temp Pulse Resp BP SpO2  
10/04/19 1004  65  115/54 96 % 10/04/19 1003  70  96/49   
10/04/19 0952  65  111/50   
10/04/19 0916  (!) 57  97/40   
10/04/19 0801 98 °F (36.7 °C) 66 18 120/55 97 % Current Level of Function Impacting Discharge (mobility/balance): CGA/min. Assist with RW use for mobility, easily fatigues with minimal exertion at this time given medical status Other factors to consider for discharge: supportive daughter present today, pt motivated for therapy efforts and familiar with rehab. process PLAN : 
Patient continues to benefit from skilled intervention to address the above impairments. Continue treatment per established plan of care. to address goals. Recommendation for discharge: (in order for the patient to meet his/her long term goals) Therapy up to 5 days/week in SNF setting This discharge recommendation: 
Has been made in collaboration with the attending provider and/or case management Equipment recommendations for successful discharge (if) home: to be determined by rehab facility SUBJECTIVE:  
Patient stated I just feel weak all over.  OBJECTIVE DATA SUMMARY:  
Critical Behavior: 
Neurologic State: Alert Orientation Level: Oriented X4(confusion at night) Cognition: Decreased command following, Poor safety awareness Functional Mobility Training: 
Bed Mobility: DNT this date due to pt sitting up in bedside chair on arrival ans after tx session Transfers: 
Sit to Stand: Contact guard assistance Stand to Sit: Contact guard assistance Stand Pivot Transfers: Minimal assistance Interventions: Safety awareness training;Verbal cues; Tactile cues Balance: 
Sitting: Intact Standing: Impaired Standing - Static: Constant support;Good(RW) 
Standing - Dynamic : Constant support;Fair(RW) 
Ambulation/Gait Training: 
Distance (ft): 15 Feet (ft) Assistive Device: Walker, rolling;Gait belt Ambulation - Level of Assistance: Minimal assistance; Additional time Gait Abnormalities: Decreased step clearance;Shuffling gait Base of Support: Narrowed Speed/Izabella: Pace decreased (<100 feet/min); Shuffled; Slow Interventions: Verbal cues; Tactile cues; Safety awareness training Therapeutic Exercises:  
Seated after gait efforts AROM BLE x 15 reps. Each of LAQ's, quad. Sets, hip flex. , ankle DF/PF Pain Ratin/10 Activity Tolerance:  
Fair, requires rest breaks and observed SOB with activity Please refer to the flowsheet for vital signs taken during this treatment. After treatment patient left in no apparent distress:  
Sitting in chair, Call bell within reach, Bed / chair alarm activated and Caregiver / family present COMMUNICATION/COLLABORATION:  
The patients plan of care was discussed with: Registered Nurse Brennen Riddle PT, DPT Time Calculation: 28 mins

## 2019-10-04 NOTE — PROGRESS NOTES
Progress Note 
 
 
10/4/2019 2:43 PM 
NAME: Regi Guerrier MRN:  [id number] Admit Diagnosis: Symptomatic anemia [D64.9]             [id number] List:  
  
Recent DVT, started on anticoagulation, now with GI bleed 
  
- Cath 7/2019 with impella supported PCI of LM into LAD with balloon side hole to LCx, cath 8/29/2019 with patent LM stent. 
- Normal EF, mild AS 
- Prox afib, currently in sinus - Hx of aflutter ablation 
- HTN 
- Dyslipidemia - Hypothyroid - GERD 
- BPH 
- Distant tobacco 
- DVT 
- GI bleed , lives alone, ADLs, occasionally using cane Son: Kiko Hutchinson III.   [phone] 
   
  
            Assessment/Plan:      
  
No chest pain. Euvolemic so far, echo with normal EF, mild to mod AS Prox afib back in sinus, will use amiodarone, high risk for triple anticoagulation DVT now with IVC filter 
  
- Holding xarelto, s/p IVC filter, currently no plan to restart - Resume aspirin, at this point would cont asa or low dose xarelto, not both 
- Cont plavix, this can not be stopped. - Cont amiodarone - Cont  Midodrine, still with some labile bp. 
- Cont statin 
  
- Cont proscar,  flomax only if needed 
  
PT/OT Stable from cardiovascular perspective. Please call if we can be of further assistance 
   
  
  
 [x]       High complexity decision making was performed in this patient at high risk for decompensation with multiple organ involvement. Subjective:  
 
Regi Guerrier denies chest pain, dyspnea. Discussed with RN events overnight. Review of Systems: 
 
Symptom Y/N Comments  Symptom Y/N Comments Fever/Chills N   Chest Pain N Poor Appetite N   Edema N   
Cough N   Abdominal Pain N Sputum N   Joint Pain N   
SOB/JAFFE N   Pruritis/Rash N   
Nausea/vomit N   Tolerating PT/OT Y Diarrhea N   Tolerating Diet Y Constipation N   Other Could NOT obtain due to:   
 
Objective:  
  
Physical Exam: Last 24hrs VS reviewed since prior progress note. Most recent are: 
 
Visit Vitals /55 (BP 1 Location: Left arm, BP Patient Position: At rest) Pulse 66 Temp 98 °F (36.7 °C) Resp 18 Ht 5' 9\" (1.753 m) Wt 82.6 kg (181 lb 15.8 oz) SpO2 97% BMI 26.88 kg/m² Intake/Output Summary (Last 24 hours) at 10/4/2019 3614 Last data filed at 10/4/2019 3618 Gross per 24 hour Intake  Output 450 ml Net -450 ml General Appearance: Well developed, well nourished, alert & oriented x 3,  
 no acute distress. Ears/Nose/Mouth/Throat: Hearing grossly normal. 
Neck: Supple. Chest: Lungs clear to auscultation bilaterally. Cardiovascular: Regular rate and rhythm, S1S2 normal, no murmur. Abdomen: Soft, non-tender, bowel sounds are active. Extremities: No edema bilaterally. Skin: Warm and dry. PMH/SH reviewed - no change compared to H&P Data Review Telemetry: normal sinus rhythm Lab Data Personally Reviewed: 
 
Recent Labs 10/04/19 
1377 10/03/19 
1716 10/03/19 
9252 WBC 11.7*  --  11.0 HGB 8.6* 8.3* 7.9*  
HCT 28.0* 27.3* 25.4*  
  --  233 No results for input(s): INR, PTP, APTT, INREXT, INREXT in the last 72 hours. Recent Labs 10/04/19 
5259 10/03/19 
5650 10/02/19 
5927  141 138  
K 3.6 3.6 3.7 * 110* 109* CO2 26 23 23 BUN 14 15 17 CREA 1.33* 1.29 1.37* GLU 94 100 107* CA 8.4* 8.4* 8.4* No results for input(s): CPK, CKNDX, TROIQ in the last 72 hours. No lab exists for component: CPKMB Lab Results Component Value Date/Time Cholesterol, total 117 06/12/2019 02:00 PM  
 HDL Cholesterol 35 06/12/2019 02:00 PM  
 LDL, calculated 65 06/12/2019 02:00 PM  
 Triglyceride 86 06/12/2019 02:00 PM  
 CHOL/HDL Ratio 3 06/12/2019 02:00 PM  
 
 
No results for input(s): SGOT, GPT, AP, TBIL, TP, ALB, GLOB, GGT, AML, LPSE in the last 72 hours. No lab exists for component: AMYP, HLPSE No results for input(s): PH, PCO2, PO2 in the last 72 hours. Medications Personally Reviewed: 
 
Current Facility-Administered Medications Medication Dose Route Frequency  iron sucrose (VENOFER) 500 mg in 0.9% sodium chloride 250 mL IVPB  500 mg IntraVENous ONCE  
 midodrine (PROAMITINE) tablet 10 mg  10 mg Oral TID WITH MEALS  pravastatin (PRAVACHOL) tablet 80 mg  80 mg Oral QHS  cefTRIAXone (ROCEPHIN) 1 g in 0.9% sodium chloride (MBP/ADV) 50 mL  1 g IntraVENous Q24H  
 sodium chloride (NS) flush 5-40 mL  5-40 mL IntraVENous PRN  
 sodium chloride (NS) flush 5-40 mL  5-40 mL IntraVENous PRN  
 lidocaine (PF) (XYLOCAINE) 10 mg/mL (1 %) injection 0.1 mL  0.1 mL SubCUTAneous PRN  
 0.9% sodium chloride infusion 250 mL  250 mL IntraVENous PRN  
 sodium chloride (NS) flush 5-40 mL  5-40 mL IntraVENous Q8H  
 sodium chloride (NS) flush 5-40 mL  5-40 mL IntraVENous PRN  pantoprazole (PROTONIX) 40 mg in sodium chloride 0.9% 10 mL injection  40 mg IntraVENous Q12H  
 amiodarone (CORDARONE) tablet 200 mg  200 mg Oral DAILY  clopidogrel (PLAVIX) tablet 75 mg  75 mg Oral DAILY  finasteride (PROSCAR) tablet 5 mg  5 mg Oral PCD  levothyroxine (SYNTHROID) tablet 137 mcg  137 mcg Oral ACB  tamsulosin (FLOMAX) capsule 0.4 mg  0.4 mg Oral DAILY  temazepam (RESTORIL) capsule 30 mg  30 mg Oral QHS  ondansetron (ZOFRAN) injection 4 mg  4 mg IntraVENous Q4H PRN  
 0.9% sodium chloride infusion 250 mL  250 mL IntraVENous PRN  
 acetaminophen (TYLENOL) tablet 650 mg  650 mg Oral Q4H PRN Phong Regan MD

## 2019-10-04 NOTE — PROGRESS NOTES
MD noted WBCs increasing. [address] [facility] has received Mohan Spann. They are aware no d/c today. I discussed with son//pt as well. May need new Samira auth on Monday.

## 2019-10-04 NOTE — PROGRESS NOTES
PROGRESS NOTE 
 
NAME:  Carole Joshi :   3/13/1931 MRN:   [id number] Date/Time:  10/4/2019 7:42 AM 
Subjective:  
History:  Chart reviewed and patient seen and examined this AM and D/W his nurse and his son at length and all events noted. He was admitted with symptomatic anemia with Hgb falling from 11.9 to 8.4 on admission and since admission to 7.0 requiring 1 U PRBC. He was found on admission to have hemocult positive melanotic stool w/o reno bleeding noted. EGD on  was remarkable for an AVM which was clipped and Colonoscopy on  with 2 sessile polyps w/o obvious bleeding site. He also had Pillcam study with some SB AVMs noted but no bleeding. He had noted no increased frequency of BMs PTA. He denies abdominal pain or other GI c/o and has no  c/o. He has no cardiac or respiratory c/o. He has no neurologic c/o except still marked generalized weakness and has been a little more confused at night on occassion. He has no other c/o on complete ROS. Medications reviewed: 
Current Facility-Administered Medications Medication Dose Route Frequency  midodrine (PROAMITINE) tablet 10 mg  10 mg Oral TID WITH MEALS  pravastatin (PRAVACHOL) tablet 80 mg  80 mg Oral QHS  cefTRIAXone (ROCEPHIN) 1 g in 0.9% sodium chloride (MBP/ADV) 50 mL  1 g IntraVENous Q24H  
 sodium chloride (NS) flush 5-40 mL  5-40 mL IntraVENous PRN  
 sodium chloride (NS) flush 5-40 mL  5-40 mL IntraVENous PRN  
 lidocaine (PF) (XYLOCAINE) 10 mg/mL (1 %) injection 0.1 mL  0.1 mL SubCUTAneous PRN  
 0.9% sodium chloride infusion 250 mL  250 mL IntraVENous PRN  
 sodium chloride (NS) flush 5-40 mL  5-40 mL IntraVENous Q8H  
 sodium chloride (NS) flush 5-40 mL  5-40 mL IntraVENous PRN  pantoprazole (PROTONIX) 40 mg in sodium chloride 0.9% 10 mL injection  40 mg IntraVENous Q12H  
 amiodarone (CORDARONE) tablet 200 mg  200 mg Oral DAILY  clopidogrel (PLAVIX) tablet 75 mg  75 mg Oral DAILY  finasteride (PROSCAR) tablet 5 mg  5 mg Oral PCD  levothyroxine (SYNTHROID) tablet 137 mcg  137 mcg Oral ACB  tamsulosin (FLOMAX) capsule 0.4 mg  0.4 mg Oral DAILY  temazepam (RESTORIL) capsule 30 mg  30 mg Oral QHS  ondansetron (ZOFRAN) injection 4 mg  4 mg IntraVENous Q4H PRN  
 0.9% sodium chloride infusion 250 mL  250 mL IntraVENous PRN  
 acetaminophen (TYLENOL) tablet 650 mg  650 mg Oral Q4H PRN Objective:  
Vitals: 
Visit Vitals /58 Pulse 67 Temp 97.6 °F (36.4 °C) Resp 18 Ht 5' 9\" (1.753 m) Wt 181 lb 15.8 oz (82.6 kg) SpO2 96% BMI 26.88 kg/m² O2 Flow Rate (L/min): 2 l/min O2 Device: Room air Temp (24hrs), Av.7 °F (36.5 °C), Min:97.2 °F (36.2 °C), Max:98.4 °F (36.9 °C) Last 24hr Input/Output: 
 
Intake/Output Summary (Last 24 hours) at 10/4/2019 4170 Last data filed at 10/4/2019 4423 Gross per 24 hour Intake  Output 450 ml Net -450 ml PHYSICAL EXAM: 
General:     Alert, cooperative, no distress but confused this AM, appears stated age. Head:    Normocephalic, without obvious abnormality, atraumatic. Eyes:    Conjunctivae/corneas clear. PERRLA Nose:   Nares normal. No drainage or sinus tenderness. Throat:     Lips, mucosa, and tongue normal.  No Thrush Neck:   Supple, symmetrical,  no adenopathy, thyroid: non tender 
   no carotid bruit and no JVD. Back:     Symmetric,  No CVA tenderness. Lungs:    Clear to auscultation bilaterally. No Wheezing or Rhonchi. No rales. Heart:    Regular rate and rhythm,  no murmur, rub or gallop. Abdomen:    Soft, non-tender. Not distended. Bowel sounds normal. No masses Extremities:  Extremities normal, atraumatic, No cyanosis. No edema. No clubbing Lymph nodes:  Cervical, supraclavicular normal. 
Neurologic:  Normal strength, Alert but confused. Skin:                 No rash Lab Data Reviewed: 
 
Recent Results (from the past 24 hour(s)) HGB & HCT  
 Collection Time: 10/03/19  5:16 PM  
Result Value Ref Range HGB 8.3 (L) 12.1 - 17.0 g/dL HCT 27.3 (L) 36.6 - 50.3 % METABOLIC PANEL, BASIC Collection Time: 10/04/19  4:50 AM  
Result Value Ref Range Sodium 139 136 - 145 mmol/L Potassium 3.6 3.5 - 5.1 mmol/L Chloride 110 (H) 97 - 108 mmol/L  
 CO2 26 21 - 32 mmol/L Anion gap 3 (L) 5 - 15 mmol/L Glucose 94 65 - 100 mg/dL BUN 14 6 - 20 MG/DL Creatinine 1.33 (H) 0.70 - 1.30 MG/DL  
 BUN/Creatinine ratio 11 (L) 12 - 20 GFR est AA >60 >60 ml/min/1.73m2 GFR est non-AA 51 (L) >60 ml/min/1.73m2 Calcium 8.4 (L) 8.5 - 10.1 MG/DL  
CBC WITH AUTOMATED DIFF Collection Time: 10/04/19  4:50 AM  
Result Value Ref Range WBC 11.7 (H) 4.1 - 11.1 K/uL  
 RBC 3.15 (L) 4.10 - 5.70 M/uL HGB 8.6 (L) 12.1 - 17.0 g/dL HCT 28.0 (L) 36.6 - 50.3 % MCV 88.9 80.0 - 99.0 FL  
 MCH 27.3 26.0 - 34.0 PG  
 MCHC 30.7 30.0 - 36.5 g/dL  
 RDW 16.1 (H) 11.5 - 14.5 % PLATELET 625 433 - 355 K/uL MPV 10.5 8.9 - 12.9 FL  
 NRBC 0.0 0  WBC ABSOLUTE NRBC 0.00 0.00 - 0.01 K/uL NEUTROPHILS 71 32 - 75 % LYMPHOCYTES 14 12 - 49 % MONOCYTES 11 5 - 13 % EOSINOPHILS 2 0 - 7 % BASOPHILS 0 0 - 1 % IMMATURE GRANULOCYTES 2 (H) 0.0 - 0.5 % ABS. NEUTROPHILS 8.3 (H) 1.8 - 8.0 K/UL  
 ABS. LYMPHOCYTES 1.6 0.8 - 3.5 K/UL  
 ABS. MONOCYTES 1.3 (H) 0.0 - 1.0 K/UL  
 ABS. EOSINOPHILS 0.3 0.0 - 0.4 K/UL  
 ABS. BASOPHILS 0.1 0.0 - 0.1 K/UL  
 ABS. IMM. GRANS. 0.2 (H) 0.00 - 0.04 K/UL  
 DF AUTOMATED Assessment/Plan:  
 
Principal Problem: 
  Gastrointestinal hemorrhage with melena (9/29/2019) Active Problems: 
  Glucose intolerance (impaired glucose tolerance) (8/21/2017) COPD (chronic obstructive pulmonary disease) ([facility] (8/21/2017) Stage 3 chronic kidney disease ([facility] (8/21/2017) Atrial fibrillation ([facility] (8/21/2017) ASCVD (arteriosclerotic cardiovascular disease) (8/21/2017) Overview: Catheterization 6/11/2004 80% stenosis nondominant RCA and 30% stenosis of  
    LAD with a notation of some left main disease but no percentage given. No  
    mention of angioplasty or stent made Nonrheumatic aortic valve stenosis (8/27/2019) Overview: Mild to moderate by echocardiogram 8/27/2019 Acute deep vein thrombosis (DVT) of distal vein of left lower extremity ([address] (9/18/2019) Symptomatic anemia (9/26/2019) AVM (arteriovenous malformation) of stomach, acquired (9/29/2019) Pneumonia due to infectious organism (10/2/2019) Overview: LLL 10/1/2019 
 
  
___________________________________________________ PLAN: 
 
1. Symptomatic anemia (9/26/2019): s/p transfusion.  sp EGD- AVM clipped. Continue high dose protonix for now.  Hgb 7.0 on 9/29 am, as pt was somewhat short of breath and orthostatic, transfused one unit. Now 8.6 2. L LE DVT: Off Xarelto and filter placed on 9/30.  
3. Triple therapy for anticoagulation. Recent DVT, hx A fib, recent cardiac stents. Cardiology note reviewed and agree to continue on Plavix although likely the culprit for his GI Bleed, but recent stents. 4. Resume ASA now 5. Appreciate vascular surgery help 6. Pillcam 10/1 without noted bleeding, non bleeding AVMs seen 7. Hypotension- on midodrine, increased to 10 TID. 8. PAF on Amiodarone 9. Hypothyroidism: Continue usual meds 10. With LGF checked WBC, UA and CXR with slight increase WBC and LLL infiltrate started Rocephin 10/1 PM 
11. Will need SNF Rehab which I D/W  and patient  yesterday AM and son on 10/1. 12. DNR 13. WBC a little higher is of concern, Increased 11.0 to 11.7 14. Repeat CXR yesterday with improved L basilar Pneumonia 15. Hold off on D/C to SNF with increasing WBC 16. IV Venofer today, 2nd dose 
 
 
 
 
 
___________________________________________________ Attending Physician: Damari Sher MD

## 2019-10-05 NOTE — PROGRESS NOTES
Bedside shift change report given to [facility] (oncoming nurse) by Basilio Chahal (offgoing nurse). Report included the following information SBAR, Kardex, Intake/Output, MAR and Recent Results. No changes. Am labs drawn.

## 2019-10-05 NOTE — PROGRESS NOTES
PROGRESS NOTE 
 
NAME:  Maria Fernanda Raymond :   3/13/1931 MRN:   [id number] Date/Time:  10/5/2019 11:19 AM 
Subjective:  
History:  Chart reviewed and patient seen and examined this AM and D/W his nurse and his son and all events noted. He was admitted with symptomatic anemia with Hgb falling from 11.9 to 8.4 on admission and since admission to 7.0 requiring 1 U PRBC. He was found on admission to have hemocult positive melanotic stool w/o reno bleeding noted. EGD on  was remarkable for an AVM which was clipped and Colonoscopy on  with 2 sessile polyps w/o obvious bleeding site. He also had Pillcam study with some SB AVMs noted but no bleeding. He had noted no increased frequency of BMs PTA. He denies abdominal pain or other GI c/o and has no  c/o. He has no cardiac or respiratory c/o. He has no neurologic c/o except still marked generalized weakness and has been a little more confused at night on occassion. He has no other c/o on complete ROS. Medications reviewed: 
Current Facility-Administered Medications Medication Dose Route Frequency  aspirin chewable tablet 81 mg  81 mg Oral DAILY  midodrine (PROAMITINE) tablet 10 mg  10 mg Oral TID WITH MEALS  pravastatin (PRAVACHOL) tablet 80 mg  80 mg Oral QHS  cefTRIAXone (ROCEPHIN) 1 g in 0.9% sodium chloride (MBP/ADV) 50 mL  1 g IntraVENous Q24H  
 sodium chloride (NS) flush 5-40 mL  5-40 mL IntraVENous PRN  
 sodium chloride (NS) flush 5-40 mL  5-40 mL IntraVENous PRN  
 lidocaine (PF) (XYLOCAINE) 10 mg/mL (1 %) injection 0.1 mL  0.1 mL SubCUTAneous PRN  
 0.9% sodium chloride infusion 250 mL  250 mL IntraVENous PRN  
 sodium chloride (NS) flush 5-40 mL  5-40 mL IntraVENous Q8H  
 sodium chloride (NS) flush 5-40 mL  5-40 mL IntraVENous PRN  pantoprazole (PROTONIX) 40 mg in sodium chloride 0.9% 10 mL injection  40 mg IntraVENous Q12H  
 amiodarone (CORDARONE) tablet 200 mg  200 mg Oral DAILY  clopidogrel (PLAVIX) tablet 75 mg  75 mg Oral DAILY  finasteride (PROSCAR) tablet 5 mg  5 mg Oral PCD  levothyroxine (SYNTHROID) tablet 137 mcg  137 mcg Oral ACB  tamsulosin (FLOMAX) capsule 0.4 mg  0.4 mg Oral DAILY  temazepam (RESTORIL) capsule 30 mg  30 mg Oral QHS  ondansetron (ZOFRAN) injection 4 mg  4 mg IntraVENous Q4H PRN  
 0.9% sodium chloride infusion 250 mL  250 mL IntraVENous PRN  
 acetaminophen (TYLENOL) tablet 650 mg  650 mg Oral Q4H PRN Objective:  
Vitals: 
Visit Vitals /41 (BP 1 Location: Left arm, BP Patient Position: At rest;Sitting) Pulse 66 Temp 98.3 °F (36.8 °C) Resp 18 Ht 5' 9\" (1.753 m) Wt 181 lb 15.8 oz (82.6 kg) SpO2 94% BMI 26.88 kg/m² O2 Flow Rate (L/min): 0.5 l/min O2 Device: Room air Temp (24hrs), Av.3 °F (36.8 °C), Min:97.7 °F (36.5 °C), Max:99 °F (37.2 °C) Last 24hr Input/Output: 
 
Intake/Output Summary (Last 24 hours) at 10/5/2019 1119 Last data filed at 10/4/2019 1952 Gross per 24 hour Intake 440 ml Output 175 ml Net 265 ml PHYSICAL EXAM: 
General:     Alert, cooperative, no distress but confused this AM, appears stated age. Head:    Normocephalic, without obvious abnormality, atraumatic. Eyes:    Conjunctivae/corneas clear. PERRLA Nose:   Nares normal. No drainage or sinus tenderness. Throat:     Lips, mucosa, and tongue normal.  No Thrush Neck:   Supple, symmetrical,  no adenopathy, thyroid: non tender 
   no carotid bruit and no JVD. Back:     Symmetric,  No CVA tenderness. Lungs:    Clear to auscultation bilaterally. No Wheezing or Rhonchi. No rales. Heart:    Regular rate and rhythm,  no murmur, rub or gallop. Abdomen:    Soft, non-tender. Not distended. Bowel sounds normal. No masses Extremities:  Extremities normal, atraumatic, No cyanosis. No edema. No clubbing Lymph nodes:  Cervical, supraclavicular normal. 
Neurologic:  Normal strength, Alert but confused. Skin:                 No rash Lab Data Reviewed: 
 
Recent Results (from the past 24 hour(s)) METABOLIC PANEL, BASIC Collection Time: 10/05/19  6:03 AM  
Result Value Ref Range Sodium 142 136 - 145 mmol/L Potassium 3.5 3.5 - 5.1 mmol/L Chloride 111 (H) 97 - 108 mmol/L  
 CO2 27 21 - 32 mmol/L Anion gap 4 (L) 5 - 15 mmol/L Glucose 83 65 - 100 mg/dL BUN 13 6 - 20 MG/DL Creatinine 1.31 (H) 0.70 - 1.30 MG/DL  
 BUN/Creatinine ratio 10 (L) 12 - 20 GFR est AA >60 >60 ml/min/1.73m2 GFR est non-AA 52 (L) >60 ml/min/1.73m2 Calcium 8.6 8.5 - 10.1 MG/DL  
CBC WITH AUTOMATED DIFF Collection Time: 10/05/19  6:03 AM  
Result Value Ref Range WBC 11.2 (H) 4.1 - 11.1 K/uL  
 RBC 2.91 (L) 4.10 - 5.70 M/uL HGB 8.0 (L) 12.1 - 17.0 g/dL HCT 26.1 (L) 36.6 - 50.3 % MCV 89.7 80.0 - 99.0 FL  
 MCH 27.5 26.0 - 34.0 PG  
 MCHC 30.7 30.0 - 36.5 g/dL  
 RDW 16.1 (H) 11.5 - 14.5 % PLATELET 998 238 - 505 K/uL MPV 10.1 8.9 - 12.9 FL  
 NRBC 0.0 0  WBC ABSOLUTE NRBC 0.00 0.00 - 0.01 K/uL NEUTROPHILS 72 32 - 75 % LYMPHOCYTES 13 12 - 49 % MONOCYTES 10 5 - 13 % EOSINOPHILS 3 0 - 7 % BASOPHILS 0 0 - 1 % IMMATURE GRANULOCYTES 2 (H) 0.0 - 0.5 % ABS. NEUTROPHILS 8.1 (H) 1.8 - 8.0 K/UL  
 ABS. LYMPHOCYTES 1.4 0.8 - 3.5 K/UL  
 ABS. MONOCYTES 1.1 (H) 0.0 - 1.0 K/UL  
 ABS. EOSINOPHILS 0.3 0.0 - 0.4 K/UL  
 ABS. BASOPHILS 0.0 0.0 - 0.1 K/UL  
 ABS. IMM. GRANS. 0.2 (H) 0.00 - 0.04 K/UL  
 DF AUTOMATED Assessment/Plan:  
 
Principal Problem: 
  Gastrointestinal hemorrhage with melena (9/29/2019) Active Problems: 
  Glucose intolerance (impaired glucose tolerance) (8/21/2017) COPD (chronic obstructive pulmonary disease) ([facility] (8/21/2017) Stage 3 chronic kidney disease ([facility] (8/21/2017) Atrial fibrillation ([facility] (8/21/2017) ASCVD (arteriosclerotic cardiovascular disease) (8/21/2017) Overview: Catheterization 6/11/2004 80% stenosis nondominant RCA and 30% stenosis of  
    LAD with a notation of some left main disease but no percentage given. No  
    mention of angioplasty or stent made Nonrheumatic aortic valve stenosis (8/27/2019) Overview: Mild to moderate by echocardiogram 8/27/2019 Acute deep vein thrombosis (DVT) of distal vein of left lower extremity ([address] (9/18/2019) Symptomatic anemia (9/26/2019) AVM (arteriovenous malformation) of stomach, acquired (9/29/2019) Pneumonia due to infectious organism (10/2/2019) Overview: LLL 10/1/2019 
 
  
___________________________________________________ PLAN: 
 
1. Symptomatic anemia (9/26/2019): s/p transfusion.  sp EGD- AVM clipped. Continue high dose protonix for now.  Hgb 7.0 on 9/29 am, as pt was somewhat short of breath and orthostatic, transfused one unit. Now 8.0 2. L LE DVT: Off Xarelto and filter placed on 9/30.  
3. Triple therapy for anticoagulation. Recent DVT, hx A fib, recent cardiac stents. Cardiology note reviewed and agree to continue on Plavix although likely the culprit for his GI Bleed, but recent stents. 4. Resumed ASA on 10/4 
5. Appreciate vascular surgery help 6. Pillcam 10/1 without noted bleeding, non bleeding AVMs seen 7. Hypotension- on midodrine, increased to 10 TID. 8. PAF on Amiodarone 9. Hypothyroidism: Continue usual meds 10. With LGF checked WBC, UA and CXR with slight increase WBC and LLL infiltrate started Rocephin 10/1 PM 
11. Will need SNF Rehab 12. DNR 13. WBC a little higher is of concern, Increased 11.0 to 11.7 yesterday but marginally down today at 11.2 14. Repeat CXR on 10/3 with improved L basilar Pneumonia 15. Hold off on D/C to SNF with elevated WBC 16. IV Venofer today, 3rd dose 
 
 
 
 
 
___________________________________________________ Attending Physician: Humphrey Linda MD

## 2019-10-06 NOTE — PROGRESS NOTES
Weekend CM reviewed medical record, followed up regarding transitional care plans. Patient previously accepted for transfer admission to [facility], CM received incoming call from [address] [place] [address] inquiring about anticipated discharge. Per MD and RN, Patient not yet medically cleared for discharge, elevated WBC. CM notified [facility], and will continue to follow/assist with discharge plans. Robert Stewart, [facility] CenterDANTE Perez. [id number]

## 2019-10-06 NOTE — PROGRESS NOTES
Progress Note 
 
 
10/6/2019 2:43 PM 
NAME: Omi Almeida MRN:  [id number] Admit Diagnosis: Symptomatic anemia [D64.9]             TSYUHVA List:  
  
Recent DVT, started on anticoagulation, now with GI bleed 
  
- Cath 7/2019 with impella supported PCI of LM into LAD with balloon side hole to LCx, cath 8/29/2019 with patent LM stent. 
- Normal EF, mild AS 
- Prox afib, currently in sinus - Hx of aflutter ablation 
- HTN 
- Dyslipidemia - Hypothyroid - GERD 
- BPH 
- Distant tobacco 
- DVT 
- GI bleed , lives alone, ADLs, occasionally using cane Son: Alexx Magana III.   [phone] 
   
  
            Assessment/Plan:      
  
No chest pain. Euvolemic so far, echo with normal EF, mild to mod AS Prox afib back in sinus, will use amiodarone, high risk for triple anticoagulation DVT now with IVC filter 
  
- Holding xarelto, s/p IVC filter, currently no plan to restart - Ideally continue aspirin given LM stents, but slight drop in Hg noted, could stop if necessary with some risk. - Cont plavix, this can not be stopped. - Cont amiodarone - Cont  Midodrine, still with some labile bp. 
- Cont statin 
  
- Cont proscar,  flomax only if needed 
  
PT/OT Stable from cardiovascular perspective. Please call if we can be of further assistance 
   
  
  
 [x]       High complexity decision making was performed in this patient at high risk for decompensation with multiple organ involvement. Subjective:  
 
Omi Almeida denies chest pain, dyspnea. Discussed with RN events overnight. Review of Systems: 
 
Symptom Y/N Comments  Symptom Y/N Comments Fever/Chills N   Chest Pain N Poor Appetite N   Edema N   
Cough N   Abdominal Pain N Sputum N   Joint Pain N   
SOB/JAFFE N   Pruritis/Rash N   
Nausea/vomit N   Tolerating PT/OT Y Diarrhea N   Tolerating Diet Y Constipation N   Other Could NOT obtain due to:   
 
Objective:  
  
Physical Exam: Last 24hrs VS reviewed since prior progress note. Most recent are: 
 
Visit Vitals /53 (BP 1 Location: Left arm, BP Patient Position: At rest) Pulse (!) 59 Temp 98 °F (36.7 °C) Resp 16 Ht 5' 9\" (1.753 m) Wt 82.6 kg (181 lb 15.8 oz) SpO2 95% BMI 26.88 kg/m² Intake/Output Summary (Last 24 hours) at 10/6/2019 1057 Last data filed at 10/6/2019 2304 Gross per 24 hour Intake 800 ml Output 600 ml Net 200 ml General Appearance: Well developed, well nourished, alert & oriented x 3,  
 no acute distress. Ears/Nose/Mouth/Throat: Hearing grossly normal. 
Neck: Supple. Chest: Lungs clear to auscultation bilaterally. Cardiovascular: Regular rate and rhythm, S1S2 normal, no murmur. Abdomen: Soft, non-tender, bowel sounds are active. Extremities: No edema bilaterally. Skin: Warm and dry. PMH/SH reviewed - no change compared to H&P Data Review Telemetry: normal sinus rhythm Lab Data Personally Reviewed: 
 
Recent Labs 10/06/19 
0254 10/05/19 
6871 WBC 12.7* 11.2* HGB 7.6* 8.0*  
HCT 24.7* 26.1*  
 299 No results for input(s): INR, PTP, APTT, INREXT, INREXT in the last 72 hours. Recent Labs 10/06/19 
2800 10/05/19 
0603 10/04/19 
6052  142 139  
K 3.5 3.5 3.6 * 111* 110* CO2 26 27 26 BUN 13 13 14 CREA 1.14 1.31* 1.33* GLU 93 83 94 CA 8.3* 8.6 8.4* No results for input(s): CPK, CKNDX, TROIQ in the last 72 hours. No lab exists for component: CPKMB Lab Results Component Value Date/Time Cholesterol, total 117 06/12/2019 02:00 PM  
 HDL Cholesterol 35 06/12/2019 02:00 PM  
 LDL, calculated 65 06/12/2019 02:00 PM  
 Triglyceride 86 06/12/2019 02:00 PM  
 CHOL/HDL Ratio 3 06/12/2019 02:00 PM  
 
 
No results for input(s): SGOT, GPT, AP, TBIL, TP, ALB, GLOB, GGT, AML, LPSE in the last 72 hours. No lab exists for component: AMYP, HLPSE No results for input(s): PH, PCO2, PO2 in the last 72 hours. Medications Personally Reviewed: 
 
Current Facility-Administered Medications Medication Dose Route Frequency  aspirin chewable tablet 81 mg  81 mg Oral DAILY  midodrine (PROAMITINE) tablet 10 mg  10 mg Oral TID WITH MEALS  pravastatin (PRAVACHOL) tablet 80 mg  80 mg Oral QHS  cefTRIAXone (ROCEPHIN) 1 g in 0.9% sodium chloride (MBP/ADV) 50 mL  1 g IntraVENous Q24H  
 sodium chloride (NS) flush 5-40 mL  5-40 mL IntraVENous PRN  
 sodium chloride (NS) flush 5-40 mL  5-40 mL IntraVENous PRN  
 lidocaine (PF) (XYLOCAINE) 10 mg/mL (1 %) injection 0.1 mL  0.1 mL SubCUTAneous PRN  
 0.9% sodium chloride infusion 250 mL  250 mL IntraVENous PRN  pantoprazole (PROTONIX) 40 mg in sodium chloride 0.9% 10 mL injection  40 mg IntraVENous Q12H  
 amiodarone (CORDARONE) tablet 200 mg  200 mg Oral DAILY  clopidogrel (PLAVIX) tablet 75 mg  75 mg Oral DAILY  finasteride (PROSCAR) tablet 5 mg  5 mg Oral PCD  levothyroxine (SYNTHROID) tablet 137 mcg  137 mcg Oral ACB  tamsulosin (FLOMAX) capsule 0.4 mg  0.4 mg Oral DAILY  temazepam (RESTORIL) capsule 30 mg  30 mg Oral QHS  ondansetron (ZOFRAN) injection 4 mg  4 mg IntraVENous Q4H PRN  
 0.9% sodium chloride infusion 250 mL  250 mL IntraVENous PRN  
 acetaminophen (TYLENOL) tablet 650 mg  650 mg Oral Q4H PRN Arya Espinoza MD

## 2019-10-06 NOTE — ROUTINE PROCESS
Bedside and Verbal shift change report given to Aj Posada RN (oncoming nurse) by Natalio Abbott (offgoing nurse). Report given with SBAR, Kardex, MAR and Recent Results.

## 2019-10-06 NOTE — PROGRESS NOTES
PROGRESS NOTE 
 
NAME:  Maurilio Diaz :   3/13/1931 MRN:   [id number] Date/Time:  10/6/2019 10:45 AM 
Subjective:  
History:  Chart reviewed and patient seen and examined this AM and D/W his nurse and his daughter and all events noted. He was admitted with symptomatic anemia with Hgb falling from 11.9 to 8.4 on admission and since admission to 7.0 requiring 1 U PRBC. He was found on admission to have hemocult positive melanotic stool w/o reno bleeding noted. EGD on  was remarkable for an AVM which was clipped and Colonoscopy on  with 2 sessile polyps w/o obvious bleeding site. He also had Pillcam study with some SB AVMs noted but no bleeding. He had noted no increased frequency of BMs PTA. He denies abdominal pain or other GI c/o and has no  c/o. He has no cardiac or respiratory c/o. He has no neurologic c/o except still marked generalized weakness, but walked in the gutierrez yesterday with assistance. He has been a little more confused at night on occassion. He has no other c/o on complete ROS. Medications reviewed: 
Current Facility-Administered Medications Medication Dose Route Frequency  aspirin chewable tablet 81 mg  81 mg Oral DAILY  midodrine (PROAMITINE) tablet 10 mg  10 mg Oral TID WITH MEALS  pravastatin (PRAVACHOL) tablet 80 mg  80 mg Oral QHS  cefTRIAXone (ROCEPHIN) 1 g in 0.9% sodium chloride (MBP/ADV) 50 mL  1 g IntraVENous Q24H  
 sodium chloride (NS) flush 5-40 mL  5-40 mL IntraVENous PRN  
 sodium chloride (NS) flush 5-40 mL  5-40 mL IntraVENous PRN  
 lidocaine (PF) (XYLOCAINE) 10 mg/mL (1 %) injection 0.1 mL  0.1 mL SubCUTAneous PRN  
 0.9% sodium chloride infusion 250 mL  250 mL IntraVENous PRN  pantoprazole (PROTONIX) 40 mg in sodium chloride 0.9% 10 mL injection  40 mg IntraVENous Q12H  
 amiodarone (CORDARONE) tablet 200 mg  200 mg Oral DAILY  clopidogrel (PLAVIX) tablet 75 mg  75 mg Oral DAILY  finasteride (PROSCAR) tablet 5 mg  5 mg Oral PCD  levothyroxine (SYNTHROID) tablet 137 mcg  137 mcg Oral ACB  tamsulosin (FLOMAX) capsule 0.4 mg  0.4 mg Oral DAILY  temazepam (RESTORIL) capsule 30 mg  30 mg Oral QHS  ondansetron (ZOFRAN) injection 4 mg  4 mg IntraVENous Q4H PRN  
 0.9% sodium chloride infusion 250 mL  250 mL IntraVENous PRN  
 acetaminophen (TYLENOL) tablet 650 mg  650 mg Oral Q4H PRN Objective:  
Vitals: 
Visit Vitals /53 (BP 1 Location: Left arm, BP Patient Position: At rest) Pulse (!) 59 Temp 98 °F (36.7 °C) Resp 16 Ht 5' 9\" (1.753 m) Wt 181 lb 15.8 oz (82.6 kg) SpO2 95% BMI 26.88 kg/m² O2 Flow Rate (L/min): 0.5 l/min O2 Device: Room air Temp (24hrs), Av.4 °F (36.9 °C), Min:98 °F (36.7 °C), Max:98.9 °F (37.2 °C) Last 24hr Input/Output: 
 
Intake/Output Summary (Last 24 hours) at 10/6/2019 1045 Last data filed at 10/6/2019 6912 Gross per 24 hour Intake 800 ml Output 600 ml Net 200 ml PHYSICAL EXAM: 
General:     Alert, cooperative, no distress but confused this AM, appears stated age. Head:    Normocephalic, without obvious abnormality, atraumatic. Eyes:    Conjunctivae/corneas clear. PERRLA Nose:   Nares normal. No drainage or sinus tenderness. Throat:     Lips, mucosa, and tongue normal.  No Thrush Neck:   Supple, symmetrical,  no adenopathy, thyroid: non tender 
   no carotid bruit and no JVD. Back:     Symmetric,  No CVA tenderness. Lungs:    Clear to auscultation bilaterally. No Wheezing or Rhonchi. No rales. Heart:    Regular rate and rhythm,  no murmur, rub or gallop. Abdomen:    Soft, non-tender. Not distended. Bowel sounds normal. No masses Extremities:  Extremities normal, atraumatic, No cyanosis. No edema. No clubbing Lymph nodes:  Cervical, supraclavicular normal. 
Neurologic:  Normal strength, Alert but confused. Skin:                 No rash Lab Data Reviewed: Recent Results (from the past 24 hour(s)) METABOLIC PANEL, BASIC Collection Time: 10/06/19  2:54 AM  
Result Value Ref Range Sodium 141 136 - 145 mmol/L Potassium 3.5 3.5 - 5.1 mmol/L Chloride 110 (H) 97 - 108 mmol/L  
 CO2 26 21 - 32 mmol/L Anion gap 5 5 - 15 mmol/L Glucose 93 65 - 100 mg/dL BUN 13 6 - 20 MG/DL Creatinine 1.14 0.70 - 1.30 MG/DL  
 BUN/Creatinine ratio 11 (L) 12 - 20 GFR est AA >60 >60 ml/min/1.73m2 GFR est non-AA >60 >60 ml/min/1.73m2 Calcium 8.3 (L) 8.5 - 10.1 MG/DL  
CBC WITH AUTOMATED DIFF Collection Time: 10/06/19  2:54 AM  
Result Value Ref Range WBC 12.7 (H) 4.1 - 11.1 K/uL  
 RBC 2.76 (L) 4.10 - 5.70 M/uL HGB 7.6 (L) 12.1 - 17.0 g/dL HCT 24.7 (L) 36.6 - 50.3 % MCV 89.5 80.0 - 99.0 FL  
 MCH 27.5 26.0 - 34.0 PG  
 MCHC 30.8 30.0 - 36.5 g/dL  
 RDW 16.2 (H) 11.5 - 14.5 % PLATELET 097 574 - 281 K/uL MPV 10.2 8.9 - 12.9 FL  
 NRBC 0.0 0  WBC ABSOLUTE NRBC 0.00 0.00 - 0.01 K/uL NEUTROPHILS 72 32 - 75 % LYMPHOCYTES 13 12 - 49 % MONOCYTES 11 5 - 13 % EOSINOPHILS 2 0 - 7 % BASOPHILS 0 0 - 1 % IMMATURE GRANULOCYTES 2 (H) 0.0 - 0.5 % ABS. NEUTROPHILS 9.3 (H) 1.8 - 8.0 K/UL  
 ABS. LYMPHOCYTES 1.6 0.8 - 3.5 K/UL  
 ABS. MONOCYTES 1.3 (H) 0.0 - 1.0 K/UL  
 ABS. EOSINOPHILS 0.3 0.0 - 0.4 K/UL  
 ABS. BASOPHILS 0.0 0.0 - 0.1 K/UL  
 ABS. IMM. GRANS. 0.2 (H) 0.00 - 0.04 K/UL  
 DF AUTOMATED Assessment/Plan:  
 
Principal Problem: 
  Gastrointestinal hemorrhage with melena (9/29/2019) Active Problems: 
  Glucose intolerance (impaired glucose tolerance) (8/21/2017) COPD (chronic obstructive pulmonary disease) ([facility] [address] (8/21/2017) Stage 3 [facility] (8/21/2017) Atrial fibrillation ([facility] (8/21/2017) ASCVD (arteriosclerotic cardiovascular disease) (8/21/2017)     Overview: Catheterization 6/11/2004 80% stenosis nondominant RCA and 30% stenosis of  
 LAD with a notation of some left main disease but no percentage given. No  
    mention of angioplasty or stent made Nonrheumatic aortic valve stenosis (8/27/2019) Overview: Mild to moderate by echocardiogram 8/27/2019 Acute deep vein thrombosis (DVT) of distal vein of left lower extremity ([address] (9/18/2019) Symptomatic anemia (9/26/2019) AVM (arteriovenous malformation) of stomach, acquired (9/29/2019) Pneumonia due to infectious organism (10/2/2019) Overview: LLL 10/1/2019 
 
  
___________________________________________________ PLAN: 
 
1. Symptomatic anemia (9/26/2019): s/p transfusion.  sp EGD- AVM clipped. Continue high dose protonix for now.  Hgb 7.0 on 9/29 am, as pt was somewhat short of breath and orthostatic, transfused one unit. Now 7.6 down from 8.0 2. L LE DVT: Off Xarelto and filter placed on 9/30.  
3. Triple therapy for anticoagulation. Recent DVT, hx A fib, recent cardiac stents. Cardiology note reviewed and agree to continue on Plavix although likely the culprit for his GI Bleed, but recent stents. 4. Resumed ASA on 10/4 
5. Appreciate vascular surgery help 6. Pillcam 10/1 without noted bleeding, non bleeding AVMs seen 7. Hypotension- on midodrine, increased to 10 TID. 8. PAF on Amiodarone 9. Hypothyroidism: Continue usual meds 10. With LGF checked WBC, UA and CXR with slight increase WBC and LLL infiltrate started Rocephin 10/1 PM 
11. Will need SNF Rehab 12. DNR 13. WBC a little higher is of concern, Increased to 12.7 today 14. Repeat CXR on 10/3 with improved L basilar Pneumonia 15. Hold off on D/C to SNF with elevated WBC 16. IV Venofer completed 3 doses 
 
 
 
 
 
___________________________________________________ Attending Physician: Phil Ramey MD

## 2019-10-06 NOTE — ROUTINE PROCESS
Bedside and Verbal shift change report given to oncoming nurse by Judi Ceballos RN (offgoing nurse). Report given with SBAR, Kardex, MAR and Recent Results.

## 2019-10-07 NOTE — PROGRESS NOTES
Bedside and Verbal shift change report given to Christo Ordoñez (oncoming nurse) by Rajiv Law RN (offgoing nurse). Report included the following information SBAR, Kardex, Intake/Output, MAR, Accordion, Recent Results and Med Rec Status.

## 2019-10-07 NOTE — PROGRESS NOTES
PROGRESS NOTE 
 
NAME:  Tiffani Perkins :   3/13/1931 MRN:   [id number] Date/Time:  10/7/2019 7:16 AM 
Subjective:  
History:  Chart reviewed and patient seen and examined this AM and D/W his nurse and his daughter and all events noted. He was admitted with symptomatic anemia with Hgb falling from 11.9 to 8.4 on admission and since admission to 7.0 requiring 1 U PRBC. He was found on admission to have hemocult positive melanotic stool w/o reno bleeding noted. EGD on  was remarkable for an AVM which was clipped and Colonoscopy on  with 2 sessile polyps w/o obvious bleeding site. He also had Pillcam study with some SB AVMs noted but no bleeding. He had noted no increased frequency of BMs PTA. He denies abdominal pain or other GI c/o except now very constipated and bloated. he has no  c/o. He has no cardiac or respiratory c/o. He has no neurologic c/o except still marked generalized weakness, but walked in the gutierrez yesterday with assistance. He has been a little more confused at night on occassion. He has no other c/o on complete ROS. Medications reviewed: 
Current Facility-Administered Medications Medication Dose Route Frequency  polyethylene glycol (MIRALAX) packet 17 g  17 g Oral DAILY  aspirin chewable tablet 81 mg  81 mg Oral DAILY  midodrine (PROAMITINE) tablet 10 mg  10 mg Oral TID WITH MEALS  pravastatin (PRAVACHOL) tablet 80 mg  80 mg Oral QHS  cefTRIAXone (ROCEPHIN) 1 g in 0.9% sodium chloride (MBP/ADV) 50 mL  1 g IntraVENous Q24H  
 sodium chloride (NS) flush 5-40 mL  5-40 mL IntraVENous PRN  
 sodium chloride (NS) flush 5-40 mL  5-40 mL IntraVENous PRN  
 lidocaine (PF) (XYLOCAINE) 10 mg/mL (1 %) injection 0.1 mL  0.1 mL SubCUTAneous PRN  
 0.9% sodium chloride infusion 250 mL  250 mL IntraVENous PRN  pantoprazole (PROTONIX) 40 mg in sodium chloride 0.9% 10 mL injection  40 mg IntraVENous Q12H  
 amiodarone (CORDARONE) tablet 200 mg  200 mg Oral DAILY  clopidogrel (PLAVIX) tablet 75 mg  75 mg Oral DAILY  finasteride (PROSCAR) tablet 5 mg  5 mg Oral PCD  levothyroxine (SYNTHROID) tablet 137 mcg  137 mcg Oral ACB  tamsulosin (FLOMAX) capsule 0.4 mg  0.4 mg Oral DAILY  temazepam (RESTORIL) capsule 30 mg  30 mg Oral QHS  ondansetron (ZOFRAN) injection 4 mg  4 mg IntraVENous Q4H PRN  
 0.9% sodium chloride infusion 250 mL  250 mL IntraVENous PRN  
 acetaminophen (TYLENOL) tablet 650 mg  650 mg Oral Q4H PRN Objective:  
Vitals: 
Visit Vitals /54 Pulse 60 Temp 98.8 °F (37.1 °C) Resp 18 Ht 5' 9\" (1.753 m) Wt 181 lb 15.8 oz (82.6 kg) SpO2 96% BMI 26.88 kg/m² O2 Flow Rate (L/min): 0.5 l/min O2 Device: Room air Temp (24hrs), Av.5 °F (36.9 °C), Min:98 °F (36.7 °C), Max:98.8 °F (37.1 °C) Last 24hr Input/Output: 
 
Intake/Output Summary (Last 24 hours) at 10/7/2019 2977 Last data filed at 10/6/2019 2334 Gross per 24 hour Intake 920 ml Output 500 ml Net 420 ml PHYSICAL EXAM: 
General:     Alert, cooperative, no distress but confused this AM, appears stated age. Head:    Normocephalic, without obvious abnormality, atraumatic. Eyes:    Conjunctivae/corneas clear. PERRLA Nose:   Nares normal. No drainage or sinus tenderness. Throat:     Lips, mucosa, and tongue normal.  No Thrush Neck:   Supple, symmetrical,  no adenopathy, thyroid: non tender 
   no carotid bruit and no JVD. Back:     Symmetric,  No CVA tenderness. Lungs:    Clear to auscultation bilaterally. No Wheezing or Rhonchi. No rales. Heart:    Regular rate and rhythm,  no murmur, rub or gallop. Abdomen:    Soft, non-tender. Not distended. Bowel sounds normal. No masses Extremities:  Extremities normal, atraumatic, No cyanosis. No edema. No clubbing Lymph nodes:  Cervical, supraclavicular normal. 
Neurologic:  Normal strength, Alert but confused. Skin:                 No rash Lab Data Reviewed: Recent Results (from the past 24 hour(s)) METABOLIC PANEL, BASIC Collection Time: 10/07/19  4:44 AM  
Result Value Ref Range Sodium 140 136 - 145 mmol/L Potassium 3.8 3.5 - 5.1 mmol/L Chloride 109 (H) 97 - 108 mmol/L  
 CO2 28 21 - 32 mmol/L Anion gap 3 (L) 5 - 15 mmol/L Glucose 89 65 - 100 mg/dL BUN 15 6 - 20 MG/DL Creatinine 1.26 0.70 - 1.30 MG/DL  
 BUN/Creatinine ratio 12 12 - 20 GFR est AA >60 >60 ml/min/1.73m2 GFR est non-AA 54 (L) >60 ml/min/1.73m2 Calcium 8.5 8.5 - 10.1 MG/DL  
CBC WITH AUTOMATED DIFF Collection Time: 10/07/19  4:44 AM  
Result Value Ref Range WBC 14.0 (H) 4.1 - 11.1 K/uL  
 RBC 2.87 (L) 4.10 - 5.70 M/uL HGB 8.1 (L) 12.1 - 17.0 g/dL HCT 26.0 (L) 36.6 - 50.3 % MCV 90.6 80.0 - 99.0 FL  
 MCH 28.2 26.0 - 34.0 PG  
 MCHC 31.2 30.0 - 36.5 g/dL  
 RDW 16.8 (H) 11.5 - 14.5 % PLATELET 219 949 - 817 K/uL MPV 9.9 8.9 - 12.9 FL  
 NRBC 0.0 0  WBC ABSOLUTE NRBC 0.00 0.00 - 0.01 K/uL NEUTROPHILS 72 32 - 75 % LYMPHOCYTES 14 12 - 49 % MONOCYTES 10 5 - 13 % EOSINOPHILS 2 0 - 7 % BASOPHILS 0 0 - 1 % IMMATURE GRANULOCYTES 2 (H) 0.0 - 0.5 % ABS. NEUTROPHILS 10.2 (H) 1.8 - 8.0 K/UL  
 ABS. LYMPHOCYTES 1.9 0.8 - 3.5 K/UL  
 ABS. MONOCYTES 1.3 (H) 0.0 - 1.0 K/UL  
 ABS. EOSINOPHILS 0.3 0.0 - 0.4 K/UL  
 ABS. BASOPHILS 0.1 0.0 - 0.1 K/UL  
 ABS. IMM. GRANS. 0.3 (H) 0.00 - 0.04 K/UL  
 DF AUTOMATED Assessment/Plan:  
 
Principal Problem: 
  Gastrointestinal hemorrhage with melena (9/29/2019) Active Problems: 
  Glucose intolerance (impaired glucose tolerance) (8/21/2017) COPD (chronic obstructive pulmonary disease) ([facility] [address] (8/21/2017) Stage 3 chronic kidney disease ([facility] [address] (8/21/2017) Atrial fibrillation ([facility] [address] (8/21/2017) ASCVD (arteriosclerotic cardiovascular disease) (8/21/2017)     Overview: Catheterization 6/11/2004 80% stenosis nondominant RCA and 30% stenosis of  
 LAD with a notation of some left main disease but no percentage given. No  
    mention of angioplasty or stent made Nonrheumatic aortic valve stenosis (8/27/2019) Overview: Mild to moderate by echocardiogram 8/27/2019 Acute deep vein thrombosis (DVT) of distal vein of left lower extremity ([address] (9/18/2019) Symptomatic anemia (9/26/2019) AVM (arteriovenous malformation) of stomach, acquired (9/29/2019) Pneumonia due to infectious organism (10/2/2019) Overview: LLL 10/1/2019 
 
  
___________________________________________________ PLAN: 
 
1. Symptomatic anemia (9/26/2019): s/p transfusion.  sp EGD- AVM clipped. Continue high dose protonix for now.  Hgb 7.0 on 9/29 am, as pt was somewhat short of breath and orthostatic, transfused one unit. Now 8.1 up from 7.6 2. L LE DVT: Off Xarelto and filter placed on 9/30.  
3. Triple therapy for anticoagulation. Recent DVT, hx A fib, recent cardiac stents. Cardiology note reviewed and agree to continue on Plavix although likely the culprit for his GI Bleed, but recent stents. 4. Resumed ASA on 10/4 
5. Miralax didn't help constipation so Mag Citrate today 6. Pillcam 10/1 without noted bleeding, non bleeding AVMs seen 7. Hypotension- on midodrine, increased to 10 TID. 8. PAF on Amiodarone 9. Hypothyroidism: Continue usual meds 10. With  LLL infiltrate started Rocephin 10/1 PM 
11. Will need SNF Rehab 12. DNR 13. WBC a little higher is of concern, Increased to 14.0 today from 12.7 14. Repeat CXR on 10/3 with improved L basilar Pneumonia, Re check with increasing WBC 15. Hold off on D/C to SNF with elevated WBC 16. IV Venofer completed 3 doses 17. Re check UA with rising WBC 
 
 
 
 
 
___________________________________________________ Attending Physician: Rowan Pham MD

## 2019-10-07 NOTE — PROGRESS NOTES
Nutrition Assessment: 
 
INTERVENTIONS/RECOMMENDATIONS:  
Meals/Snacks: General/healthful diet: Continue Cardiac diet Supplements: Commercial supplement: Continue ensure clear BID ASSESSMENT:  
Chart reviewed; medically noted for GI bleed, AVM, pneumonia, and constipation. PMH COPD and CKD. Patient not in his room at time of attempted visit. Breakfast tray at bedside; majority of meal had been eaten. Improving PO intake per flowsheets. Also receiving ensure clear for supplementation. Constipation being treated. Encourage intake of meals/supplements. Diet Order: Cardiac(Ensure clear BID) % Eaten:   
Patient Vitals for the past 72 hrs: 
 % Diet Eaten 10/06/19 1941 75 % 10/06/19 1433 100 % 10/06/19 0943 25 % 10/06/19 0112 0 % 10/05/19 1800 35 % 10/05/19 1200 50 % 10/05/19 1002 45 % 10/04/19 1803 25 % 10/04/19 1158 35 % 10/04/19 0952 40 % Pertinent Medications: [x] Reviewed []Other: Plavix, Synthroid, midodrine, Protonix, Miralax, Pravastatin Pertinent Labs: [x]Reviewed  []Other:  
Food Allergies: [x]None []Yes:    
Last BM: 9/30   []Active     []Hyperactive  []Hypoactive       [] Absent  BS Skin:    [x] Intact   [] Incision  [] Breakdown   []Edema   []Other: Anthropometrics: Height: 5' 9\" (175.3 cm) Weight: 82.6 kg (181 lb 15.8 oz) IBW (%IBW):   ( ) UBW (%UBW):   (  %) BMI: Body mass index is 26.88 kg/m². This BMI is indicative of: 
[]Underweight   []Normal   [x]Overweight   [] Obesity   [] Extreme Obesity (BMI>40) Last Weight Metrics: 
Weight Loss Metrics 9/27/2019 9/18/2019 9/11/2019 8/27/2019 8/26/2019 8/26/2019 8/26/2019 Today's Wt 181 lb 15.8 oz 181 lb 9.6 oz 183 lb 182 lb 5.1 oz - 182 lb 6.4 oz -  
BMI 26.88 kg/m2 26.82 kg/m2 27.02 kg/m2 - 26.92 kg/m2 - 26.94 kg/m2 Estimated Nutrition Needs (Based on): 1937 Kcals/day(BMR (1490) x 1. 3AF) , 83 g(1.0 g/kg bw) Protein Carbohydrate: At Least 130 g/day  Fluids: 1900 mL/day Pt expected to meet estimated nutrient needs: [x]Yes []No 
 
NUTRITION DIAGNOSES:  
Problem:  No nutritional diagnosis at this time Etiology: related to Signs/Symptoms: as evidenced by NUTRITION INTERVENTIONS: 
Meals/Snacks: General/healthful diet   Supplements: Commercial supplement GOAL:  
PO intake >70% of meals/supplement next 3-5 days NUTRITION MONITORING AND EVALUATION Food/Nutrient Intake Outcomes: Total energy intake Physical Signs/Symptoms Outcomes: Weight/weight change Previous Goal Met: 
 [x] Met              [] Progressing Towards Goal              [] Not Progressing Towards Goal  
Previous Recommendations: 
 [x] Implemented          [] Not Implemented          [] Not Applicable LEARNING NEEDS (Diet, Food/Nutrient-Drug Interaction):  
 [x] None Identified 
 [] Identified and Education Provided/Documented 
 [] Identified and Pt declined/was not appropriate Cultural, Scientologist, OR Ethnic Dietary Needs:  
 [x] None Identified 
 [] Identified and Addressed 
 
 [x] Interdisciplinary Care Plan Reviewed/Documented  
 [x] Discharge Planning: Heart healthy diet  
 [] Participated in Interdisciplinary Rounds NUTRITION RISK:  
 [x] Patient At Nutritional Risk             [] Patient Not At Nutritional Risk Nima Obrien Ext [phone] Pager [phone] Weekend Pager [phone]

## 2019-10-07 NOTE — PROGRESS NOTES
Problem: Mobility Impaired (Adult and Pediatric) Goal: *Acute Goals and Plan of Care (Insert Text) Description FUNCTIONAL STATUS PRIOR TO ADMISSION: Patient was modified independent using a Rolling walker for functional mobility. HOME SUPPORT PRIOR TO ADMISSION: The patient lived with his son and has multiple daughters that live close by. Physical Therapy Goals Initiated 10/1/2019 1. Patient will move from supine to sit and sit to supine  in bed with supervision/set-up within 7 day(s). 2.  Patient will transfer from bed to chair and chair to bed with supervision/set-up using the least restrictive device within 7 day(s). 3.  Patient will perform sit to stand with supervision/set-up within 7 day(s). 4.  Patient will ambulate with supervision/set-up for 200 feet with the least restrictive device within 7 day(s). 5.  Patient will ascend/descend 4 stairs with 1 handrail(s) with modified independence within 7 day(s). Outcome: Progressing Towards Goal 
 PHYSICAL THERAPY TREATMENT Patient: Sreedhar Grace (47 y.o. male) Date: 10/7/2019 Diagnosis: Symptomatic anemia [D64.9] Gastrointestinal hemorrhage with melena Procedure(s) (LRB): 
CAPSULE (N/A) 6 Days Post-Op Precautions:  falls Chart, physical therapy assessment, plan of care and goals were reviewed. ASSESSMENT Patient continues with skilled PT services and is progressing towards goals. Patient demonstrating improved activity tolerance and improved overall mobility and endurance. Patient able to ambulate 100 feet today using RW for support. Gait is slow and shuffled but no overt LOB noted. BP stable during tx session today. Continue to recommend SNF following discharge Current Level of Function Impacting Discharge (mobility/balance): CGA PLAN : 
Patient continues to benefit from skilled intervention to address the above impairments. Continue treatment per established plan of care. to address goals. Recommendation for discharge: (in order for the patient to meet his/her long term goals) Therapy up to 5 days/week in SNF setting This discharge recommendation: 
Has been made in collaboration with the attending provider and/or case management Equipment recommendations for successful discharge (if) home: patient owns DME required for discharge SUBJECTIVE:  
Patient stated I haven't had a BM in 7 days. I'm not feeling that great.  OBJECTIVE DATA SUMMARY:  
Critical Behavior: 
Neurologic State: Alert Orientation Level: Oriented X4 Cognition: Follows commands, Appropriate decision making Functional Mobility Training: 
Bed Mobility: 
  
Supine to Sit: Supervision Scooting: Supervision Transfers: 
Sit to Stand: Contact guard assistance Stand to Sit: Contact guard assistance Stand Pivot Transfers: Contact guard assistance Balance: 
Sitting: Intact Standing: Impaired Standing - Static: Good;Constant support Standing - Dynamic : Fair;Constant support(using RW for support) Ambulation/Gait Training: 
Distance (ft): 100 Feet (ft) Assistive Device: Walker, rolling;Gait belt Ambulation - Level of Assistance: Contact guard assistance Gait Abnormalities: Decreased step clearance;Shuffling gait(increased forward flexion) Base of Support: Narrowed Speed/Izabella: Pace decreased (<100 feet/min); Shuffled; Slow Step Length: Left shortened;Right shortened Gait is slow and shuffled; mildly unsteady but no overt LOB noted Activity Tolerance:  
Fair and observed SOB with activity Patient Vitals for the past 4 hrs: 
 Temp Pulse Resp BP SpO2  
10/07/19 1451  71  124/58 97 %- sitting post activity 10/07/19 1445  67  110/57 standing 10/07/19 1442  65  117/62 sitting 10/07/19 1422 98.6 °F (37 °C) 62 18 120/44 94 % Please refer to the flowsheet for vital signs taken during this treatment. After treatment patient left in no apparent distress:  
Sitting in chair, Call bell within reach and Caregiver / family present COMMUNICATION/COLLABORATION:  
The patients plan of care was discussed with: Registered Nurse and  Roberto Castror, PT Time Calculation: 18 mins

## 2019-10-07 NOTE — PROGRESS NOTES
Progress Note 
 
 
10/7/2019 2:43 PM 
NAME: Franko Funk MRN:  [id number] Admit Diagnosis: Symptomatic anemia [D64.9]             IZCYKXD List:  
  
Recent DVT, started on anticoagulation, now with GI bleed 
  
- Cath 7/2019 with impella supported PCI of LM into LAD with balloon side hole to LCx, cath 8/29/2019 with patent LM stent. 
- Normal EF, mild AS 
- Prox afib, currently in sinus - Hx of aflutter ablation 
- HTN 
- Dyslipidemia - Hypothyroid - GERD 
- BPH 
- Distant tobacco 
- DVT 
- GI bleed , lives alone, ADLs, occasionally using cane Son: Moe De Guzman the III.   [phone] 
   
  
            Assessment/Plan:      
  
No chest pain. Euvolemic so far, echo with normal EF, mild to mod AS Prox afib back in sinus, will use amiodarone, high risk for triple anticoagulation DVT now with IVC filter 
  
- Holding xarelto, s/p IVC filter, currently no plan to restart - Ideally continue aspirin given LM stents, but slight drop in Hg noted, could stop if necessary with some risk. - Cont plavix, this can not be stopped. - Cont amiodarone - Cont  Midodrine, still with some labile bp. 
- Cont statin 
  
- Cont proscar,  flomax only if needed 
  
PT/OT Stable from cardiovascular perspective. Please call if we can be of further assistance 
   
  
  
 [x]       High complexity decision making was performed in this patient at high risk for decompensation with multiple organ involvement. Subjective:  
 
Franko Funk denies chest pain, dyspnea. Discussed with RN events overnight. Review of Systems: 
 
Symptom Y/N Comments  Symptom Y/N Comments Fever/Chills N   Chest Pain N Poor Appetite N   Edema N   
Cough N   Abdominal Pain N Sputum N   Joint Pain N   
SOB/JAFFE N   Pruritis/Rash N   
Nausea/vomit N   Tolerating PT/OT Y Diarrhea N   Tolerating Diet Y Constipation N   Other Could NOT obtain due to:   
 
Objective:  
  
Physical Exam: Last 24hrs VS reviewed since prior progress note. Most recent are: 
 
Visit Vitals /54 (BP 1 Location: Left arm, BP Patient Position: At rest) Pulse 64 Temp 98.4 °F (36.9 °C) Resp 18 Ht 5' 9\" (1.753 m) Wt 82.6 kg (181 lb 15.8 oz) SpO2 95% BMI 26.88 kg/m² Intake/Output Summary (Last 24 hours) at 10/7/2019 1054 Last data filed at 10/6/2019 2334 Gross per 24 hour Intake 720 ml Output 500 ml Net 220 ml General Appearance: Well developed, well nourished, alert & oriented x 3,  
 no acute distress. Ears/Nose/Mouth/Throat: Hearing grossly normal. 
Neck: Supple. Chest: Lungs clear to auscultation bilaterally. Cardiovascular: Regular rate and rhythm, S1S2 normal, no murmur. Abdomen: Soft, non-tender, bowel sounds are active. Extremities: No edema bilaterally. Skin: Warm and dry. PMH/SH reviewed - no change compared to H&P Data Review Telemetry: normal sinus rhythm Lab Data Personally Reviewed: 
 
Recent Labs 10/07/19 
[phone] 10/06/19 
025 WBC 14.0* 12.7* HGB 8.1* 7.6* HCT 26.0* 24.7*  
 327 No results for input(s): INR, PTP, APTT, INREXT, INREXT in the last 72 hours. Recent Labs 10/07/19 
5316 10/06/19 
0254 10/05/19 
2584  141 142  
K 3.8 3.5 3.5 * 110* 111* CO2 28 26 27 BUN 15 13 13 CREA 1.26 1.14 1.31* GLU 89 93 83 CA 8.5 8.3* 8.6 No results for input(s): CPK, CKNDX, TROIQ in the last 72 hours. No lab exists for component: CPKMB Lab Results Component Value Date/Time Cholesterol, total 117 06/12/2019 02:00 PM  
 HDL Cholesterol 35 06/12/2019 02:00 PM  
 LDL, calculated 65 06/12/2019 02:00 PM  
 Triglyceride 86 06/12/2019 02:00 PM  
 CHOL/HDL Ratio 3 06/12/2019 02:00 PM  
 
 
No results for input(s): SGOT, GPT, AP, TBIL, TP, ALB, GLOB, GGT, AML, LPSE in the last 72 hours. No lab exists for component: AMYP, HLPSE No results for input(s): PH, PCO2, PO2 in the last 72 hours. Medications Personally Reviewed: 
 
Current Facility-Administered Medications Medication Dose Route Frequency  polyethylene glycol (MIRALAX) packet 17 g  17 g Oral DAILY  aspirin chewable tablet 81 mg  81 mg Oral DAILY  midodrine (PROAMITINE) tablet 10 mg  10 mg Oral TID WITH MEALS  pravastatin (PRAVACHOL) tablet 80 mg  80 mg Oral QHS  cefTRIAXone (ROCEPHIN) 1 g in 0.9% sodium chloride (MBP/ADV) 50 mL  1 g IntraVENous Q24H  
 sodium chloride (NS) flush 5-40 mL  5-40 mL IntraVENous PRN  
 sodium chloride (NS) flush 5-40 mL  5-40 mL IntraVENous PRN  
 lidocaine (PF) (XYLOCAINE) 10 mg/mL (1 %) injection 0.1 mL  0.1 mL SubCUTAneous PRN  
 0.9% sodium chloride infusion 250 mL  250 mL IntraVENous PRN  pantoprazole (PROTONIX) 40 mg in sodium chloride 0.9% 10 mL injection  40 mg IntraVENous Q12H  
 amiodarone (CORDARONE) tablet 200 mg  200 mg Oral DAILY  clopidogrel (PLAVIX) tablet 75 mg  75 mg Oral DAILY  finasteride (PROSCAR) tablet 5 mg  5 mg Oral PCD  levothyroxine (SYNTHROID) tablet 137 mcg  137 mcg Oral ACB  tamsulosin (FLOMAX) capsule 0.4 mg  0.4 mg Oral DAILY  temazepam (RESTORIL) capsule 30 mg  30 mg Oral QHS  ondansetron (ZOFRAN) injection 4 mg  4 mg IntraVENous Q4H PRN  
 0.9% sodium chloride infusion 250 mL  250 mL IntraVENous PRN  
 acetaminophen (TYLENOL) tablet 650 mg  650 mg Oral Q4H PRN Aj Orr MD

## 2019-10-07 NOTE — PROGRESS NOTES
Events of weekend noted. I notified [facility] of no d/c today. Will ask therapy to provide updates in order to obtain new [facility]. I've asked SNF to obtain new [place]

## 2019-10-07 NOTE — PROGRESS NOTES
Bedside shift change report given to Yza Gamboa (oncoming nurse) by Carter Schaffer (offgoing nurse). Report included the following information SBAR, Kardex, Intake/Output, MAR, Accordion and Recent Results. Pt had BM today. New IV 24g in the left forearm. Pt continusd to have right arm pain from IV infiltration x2 days ago. Consider doppler to rule out DVT.

## 2019-10-08 PROBLEM — L03.113 CELLULITIS OF RIGHT UPPER EXTREMITY: Status: ACTIVE | Noted: 2019-01-01

## 2019-10-08 NOTE — PROGRESS NOTES
I notified [facility] of no d/c today. Will ask therapy to provide updates in order to obtain new [place].   
  
MD anticipates d/c tomorrow if stable. Family to transport? SNf can accept into room 413

## 2019-10-08 NOTE — PROGRESS NOTES
Bedside shift change report given to Arpan Vogt (oncoming nurse) by Michael Wilkes (offgoing nurse). Report included the following information SBAR, Kardex, Intake/Output, MAR, Accordion and Recent Results. Pt started on antibiotic for suspected cellulitis in right arm. Pt experienced SOB today but O2 sat remained above 93%. Incentive spirometer encouraged.

## 2019-10-08 NOTE — PROGRESS NOTES
Physical Therapy Patient ambulating in gutierrez with family who is providing CGA. Gait is mildly shuffled and slightly unsteady but no overt LOB noted. Will defer therapy at this time and follow back tomorrow.  
Beti Camacho, PT

## 2019-10-08 NOTE — PROGRESS NOTES
Bedside shift change report given to BRAULIO Lei (oncoming nurse) by Aurelia Philippe (offgoing nurse). Report included the following information SBAR, Kardex, Intake/Output, MAR and Cardiac Rhythm 1st Degree AVB.

## 2019-10-08 NOTE — PROGRESS NOTES
PROGRESS NOTE 
 
NAME:  Kamla See :   3/13/1931 MRN:   [id number] Date/Time:  10/8/2019 7:43 AM 
Subjective:  
History:  Chart reviewed and patient seen and examined this AM and D/W his nurse and his son and all events noted. He was admitted with symptomatic anemia with Hgb falling from 11.9 to 8.4 on admission and since admission to 7.0 requiring 1 U PRBC. He was found on admission to have hemocult positive melanotic stool w/o reno bleeding noted. EGD on  was remarkable for an AVM which was clipped and Colonoscopy on  with 2 sessile polyps w/o obvious bleeding site. He also had Pillcam study with some SB AVMs noted but no bleeding. He had noted no increased frequency of BMs PTA. He denies abdominal pain or other GI c/o except now very constipated and bloated. he has no  c/o. He has no cardiac or respiratory c/o. He has no neurologic c/o except still marked generalized weakness, but walked in the gutierrez yesterday with assistance. He has been a little more confused at night on occassion. He has no other c/o on complete ROS except now noted R forearm swelling and tenderness. Medications reviewed: 
Current Facility-Administered Medications Medication Dose Route Frequency  polyethylene glycol (MIRALAX) packet 17 g  17 g Oral DAILY  aspirin chewable tablet 81 mg  81 mg Oral DAILY  midodrine (PROAMITINE) tablet 10 mg  10 mg Oral TID WITH MEALS  pravastatin (PRAVACHOL) tablet 80 mg  80 mg Oral QHS  cefTRIAXone (ROCEPHIN) 1 g in 0.9% sodium chloride (MBP/ADV) 50 mL  1 g IntraVENous Q24H  
 sodium chloride (NS) flush 5-40 mL  5-40 mL IntraVENous PRN  
 sodium chloride (NS) flush 5-40 mL  5-40 mL IntraVENous PRN  
 lidocaine (PF) (XYLOCAINE) 10 mg/mL (1 %) injection 0.1 mL  0.1 mL SubCUTAneous PRN  
 0.9% sodium chloride infusion 250 mL  250 mL IntraVENous PRN  pantoprazole (PROTONIX) 40 mg in sodium chloride 0.9% 10 mL injection  40 mg IntraVENous Q12H  amiodarone (CORDARONE) tablet 200 mg  200 mg Oral DAILY  clopidogrel (PLAVIX) tablet 75 mg  75 mg Oral DAILY  finasteride (PROSCAR) tablet 5 mg  5 mg Oral PCD  levothyroxine (SYNTHROID) tablet 137 mcg  137 mcg Oral ACB  tamsulosin (FLOMAX) capsule 0.4 mg  0.4 mg Oral DAILY  temazepam (RESTORIL) capsule 30 mg  30 mg Oral QHS  ondansetron (ZOFRAN) injection 4 mg  4 mg IntraVENous Q4H PRN  
 0.9% sodium chloride infusion 250 mL  250 mL IntraVENous PRN  
 acetaminophen (TYLENOL) tablet 650 mg  650 mg Oral Q4H PRN Objective:  
Vitals: 
Visit Vitals /45 Pulse 60 Temp 98.9 °F (37.2 °C) Resp 17 Ht 5' 9\" (1.753 m) Wt 181 lb 15.8 oz (82.6 kg) SpO2 95% BMI 26.88 kg/m² O2 Flow Rate (L/min): 0.5 l/min O2 Device: Room air Temp (24hrs), Av.6 °F (37 °C), Min:97.8 °F (36.6 °C), Max:99.3 °F (37.4 °C) Last 24hr Input/Output: 
 
Intake/Output Summary (Last 24 hours) at 10/8/2019 9176 Last data filed at 10/7/2019 1045 Gross per 24 hour Intake  Output 200 ml Net -200 ml PHYSICAL EXAM: 
General:     Alert, cooperative, no distress but confused this AM, appears stated age. Head:    Normocephalic, without obvious abnormality, atraumatic. Eyes:    Conjunctivae/corneas clear. PERRLA Nose:   Nares normal. No drainage or sinus tenderness. Throat:     Lips, mucosa, and tongue normal.  No Thrush Neck:   Supple, symmetrical,  no adenopathy, thyroid: non tender 
   no carotid bruit and no JVD. Back:     Symmetric,  No CVA tenderness. Lungs:    Clear to auscultation bilaterally. No Wheezing or Rhonchi. No rales. Heart:    Regular rate and rhythm,  no murmur, rub or gallop. Abdomen:    Soft, non-tender. Not distended. Bowel sounds normal. No masses Extremities:  Extremities normal except R forearm swelling tenderness, STS and erythema with increased temp, atraumatic, No cyanosis. No edema. No clubbing Lymph nodes:  Cervical, supraclavicular normal. 
 Neurologic:  Normal strength, Alert but confused. Skin:                 R forearm STS, erythema and tenderness c/w cellulitis Lab Data Reviewed: 
 
Recent Results (from the past 24 hour(s)) METABOLIC PANEL, BASIC Collection Time: 10/08/19  2:11 AM  
Result Value Ref Range Sodium 140 136 - 145 mmol/L Potassium 4.1 3.5 - 5.1 mmol/L Chloride 108 97 - 108 mmol/L  
 CO2 26 21 - 32 mmol/L Anion gap 6 5 - 15 mmol/L Glucose 96 65 - 100 mg/dL BUN 17 6 - 20 MG/DL Creatinine 1.23 0.70 - 1.30 MG/DL  
 BUN/Creatinine ratio 14 12 - 20 GFR est AA >60 >60 ml/min/1.73m2 GFR est non-AA 56 (L) >60 ml/min/1.73m2 Calcium 8.7 8.5 - 10.1 MG/DL  
CBC WITH AUTOMATED DIFF Collection Time: 10/08/19  2:11 AM  
Result Value Ref Range WBC 14.9 (H) 4.1 - 11.1 K/uL  
 RBC 3.11 (L) 4.10 - 5.70 M/uL HGB 8.7 (L) 12.1 - 17.0 g/dL HCT 28.0 (L) 36.6 - 50.3 % MCV 90.0 80.0 - 99.0 FL  
 MCH 28.0 26.0 - 34.0 PG  
 MCHC 31.1 30.0 - 36.5 g/dL  
 RDW 17.2 (H) 11.5 - 14.5 % PLATELET 936 298 - 409 K/uL MPV 9.9 8.9 - 12.9 FL  
 NRBC 0.0 0  WBC ABSOLUTE NRBC 0.00 0.00 - 0.01 K/uL NEUTROPHILS 74 32 - 75 % LYMPHOCYTES 13 12 - 49 % MONOCYTES 9 5 - 13 % EOSINOPHILS 2 0 - 7 % BASOPHILS 0 0 - 1 % IMMATURE GRANULOCYTES 2 (H) 0.0 - 0.5 % ABS. NEUTROPHILS 11.1 (H) 1.8 - 8.0 K/UL  
 ABS. LYMPHOCYTES 1.9 0.8 - 3.5 K/UL  
 ABS. MONOCYTES 1.4 (H) 0.0 - 1.0 K/UL  
 ABS. EOSINOPHILS 0.3 0.0 - 0.4 K/UL  
 ABS. BASOPHILS 0.1 0.0 - 0.1 K/UL  
 ABS. IMM. GRANS. 0.2 (H) 0.00 - 0.04 K/UL  
 DF AUTOMATED URINALYSIS W/MICROSCOPIC Collection Time: 10/08/19  5:30 AM  
Result Value Ref Range Color YELLOW/STRAW Appearance CLEAR CLEAR Specific gravity 1.018 1.003 - 1.030    
 pH (UA) 5.0 5.0 - 8.0 Protein NEGATIVE  NEG mg/dL Glucose NEGATIVE  NEG mg/dL Ketone NEGATIVE  NEG mg/dL Bilirubin NEGATIVE  NEG  Blood NEGATIVE  NEG    
 Urobilinogen 0.2 0.2 - 1.0 EU/dL Nitrites NEGATIVE  NEG Leukocyte Esterase NEGATIVE  NEG    
 WBC 0-4 0 - 4 /hpf  
 RBC 0-5 0 - 5 /hpf Epithelial cells FEW FEW /lpf Bacteria NEGATIVE  NEG /hpf Hyaline cast 0-2 0 - 5 /lpf Assessment/Plan:  
 
Principal Problem: 
  Gastrointestinal hemorrhage with melena (9/29/2019) Active Problems: 
  Glucose intolerance (impaired glucose tolerance) (8/21/2017) COPD (chronic obstructive pulmonary disease) ([facility] (8/21/2017) Stage 3 chronic [facility] (8/21/2017) Atrial fibrillation ([facility] (8/21/2017) ASCVD (arteriosclerotic cardiovascular disease) (8/21/2017) Overview: Catheterization 6/11/2004 80% stenosis nondominant RCA and 30% stenosis of  
    LAD with a notation of some left main disease but no percentage given. No  
    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 
 
  
___________________________________________________ PLAN: 
 
1. Symptomatic anemia (9/26/2019): s/p transfusion.  sp EGD- AVM clipped. Continue high dose protonix for now.  Hgb 7.0 on 9/29 am, as pt was somewhat short of breath and orthostatic, transfused one unit. Now 8.7 up from 7.6 2. L LE DVT: Off Xarelto and filter placed on 9/30.  
3. Triple therapy for anticoagulation. Recent DVT, hx A fib, recent cardiac stents. Cardiology note reviewed and agree to continue on Plavix although likely the culprit for his GI Bleed, but recent stents. 4. Resumed ASA on 10/4 5. Miralax didn't help constipation so Mag Citrate today 6. Pillcam 10/1 without noted bleeding, non bleeding AVMs seen 7. Hypotension- on midodrine, increased to 10 TID. 8. PAF on Amiodarone 9. Hypothyroidism: Continue usual meds 10. With  LLL infiltrate started Rocephin 10/1 PM, change to bactrim today to cover Cellulitis forearm 11. Will need SNF Rehab 12. DNR 13. WBC a little higher is of concern, Increased to 14.9 today from 14.0 yesterday 14. Repeat CXR on 10/3 with improved L basilar Pneumonia, Re checked with increasing WBC yesterday and clear 15. Hold off on D/C to SNF with elevated WBC, hopefully able to D/C tomorrow 16. IV Venofer completed 3 doses 17. Re check UA with rising WBC nl 
18. Now obvious cellulitis R forearm so change Rocephin to PO Bactrim 
 
 
 
 
 
___________________________________________________ Attending Physician: Jay Victor MD

## 2019-10-09 NOTE — PROGRESS NOTES
Pharmacy Automatic Renal Dosing Protocol - Antimicrobials Indication for Antimicrobials: SSTI Current Regimen of Each Antimicrobial: 
Bactrim 1 DS tab PO Q12H (Start Date 10/8; Day # 2) Previous Antimicrobial Therapy: 
 
 
Significant Cultures:  
Urine - Proteus 10k CFU - Final 
 
Radiology / Imaging results: (X-ray, CT scan or MRI):  
 
Paralysis, amputations, malnutrition: None noted Labs: 
Recent Labs 10/09/19 
0231 10/08/19 
0211 10/07/19 
7228 CREA 1.38* 1.23 1.26 BUN 16 17 15 WBC 12.3* 14.9* 14.0* Temp (24hrs), Av.3 °F (36.8 °C), Min:98.1 °F (36.7 °C), Max:98.4 °F (36.9 °C) Creatinine Clearance (mL/min) or Dialysis: 42 ml/min Impression/Plan:  
Bactrim initiated for treatment of cellulitis Spoke with Dr. May Hemphill. Wants to continue Bactrim and watch serum creatinine. If Scr increases more tomorrow, please consider alternative such as amoxicillin+Doxycyclnie to cover strep/staph Pharmacy will follow daily and adjust medications as appropriate for renal function and/or serum levels. Thank you, Rochelle Meza, PHARMD 
 
Recommended duration of therapy 
http://[facility]/Pharmacy/Clinical%20Companion/Duration%20of%20ABX%20therapy. docx Renal Dosing 
http://[facility]/[place]/[facility]/Pharmacy/Clinical%20Companion/Renal%[id number]. pdf

## 2019-10-09 NOTE — PROGRESS NOTES
Bedside shift change report given to BRAULIO Lei (oncoming nurse) by Mack Banegas (offgoing nurse). Report included the following information SBAR, Kardex, Intake/Output, MAR and Cardiac Rhythm 1st Degree AVB. Pt unable to sleep through the night. Discussed with Dr. Liv chávez, Dr Yoan Moeller will add ativan to pt meds to help pt sleep through the night.

## 2019-10-09 NOTE — PROGRESS NOTES
Bedside shift change report given to [facility] (oncoming nurse) by Vivek Nash (offgoing nurse). Report included the following information SBAR, Kardex, Intake/Output, MAR, Accordion and Recent Results. Continued with Bactrim today. Will monitor Cr.

## 2019-10-09 NOTE — PROGRESS NOTES
PROGRESS NOTE 
 
NAME:  Carole Joshi :   3/13/1931 MRN:   [id number] Date/Time:  10/9/2019 7:46 AM 
Subjective:  
History:  Chart reviewed and patient seen and examined this AM and D/W his nurse and his son and all events noted. He was admitted with symptomatic anemia with Hgb falling from 11.9 to 8.4 on admission and since admission to 7.0 requiring 1 U PRBC. He was found on admission to have hemocult positive melanotic stool w/o reno bleeding noted. EGD on  was remarkable for an AVM which was clipped and Colonoscopy on  with 2 sessile polyps w/o obvious bleeding site. He also had Pillcam study with some SB AVMs noted but no bleeding. He had noted no increased frequency of BMs PTA. He denies abdominal pain or other GI c/o except now very constipated and bloated. he has no  c/o. He has no cardiac or respiratory c/o. He has no neurologic c/o except still marked generalized weakness, but walked in the gutierrez yesterday with assistance. He has been a little more confused at night on occassion. He has no other c/o on complete ROS except sleep difficulty despite Restoril 30 mg. Now noted R forearm swelling and tenderness w/o much change from yesterday. Medications reviewed: 
Current Facility-Administered Medications Medication Dose Route Frequency  trimethoprim-sulfamethoxazole (BACTRIM DS, SEPTRA DS) 160-800 mg per tablet 1 Tab  1 Tab Oral Q12H  polyethylene glycol (MIRALAX) packet 17 g  17 g Oral DAILY  aspirin chewable tablet 81 mg  81 mg Oral DAILY  midodrine (PROAMITINE) tablet 10 mg  10 mg Oral TID WITH MEALS  pravastatin (PRAVACHOL) tablet 80 mg  80 mg Oral QHS  sodium chloride (NS) flush 5-40 mL  5-40 mL IntraVENous PRN  
 sodium chloride (NS) flush 5-40 mL  5-40 mL IntraVENous PRN  
 lidocaine (PF) (XYLOCAINE) 10 mg/mL (1 %) injection 0.1 mL  0.1 mL SubCUTAneous PRN  
 0.9% sodium chloride infusion 250 mL  250 mL IntraVENous PRN  
  pantoprazole (PROTONIX) 40 mg in sodium chloride 0.9% 10 mL injection  40 mg IntraVENous Q12H  
 amiodarone (CORDARONE) tablet 200 mg  200 mg Oral DAILY  clopidogrel (PLAVIX) tablet 75 mg  75 mg Oral DAILY  finasteride (PROSCAR) tablet 5 mg  5 mg Oral PCD  levothyroxine (SYNTHROID) tablet 137 mcg  137 mcg Oral ACB  tamsulosin (FLOMAX) capsule 0.4 mg  0.4 mg Oral DAILY  temazepam (RESTORIL) capsule 30 mg  30 mg Oral QHS  ondansetron (ZOFRAN) injection 4 mg  4 mg IntraVENous Q4H PRN  
 0.9% sodium chloride infusion 250 mL  250 mL IntraVENous PRN  
 acetaminophen (TYLENOL) tablet 650 mg  650 mg Oral Q4H PRN Objective:  
Vitals: 
Visit Vitals /61 Pulse (!) 59 Temp 98.3 °F (36.8 °C) Resp 16 Ht 5' 9\" (1.753 m) Wt 181 lb 15.8 oz (82.6 kg) SpO2 93% BMI 26.88 kg/m² O2 Flow Rate (L/min): 0.5 l/min O2 Device: Room air Temp (24hrs), Av.3 °F (36.8 °C), Min:98.1 °F (36.7 °C), Max:98.7 °F (37.1 °C) Last 24hr Input/Output: 
 
Intake/Output Summary (Last 24 hours) at 10/9/2019 0746 Last data filed at 10/9/2019 3036 Gross per 24 hour Intake  Output 625 ml Net -625 ml PHYSICAL EXAM: 
General:     Alert, cooperative, no distress but confused this AM, appears stated age. Head:    Normocephalic, without obvious abnormality, atraumatic. Eyes:    Conjunctivae/corneas clear. PERRLA Nose:   Nares normal. No drainage or sinus tenderness. Throat:     Lips, mucosa, and tongue normal.  No Thrush Neck:   Supple, symmetrical,  no adenopathy, thyroid: non tender 
   no carotid bruit and no JVD. Back:     Symmetric,  No CVA tenderness. Lungs:    Clear to auscultation bilaterally. No Wheezing or Rhonchi. No rales. Heart:    Regular rate and rhythm,  no murmur, rub or gallop. Abdomen:    Soft, non-tender. Not distended. Bowel sounds normal. No masses Extremities:  Extremities normal except R forearm swelling tenderness, STS and erythema with increased temp, atraumatic, No cyanosis. No edema. No clubbing Lymph nodes:  Cervical, supraclavicular normal. 
Neurologic:  Normal strength, Alert but confused. Skin:                 R forearm STS, erythema and tenderness c/w cellulitis w/o change from yesterday Lab Data Reviewed: 
 
Recent Results (from the past 24 hour(s)) METABOLIC PANEL, BASIC Collection Time: 10/09/19  2:31 AM  
Result Value Ref Range Sodium 138 136 - 145 mmol/L Potassium 4.2 3.5 - 5.1 mmol/L Chloride 107 97 - 108 mmol/L  
 CO2 28 21 - 32 mmol/L Anion gap 3 (L) 5 - 15 mmol/L Glucose 98 65 - 100 mg/dL BUN 16 6 - 20 MG/DL Creatinine 1.38 (H) 0.70 - 1.30 MG/DL  
 BUN/Creatinine ratio 12 12 - 20 GFR est AA 59 (L) >60 ml/min/1.73m2 GFR est non-AA 49 (L) >60 ml/min/1.73m2 Calcium 8.3 (L) 8.5 - 10.1 MG/DL  
CBC WITH AUTOMATED DIFF Collection Time: 10/09/19  2:31 AM  
Result Value Ref Range WBC 12.3 (H) 4.1 - 11.1 K/uL  
 RBC 2.90 (L) 4.10 - 5.70 M/uL HGB 7.9 (L) 12.1 - 17.0 g/dL HCT 26.4 (L) 36.6 - 50.3 % MCV 91.0 80.0 - 99.0 FL  
 MCH 27.2 26.0 - 34.0 PG  
 MCHC 29.9 (L) 30.0 - 36.5 g/dL  
 RDW 17.2 (H) 11.5 - 14.5 % PLATELET 936 467 - 886 K/uL MPV 10.0 8.9 - 12.9 FL  
 NRBC 0.0 0  WBC ABSOLUTE NRBC 0.00 0.00 - 0.01 K/uL NEUTROPHILS 72 32 - 75 % LYMPHOCYTES 14 12 - 49 % MONOCYTES 9 5 - 13 % EOSINOPHILS 3 0 - 7 % BASOPHILS 0 0 - 1 % IMMATURE GRANULOCYTES 2 (H) 0.0 - 0.5 % ABS. NEUTROPHILS 8.9 (H) 1.8 - 8.0 K/UL  
 ABS. LYMPHOCYTES 1.8 0.8 - 3.5 K/UL  
 ABS. MONOCYTES 1.1 (H) 0.0 - 1.0 K/UL  
 ABS. EOSINOPHILS 0.3 0.0 - 0.4 K/UL  
 ABS. BASOPHILS 0.1 0.0 - 0.1 K/UL  
 ABS. IMM. GRANS. 0.2 (H) 0.00 - 0.04 K/UL  
 DF AUTOMATED Assessment/Plan:  
 
Principal Problem: 
  Gastrointestinal hemorrhage with melena (9/29/2019) Active Problems: 
  Glucose intolerance (impaired glucose tolerance) (8/21/2017) COPD (chronic obstructive pulmonary disease) ([facility] (8/21/2017) Stage 3 chronic kidney disease ([facility] (8/21/2017) Atrial fibrillation ([facility] (8/21/2017) ASCVD (arteriosclerotic cardiovascular disease) (8/21/2017) Overview: Catheterization 6/11/2004 80% stenosis nondominant RCA and 30% stenosis of  
    LAD with a notation of some left main disease but no percentage given. No  
    mention of angioplasty or stent made Nonrheumatic aortic valve stenosis (8/27/2019) Overview: Mild to moderate by echocardiogram 8/27/2019 Acute deep vein thrombosis (DVT) of distal vein of left lower extremity ([facility] (9/18/2019) Symptomatic anemia (9/26/2019) AVM (arteriovenous malformation) of stomach, acquired (9/29/2019) Pneumonia due to infectious organism (10/2/2019) Overview: LLL 10/1/2019 Cellulitis of right upper extremity (10/8/2019) 
 
  
___________________________________________________ PLAN: 
 
1. Symptomatic anemia (9/26/2019): s/p transfusion.  sp EGD- AVM clipped. Continue high dose protonix for now.  Hgb 7.0 on 9/29 am, as pt was somewhat short of breath and orthostatic, transfused one unit. Now 7.9 2. L LE DVT: Off Xarelto and filter placed on 9/30.  
3. Triple therapy for anticoagulation. Recent DVT, hx A fib, recent cardiac stents. Cardiology note reviewed and agree to continue on Plavix although likely the culprit for his GI Bleed, but recent stents. 4. Resumed ASA on 10/4 
5. Miralax didn't help constipation so Mag Citrate today 6. Pillcam 10/1 without noted bleeding, non bleeding AVMs seen 7. Hypotension- on midodrine, increased to 10 TID. 8. PAF on Amiodarone 9. Hypothyroidism: Continue usual meds 10. With  LLL infiltrate started Rocephin 10/1 PM, changed to bactrim yesterday to cover Cellulitis forearm 11. Will need SNF Rehab. Plan on D/C tomorrow if continues to improve Re Infection 12.  DNR 
 13. WBC a little higher lower today, Decreased to 12.3 today from 14.9 yesterday 14. Repeat CXR on 10/3 with improved L basilar Pneumonia, Re checked with increasing WBC 10/7 and clear 15. Hold off on D/C to SNF with elevated WBC, hopefully able to D/C tomorrow 16. IV Venofer completed 3 doses 17. Re check UA with rising WBC nl 
18. Now obvious cellulitis R forearm so changed Rocephin to PO Bactrim and so far improved 
 
 
 
 
 
___________________________________________________ Attending Physician: Sean Parra MD

## 2019-10-09 NOTE — PROGRESS NOTES
Problem: Pressure Injury - Risk of 
Goal: *Prevention of pressure injury Description Document Berry Scale and appropriate interventions in the flowsheet. Outcome: Progressing Towards Goal 
Note:  
Pressure Injury Interventions: 
Sensory Interventions: Assess changes in LOC, Avoid rigorous massage over bony prominences Moisture Interventions: Assess need for specialty bed Activity Interventions: Chair cushion, Increase time out of bed Mobility Interventions: Pressure redistribution bed/mattress (bed type), Float heels, Chair cushion Nutrition Interventions: Document food/fluid/supplement intake, Offer support with meals,snacks and hydration Friction and Shear Interventions: Feet elevated on foot rest, Lift sheet

## 2019-10-09 NOTE — PROGRESS NOTES
Problem: Mobility Impaired (Adult and Pediatric) Goal: *Acute Goals and Plan of Care (Insert Text) Description FUNCTIONAL STATUS PRIOR TO ADMISSION: Patient was modified independent using a Rolling walker for functional mobility. HOME SUPPORT PRIOR TO ADMISSION: The patient lived with his son and has multiple daughters that live close by. Physical Therapy Goals Goals reviewed and remain appropriate for next 7 days. 10/9/19 Initiated 10/1/2019 1. Patient will move from supine to sit and sit to supine  in bed with supervision/set-up within 7 day(s). 2.  Patient will transfer from bed to chair and chair to bed with supervision/set-up using the least restrictive device within 7 day(s). 3.  Patient will perform sit to stand with supervision/set-up within 7 day(s). 4.  Patient will ambulate with supervision/set-up for 200 feet with the least restrictive device within 7 day(s). 5.  Patient will ascend/descend 4 stairs with 1 handrail(s) with modified independence within 7 day(s). Outcome: Progressing Towards Goal 
 PHYSICAL THERAPY TREATMENT: WEEKLY REASSESSMENT Patient: Jose Luis Pizarro (63 y.o. male) Date: 10/9/2019 Primary Diagnosis: Symptomatic anemia [D64.9] Procedure(s) (LRB): 
CAPSULE (N/A) 8 Days Post-Op Precautions: falls ASSESSMENT Patient continues with skilled PT services and is progressing towards goals. Patient continues to demonstrate impaired functional mobility, balance and endurance which limit functional independence. Gait is unsteady demonstrating short shuffled steps. Gait deviations increase risk of falls. Patient's endurance is slowly improving- patient able to ambulate 125 feet in halls today. Continue to recommend SNF rehab following discharge. Patient's progression toward goals since last assessment: slow progress and al goals remain appropriate Current Level of Function Impacting Discharge (mobility/balance): close SBA to CGA PLAN : 
 Goals have been updated based on progression since last assessment. Patient continues to benefit from skilled intervention to address the above impairments. Recommendations and Planned Interventions: bed mobility training, transfer training, gait training, therapeutic exercises, patient and family training/education and therapeutic activities Frequency/Duration: Patient will be followed by physical therapy:  4 times a week to address goals. Recommendation for discharge: (in order for the patient to meet his/her long term goals) Therapy up to 5 days/week in SNF setting This discharge recommendation: 
Has been made in collaboration with the attending provider and/or case management Equipment recommendations for successful discharge (if) home: patient owns DME required for discharge SUBJECTIVE:  
Patient stated I'm feeling better today but tired because I did not sleep last night.  OBJECTIVE DATA SUMMARY:  
HISTORY:   
Past Medical History:  
Diagnosis Date Arrhythmia A-fib GERD (gastroesophageal reflux disease) Hypertension Other ill-defined conditions(799.89) BPH Other ill-defined conditions(799.89) lipids Other ill-defined conditions(799.89)   
 shingles hx Thyroid disease Unspecified adverse effect of anesthesia Past Surgical History:  
Procedure Laterality Date COLONOSCOPY N/A 9/30/2019 COLONOSCOPY performed by Tammi Engle MD at [facility] ENDOSCOPY  
 HX CATARACT REMOVAL    
 HX HEART CATHETERIZATION    
 ablation HX HEART CATHETERIZATION  07/18/2019 Stent HX HEENT    
 scar tissue removed/laser HX HERNIA REPAIR  05/05/2017 Bryan Womack MD  
 HX ORTHOPAEDIC    
 left foot fx--hardware HX ORTHOPAEDIC  4/9/14 Right total knee arthroplasty HX OTHER SURGICAL    
 prostate bx Home Situation Home Environment: Private residence One/Two Story Residence: Two story Living Alone: No 
 Support Systems: Child(toño)(son) Patient Expects to be Discharged to[de-identified] Private residence Current DME Used/Available at Home: Wheelchair, Neteven, rolling, Shower chair, Grab bars, [place], straight EXAMINATION/PRESENTATION/DECISION MAKING:  
Critical Behavior: 
Neurologic State: Alert Orientation Level: Oriented X4 Cognition: Follows commands, Appropriate decision making Hearing: Auditory Auditory Impairment: Hard of hearing, bilateral 
 
Range Of Motion: 
AROM: Generally decreased, functional 
  
  
  
  
  
  
  
Strength:   
Strength: Generally decreased, functional 
  
  
  
  
  
  
Tone & Sensation:  
Tone: Normal 
  
  
  
  
Sensation: Intact Coordination: 
Coordination: Generally decreased, functional 
 
  
Functional Mobility: 
Bed Mobility: 
  
 Deferred; patient sitting in chair upon arrival  
  
  
Transfers: 
Sit to Stand: Stand-by assistance Stand to Sit: Stand-by assistance Balance:  
Sitting: Intact Standing: Impaired Standing - Static: Good;Constant support Standing - Dynamic : Fair;Constant support Ambulation/Gait Training: 
Distance (ft): 125 Feet (ft) Assistive Device: Walker, rolling;Gait belt Ambulation - Level of Assistance: Contact guard assistance Gait Abnormalities: Decreased step clearance;Shuffling gait Base of Support: Narrowed Speed/Izabella: Pace decreased (<100 feet/min); Shuffled; Slow Step Length: Left shortened;Right shortened Gait is slow and mildly unsteady demonstrating short shuffled steps Pain Rating: 
No c/o pain Activity Tolerance:  
Fair and observed SOB with activity Please refer to the flowsheet for vital signs taken during this treatment. After treatment patient left in no apparent distress:  
Sitting in chair, Call bell within reach and Caregiver / family present COMMUNICATION/EDUCATION:  
The patients plan of care was discussed with: Registered Nurse. Fall prevention education was provided and the patient/caregiver indicated understanding., Patient/family have participated as able in goal setting and plan of care. and Patient/family agree to work toward stated goals and plan of care. Thank you for this referral. 
Jasmeet Steven, PT Time Calculation: 14 mins

## 2019-10-09 NOTE — PROGRESS NOTES
I notified [facility] of no d/c today.  Will ask therapy to provide updates in order to obtain new Simmie Quivers.   
  
MD anticipates d/c tomorrow if stable. Family to transport?   Pt up walking in the halls with family

## 2019-10-10 NOTE — PROGRESS NOTES
All in agreement with d/c to [facility] private room. Please call report to [phone], send emar, d/c instructions, Rx for narcotics if chen and completed DDNR if available. Family to transport. Thanks

## 2019-10-10 NOTE — DISCHARGE INSTRUCTIONS
Doctor [address]  [phone]      Patient Discharge Instructions    Sonia Huynh / [id number] : 3/13/1931    Admitted 2019 Discharged: 10/10/2019     Principal Problem:    Gastrointestinal hemorrhage with melena (2019)    Active Problems:    Glucose intolerance (impaired glucose tolerance) (2017)      COPD (chronic obstructive pulmonary disease) ([address] (2017)      Stage 3 chronic kidney disease ([address] (2017)      Atrial fibrillation ([address] (2017)      ASCVD (arteriosclerotic cardiovascular disease) (2017)      Overview: Catheterization 2004 80% stenosis nondominant RCA and 30% stenosis of       LAD with a notation of some left main disease but no percentage given. No       mention of angioplasty or stent made      Nonrheumatic aortic valve stenosis (2019)      Overview: Mild to moderate by echocardiogram 2019      Acute deep vein thrombosis (DVT) of distal vein of left lower extremity ([address] (2019)      Symptomatic anemia (2019)      AVM (arteriovenous malformation) of stomach, acquired (2019)      Pneumonia due to infectious organism (10/2/2019)      Overview: LLL 10/1/2019      Cellulitis of right upper extremity (10/8/2019)          No Known Allergies    · It is important that you take the medication exactly as they are prescribed. · Do not take other medications without consulting your doctor. What to do at Next Level of Care    Disposition:  [address]    Recommended diet: Cardia    Recommended activity: Ambulate with PT    Wound care:  Warm compresses to r forearm          Information obtained by :  I understand that if any problems occur once I am at home I am to contact my physician. I understand and acknowledge receipt of the instructions indicated above. Physician's or R.N.'s Signature                                                                  Date/Time                                                                                                                                              Patient or Representative Signature                                                          Date/Time

## 2019-10-10 NOTE — PROGRESS NOTES
Hospital to [facility] SBAR Handoff - Haile Armendariz 
                                                                      80 y.o.   male [address] [facility]   Room: 3259/01    [facility] [address]  Unit Phone# :  [phone] 
MRM 3 MED TELE 
[address] [place] [address] Dept: [phone] Loc: [id number] SITUATION Admitted:  9/26/2019         Attending Provider:  Mariah Cortes MD    
 
Consultations:  IP CONSULT TO GASTROENTEROLOGY 
IP CONSULT TO CARDIOLOGY 
IP CONSULT TO VASCULAR SURGERY 
 
PCP:  Mariah Cortes MD   [phone] Treatment Team: Attending Provider: Mariah Cortes MD; Consulting Provider: Joanna Wan MD; Care Manager: Katt Bass RN; Utilization Review: Margareth Solorzano; Care Manager: Jagdish Avila RN; Utilization Review: Fredy Griffin RN; Primary Nurse: Amee Blank Admitting Dx:  Symptomatic anemia [D64.9] Principal Problem: Gastrointestinal hemorrhage with melena 9 Days Post-Op of  
Procedure(s): 
CAPSULE  
BY: Josh Flores MD             ON: 10/1/2019 Code Status: DNR Advance Directives:  
Advance Care Planning 8/26/2019 Patient's Healthcare Decision Maker is: -  
Primary Decision Maker Name -  
Primary Decision Maker Phone Number -  
Primary Decision Maker Relationship to Patient -  
Confirm Advance Directive None Patient Would Like to Complete Advance Directive - (Send w/patient) Yes Not W Pt  
 
 
Isolation:  There are currently no Active Isolations       MDRO: No current active infections Pain Medications given:  None    Last dose: none Special Equipment needed: no  Type of equipment: 
  
  BACKGROUND Allergies: 
No Known Allergies Past Medical History:  
Diagnosis Date  Arrhythmia A-fib  GERD (gastroesophageal reflux disease)  Hypertension  Other ill-defined conditions(799.89)  BPH  
  Other ill-defined conditions(799.89) lipids  Other ill-defined conditions(799.89)   
 shingles hx  Thyroid disease  Unspecified adverse effect of anesthesia Past Surgical History:  
Procedure Laterality Date  COLONOSCOPY N/A 2019 COLONOSCOPY performed by Josh Flores MD at [facility] ENDOSCOPY  
 HX CATARACT REMOVAL    
 HX HEART CATHETERIZATION    
 ablation  HX HEART CATHETERIZATION  2019 Stent  HX HEENT    
 scar tissue removed/laser  HX HERNIA REPAIR  2017 Rola Womack MD  
 HX ORTHOPAEDIC    
 left foot fx--hardware  HX ORTHOPAEDIC  14 Right total knee arthroplasty  HX OTHER SURGICAL    
 prostate bx Medications Prior to Admission Medication Sig  temazepam (RESTORIL) 15 mg capsule Take two at bedtime.  rivaroxaban (XARELTO) 20 mg tab tablet Take 1 Tab by mouth daily (with breakfast).  amiodarone (CORDARONE) 200 mg tablet Take 1 Tab by mouth daily.  [] midodrine (PROAMITINE) 5 mg tablet Take 1 Tab by mouth three (3) times daily (with meals) for 30 days.  clopidogrel (PLAVIX) 75 mg tab Take 1 Tab by mouth daily.  aspirin 81 mg chewable tablet Take 81 mg by mouth daily.  tamsulosin (FLOMAX) 0.4 mg capsule TAKE 2 CAPSULES DAILY  levothyroxine (SYNTHROID) 137 mcg tablet Take 137 mcg by mouth Daily (before breakfast).  esomeprazole (NEXIUM) 40 mg capsule Take 1 Cap by mouth daily.  finasteride (PROSCAR) 5 mg tablet Take 1 Tab by mouth daily (after dinner).  simvastatin (ZOCOR) 40 mg tablet Take 1 Tab by mouth nightly.  albuterol (PROAIR HFA) 90 mcg/actuation inhaler Take 2 Puffs by inhalation every four (4) hours as needed for Wheezing or Shortness of Breath. Hard scripts included in transfer packet no Vaccinations:   
Immunization History Administered Date(s) Administered  Influenza High Dose Vaccine PF 2017  Influenza Vaccine 10/27/2014, 2015, 10/20/2016  Influenza Vaccine (Tri) Adjuvanted 11/06/2018  Pneumococcal Conjugate (PCV-13) 09/01/2015  Pneumococcal Vaccine (Unspecified Type) 10/11/2004, 01/01/2014  Tdap 04/03/2018 Readmission Risks:    Known Risks: None The Charlson CoMorbitiy Index tool is an evidenced based tool that has more automatic generated information. The tool looks at many different items such as the age of the patient, how many times they were admitted in the last calendar year, current length of stay in the hospital and their diagnosis. All of these items are pulled automatically from information documented in the chart from various places and will generate a score that predicts whether a patient is at low (less than 13), medium (13-20) or high (21 or greater) risk of being readmitted. ASSESSMENT Temp: 98.3 °F (36.8 °C) (10/10/19 0755) Pulse (Heart Rate): 60 (10/10/19 0755) Resp Rate: 17 (10/10/19 0755)           BP: 141/63 (10/10/19 0755) O2 Sat (%): 95 % (10/10/19 0755) Weight: 82.6 kg (181 lb 15.8 oz)    Height: 5' 9\" (175.3 cm) (09/27/19 1020) If above not within 1 hour of discharge: 
 
BP:_____  P:____  R:____ T:_____ O2 Sat: ___%  O2: ______ Active Orders Diet DIET CARDIAC Regular Orientation: oriented to time, place, person and situation Active Behaviors: None Active Lines/Drains:  (Peg Tube / Greenberg / CL or S/L?): no 
 
Urinary Status: Voiding     Last BM: Last Bowel Movement Date: 10/08/19 Skin Integrity: Intact Mobility: Slightly limited Weight Bearing Status: WBAT (Weight Bearing as Tolerated) Gait Training Assistive Device: Walker, rolling, Gait belt Ambulation - Level of Assistance: Contact guard assistance Distance (ft): 125 Feet (ft) Interventions: Verbal cues, Tactile cues, Safety awareness training Lab Results Component Value Date/Time  Glucose 81 10/10/2019 05:01 AM  
 Hemoglobin A1c 5.8 (H) 06/12/2019 01:58 PM  
 INR 1.1 09/26/2019 05:10 PM  
 INR 1.1 07/18/2019 05:22 AM  
 HGB 7.9 (L) 10/10/2019 05:01 AM  
 HGB 7.9 (L) 10/09/2019 02:31 AM  
  
  RECOMMENDATION See After Visit Summary (AVS) for: · Discharge instructions · [facility] · Special equipment needed (entered pre-discharge by Care Management) · Medication Reconciliation · Follow up Appointment(s) Report given/sent by:  Kyler Casillas Verbal report given to: Darcy Carvalho  FAXED to:  
    
Estimated discharge time:  10/10/2019 at [phone] Report called to Darcy Carvalho at [facility]. Pt discharged to [facility] via family transportation. IV and telemetry discontinued. Discharge instructions transported with pt to facility.

## 2019-10-11 NOTE — PROGRESS NOTES
Patient discharged to a SNF Preferred [facility] Network facility, [facility]. Patient will be included in weekly care coordination calls.

## 2019-10-11 NOTE — DISCHARGE SUMMARY
[facility] DISCHARGE SUMMARY Name:  Lydia Dean 
MR#:  [id number] :  [id number] ACCOUNT #:  [de-identified] ADMIT DATE:  2019 DISCHARGE DATE:  10/10/2019 FINAL DIAGNOSES: 
1. Gastrointestinal bleed with melanotic stool. 2.  Gastric arteriovenous malformation clipped during hospitalization at time of esophagogastroduodenoscopy. 3.  Anemia secondary to gastrointestinal bleed with melanotic stool. 4.  Recent left lower extremity deep venous thrombosis for which Xarelto was started and subsequently discontinued during this hospitalization. 5.  Glucose intolerance. 6.  Chronic obstructive pulmonary disease. 7.  Chronic kidney disease, stage III. 8.  Atrial fibrillation. 9.  Atherosclerotic cardiovascular disease, status post cardiac catheterization with angioplasty and stent replacement done two months prior to this hospitalization. 10.  Nonrheumatic aortic valve stenosis. 11.  Pneumonia left lower lobe, treated and resolved. 12.  Cellulitis right upper extremity, currently being treated. HOSPITAL COMPLICATIONS:  None. HOSPITAL PROCEDURES:  Placement of inferior vena cava filter. COMPLICATIONS:  None. For details of admission history and physical, please see admit note. HOSPITAL COURSE:  Briefly, the patient is an 43-year-old white male who presented to the hospital with symptomatic anemia and did require one unit of blood transfusion. He had melanotic stool and was lightheaded and dizzy. Workup in the hospital included an EGD, which revealed a gastric AV malformation that was not actively bleeding, but was clipped. He had a colonoscopy, which revealed no source of bleeding and had a small bowel capsule endoscopy, which revealed some nonbleeding AV malformations. At the time of admission, his Xarelto was discontinued and aspirin was held.   He was continued on Plavix in light of his recent cardiac stent that was left main extending into the LAD with a sideport for the circumflex and it was felt that that could not be stopped. He did require one unit of transfusion. His hemoglobin seemed to have improved after receiving 3 iron transfusions during the hospitalization, and at the time of discharge, hemoglobin was up to 7.9. He did develop problems after the GI procedures with fever and elevated white count and chest x-ray did reveal a left lower lobe atelectasis/infiltrate, for which he was started on Rocephin and continued that for 7 days and followup chest x-ray revealed that, that had resolved. White blood count was seemed to be improving with that and started to rise again and it was noted that he had some redness and warmth of his right forearm, which was felt secondary to a cellulitis and his antibiotics was switched to Bactrim. Within 48 hours, his white blood count has returned to normal and it is felt at this point he can be discharged for rehab. He was seen by physical therapy and ambulated, but still very unsteady and had significant risk of fall and it was felt inpatient rehabilitation is appropriate based upon that. His discharge medications will consist discontinuation of Xarelto and new medications will be Bactrim DS twice a day for an additional 8 days, MiraLax 1 packet daily. Changed medications will be the dose of his ProAmatine was increased to 10 mg 3 times daily. His other medications at discharge are the same as per admission, which consists of ProAir inhaler 2 puffs q.i.d. p.r.n., Nexium 40 mg daily, aspirin 81 mg daily, Plavix 75 mg daily, amiodarone 200 mg daily, Proscar 5 mg daily, Synthroid 0.137 daily, Zocor 40 mg daily, Flomax 0.4 daily and Restoril 15 mg at bedtime. Winnie Cabrera MD 
 
 
[id number] 
D:  10/10/2019 7:43 
T:  10/11/2019 5:07 JOB #:  [id number] CC:  MD Fadumo Lorenzana MD 
 Wing Arriola MD

## 2019-10-11 NOTE — TELEPHONE ENCOUNTER
Requested Prescriptions     Pending Prescriptions Disp Refills    temazepam (RESTORIL) 30 mg capsule 30 Cap 5     Sig: Take two at bedtime.

## 2019-10-14 NOTE — TELEPHONE ENCOUNTER
Requested Prescriptions     Pending Prescriptions Disp Refills    temazepam (RESTORIL) 30 mg capsule 30 Cap 5     Sig: Take 1 Cap by mouth nightly as needed for Sleep. Max Daily Amount: 30 mg.

## 2019-10-24 PROBLEM — J18.9 BILATERAL PNEUMONIA: Status: ACTIVE | Noted: 2019-01-01

## 2019-10-24 PROBLEM — I95.1 CHRONIC ORTHOSTATIC HYPOTENSION: Status: ACTIVE | Noted: 2019-01-01

## 2019-10-24 PROBLEM — R65.20 SEVERE SEPSIS (HCC): Status: ACTIVE | Noted: 2019-01-01

## 2019-10-24 PROBLEM — R50.9 FEVER: Status: ACTIVE | Noted: 2019-01-01

## 2019-10-24 PROBLEM — A41.9 SEVERE SEPSIS (HCC): Status: ACTIVE | Noted: 2019-01-01

## 2019-10-24 PROBLEM — N17.9 AKI (ACUTE KIDNEY INJURY) (HCC): Status: ACTIVE | Noted: 2019-01-01

## 2019-10-24 PROBLEM — G93.40 ACUTE ENCEPHALOPATHY: Status: ACTIVE | Noted: 2019-01-01

## 2019-10-24 NOTE — H&P
Hospitalist Admission Note NAME: Jack Ac :  3/13/1931 MRN:  [id number] Date/Time:  10/24/2019 7:33 AM 
 
Patient PCP: Juan Hines MD 
______________________________________________________________________ Given the patient's current clinical presentation, I have a high level of concern for decompensation if discharged from the emergency department. Complex decision making was performed, which includes reviewing the patient's available past medical records, laboratory results, and x-ray films. My assessment of this patient's clinical condition and my plan of care is as follows. Assessment / Plan: 
Severe Sepsis with Hypotension POA Bilateral Basilar PNA/HCAP POA KEVAN on CKD stage 3 POA- due to high fever POA Acute Encephalopathy POA due to above 
WBC= 10.7k with left shift Lact=  3.9-> 2.0 
UA neg except blood Blood Cx pending Cr= 2.0 (baseline ~ 1.5) Admit to Stepdown unit on telemonitor S/p 3.5L NS bolus in ER per severe sepsis protocol, cont IVF at 125 ml/hr Serial lactate till <2.0 IV ABx to cover HCAP (pt was in hospital recently for GI Bleed)- IV Vanco, IV cefepime Follow Blood Cx Resume home Midodrine TID for hypotension at baseline BMP in AM 
Diet NPO till MS improves with fever improving- follow the fever trend HTN 
CAD s/p stents Hyperlipidemia Parox AFib POA- currently in sinus Hyperlipidemia Cont ASA, plavix, statin Cont Amiodarone Cont synthroid BPH Holding Flomax/proscar for now due to hypotension Code Status: Full Surrogate Decision Maker: daughter Abrahan Cui DVT Prophylaxis: SQ Heparin GI Prophylaxis: not indicated Baseline: Pt was at [facility] having Short term rehab after last admission/discharge from [id number] earlier this month Subjective: CHIEF COMPLAINT: High fevers HISTORY OF PRESENT ILLNESS:    
Jose Carlos Eli is a 80 y.o.    male who presents with above complaints from nursing home named [facility]. Sent by EMS Patient was recently discharged from the hospital after he had a GI bleed and now he comes back with by EMS for high fevers. Patient seems to be altered mental status wise due to high fevers as seen in the ED. On the work-up in ER patient was found to have normal WBC count normal UA, but chest x-ray showing bibasilar airspace disease and was found to have elevated lactate at 3.9. Blood pressure was on the borderline side around 90 systolic which has now improved to around about 767 systolic after fluid challenge here. Patient's mental status has significantly improved in the ER now when I have seen the patient with the family at bedside. We were asked to admit for work up and evaluation of the above problems. Past Medical History:  
Diagnosis Date  Arrhythmia A-fib  GERD (gastroesophageal reflux disease)  Hypertension  Other ill-defined conditions(799.89) BPH  Other ill-defined conditions(799.89) lipids  Other ill-defined conditions(799.89)   
 shingles hx  Thyroid disease  Unspecified adverse effect of anesthesia Past Surgical History:  
Procedure Laterality Date  COLONOSCOPY N/A 2019 COLONOSCOPY performed by Brian Schmitt MD at [facility] ENDOSCOPY  
 HX CATARACT REMOVAL    
 HX HEART CATHETERIZATION    
 ablation  HX HEART CATHETERIZATION  2019 Stent  HX HEENT    
 scar tissue removed/laser  HX HERNIA REPAIR  2017 Wayne Womack MD  
 HX ORTHOPAEDIC    
 left foot fx--hardware  HX ORTHOPAEDIC  14 Right total knee arthroplasty  HX OTHER SURGICAL    
 prostate bx Social History Tobacco Use  Smoking status: Former Smoker Packs/day: 0.50 Last attempt to quit: 3/28/1963 Years since quittin.6  Smokeless tobacco: Never Used Substance Use Topics  Alcohol use: Yes Comment: rare/social-beer Family History Problem Relation Age of Onset  Heart Disease Mother  Hypertension Mother  Stroke Mother  Heart Disease Father  Cancer Paternal Grandmother No Known Allergies Prior to Admission medications Medication Sig Start Date End Date Taking? Authorizing Provider  
temazepam (RESTORIL) 30 mg capsule Take 1 Cap by mouth nightly as needed for Sleep. Max Daily Amount: 30 mg. 10/14/19   Mae Chen MD  
temazepam (RESTORIL) 30 mg capsule Take two at bedtime. 10/11/19   Mae Chen MD  
midodrine (PROAMITINE) 10 mg tablet Take 1 Tab by mouth three (3) times daily (with meals) for 30 days. 10/10/19 11/9/19  Mae Chen MD  
polyethylene glycol [facility]) 17 gram packet Take 1 Packet by mouth daily. 10/10/19   Mae Chen MD  
trimethoprim-sulfamethoxazole (BACTRIM DS, SEPTRA DS) 160-800 mg per tablet Take 1 Tab by mouth every twelve (12) hours. 10/10/19   Mae Chen MD  
amiodarone (CORDARONE) 200 mg tablet Take 1 Tab by mouth daily. 9/4/19   Mae Chen MD  
albuterol [facility] HFA) 90 mcg/actuation inhaler Take 2 Puffs by inhalation every four (4) hours as needed for Wheezing or Shortness of Breath. 8/14/19   Mae Chen MD  
clopidogrel (PLAVIX) 75 mg tab Take 1 Tab by mouth daily. 8/14/19   Mae Chen MD  
aspirin 81 mg chewable tablet Take 81 mg by mouth daily. Provider, Historical  
tamsulosin (FLOMAX) 0.4 mg capsule TAKE 2 CAPSULES DAILY 3/22/19   Mae Chen MD  
levothyroxine (SYNTHROID) 137 mcg tablet Take 137 mcg by mouth Daily (before breakfast). 3/22/19   Mae Chen MD  
esomeprazole (NEXIUM) 40 mg capsule Take 1 Cap by mouth daily. 3/22/19   Mae Chen MD  
finasteride (PROSCAR) 5 mg tablet Take 1 Tab by mouth daily (after dinner). 12/12/18   Mae Chen MD  
simvastatin (ZOCOR) 40 mg tablet Take 1 Tab by mouth nightly.  12/12/18   Mae Chen MD  
 
 
 REVIEW OF SYSTEMS:    
I am not able to complete the review of systems because: The patient is intubated and sedated  
x The patient has altered mental status due to his acute medical problems The patient has baseline aphasia from prior stroke(s) The patient has baseline dementia and is not reliable historian The patient is in acute medical distress and unable to provide information Total of 12 systems reviewed as follows:   
   POSITIVE= underlined text  Negative = text not underlined General:  fever, chills, sweats, generalized weakness, weight loss/gain,  
   loss of appetite Eyes:    blurred vision, eye pain, loss of vision, double vision ENT:    rhinorrhea, pharyngitis Respiratory:   cough, sputum production, SOB, JAFFE, wheezing, pleuritic pain  
Cardiology:   chest pain, palpitations, orthopnea, PND, edema, syncope Gastrointestinal:  abdominal pain , N/V, diarrhea, dysphagia, constipation, bleeding Genitourinary:  frequency, urgency, dysuria, hematuria, incontinence Muskuloskeletal :  arthralgia, myalgia, back pain Hematology:  easy bruising, nose or gum bleeding, lymphadenopathy Dermatological: rash, ulceration, pruritis, color change / jaundice Endocrine:   hot flashes or polydipsia Neurological:  headache, dizziness, confusion, focal weakness, paresthesia, Speech difficulties, memory loss, gait difficulty Psychological: Feelings of anxiety, depression, agitation Objective: VITALS:   
Visit Vitals BP 96/46 (BP 1 Location: Left arm, BP Patient Position: At rest) Pulse 91 Temp 99 °F (37.2 °C) Resp 28 Ht 5' 9\" (1.753 m) Wt 85.3 kg (188 lb 0.8 oz) SpO2 95% BMI 27.77 kg/m² PHYSICAL EXAM: 
 
General:    Alert, somewhat cooperative, no distress, appears stated age. HEENT: Atraumatic, anicteric sclerae, pink conjunctivae No oral ulcers, mucosa moist, throat clear, dentition fair Neck:  Supple, symmetrical,  thyroid: non tender Lungs:   Clear to auscultation bilaterally. No Wheezing or Rhonchi. No rales. Chest wall:  No tenderness  No Accessory muscle use. Heart:   Regular  rhythm,  No  murmur   No edema Abdomen:   Soft, non-tender. Not distended. Bowel sounds normal 
Extremities: No cyanosis. No clubbing,   
  Skin turgor normal, Capillary refill normal, Radial dial pulse 2+ Skin:     Not pale. Not Jaundiced  No rashes Psych:  Poor insight. Not depressed. Mildly agitated. Neurologic: EOMs intact. No facial asymmetry. No aphasia or slurred speech. Symmetrical strength, Sensation grossly intact. Alert and oriented X 1 (person). _______________________________________________________________________ Care Plan discussed with: 
  Comments Patient x Family  x Daughter at bedside RN x Care Manager Consultant:     
_______________________________________________________________________ Expected  Disposition:  
Home with Family HH/PT/OT/RN   
SNF/LTC x  
LESLEY   
________________________________________________________________________ TOTAL TIME:  61 Minutes Critical Care Provided     Minutes non procedure based Comments Reviewed previous records  
>50% of visit spent in counseling and coordination of care  Discussion with patient and/or family and questions answered 
  
 
________________________________________________________________________ Signed: Ky Rene MD 
 
Procedures: see electronic medical records for all procedures/Xrays and details which were not copied into this note but were reviewed prior to creation of Plan. LAB DATA REVIEWED:   
Recent Results (from the past 24 hour(s)) EKG, 12 LEAD, INITIAL Collection Time: 10/24/19  5:01 AM  
Result Value Ref Range Ventricular Rate 104 BPM  
 Atrial Rate 104 BPM  
 P-R Interval 244 ms QRS Duration 104 ms Q-T Interval 336 ms  
 QTC Calculation (Bezet) 441 ms Calculated P Axis 87 degrees Calculated R Axis 70 degrees Calculated T Axis 52 degrees Diagnosis Sinus tachycardia with 1st degree AV block with premature atrial complexes Incomplete right bundle branch block ST depression, consider subendocardial injury When compared with ECG of 26-SEP-2019 14:50, 
premature atrial complexes are now present NV interval has increased ST now depressed in Inferior leads ST more depressed in Anterolateral leads POC LACTIC ACID Collection Time: 10/24/19  5:08 AM  
Result Value Ref Range Lactic Acid (POC) 3.98 (HH) 0.40 - 2.00 mmol/L  
CBC WITH AUTOMATED DIFF Collection Time: 10/24/19  5:17 AM  
Result Value Ref Range WBC 10.7 4.1 - 11.1 K/uL  
 RBC 3.14 (L) 4.10 - 5.70 M/uL HGB 9.0 (L) 12.1 - 17.0 g/dL HCT 28.7 (L) 36.6 - 50.3 % MCV 91.4 80.0 - 99.0 FL  
 MCH 28.7 26.0 - 34.0 PG  
 MCHC 31.4 30.0 - 36.5 g/dL  
 RDW 17.9 (H) 11.5 - 14.5 % PLATELET 526 424 - 159 K/uL MPV 10.8 8.9 - 12.9 FL  
 NRBC 0.0 0  WBC ABSOLUTE NRBC 0.00 0.00 - 0.01 K/uL NEUTROPHILS 94 (H) 32 - 75 % LYMPHOCYTES 4 (L) 12 - 49 % MONOCYTES 2 (L) 5 - 13 % EOSINOPHILS 0 0 - 7 % BASOPHILS 0 0 - 1 % IMMATURE GRANULOCYTES 0 0.0 - 0.5 % ABS. NEUTROPHILS 10.1 (H) 1.8 - 8.0 K/UL  
 ABS. LYMPHOCYTES 0.4 (L) 0.8 - 3.5 K/UL  
 ABS. MONOCYTES 0.2 0.0 - 1.0 K/UL  
 ABS. EOSINOPHILS 0.0 0.0 - 0.4 K/UL  
 ABS. BASOPHILS 0.0 0.0 - 0.1 K/UL  
 ABS. IMM. GRANS. 0.0 0.00 - 0.04 K/UL  
 DF AUTOMATED    
 RBC COMMENTS ANISOCYTOSIS 
1+ METABOLIC PANEL, COMPREHENSIVE Collection Time: 10/24/19  5:17 AM  
Result Value Ref Range Sodium 134 (L) 136 - 145 mmol/L Potassium 3.9 3.5 - 5.1 mmol/L Chloride 101 97 - 108 mmol/L  
 CO2 21 21 - 32 mmol/L Anion gap 12 5 - 15 mmol/L Glucose 151 (H) 65 - 100 mg/dL BUN 25 (H) 6 - 20 MG/DL Creatinine 2.03 (H) 0.70 - 1.30 MG/DL  
 BUN/Creatinine ratio 12 12 - 20 GFR est AA 38 (L) >60 ml/min/1.73m2 GFR est non-AA 31 (L) >60 ml/min/1.73m2 Calcium 8.9 8.5 - 10.1 MG/DL Bilirubin, total 1.0 0.2 - 1.0 MG/DL  
 ALT (SGPT) 21 12 - 78 U/L  
 AST (SGOT) 30 15 - 37 U/L Alk. phosphatase 87 45 - 117 U/L Protein, total 6.7 6.4 - 8.2 g/dL Albumin 2.7 (L) 3.5 - 5.0 g/dL Globulin 4.0 2.0 - 4.0 g/dL A-G Ratio 0.7 (L) 1.1 - 2.2 URINALYSIS W/ REFLEX CULTURE Collection Time: 10/24/19  5:17 AM  
Result Value Ref Range Color YELLOW/STRAW Appearance CLEAR CLEAR Specific gravity 1.018 1.003 - 1.030    
 pH (UA) 5.0 5.0 - 8.0 Protein NEGATIVE  NEG mg/dL Glucose NEGATIVE  NEG mg/dL Ketone NEGATIVE  NEG mg/dL Bilirubin NEGATIVE  NEG Blood LARGE (A) NEG Urobilinogen 0.2 0.2 - 1.0 EU/dL Nitrites NEGATIVE  NEG Leukocyte Esterase NEGATIVE  NEG    
 WBC 0-4 0 - 4 /hpf  
 RBC >100 (H) 0 - 5 /hpf Epithelial cells FEW FEW /lpf Bacteria NEGATIVE  NEG /hpf  
 UA:UC IF INDICATED CULTURE NOT INDICATED BY UA RESULT CNI Hyaline cast 2-5 0 - 5 /lpf

## 2019-10-24 NOTE — ED NOTES
Assumed care of patient from [facility]. Patient is alert and non oriented. Patient is attempting to get out of the bed, bed alarm on and side rails up. Patient repositioned in the bed for comfort. Patient sinus tach at 93, 96% on 2L NC and hypotensive at 96/40. MD aware of three MAPS below 65. Another liter of fluids hanging and new LACTIC acid sent.

## 2019-10-24 NOTE — ED NOTES
Bedside and Verbal shift change report given to Chaim Ryder (oncoming nurse) by Rosy (offgoing nurse). Report included the following information SBAR, ED Summary, MAR and Recent Results.

## 2019-10-24 NOTE — PROGRESS NOTES
Reason for Readmission:    Severe Sepsis, Bilateral pneumonia, Fever, KEVAN  
      
RRAT Score and Risk Level:    30 - high risk Level of Readmission:   2 - recent discharge from [facility] on 10/10/19 Care Conference scheduled:   IDRs Resources/supports as identified by patient/family:   Good family support Top Challenges facing patient (as identified by patient/family and CM): Finances/Medication cost?  No issues - has [employer] Transportation   Medical transport at discharge Support system or lack thereof? Good family support, lives with son, daughter Celeste Holt is the contact Living arrangements? Lives with son in a 1 story home with 4 SANTANA Self-care/ADLs/Cognition? Needs assistance with ADL's/ sleeping at the time of this CM visit Current Advanced Directive/Advance Care Plan:  Full code/ not on file Plan for utilizing home health: no, SNF Transition of Care Plan:    Based on readmission, the patient's previous Plan of Care of [address] [place] [address]  has been evaluated and/or modified. The current Transition of Care Plan is:  Return to [address] [place] [address] with f/u appts CM met with pt and family at the bedside. Pt was sleeping at the time of this CM visit. Pt is a Level 2 readmit with a recent discharge from [facility] to [address] [place] [address] on 10/10/19. Pt's family stated he was getting ready to d/c from [address] [place] [address] this weekend and then became ill yesterday. Pt's daughter and family would like pt to return to [address] [place] [address] Floor at discharge. CM sent referral to [address] [place] [address] via [facility]. Pt will need medical transport at discharge. CM notified [facility] of pt's admission. CM will continue to follow for discharge planning needs. Care Management Interventions PCP Verified by CM: Yes(Dr. Carri Pantoja ) Mode of Transport at Discharge: BLS Transition of Care Consult (CM Consult): SNF([facility]) Partner SNF: Yes 
 Discharge Durable Medical Equipment: No(rolling walker, shower chair, cane) Physical Therapy Consult: No 
Occupational Therapy Consult: No 
Speech Therapy Consult: No 
Current Support Network: Other, Own Home(lives with son in a 1 story home with 4 steps to the entrance) Confirm Follow Up Transport: Other (see comment)(medical transport ) Discharge Location Discharge Placement: Skilled nursing [facility] [address]

## 2019-10-24 NOTE — ED PROVIDER NOTES
EMERGENCY DEPARTMENT HISTORY AND PHYSICAL EXAM  
 
---------------------------------------------------------------------------- Please note that this dictation was completed with [employer], the [employer] voice recognition software. Quite often unanticipated grammatical, syntax, homophones, and other interpretive errors are inadvertently transcribed by the computer software. Please disregard these errors. Please excuse any errors that have escaped final proofreading 
---------------------------------------------------------------------------- 
 
 
Date: 10/24/2019 Patient Name: Karan Mandel History of Presenting Illness Chief Complaint Patient presents with  Fever  Shortness of Breath History Provided By:  EMS 
 
HPI: Karan Mandel is a 80 y.o. male, with significant pmhx of hypertension, GERD, A. fib, BPH, cholesterol, who presents ambulatory /via EMS to the ED with c/o progressively declining mental status throughout the last 24 hours per [facility] care staff. Patient unable to provide any history of present illness in his completely disoriented. Does not answer questions, states his name or follow commands PCP: Mame Minaya MD 
 
No Known Allergies Current Facility-Administered Medications Medication Dose Route Frequency Provider Last Rate Last Dose  sodium chloride (NS) flush 5-10 mL  5-10 mL IntraVENous PRN Ileana Waters MD   10 mL at 10/24/19 0480 Current Outpatient Medications Medication Sig Dispense Refill  temazepam (RESTORIL) 30 mg capsule Take 1 Cap by mouth nightly as needed for Sleep. Max Daily Amount: 30 mg. 30 Cap 5  temazepam (RESTORIL) 30 mg capsule Take two at bedtime. 30 Cap 5  
 midodrine (PROAMITINE) 10 mg tablet Take 1 Tab by mouth three (3) times daily (with meals) for 30 days. 90 Tab 0  
 polyethylene glycol (MIRALAX) 17 gram packet Take 1 Packet by mouth daily.  30 Packet prn  
  trimethoprim-sulfamethoxazole (BACTRIM DS, SEPTRA DS) 160-800 mg per tablet Take 1 Tab by mouth every twelve (12) hours. 16 Tab 0  
 amiodarone (CORDARONE) 200 mg tablet Take 1 Tab by mouth daily. 30 Tab prn  albuterol (PROAIR HFA) 90 mcg/actuation inhaler Take 2 Puffs by inhalation every four (4) hours as needed for Wheezing or Shortness of Breath. 1 Inhaler 3  clopidogrel (PLAVIX) 75 mg tab Take 1 Tab by mouth daily. 90 Tab 3  
 aspirin 81 mg chewable tablet Take 81 mg by mouth daily.  tamsulosin (FLOMAX) 0.4 mg capsule TAKE 2 CAPSULES DAILY 180 Cap 3  
 levothyroxine (SYNTHROID) 137 mcg tablet Take 137 mcg by mouth Daily (before breakfast). 90 Tab 3  
 esomeprazole (NEXIUM) 40 mg capsule Take 1 Cap by mouth daily. 90 Cap 3  
 finasteride (PROSCAR) 5 mg tablet Take 1 Tab by mouth daily (after dinner). 90 Tab 3  
 simvastatin (ZOCOR) 40 mg tablet Take 1 Tab by mouth nightly. 90 Tab 3 Past History Past Medical History: 
Past Medical History:  
Diagnosis Date  Arrhythmia A-fib  GERD (gastroesophageal reflux disease)  Hypertension  Other ill-defined conditions(799.89) BPH  Other ill-defined conditions(799.89) lipids  Other ill-defined conditions(799.89)   
 shingles hx  Thyroid disease  Unspecified adverse effect of anesthesia Past Surgical History: 
Past Surgical History:  
Procedure Laterality Date  COLONOSCOPY N/A 9/30/2019 COLONOSCOPY performed by Poncho Rosario MD at [facility] ENDOSCOPY  
 HX CATARACT REMOVAL    
 HX HEART CATHETERIZATION    
 ablation  HX HEART CATHETERIZATION  07/18/2019 Stent  HX HEENT    
 scar tissue removed/laser  HX HERNIA REPAIR  05/05/2017 Edwards Chay Womack MD  
 HX ORTHOPAEDIC    
 left foot fx--hardware  HX ORTHOPAEDIC  4/9/14 Right total knee arthroplasty  HX OTHER SURGICAL    
 prostate bx Family History: 
Family History Problem Relation Age of Onset  Heart Disease Mother  Hypertension Mother  Stroke Mother  Heart Disease Father  Cancer Paternal Grandmother Social History: 
Social History Tobacco Use  Smoking status: Former Smoker Packs/day: 0.50 Last attempt to quit: 3/28/1963 Years since quittin.6  Smokeless tobacco: Never Used Substance Use Topics  Alcohol use: Yes Comment: rare/social-beer  Drug use: Yes Types: Prescription, OTC Allergies: 
No Known Allergies Review of Systems Review of Systems Unable to perform ROS: Mental status change Physical Exam  
Physical Exam  
Constitutional: He appears well-developed and well-nourished. He appears distressed. HENT:  
Head: Normocephalic and atraumatic. Nose: Nose normal.  
Mouth/Throat: No oropharyngeal exudate. Eyes: Pupils are equal, round, and reactive to light. Conjunctivae and EOM are normal.  
Neck: Normal range of motion. Neck supple. No JVD present. Cardiovascular: Regular rhythm, normal heart sounds and intact distal pulses. Tachycardia present. Exam reveals no friction rub. No murmur heard. Pulmonary/Chest: Breath sounds normal. No stridor. Tachypnea noted. He is in respiratory distress. He has no wheezes. He has no rales. Abdominal: Soft. Bowel sounds are normal. He exhibits no distension. There is no tenderness. There is no rebound. Musculoskeletal: Normal range of motion. He exhibits no tenderness. Neurological: He is alert. He is disoriented. No cranial nerve deficit or sensory deficit. GCS eye subscore is 4. GCS verbal subscore is 4. GCS motor subscore is 6. Skin: Skin is warm and dry. No rash noted. He is not diaphoretic. Psychiatric: He is inattentive. Nursing note and vitals reviewed. Diagnostic Study Results Labs - Recent Results (from the past 12 hour(s)) EKG, 12 LEAD, INITIAL Collection Time: 10/24/19  5:01 AM  
Result Value Ref Range  Ventricular Rate 104 BPM  
 Atrial Rate 104 BPM  
 P-R Interval 244 ms QRS Duration 104 ms Q-T Interval 336 ms  
 QTC Calculation (Bezet) 441 ms Calculated P Axis 87 degrees Calculated R Axis 70 degrees Calculated T Axis 52 degrees Diagnosis Sinus tachycardia with 1st degree AV block with premature atrial complexes Incomplete right bundle branch block ST depression, consider subendocardial injury When compared with ECG of 26-SEP-2019 14:50, 
premature atrial complexes are now present IA interval has increased ST now depressed in Inferior leads ST more depressed in Anterolateral leads POC LACTIC ACID Collection Time: 10/24/19  5:08 AM  
Result Value Ref Range Lactic Acid (POC) 3.98 (HH) 0.40 - 2.00 mmol/L  
CBC WITH AUTOMATED DIFF Collection Time: 10/24/19  5:17 AM  
Result Value Ref Range WBC 10.7 4.1 - 11.1 K/uL  
 RBC 3.14 (L) 4.10 - 5.70 M/uL HGB 9.0 (L) 12.1 - 17.0 g/dL HCT 28.7 (L) 36.6 - 50.3 % MCV 91.4 80.0 - 99.0 FL  
 MCH 28.7 26.0 - 34.0 PG  
 MCHC 31.4 30.0 - 36.5 g/dL  
 RDW 17.9 (H) 11.5 - 14.5 % PLATELET 140 371 - 769 K/uL MPV 10.8 8.9 - 12.9 FL  
 NRBC 0.0 0  WBC ABSOLUTE NRBC 0.00 0.00 - 0.01 K/uL NEUTROPHILS 94 (H) 32 - 75 % LYMPHOCYTES 4 (L) 12 - 49 % MONOCYTES 2 (L) 5 - 13 % EOSINOPHILS 0 0 - 7 % BASOPHILS 0 0 - 1 % IMMATURE GRANULOCYTES 0 0.0 - 0.5 % ABS. NEUTROPHILS 10.1 (H) 1.8 - 8.0 K/UL  
 ABS. LYMPHOCYTES 0.4 (L) 0.8 - 3.5 K/UL  
 ABS. MONOCYTES 0.2 0.0 - 1.0 K/UL  
 ABS. EOSINOPHILS 0.0 0.0 - 0.4 K/UL  
 ABS. BASOPHILS 0.0 0.0 - 0.1 K/UL  
 ABS. IMM. GRANS. 0.0 0.00 - 0.04 K/UL  
 DF AUTOMATED    
 RBC COMMENTS ANISOCYTOSIS 
1+ METABOLIC PANEL, COMPREHENSIVE Collection Time: 10/24/19  5:17 AM  
Result Value Ref Range Sodium 134 (L) 136 - 145 mmol/L Potassium 3.9 3.5 - 5.1 mmol/L Chloride 101 97 - 108 mmol/L  
 CO2 21 21 - 32 mmol/L Anion gap 12 5 - 15 mmol/L Glucose 151 (H) 65 - 100 mg/dL BUN 25 (H) 6 - 20 MG/DL Creatinine 2.03 (H) 0.70 - 1.30 MG/DL  
 BUN/Creatinine ratio 12 12 - 20 GFR est AA 38 (L) >60 ml/min/1.73m2 GFR est non-AA 31 (L) >60 ml/min/1.73m2 Calcium 8.9 8.5 - 10.1 MG/DL Bilirubin, total 1.0 0.2 - 1.0 MG/DL  
 ALT (SGPT) 21 12 - 78 U/L  
 AST (SGOT) 30 15 - 37 U/L Alk. phosphatase 87 45 - 117 U/L Protein, total 6.7 6.4 - 8.2 g/dL Albumin 2.7 (L) 3.5 - 5.0 g/dL Globulin 4.0 2.0 - 4.0 g/dL A-G Ratio 0.7 (L) 1.1 - 2.2 URINALYSIS W/ REFLEX CULTURE Collection Time: 10/24/19  5:17 AM  
Result Value Ref Range Color YELLOW/STRAW Appearance CLEAR CLEAR Specific gravity 1.018 1.003 - 1.030    
 pH (UA) 5.0 5.0 - 8.0 Protein NEGATIVE  NEG mg/dL Glucose NEGATIVE  NEG mg/dL Ketone NEGATIVE  NEG mg/dL Bilirubin NEGATIVE  NEG Blood LARGE (A) NEG Urobilinogen 0.2 0.2 - 1.0 EU/dL Nitrites NEGATIVE  NEG Leukocyte Esterase NEGATIVE  NEG    
 WBC 0-4 0 - 4 /hpf  
 RBC >100 (H) 0 - 5 /hpf Epithelial cells FEW FEW /lpf Bacteria NEGATIVE  NEG /hpf  
 UA:UC IF INDICATED CULTURE NOT INDICATED BY UA RESULT CNI Hyaline cast 2-5 0 - 5 /lpf Radiologic Studies -  
XR CHEST SNGL V    (Results Pending) CT Results  (Last 48 hours) None CXR Results  (Last 48 hours) None Medical Decision Making I am the first provider for this patient. I reviewed the vital signs, available nursing notes, past medical history, past surgical history, family history and social history. Vital Signs-Reviewed the patient's vital signs. Patient Vitals for the past 12 hrs: 
 Temp Pulse Resp BP SpO2  
10/24/19 0638  94 27 102/47 95 % 10/24/19 0630  95 27 105/48 95 % 10/24/19 0625  97 (!) 32 112/49 94 % 10/24/19 0615  (!) 103 27 119/59 94 % 10/24/19 0600 (!) 102.7 °F (39.3 °C) (!) 103 (!) 34  92 % 10/24/19 0545  (!) 103 28 109/60 93 % 10/24/19 0530  (!) 103 23 128/48 94 % 10/24/19 0523 (!) 105.9 °F (41.1 °C)      
10/24/19 0515 (!) 105.9 °F (41.1 °C) (!) 102 26 110/46 90 % 10/24/19 0505     92 % 10/24/19 0458 (!) 102.8 °F (39.3 °C) 93 28 133/61 91 % Pulse Oximetry Analysis - 92% on RA Cardiac Monitor:  
Rate: 101 bpm 
Rhythm: sinus tach Records Reviewed: Nursing Notes, Old Medical Records, Ambulance Run Sheet, Previous Radiology Studies and Previous Laboratory Studies Provider Notes (Medical Decision Making): DDX: 
Sepsis, pneumonia, UTI Plan: 
Sepsis protocol, IV fluids, cultures, antibiotics Impression: 
sepsis ED Course:  
Initial assessment performed. The patients presenting problems have been discussed, and they are in agreement with the care plan formulated and outlined with them. I have encouraged them to ask questions as they arise throughout their visit. I reviewed our electronic medical record system for any past medical records that were available that may contribute to the patients current condition, the nursing notes and and vital signs from today's visit Nursing notes will be reviewed as they become available in realtime while the pt has been in the ED. Jessica Stringer MD 
 
EKG interpretation 5410: Sinus tachycardia with incomplete right bundle branch block, nl Axis, rate 104; , , QTc 441; possible acute ischemia; interpreted by Jessica Stringer MD 
 
I personally reviewed pt's imaging. Official read by radiology noted above. Jessica Stringer MD 
 
CONSULT NOTE:  
6:41 AM 
Jessica Stringer MD spoke with Dr. Marisa Figueroa, Specialty: Thomas Figueroa due to sepsis. Discussed pt's HPI and available diagnostic results thus far. Expressed concerns for needed admission. Consultant will evaluate for admission. Jessica Stringer MD 
 
 
 
  
 
Critical Care Time: CRITICAL CARE NOTE: 
5:13 AM 
IMPENDING DETERIORATION -Airway, Respiratory, Cardiovascular, CNS, Metabolic and Renal 
 ASSOCIATED RISK FACTORS - Hypotension, Dysrhythmia, Metabolic changes, Dehydration, Vascular Compromise and CNS Decompensation MANAGEMENT- Bedside Assessment and Supervision of Care INTERPRETATION -  Xrays, ECG, Blood Pressure and Cardiac Output Measures INTERVENTIONS - hemodynamic mngmt, vascular control, Neurologic interventions  and Metobolic interventions CASE REVIEW - Hospitalist and Nursing TREATMENT RESPONSE -Improved PERFORMED BY - Self NOTES   : 
I have spent 90 minutes of critical care time involved in lab review, consultations with specialist, family decision- making, bedside attention and documentation excluding time spent on any separately billed procedures. During this entire length of time I was immediately available to the patient . Gabe Mejias MD 
 
 
Diagnosis Clinical Impression: 1. Sepsis with acute organ dysfunction and septic shock, due to unspecified organism, unspecified type ([facility] [address] PLAN: 
1. Admit to hospitalist 
 
 
 
 
This note will not be viewable in [address]

## 2019-10-24 NOTE — PROGRESS NOTES
Pharmacy Automatic Renal Dosing Protocol - Antimicrobials Indication for Antimicrobials: HAP Current Regimen of Each Antimicrobial: 
Cefepime 2g IV q8h x 7 days (Start Date 10/24; Day # 1) Labs: 
Recent Labs 10/24/19 
8366 CREA 2.03* BUN 25* WBC 10.7 Temp (24hrs), Av.3 °F (39.6 °C), Min:99 °F (37.2 °C), Max:105.9 °F (41.1 °C) Creatinine Clearance (mL/min) or Dialysis: 27.2 Impression/Plan:  
Adjust Cefepime dose to 2g IV q12h Antimicrobial stop date 10/31 Pharmacy will follow daily and adjust medications as appropriate for renal function and/or serum levels. Thank you, 
Ronaldo Rahman Recommended duration of therapy 
http://[facility]/[place]/[facility]/Pharmacy/Clinical%20Companion/Duration%20of%20ABX%20therapy. docx Renal Dosing 
http://[facility]/[place]/[facility]/Pharmacy/Clinical%20Companion/Renal%[id number]. pdf

## 2019-10-24 NOTE — ED NOTES
TRANSFER - OUT REPORT: 
 
Verbal report given to Azalia RAMSEY(name) on Maria Fernanda Raymond  being transferred to [facility]) for routine progression of care Report consisted of patients Situation, Background, Assessment and  
Recommendations(SBAR). Information from the following report(s) SBAR, Kardex, ED Summary, Procedure Summary, Intake/Output, MAR, Recent Results, Med Rec Status and Cardiac Rhythm NSR was reviewed with the receiving nurse. Lines:  
Peripheral IV 10/24/19 Left Antecubital (Active) Site Assessment Clean, dry, & intact 10/24/2019  5:10 AM  
Phlebitis Assessment 0 10/24/2019  5:10 AM  
Infiltration Assessment 0 10/24/2019  5:10 AM  
Dressing Status Clean, dry, & intact 10/24/2019  5:10 AM  
Dressing Type Transparent 10/24/2019  5:10 AM  
Hub Color/Line Status Green 10/24/2019  5:10 AM  
Action Taken Blood drawn 10/24/2019  5:10 AM  
Alcohol Cap Used No 10/24/2019  5:10 AM  
   
Peripheral IV 10/24/19 Right Antecubital (Active) Site Assessment Clean, dry, & intact 10/24/2019  5:11 AM  
Phlebitis Assessment 0 10/24/2019  5:11 AM  
Infiltration Assessment 0 10/24/2019  5:11 AM  
Dressing Status Clean, dry, & intact 10/24/2019  5:11 AM  
Dressing Type Transparent 10/24/2019  5:11 AM  
Hub Color/Line Status Green 10/24/2019  5:11 AM  
Action Taken Blood drawn 10/24/2019  5:11 AM  
Alcohol Cap Used No 10/24/2019  5:11 AM  
   
Peripheral IV 10/24/19 Right Hand (Active) Site Assessment Clean, dry, & intact 10/24/2019  5:19 AM  
Phlebitis Assessment 0 10/24/2019  5:19 AM  
Infiltration Assessment 0 10/24/2019  5:19 AM  
Dressing Status Clean, dry, & intact 10/24/2019  5:19 AM  
Dressing Type Transparent 10/24/2019  5:19 AM  
Hub Color/Line Status Pink;Flushed 10/24/2019  5:19 AM  
Alcohol Cap Used No 10/24/2019  5:19 AM  
  
 
Opportunity for questions and clarification was provided. Patient transported with: 
 O2 @ 2 liters Registered Nurse Tech

## 2019-10-24 NOTE — ED TRIAGE NOTES
Assumed care of pt from EMS. Per EMS pt was not feeling well past 2 days and starting today became altered. Pt temp per EMS was 103.5 and  and 86% on RA. Pt alert but disoriented x4. Pt on monitor x3.

## 2019-10-24 NOTE — ED NOTES
Patient cleansed of stool incontinence, patient family now at the bedside. Dr. Marisa Moncada at the bedside to admit Per MD patient normally has a low BP at baseline.

## 2019-10-24 NOTE — ED NOTES
Pt MAP below 65 x2. Pt's original MAP was 65. MD notified. MD ordered fluids to finish then reassess.

## 2019-10-25 NOTE — CDMP QUERY
DR Fercho Mcfadden: 
Pt admitted with Severe Sepsis w ? PNA and Acute Encephalopathy is documented. Please further specify type of Encephalopathy in the medical record: ? Metabolic Encephalopathy ? Septic Encephalopathy ? Toxic Encephalopathy 
? Encephalopathy due to medications or drugs (please specify) ? Toxic Metabolic Encephalopathy 
? Other Encephalopathy 
? Other, please specify ? Clinically unable to determine The medical record reflects the following: 
   Risk Factors: advanced age, high fevers, sepsis, KEVAN, hypotension Clinical Indicators: altered mental status due to high fevers of 105 rectally, confused, disoriented x4, agitated, repeatedly pulling off his gown, his EKG leads, and pulling on his IV line. Treatment: IVF bolus, IV abx, initiation of restoril, supplemental O2, frequent reorientation. Thank you,  
Mac Schuster, [address] [facility], [address] [place] [address], [address] [place] [address]  
[id number]

## 2019-10-25 NOTE — PROGRESS NOTES
Bedside shift change report given to Margarita Lynne (oncoming nurse) by Perla Cervantes (offgoing nurse). Report included the following information SBAR, Kardex, Intake/Output, MAR, Recent Results and Cardiac Rhythm NSR. 0630  Patient was very agitated overnight, repeatedly pulling off his gown, his EKG leads, and pulling on his IV line. His nightly restoril was not ordered. Bedside shift change report given to Azalia (oncoming nurse) by Margarita Lynne (offgoing nurse). Report included the following information SBAR, Kardex, Intake/Output, MAR, Recent Results and Cardiac Rhythm NSR.

## 2019-10-25 NOTE — PROGRESS NOTES
PROGRESS NOTE 
 
NAME:  Benigno Orellana :   3/13/1931 MRN:   [id number] Date/Time:  10/25/2019 5:41 AM 
Subjective:  
History:  Chart reviewed and patient seen and examined this AM and D/W his nurse and his daughter this AM and all events noted. He has had 2 recent hospital admissions the first 1 of which was related to acute coronary syndrome and he had a stent placed in his left main extending into his LAD with a side-port to circumflex. After that hospitalization he developed DVT of his left lower extremity despite being on aspirin and Plavix and thus Xarelto was started and unfortunately he was readmitted to the hospital with an acute GI bleed. During that hospitalization he had a inferior vena cava filter placed and Xarelto was discontinued and he had an EGD and colonoscopy without an obvious source for the bleed. He was discharged to rehab and seemed to be doing well until yesterday. He was admitted yesterday with Sepsis with Temp to 105.7 and CXR read as bilateral LL infiltrate although no cough and not requiring oxygen so doubt he has true Pneumonia. UA doesn't appear infected. He feels poorly this AM because he can't breathe through his nose and he didn't sleep as he did not receive hisa Restoril. He has no other cardiac or respiratory c/o. He has no GI/ c/o. He has no neurologic c/o. He has no other c/o on complete ROS. Medications reviewed: 
Current Facility-Administered Medications Medication Dose Route Frequency  sodium chloride (NS) flush 5-10 mL  5-10 mL IntraVENous PRN  
 albuterol (PROVENTIL HFA, VENTOLIN HFA, PROAIR HFA) inhaler 2 Puff  2 Puff Inhalation Q4H PRN  
 amiodarone (CORDARONE) tablet 200 mg  200 mg Oral DAILY  aspirin chewable tablet 81 mg  81 mg Oral DAILY  clopidogrel (PLAVIX) tablet 75 mg  75 mg Oral DAILY  pantoprazole (PROTONIX) tablet 40 mg  40 mg Oral ACB  levothyroxine (SYNTHROID) tablet 137 mcg  137 mcg Oral ACB  midodrine (PROAMITINE) tablet 10 mg  10 mg Oral TID WITH MEALS  
 0.9% sodium chloride infusion  125 mL/hr IntraVENous CONTINUOUS  
 sodium chloride (NS) flush 5-40 mL  5-40 mL IntraVENous Q8H  
 sodium chloride (NS) flush 5-40 mL  5-40 mL IntraVENous PRN  
 heparin (porcine) injection 5,000 Units  5,000 Units SubCUTAneous Q8H  
 vancomycin (VANCOCIN) 1,000 mg in 0.9% sodium chloride (MBP/ADV) 250 mL  1,000 mg IntraVENous Q24H  cefepime (MAXIPIME) 2 g in 0.9% sodium chloride (MBP/ADV) 100 mL  2 g IntraVENous Q12H Objective:  
Vitals: 
Visit Vitals /62 (BP 1 Location: Left arm, BP Patient Position: At rest) Pulse 77 Temp 97.7 °F (36.5 °C) Resp 19 Ht 5' 9\" (1.753 m) Wt 188 lb 0.8 oz (85.3 kg) SpO2 98% BMI 27.77 kg/m² O2 Flow Rate (L/min): 2 l/min O2 Device: Nasal cannula Temp (24hrs), Av.8 °F (37.1 °C), Min:97.7 °F (36.5 °C), Max:102.7 °F (39.3 °C) Last 24hr Input/Output: 
 
Intake/Output Summary (Last 24 hours) at 10/25/2019 0541 Last data filed at 10/24/2019 2247 Gross per 24 hour Intake 3794.42 ml Output 225 ml Net 3569.42 ml PHYSICAL EXAM: 
General:     Alert, cooperative, no distress, appears stated age. Head:    Normocephalic, without obvious abnormality, atraumatic. Eyes:    Conjunctivae/corneas clear. PERRLA Nose:   Nares normal. No drainage or sinus tenderness. Throat:     Lips, mucosa, and tongue normal.  No Thrush Neck:   Supple, symmetrical,  no adenopathy, thyroid: non tender 
   no carotid bruit and no JVD. Back:     Symmetric,  No CVA tenderness. Lungs:    Clear to auscultation bilaterally. No Wheezing or Rhonchi. No rales. Heart:    Regular rate and rhythm,  no murmur, rub or gallop. Abdomen:    Soft, non-tender. Not distended. Bowel sounds normal. No masses Extremities:  Extremities normal, atraumatic, No cyanosis. No edema. No clubbing Lymph nodes:  Cervical, supraclavicular normal. 
 Neurologic:  Normal strength, Alert and oriented X 3. Skin:                 No rash and no obvious source of infection. Lab Data Reviewed: 
 
Recent Results (from the past 24 hour(s)) LACTIC ACID Collection Time: 10/24/19  7:32 AM  
Result Value Ref Range Lactic acid 2.0 0.4 - 2.0 MMOL/L  
LACTIC ACID Collection Time: 10/24/19  7:16 PM  
Result Value Ref Range Lactic acid 1.5 0.4 - 2.0 MMOL/L  
CBC W/O DIFF Collection Time: 10/25/19  3:46 AM  
Result Value Ref Range WBC 12.6 (H) 4.1 - 11.1 K/uL  
 RBC 2.72 (L) 4.10 - 5.70 M/uL HGB 7.8 (L) 12.1 - 17.0 g/dL HCT 25.4 (L) 36.6 - 50.3 % MCV 93.4 80.0 - 99.0 FL  
 MCH 28.7 26.0 - 34.0 PG  
 MCHC 30.7 30.0 - 36.5 g/dL  
 RDW 17.9 (H) 11.5 - 14.5 % PLATELET 668 377 - 201 K/uL MPV 11.1 8.9 - 12.9 FL  
 NRBC 0.0 0  WBC ABSOLUTE NRBC 0.00 0.00 - 0.01 K/uL METABOLIC PANEL, BASIC Collection Time: 10/25/19  3:46 AM  
Result Value Ref Range Sodium 140 136 - 145 mmol/L Potassium 3.7 3.5 - 5.1 mmol/L Chloride 109 (H) 97 - 108 mmol/L  
 CO2 20 (L) 21 - 32 mmol/L Anion gap 11 5 - 15 mmol/L Glucose 94 65 - 100 mg/dL BUN 29 (H) 6 - 20 MG/DL Creatinine 1.54 (H) 0.70 - 1.30 MG/DL  
 BUN/Creatinine ratio 19 12 - 20 GFR est AA 52 (L) >60 ml/min/1.73m2 GFR est non-AA 43 (L) >60 ml/min/1.73m2 Calcium 8.5 8.5 - 10.1 MG/DL Assessment/Plan:  
 
Principal Problem: 
  Severe sepsis ([address] (10/24/2019) Active Problems: 
  Glucose intolerance (impaired glucose tolerance) (8/21/2017) COPD (chronic obstructive pulmonary disease) ([facility] (8/21/2017) Stage 3 chronic kidney disease ([facility] (8/21/2017) Atrial fibrillation ([facility] (8/21/2017) ASCVD (arteriosclerotic cardiovascular disease) (8/21/2017) Overview: Catheterization 6/11/2004 80% stenosis nondominant RCA and 30% stenosis of  
    LAD with a notation of some left main disease but no percentage given.   No  
 mention of angioplasty or stent made Anemia (8/13/2019) Nonrheumatic aortic valve stenosis (8/27/2019) Overview: Mild to moderate by echocardiogram 8/27/2019 Fever (10/24/2019) Bilateral pneumonia (10/24/2019) KEVAN (acute kidney injury) ([facility] (10/24/2019) Acute encephalopathy (10/24/2019) Chronic orthostatic hypotension (10/24/2019) 
 
  
___________________________________________________ PLAN: 
 
1. Continue Maxipime and Vancomycin for Sepsis 2. Supplemental oxygen for hypoxemia due to ? ? Pneumonia 3. Follow Hgb (9.0 on admission) with history of significant anemia, now 7.8 4. Continue Plavix with recent cardiac stents 5. Continue Midodrine for orthostatic hypotension 6. Amiodarone for PAF 7. Follow renal function with ARF on CKD, Creat 1.54 today 8. Continue Protonix 9. Repeat CXR 10. Flonase nasal spray 11. Mobilize with PT 
12. Resume HS Restoril 45 minutes spent in direct care of this high complexity patient today with extremely high risk of deterioration 
 
 
___________________________________________________ Attending Physician: Olegario Rod MD

## 2019-10-26 NOTE — PROGRESS NOTES
1900: The bedside shift change report given to Clementina  (oncoming nurse) by Ye Dubose  (offgoing nurse). Report included the following information SBAR, Kardex, Intake/Output, MAR, Recent Results, Cardiac Rhythm NSR and Alarm Parameters . 11:03 PM 
The patient refused to take his Restoril, agitated, yelling and trying to get out of bed. Paged Dr Amarilys Jimenez and ATIVan 1mg IV ordered and administered and 2mg Haldol Q6. The patient is calming now after ATIVAN dose. Now getting less restless and going to sleep. Bed alarm on.

## 2019-10-26 NOTE — PROGRESS NOTES
Continued confusion, agitation and beginning combative, hitting and kicking without contact, pulling lines daughter at bedside Dr ayala at this time

## 2019-10-26 NOTE — PROGRESS NOTES
INTERNAL MEDICINE ATTENDING NOTE 
 
S: Mr. Bekah Dickey is a patient of Sonia Kothari MD and  was seen by me today during rounds. At this time, he is asleep. He has been agitated and combative, requiring sedatives and antipsychotics; no history of dementia at baseline. ROS not obtainable today. O: Blood pressure 152/83, pulse 76, temperature 98 °F (36.7 °C), resp. rate 25, height 5' 9\" (1.753 m), weight 195 lb 12.8 oz (88.8 kg), SpO2 97 %. Gen: Patient is in no acute distress. Lungs: CTAB. Heart: RRR. Abd: S, NT, ND, BS present. Extremities: Warm. Recent Results (from the past 12 hour(s)) METABOLIC PANEL, BASIC Collection Time: 10/26/19  4:18 AM  
Result Value Ref Range Sodium 139 136 - 145 mmol/L Potassium 3.7 3.5 - 5.1 mmol/L Chloride 111 (H) 97 - 108 mmol/L  
 CO2 20 (L) 21 - 32 mmol/L Anion gap 8 5 - 15 mmol/L Glucose 89 65 - 100 mg/dL BUN 25 (H) 6 - 20 MG/DL Creatinine 1.32 (H) 0.70 - 1.30 MG/DL  
 BUN/Creatinine ratio 19 12 - 20 GFR est AA >60 >60 ml/min/1.73m2 GFR est non-AA 51 (L) >60 ml/min/1.73m2 Calcium 8.7 8.5 - 10.1 MG/DL  
CBC WITH AUTOMATED DIFF Collection Time: 10/26/19  4:18 AM  
Result Value Ref Range WBC 10.1 4.1 - 11.1 K/uL  
 RBC 2.63 (L) 4.10 - 5.70 M/uL HGB 7.4 (L) 12.1 - 17.0 g/dL HCT 24.7 (L) 36.6 - 50.3 % MCV 93.9 80.0 - 99.0 FL  
 MCH 28.1 26.0 - 34.0 PG  
 MCHC 30.0 30.0 - 36.5 g/dL  
 RDW 17.6 (H) 11.5 - 14.5 % PLATELET 410 074 - 844 K/uL MPV 11.3 8.9 - 12.9 FL  
 NRBC 0.0 0  WBC ABSOLUTE NRBC 0.00 0.00 - 0.01 K/uL NEUTROPHILS 82 (H) 32 - 75 % LYMPHOCYTES 8 (L) 12 - 49 % MONOCYTES 9 5 - 13 % EOSINOPHILS 0 0 - 7 % BASOPHILS 0 0 - 1 % IMMATURE GRANULOCYTES 1 (H) 0.0 - 0.5 % ABS. NEUTROPHILS 8.3 (H) 1.8 - 8.0 K/UL  
 ABS. LYMPHOCYTES 0.8 0.8 - 3.5 K/UL  
 ABS. MONOCYTES 0.9 0.0 - 1.0 K/UL  
 ABS. EOSINOPHILS 0.0 0.0 - 0.4 K/UL  
 ABS. BASOPHILS 0.0 0.0 - 0.1 K/UL ABS. IMM. GRANS. 0.1 (H) 0.00 - 0.04 K/UL  
 DF AUTOMATED A / P:  
1. Severe sepsis ([facility] (10/24/2019): Supportive care, fluids. 2. Bilateral pneumonia (10/24/2019): On Vanc and cefepime. 3. KEVAN (acute kidney injury) ([facility] (10/24/2019) on Stage 3 chronic [facility] disease [facility] (8/21/2017) 4. Acute encephalopathy (10/24/2019):  
5. COPD (chronic obstructive pulmonary disease) ([facility] (8/21/2017) 6. Atrial fibrillation ([facility] (8/21/2017): On amiodarone. 7. ASCVD (arteriosclerotic cardiovascular disease) (8/21/2017) Overview: Catheterization 6/11/2004 80% stenosis nondominant RCA and 30% stenosis of LAD with a notation of some left main disease but no percentage given. No mention of angioplasty or stent made 8. Anemia (8/13/2019): Watch lab; transfuse if <7. 
9. Nonrheumatic aortic valve stenosis (8/27/2019) Overview: Mild to moderate by echocardiogram 8/27/2019 
10. Chronic orthostatic hypotension (10/24/2019) 11. Glucose intolerance (impaired glucose tolerance) (8/21/2017) Tiana Segal MD, [address] Best contact is via Pager: [phone], or via hospital  at [phone]

## 2019-10-26 NOTE — PROGRESS NOTES
Pharmacy Automatic Renal Dosing Protocol - Antimicrobials Indication for Antimicrobials: HAP Current Regimen of Each Antimicrobial: 
Vancomycin 1 g IVq 24 hours (Start Date 10/24; Day # 3) Cefepime 2g IV q12h x 7 days (Start Date 10/24; Day # 3) Previous Antimicrobial Therapy: 
None Micro:  
10/24 Blood cx - 4 of 4 bottles - CoN staph - pending Vancomycin Trough Goal Level:  15-20 (AUC: 400 - 600 mg/hr/Liter/day) Date Dose & Interval Measured (mcg/mL) Extrapolated (mcg/mL)  
10/26 12:41 1000mg IV q24h 10.3 10.3 Labs: 
Recent Labs 10/26/19 
6835 10/25/19 
3249 10/24/19 
8153 CREA 1.32* 1.54* 2.03* BUN 25* 29* 25* WBC 10.1 12.6* 10.7 Temp (24hrs), Av.1 °F (36.7 °C), Min:97.9 °F (36.6 °C), Max:98.4 °F (36.9 °C) Creatinine Clearance (mL/min) or Dialysis:  
Estimated Creatinine Clearance: 42.6 mL/min (A) (based on SCr of 1.32 mg/dL (H)). Estimated Creatinine Clearance (using IBW):38.7 mL/min Impression/Plan:  
SCr improving (baseline 1.3 with history of previous SCr elevations), Continue Cefepime regimen Adjusted Vancomycin to 1000mg IV q18h for a projected trough of 15.3 at Css 
BMP and CBC already ordered daily Antimicrobial stop date:  after Vancomycin 10/30am dose, Cefepime 10/31 am  
 
Pharmacy will follow daily and adjust medications as appropriate for renal function and/or serum levels.  
 
Thank you, 
Rolo Head, [facility]

## 2019-10-27 NOTE — PROGRESS NOTES
Cooperative with assessment this am,   
 
0840 pt eat 80% breakfast stating \"I could eat another bowl of rice Chex cereal\"

## 2019-10-27 NOTE — PROGRESS NOTES
INTERNAL MEDICINE ATTENDING NOTE 
 
S: Mr. Tiffani Perkins is a patient of Meenu Pena MD and  was seen by me today during rounds. Per nurses, he has been much improved since yesterday; less agitated and combative. He has no history of dementia at baseline. ROS not obtainable today. O: Blood pressure 123/53, pulse 63, temperature 98.2 °F (36.8 °C), resp. rate 9, height 5' 9\" (1.753 m), weight 199 lb 9.6 oz (90.5 kg), SpO2 99 %. Gen: Patient is in no acute distress. Lungs: CTAB. Heart: RRR. Abd: S, NT, ND, BS present. Extremities: Warm. Recent Results (from the past 12 hour(s)) METABOLIC PANEL, BASIC Collection Time: 10/27/19  3:32 AM  
Result Value Ref Range Sodium 141 136 - 145 mmol/L Potassium 3.8 3.5 - 5.1 mmol/L Chloride 112 (H) 97 - 108 mmol/L  
 CO2 21 21 - 32 mmol/L Anion gap 8 5 - 15 mmol/L Glucose 77 65 - 100 mg/dL BUN 21 (H) 6 - 20 MG/DL Creatinine 1.23 0.70 - 1.30 MG/DL  
 BUN/Creatinine ratio 17 12 - 20 GFR est AA >60 >60 ml/min/1.73m2 GFR est non-AA 56 (L) >60 ml/min/1.73m2 Calcium 9.0 8.5 - 10.1 MG/DL  
CBC WITH AUTOMATED DIFF Collection Time: 10/27/19  3:32 AM  
Result Value Ref Range WBC 11.3 (H) 4.1 - 11.1 K/uL  
 RBC 3.18 (L) 4.10 - 5.70 M/uL HGB 9.0 (L) 12.1 - 17.0 g/dL HCT 29.9 (L) 36.6 - 50.3 % MCV 94.0 80.0 - 99.0 FL  
 MCH 28.3 26.0 - 34.0 PG  
 MCHC 30.1 30.0 - 36.5 g/dL  
 RDW 17.8 (H) 11.5 - 14.5 % PLATELET 107 061 - 293 K/uL MPV 10.7 8.9 - 12.9 FL  
 NRBC 0.0 0  WBC ABSOLUTE NRBC 0.00 0.00 - 0.01 K/uL NEUTROPHILS 83 (H) 32 - 75 % LYMPHOCYTES 6 (L) 12 - 49 % MONOCYTES 9 5 - 13 % EOSINOPHILS 1 0 - 7 % BASOPHILS 0 0 - 1 % IMMATURE GRANULOCYTES 1 (H) 0.0 - 0.5 % ABS. NEUTROPHILS 9.4 (H) 1.8 - 8.0 K/UL  
 ABS. LYMPHOCYTES 0.7 (L) 0.8 - 3.5 K/UL  
 ABS. MONOCYTES 1.0 0.0 - 1.0 K/UL  
 ABS. EOSINOPHILS 0.1 0.0 - 0.4 K/UL  
 ABS. BASOPHILS 0.0 0.0 - 0.1 K/UL ABS. IMM. GRANS. 0.1 (H) 0.00 - 0.04 K/UL  
 DF SMEAR SCANNED    
 RBC COMMENTS OVALOCYTES 1+ RBC COMMENTS ANISOCYTOSIS 
1+ A / P:  
1. Severe sepsis ([facility] (10/24/2019): Supportive care, fluids. 2. Bilateral pneumonia (10/24/2019): On Vanc and cefepime. 3. KEVAN (acute kidney injury) ([facility] (10/24/2019) on Stage 3 chronic kidney disease ([facility] (8/21/2017) 4. Acute encephalopathy (08/75/6056): Metabolic, due to sepsis, pneumonia. 5. COPD (chronic obstructive pulmonary disease) ([facility] (8/21/2017): Controlled at this time. 6. Atrial fibrillation ([facility] (8/21/2017): On amiodarone. 7. ASCVD (arteriosclerotic cardiovascular disease) (8/21/2017) Overview: Catheterization 6/11/2004 80% stenosis nondominant RCA and 30% stenosis of LAD with a notation of some left main disease but no percentage given. No mention of angioplasty or stent made 8. Anemia (8/13/2019): Watch lab; transfuse if <7. 
9. Nonrheumatic aortic valve stenosis (8/27/2019) Overview: Mild to moderate by echocardiogram 8/27/2019 
10. Chronic orthostatic hypotension (10/24/2019) 11. Glucose intolerance (impaired glucose tolerance) (8/21/2017) Edwin Smith MD, [address] Best contact is via Pager: [phone], or via hospital  at [phone]

## 2019-10-28 NOTE — PROGRESS NOTES
0708 Bedside shift change report given to Nate (oncoming nurse) by Fredy Acosta (offgoing nurse). Report included the following information SBAR, Kardex, MAR and Cardiac Rhythm NSR. Kenney Angry 0820 pt with increased agitation, pulling at IV site and stating \" I need to get it off\" unable to redirect. Prn ativan given. 1130 pt assisted up to chair with staff assist x2 and use of walker, requires cueing and max assist with movement. 1636 pt with increased agitation pulling at blankets and removing sheet and gown. Asked it he was hot in which he stated he was cold. Incontinence care provided, fluids encouraged and small amount accepted, repositioned. No interventions effective in calming patient. Prn ativan given. 1705 pt continues with restlessness and agitation, cursing 1843 pt placed in recliner as he continued to throw his legs over the side rail. Offered dinner in which he refused. 1900 Bedside shift change report given to Gio Quan (oncoming nurse) by Nate (offgoing nurse). Report included the following information SBAR, Kardex, Med Rec Status and Cardiac Rhythm NSR.

## 2019-10-28 NOTE — PROGRESS NOTES
Orders received, chart reviewed. Attempted to see pt this AM however pt not appropriate for participation in PT evaluation secondary to confusion, agitation, and lack of command following. Pt maintained eyes in closed position throughout despite max cueing. Will follow back tomorrow for PT evaluation. Thank you Faviola Velasquez, PT, DPT

## 2019-10-28 NOTE — PROGRESS NOTES
PROGRESS NOTE 
 
NAME:  Roxanna Nguyen :   3/13/1931 MRN:   [id number] Date/Time:  10/28/2019 7:03 AM 
Subjective:  
History:  Chart reviewed and patient seen and examined this AM and D/W his nurse  this AM and all events noted. He has had 2 recent hospital admissions the first one of which was related to acute coronary syndrome and he had a stent placed in his left main extending into his LAD with a side-port to circumflex. After that hospitalization he developed DVT of his left lower extremity despite being on aspirin and Plavix and thus Xarelto was started and unfortunately he was readmitted to the hospital with an acute GI bleed. During that hospitalization he had a inferior vena cava filter placed and Xarelto was discontinued and he had an EGD and colonoscopy without an obvious source for the bleed. He was discharged to rehab and seemed to be doing well until 10/24 when he was admitted with Sepsis with Temp to 105.7 and CXR read as bilateral LL infiltrate although no cough and not requiring oxygen so doubt he has true Pneumonia (CXR 10/25 w/o definite pneumonia). UA doesn't appear infected. Blood cultures + Staph. Lugdenensis c/w skin ans soft tissue source. He has been intermittently agitated and confused since admission and requiring PRN Ativan and thus sedated this AM and unable to give a history today. History obtained from his nurse this AM. He has had no other cardiac or respiratory c/o. He has no GI/ c/o. He has no neurologic c/o. He has no other c/o on complete ROS. Medications reviewed: 
Current Facility-Administered Medications Medication Dose Route Frequency  vancomycin (VANCOCIN) 1,000 mg in 0.9% sodium chloride (MBP/ADV) 250 mL  1,000 mg IntraVENous Q18H  
 fluticasone propionate (FLONASE) 50 mcg/actuation nasal spray 2 Spray  2 Spray Both Nostrils DAILY  temazepam (RESTORIL) capsule 30 mg  30 mg Oral QHS PRN  
  guaiFENesin ER (MUCINEX) tablet 600 mg  600 mg Oral Q12H  
 LORazepam (ATIVAN) injection 1 mg  1 mg IntraVENous Q6H PRN  
 haloperidol lactate (HALDOL) injection 2 mg  2 mg IntraVENous Q6H PRN  
 sodium chloride (NS) flush 5-10 mL  5-10 mL IntraVENous PRN  
 albuterol (PROVENTIL HFA, VENTOLIN HFA, PROAIR HFA) inhaler 2 Puff  2 Puff Inhalation Q4H PRN  
 amiodarone (CORDARONE) tablet 200 mg  200 mg Oral DAILY  aspirin chewable tablet 81 mg  81 mg Oral DAILY  clopidogrel (PLAVIX) tablet 75 mg  75 mg Oral DAILY  pantoprazole (PROTONIX) tablet 40 mg  40 mg Oral ACB  levothyroxine (SYNTHROID) tablet 137 mcg  137 mcg Oral ACB  midodrine (PROAMITINE) tablet 10 mg  10 mg Oral TID WITH MEALS  
 0.9% sodium chloride infusion  75 mL/hr IntraVENous CONTINUOUS  
 sodium chloride (NS) flush 5-40 mL  5-40 mL IntraVENous Q8H  
 sodium chloride (NS) flush 5-40 mL  5-40 mL IntraVENous PRN  
 heparin (porcine) injection 5,000 Units  5,000 Units SubCUTAneous Q8H  
 cefepime (MAXIPIME) 2 g in 0.9% sodium chloride (MBP/ADV) 100 mL  2 g IntraVENous Q12H Objective:  
Vitals: 
Visit Vitals /70 (BP 1 Location: Left arm, BP Patient Position: At rest) Pulse 78 Temp 98.2 °F (36.8 °C) Resp 21 Ht 5' 9\" (1.753 m) Wt 203 lb 11.3 oz (92.4 kg) SpO2 100% BMI 30.08 kg/m² O2 Flow Rate (L/min): 2 l/min O2 Device: Nasal cannula Temp (24hrs), Av.1 °F (36.7 °C), Min:97.7 °F (36.5 °C), Max:98.5 °F (36.9 °C) Last 24hr Input/Output: 
 
Intake/Output Summary (Last 24 hours) at 10/28/2019 0703 Last data filed at 10/28/2019 8948 Gross per 24 hour Intake 480 ml Output 650 ml Net -170 ml PHYSICAL EXAM: 
General:     Somnolent this AM, cooperative, no distress, appears stated age. Head:    Normocephalic, without obvious abnormality, atraumatic. Eyes:    Conjunctivae/corneas clear. PERRLA Nose:   Nares normal. No drainage or sinus tenderness. Throat:     Lips, mucosa, and tongue normal.  No Thrush Neck:   Supple, symmetrical,  no adenopathy, thyroid: non tender 
   no carotid bruit and no JVD. Back:     Symmetric,  No CVA tenderness. Lungs:    Clear to auscultation bilaterally. No Wheezing or Rhonchi. No rales. Heart:    Regular rate and rhythm,  no murmur, rub or gallop. Abdomen:    Soft, non-tender. Not distended. Bowel sounds normal. No masses Extremities:  Extremities normal, atraumatic, No cyanosis. No edema. No clubbing Lymph nodes:  Cervical, supraclavicular normal. 
Neurologic:  Normal strength, Alert and oriented X 3. Skin:                 No rash and no obvious source of infection. Lab Data Reviewed: 
 
Recent Results (from the past 24 hour(s)) METABOLIC PANEL, BASIC Collection Time: 10/28/19  2:43 AM  
Result Value Ref Range Sodium 142 136 - 145 mmol/L Potassium 3.7 3.5 - 5.1 mmol/L Chloride 113 (H) 97 - 108 mmol/L  
 CO2 21 21 - 32 mmol/L Anion gap 8 5 - 15 mmol/L Glucose 109 (H) 65 - 100 mg/dL BUN 19 6 - 20 MG/DL Creatinine 1.21 0.70 - 1.30 MG/DL  
 BUN/Creatinine ratio 16 12 - 20 GFR est AA >60 >60 ml/min/1.73m2 GFR est non-AA 57 (L) >60 ml/min/1.73m2 Calcium 8.8 8.5 - 10.1 MG/DL  
CBC WITH AUTOMATED DIFF Collection Time: 10/28/19  2:43 AM  
Result Value Ref Range WBC 10.2 4.1 - 11.1 K/uL  
 RBC 3.13 (L) 4.10 - 5.70 M/uL HGB 8.7 (L) 12.1 - 17.0 g/dL HCT 29.0 (L) 36.6 - 50.3 % MCV 92.7 80.0 - 99.0 FL  
 MCH 27.8 26.0 - 34.0 PG  
 MCHC 30.0 30.0 - 36.5 g/dL  
 RDW 17.7 (H) 11.5 - 14.5 % PLATELET 143 (L) 915 - 400 K/uL MPV 11.0 8.9 - 12.9 FL  
 NRBC 0.0 0  WBC ABSOLUTE NRBC 0.00 0.00 - 0.01 K/uL NEUTROPHILS 79 (H) 32 - 75 % LYMPHOCYTES 8 (L) 12 - 49 % MONOCYTES 10 5 - 13 % EOSINOPHILS 2 0 - 7 % BASOPHILS 0 0 - 1 % IMMATURE GRANULOCYTES 1 (H) 0.0 - 0.5 % ABS. NEUTROPHILS 8.1 (H) 1.8 - 8.0 K/UL  
 ABS. LYMPHOCYTES 0.8 0.8 - 3.5 K/UL ABS. MONOCYTES 1.0 0.0 - 1.0 K/UL  
 ABS. EOSINOPHILS 0.2 0.0 - 0.4 K/UL  
 ABS. BASOPHILS 0.0 0.0 - 0.1 K/UL  
 ABS. IMM. GRANS. 0.1 (H) 0.00 - 0.04 K/UL  
 DF AUTOMATED Assessment/Plan:  
 
Principal Problem: 
  Severe sepsis ([facility] (10/24/2019) Active Problems: 
  Glucose intolerance (impaired glucose tolerance) (8/21/2017) COPD (chronic obstructive pulmonary disease) ([facility] (8/21/2017) Stage 3 chronic kidney disease ([facility] (8/21/2017) Atrial fibrillation ([facility] (8/21/2017) ASCVD (arteriosclerotic cardiovascular disease) (8/21/2017) Overview: Catheterization 6/11/2004 80% stenosis nondominant RCA and 30% stenosis of  
    LAD with a notation of some left main disease but no percentage given. No  
    mention of angioplasty or stent made Anemia (8/13/2019) Nonrheumatic aortic valve stenosis (8/27/2019) Overview: Mild to moderate by echocardiogram 8/27/2019 Fever (10/24/2019) Bilateral pneumonia (10/24/2019) KEVAN (acute kidney injury) ([facility] (10/24/2019) Acute encephalopathy (10/24/2019) Chronic orthostatic hypotension (10/24/2019) 
 
  
___________________________________________________ PLAN: 
 
1. Discontinue Maxipime and continue Vancomycin for Sepsis (S. Lugdunensis) 2. Supplemental oxygen for hypoxemia due to COPD, does not have Pneumonia 3. Follow Hgb (9.0 on admission) with history of significant anemia, now 8.7 4. Continue Plavix with recent cardiac stents 5. Continue Midodrine for orthostatic hypotension 6. Amiodarone for PAF 7. Follow renal function with ARF on CKD, Creat 1.21 today 8. Continue Protonix 9. Repeat CXR, no Pneumonia 10. Continue Flonase nasal spray 11. Mobilize with PT 
12. Resume HS Restoril 13. D/C IV fluids 14. Ativan or Haldol PRN agitation 15. Echocardiogram to R/O valvular vegetation  
 
 
 high complexity patient today with extremely high risk of deterioration ___________________________________________________ Attending Physician: Tiburcio Paredes MD

## 2019-10-29 NOTE — PROGRESS NOTES
Problem: Mobility Impaired (Adult and Pediatric) Goal: *Acute Goals and Plan of Care (Insert Text) Description FUNCTIONAL STATUS PRIOR TO ADMISSION: Pt currently at SNF, participating with PT/OT following hospitalization at [facility]. Ambulating with RW support. HOME SUPPORT PRIOR TO ADMISSION: Lived with son previously Physical Therapy Goals Initiated 10/29/2019 1. Patient will move from supine to sit and sit to supine , scoot up and down and roll side to side in bed with moderate assistance  within 7 day(s). 2.  Patient will transfer from bed to chair and chair to bed with moderate assistance  using the least restrictive device within 7 day(s). 3.  Patient will perform sit to stand with moderate assistance  within 7 day(s). 4.  Patient will ambulate with moderate assistance  for 20 feet with the least restrictive device within 7 day(s). Outcome: Progressing Towards Goal 
 PHYSICAL THERAPY EVALUATION Patient: Desmond Nolasco (34 y.o. male) Date: 10/29/2019 Primary Diagnosis: Severe sepsis ([facility] [A41.9, R65.20] Bilateral pneumonia [J18.9] Fever [R50.9] KEVAN (acute kidney injury) ([facility] [N17.9] Precautions:    
 
 
ASSESSMENT Based on the objective data described below, the patient presents with increased drowsiness/lethargy, grossly decreased strength, delayed motor planning, poor standing balance, and overall significant impairments in functional mobility. Pt more alert this date however overall extremely lethargic/drowsy, requiring MAX verbal cueing/manual cues for arousal. Sit>>stand transfer attempted x3 from bedside chair w/ max-totalAx2. Pt unable to achieve extension of b/l knees, hips and trunk necessary to achieve fully upright standing position. All VSS throughout. Unsafe to attempt ambulation this date. Current Level of Function Impacting Discharge (mobility/balance): max-totalAx2 for sit>>stand transfer attempts Functional Outcome Measure: The patient scored 0/100 on the Barthel Index outcome measure which is indicative of significant impairment in ADLs and functional mobility. Other factors to consider for discharge: currently at [facility] Patient will benefit from skilled therapy intervention to address the above noted impairments. PLAN : 
Recommendations and Planned Interventions: bed mobility training, transfer training, gait training, therapeutic exercises, patient and family training/education and therapeutic activities Frequency/Duration: Patient will be followed by physical therapy:  4 times a week to address goals. Recommendation for discharge: (in order for the patient to meet his/her long term goals) Therapy up to 5 days/week in SNF setting This discharge recommendation: 
Has been made in collaboration with the attending provider and/or case management IF patient discharges home will need the following DME: to be determined (TBD) SUBJECTIVE:  
Patient stated Yeah. OBJECTIVE DATA SUMMARY:  
HISTORY:   
Past Medical History:  
Diagnosis Date Arrhythmia A-fib GERD (gastroesophageal reflux disease) Hypertension Other ill-defined conditions(799.89) BPH Other ill-defined conditions(799.89) lipids Other ill-defined conditions(799.89)   
 shingles hx Thyroid disease Unspecified adverse effect of anesthesia Past Surgical History:  
Procedure Laterality Date COLONOSCOPY N/A 9/30/2019 COLONOSCOPY performed by Leon Lopez MD at hospitals ENDOSCOPY  
 HX CATARACT REMOVAL    
 HX HEART CATHETERIZATION    
 ablation HX HEART CATHETERIZATION  07/18/2019 Stent HX HEENT    
 scar tissue removed/laser HX HERNIA REPAIR  05/05/2017 Jez Womack MD  
 HX ORTHOPAEDIC    
 left foot fx--hardware HX ORTHOPAEDIC  4/9/14 Right total knee arthroplasty  HX OTHER SURGICAL    
 prostate bx  
 
 
 Personal factors and/or comorbidities impacting plan of care:  
 
Home Situation Home Environment: Rehabilitation facility # Steps to Enter: 0 One/Two Story Residence: One story Living Alone: No 
Support Systems: Skilled nursing facility Patient Expects to be Discharged to[de-identified] Rehabilitation facility Current DME Used/Available at Home: None EXAMINATION/PRESENTATION/DECISION MAKING:  
Critical Behavior: 
Neurologic State: Alert, Confused Orientation Level: Oriented to person, Disoriented to situation, Disoriented to place, Disoriented to time Cognition: Decreased attention/concentration, Decreased command following, Impaired decision making, Memory loss, Poor safety awareness Hearing: Auditory Auditory Impairment: Hard of hearing, bilateral 
Skin:  intact Edema: none noted Range Of Motion: 
AROM: Grossly decreased, non-functional 
  
  
  
  
  
  
  
Strength:   
Strength: Grossly decreased, non-functional 
  
  
  
  
  
  
Tone & Sensation:  
Tone: Normal 
  
  
  
  
  
  
  
  
   
Coordination: 
Coordination: Grossly decreased, non-functional 
Vision:  
  
Functional Mobility: 
Bed Mobility: 
Rolling: Other (comment)(seated in bedside chair upon arrival ) Transfers: 
Sit to Stand: Maximum assistance; Total assistance;Assist x2 Stand to Sit: Maximum assistance; Total assistance;Assist x2 Balance:  
Sitting: Impaired Sitting - Static: Fair (occasional) Sitting - Dynamic: Fair (occasional) Standing: Impaired; With support Standing - Static: Poor;Constant support Ambulation/Gait Training: 
  
  
  
  
  
  
  
  
  
  
  
  
  
  
  
Ambulation did not occur. Functional Measure: 
Barthel Index: 
 
Bathin Bladder: 0 Bowels: 0 Groomin Dressin Feedin Mobility: 0 Stairs: 0 Toilet Use: 0 Transfer (Bed to Chair and Back): 0 Total: 0/100 The Barthel ADL Index: Guidelines 1. The index should be used as a record of what a patient does, not as a record of what a patient could do. 2. The main aim is to establish degree of independence from any help, physical or verbal, however minor and for whatever reason. 3. The need for supervision renders the patient not independent. 4. A patient's performance should be established using the best available evidence. Asking the patient, friends/relatives and nurses are the usual sources, but direct observation and common sense are also important. However direct testing is not needed. 5. Usually the patient's performance over the preceding 24-48 hours is important, but occasionally longer periods will be relevant. 6. Middle categories imply that the patient supplies over 50 per cent of the effort. 7. Use of aids to be independent is allowed. Tammy Sutherland., Barthel, D.W. (6399). Functional evaluation: the Barthel Index. [address] [facility] (14)2. Horacio Mancia ping DEMARCUS GreenwoodF, Jake Horn., Sharmin Paris., [place], [address] [place] [address] (1999). Measuring the change indisability after inpatient rehabilitation; comparison of the responsiveness of the Barthel Index and Functional [place] Measure. Journal of Neurology, Neurosurgery, and Psychiatry, 66(4), 152-189. Parish Whittaker, N.[place].A, ALESSANDRO Madrigal, & Arleth Faulkner MKEVIN. (2004.) Assessment of post-stroke quality of life in cost-effectiveness studies: The usefulness of the Barthel Index and the EuroQoL-5D. [facility], 31, 223-74 Physical Therapy Evaluation Charge Determination History Examination Presentation Decision-Making MEDIUM  Complexity : 1-2 comorbidities / personal factors will impact the outcome/ POC  MEDIUM Complexity : 3 Standardized tests and measures addressing body structure, function, activity limitation and / or participation in recreation  MEDIUM Complexity : Evolving with changing characteristics  MEDIUM Complexity : FOTO score of 26-74 Based on the above components, the patient evaluation is determined to be of the following complexity level: MEDIUM Pain Rating: 
Denied complaints of pain Activity Tolerance:  
Fair and however VSS on RA Please refer to the flowsheet for vital signs taken during this treatment. After treatment patient left in no apparent distress:  
Sitting in chair, Call bell within reach, Bed / chair alarm activated and Caregiver / family present COMMUNICATION/EDUCATION:  
The patients plan of care was discussed with: Registered Nurse. Fall prevention education was provided and the patient/caregiver indicated understanding., Patient/family have participated as able in goal setting and plan of care. and Patient/family agree to work toward stated goals and plan of care. Thank you for this referral. 
Kwesi Jackson, PT, DPT Time Calculation: 15 mins

## 2019-10-29 NOTE — PROGRESS NOTES
MICHELLE:  
1. [facility] 2. [facility] transportation at d/c 
 
D/C plan remains for pt to return to [facility] once medically stable. [facility] has accepted back. CM will continue to follow. Sima Brown MSDANTE Care Manager [phone]

## 2019-10-29 NOTE — PROGRESS NOTES
Bedside shift change report given to Amrit RN (oncoming nurse) by Ethan Barros (offgoing nurse). Report included the following information SBAR, Kardex, Recent Results and Med Rec Status.

## 2019-10-29 NOTE — PROGRESS NOTES
Problem: Falls - Risk of 
Goal: *Absence of Falls Description Document Donata Carrel Fall Risk and appropriate interventions in the flowsheet. Outcome: Progressing Towards Goal 
Note:  
Fall Risk Interventions: 
Mobility Interventions: Bed/chair exit alarm, Communicate number of staff needed for ambulation/transfer, OT consult for ADLs, Patient to call before getting OOB, PT Consult for mobility concerns, Strengthening exercises (ROM-active/passive) Mentation Interventions: Bed/chair exit alarm, Door open when patient unattended, Evaluate medications/consider consulting pharmacy, Familiar objects from home, Increase mobility, More frequent rounding, Reorient patient, Room close to nurse's station Medication Interventions: Bed/chair exit alarm, Evaluate medications/consider consulting pharmacy, Patient to call before getting OOB, Teach patient to arise slowly Elimination Interventions: Bed/chair exit alarm, Call light in reach, Patient to call for help with toileting needs, Toileting schedule/hourly rounds History of Falls Interventions: Bed/chair exit alarm, Consult care management for discharge planning, Door open when patient unattended, Investigate reason for fall, Room close to nurse's station

## 2019-10-29 NOTE — PROGRESS NOTES
6601 Bedside shift change report given to Nate (oncoming nurse) by Santo Pittman (offgoing nurse). Report included the following information SBAR, Kardex, Recent Results and Cardiac Rhythm NSR. .  
 
1800 pt up to chair x2 this shift. Family at the bedside throughout the day. 1850 Bedside shift change report given to Shelby Cerrato 69 (oncoming nurse) by Nate RN (offgoing nurse). Report included the following information SBAR, Kardex, MAR and Cardiac Rhythm NSR. Dominick Day

## 2019-10-29 NOTE — PROGRESS NOTES
PROGRESS NOTE 
 
NAME:  Bowen Hunter :   3/13/1931 MRN:   [id number] Date/Time:  10/29/2019 7:23 AM 
Subjective:  
History:  Chart reviewed and patient seen and examined this AM and D/W his nurse  this AM and all events noted. He has had 2 recent hospital admissions the first one of which was related to acute coronary syndrome and he had a stent placed in his left main extending into his LAD with a side-port to circumflex. After that hospitalization he developed DVT of his left lower extremity despite being on aspirin and Plavix and thus Xarelto was started and unfortunately he was readmitted to the hospital with an acute GI bleed. During that hospitalization he had a inferior vena cava filter placed and Xarelto was discontinued and he had an EGD and colonoscopy without an obvious source for the bleed. He was discharged to rehab and seemed to be doing well until 10/24 when he was admitted with Sepsis with Temp to 105.7 and CXR read as bilateral LL infiltrate although no cough and not requiring oxygen so doubt he has true Pneumonia (CXR 10/25 w/o definite pneumonia). UA doesn't appear infected. Blood cultures + Staph. Lugdenensis c/w skin ans soft tissue source. He has been intermittently agitated and confused since admission and requiring PRN Ativan and thus sedated this AM and unable to give a history today. History obtained from his nurse this AM. He has had no other cardiac or respiratory c/o. He has no GI/ c/o. He has no neurologic c/o. He has no other c/o on complete ROS. Medications reviewed: 
Current Facility-Administered Medications Medication Dose Route Frequency  sodium chloride (NS) flush 5-40 mL  5-40 mL IntraVENous Q8H  
 sodium chloride (NS) flush 5-40 mL  5-40 mL IntraVENous PRN  
 vancomycin (VANCOCIN) 1,000 mg in 0.9% sodium chloride (MBP/ADV) 250 mL  1,000 mg IntraVENous Q18H  
 fluticasone propionate (FLONASE) 50 mcg/actuation nasal spray 2 Spray  2 [place] Both Nostrils DAILY  temazepam (RESTORIL) capsule 30 mg  30 mg Oral QHS PRN  
 guaiFENesin ER (MUCINEX) tablet 600 mg  600 mg Oral Q12H  
 LORazepam (ATIVAN) injection 1 mg  1 mg IntraVENous Q6H PRN  
 haloperidol lactate (HALDOL) injection 2 mg  2 mg IntraVENous Q6H PRN  
 sodium chloride (NS) flush 5-10 mL  5-10 mL IntraVENous PRN  
 albuterol (PROVENTIL HFA, VENTOLIN HFA, PROAIR HFA) inhaler 2 Puff  2 Puff Inhalation Q4H PRN  
 amiodarone (CORDARONE) tablet 200 mg  200 mg Oral DAILY  aspirin chewable tablet 81 mg  81 mg Oral DAILY  clopidogrel (PLAVIX) tablet 75 mg  75 mg Oral DAILY  pantoprazole (PROTONIX) tablet 40 mg  40 mg Oral ACB  levothyroxine (SYNTHROID) tablet 137 mcg  137 mcg Oral ACB  midodrine (PROAMITINE) tablet 10 mg  10 mg Oral TID WITH MEALS  sodium chloride (NS) flush 5-40 mL  5-40 mL IntraVENous Q8H  
 sodium chloride (NS) flush 5-40 mL  5-40 mL IntraVENous PRN  
 heparin (porcine) injection 5,000 Units  5,000 Units SubCUTAneous Q8H Objective:  
Vitals: 
Visit Vitals /53 Pulse 75 Temp 99.6 °F (37.6 °C) Resp 25 Ht 5' 9\" (1.753 m) Wt 203 lb (92.1 kg) SpO2 90% BMI 29.98 kg/m² O2 Flow Rate (L/min): 2 l/min O2 Device: Room air Temp (24hrs), Av.6 °F (37 °C), Min:97.6 °F (36.4 °C), Max:99.6 °F (37.6 °C) Last 24hr Input/Output: 
 
Intake/Output Summary (Last 24 hours) at 10/29/2019 2404 Last data filed at 10/28/2019 1818 Gross per 24 hour Intake 1081.25 ml Output  Net 1081.25 ml PHYSICAL EXAM: 
General:     Somnolent this AM, cooperative, no distress, appears stated age. Head:    Normocephalic, without obvious abnormality, atraumatic. Eyes:    Conjunctivae/corneas clear. PERRLA Nose:   Nares normal. No drainage or sinus tenderness. Throat:     Lips, mucosa, and tongue normal.  No Thrush Neck:   Supple, symmetrical,  no adenopathy, thyroid: non tender 
   no carotid bruit and no JVD. Back:     Symmetric,  No CVA tenderness. Lungs:    Clear to auscultation bilaterally. No Wheezing or Rhonchi. No rales. Heart:    Regular rate and rhythm,  no murmur, rub or gallop. Abdomen:    Soft, non-tender. Not distended. Bowel sounds normal. No masses Extremities:  Extremities normal, atraumatic, No cyanosis. No edema. No clubbing Lymph nodes:  Cervical, supraclavicular normal. 
Neurologic:  Normal strength, Somnolent. Skin:                 No rash and no obvious source of infection. Lab Data Reviewed: 
 
Recent Results (from the past 24 hour(s)) METABOLIC PANEL, BASIC Collection Time: 10/29/19  2:55 AM  
Result Value Ref Range Sodium 141 136 - 145 mmol/L Potassium 3.5 3.5 - 5.1 mmol/L Chloride 112 (H) 97 - 108 mmol/L  
 CO2 20 (L) 21 - 32 mmol/L Anion gap 9 5 - 15 mmol/L Glucose 97 65 - 100 mg/dL BUN 14 6 - 20 MG/DL Creatinine 1.14 0.70 - 1.30 MG/DL  
 BUN/Creatinine ratio 12 12 - 20 GFR est AA >60 >60 ml/min/1.73m2 GFR est non-AA >60 >60 ml/min/1.73m2 Calcium 8.9 8.5 - 10.1 MG/DL Assessment/Plan:  
 
Principal Problem: 
  Severe sepsis ([facility] [address] (10/24/2019) Active Problems: 
  Glucose intolerance (impaired glucose tolerance) (8/21/2017) COPD (chronic obstructive pulmonary disease) ([facility] (8/21/2017) Stage 3 chronic kidney disease ([facility] [address] (8/21/2017) Atrial fibrillation ([facility] (8/21/2017) ASCVD (arteriosclerotic cardiovascular disease) (8/21/2017) Overview: Catheterization 6/11/2004 80% stenosis nondominant RCA and 30% stenosis of  
    LAD with a notation of some left main disease but no percentage given. No  
    mention of angioplasty or stent made Anemia (8/13/2019) Nonrheumatic aortic valve stenosis (8/27/2019) Overview: Mild to moderate by echocardiogram 8/27/2019 Fever (10/24/2019) KEVAN (acute kidney injury) ([facility] [address] (10/24/2019) Acute encephalopathy (10/24/2019) Chronic orthostatic hypotension (10/24/2019) 
 
  
___________________________________________________ PLAN: 
 
1. Discontinued Maxipime and continue Vancomycin for Sepsis (S. Lugdunensis) 2. Supplemental oxygen for hypoxemia due to COPD, does not have Pneumonia 3. Follow Hgb (9.0 on admission) with history of significant anemia, now 8.7 4. Continue Plavix with recent cardiac stents 5. Continue Midodrine for orthostatic hypotension 6. Amiodarone for PAF 7. Follow renal function with ARF on CKD, Creat 1.14 today 8. Continue Protonix 9. Repeat CXR, no Pneumonia 10. Continue Flonase nasal spray 11. Mobilize with PT 
12. Resume HS Restoril 13. D/C IV fluids 14. Ativan or Haldol PRN agitation 15. Echocardiogram to R/O valvular vegetation, results pending High complexity patient today with extremely high risk of deterioration 
 
 
___________________________________________________ Attending Physician: Claritza Melgar MD

## 2019-10-29 NOTE — PROGRESS NOTES
Bedside shift change report given to Bushra Rodriguez (oncoming nurse) by HungCorwith (offgoing nurse). Report included the following information SBAR, Kardex, Intake/Output, MAR, Recent Results and Cardiac Rhythm NSR. Patient confused, but verbally responsive. No acute events on evening shift. Bedside shift change report given to Tari (oncoming nurse) by Bushra Rodriguez (offgoing nurse). Report included the following information SBAR, Kardex, Intake/Output, MAR, Recent Results and Cardiac Rhythm NSR.

## 2019-10-30 NOTE — PROGRESS NOTES
Problem: Mobility Impaired (Adult and Pediatric) Goal: *Acute Goals and Plan of Care (Insert Text) Description FUNCTIONAL STATUS PRIOR TO ADMISSION: Pt currently at SNF, participating with PT/OT following hospitalization at [id number] Ambulating with RW support. HOME SUPPORT PRIOR TO ADMISSION: Lived with son previously Physical Therapy Goals Initiated 10/29/2019 1. Patient will move from supine to sit and sit to supine , scoot up and down and roll side to side in bed with moderate assistance  within 7 day(s). 2.  Patient will transfer from bed to chair and chair to bed with moderate assistance  using the least restrictive device within 7 day(s). 3.  Patient will perform sit to stand with moderate assistance  within 7 day(s). 4.  Patient will ambulate with moderate assistance  for 20 feet with the least restrictive device within 7 day(s). Outcome: Progressing Towards Goal 
 PHYSICAL THERAPY TREATMENT Patient: Oneida Chin (40 y.o. male) Date: 10/30/2019 Diagnosis: Severe sepsis ([facility] [address] [A41.9, R65.20] Bilateral pneumonia [J18.9] Fever [R50.9] KEVAN (acute kidney injury) ([facility] [address] [N17.9] Severe sepsis ([facility] [address] Precautions:   
Chart, physical therapy assessment, plan of care and goals were reviewed. ASSESSMENT Patient continues with skilled PT services and is progressing towards goals. Pt with improved alertness however continued to exhibit delayed processing, delayed initiation, and impaired motor planning, requiring additional time to complete all tasks. Pt ambulated w/ RW and minAx2 from EOB to bedside chair, exhibiting slow, shuffled gait with increased trunk flexion. Again max verbal cues requiring for motor planning/attention to task/safety awareness. Overall, pt tolerated therapy session well however remains well below his baseline mod I level (pt states he was close to discharge at [facility]). Recommend pt return to SNF to continue therapy prior to returning home w/ son. Current Level of Function Impacting Discharge (mobility/balance): modAx2 for bed mobility, minAx2 for sit<>stand transfer, minAx2 for ambulation w/ RW support Other factors to consider for discharge: confusion PLAN : 
Patient continues to benefit from skilled intervention to address the above impairments. Continue treatment per established plan of care. to address goals. Recommendation for discharge: (in order for the patient to meet his/her long term goals) Therapy up to 5 days/week in SNF setting This discharge recommendation: 
Has been made in collaboration with the attending provider and/or case management IF patient discharges home will need the following DME: to be determined (TBD) SUBJECTIVE:  
Patient stated Okay.  OBJECTIVE DATA SUMMARY:  
Critical Behavior: 
Neurologic State: Eyes open to voice Orientation Level: Oriented to person, Disoriented to time, Disoriented to situation, Disoriented to place Cognition: Decreased attention/concentration, Decreased command following, Impaired decision making, Poor safety awareness Functional Mobility Training: 
Bed Mobility: 
Rolling: Moderate assistance;Assist x2 Supine to Sit: Moderate assistance;Assist x2 Scooting: Supervision Transfers: 
Sit to Stand: Minimum assistance;Assist x2 Bed to Chair: Minimum assistance;Assist x2 Balance: 
Sitting: Impaired Sitting - Static: Good (unsupported) Sitting - Dynamic: Fair (occasional) Standing: Impaired; With support(RW) 
Standing - Static: Fair;Constant support Standing - Dynamic : Fair;Constant support Ambulation/Gait Training: 
Distance (ft): 4 Feet (ft) Assistive Device: Walker, rolling;Gait belt Ambulation - Level of Assistance: Minimal assistance;Assist x2 Gait Abnormalities: Decreased step clearance;Shuffling gait Base of Support: Widened Speed/Izabella: Slow;Shuffled Step Length: Right shortened;Left shortened Therapeutic Exercises:  
5x sit<>stand transfers from bedside chair w/ CG/minAx2 Pain Rating: 
Denied complaints of pain Activity Tolerance: VSS on RA however pt fatigues quickly Please refer to the flowsheet for vital signs taken during this treatment. After treatment patient left in no apparent distress:  
Sitting in chair, Call bell within reach, Bed / chair alarm activated and Caregiver / family present COMMUNICATION/COLLABORATION:  
The patients plan of care was discussed with: Registered Nurse Yoav Herrera, PT, DPT Time Calculation: 23 mins

## 2019-10-30 NOTE — PROGRESS NOTES
Progress Note 10/30/2019 5:55 PM 
NAME: Regi Guerrier MRN:  [id number] Admit Diagnosis: Severe sepsis ([facility] [address] [A41.9, R65.20] Bilateral pneumonia [J18.9] Fever [R50.9] KEVAN (acute kidney injury) ([facility] [address] [N17.9]             CYRHIZC List:  
Multivessel PCI Recent DVT, started on anticoagulation, then with GI bleed Now with staph bacteremia and likely endocarditis 
  
- Cath 7/2019 with impella supported PCI of LM into LAD with balloon side hole to LCx, cath 8/29/2019 with patent LM stent. 
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 
- Prox afib, currently in sinus - Hx of aflutter ablation 
- HTN 
- Dyslipidemia - Hypothyroid - GERD 
- BPH 
- Distant tobacco 
- DVT 
- GI bleed , lives alone, ADLs, occasionally using cane Son: Kiko Hutchinson III.   [phone] 
   
  
            Assessment/Plan:      
Staph bacteremia, TTE with likely endocarditis, discussed BRIAN with family, patient has some delerium and concern with anesthesia, poor surgical candidate, BRIAN may not  at this point.  
No chest pain. Euvolemic so far, echo with normal EF, mild to mod AS, mild AI Prox afib back in sinus, will use amiodarone, high risk for triple anticoagulation DVT now with IVC filter 
  
- Cont ASA. - Cont plavix, this can not be stopped. - Cont amiodarone - Cont  Midodrine, still with some labile bp. 
- ? statin 
 
   
  
  
 [x]       High complexity decision making was performed in this patient at high risk for decompensation with multiple organ involvement. Subjective:  
 
Regi Guerrier denies chest pain, dyspnea. Discussed with RN events overnight. Review of Systems: 
 
Symptom Y/N Comments  Symptom Y/N Comments Fever/Chills N   Chest Pain N Poor Appetite N   Edema N   
Cough N   Abdominal Pain N Sputum N   Joint Pain N   
SOB/JAFFE N   Pruritis/Rash N   
Nausea/vomit N   Tolerating PT/OT Y   
 Diarrhea N   Tolerating Diet Y Constipation N   Other Could NOT obtain due to:   
 
Objective:  
  
Physical Exam: 
 
Last 24hrs VS reviewed since prior progress note. Most recent are: 
 
Visit Vitals /58 (BP 1 Location: Right arm, BP Patient Position: At rest) Pulse 72 Temp 98.9 °F (37.2 °C) Resp 18 Ht 5' 9\" (1.753 m) Wt 94.1 kg (207 lb 7.3 oz) SpO2 98% BMI 30.64 kg/m² Intake/Output Summary (Last 24 hours) at 10/30/2019 1755 Last data filed at 10/30/2019 1439 Gross per 24 hour Intake 720 ml Output 400 ml Net 320 ml General Appearance: Well developed, well nourished, alert & oriented x 3,  
 no acute distress. Ears/Nose/Mouth/Throat: Hearing grossly normal. 
Neck: Supple. Chest: Lungs clear to auscultation bilaterally. Cardiovascular: Regular rate and rhythm, S1S2 normal, no murmur. Abdomen: Soft, non-tender, bowel sounds are active. Extremities: No edema bilaterally. Skin: Warm and dry. PMH/SH reviewed - no change compared to H&P Data Review Telemetry: normal sinus rhythm Lab Data Personally Reviewed: 
 
Recent Labs 10/30/19 
0214 10/28/19 
0763 WBC 14.8* 10.2 HGB 8.6* 8.7* HCT 29.0* 29.0*  
 138* No results for input(s): INR, PTP, APTT, INREXT in the last 72 hours. Recent Labs 10/30/19 
0214 10/29/19 
0255 10/28/19 
0145  141 142  
K 3.4* 3.5 3.7 * 112* 113* CO2 19* 20* 21 BUN 11 14 19 CREA 0.92 1.14 1.21  
GLU 78 97 109* CA 7.7* 8.9 8.8 No results for input(s): CPK, CKNDX, TROIQ in the last 72 hours. No lab exists for component: CPKMB Lab Results Component Value Date/Time Cholesterol, total 117 06/12/2019 02:00 PM  
 HDL Cholesterol 35 06/12/2019 02:00 PM  
 LDL, calculated 65 06/12/2019 02:00 PM  
 Triglyceride 86 06/12/2019 02:00 PM  
 CHOL/HDL Ratio 3 06/12/2019 02:00 PM  
 
 
No results for input(s): SGOT, GPT, AP, TBIL, TP, ALB, GLOB, GGT, AML, LPSE in the last 72 hours. No lab exists for component: AMYP, HLPSE No results for input(s): PH, PCO2, PO2 in the last 72 hours. Medications Personally Reviewed: 
 
Current Facility-Administered Medications Medication Dose Route Frequency  vancomycin (VANCOCIN) 1,000 mg in 0.9% sodium chloride (MBP/ADV) 250 mL  1,000 mg IntraVENous Q18H  
 VANCOMYCIN INFORMATION NOTE   Other ONCE  
 sodium chloride (NS) flush 5-40 mL  5-40 mL IntraVENous Q8H  
 sodium chloride (NS) flush 5-40 mL  5-40 mL IntraVENous PRN  
 fluticasone propionate (FLONASE) 50 mcg/actuation nasal spray 2 Spray  2 Spray Both Nostrils DAILY  temazepam (RESTORIL) capsule 30 mg  30 mg Oral QHS PRN  
 guaiFENesin ER (MUCINEX) tablet 600 mg  600 mg Oral Q12H  
 LORazepam (ATIVAN) injection 1 mg  1 mg IntraVENous Q6H PRN  
 haloperidol lactate (HALDOL) injection 2 mg  2 mg IntraVENous Q6H PRN  
 sodium chloride (NS) flush 5-10 mL  5-10 mL IntraVENous PRN  
 albuterol (PROVENTIL HFA, VENTOLIN HFA, PROAIR HFA) inhaler 2 Puff  2 Puff Inhalation Q4H PRN  
 amiodarone (CORDARONE) tablet 200 mg  200 mg Oral DAILY  aspirin chewable tablet 81 mg  81 mg Oral DAILY  clopidogrel (PLAVIX) tablet 75 mg  75 mg Oral DAILY  pantoprazole (PROTONIX) tablet 40 mg  40 mg Oral ACB  levothyroxine (SYNTHROID) tablet 137 mcg  137 mcg Oral ACB  midodrine (PROAMITINE) tablet 10 mg  10 mg Oral TID WITH MEALS  sodium chloride (NS) flush 5-40 mL  5-40 mL IntraVENous Q8H  
 sodium chloride (NS) flush 5-40 mL  5-40 mL IntraVENous PRN  
 heparin (porcine) injection 5,000 Units  5,000 Units SubCUTAneous Q8H Jenn Gerber MD

## 2019-10-30 NOTE — PROGRESS NOTES
Initial Nutrition Assessment: 
 
INTERVENTIONS/RECOMMENDATIONS:  
· Meals/Snacks: General/healthful diet: Continue cardiac diet · Supplements: Commercial supplement: Add Ensure clear BID and ensure pudding daily ASSESSMENT:  
Patient medically noted for sepsis, COPD, KEVAN, and orthostatic hypotension. PMH CKD and PVD. Chart reviewed for length of stay. Nursing assisting with breakfast at time of visit; [employer] had been brought in for him. Taking bites of his sandwich. He reports a poor appetite and not eating well. Nursing confirms. PO 0-25% of meals per flowsheets. Patient states he doesn't really like milkshakes but does like juice. Will trial ensure clear and ensure pudding. Encouraged intake of meals. Diet Order: Cardiac 
% Eaten:   
Patient Vitals for the past 72 hrs: 
 % Diet Eaten 10/29/19 1827 25 % 10/29/19 1405 0 % 10/29/19 0817 5 % 10/28/19 1753 0 % 10/28/19 1345 25 % 10/28/19 0956 25 % Pertinent Medications: [x]Reviewed []Other: Plavix, Synthroid, Midodrine, Protonix Pertinent Labs: [x]Reviewed []Other: K+ 3.4 Food Allergies: [x]None []Yes:   
Last BM: 10/28 []Active     []Hyperactive  []Hypoactive       [] Absent BS Skin:    [x] Intact   [] Incision  [] Breakdown: [] Edema []Other: Anthropometrics:  
Height: 5' 9\" (175.3 cm) Weight: 94.1 kg (207 lb 7.3 oz) IBW (%IBW):   ( ) UBW (%UBW):   (  %) Last Weight Metrics: 
Weight Loss Metrics 10/30/2019 9/27/2019 9/18/2019 9/11/2019 8/27/2019 8/26/2019 8/26/2019 Today's Wt 207 lb 7.3 oz 181 lb 15.8 oz 181 lb 9.6 oz 183 lb 182 lb 5.1 oz - 182 lb 6.4 oz BMI 30.64 kg/m2 26.88 kg/m2 26.82 kg/m2 27.02 kg/m2 - 26.92 kg/m2 -  
 
 
BMI: Body mass index is 30.64 kg/m². This BMI is indicative of: 
 []Underweight    []Normal    []Overweight    [x] Obesity   [] Extreme Obesity (BMI>40) Estimated Nutrition Needs (Based on):  
2080 Kcals/day(BMR (1600) x 1. 3AF) , 75 g(-94g (0.8-1.0 g/kg bw)) Protein Carbohydrate: At Least 130 g/day  Fluids: 1800 mL/day (1ml/kcal) Pt expected to meet estimated nutrient needs: [x]Yes []No 
 
NUTRITION DIAGNOSES:  
Problem:  Inadequate protein-energy intake Etiology: related to decreased appetite, current medical condition Signs/Symptoms: as evidenced by PO intake mostly <50% of meals NUTRITION INTERVENTIONS: 
Meals/Snacks: General/healthful diet   Supplements: Commercial supplement GOAL:  
PO intake at least 50% of meals and 70% of ONS next 3-5 days LEARNING NEEDS (Diet, Food/Nutrient-Drug Interaction):  
 [x] None Identified 
 [] Identified and Education Provided/Documented 
 [] Identified and Pt declined/was not appropriate Cultural, Shinto, OR Ethnic Dietary Needs:  
 [x] None Identified 
 [] Identified and Addressed 
 
 [x] Interdisciplinary Care Plan Reviewed/Documented  
 [x] Discharge Planning: Heart healthy diet, ONS 1-2x/day as needed MONITORING /EVALUATION:  
Food/Nutrient Intake Outcomes: Total energy intake Physical Signs/Symptoms Outcomes: Weight/weight change, Electrolyte and renal profile NUTRITION RISK:  
 [x] Patient At Nutritional Risk              [] Patient Not at Nutritional Risk PT SEEN FOR:  
 []  MD Consult: []Calorie Count []Diabetic Diet Education []Diet Education []Electrolyte Management []General Nutrition Management and Supplements []Management of Tube Feeding []TPN Recommendations []  RN Referral:  []MST score >=2 
   []Enteral/Parenteral Nutrition PTA []Pregnant: Gestational DM or Multigestation 
   []Pressure Ulcer/Wound Care needs 
     
[]  Low BMI [x]  ANDRES Thompson Jumper Ext [phone] Pager [phone] Weekend Pager [phone]

## 2019-10-30 NOTE — PROGRESS NOTES
1940 
Beside report received from [facility], [address] (out going) to self Noemi Selby RN on coming). Discussed related medical and environmental issues involved in caring for this patient. Events and treatments discussed. POC for tonight discussed.

## 2019-10-30 NOTE — PROGRESS NOTES
0700 Bedside shift change report given to Nate (oncoming nurse) by Barbara Hong (offgoing nurse). Report included the following information SBAR, Kardex, Recent Results and Cardiac Rhythm NSR. .  
 
1000 echo to call this nurse prior to arrival to floor so that antianxiety med can be administered. 1057 pt up in the chair, able to participate with therapy and placed in chair with two person assist.  
 
1530 echo completed at the bedside. Pt became agitated towards end of testing despite medication administration. Tech able to complete test.  
 
1750 Dr. Yin Riley at the bedside to discuss echo results with the family 1920 Bedside shift change report given to Iglesia Chan (oncoming nurse) by Nate (offgoing nurse). Report included the following information SBAR, Kardex, Recent Results and Cardiac Rhythm NSR. Jagdeep Padilla

## 2019-10-30 NOTE — PROGRESS NOTES
Occupational Therapy Screening: 
Services are indicated. Order is required. An InBasket screening referral was triggered for occupational therapy based on results obtained during the nursing admission assessment. The patients chart was reviewed and the patient is appropriate for a skilled therapy evaluation. Please order a consult for occupational therapy if you are in agreement and would like an evaluation to be completed. Thank you.  
 
Reed Ray, OTR/L

## 2019-10-30 NOTE — PROGRESS NOTES
PROGRESS NOTE 
 
NAME:  Omi Almeida :   3/13/1931 MRN:   [id number] Date/Time:  10/30/2019 7:15 AM 
Subjective:  
History:  Chart reviewed and patient seen and examined this AM and D/W his nurse and his daughter this AM and all events noted. He has had 3 recent hospital admissions the first one of which was related to acute coronary syndrome and he had a stent placed in his left main extending into his LAD with a side-port to circumflex. After that hospitalization he developed DVT of his left lower extremity despite being on aspirin and Plavix and thus Xarelto was started and unfortunately he was readmitted to the hospital with an acute GI bleed. During that hospitalization he had a inferior vena cava filter placed and Xarelto was discontinued and he had an EGD and colonoscopy without an obvious source for the bleed. He was discharged to rehab and seemed to be doing well until 10/24 when he was admitted with Sepsis with Temp to 105.7 and CXR read as bilateral LL infiltrate although no cough and not requiring oxygen so doubt he has true Pneumonia (CXR 10/25 w/o definite pneumonia). UA doesn't appear infected. Blood cultures + Staph. Lugdenensis c/w skin and soft tissue source. He has been intermittently agitated and confused since admission and requiring PRN Ativan: however although a little confused he is not sedated this AM. He denies CP, SOB or other cardiac or respiratory c/o. He says he isn't sleeping although that DOES NOT appear to be the case as per his daughter he took a 3 hour nap yesterday afternoon and yesterday AM he was so somnolent that I really couldn't arouse him. He has no GI/ c/o. He has no neurologic c/o except extremely weak but he did sit in chair X 2 yesterday with max. assistance of 2 for transfer. He has no other c/o on complete ROS. Medications reviewed: 
Current Facility-Administered Medications Medication Dose Route Frequency  sodium chloride (NS) flush 5-40 mL  5-40 mL IntraVENous Q8H  
 sodium chloride (NS) flush 5-40 mL  5-40 mL IntraVENous PRN  
 fluticasone propionate (FLONASE) 50 mcg/actuation nasal spray 2 Spray  2 Spray Both Nostrils DAILY  temazepam (RESTORIL) capsule 30 mg  30 mg Oral QHS PRN  
 guaiFENesin ER (MUCINEX) tablet 600 mg  600 mg Oral Q12H  
 LORazepam (ATIVAN) injection 1 mg  1 mg IntraVENous Q6H PRN  
 haloperidol lactate (HALDOL) injection 2 mg  2 mg IntraVENous Q6H PRN  
 sodium chloride (NS) flush 5-10 mL  5-10 mL IntraVENous PRN  
 albuterol (PROVENTIL HFA, VENTOLIN HFA, PROAIR HFA) inhaler 2 Puff  2 Puff Inhalation Q4H PRN  
 amiodarone (CORDARONE) tablet 200 mg  200 mg Oral DAILY  aspirin chewable tablet 81 mg  81 mg Oral DAILY  clopidogrel (PLAVIX) tablet 75 mg  75 mg Oral DAILY  pantoprazole (PROTONIX) tablet 40 mg  40 mg Oral ACB  levothyroxine (SYNTHROID) tablet 137 mcg  137 mcg Oral ACB  midodrine (PROAMITINE) tablet 10 mg  10 mg Oral TID WITH MEALS  sodium chloride (NS) flush 5-40 mL  5-40 mL IntraVENous Q8H  
 sodium chloride (NS) flush 5-40 mL  5-40 mL IntraVENous PRN  
 heparin (porcine) injection 5,000 Units  5,000 Units SubCUTAneous Q8H Objective:  
Vitals: 
Visit Vitals /50 Pulse 73 Temp 98.2 °F (36.8 °C) Resp 18 Ht 5' 9\" (1.753 m) Wt 207 lb 7.3 oz (94.1 kg) SpO2 97% BMI 30.64 kg/m² O2 Flow Rate (L/min): 2 l/min O2 Device: Room air Temp (24hrs), Av.7 °F (37.1 °C), Min:98.2 °F (36.8 °C), Max:99.4 °F (37.4 °C) Last 24hr Input/Output: 
 
Intake/Output Summary (Last 24 hours) at 10/30/2019 [facility] Last data filed at 10/30/2019 1944 Gross per 24 hour Intake 830 ml Output 400 ml Net 430 ml PHYSICAL EXAM: 
General:     Alert this AM, cooperative, no distress, appears stated age. Head:    Normocephalic, without obvious abnormality, atraumatic. Eyes:    Conjunctivae/corneas clear. PERRLA Nose:   Nares normal. No drainage or sinus tenderness. Throat:     Lips, mucosa, and tongue normal.  No Thrush Neck:   Supple, symmetrical,  no adenopathy, thyroid: non tender 
   no carotid bruit and no JVD. Back:     Symmetric,  No CVA tenderness. Lungs:    Clear to auscultation bilaterally. No Wheezing or Rhonchi. No rales. Heart:    Regular rate and rhythm,  no murmur, rub or gallop. Abdomen:    Soft, non-tender. Not distended. Bowel sounds normal. No masses Extremities:  Extremities normal, atraumatic, No cyanosis. No edema. No clubbing Lymph nodes:  Cervical, supraclavicular normal. 
Neurologic:  Normal strength, alert and oriented X 3 with a little confusion. Skin:                 No rash and no obvious source of infection. IV site looks OK Lab Data Reviewed: 
 
Recent Results (from the past 24 hour(s)) METABOLIC PANEL, BASIC Collection Time: 10/30/19  2:14 AM  
Result Value Ref Range Sodium 145 136 - 145 mmol/L Potassium 3.4 (L) 3.5 - 5.1 mmol/L Chloride 117 (H) 97 - 108 mmol/L  
 CO2 19 (L) 21 - 32 mmol/L Anion gap 9 5 - 15 mmol/L Glucose 78 65 - 100 mg/dL BUN 11 6 - 20 MG/DL Creatinine 0.92 0.70 - 1.30 MG/DL  
 BUN/Creatinine ratio 12 12 - 20 GFR est AA >60 >60 ml/min/1.73m2 GFR est non-AA >60 >60 ml/min/1.73m2 Calcium 7.7 (L) 8.5 - 10.1 MG/DL  
CBC WITH AUTOMATED DIFF Collection Time: 10/30/19  2:14 AM  
Result Value Ref Range WBC 14.8 (H) 4.1 - 11.1 K/uL  
 RBC 3.09 (L) 4.10 - 5.70 M/uL HGB 8.6 (L) 12.1 - 17.0 g/dL HCT 29.0 (L) 36.6 - 50.3 % MCV 93.9 80.0 - 99.0 FL  
 MCH 27.8 26.0 - 34.0 PG  
 MCHC 29.7 (L) 30.0 - 36.5 g/dL  
 RDW 18.2 (H) 11.5 - 14.5 % PLATELET 217 234 - 663 K/uL MPV 11.3 8.9 - 12.9 FL  
 NRBC 0.0 0  WBC ABSOLUTE NRBC 0.00 0.00 - 0.01 K/uL NEUTROPHILS 81 (H) 32 - 75 % LYMPHOCYTES 6 (L) 12 - 49 % MONOCYTES 10 5 - 13 % EOSINOPHILS 1 0 - 7 % BASOPHILS 0 0 - 1 % IMMATURE GRANULOCYTES 2 (H) 0.0 - 0.5 % ABS. NEUTROPHILS 11.9 (H) 1.8 - 8.0 K/UL  
 ABS. LYMPHOCYTES 0.9 0.8 - 3.5 K/UL  
 ABS. MONOCYTES 1.5 (H) 0.0 - 1.0 K/UL  
 ABS. EOSINOPHILS 0.1 0.0 - 0.4 K/UL  
 ABS. BASOPHILS 0.0 0.0 - 0.1 K/UL  
 ABS. IMM. GRANS. 0.2 (H) 0.00 - 0.04 K/UL  
 DF AUTOMATED Assessment/Plan:  
 
Principal Problem: 
  Severe sepsis ([facility] (10/24/2019) Active Problems: 
  Glucose intolerance (impaired glucose tolerance) (8/21/2017) COPD (chronic obstructive pulmonary disease) ([facility] (8/21/2017) Stage 3 chronic kidney disease ([facility] (8/21/2017) Atrial fibrillation ([facility] (8/21/2017) ASCVD (arteriosclerotic cardiovascular disease) (8/21/2017) Overview: Catheterization 6/11/2004 80% stenosis nondominant RCA and 30% stenosis of  
    LAD with a notation of some left main disease but no percentage given. No  
    mention of angioplasty or stent made Anemia (8/13/2019) Nonrheumatic aortic valve stenosis (8/27/2019) Overview: Mild to moderate by echocardiogram 8/27/2019 Fever (10/24/2019) KEVAN (acute kidney injury) ([facility] (10/24/2019) Acute encephalopathy (10/24/2019) Chronic orthostatic hypotension (10/24/2019) 
 
  
___________________________________________________ PLAN: 
 
1. Discontinued Maxipime and continue Vancomycin for Sepsis (S. Lugdunensis), concerning that WBC up to 14.8 this AM, yesterday 10.2 2. Supplemental oxygen for hypoxemia due to COPD, does not have Pneumonia 3. Follow Hgb (9.0 on admission) with history of significant anemia, now 8.6 4. Continue Plavix with recent cardiac stents 5. Continue Midodrine for orthostatic hypotension 6. Amiodarone for PAF 7. Follow renal function with ARF on CKD, Creat . 92 today 8. Continue Protonix 9. Repeat CXR, no Pneumonia but will re check if further increase WBC or spikes fever 10. Continue Flonase nasal spray 11. Mobilize with PT 
12. Resume HS Restoril 13. D/Maxim IV fluids 14. Ativan or Haldol PRN agitation 15. Echocardiogram to R/O valvular vegetation, ordered 2 days ago and not done yet!!! 
 
 
35 minutes spent today on this High complexity patient with extremely high risk of deterioration 
 
 
___________________________________________________ Attending Physician: Damari Sher MD

## 2019-10-31 PROBLEM — I33.0 ACUTE BACTERIAL ENDOCARDITIS: Status: ACTIVE | Noted: 2019-01-01

## 2019-10-31 NOTE — PROGRESS NOTES
Progress Note 10/31/2019 5:55 PM 
NAME: Kellen Vincent MRN:  [id number] Admit Diagnosis: Severe sepsis ([facility] [A41.9, R65.20] Bilateral pneumonia [J18.9] Fever [R50.9] KEVAN (acute kidney injury) ([facility] [N17.9]             SGPTNVM List:  
Multivessel PCI Recent DVT, started on anticoagulation, then with GI bleed Now with staph bacteremia and likely endocarditis 
  
- Cath 7/2019 with impella supported PCI of LM into LAD with balloon side hole to LCx, cath 8/29/2019 with patent LM stent. 
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 
- Prox afib, currently back in afib 
- Hx of aflutter ablation 
- HTN 
- Dyslipidemia - Hypothyroid - GERD 
- BPH 
- Distant tobacco 
- DVT 
- GI bleed , lives alone, ADLs, occasionally using cane Son: Roselyn Azul the III.   [phone] 
   
  
            Assessment/Plan:      
Staph bacteremia, TTE with likely endocarditis, discussed BRIAN with family, patient has some delerium and concern with anesthesia, poor surgical candidate, BRIAN may not  at this point.  
No chest pain. Euvolemic so far, echo with normal EF, mild to mod AS, mild AI Prox afib back afib despite amiodarone, unfortunately unable to tolerate anticoagulation given recent GI bleed DVT now with IVC filter 
  
- Cont ASA. - Cont plavix, this can not be stopped. - Cont amiodarone - Cont  Midodrine, still with some labile bp. 
- ? statin - Abx per Dr. River Viveros, will need repeat cultures off of abx, poor surgical candidate. Dr. Elie Orellana available as needed. 
   
  
  
 [x]       High complexity decision making was performed in this patient at high risk for decompensation with multiple organ involvement. Subjective:  
 
Kellen Vincent denies chest pain, dyspnea. Discussed with RN events overnight. Review of Systems: 
 
Symptom Y/N Comments  Symptom Y/N Comments Fever/Chills N   Chest Pain N   
 Poor Appetite N   Edema N   
Cough N   Abdominal Pain N Sputum N   Joint Pain N   
SOB/JAFFE N   Pruritis/Rash N   
Nausea/vomit N   Tolerating PT/OT Y Diarrhea N   Tolerating Diet Y Constipation N   Other Could NOT obtain due to:   
 
Objective:  
  
Physical Exam: 
 
Last 24hrs VS reviewed since prior progress note. Most recent are: 
 
Visit Vitals /75 (BP 1 Location: Right arm, BP Patient Position: Sitting) Pulse 96 Temp 98.5 °F (36.9 °C) Resp 24 Ht 5' 9\" (1.753 m) Wt 94.7 kg (208 lb 12.4 oz) SpO2 98% BMI 30.83 kg/m² Intake/Output Summary (Last 24 hours) at 10/31/2019 1144 Last data filed at 10/31/2019 1040 Gross per 24 hour Intake 290 ml Output 250 ml Net 40 ml General Appearance: Well developed, well nourished, alert & oriented x 3,  
 no acute distress. Ears/Nose/Mouth/Throat: Hearing grossly normal. 
Neck: Supple. Chest: Lungs clear to auscultation bilaterally. Cardiovascular: Regular rate and rhythm, S1S2 normal, no murmur. Abdomen: Soft, non-tender, bowel sounds are active. Extremities: No edema bilaterally. Skin: Warm and dry. PMH/SH reviewed - no change compared to H&P Data Review Telemetry: normal sinus rhythm Lab Data Personally Reviewed: 
 
Recent Labs 10/31/19 
0344 10/30/19 
0214 WBC 12.3* 14.8* HGB 9.6* 8.6* HCT 32.4* 29.0*  
 189 No results for input(s): INR, PTP, APTT, INREXT, INREXT in the last 72 hours. Recent Labs 10/31/19 
8316 10/30/19 
0214 10/29/19 
0255  145 141  
K 3.5 3.4* 3.5 * 117* 112* CO2 21 19* 20* BUN 12 11 14 CREA 1.14 0.92 1.14 * 78 97 CA 9.4 7.7* 8.9 No results for input(s): CPK, CKNDX, TROIQ in the last 72 hours. No lab exists for component: CPKMB Lab Results Component Value Date/Time  Cholesterol, total 117 06/12/2019 02:00 PM  
 HDL Cholesterol 35 06/12/2019 02:00 PM  
 LDL, calculated 65 06/12/2019 02:00 PM  
 Triglyceride 86 06/12/2019 02:00 PM  
 CHOL/HDL Ratio 3 06/12/2019 02:00 PM  
 
 
No results for input(s): SGOT, GPT, AP, TBIL, TP, ALB, GLOB, GGT, AML, LPSE in the last 72 hours. No lab exists for component: AMYP, HLPSE No results for input(s): PH, PCO2, PO2 in the last 72 hours. Medications Personally Reviewed: 
 
Current Facility-Administered Medications Medication Dose Route Frequency  vancomycin (VANCOCIN) 1,000 mg in 0.9% sodium chloride (MBP/ADV) 250 mL  1,000 mg IntraVENous Q18H  
 sodium chloride (NS) flush 5-40 mL  5-40 mL IntraVENous Q8H  
 sodium chloride (NS) flush 5-40 mL  5-40 mL IntraVENous PRN  
 fluticasone propionate (FLONASE) 50 mcg/actuation nasal spray 2 Spray  2 Spray Both Nostrils DAILY  temazepam (RESTORIL) capsule 30 mg  30 mg Oral QHS PRN  
 guaiFENesin ER (MUCINEX) tablet 600 mg  600 mg Oral Q12H  
 LORazepam (ATIVAN) injection 1 mg  1 mg IntraVENous Q6H PRN  
 haloperidol lactate (HALDOL) injection 2 mg  2 mg IntraVENous Q6H PRN  
 sodium chloride (NS) flush 5-10 mL  5-10 mL IntraVENous PRN  
 albuterol (PROVENTIL HFA, VENTOLIN HFA, PROAIR HFA) inhaler 2 Puff  2 Puff Inhalation Q4H PRN  
 amiodarone (CORDARONE) tablet 200 mg  200 mg Oral DAILY  aspirin chewable tablet 81 mg  81 mg Oral DAILY  clopidogrel (PLAVIX) tablet 75 mg  75 mg Oral DAILY  pantoprazole (PROTONIX) tablet 40 mg  40 mg Oral ACB  levothyroxine (SYNTHROID) tablet 137 mcg  137 mcg Oral ACB  midodrine (PROAMITINE) tablet 10 mg  10 mg Oral TID WITH MEALS  sodium chloride (NS) flush 5-40 mL  5-40 mL IntraVENous Q8H  
 sodium chloride (NS) flush 5-40 mL  5-40 mL IntraVENous PRN  
 heparin (porcine) injection 5,000 Units  5,000 Units SubCUTAneous Q8H Adriana Ch MD

## 2019-10-31 NOTE — PROGRESS NOTES
Bedside shift change report given to Yuniel Heck RN (oncoming nurse) by Jaun Vines RN (offgoing nurse). Report included the following information SBAR, Kardex, MAR, Recent Results and Cardiac Rhythm afib/NSR.

## 2019-10-31 NOTE — PROGRESS NOTES
Problem: Mobility Impaired (Adult and Pediatric) Goal: *Acute Goals and Plan of Care (Insert Text) Description FUNCTIONAL STATUS PRIOR TO ADMISSION: Pt currently at SNF, participating with PT/OT following hospitalization at [facility]. Ambulating with RW support. HOME SUPPORT PRIOR TO ADMISSION: Lived with son previously Physical Therapy Goals Initiated 10/29/2019 1. Patient will move from supine to sit and sit to supine , scoot up and down and roll side to side in bed with moderate assistance  within 7 day(s). 2.  Patient will transfer from bed to chair and chair to bed with moderate assistance  using the least restrictive device within 7 day(s). 3.  Patient will perform sit to stand with moderate assistance  within 7 day(s). 4.  Patient will ambulate with moderate assistance  for 20 feet with the least restrictive device within 7 day(s). Note: PHYSICAL THERAPY TREATMENT Patient: Isrrael Mccormack (37 y.o. male) Date: 10/31/2019 Diagnosis: Severe sepsis ([facility] [A41.9, R65.20] Bilateral pneumonia [J18.9] Fever [R50.9] KEVAN (acute kidney injury) ([facility] [N17.9] Precautions:  FALLS!!! 
Chart, physical therapy assessment, plan of care and goals were reviewed. ASSESSMENT Patient continues with skilled PT services and is progressing towards goals, pt is very weak and fatigues very quickly, very confused, [address] [place] [address], max verbal and tactile cues for all mobility, will need SNF rehab at d/c, [facility]'s for safety and proper RW use. Current Level of Function Impacting Discharge (mobility/balance): mod assist x2 Other factors to consider for discharge: very confused PLAN : 
Patient continues to benefit from skilled intervention to address the above impairments. Continue treatment per established plan of care. to address goals.  
 
Recommendation for discharge: (in order for the patient to meet his/her long term goals) Therapy up to 5 days/week in SNF setting This discharge recommendation: Has not yet been discussed the attending provider and/or case management IF patient discharges home will need the following DME: bedside commode and rolling walker OBJECTIVE DATA SUMMARY:  
 
Critical Behavior: 
Neurologic State: Sleeping, Eyes open to voice Orientation Level: Disoriented to time Cognition: Follows commands, Decreased attention/concentration, Impulsive, Poor safety awareness Functional Mobility Training: 
Bed Mobility: 
Rolling: Moderate assistance;Assist x2; Additional time Supine to Sit: Moderate assistance;Assist x2; Additional time Scooting: Moderate assistance;Assist x2; Additional time Level of Assistance: Moderate assistance Interventions: Tactile cues; Verbal cues;Manual cues Transfers: 
Sit to Stand: Minimum assistance;Assist x2; Additional time Stand to Sit: Minimum assistance;Assist x2 Bed to Chair: Assist x2; Additional time; Moderate assistance Interventions: Tactile cues; Verbal cues;Manual cues Level of Assistance: Moderate assistance;Assist x2; Additional time Balance: 
Sitting: Impaired; With support Sitting - Static: Fair (occasional); Prop sitting Sitting - Dynamic: Not tested Standing: Impaired; With support Standing - Static: Fair;Constant support Standing - Dynamic : Fair;Constant support Ambulation/Gait Training: 
Distance (ft): 4 Feet (ft) Assistive Device: Gait belt;Walker, rolling Ambulation - Level of Assistance: Minimal assistance;Assist x2; Additional time Gait Abnormalities: Decreased step clearance;Shuffling gait Right Side Weight Bearing: Full Left Side Weight Bearing: Full Base of Support: Widened Stance: (equal) Speed/Izabella: Slow;Shuffled Step Length: Right shortened;Left shortened Interventions: Manual cues; Tactile cues; Verbal cues Pain Ratin/10 Activity Tolerance: Poor After treatment patient left in no apparent distress: Sitting in chair, Call bell within reach, Bed / chair alarm activated and Caregiver / family present COMMUNICATION/COLLABORATION:  
The patients plan of care was discussed with: Registered Nurse Irineo Armendariz PTA Time Calculation: 25 mins

## 2019-10-31 NOTE — PROGRESS NOTES
PROGRESS NOTE 
 
NAME:  Haile Armendariz :   3/13/1931 MRN:   [id number] Date/Time:  10/31/2019 7:30 AM 
Subjective:  
History:  Chart reviewed and patient seen and examined this AM and D/W his nurse and his daughter this AM and all events noted. He has had 3 recent hospital admissions the first one of which was related to acute coronary syndrome and he had a stent placed in his left main extending into his LAD with a side-port to circumflex. After that hospitalization he developed DVT of his left lower extremity despite being on aspirin and Plavix and thus Xarelto was started and unfortunately he was readmitted to the hospital with an acute GI bleed. During that hospitalization he had a inferior vena cava filter placed and Xarelto was discontinued and he had an EGD and colonoscopy without an obvious source for the bleed. He was discharged to rehab and seemed to be doing well until 10/24 when he was admitted with Sepsis with Temp to 105.7 and CXR read as bilateral LL infiltrate although no cough and not requiring oxygen so doubt he has true Pneumonia (CXR 10/25 w/o definite pneumonia). UA doesn't appear infected. Blood cultures + Staph. Lugdenensis c/w skin and soft tissue source. He has been intermittently agitated and confused since admission and requiring PRN Ativan: however although a little confused he is not sedated this AM. He denies CP, SOB or other cardiac or respiratory c/o. He says he isn't sleeping although that DOES NOT appear to be the case as per his daughter he took a 3 hour nap yesterday afternoon and yesterday AM he was so somnolent that I really couldn't arouse him. He has no GI/ c/o. He has no neurologic c/o except extremely weak but he did sit in chair X 2 yesterday with max. assistance of 2 for transfer. He has no other c/o on complete ROS. Echo yesterday c/w Aortic valve vegetation. Medications reviewed: 
Current Facility-Administered Medications Medication Dose Route Frequency  vancomycin (VANCOCIN) 1,000 mg in 0.9% sodium chloride (MBP/ADV) 250 mL  1,000 mg IntraVENous Q18H  
 sodium chloride (NS) flush 5-40 mL  5-40 mL IntraVENous Q8H  
 sodium chloride (NS) flush 5-40 mL  5-40 mL IntraVENous PRN  
 fluticasone propionate (FLONASE) 50 mcg/actuation nasal spray 2 Spray  2 Spray Both Nostrils DAILY  temazepam (RESTORIL) capsule 30 mg  30 mg Oral QHS PRN  
 guaiFENesin ER (MUCINEX) tablet 600 mg  600 mg Oral Q12H  
 LORazepam (ATIVAN) injection 1 mg  1 mg IntraVENous Q6H PRN  
 haloperidol lactate (HALDOL) injection 2 mg  2 mg IntraVENous Q6H PRN  
 sodium chloride (NS) flush 5-10 mL  5-10 mL IntraVENous PRN  
 albuterol (PROVENTIL HFA, VENTOLIN HFA, PROAIR HFA) inhaler 2 Puff  2 Puff Inhalation Q4H PRN  
 amiodarone (CORDARONE) tablet 200 mg  200 mg Oral DAILY  aspirin chewable tablet 81 mg  81 mg Oral DAILY  clopidogrel (PLAVIX) tablet 75 mg  75 mg Oral DAILY  pantoprazole (PROTONIX) tablet 40 mg  40 mg Oral ACB  levothyroxine (SYNTHROID) tablet 137 mcg  137 mcg Oral ACB  midodrine (PROAMITINE) tablet 10 mg  10 mg Oral TID WITH MEALS  sodium chloride (NS) flush 5-40 mL  5-40 mL IntraVENous Q8H  
 sodium chloride (NS) flush 5-40 mL  5-40 mL IntraVENous PRN  
 heparin (porcine) injection 5,000 Units  5,000 Units SubCUTAneous Q8H Objective:  
Vitals: 
Visit Vitals /82 (BP 1 Location: Right arm, BP Patient Position: At rest;Head of bed elevated (Comment degrees)) Pulse 80 Temp 98.5 °F (36.9 °C) Resp 22 Ht 5' 9\" (1.753 m) Wt 208 lb 12.4 oz (94.7 kg) SpO2 98% BMI 30.83 kg/m² O2 Flow Rate (L/min): 2 l/min O2 Device: Room air Temp (24hrs), Av.4 °F (36.9 °C), Min:98.2 °F (36.8 °C), Max:98.9 °F (37.2 °C) Last 24hr Input/Output: 
 
Intake/Output Summary (Last 24 hours) at 10/31/2019 0618 Last data filed at 10/31/2019 5733 Gross per 24 hour Intake 530 ml Output 100 ml Net 430 ml  
  
 
 PHYSICAL EXAM: 
General:     Alert this AM, cooperative, no distress, appears stated age. Head:    Normocephalic, without obvious abnormality, atraumatic. Eyes:    Conjunctivae/corneas clear. PERRLA Nose:   Nares normal. No drainage or sinus tenderness. Throat:     Lips, mucosa, and tongue normal.  No Thrush Neck:   Supple, symmetrical,  no adenopathy, thyroid: non tender 
   no carotid bruit and no JVD. Back:     Symmetric,  No CVA tenderness. Lungs:    Clear to auscultation bilaterally. No Wheezing or Rhonchi. No rales. Heart:    Regular rate and rhythm,  no murmur, rub or gallop. Abdomen:    Soft, non-tender. Not distended. Bowel sounds normal. No masses Extremities:  Extremities normal, atraumatic, No cyanosis. No edema. No clubbing Lymph nodes:  Cervical, supraclavicular normal. 
Neurologic:  Normal strength, alert and oriented X 3 with a little confusion. Skin:                 No rash and no obvious source of infection. IV site looks OK Lab Data Reviewed: 
 
Recent Results (from the past 24 hour(s)) ECHO ADULT COMPLETE Collection Time: 10/30/19  3:57 PM  
Result Value Ref Range LV E' Lateral Velocity 9.27 cm/s LV E' Septal Velocity 9.27 cm/s Aortic Valve Systolic Peak Velocity 542.17 cm/s AoV VTI 35.18 cm Aortic Valve Area by Continuity of Peak Velocity 1.1 cm2 Aortic Valve Area by Continuity of VTI 1.6 cm2 AoV PG 18.9 mmHg LVIDd 3.48 (A) 4.2 - 5.9 cm  
 LVPWd 1.27 (A) 0.6 - 1.0 cm LVIDs 2.52 cm IVSd 1.70 (A) 0.6 - 1.0 cm  
 LVOT d 2.03 cm  
 LVOT Peak Velocity 75.01 cm/s LVOT Peak Gradient 2.3 mmHg LVOT VTI 17.68 cm  
 MVA (PHT) 5.9 cm2  
 MV A Isaac 66.59 cm/s  
 MV E Isaac 108.40 cm/s  
 MV E/A 1.63   
 MV Mean Gradient 1.6 mmHg Mitral Valve Annulus Velocity Time Integral 25.68 cm Aortic Valve Systolic Mean Gradient 7.3 mmHg LVOT Cardiac Output 4.1 L/min LV Mass .6 88 - 224 g LV Mass AL Index 105.6 49 - 115 g/m2 E/E' lateral 11.69   
 E/E' septal 11.69   
 RVSP 58.3 mmHg MV Peak Gradient 4.6 mmHg E/E' ratio (averaged) 11.69 Est. RA Pressure 10.0 mmHg Mitral Valve E Wave Deceleration Time 114.7 ms  
 Mitral Valve Pressure Half-time 37.6 ms Left Atrium Major Axis 3.23 cm Triscuspid Valve Regurgitation Peak Gradient 48.3 mmHg Mitral Valve Max Velocity 107.64 cm/s MVA VTI 2.2 cm2 LVOT SV 57.2 ml  
 TR Max Velocity 347.33 cm/s PASP 58.3 mmHg HEMA/BSA Pk Isaac 0.5 cm2/m2 HEMA/BSA VTI 0.8 cm2/m2 Left Ventricular Fractional Shortening by 2D 34.99275507 % Left Ventricular Outflow Tract Mean Gradient 4.7574249055211 mmHg Left Ventricular Outflow Tract Mean Velocity 8.44556336773922 cm/s Mitral Valve Deceleration Nicholas 7.161914592090 Mitral valve mean inflow velocity 2.83241415518003 m/s AV Velocity Ratio 0.34   
 AV VTI Ratio 0.5 Aortic valve mean velocity 1.7827179793411 m/s Left Ventricular End Diastolic Volume by Teichholz Method 3.50920810652 mL Left Ventricular End Systolic Volume by Teichholz Method 7.33448310981 mL Left Ventricular Stroke Volume by Teichholz Method 95.378328482 mL Susan Garland Collection Time: 10/30/19 10:00 PM  
Result Value Ref Range Vancomycin,trough 16.6 (H) 5.0 - 10.0 ug/mL Reported dose date: PENDING Reported dose time: PENDING Reported dose: PENDING UNITS METABOLIC PANEL, BASIC Collection Time: 10/31/19  3:44 AM  
Result Value Ref Range Sodium 140 136 - 145 mmol/L Potassium 3.5 3.5 - 5.1 mmol/L Chloride 109 (H) 97 - 108 mmol/L  
 CO2 21 21 - 32 mmol/L Anion gap 10 5 - 15 mmol/L Glucose 105 (H) 65 - 100 mg/dL BUN 12 6 - 20 MG/DL Creatinine 1.14 0.70 - 1.30 MG/DL  
 BUN/Creatinine ratio 11 (L) 12 - 20 GFR est AA >60 >60 ml/min/1.73m2 GFR est non-AA >60 >60 ml/min/1.73m2 Calcium 9.4 8.5 - 10.1 MG/DL  
CBC WITH AUTOMATED DIFF  Collection Time: 10/31/19  3:44 AM  
 Result Value Ref Range WBC 12.3 (H) 4.1 - 11.1 K/uL  
 RBC 3.41 (L) 4.10 - 5.70 M/uL HGB 9.6 (L) 12.1 - 17.0 g/dL HCT 32.4 (L) 36.6 - 50.3 % MCV 95.0 80.0 - 99.0 FL  
 MCH 28.2 26.0 - 34.0 PG  
 MCHC 29.6 (L) 30.0 - 36.5 g/dL  
 RDW 18.3 (H) 11.5 - 14.5 % PLATELET 097 823 - 943 K/uL MPV 10.8 8.9 - 12.9 FL  
 NRBC 0.0 0  WBC ABSOLUTE NRBC 0.00 0.00 - 0.01 K/uL NEUTROPHILS 77 (H) 32 - 75 % LYMPHOCYTES 8 (L) 12 - 49 % MONOCYTES 11 5 - 13 % EOSINOPHILS 1 0 - 7 % BASOPHILS 1 0 - 1 % IMMATURE GRANULOCYTES 2 (H) 0.0 - 0.5 % ABS. NEUTROPHILS 9.4 (H) 1.8 - 8.0 K/UL  
 ABS. LYMPHOCYTES 1.0 0.8 - 3.5 K/UL  
 ABS. MONOCYTES 1.3 (H) 0.0 - 1.0 K/UL  
 ABS. EOSINOPHILS 0.2 0.0 - 0.4 K/UL  
 ABS. BASOPHILS 0.1 0.0 - 0.1 K/UL  
 ABS. IMM. GRANS. 0.2 (H) 0.00 - 0.04 K/UL  
 DF AUTOMATED Assessment/Plan:  
 
Principal Problem: 
  Severe sepsis ([facility] (10/24/2019) Active Problems: 
  Glucose intolerance (impaired glucose tolerance) (8/21/2017) COPD (chronic obstructive pulmonary disease) ([facility] (8/21/2017) Stage 3 chronic kidney disease ([facility] (8/21/2017) Atrial fibrillation ([facility] (8/21/2017) ASCVD (arteriosclerotic cardiovascular disease) (8/21/2017) Overview: Catheterization 6/11/2004 80% stenosis nondominant RCA and 30% stenosis of  
    LAD with a notation of some left main disease but no percentage given. No  
    mention of angioplasty or stent made Anemia (8/13/2019) Nonrheumatic aortic valve stenosis (8/27/2019) Overview: Mild to moderate by echocardiogram 8/27/2019 Fever (10/24/2019) KEVAN (acute kidney injury) ([facility] (10/24/2019) Acute encephalopathy (10/24/2019) Chronic orthostatic hypotension (10/24/2019) Acute bacterial endocarditis (10/31/2019) 
 
  
___________________________________________________ PLAN: 
 
1.   Discontinued Maxipime and continue Vancomycin for Sepsis (S. Bishop), concerning that WBC was up but improved today 12.3 this AM, yesterday 14.8 2. Supplemental oxygen for hypoxemia due to COPD, does not have Pneumonia 3. Follow Hgb (9.0 on admission) with history of significant anemia, now 9.6 4. Continue Plavix with recent cardiac stents 5. Continue Midodrine for orthostatic hypotension 6. Amiodarone for PAF, In a fib this AM, rate OK 7. Follow renal function with ARF on CKD, Creat 1.14 today 8. Continue Protonix 9. Repeat CXR, no Pneumonia but will re check if further increase WBC or spikes fever 10. Continue Flonase nasal spray 11. Mobilize with PT 
12. Resume HS Restoril 13. D/Maxim IV fluids 14. Ativan or Haldol PRN agitation 15. Echocardiogram reveals aortic valvular vegetation so needs 6 weeks antibiotics D/W Dr. Mykel Macias and his help appreciated 35 minutes spent today on this High complexity patient with extremely high risk of deterioration 
 
 
___________________________________________________ Attending Physician: Keyon Rosas MD

## 2019-10-31 NOTE — PROGRESS NOTES
Pharmacy Automatic Renal Dosing Protocol - Antimicrobials Indication for Antimicrobials: HAP Current Regimen of Each Antimicrobial: 
Vancomycin 1g IV q18h; Start Date 10/24; Day # 8 Previous Antimicrobial Therapy: 
Cefepime 2g IV q12h x 7 days; Start Date 10/24; Day # 6 Micro:  
10/24 Blood cx - 4 of 4 bottles - STAPHYLOCOCCUS LUGDUNENSIS - final  
 
Vancomycin Trough Goal Level:  15-20 (AUC: 400 - 600 mg/hr/Liter/day) Date Dose & Interval Measured (mcg/mL) Extrapolated (mcg/mL)  
10/26 12:41 1000mg IV q24h 10.3 10.3  
10/30 22:00 1000 mg IV q18h 16.6 Labs: 
Recent Labs 10/31/19 
7194 10/30/19 
0214 10/29/19 
0255 CREA 1.14 0.92 1.14 BUN 12 11 14 WBC 12.3* 14.8*  -- Temp (24hrs), Av.5 °F (36.9 °C), Min:98.2 °F (36.8 °C), Max:98.9 °F (37.2 °C) Creatinine Clearance (mL/min) or Dialysis: >50 Impression/Plan:  
Vancomycin trough 16.6 10/30 at 2200; true trough 15.92;  Continue current vancomycin regimen WBC improved Antimicrobial stop date:  Vancomycin  Pharmacy will follow daily and adjust medications as appropriate for renal function and/or serum levels. Thank you, LEILANI Bustillos

## 2019-11-01 NOTE — PROGRESS NOTES
2300 
Beside report received from [facility], [address] [facility] (out going) to self Yudi Lisa RN on coming). Discussed related medical and environmental issues involved in caring for this patient. Events and treatments discussed. POC for tonight discussed. Tele sitter in use after pt attempted to get OOB without assistance. A/Ox2, attempts to reorient to time and event. 11/01/2019 
0000 Assessment unchanged. [facility] Pulling at clothes, lines. Attempting to get OOB> Unsuccessful attempts to reorient and provide safe environment. Haldol 2mg IV given. Tele-sitter in use. [id number] [address] [facility] Resting quietly after Ativan 1mg IV given. 0600 Sleeping, VSS.

## 2019-11-01 NOTE — PROGRESS NOTES
Physical Therapy Attempting to see patient for PT this am. Patient recently received Ativan and is soundly sleeping. Will defer therapy at this time and continue to follow. Thank you, Frieda Cartagena, PT

## 2019-11-01 NOTE — PROGRESS NOTES
PROGRESS NOTE 
 
NAME:  Geo Oliver :   3/13/1931 MRN:   [id number] Date/Time:  2019 7:09 AM 
Subjective:  
History:  Chart reviewed and patient seen and examined this AM and D/W his nurse and his daughter this AM and all events noted. He has had 3 recent hospital admissions the first one of which was related to acute coronary syndrome and he had a stent placed in his left main extending into his LAD with a side-port to circumflex. After that hospitalization he developed DVT of his left lower extremity despite being on aspirin and Plavix and thus Xarelto was started and unfortunately he was readmitted to the hospital with an acute GI bleed. During that hospitalization he had a inferior vena cava filter placed and Xarelto was discontinued and he had an EGD and colonoscopy without an obvious source for the bleed. He was discharged to rehab and seemed to be doing well until 10/24 when he was admitted with Sepsis with Temp to 105.7 and CXR read as bilateral LL infiltrate although no cough and not requiring oxygen so doubt he has true Pneumonia (CXR 10/25 w/o definite pneumonia). UA doesn't appear infected. Blood cultures + Staph. Lugdenensis c/w skin and soft tissue source. He has been intermittently agitated and confused since admission and requiring PRN Ativan: however although a little confused he is not sedated this AM. He denies CP, SOB or other cardiac or respiratory c/o. He says he isn't sleeping although that DOES NOT appear to be the case as he is so somnolent that I really have difficulty arousing him. He has no GI/ c/o. He has no neurologic c/o except extremely weak but he did sit in chair X 2 yesterday with max. assistance of 2 for transfer. He has no other c/o on complete ROS. Echo on 10/30 c/w Aortic valve vegetation. Medications reviewed: 
Current Facility-Administered Medications Medication Dose Route Frequency  vancomycin (VANCOCIN) 1,000 mg in 0.9% sodium chloride (MBP/ADV) 250 mL  1,000 mg IntraVENous Q18H  
 sodium chloride (NS) flush 5-40 mL  5-40 mL IntraVENous Q8H  
 sodium chloride (NS) flush 5-40 mL  5-40 mL IntraVENous PRN  
 fluticasone propionate (FLONASE) 50 mcg/actuation nasal spray 2 Spray  2 Spray Both Nostrils DAILY  temazepam (RESTORIL) capsule 30 mg  30 mg Oral QHS PRN  
 guaiFENesin ER (MUCINEX) tablet 600 mg  600 mg Oral Q12H  
 LORazepam (ATIVAN) injection 1 mg  1 mg IntraVENous Q6H PRN  
 haloperidol lactate (HALDOL) injection 2 mg  2 mg IntraVENous Q6H PRN  
 sodium chloride (NS) flush 5-10 mL  5-10 mL IntraVENous PRN  
 albuterol (PROVENTIL HFA, VENTOLIN HFA, PROAIR HFA) inhaler 2 Puff  2 Puff Inhalation Q4H PRN  
 amiodarone (CORDARONE) tablet 200 mg  200 mg Oral DAILY  aspirin chewable tablet 81 mg  81 mg Oral DAILY  clopidogrel (PLAVIX) tablet 75 mg  75 mg Oral DAILY  pantoprazole (PROTONIX) tablet 40 mg  40 mg Oral ACB  levothyroxine (SYNTHROID) tablet 137 mcg  137 mcg Oral ACB  midodrine (PROAMITINE) tablet 10 mg  10 mg Oral TID WITH MEALS  sodium chloride (NS) flush 5-40 mL  5-40 mL IntraVENous Q8H  
 sodium chloride (NS) flush 5-40 mL  5-40 mL IntraVENous PRN  
 heparin (porcine) injection 5,000 Units  5,000 Units SubCUTAneous Q8H Objective:  
Vitals: 
Visit Vitals /40 Pulse 60 Temp 97.5 °F (36.4 °C) Resp 20 Ht 5' 9\" (1.753 m) Wt 206 lb 12.7 oz (93.8 kg) SpO2 95% BMI 30.54 kg/m² O2 Flow Rate (L/min): 2 l/min O2 Device: Room air Temp (24hrs), Av °F (36.7 °C), Min:97.5 °F (36.4 °C), Max:98.5 °F (36.9 °C) Last 24hr Input/Output: 
 
Intake/Output Summary (Last 24 hours) at 2019 3040 Last data filed at 10/31/2019 2338 Gross per 24 hour Intake  Output 300 ml Net -300 ml PHYSICAL EXAM: 
General:     Alert this AM but very lethargic, cooperative, no distress, appears stated age. Head:    Normocephalic, without obvious abnormality, atraumatic. Eyes:    Conjunctivae/corneas clear. PERRLA Nose:   Nares normal. No drainage or sinus tenderness. Throat:     Lips, mucosa, and tongue normal.  No Thrush Neck:   Supple, symmetrical,  no adenopathy, thyroid: non tender 
   no carotid bruit and no JVD. Back:     Symmetric,  No CVA tenderness. Lungs:    Clear to auscultation bilaterally. No Wheezing or Rhonchi. No rales. Heart:    Regular rate and rhythm,  no murmur, rub or gallop. Abdomen:    Soft, non-tender. Not distended. Bowel sounds normal. No masses Extremities:  Extremities normal, atraumatic, No cyanosis. No edema. No clubbing Lymph nodes:  Cervical, supraclavicular normal. 
Neurologic:  Normal strength, alert and oriented X 3 with a little confusion and significant lethargy. Skin:                 No rash and no obvious source of infection. IV site looks OK Lab Data Reviewed: 
 
No results found for this or any previous visit (from the past 24 hour(s)). Assessment/Plan:  
 
Principal Problem: 
  Severe sepsis ([address] (10/24/2019) Active Problems: 
  Glucose intolerance (impaired glucose tolerance) (8/21/2017) COPD (chronic obstructive pulmonary disease) ([facility] (8/21/2017) Stage 3 chronic kidney disease ([facility] (8/21/2017) Atrial fibrillation ([facility] (8/21/2017) ASCVD (arteriosclerotic cardiovascular disease) (8/21/2017) Overview: Catheterization 6/11/2004 80% stenosis nondominant RCA and 30% stenosis of  
    LAD with a notation of some left main disease but no percentage given. No  
    mention of angioplasty or stent made Anemia (8/13/2019) Nonrheumatic aortic valve stenosis (8/27/2019) Overview: Mild to moderate by echocardiogram 8/27/2019 Fever (10/24/2019) KEVAN (acute kidney injury) ([address] (10/24/2019) Acute encephalopathy (10/24/2019) Chronic orthostatic hypotension (10/24/2019) Acute bacterial endocarditis (10/31/2019) 
 
  
___________________________________________________ PLAN: 
 
1. Discontinued Maxipime and continue Vancomycin (D #9) for Sepsis (S. Lugdunensis), concerning that WBC was up but improved yesterday 12.3 from 14.8 2. Supplemental oxygen for hypoxemia due to COPD, does not have Pneumonia 3. Follow Hgb (9.0 on admission) with history of significant anemia, now 9.6 4. Continue Plavix with recent cardiac stents 5. Continue Midodrine for orthostatic hypotension 6. Amiodarone for PAF, In a fib this AM, rate OK 7. Follow renal function with ARF on CKD, Creat 1.14 yesterday 8. Continue Protonix 9. Repeat CXR, no Pneumonia but will re check if further increase WBC or spikes fever 10. Continue Flonase nasal spray 11. Mobilize with PT 
12. Resume HS Restoril 13. D/Maxim IV fluids 14. Ativan or Haldol PRN agitation 15. Echocardiogram reveals aortic valvular vegetation so needs 6 weeks antibiotics D/W Dr. Dionna Vega and his help appreciated High complexity patient with extremely high risk of deterioration 
 
 
___________________________________________________ Attending Physician: Carri James MD

## 2019-11-01 NOTE — PROGRESS NOTES
1800- Pt found trying to get out of bed, pulling at lines, extreme fall risk with 2 max ass during day shift. Tele sitter placed in room at present time. Family made aware that this might happen during day shift.

## 2019-11-02 NOTE — ROUTINE PROCESS
0830 haldol for aggitation 1100 resting comfortable 200  Dr Luma Ribera at bedside, code status to DNR 
 
 
1500 Voiding in urinal. 
 
1730 repositioned Seroquel started

## 2019-11-02 NOTE — PROGRESS NOTES
PROGRESS NOTE 
 
NAME:  Andrew Daugherty :   3/13/1931 MRN:   [id number] Date/Time:  2019 12:46 PM 
Subjective:  
History:  Chart reviewed and patient seen and examined this AM and D/W his nurse and his daughters at length (>40 min) this AM and all events noted. He has had 3 recent hospital admissions the first one of which was related to acute coronary syndrome and he had a stent placed in his left main extending into his LAD with a side-port to circumflex. After that hospitalization he developed DVT of his left lower extremity despite being on aspirin and Plavix and thus Xarelto was started and unfortunately he was readmitted to the hospital with an acute GI bleed. During that hospitalization he had a inferior vena cava filter placed and Xarelto was discontinued and he had an EGD and colonoscopy without an obvious source for the bleed. He was discharged to rehab and seemed to be doing well until 10/24 when he was admitted with Sepsis with Temp to 105.7 and CXR read as bilateral LL infiltrate although no cough and not requiring oxygen so doubt he has true Pneumonia (CXR 10/25 w/o definite pneumonia). UA doesn't appear infected. Blood cultures + Staph. Lugdenensis c/w skin and soft tissue source. He has been intermittently agitated and confused since admission and requiring PRN Ativan and Haldol: however markedly confused/ sedated this AM. Unable to give history himself today but per family and nurse. He denies CP, SOB or other cardiac or respiratory c/o. He has no GI/ c/o. He has no neurologic c/o except the confusion. He has no other c/o on complete ROS. Echo on 10/30 c/w Aortic valve vegetation. Medications reviewed: 
Current Facility-Administered Medications Medication Dose Route Frequency  vancomycin (VANCOCIN) 1,000 mg in 0.9% sodium chloride (MBP/ADV) 250 mL  1,000 mg IntraVENous Q18H  Please draw vancomycin trough before 2300 dose on    Other Rx Dosing/Monitoring  sodium chloride (NS) flush 5-40 mL  5-40 mL IntraVENous PRN  
 QUEtiapine (SEROquel) tablet 25 mg  25 mg Oral BID  
 haloperidol lactate (HALDOL) injection 1 mg  1 mg IntraVENous Q6H PRN  
 fluticasone propionate (FLONASE) 50 mcg/actuation nasal spray 2 Spray  2 Spray Both Nostrils DAILY  temazepam (RESTORIL) capsule 30 mg  30 mg Oral QHS PRN  
 guaiFENesin ER (MUCINEX) tablet 600 mg  600 mg Oral Q12H  
 LORazepam (ATIVAN) injection 1 mg  1 mg IntraVENous Q6H PRN  
 albuterol (PROVENTIL HFA, VENTOLIN HFA, PROAIR HFA) inhaler 2 Puff  2 Puff Inhalation Q4H PRN  
 amiodarone (CORDARONE) tablet 200 mg  200 mg Oral DAILY  aspirin chewable tablet 81 mg  81 mg Oral DAILY  clopidogrel (PLAVIX) tablet 75 mg  75 mg Oral DAILY  pantoprazole (PROTONIX) tablet 40 mg  40 mg Oral ACB  levothyroxine (SYNTHROID) tablet 137 mcg  137 mcg Oral ACB  midodrine (PROAMITINE) tablet 10 mg  10 mg Oral TID WITH MEALS  sodium chloride (NS) flush 5-40 mL  5-40 mL IntraVENous Q8H  
 heparin (porcine) injection 5,000 Units  5,000 Units SubCUTAneous Q8H Objective:  
Vitals: 
Visit Vitals /67 Pulse 95 Temp 98.5 °F (36.9 °C) Resp 18 Ht 5' 9\" (1.753 m) Wt 208 lb 3.2 oz (94.4 kg) SpO2 95% BMI 30.75 kg/m² O2 Flow Rate (L/min): 2 l/min O2 Device: Room air Temp (24hrs), Av.8 °F (36.6 °C), Min:97 °F (36.1 °C), Max:98.5 °F (36.9 °C) Last 24hr Input/Output: 
 
Intake/Output Summary (Last 24 hours) at 2019 1246 Last data filed at 2019 0400 Gross per 24 hour Intake 180 ml Output 200 ml Net -20 ml PHYSICAL EXAM: 
General:     Somnolent but arousable this AM but very lethargic and restless, no distress, appears stated age. Head:    Normocephalic, without obvious abnormality, atraumatic. Eyes:    Conjunctivae/corneas clear. PERRLA Nose:   Nares normal. No drainage or sinus tenderness. Throat:     Lips, mucosa, and tongue normal.  No Thrush Neck:   Supple, symmetrical,  no adenopathy, thyroid: non tender 
   no carotid bruit and no JVD. Back:     Symmetric,  No CVA tenderness. Lungs:    Clear to auscultation bilaterally. No Wheezing or Rhonchi. No rales. Heart:    Regular rate and rhythm,  no murmur, rub or gallop. Abdomen:    Soft, non-tender. Not distended. Bowel sounds normal. No masses Extremities:  Extremities normal, atraumatic, No cyanosis. No edema. No clubbing Lymph nodes:  Cervical, supraclavicular normal. 
Neurologic:  Normal strength, Somnolent but arousable and with confusion and significant lethargy. Skin:                 No rash and no obvious source of infection. IV site looks OK Lab Data Reviewed: 
 
Recent Results (from the past 24 hour(s)) METABOLIC PANEL, BASIC Collection Time: 11/02/19  5:50 AM  
Result Value Ref Range Sodium 139 136 - 145 mmol/L Potassium 3.7 3.5 - 5.1 mmol/L Chloride 110 (H) 97 - 108 mmol/L  
 CO2 22 21 - 32 mmol/L Anion gap 7 5 - 15 mmol/L Glucose 111 (H) 65 - 100 mg/dL BUN 11 6 - 20 MG/DL Creatinine 1.30 0.70 - 1.30 MG/DL  
 BUN/Creatinine ratio 8 (L) 12 - 20 GFR est AA >60 >60 ml/min/1.73m2 GFR est non-AA 52 (L) >60 ml/min/1.73m2 Calcium 9.5 8.5 - 10.1 MG/DL  
CBC WITH AUTOMATED DIFF Collection Time: 11/02/19  5:50 AM  
Result Value Ref Range WBC 14.0 (H) 4.1 - 11.1 K/uL  
 RBC 3.52 (L) 4.10 - 5.70 M/uL  
 HGB 10.0 (L) 12.1 - 17.0 g/dL HCT 32.9 (L) 36.6 - 50.3 % MCV 93.5 80.0 - 99.0 FL  
 MCH 28.4 26.0 - 34.0 PG  
 MCHC 30.4 30.0 - 36.5 g/dL  
 RDW 18.5 (H) 11.5 - 14.5 % PLATELET 229 836 - 549 K/uL MPV 10.9 8.9 - 12.9 FL  
 NRBC 0.0 0  WBC ABSOLUTE NRBC 0.00 0.00 - 0.01 K/uL NEUTROPHILS 83 (H) 32 - 75 % LYMPHOCYTES 7 (L) 12 - 49 % MONOCYTES 8 5 - 13 % EOSINOPHILS 1 0 - 7 % BASOPHILS 0 0 - 1 % IMMATURE GRANULOCYTES 1 (H) 0.0 - 0.5 % ABS. NEUTROPHILS 11.5 (H) 1.8 - 8.0 K/UL ABS. LYMPHOCYTES 0.9 0.8 - 3.5 K/UL  
 ABS. MONOCYTES 1.2 (H) 0.0 - 1.0 K/UL  
 ABS. EOSINOPHILS 0.1 0.0 - 0.4 K/UL  
 ABS. BASOPHILS 0.1 0.0 - 0.1 K/UL  
 ABS. IMM. GRANS. 0.2 (H) 0.00 - 0.04 K/UL  
 DF AUTOMATED Assessment/Plan:  
 
Principal Problem: 
  Severe sepsis ([facility].) (10/24/2019) Active Problems: 
  Glucose intolerance (impaired glucose tolerance) (8/21/2017) COPD (chronic obstructive pulmonary disease) ([facility] (8/21/2017) Stage 3 chronic kidney disease ([facility] (8/21/2017) Atrial fibrillation ([facility] (8/21/2017) ASCVD (arteriosclerotic cardiovascular disease) (8/21/2017) Overview: Catheterization 6/11/2004 80% stenosis nondominant RCA and 30% stenosis of  
    LAD with a notation of some left main disease but no percentage given. No  
    mention of angioplasty or stent made Anemia (8/13/2019) Nonrheumatic aortic valve stenosis (8/27/2019) Overview: Mild to moderate by echocardiogram 8/27/2019 Fever (10/24/2019) KEVAN (acute kidney injury) ([facility].) (10/24/2019) Acute encephalopathy (10/24/2019) Chronic orthostatic hypotension (10/24/2019) Acute bacterial endocarditis (10/31/2019) 
 
  
___________________________________________________ PLAN: 
 
1. Discontinued Maxipime and continued Vancomycin (D #10) for Sepsis (S. Lugdunensis), concerning that WBC was up to 14.0, based on sensitivities change to Ancef as less Nephrotoxic, repeat cx 10/31 NGSF at 48 hr 
2. Supplemental oxygen for hypoxemia due to COPD, does not have Pneumonia 3. Follow Hgb (9.0 on admission) with history of significant anemia, now 10.0 4. Continue Plavix with recent cardiac stents 5. Continue Midodrine for orthostatic hypotension 6. Amiodarone for PAF, In a fib this AM, rate OK 7. Follow renal function with ARF on CKD, Creat 1.30 today from 1.07 yesterday 8. Continue Protonix 9.   Repeat CXR, no Pneumonia but will re check if further increase WBC or spikes fever 10. Continue Flonase nasal spray 11. Mobilize with PT 
12. Resume HS Restoril 13. D/Maxim IV fluids 14. Ativan or Haldol PRN agitation, Try scheduled Seroquel 15. Echocardiogram reveals aortic valvular vegetation so needs 6 weeks antibiotics 16. Very long discussion this AM with 2 daughters Re; continue current care vs withdraw care 17. DNR per family and patient wishes 18. D/C Telemetry D/W Dr. Marivel Castellanos and his help appreciated Greater than 1 hour spent in direct care of this patient critically ill patient this AM with > 50% in counseling and coordination of care in this Critically ill patient with multi-organ system failure and a Very High complexity patient with extremely high risk of deterioration 
 
 
___________________________________________________ Attending Physician: Nargis Mallory MD

## 2019-11-03 NOTE — ROUTINE PROCESS
In bed, resting children at bedside, no distress 2:45 PM 
 
Patient veery restless this afternoon, removing covers, attempting climbing out of bed. picking at cloths. Family defers ativan or haldol at this time. Continue to monitor patient . 1630  Ativan given 5:14 PM 
 
Refusing po pills or intake.

## 2019-11-03 NOTE — PROGRESS NOTES
PROGRESS NOTE 
 
NAME:  Carole Joshi :   3/13/1931 MRN:   [id number] Date/Time:  11/3/2019 11:35 AM 
Subjective:  
History:  Chart reviewed and patient seen and examined this AM and D/W his nurse and his daughter at length this AM and all events noted. He has had 3 recent hospital admissions the first one of which was related to acute coronary syndrome and he had a stent placed in his left main extending into his LAD with a side-port to circumflex. After that hospitalization he developed DVT of his left lower extremity despite being on aspirin and Plavix and thus Xarelto was started and unfortunately he was readmitted to the hospital with an acute GI bleed. During that hospitalization he had a inferior vena cava filter placed and Xarelto was discontinued and he had an EGD and colonoscopy without an obvious source for the bleed. He was discharged to rehab and seemed to be doing well until 10/24 when he was admitted with Sepsis with Temp to 105.7 and CXR read as bilateral LL infiltrate although no cough and not requiring oxygen so further evaluation ruled out Pneumonia (CXR 10/25 w/o definite pneumonia). UA not infected. Blood cultures + Staph. Lugdenensis c/w skin and soft tissue source. He has been intermittently agitated and confused since admission and requiring PRN Ativan and Haldol: however markedly confused/ sedated yesterday AM  So changed to scheduled Seroquel and today is improved although still somewhat lethargic. Unable to give history himself today but per family and nurse as follows. He denies CP, SOB or other cardiac or respiratory c/o. He has no GI/ c/o. He has no neurologic c/o except the confusion. He is able to be aroused today and answer questionsHe has no other c/o on complete ROS. Echo on 10/30 c/w Aortic valve vegetation. Medications reviewed: 
Current Facility-Administered Medications Medication Dose Route Frequency  ceFAZolin (ANCEF) 1 g in 0.9% sodium chloride (MBP/ADV) 50 mL  1 g IntraVENous Q12H  
 dextrose 5 % - 0.45% NaCl infusion  50 mL/hr IntraVENous CONTINUOUS  
 sodium chloride (NS) flush 5-40 mL  5-40 mL IntraVENous PRN  
 QUEtiapine (SEROquel) tablet 25 mg  25 mg Oral BID  
 haloperidol lactate (HALDOL) injection 1 mg  1 mg IntraVENous Q6H PRN  
 fluticasone propionate (FLONASE) 50 mcg/actuation nasal spray 2 Spray  2 Spray Both Nostrils DAILY  temazepam (RESTORIL) capsule 30 mg  30 mg Oral QHS PRN  
 guaiFENesin ER (MUCINEX) tablet 600 mg  600 mg Oral Q12H  
 LORazepam (ATIVAN) injection 1 mg  1 mg IntraVENous Q6H PRN  
 albuterol (PROVENTIL HFA, VENTOLIN HFA, PROAIR HFA) inhaler 2 Puff  2 Puff Inhalation Q4H PRN  
 amiodarone (CORDARONE) tablet 200 mg  200 mg Oral DAILY  aspirin chewable tablet 81 mg  81 mg Oral DAILY  clopidogrel (PLAVIX) tablet 75 mg  75 mg Oral DAILY  pantoprazole (PROTONIX) tablet 40 mg  40 mg Oral ACB  levothyroxine (SYNTHROID) tablet 137 mcg  137 mcg Oral ACB  midodrine (PROAMITINE) tablet 10 mg  10 mg Oral TID WITH MEALS  sodium chloride (NS) flush 5-40 mL  5-40 mL IntraVENous Q8H  
 heparin (porcine) injection 5,000 Units  5,000 Units SubCUTAneous Q8H Objective:  
Vitals: 
Visit Vitals BP (!) 93/39 Pulse 95 Temp 97.9 °F (36.6 °C) Resp 22 Ht 5' 9\" (1.753 m) Wt 205 lb 9.6 oz (93.3 kg) SpO2 98% BMI 30.36 kg/m² O2 Flow Rate (L/min): 4 l/min O2 Device: Room air Temp (24hrs), Av.2 °F (36.8 °C), Min:97.4 °F (36.3 °C), Max:99.7 °F (37.6 °C) Last 24hr Input/Output: 
 
Intake/Output Summary (Last 24 hours) at 11/3/2019 1134 Last data filed at 11/3/2019 6447 Gross per 24 hour Intake 50 ml Output 100 ml Net -50 ml PHYSICAL EXAM: 
General:     Somnolent but arousable this AM and less lethargic and restless, no distress, appears stated age. Head:    Normocephalic, without obvious abnormality, atraumatic. Eyes:    Conjunctivae/corneas clear. PERRLA Nose:   Nares normal. No drainage or sinus tenderness. Throat:     Lips, mucosa, and tongue normal.  No Thrush Neck:   Supple, symmetrical,  no adenopathy, thyroid: non tender 
   no carotid bruit and no JVD. Back:     Symmetric,  No CVA tenderness. Lungs:    Clear to auscultation bilaterally. No Wheezing or Rhonchi. No rales. Heart:    Regular rate and rhythm,  no murmur, rub or gallop. Abdomen:    Soft, non-tender. Not distended. Bowel sounds normal. No masses Extremities:  Extremities normal, atraumatic, No cyanosis. No edema. No clubbing Lymph nodes:  Cervical, supraclavicular normal. 
Neurologic:  General decreased strength, Somnolent but arousable and will answer questions appropriately today Skin:                 No rash and no obvious source of infection. IV site looks OK Lab Data Reviewed: 
 
Recent Results (from the past 24 hour(s)) METABOLIC PANEL, BASIC Collection Time: 11/03/19  4:21 AM  
Result Value Ref Range Sodium 139 136 - 145 mmol/L Potassium 3.8 3.5 - 5.1 mmol/L Chloride 111 (H) 97 - 108 mmol/L  
 CO2 22 21 - 32 mmol/L Anion gap 6 5 - 15 mmol/L Glucose 107 (H) 65 - 100 mg/dL BUN 16 6 - 20 MG/DL Creatinine 1.66 (H) 0.70 - 1.30 MG/DL  
 BUN/Creatinine ratio 10 (L) 12 - 20 GFR est AA 48 (L) >60 ml/min/1.73m2 GFR est non-AA 39 (L) >60 ml/min/1.73m2 Calcium 9.2 8.5 - 10.1 MG/DL  
CBC WITH AUTOMATED DIFF Collection Time: 11/03/19  4:21 AM  
Result Value Ref Range WBC 19.9 (H) 4.1 - 11.1 K/uL  
 RBC 3.41 (L) 4.10 - 5.70 M/uL HGB 9.5 (L) 12.1 - 17.0 g/dL HCT 31.4 (L) 36.6 - 50.3 % MCV 92.1 80.0 - 99.0 FL  
 MCH 27.9 26.0 - 34.0 PG  
 MCHC 30.3 30.0 - 36.5 g/dL  
 RDW 18.6 (H) 11.5 - 14.5 % PLATELET 200 823 - 128 K/uL MPV 11.5 8.9 - 12.9 FL  
 NRBC 0.0 0  WBC ABSOLUTE NRBC 0.00 0.00 - 0.01 K/uL NEUTROPHILS 84 (H) 32 - 75 % LYMPHOCYTES 6 (L) 12 - 49 % MONOCYTES 9 5 - 13 % EOSINOPHILS 0 0 - 7 % BASOPHILS 0 0 - 1 % IMMATURE GRANULOCYTES 1 (H) 0.0 - 0.5 % ABS. NEUTROPHILS 16.7 (H) 1.8 - 8.0 K/UL  
 ABS. LYMPHOCYTES 1.1 0.8 - 3.5 K/UL  
 ABS. MONOCYTES 1.8 (H) 0.0 - 1.0 K/UL  
 ABS. EOSINOPHILS 0.1 0.0 - 0.4 K/UL  
 ABS. BASOPHILS 0.1 0.0 - 0.1 K/UL  
 ABS. IMM. GRANS. 0.2 (H) 0.00 - 0.04 K/UL  
 DF AUTOMATED Assessment/Plan:  
 
Principal Problem: 
  Severe sepsis ([facility] (10/24/2019) Active Problems: 
  Glucose intolerance (impaired glucose tolerance) (8/21/2017) COPD (chronic obstructive pulmonary disease) ([facility] (8/21/2017) Stage 3 chronic kidney disease ([facility] (8/21/2017) Atrial fibrillation ([facility] (8/21/2017) ASCVD (arteriosclerotic cardiovascular disease) (8/21/2017) Overview: Catheterization 6/11/2004 80% stenosis nondominant RCA and 30% stenosis of  
    LAD with a notation of some left main disease but no percentage given. No  
    mention of angioplasty or stent made Anemia (8/13/2019) Nonrheumatic aortic valve stenosis (8/27/2019) Overview: Mild to moderate by echocardiogram 8/27/2019 Fever (10/24/2019) KEVAN (acute kidney injury) ([facility] (10/24/2019) Acute encephalopathy (10/24/2019) Chronic orthostatic hypotension (10/24/2019) Acute bacterial endocarditis (10/31/2019) 
 
  
___________________________________________________ PLAN: 
 
1. Discontinued Maxipime and continued Vancomycin through yesterday 11/2 (D #10) for Sepsis (S. Lugdunensis), concerning that WBC was up to 14.0, so based on sensitivities changed to Ancef as less Nephrotoxic, repeat cx 10/31 NGSF at 72 hr, WBC 19.9 today 2. Supplemental oxygen for hypoxemia due to COPD, does not have Pneumonia 3. Follow Hgb (9.0 on admission) with history of significant anemia, now 9.5 4. Continue Plavix with recent cardiac stents 5. Continue Midodrine for orthostatic hypotension 6. Amiodarone for PAF, In a fib this AM, rate OK 7. Follow renal function with ARF on CKD, Creat 1.66 today from 1.30 yesterday so start some IV fluids and stopped Vanc yesterday 11/2 8. Continue Protonix 9. Repeat CXR, no Pneumonia but will re check if further increase WBC or spikes fever 10. Continue Flonase nasal spray 11. Mobilize with PT 
12. Resume HS Restoril 13. Resume IV fluids 14. Ativan or Haldol PRN agitation only if absolutely needed, Continue scheduled Seroquel 15. Echocardiogram reveals aortic valvular vegetation so needs 6 weeks antibiotics 16. DNR per family and patient wishes 17. D/Maxim Telemetry D/W Dr. Deshaun La and his help appreciated 40 minutes spent in direct care of this patient critically ill patient this AM with > 50% in counseling and coordination of care. Critically ill patient with multi-organ system failure and a Very High complexity patient with extremely high risk of further deterioration 
 
 
___________________________________________________ Attending Physician: Kimble Lundborg, MD

## 2019-11-03 NOTE — ROUTINE PROCESS
Family requesting palliative meeting and discussion of goals of care, contacting Dr Kera Moser to get order in to facilitate earliest possible visit on Monday.

## 2019-11-03 NOTE — PROGRESS NOTES
Pharmacy Automatic Renal Dosing Protocol - Antimicrobials Indication for Antimicrobials: bacteremia, AV endocarditis Current Regimen of Each Antimicrobial: 
Cefazolin 1g IV q8h, start , day 2 Previous Antimicrobial Therapy: 
Cefepime 2g IV q12h x 7 days; Start Date 10/24; Day # 6 Vancomycin 1g IV q18h; Start Date 10/24; Day # 10 Micro:  
10/24 Blood cx - 4 of 4 bottles - STAPHYLOCOCCUS LUGDUNENSIS (oxacillin sensitive) - final  
10/31 blood cx NGTD - pending Vancomycin Trough Goal Level:  15-20 (AUC: 400 - 600 mg/hr/Liter/day) Date Dose & Interval Measured (mcg/mL) Extrapolated (mcg/mL)  
10/26 12:41 1000mg IV q24h 10.3 10.3  
10/30 22:00 1000 mg IV q18h 16.6 Labs: 
Recent Labs 19 
0421 19 
0550 19 
0750 CREA 1.66* 1.30 1.07 BUN 16 11 11 WBC 19.9* 14.0*  -- Temp (24hrs), Av.3 °F (36.8 °C), Min:97.4 °F (36.3 °C), Max:99.7 °F (37.6 °C) Imaging: 
10/30 TTE: Aortic Valve: Aortic valve focal thickening present. Mild aortic valve regurgitation is present. Possible vegetation is present on the aortic valve. Creatinine Clearance (mL/min) or Dialysis: 30.8 mL/min (IBW) Impression/Plan:  
Scr trending up to 1.66 today Decrease cefazolin to 1g IV q12h BMP ordered daily Antimicrobial stop date:  6 weeks, likely d/c  Pharmacy will follow daily and adjust medications as appropriate for renal function and/or serum levels. Thank you, LEILANI Wilson

## 2019-11-04 PROBLEM — R41.0 DELIRIUM: Status: ACTIVE | Noted: 2019-01-01

## 2019-11-04 NOTE — PROGRESS NOTES
PROGRESS NOTE 
 
NAME:  Andrew Daugherty :   3/13/1931 MRN:   [id number] Date/Time:  2019 7:24 AM 
Subjective:  
History:  Chart reviewed and patient seen and examined this AM and D/W his nurse and all events noted. He has had 3 recent hospital admissions the first one of which was related to acute coronary syndrome and he had a stent placed in his left main extending into his LAD with a side-port to circumflex. After that hospitalization he developed DVT of his left lower extremity despite being on aspirin and Plavix and thus Xarelto was started and unfortunately he was readmitted to the hospital with an acute GI bleed. During that hospitalization he had a inferior vena cava filter placed and Xarelto was discontinued and he had an EGD and colonoscopy without an obvious source for the bleed. He was discharged to rehab and seemed to be doing well until 10/24 when he was admitted with Sepsis with Temp to 105.7 and CXR read as bilateral LL infiltrate although no cough and not requiring oxygen so further evaluation ruled out Pneumonia (CXR 10/25 w/o definite pneumonia). UA not infected. Blood cultures + Staph. Lugdenensis c/w skin and soft tissue source. He has been intermittently agitated and confused since admission and requiring PRN Ativan and Haldol: however markedly confused/ sedated so changed to scheduled Seroquel on  and now is improved although still somewhat lethargic and confused but he is oriented to name, hospital and calls me by name. Unable to give history himself today but per his nurse as follows. He denies CP, SOB or other cardiac or respiratory c/o. He has no GI/ c/o. He has no neurologic c/o except the confusion. He is able to be aroused today and answer questions. He has no other c/o on complete ROS. Echo on 10/30 c/w Aortic valve vegetation. Medications reviewed: 
Current Facility-Administered Medications Medication Dose Route Frequency  ceFAZolin (ANCEF) 1 g in 0.9% sodium chloride (MBP/ADV) 50 mL  1 g IntraVENous Q12H  
 dextrose 5 % - 0.45% NaCl infusion  100 mL/hr IntraVENous CONTINUOUS  
 sodium chloride (NS) flush 5-40 mL  5-40 mL IntraVENous PRN  
 QUEtiapine (SEROquel) tablet 25 mg  25 mg Oral BID  
 haloperidol lactate (HALDOL) injection 1 mg  1 mg IntraVENous Q6H PRN  
 fluticasone propionate (FLONASE) 50 mcg/actuation nasal spray 2 Spray  2 Spray Both Nostrils DAILY  temazepam (RESTORIL) capsule 30 mg  30 mg Oral QHS PRN  
 guaiFENesin ER (MUCINEX) tablet 600 mg  600 mg Oral Q12H  
 LORazepam (ATIVAN) injection 1 mg  1 mg IntraVENous Q6H PRN  
 albuterol (PROVENTIL HFA, VENTOLIN HFA, PROAIR HFA) inhaler 2 Puff  2 Puff Inhalation Q4H PRN  
 amiodarone (CORDARONE) tablet 200 mg  200 mg Oral DAILY  aspirin chewable tablet 81 mg  81 mg Oral DAILY  clopidogrel (PLAVIX) tablet 75 mg  75 mg Oral DAILY  pantoprazole (PROTONIX) tablet 40 mg  40 mg Oral ACB  levothyroxine (SYNTHROID) tablet 137 mcg  137 mcg Oral ACB  midodrine (PROAMITINE) tablet 10 mg  10 mg Oral TID WITH MEALS  sodium chloride (NS) flush 5-40 mL  5-40 mL IntraVENous Q8H  
 heparin (porcine) injection 5,000 Units  5,000 Units SubCUTAneous Q8H Objective:  
Vitals: 
Visit Vitals /55 Pulse 87 Temp 97.4 °F (36.3 °C) Resp 27 Ht 5' 9\" (1.753 m) Wt 210 lb 8.6 oz (95.5 kg) SpO2 100% BMI 31.09 kg/m² O2 Flow Rate (L/min): 4 l/min O2 Device: Room air Temp (24hrs), Av.9 °F (36.6 °C), Min:97.4 °F (36.3 °C), Max:98.5 °F (36.9 °C) Last 24hr Input/Output: 
No intake or output data in the 24 hours ending 19 0750 PHYSICAL EXAM: 
General:     Somnolent but easily arousable this AM and less lethargic although still quite restless, no distress, appears stated age. Head:    Normocephalic, without obvious abnormality, atraumatic. Eyes:    Conjunctivae/corneas clear. PERRLA Nose:   Nares normal. No drainage or sinus tenderness. Throat:     Lips, mucosa, and tongue normal.  No Thrush Neck:   Supple, symmetrical,  no adenopathy, thyroid: non tender 
   no carotid bruit and no JVD. Back:     Symmetric,  No CVA tenderness. Lungs:    Clear to auscultation bilaterally. No Wheezing or Rhonchi. No rales. Heart:    Irregularly irregular,  2/6 systolic murmur w/o change and w/o rub or gallop. Abdomen:    Soft, non-tender. Not distended. Bowel sounds normal. No masses Extremities:  Extremities normal, atraumatic, No cyanosis. No edema. No clubbing Lymph nodes:  Cervical, supraclavicular normal. 
Neurologic:  General decreased strength, Somnolent but arousable and will answer questions appropriately today Skin:                 No rash and no obvious source of infection. IV site looks OK Lab Data Reviewed: 
 
Recent Results (from the past 24 hour(s)) METABOLIC PANEL, BASIC Collection Time: 11/04/19  3:54 AM  
Result Value Ref Range Sodium 138 136 - 145 mmol/L Potassium 3.9 3.5 - 5.1 mmol/L Chloride 109 (H) 97 - 108 mmol/L  
 CO2 22 21 - 32 mmol/L Anion gap 7 5 - 15 mmol/L Glucose 127 (H) 65 - 100 mg/dL BUN 22 (H) 6 - 20 MG/DL Creatinine 2.11 (H) 0.70 - 1.30 MG/DL  
 BUN/Creatinine ratio 10 (L) 12 - 20 GFR est AA 36 (L) >60 ml/min/1.73m2 GFR est non-AA 30 (L) >60 ml/min/1.73m2 Calcium 8.9 8.5 - 10.1 MG/DL  
CBC WITH AUTOMATED DIFF Collection Time: 11/04/19  3:54 AM  
Result Value Ref Range WBC 19.2 (H) 4.1 - 11.1 K/uL  
 RBC 3.46 (L) 4.10 - 5.70 M/uL HGB 9.6 (L) 12.1 - 17.0 g/dL HCT 31.6 (L) 36.6 - 50.3 % MCV 91.3 80.0 - 99.0 FL  
 MCH 27.7 26.0 - 34.0 PG  
 MCHC 30.4 30.0 - 36.5 g/dL  
 RDW 18.5 (H) 11.5 - 14.5 % PLATELET 050 (L) 846 - 400 K/uL MPV 11.8 8.9 - 12.9 FL  
 NRBC 0.0 0  WBC ABSOLUTE NRBC 0.00 0.00 - 0.01 K/uL NEUTROPHILS 82 (H) 32 - 75 % LYMPHOCYTES 7 (L) 12 - 49 % MONOCYTES 9 5 - 13 % EOSINOPHILS 1 0 - 7 % BASOPHILS 0 0 - 1 % IMMATURE GRANULOCYTES 1 (H) 0.0 - 0.5 % ABS. NEUTROPHILS 15.8 (H) 1.8 - 8.0 K/UL  
 ABS. LYMPHOCYTES 1.4 0.8 - 3.5 K/UL  
 ABS. MONOCYTES 1.6 (H) 0.0 - 1.0 K/UL  
 ABS. EOSINOPHILS 0.1 0.0 - 0.4 K/UL  
 ABS. BASOPHILS 0.1 0.0 - 0.1 K/UL  
 ABS. IMM. GRANS. 0.2 (H) 0.00 - 0.04 K/UL  
 DF AUTOMATED Assessment/Plan:  
 
Principal Problem: 
  Severe sepsis ([facility] (10/24/2019) Active Problems: 
  Glucose intolerance (impaired glucose tolerance) (8/21/2017) COPD (chronic obstructive pulmonary disease) ([facility] (8/21/2017) Stage 3 chronic kidney disease ([facility] (8/21/2017) Atrial fibrillation ([facility] (8/21/2017) ASCVD (arteriosclerotic cardiovascular disease) (8/21/2017) Overview: Catheterization 6/11/2004 80% stenosis nondominant RCA and 30% stenosis of  
    LAD with a notation of some left main disease but no percentage given. No  
    mention of angioplasty or stent made Anemia (8/13/2019) Nonrheumatic aortic valve stenosis (8/27/2019) Overview: Mild to moderate by echocardiogram 8/27/2019 Fever (10/24/2019) KEVAN (acute kidney injury) ([facility] (10/24/2019) Acute encephalopathy (10/24/2019) Chronic orthostatic hypotension (10/24/2019) Acute bacterial endocarditis (10/31/2019) 
 
  
___________________________________________________ PLAN: 
 
1. Discontinued Maxipime and continued Vancomycin through 11/2 (D #10) for Sepsis (S. Lugdunensis), concerning that WBC was up to 14.0, so based on sensitivities changed to Ancef as less Nephrotoxic, repeat cx 10/31 NGSF at 4 days, WBC 19.2 today from 19.9 yesterday 2. Supplemental oxygen for hypoxemia due to COPD, does not have Pneumonia 3. Follow Hgb (9.0 on admission) with history of significant anemia, now 9.6 4. Continue Plavix with recent cardiac stents 5. Continue Midodrine for orthostatic hypotension 6. Amiodarone for PAF, In a fib this AM, rate OK 7. Follow renal function with ARF on CKD, Creat 2.11 today from 1.66 yesterday and 1.3 2 days ago so increase IV fluids as taking in little to nothing PO and stopped Vanc 11/2 8. Continue Protonix 9. Repeat CXR, no Pneumonia but will re check if further increase WBC or spikes fever 10. Continue Flonase nasal spray 11. Mobilize with PT 
12. Resume HS Restoril 13. Resumed IV fluids and increased this AM 
14. Ativan or Haldol PRN agitation only if absolutely needed (has had none for 48 hrs), Continue scheduled Seroquel 15. Echocardiogram reveals aortic valvular vegetation so needs 6 weeks antibiotics 16. DNR per family and patient wishes 17. D/Maxim Telemetry 18. Palliative care consult per family request which I D/W them Palliative and Hospice D/W Dr. Dilcia Perez and his help appreciated 35 minutes spent in direct care of this critically ill patient this AM with > 50% in counseling and coordination of care. Critically ill  Very high complexity patient with multi-organ system failure and with extremely high risk of further deterioration and death which family is aware of and understanding 
 
 
___________________________________________________ Attending Physician: Olegario Rod MD

## 2019-11-04 NOTE — PROGRESS NOTES
Spiritual Care Assessment/Progress Note [place] [address] 
 
 
NAME: Akil Edwards      MRN: [id number] AGE: 80 y.o. SEX: male Temple Affiliation: Hoahaoism  
Language: English  
 
11/4/2019     Total Time (in minutes): 15 Spiritual Assessment begun in [facility] 2 PROGRESSIVE CARE through conversation with: 
  
    []Patient        [x] Family    [] Friend(s) Reason for Consult: Palliative Care, Initial/Spiritual Assessment Spiritual beliefs: (Please include comment if needed) 
   [] Identifies with a leandro tradition:     
   [] Supported by a leandro community:        
   [] Claims no spiritual orientation:       
   [] Seeking spiritual identity:            
   [] Adheres to an individual form of spirituality:       
   [x] Not able to assess:  Pt resting Identified resources for coping:  
   [] Prayer                           
   [] Music                  [] Guided Imagery [x] Family/friends                 [] Pet visits [] Devotional reading                         [] Unknown 
   [] Other Interventions offered during this visit: (See comments for more details) Family/Friend(s): Affirmation of emotions/emotional suffering, Catharsis/review of pertinent events in supportive environment, Prayer (assurance of), Initial Assessment Plan of Care: 
 
 [] Support spiritual and/or cultural needs  
 [] Support AMD and/or advance care planning process    
 [] Support grieving process 
 [] Coordinate Rites and/or Rituals  
 [] Coordination with community clergy [] No spiritual needs identified at this time 
 [] Detailed Plan of Care below (See Comments)  [] Make referral to Music Therapy 
[] Make referral to Pet Therapy    
[] Make referral to Addiction services 
[] Make referral to [facility] 
[] Make referral to Spiritual Care Partner 
[] No future visits requested [x] Follow up visits as needed Comments: Supportive visit with pt's son Kirsty Santiago who was present at bedside. Patient appeared to be resting comfortably. Pt's son indicated that pt has five children, and family has been taking shifts at the hospital.  Explored any spiritual needs. Pt's son indicated that he is not sure that pt would understand or desire  support at this time due to his condition, but family will reach out as needed. Son expressed appreciation for visit and offer of support. Unable to directly assess pt regarding spiritual needs at this time. Rebecca Sandhu, Palliative

## 2019-11-04 NOTE — PROGRESS NOTES
Progress Note 
 
 
11/4/2019 5:55 PM 
NAME: Omi Almeida MRN:  [id number] Admit Diagnosis: Severe sepsis ([facility] [A41.9, R65.20] Bilateral pneumonia [J18.9] Fever [R50.9] KEVAN (acute kidney injury) ([facility] [N17.9]             STEKZQY List:  
Multivessel PCI Recent DVT, started on anticoagulation, then with GI bleed Now with staph bacteremia and likely endocarditis 
  
- Cath 7/2019 with impella supported PCI of LM into LAD with balloon side hole to LCx, cath 8/29/2019 with patent LM stent. 
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 
- Prox afib, currently back in afib 
- Hx of aflutter ablation 
- HTN 
- Dyslipidemia - Hypothyroid - GERD 
- BPH 
- Distant tobacco 
- DVT 
- GI bleed , lives alone, ADLs, occasionally using cane Son: Alexx Glynn the III.   [phone] 
   
  
            Assessment/Plan:      
Staph bacteremia, TTE with likely endocarditis, discussed BRIAN with family, patient has some delerium and concern with anesthesia, poor surgical candidate, BRIAN may not  at this point.  
No chest pain. Euvolemic so far, echo with normal EF, mild to mod AS, mild AI Prox afib back afib despite amiodarone, unfortunately unable to tolerate anticoagulation given recent GI bleed DVT now with IVC filter 
  
- Cont ASA. - Cont plavix, this can not be stopped. - Cont amiodarone - Cont  Midodrine, still with some labile bp. 
- ? statin - Abx per Dr. Remy Vazquez, will need repeat cultures off of abx, poor surgical candidate. Worsening renal function on abx. Patient elderly and has had multiple hospitalizations this past year. Family meeting with palliative care. 
   
  
  
 [x]       High complexity decision making was performed in this patient at high risk for decompensation with multiple organ involvement. Subjective:  
 
Omi Almeida denies chest pain, dyspnea. Discussed with RN events overnight. Review of Systems: 
 
Symptom Y/N Comments  Symptom Y/N Comments Fever/Chills N   Chest Pain N Poor Appetite N   Edema N   
Cough N   Abdominal Pain N Sputum N   Joint Pain N   
SOB/JAFFE N   Pruritis/Rash N   
Nausea/vomit N   Tolerating PT/OT Y Diarrhea N   Tolerating Diet Y Constipation N   Other Could NOT obtain due to:   
 
Objective:  
  
Physical Exam: 
 
Last 24hrs VS reviewed since prior progress note. Most recent are: 
 
Visit Vitals BP (!) 88/43 (BP 1 Location: Left arm, BP Patient Position: At rest) Pulse 90 Temp 97.6 °F (36.4 °C) Resp 20 Ht 5' 9\" (1.753 m) Wt 95.5 kg (210 lb 8.6 oz) SpO2 100% BMI 31.09 kg/m² No intake or output data in the 24 hours ending 11/04/19 1138 General Appearance: Well developed, well nourished, alert & oriented x 3,  
 no acute distress. Ears/Nose/Mouth/Throat: Hearing grossly normal. 
Neck: Supple. Chest: Lungs clear to auscultation bilaterally. Cardiovascular: Regular rate and rhythm, S1S2 normal, no murmur. Abdomen: Soft, non-tender, bowel sounds are active. Extremities: No edema bilaterally. Skin: Warm and dry. PMH/SH reviewed - no change compared to H&P Data Review Telemetry: normal sinus rhythm Lab Data Personally Reviewed: 
 
Recent Labs 11/04/19 
0354 11/03/19 0421 WBC 19.2* 19.9* HGB 9.6* 9.5* HCT 31.6* 31.4*  
* 182 No results for input(s): INR, PTP, APTT, INREXT, INREXT in the last 72 hours. Recent Labs 11/04/19 
0354 11/03/19 
0421 11/02/19 
0550  139 139  
K 3.9 3.8 3.7 * 111* 110* CO2 22 22 22 BUN 22* 16 11 CREA 2.11* 1.66* 1.30 * 107* 111* CA 8.9 9.2 9.5 No results for input(s): CPK, CKNDX, TROIQ in the last 72 hours. No lab exists for component: CPKMB Lab Results Component Value Date/Time  Cholesterol, total 117 06/12/2019 02:00 PM  
 HDL Cholesterol 35 06/12/2019 02:00 PM  
 LDL, calculated 65 06/12/2019 02:00 PM  
 Triglyceride 86 06/12/2019 02:00 PM  
 CHOL/HDL Ratio 3 06/12/2019 02:00 PM  
 
 
No results for input(s): SGOT, GPT, AP, TBIL, TP, ALB, GLOB, GGT, AML, LPSE in the last 72 hours. No lab exists for component: AMYP, HLPSE No results for input(s): PH, PCO2, PO2 in the last 72 hours. Medications Personally Reviewed: 
 
Current Facility-Administered Medications Medication Dose Route Frequency  ceFAZolin (ANCEF) 1 g in 0.9% sodium chloride (MBP/ADV) 50 mL  1 g IntraVENous Q12H  
 dextrose 5 % - 0.45% NaCl infusion  100 mL/hr IntraVENous CONTINUOUS  
 sodium chloride (NS) flush 5-40 mL  5-40 mL IntraVENous PRN  
 QUEtiapine (SEROquel) tablet 25 mg  25 mg Oral BID  
 haloperidol lactate (HALDOL) injection 1 mg  1 mg IntraVENous Q6H PRN  
 fluticasone propionate (FLONASE) 50 mcg/actuation nasal spray 2 Spray  2 Spray Both Nostrils DAILY  temazepam (RESTORIL) capsule 30 mg  30 mg Oral QHS PRN  
 guaiFENesin ER (MUCINEX) tablet 600 mg  600 mg Oral Q12H  
 LORazepam (ATIVAN) injection 1 mg  1 mg IntraVENous Q6H PRN  
 albuterol (PROVENTIL HFA, VENTOLIN HFA, PROAIR HFA) inhaler 2 Puff  2 Puff Inhalation Q4H PRN  
 amiodarone (CORDARONE) tablet 200 mg  200 mg Oral DAILY  aspirin chewable tablet 81 mg  81 mg Oral DAILY  clopidogrel (PLAVIX) tablet 75 mg  75 mg Oral DAILY  pantoprazole (PROTONIX) tablet 40 mg  40 mg Oral ACB  levothyroxine (SYNTHROID) tablet 137 mcg  137 mcg Oral ACB  midodrine (PROAMITINE) tablet 10 mg  10 mg Oral TID WITH MEALS  sodium chloride (NS) flush 5-40 mL  5-40 mL IntraVENous Q8H  
 heparin (porcine) injection 5,000 Units  5,000 Units SubCUTAneous Q8H Shira Gambino MD

## 2019-11-04 NOTE — CONSULTS
Palliative Medicine Consult Lucian: [phone]) Patient Name: Josesito Garcia YOB: 1931 Date of Initial Consult: Nov 4,2019 Reason for Consult: first available, urgently need end of life guidance 5 children, no legal documentation no advance directive. Ian Varner interested in hospice. Requesting Provider: Ruperto Dowd MD  
Primary Care Physician: Azra Husain MD 
 
 SUMMARY:  
Josesito Garcia is a 80 y.o. with a past history of hypotension, GERD, A. fib, BPH, CAD s/p stents, hyperlipidemia, who was admitted on 10/24/2019 from [address] [place] [address],[address] with a diagnosis of severe sepsis, bilateral LL infiltrate. Current medical issues leading to Palliative Medicine involvement include: pt has been seen in the ED 5 times with 4 resulting admissions in the past 6 months; in July he was admitted for acute coronary syndrome with stent placement, shortly following discharge he developed DVT of his LLE despite being on ASA and plavix. He was started on Xarelto, and shortly following discharge was re-admitted with GIB. Xarelto was discontinued, EGD and colonoscopy did not reveal the source for the bleed. Pt was again discharged to [address] [place] [address],[address] for rehab and was doing well, back to baseline per son with anticipation of discharge home in a few days, then on 10/24 developed a fever of 105.7, was re-admitted to [facility] and was treated for +BC staph lugdenensis. This admission has been complicated by delirium, ECHO on 10/30 revealed vegetation on the aortic valve. Creatinine slowly increasing. Family requested Palliative Medicine consult; pt refused to address medical and financial wishes with family in the past.  
 
Psychosocial: , lives with son in 1 level home, needs assistance with ADLs. 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 5 children. PALLIATIVE DIAGNOSES:  
1. Severe sepsis 2. Hypotension/septic shock 3. Acute on chronic renal failure 4. Acute encephalopathy/delirium 5. PNA/bilateral LL infiltrate 6. Endocarditis 7. Care decisions PLAN:  
1. Prior to visit, I completed an extensive review of patient's medical records, including medical documentation, vital signs, MARs, and results of various labs and other diagnostics. I also spoke with patient's nurse, Oc Chauhan. 2. Spoke with dtr Marina Lopez who will call her siblings and arrange a time to meet tomorrow. Also met with son Markus Guillen in room, obtained history. 3. Family meeting tomorrow at 3:30pm. 
4. OT/PT please continue to mobilize patient when medically ok; please do not withold OT/PT services when pt has a Palliative Consult. 5. Message left on 's voicemail to please call me back to discuss this case, I'd like to hear what his thoughts are regarding this patient and care moving forward. 6. Initial consult note routed to primary continuity provider and/or primary health care team members 7. Communicated plan of care with: Palliative Renea FONG 192 Team 
 
 GOALS OF CARE / TREATMENT PREFERENCES:  
 
GOALS OF CARE:  To be determined TREATMENT PREFERENCES:  
Code Status: DNR Advance Care Planning: 
[x] [facility] Interdisciplinary Team has updated the ACP Navigator with Jose David and Patient Capacity Will discuss who will be willing to take the role/responsibilities of NOK Advance Care Planning 11/4/2019 Patient's Healthcare Decision Maker is: Legal Next of Kin Primary Decision Maker Name -  
Primary Decision Maker Phone Number -  
Primary Decision Maker Relationship to Patient -  
Confirm Advance Directive None Patient Would Like to Complete Advance Directive Unable Other Instructions: Other: As far as possible, the palliative care team has discussed with patient / health care proxy about goals of care / treatment preferences for patient. HISTORY:  
 
History obtained from: chart, son CHIEF COMPLAINT: high fever HPI/SUBJECTIVE: The patient is:  
[] Verbal and participatory [x] Non-participatory due to: somnolent 10/24: BIBA with c/o progressively declining mental status over the past 24 hours, temp 103.5, , sats 86% In ED, pt does not answer questions, states his name or follow commands Clinical Pain Assessment (nonverbal scale for severity on nonverbal patients): Activity (Movement): Lying quietly, normal position Duration: for how long has pt been experiencing pain (e.g., 2 days, 1 month, years) Frequency: how often pain is an issue (e.g., several times per day, once every few days, constant) FUNCTIONAL ASSESSMENT:  
 
Palliative Performance Scale (PPS): PSYCHOSOCIAL/SPIRITUAL SCREENING:  
 
Palliative IDT has assessed this patient for cultural preferences / practices and a referral made as appropriate to needs (Cultural Services, Patient Advocacy, Ethics, etc.) Any spiritual / Samaritan concerns: 
[] Yes /  [x] No 
 
Caregiver Burnout: 
[] Yes /  [x] No /  [] No Caregiver Present Anticipatory grief assessment:  
[x] Normal  / [] Maladaptive ESAS Anxiety:   unable to assess due to pt factors ESAS Depression:   unable to assess due to pt factors REVIEW OF SYSTEMS:  
 
Positive and pertinent negative findings in ROS are noted above in HPI. The following systems were [x] reviewed / [] unable to be reviewed as noted in HPI Other findings are noted below. Systems: constitutional, ears/nose/mouth/throat, respiratory, gastrointestinal, genitourinary, musculoskeletal, integumentary, neurologic, psychiatric, endocrine. Positive findings noted below. Modified ESAS Completed by: provider Stool Occurrence(s): 1 PHYSICAL EXAM:  
 
From RN flowsheet: 
Wt Readings from Last 3 Encounters:  
11/04/19 210 lb 8.6 oz (95.5 kg) 09/27/19 181 lb 15.8 oz (82.6 kg) 09/18/19 181 lb 9.6 oz (82.4 kg) Blood pressure (!) 88/43, pulse 90, temperature 97.6 °F (36.4 °C), resp. rate 20, height 5' 9\" (1.753 m), weight 210 lb 8.6 oz (95.5 kg), SpO2 100 %. Pain Scale 1: Visual 
Pain Intensity 1: 0 Last bowel movement, if known:  
 
Constitutional: somnolent Eyes: closed ENMT: no nasal discharge, moist mucous membranes Cardiovascular: regular rhythm, distal pulses intact Respiratory: breathing not labored, symmetric Gastrointestinal: soft non-tender, +bowel sounds Musculoskeletal: no deformity, no tenderness to palpation Skin: warm, dry Neurologic: asleep Psychiatric:asleep (didn't wake pt due to agitation) HISTORY:  
 
Principal Problem: 
  Severe sepsis ([facility] (10/24/2019) Active Problems: 
  Glucose intolerance (impaired glucose tolerance) (8/21/2017) COPD (chronic obstructive pulmonary disease) ([facility] (8/21/2017) Stage 3 chronic kidney disease ([facility] (8/21/2017) Atrial fibrillation ([facility] (8/21/2017) ASCVD (arteriosclerotic cardiovascular disease) (8/21/2017) Overview: Catheterization 6/11/2004 80% stenosis nondominant RCA and 30% stenosis of  
    LAD with a notation of some left main disease but no percentage given. No  
    mention of angioplasty or stent made Anemia (8/13/2019) Nonrheumatic aortic valve stenosis (8/27/2019) Overview: Mild to moderate by echocardiogram 8/27/2019 Fever (10/24/2019) KEVAN (acute kidney injury) ([facility] (10/24/2019) Acute encephalopathy (10/24/2019) Chronic orthostatic hypotension (10/24/2019) Acute bacterial endocarditis (10/31/2019) Past Medical History:  
Diagnosis Date  Arrhythmia A-fib  GERD (gastroesophageal reflux disease)  Hypertension  Other ill-defined conditions(799.89) BPH  Other ill-defined conditions(799.89) lipids  Other ill-defined conditions(799.89)   
 shingles hx  Thyroid disease  Unspecified adverse effect of anesthesia Past Surgical History:  
Procedure Laterality Date  COLONOSCOPY N/A 2019 COLONOSCOPY performed by Ihsan Ward MD at [facility] ENDOSCOPY  
 HX CATARACT REMOVAL    
 HX HEART CATHETERIZATION    
 ablation  HX HEART CATHETERIZATION  2019 Stent  HX HEENT    
 scar tissue removed/laser  HX HERNIA REPAIR  2017 Rachael Womack MD  
 HX ORTHOPAEDIC    
 left foot fx--hardware  HX ORTHOPAEDIC  14 Right total knee arthroplasty  HX OTHER SURGICAL    
 prostate bx Family History Problem Relation Age of Onset  Heart Disease Mother  Hypertension Mother  Stroke Mother  Heart Disease Father  Cancer Paternal Grandmother History reviewed, no pertinent family history. Social History Tobacco Use  Smoking status: Former Smoker Packs/day: 0.50 Last attempt to quit: 3/28/1963 Years since quittin.6  Smokeless tobacco: Never Used Substance Use Topics  Alcohol use: Yes Comment: rare/social-beer No Known Allergies Current Facility-Administered Medications Medication Dose Route Frequency  ceFAZolin (ANCEF) 1 g in 0.9% sodium chloride (MBP/ADV) 50 mL  1 g IntraVENous Q12H  
 dextrose 5 % - 0.45% NaCl infusion  100 mL/hr IntraVENous CONTINUOUS  
 sodium chloride (NS) flush 5-40 mL  5-40 mL IntraVENous PRN  
 QUEtiapine (SEROquel) tablet 25 mg  25 mg Oral BID  
 haloperidol lactate (HALDOL) injection 1 mg  1 mg IntraVENous Q6H PRN  
 fluticasone propionate (FLONASE) 50 mcg/actuation nasal spray 2 Spray  2 Spray Both Nostrils DAILY  temazepam (RESTORIL) capsule 30 mg  30 mg Oral QHS PRN  
 guaiFENesin ER (MUCINEX) tablet 600 mg  600 mg Oral Q12H  
 LORazepam (ATIVAN) injection 1 mg  1 mg IntraVENous Q6H PRN  
 albuterol (PROVENTIL HFA, VENTOLIN HFA, PROAIR HFA) inhaler 2 Puff  2 Puff Inhalation Q4H PRN  
 amiodarone (CORDARONE) tablet 200 mg  200 mg Oral DAILY  aspirin chewable tablet 81 mg  81 mg Oral DAILY  clopidogrel (PLAVIX) tablet 75 mg  75 mg Oral DAILY  pantoprazole (PROTONIX) tablet 40 mg  40 mg Oral ACB  levothyroxine (SYNTHROID) tablet 137 mcg  137 mcg Oral ACB  midodrine (PROAMITINE) tablet 10 mg  10 mg Oral TID WITH MEALS  sodium chloride (NS) flush 5-40 mL  5-40 mL IntraVENous Q8H  
 heparin (porcine) injection 5,000 Units  5,000 Units SubCUTAneous Q8H  
 
 
 
 LAB AND IMAGING FINDINGS:  
 
Lab Results Component Value Date/Time WBC 19.2 (H) 11/04/2019 03:54 AM  
 HGB 9.6 (L) 11/04/2019 03:54 AM  
 PLATELET 469 (L) 67/79/5821 03:54 AM  
 
Lab Results Component Value Date/Time Sodium 138 11/04/2019 03:54 AM  
 Potassium 3.9 11/04/2019 03:54 AM  
 Chloride 109 (H) 11/04/2019 03:54 AM  
 CO2 22 11/04/2019 03:54 AM  
 BUN 22 (H) 11/04/2019 03:54 AM  
 Creatinine 2.11 (H) 11/04/2019 03:54 AM  
 Calcium 8.9 11/04/2019 03:54 AM  
  
Lab Results Component Value Date/Time AST (SGOT) 30 10/24/2019 05:17 AM  
 Alk. phosphatase 87 10/24/2019 05:17 AM  
 Protein, total 6.7 10/24/2019 05:17 AM  
 Albumin 2.7 (L) 10/24/2019 05:17 AM  
 Globulin 4.0 10/24/2019 05:17 AM  
 
Lab Results Component Value Date/Time INR 1.1 09/26/2019 05:10 PM  
 Prothrombin time 11.7 (H) 09/26/2019 05:10 PM  
 aPTT >130.0 (HH) 07/18/2019 05:22 AM  
  
No results found for: IRON, FE, TIBC, IBCT, PSAT, FERR No results found for: PH, PCO2, PO2 No components found for: [place] Lab Results Component Value Date/Time CK 41 08/26/2019 07:34 PM  
 CK - MB <1.0 08/26/2019 07:34 PM  
  
 
 
   
 
Total time: 85 
Counseling / coordination time, spent as noted above: 75 
> 50% counseling / coordination?: y 
Prolonged service was provided for  []30 min   []75 min in face to face time in the presence of the patient, spent as noted above. Time Start:  
Time End:  
Note: this can only be billed with 89710 (initial) or 84240 (follow up). If multiple start / stop times, list each separately.

## 2019-11-05 PROBLEM — Z71.89 ACP (ADVANCE CARE PLANNING): Status: ACTIVE | Noted: 2019-01-01

## 2019-11-05 NOTE — PROGRESS NOTES
Pharmacy Automatic Renal Dosing Protocol - Antimicrobials Indication for Antimicrobials: bacteremia, AV endocarditis Current Regimen of Each Antimicrobial: 
Cefazolin 1g IV q8h, start , day 4 Previous Antimicrobial Therapy: 
Cefepime 2g IV q12h x 7 days; Start Date 10/24; Day # 6 Vancomycin 1g IV q18h; Start Date 10/24; Day # 10 Micro:  
10/24 Blood cx - 4 of 4 bottles - STAPHYLOCOCCUS LUGDUNENSIS (oxacillin sensitive) - final  
10/31 blood cx NGTD - pending Vancomycin Trough Goal Level:  15-20 (AUC: 400 - 600 mg/hr/Liter/day) Date Dose & Interval Measured (mcg/mL) Extrapolated (mcg/mL)  
10/26 12:41 1000mg IV q24h 10.3 10.3  
10/30 22:00 1000 mg IV q18h 16.6 Labs: 
Recent Labs 19 
0354 19 
0421 CREA 2.11* 1.66* BUN 22* 16 WBC 19.2* 19.9* Temp (24hrs), Av.5 °F (36.4 °C), Min:96.5 °F (35.8 °C), Max:97.9 °F (36.6 °C) Imaging: 
10/30 TTE: Aortic Valve: Aortic valve focal thickening present. Mild aortic valve regurgitation is present. Possible vegetation is present on the aortic valve. Creatinine Clearance (mL/min) or Dialysis: 27 Impression/Plan:  
Asked by Dr. Janie Zamora to explore the possibility of oral antibiotics for patient's native valve endocarditis Recent evidence - POET trial Jyoti Keenan et al., Vinod Blas J Med 2019; 359:759-116) - suggests non-inferiority of partial oral antibiotic regimens compared to continued IV antibiotic therapy in clinically stable patients with left-sided endocarditis Mr. Lalita Ley is critically ill and in a much worse condition than patients included this trial 
However if palliative is the direction that the patient chooses, oral antibiotics are a reasonable consideration in lieu of long-term IV antibiotics (if GI function is preserved) Recommend dicloxacillin 1 g PO every 6 hours + rifampin 600 mg PO every 12 hours to complete 6 weeks of therapy (based on regimens used in the trial - see below) Oral regimens recommended in the POET trial 
 
Methicillin sensitive Staphylococcus aureus and coagulase-negative staphylococci Dicloxacillin 1 g x 4 and fusidic acid 0.75 g x 2 Dicloxacillin 1 g x 4 and rifampin 0.6 g x 2 Linezolid 0.6 g x 2 and fucidic acid 0.75g x 2 Linezolid 0.6 g x 2 and rifampin 0.6 g x 2 Pharmacy will follow daily and adjust medications as appropriate for renal function and/or serum levels. Thank you, Regis Barr Reviewed with Regis Barr, PharmD Thanks Sarah Ferguson, PHARMD

## 2019-11-05 NOTE — PROGRESS NOTES
Report received from [place], [address] [place] [address]. Introduced myself as primary RN. Assumed care of patient. Patient alert and oriented to person and place. Call bell within reach. 3:10 PM Attempted to obtain CBC and BMP x1, unsuccessful. Bedside and Verbal shift change report given to Klever Melgoza (oncoming nurse) by Abad Pinto (offgoing nurse). Report included the following information SBAR, Kardex, MAR, Recent Results and Med Rec Status.

## 2019-11-05 NOTE — PROGRESS NOTES
Palliative Medicine Social Work Wandy CHILDRESS and I met with patient 5 children/LNOK Family not ready for hospice yet - they want to see how labs look over next few days. Plan to reconvene on Friday at 11:30. Longer note to follow. Thank you for the opportunity to be involved in the care of Mr. Vu Odonnell and his family. Oral Kind, VIOLETTE, Supervisee in Social Work Palliative Medicine  [phone]

## 2019-11-05 NOTE — PROGRESS NOTES
Palliative Medicine Consult Lucian: [phone] Patient Name: Benigno Gallardo YOB: 1931 Date of Initial Consult: Nov 4,2019 Reason for Consult: first available, urgently need end of life guidance 5 children, no legal documentation no advance directive. Issavivian Casarez interested in hospice. Requesting Provider: Kasandra Mitchell MD  
Primary Care Physician: Sara Armendariz MD 
 
 SUMMARY:  
Benigno Gallardo is a 80 y.o. with a past history of hypotension, GERD, A. fib, BPH, CAD s/p stents, hyperlipidemia, who was admitted on 10/24/2019 from [address] [place] [address],[address] with a diagnosis of severe sepsis, bilateral LL infiltrate. Current medical issues leading to Palliative Medicine involvement include: pt has been seen in the ED 5 times with 4 resulting admissions in the past 6 months; in July he was admitted for acute coronary syndrome with stent placement, shortly following discharge he developed DVT of his LLE despite being on ASA and plavix. He was started on Xarelto, and shortly following discharge was re-admitted with GIB. Xarelto was discontinued, EGD and colonoscopy did not reveal the source for the bleed. Pt was again discharged to [address] [place] [address],[address] for rehab and was doing well, back to baseline per son with anticipation of discharge home in a few days, then on 10/24 developed a fever of 105.7, was re-admitted to [facility] and was treated for +BC staph lugdenensis. This admission has been complicated by delirium, ECHO on 10/30 revealed vegetation on the aortic valve. Creatinine slowly increasing. Family requested Palliative Medicine consult; pt refused to address medical and financial wishes with family in the past.  
11/4: worsening renal function with creatinine up to 2. 11. Wbc still elevated at 19.2. 
11/5: no lab results; RN tried multiple times this am and again this afternoon with no luck.  Pt states \"I feel sick\" but unable to elaborate, denies pain, nausea,          falls asleep mid-conversation. Psychosocial: , lives with son in 1 level home, needs assistance with ADLs. 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 5 children. PALLIATIVE DIAGNOSES:  
1. Severe sepsis 2. Hypotension/septic shock 3. Acute on chronic renal failure 4. Acute encephalopathy/delirium 5. PNA/bilateral LL infiltrate 6. Endocarditis 7. Care decisions PLAN:  
1. Prior to visit, I completed an extensive review of patient's medical records, including medical documentation, vital signs, MARs, and results of various labs and other diagnostics. I also spoke with patient's nurse, Carole Barboza and attending Karen Diaz. 2. 3:30-4:30pm: family meeting with pt's 5 children  (pt not included due to delirium) and Palliative SW Amy:  introduced Palliative Medicine as a support for pt and families dealing with life limiting disease, to help with symptom management, education and understanding disease process and treatment options, and to discuss goals of care, what pt wants in terms of treatment as well as code status. Discussed pt's medical history, multiple admissions back to back, concerns with renal function, causes of delirium, family very focused on lab results. Counseled family that there is no way to tell if pt will recover back to baseline; 2 weeks ago he was close to baseline (per family), prior to July pt was driving, paying own bills, gardening, however, he is now very sick,and with his advanced age,unstable renal function, and the cascading affect of all the medical problems he's had since July would be very difficult to overcome. We discussed in detail 1) continuing aggressive medical care for a defined period of time then reassessing, 2) transitioning to Hospice care. One family member feels pt would want to transition to [address] [place] [address], however, the rest of the family are still unsure.    Counseled family that if we continue aggressive medical care, daily labs are vital especially now that his renal function is worsening, but also stressed not to focus so much on numbers, rather, focus on pt's clinical picture. Discussed labs can be misleading; although wbcs are still elevated, the last [facility] was negative and the fact that wbcs are not going up indicates infection is being treated and there are other reasons for elevated wbcs. 3. PLAN:  
1. Family needs more time. We will meet again Friday between 11:30 and 12:00 to reassess. If pt is able, will include him in the meeting/decision-making. 2. Given that today is almost over, hold lab draw until tomorrow am.  If still unable to obtain, could we please consider a PICC line? 3. Per RN request, changed guaifenesin tab to liquid as pt is unable to swallow the large tab. 4. Provided Palliative contact info, counseled family that they can call before Friday if they have questions or need to talk further. 4. Initial consult note routed to primary continuity provider and/or primary health care team members 5. Communicated plan of care with: Palliative Renea FONG 192 Team 
 
 GOALS OF CARE / TREATMENT PREFERENCES:  
 
GOALS OF CARE:  Continue current level of care Patient/Health Care Proxy Stated Goals: Prolong life TREATMENT PREFERENCES:  
Code Status: DNR Advance Care Planning: 
[x] [facility] Interdisciplinary Team has updated the ACP Navigator with Jose David and Patient Capacity Primary Decision Maker: Bowen Del Toro - Child - [phone] Secondary Decision Maker: Lilia Glaserr - Daughter - [phone]   ALL 5 CHILDREN ARE NOK AND SHOULD BE INCLUDED IN DECISION-MAKING. Advance Care Planning 11/4/2019 Patient's Healthcare Decision Maker is: Legal Next of Kin Primary Decision Maker Name -  
Primary Decision Maker Phone Number -  
Primary Decision Maker Relationship to Patient -  
Confirm Advance Directive None Patient Would Like to Complete Advance Directive Unable Medical Interventions: Full interventions Other Instructions: Other: As far as possible, the palliative care team has discussed with patient / health care proxy about goals of care / treatment preferences for patient. HISTORY:  
 
History obtained from: chart, son CHIEF COMPLAINT: high fever HPI/SUBJECTIVE: The patient is:  
[] Verbal and participatory [x] Non-participatory due to: somnolent 10/24: BIBA with c/o progressively declining mental status over the past 24 hours, temp 103.5, , sats 86% In ED, pt does not answer questions, states his name or follow commands Clinical Pain Assessment (nonverbal scale for severity on nonverbal patients):  
Clinical Pain Assessment Severity: 0 Activity (Movement): Lying quietly, normal position Duration: for how long has pt been experiencing pain (e.g., 2 days, 1 month, years) Frequency: how often pain is an issue (e.g., several times per day, once every few days, constant) FUNCTIONAL ASSESSMENT:  
 
Palliative Performance Scale (PPS): PPS: 20 
 
 
 PSYCHOSOCIAL/SPIRITUAL SCREENING:  
 
Palliative IDT has assessed this patient for cultural preferences / practices and a referral made as appropriate to needs (Cultural Services, Patient Advocacy, Ethics, etc.) Any spiritual / Jew concerns: 
[] Yes /  [x] No 
 
Caregiver Burnout: 
[] Yes /  [x] No /  [] No Caregiver Present Anticipatory grief assessment:  
[x] Normal  / [] Maladaptive ESAS Anxiety: Anxiety: 0 unable to assess due to pt factors ESAS Depression:   unable to assess due to pt factors REVIEW OF SYSTEMS:  
 
Positive and pertinent negative findings in ROS are noted above in HPI. The following systems were [x] reviewed / [] unable to be reviewed as noted in HPI Other findings are noted below.  
Systems: constitutional, ears/nose/mouth/throat, respiratory, gastrointestinal, genitourinary, musculoskeletal, integumentary, neurologic, psychiatric, endocrine. Positive findings noted below. Modified ESAS Completed by: provider Fatigue: 8 Drowsiness: 8 Pain: 0 Anxiety: 0 Dyspnea: 0 Stool Occurrence(s): 1 PHYSICAL EXAM:  
 
From RN flowsheet: 
Wt Readings from Last 3 Encounters:  
11/05/19 211 lb 14.4 oz (96.1 kg) 09/27/19 181 lb 15.8 oz (82.6 kg) 09/18/19 181 lb 9.6 oz (82.4 kg) Blood pressure 124/46, pulse 66, temperature 97.9 °F (36.6 °C), resp. rate 18, height 5' 9\" (1.753 m), weight 211 lb 14.4 oz (96.1 kg), SpO2 99 %. Pain Scale 1: Numeric (0 - 10) Pain Intensity 1: 0 Last bowel movement, if known:  
 
Constitutional: ill appearing, confused, knows he's in the hospital, falls asleep mid-conversation Eyes: anicteric ENMT: no nasal discharge, dry mucous membranes Cardiovascular: regular rhythm, distal pulses intact Respiratory: breathing not labored, symmetric Gastrointestinal: soft non-tender, +bowel sounds Musculoskeletal: no deformity, no tenderness to palpation Skin: warm, dry, +peripheral edema Neurologic: follows simple commands, move all extremities Psychiatric: full affect HISTORY:  
 
Principal Problem: 
  Severe sepsis ([facility] [address] (10/24/2019) Active Problems: 
  Glucose intolerance (impaired glucose tolerance) (8/21/2017) COPD (chronic obstructive pulmonary disease) ([facility] [address] (8/21/2017) Stage 3 chronic kidney disease ([facility] [address] (8/21/2017) Atrial fibrillation ([facility] [address] (8/21/2017) ASCVD (arteriosclerotic cardiovascular disease) (8/21/2017) Overview: Catheterization 6/11/2004 80% stenosis nondominant RCA and 30% stenosis of  
    LAD with a notation of some left main disease but no percentage given. No  
    mention of angioplasty or stent made Anemia (8/13/2019) Nonrheumatic aortic valve stenosis (8/27/2019) Overview: Mild to moderate by echocardiogram 8/27/2019 Fever (10/24/2019) KEVAN (acute kidney injury) ([facility] [address] (10/24/2019) Acute encephalopathy (10/24/2019) Chronic orthostatic hypotension (10/24/2019) Acute bacterial endocarditis (10/31/2019) Delirium (2019) Past Medical History:  
Diagnosis Date  Arrhythmia A-fib  GERD (gastroesophageal reflux disease)  Hypertension  Other ill-defined conditions(799.89) BPH  Other ill-defined conditions(799.89) lipids  Other ill-defined conditions(799.89)   
 shingles hx  Thyroid disease  Unspecified adverse effect of anesthesia Past Surgical History:  
Procedure Laterality Date  COLONOSCOPY N/A 2019 COLONOSCOPY performed by [facility]u [facility] at [facility] ENDOSCOPY  
 HX CATARACT REMOVAL    
 HX HEART CATHETERIZATION    
 ablation  HX HEART CATHETERIZATION  2019 Stent  HX HEENT    
 scar tissue removed/laser  HX HERNIA REPAIR  2017 Loreto Womack MD  
 HX ORTHOPAEDIC    
 left foot fx--hardware  HX ORTHOPAEDIC  14 Right total knee arthroplasty  HX OTHER SURGICAL    
 prostate bx Family History Problem Relation Age of Onset  Heart Disease Mother  Hypertension Mother  Stroke Mother  Heart Disease Father  Cancer Paternal Grandmother History reviewed, no pertinent family history. Social History Tobacco Use  Smoking status: Former Smoker Packs/day: 0.50 Last attempt to quit: 3/28/1963 Years since quittin.6  Smokeless tobacco: Never Used Substance Use Topics  Alcohol use: Yes Comment: rare/social-beer No Known Allergies Current Facility-Administered Medications Medication Dose Route Frequency  guaiFENesin (ROBITUSSIN) 100 mg/5 mL oral liquid 100 mg  100 mg Oral Q4H PRN  
 ceFAZolin (ANCEF) 1 g in 0.9% sodium chloride (MBP/ADV) 50 mL  1 g IntraVENous Q12H  
 dextrose 5 % - 0.45% NaCl infusion  100 mL/hr IntraVENous CONTINUOUS  
  sodium chloride (NS) flush 5-40 mL  5-40 mL IntraVENous PRN  
 QUEtiapine (SEROquel) tablet 25 mg  25 mg Oral BID  
 haloperidol lactate (HALDOL) injection 1 mg  1 mg IntraVENous Q6H PRN  
 fluticasone propionate (FLONASE) 50 mcg/actuation nasal spray 2 Spray  2 Spray Both Nostrils DAILY  temazepam (RESTORIL) capsule 30 mg  30 mg Oral QHS PRN  
 LORazepam (ATIVAN) injection 1 mg  1 mg IntraVENous Q6H PRN  
 albuterol (PROVENTIL HFA, VENTOLIN HFA, PROAIR HFA) inhaler 2 Puff  2 Puff Inhalation Q4H PRN  
 amiodarone (CORDARONE) tablet 200 mg  200 mg Oral DAILY  aspirin chewable tablet 81 mg  81 mg Oral DAILY  clopidogrel (PLAVIX) tablet 75 mg  75 mg Oral DAILY  pantoprazole (PROTONIX) tablet 40 mg  40 mg Oral ACB  levothyroxine (SYNTHROID) tablet 137 mcg  137 mcg Oral ACB  midodrine (PROAMITINE) tablet 10 mg  10 mg Oral TID WITH MEALS  sodium chloride (NS) flush 5-40 mL  5-40 mL IntraVENous Q8H  
 heparin (porcine) injection 5,000 Units  5,000 Units SubCUTAneous Q8H  
 
 
 
 LAB AND IMAGING FINDINGS:  
 
Lab Results Component Value Date/Time WBC 19.2 (H) 11/04/2019 03:54 AM  
 HGB 9.6 (L) 11/04/2019 03:54 AM  
 PLATELET 726 (L) 09/03/0407 03:54 AM  
 
Lab Results Component Value Date/Time Sodium 138 11/04/2019 03:54 AM  
 Potassium 3.9 11/04/2019 03:54 AM  
 Chloride 109 (H) 11/04/2019 03:54 AM  
 CO2 22 11/04/2019 03:54 AM  
 BUN 22 (H) 11/04/2019 03:54 AM  
 Creatinine 2.11 (H) 11/04/2019 03:54 AM  
 Calcium 8.9 11/04/2019 03:54 AM  
  
Lab Results Component Value Date/Time AST (SGOT) 30 10/24/2019 05:17 AM  
 Alk. phosphatase 87 10/24/2019 05:17 AM  
 Protein, total 6.7 10/24/2019 05:17 AM  
 Albumin 2.7 (L) 10/24/2019 05:17 AM  
 Globulin 4.0 10/24/2019 05:17 AM  
 
Lab Results Component Value Date/Time  INR 1.1 09/26/2019 05:10 PM  
 Prothrombin time 11.7 (H) 09/26/2019 05:10 PM  
 aPTT >130.0 (HH) 07/18/2019 05:22 AM  
  
 No results found for: IRON, FE, TIBC, IBCT, PSAT, FERR No results found for: PH, PCO2, PO2 No components found for: [place] Lab Results Component Value Date/Time CK 41 08/26/2019 07:34 PM  
 CK - MB <1.0 08/26/2019 07:34 PM  
  
 
 
   
 
Total time: 75 
Counseling / coordination time, spent as noted above: 65 
> 50% counseling / coordination?: y 
Prolonged service was provided for  [x]30 min   []75 min in face to face time in the presence of the patient, spent as noted above. Time Start: 3:30pm 
Time End:  4:45pm 
Note: this can only be billed with  (initial) or 21 [phone] (follow up). If multiple start / stop times, list each separately.

## 2019-11-05 NOTE — PROGRESS NOTES
Progress Note 
 
 
11/5/2019 5:55 PM 
NAME: Andrew Daugherty MRN:  [id number] Admit Diagnosis: Severe sepsis ([facility] [address] [A41.9, R65.20] Bilateral pneumonia [J18.9] Fever [R50.9] KEVAN (acute kidney injury) ([facility] [address] [N17.9]             QYCPYZA List:  
Multivessel PCI Recent DVT, started on anticoagulation, then with GI bleed Now with staph bacteremia and likely endocarditis 
  
- Cath 7/2019 with impella supported PCI of LM into LAD with balloon side hole to LCx, cath 8/29/2019 with patent LM stent. 
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 
- Prox afib, currently back in afib 
- Hx of aflutter ablation 
- HTN 
- Dyslipidemia - Hypothyroid - GERD 
- BPH 
- Distant tobacco 
- DVT 
- GI bleed , lives alone, ADLs, occasionally using cane Son: Ariadne Head the III.   [phone] 
   
  
            Assessment/Plan:      
Staph bacteremia, TTE with likely endocarditis, discussed BRIAN with family, patient has some delerium and concern with anesthesia, poor surgical candidate, BRIAN may not  at this point.  
No chest pain. Euvolemic so far, echo with normal EF, mild to mod AS, mild AI Prox afib back afib despite amiodarone, unfortunately unable to tolerate anticoagulation given recent GI bleed DVT now with IVC filter 
  
- Cont ASA. - Cont plavix, this can not be stopped. - Cont amiodarone - Cont  Midodrine, still with some labile bp. 
- ? statin - Abx per Dr. Christianne Feliciano, will need repeat cultures off of abx, poor surgical candidate. ?change to PO abx for comfort. Worsening renal function on abx. Patient elderly and has had multiple hospitalizations this past year. Family meeting with palliative care today. 
   
  
  
 [x]       High complexity decision making was performed in this patient at high risk for decompensation with multiple organ involvement. Subjective:  
 
Andrew Daugherty denies chest pain, dyspnea. Discussed with RN events overnight. Review of Systems: 
 
Symptom Y/N Comments  Symptom Y/N Comments Fever/Chills N   Chest Pain N Poor Appetite N   Edema N   
Cough N   Abdominal Pain N Sputum N   Joint Pain N   
SOB/JAFFE N   Pruritis/Rash N   
Nausea/vomit N   Tolerating PT/OT Y Diarrhea N   Tolerating Diet Y Constipation N   Other Could NOT obtain due to:   
 
Objective:  
  
Physical Exam: 
 
Last 24hrs VS reviewed since prior progress note. Most recent are: 
 
Visit Vitals /57 (BP 1 Location: Right arm, BP Patient Position: At rest) Pulse 63 Temp 97.8 °F (36.6 °C) Resp 14 Ht 5' 9\" (1.753 m) Wt 96.1 kg (211 lb 14.4 oz) SpO2 97% BMI 31.29 kg/m² Intake/Output Summary (Last 24 hours) at 11/5/2019 2892 Last data filed at 11/5/2019 0302 Gross per 24 hour Intake 499 ml Output  Net 499 ml General Appearance: Well developed, well nourished, alert & oriented x 3,  
 no acute distress. Ears/Nose/Mouth/Throat: Hearing grossly normal. 
Neck: Supple. Chest: Lungs clear to auscultation bilaterally. Cardiovascular: Regular rate and rhythm, S1S2 normal, no murmur. Abdomen: Soft, non-tender, bowel sounds are active. Extremities: No edema bilaterally. Skin: Warm and dry. PMH/SH reviewed - no change compared to H&P Data Review Telemetry: normal sinus rhythm Lab Data Personally Reviewed: 
 
Recent Labs 11/04/19 0354 11/03/19 0421 WBC 19.2* 19.9* HGB 9.6* 9.5* HCT 31.6* 31.4*  
* 182 No results for input(s): INR, PTP, APTT, INREXT, INREXT in the last 72 hours. Recent Labs 11/04/19 
0354 11/03/19 0421  139  
K 3.9 3.8 * 111* CO2 22 22 BUN 22* 16  
CREA 2.11* 1.66* * 107* CA 8.9 9.2 No results for input(s): CPK, CKNDX, TROIQ in the last 72 hours. No lab exists for component: CPKMB Lab Results Component Value Date/Time  Cholesterol, total 117 06/12/2019 02:00 PM  
 HDL Cholesterol 35 06/12/2019 02:00 PM  
 LDL, calculated 65 06/12/2019 02:00 PM  
 Triglyceride 86 06/12/2019 02:00 PM  
 CHOL/HDL Ratio 3 06/12/2019 02:00 PM  
 
 
No results for input(s): SGOT, GPT, AP, TBIL, TP, ALB, GLOB, GGT, AML, LPSE in the last 72 hours. No lab exists for component: AMYP, HLPSE No results for input(s): PH, PCO2, PO2 in the last 72 hours. Medications Personally Reviewed: 
 
Current Facility-Administered Medications Medication Dose Route Frequency  ceFAZolin (ANCEF) 1 g in 0.9% sodium chloride (MBP/ADV) 50 mL  1 g IntraVENous Q12H  
 dextrose 5 % - 0.45% NaCl infusion  100 mL/hr IntraVENous CONTINUOUS  
 sodium chloride (NS) flush 5-40 mL  5-40 mL IntraVENous PRN  
 QUEtiapine (SEROquel) tablet 25 mg  25 mg Oral BID  
 haloperidol lactate (HALDOL) injection 1 mg  1 mg IntraVENous Q6H PRN  
 fluticasone propionate (FLONASE) 50 mcg/actuation nasal spray 2 Spray  2 Spray Both Nostrils DAILY  temazepam (RESTORIL) capsule 30 mg  30 mg Oral QHS PRN  
 guaiFENesin ER (MUCINEX) tablet 600 mg  600 mg Oral Q12H  
 LORazepam (ATIVAN) injection 1 mg  1 mg IntraVENous Q6H PRN  
 albuterol (PROVENTIL HFA, VENTOLIN HFA, PROAIR HFA) inhaler 2 Puff  2 Puff Inhalation Q4H PRN  
 amiodarone (CORDARONE) tablet 200 mg  200 mg Oral DAILY  aspirin chewable tablet 81 mg  81 mg Oral DAILY  clopidogrel (PLAVIX) tablet 75 mg  75 mg Oral DAILY  pantoprazole (PROTONIX) tablet 40 mg  40 mg Oral ACB  levothyroxine (SYNTHROID) tablet 137 mcg  137 mcg Oral ACB  midodrine (PROAMITINE) tablet 10 mg  10 mg Oral TID WITH MEALS  sodium chloride (NS) flush 5-40 mL  5-40 mL IntraVENous Q8H  
 heparin (porcine) injection 5,000 Units  5,000 Units SubCUTAneous Q8H Salma Rae MD

## 2019-11-05 NOTE — TELEPHONE ENCOUNTER
Cherelle Encarnacion NP.  ([address] [facility])    Would like a call back from [facility]     Call back [phone]

## 2019-11-05 NOTE — PROGRESS NOTES
MICHELLE: 
1. [address] [facility] when medically stable 2. [facility] transportation at d/c 
 
Per chart review, family meeting planned with Palliative Medicine today at 3:30 pm. CM will follow-up on outcome of this meeting. Plan remains for pt to d/c to [facility] once medically stable. Robert Alvarez, MSW Care Manager [phone]

## 2019-11-05 NOTE — PROGRESS NOTES
Bedside shift change report given to Thom Kirkland (oncoming nurse) by Urszula Jensen RN (offgoing nurse). Report included the following information SBAR, Kardex and MAR. Notified Dr. Rambo Bhagat that I was unable to get morning labs. Verbal order received to get arterial stick if unable to get labs from peripheral draw. Urszula Jensen, RN'

## 2019-11-05 NOTE — PROGRESS NOTES
PROGRESS NOTE 
 
NAME:  Regi Guerrier :   3/13/1931 MRN:   [id number] Date/Time:  2019 7:13 AM 
Subjective:  
History:  Chart reviewed and patient seen and examined this AM and D/W his nurse and all events noted. He has had 3 recent hospital admissions the first one of which was related to acute coronary syndrome and he had a stent placed in his left main extending into his LAD with a side-port to circumflex. After that hospitalization he developed DVT of his left lower extremity despite being on aspirin and Plavix and thus Xarelto was started and unfortunately he was readmitted to the hospital with an acute GI bleed. During that hospitalization he had a inferior vena cava filter placed and Xarelto was discontinued and he had an EGD and colonoscopy without an obvious source for the bleed. He was discharged to rehab and seemed to be doing well until 10/24 when he was admitted with Sepsis with Temp to 105.7 and CXR read as bilateral LL infiltrate although no cough and not requiring oxygen so further evaluation ruled out Pneumonia (CXR 10/25 w/o definite pneumonia). UA not infected. Blood cultures + Staph. Lugdenensis c/w skin and soft tissue source. He has been intermittently agitated and confused since admission and requiring PRN Ativan and Haldol: however markedly confused/ sedated so changed to scheduled Seroquel on  and now is improved marginally although still somewhat lethargic and confused but he is oriented to name, hospital and calls me by name. Unable to give history himself today but per his nurse as follows. He denies CP, SOB or other cardiac or respiratory c/o. He has no GI/ c/o although eating little with very little PO intake. He has no neurologic c/o except the confusion. He is able to be aroused today and answer questions. He has no other c/o on complete ROS. Echo on 10/30 c/w Aortic valve vegetation. Medications reviewed: Current Facility-Administered Medications Medication Dose Route Frequency  ceFAZolin (ANCEF) 1 g in 0.9% sodium chloride (MBP/ADV) 50 mL  1 g IntraVENous Q12H  
 dextrose 5 % - 0.45% NaCl infusion  100 mL/hr IntraVENous CONTINUOUS  
 sodium chloride (NS) flush 5-40 mL  5-40 mL IntraVENous PRN  
 QUEtiapine (SEROquel) tablet 25 mg  25 mg Oral BID  
 haloperidol lactate (HALDOL) injection 1 mg  1 mg IntraVENous Q6H PRN  
 fluticasone propionate (FLONASE) 50 mcg/actuation nasal spray 2 Spray  2 Spray Both Nostrils DAILY  temazepam (RESTORIL) capsule 30 mg  30 mg Oral QHS PRN  
 guaiFENesin ER (MUCINEX) tablet 600 mg  600 mg Oral Q12H  
 LORazepam (ATIVAN) injection 1 mg  1 mg IntraVENous Q6H PRN  
 albuterol (PROVENTIL HFA, VENTOLIN HFA, PROAIR HFA) inhaler 2 Puff  2 Puff Inhalation Q4H PRN  
 amiodarone (CORDARONE) tablet 200 mg  200 mg Oral DAILY  aspirin chewable tablet 81 mg  81 mg Oral DAILY  clopidogrel (PLAVIX) tablet 75 mg  75 mg Oral DAILY  pantoprazole (PROTONIX) tablet 40 mg  40 mg Oral ACB  levothyroxine (SYNTHROID) tablet 137 mcg  137 mcg Oral ACB  midodrine (PROAMITINE) tablet 10 mg  10 mg Oral TID WITH MEALS  sodium chloride (NS) flush 5-40 mL  5-40 mL IntraVENous Q8H  
 heparin (porcine) injection 5,000 Units  5,000 Units SubCUTAneous Q8H Objective:  
Vitals: 
Visit Vitals BP (!) 89/63 Pulse 66 Temp 97.9 °F (36.6 °C) Resp 14 Ht 5' 9\" (1.753 m) Wt 211 lb 14.4 oz (96.1 kg) SpO2 100% BMI 31.29 kg/m² O2 Flow Rate (L/min): 4 l/min O2 Device: Room air Temp (24hrs), Av.4 °F (36.3 °C), Min:96.5 °F (35.8 °C), Max:97.9 °F (36.6 °C) Last 24hr Input/Output: 
 
Intake/Output Summary (Last 24 hours) at 2019 9310 Last data filed at 2019 0302 Gross per 24 hour Intake 499 ml Output  Net 499 ml PHYSICAL EXAM: 
General:     Somnolent but easily arousable this AM and less lethargic although still quite restless, no distress, appears stated age. Head:    Normocephalic, without obvious abnormality, atraumatic. Eyes:    Conjunctivae/corneas clear. PERRLA Nose:   Nares normal. No drainage or sinus tenderness. Throat:     Lips, mucosa, and tongue normal.  No Thrush Neck:   Supple, symmetrical,  no adenopathy, thyroid: non tender 
   no carotid bruit and no JVD. Back:     Symmetric,  No CVA tenderness. Lungs:    Clear to auscultation bilaterally. No Wheezing or Rhonchi. No rales. Heart:    Irregularly irregular,  2/6 systolic murmur w/o change and w/o rub or gallop. Abdomen:    Soft, non-tender. Not distended. Bowel sounds normal. No masses Extremities:  Extremities normal, atraumatic, No cyanosis. No edema. No clubbing Lymph nodes:  Cervical, supraclavicular normal. 
Neurologic:  General decreased strength, Somnolent but arousable and will answer questions appropriately today Skin:                 No rash and no obvious source of infection. IV site looks OK Lab Data Reviewed: 
 
No results found for this or any previous visit (from the past 24 hour(s)). Assessment/Plan:  
 
Principal Problem: 
  Severe sepsis ([address] (10/24/2019) Active Problems: 
  Glucose intolerance (impaired glucose tolerance) (8/21/2017) COPD (chronic obstructive pulmonary disease) ([facility] (8/21/2017) Stage 3 chronic kidney disease ([facility] (8/21/2017) Atrial fibrillation ([facility] (8/21/2017) ASCVD (arteriosclerotic cardiovascular disease) (8/21/2017) Overview: Catheterization 6/11/2004 80% stenosis nondominant RCA and 30% stenosis of  
    LAD with a notation of some left main disease but no percentage given. No  
    mention of angioplasty or stent made Anemia (8/13/2019) Nonrheumatic aortic valve stenosis (8/27/2019) Overview: Mild to moderate by echocardiogram 8/27/2019 Fever (10/24/2019) KEVAN (acute kidney injury) ([address] (10/24/2019) Acute encephalopathy (10/24/2019) Chronic orthostatic hypotension (10/24/2019) Acute bacterial endocarditis (10/31/2019) Delirium (11/4/2019) 
 
  
___________________________________________________ PLAN: 
 
1. Discontinued Maxipime and continued Vancomycin through 11/2 (D #10) for Sepsis (S. Lugdunensis), concerning that WBC was up to 14.0, so based on sensitivities changed to Ancef (D # 4) as less Nephrotoxic, repeat cx 10/31 NGSF at 4 days, WBC 19.2 yesterday from 19.9 on 11/3, today pending 2. Supplemental oxygen for hypoxemia due to COPD, does not have Pneumonia 3. Follow Hgb (9.0 on admission) with history of significant anemia, now 9.6 4. Continue Plavix with recent cardiac stents 5. Continue Midodrine for orthostatic hypotension 6. Amiodarone for PAF, In a fib this AM, rate OK 7. Follow renal function with ARF on CKD, Creat 2.11 yesterday from 1.66 on 11/3 and 1.3 on 11/2 so increased IV fluids as taking in little to nothing PO and stopped Vanc 11/2. Today's BMP pending 8. Continue Protonix 9. Repeat CXR, no Pneumonia but will re check if further increase WBC or spikes fever 10. Continue Flonase nasal spray 11. Mobilize with PT 
12. Resume HS Restoril 13. Resumed IV fluids and increased 11/4 AM 
14. Ativan or Haldol PRN agitation only if absolutely needed (has had none for 72 hrs), Continue scheduled Seroquel 15. Echocardiogram reveals aortic valvular vegetation so needs 6 weeks antibiotics 16. DNR per family and patient wishes 17. D/Maxim Telemetry 18. Palliative care consult per family request which I D/W them Palliative and Hospice D/W Dr. Tika Jurado and his help appreciated Critically ill  Very high complexity patient with multi-organ system failure and with extremely high risk of further deterioration and death which family is aware of and understanding Unable to reach Palliative Care this AM 
___________________________________________________ Attending Physician: Damari Sher MD

## 2019-11-05 NOTE — PROGRESS NOTES
11/04/19 1952 Vitals Temp 97.5 °F (36.4 °C) Temp Source Oral  
Pulse (Heart Rate) 86 Heart Rate Source Monitor Resp Rate 20  
O2 Sat (%) 98 % Level of Consciousness (!) Confused or Agitated /62 MAP (Calculated) 78 MEWS Score 3 Per family and day shift RN, pt has been more restless and confused throughout day, and has increased in the past 1-2 hours. Will given prn Ativan and continue to monitor. Joel Mcallister RN

## 2019-11-05 NOTE — PROGRESS NOTES
Problem: Mobility Impaired (Adult and Pediatric) Goal: *Acute Goals and Plan of Care (Insert Text) Description FUNCTIONAL STATUS PRIOR TO ADMISSION: Pt currently at SNF, participating with PT/OT following hospitalization at [facility]. Ambulating with RW support. HOME SUPPORT PRIOR TO ADMISSION: Lived with son previously Physical Therapy Goals Initiated 10/29/2019 1. Patient will move from supine to sit and sit to supine , scoot up and down and roll side to side in bed with moderate assistance  within 7 day(s). 2.  Patient will transfer from bed to chair and chair to bed with moderate assistance  using the least restrictive device within 7 day(s). 3.  Patient will perform sit to stand with moderate assistance  within 7 day(s). 4.  Patient will ambulate with moderate assistance  for 20 feet with the least restrictive device within 7 day(s). Revised 11/5/2019 1. Patient will move from supine to sit and sit to supine  in bed with maximal assistance within 7 day(s). 2.  Patient will transfer from bed to chair and chair to bed with maximal assistance using the least restrictive device within 7 day(s). 3.  Patient will perform sit to stand with maximal assistance within 7 day(s). 4.  Patient will tolerate sitting EOB and more alert for 20 minutes within 7 day(s). Outcome: Not Progressing Towards Goal 
PHYSICAL THERAPY TREATMENT: WEEKLY REASSESSMENT Patient: Columba Farrell (44 y.o. male) Date: 11/5/2019 Primary Diagnosis: Severe sepsis ([facility] [A41.9, R65.20] Bilateral pneumonia [J18.9] Fever [R50.9] KEVAN (acute kidney injury) ([facility] [address] [N17.9] Precautions:   Fall ASSESSMENT Patient continues with skilled PT services and is not progressing towards goals. Pt was received in supine, VSS during session and cleared by nursing to mobilize. Pt lethargic and only opened R eye 1 time during entire session. Noted pt was confused and agitated the previous night.  He required increased assistance and not making any progress toward goals. Patient's progression toward goals since last assessment: not making progress, goals down graded Current Level of Function Impacting Discharge (mobility/balance): total A x 2 Functional Outcome Measure: The patient scored 0/100 on the barthel outcome measure which is indicative of 100% impaired. Other factors to consider for discharge: lethargic, palliative following PLAN : 
Goals have been updated based on progression since last assessment. Patient continues to benefit from skilled intervention to address the above impairments. Recommendations and Planned Interventions: bed mobility training, transfer training, therapeutic exercises, patient and family training/education and therapeutic activities Frequency/Duration: Patient will be followed by physical therapy:  3 times a week to address goals. Recommendation for discharge: (in order for the patient to meet his/her long term goals) Therapy up to 5 days/week in SNF setting/LTC This discharge recommendation: 
Has not yet been discussed the attending provider and/or case management IF patient discharges home will need the following DME: hospital bed and total A for care SUBJECTIVE:  
Patient stated mmmmm.  OBJECTIVE DATA SUMMARY:  
HISTORY:   
Past Medical History:  
Diagnosis Date Arrhythmia A-fib GERD (gastroesophageal reflux disease) Hypertension Other ill-defined conditions(799.89) BPH Other ill-defined conditions(799.89) lipids Other ill-defined conditions(799.89)   
 shingles hx Thyroid disease Unspecified adverse effect of anesthesia Past Surgical History:  
Procedure Laterality Date COLONOSCOPY N/A 9/30/2019 COLONOSCOPY performed by Martha Cardoza MD at [facility] ENDOSCOPY  
 HX CATARACT REMOVAL    
 HX HEART CATHETERIZATION    
 ablation HX HEART CATHETERIZATION  07/18/2019 Stent HX HEENT    
 scar tissue removed/laser HX HERNIA REPAIR  2017 Sandip Womack MD  
 HX ORTHOPAEDIC    
 left foot fx--hardware HX ORTHOPAEDIC  14 Right total knee arthroplasty HX OTHER SURGICAL    
 prostate bx Personal factors and/or comorbidities impacting plan of care: lethargic and limited command following Home Situation Home Environment: Rehabilitation facility # Steps to Enter: 0 One/Two Story Residence: One story Living Alone: No 
Support Systems: Skilled nursing facility Patient Expects to be Discharged to[de-identified] Rehabilitation facility Current DME Used/Available at Home: None EXAMINATION/PRESENTATION/DECISION MAKING:  
Critical Behavior: 
Neurologic State: Drowsy, Restless Orientation Level: Disoriented to place, Disoriented to situation, Disoriented to time Cognition: Impaired decision making, Impulsive, No command following, Poor safety awareness, Decreased attention/concentration Hearing: Auditory Auditory Impairment: Hard of hearing, bilateral 
Skin:  intact Edema: WDL Functional Mobility: 
Bed Mobility: 
Rolling: Maximum assistance Supine to Sit: Total assistance;Assist x2 Sit to Supine: Total assistance Scooting: Total assistance Balance:  
Sitting: Impaired Sitting - Static: Fair (occasional) Functional Measure: 
Barthel Index: 
 
Bathin Bladder: 0 Bowels: 0 Groomin Dressin Feedin Mobility: 0 Stairs: 0 Toilet Use: 0 Transfer (Bed to Chair and Back): 0 Total: 0/100 The Barthel ADL Index: Guidelines 1. The index should be used as a record of what a patient does, not as a record of what a patient could do. 2. The main aim is to establish degree of independence from any help, physical or verbal, however minor and for whatever reason. 3. The need for supervision renders the patient not independent.  
4. A patient's performance should be established using the best available evidence. Asking the patient, friends/relatives and nurses are the usual sources, but direct observation and common sense are also important. However direct testing is not needed. 5. Usually the patient's performance over the preceding 24-48 hours is important, but occasionally longer periods will be relevant. 6. Middle categories imply that the patient supplies over 50 per cent of the effort. 7. Use of aids to be independent is allowed. Lucie Curry., Barthel, D.W. (1946). Functional evaluation: the Barthel Index. [address] [facility] (14)2. Krysten Burns ping LUIS Greenwood, Elige Heimlich., Emerald Shaw., [place], [address] (1999). Measuring the change indisability after inpatient rehabilitation; comparison of the responsiveness of the Barthel Index and Functional [place] Measure. Journal of Neurology, Neurosurgery, and Psychiatry, 66(4), 772-626. Tahira Valdez, N.[place].A, ALESSANDRO Madrigal, & Sharon Sanchez MPrimitivoA. (2004.) Assessment of post-stroke quality of life in cost-effectiveness studies: The usefulness of the Barthel Index and the EuroQoL-5D. Formerly [facility], 13, 127-12 Activity Tolerance:  
Fair, VSS Please refer to the flowsheet for vital signs taken during this treatment. After treatment patient left in no apparent distress:  
Supine in bed, Call bell within reach and chair position in bed COMMUNICATION/EDUCATION:  
The patients plan of care was discussed with: Registered Nurse. Patient is unable to participate in goal setting and plan of care. Thank you for this referral. 
Caroline Delarosa, PT, DPT Time Calculation: 23 mins

## 2019-11-06 NOTE — PROGRESS NOTES
PROGRESS NOTE 
 
NAME:  Twin Esteban :   3/13/1931 MRN:   [id number] Date/Time:  2019 7:13 AM 
Subjective:  
History:  Chart reviewed and patient seen and examined this AM and D/W his nurse and all events noted. He has had 3 recent hospital admissions the first one of which was related to acute coronary syndrome and he had a stent placed in his left main extending into his LAD with a side-port to circumflex. After that hospitalization he developed DVT of his left lower extremity despite being on aspirin and Plavix and thus Xarelto was started and unfortunately he was readmitted to the hospital with an acute GI bleed. During that hospitalization he had a inferior vena cava filter placed and Xarelto was discontinued and he had an EGD and colonoscopy without an obvious source for the bleed. He was discharged to rehab and seemed to be doing well until 10/24 when he was admitted with Sepsis with Temp to 105.7 and CXR read as bilateral LL infiltrate although no cough and not requiring oxygen so further evaluation ruled out Pneumonia (CXR 10/25 w/o definite pneumonia). UA not infected. Blood cultures + Staph. Lugdenensis c/w skin and soft tissue source. He has been intermittently agitated and confused since admission and requiring PRN Ativan and Haldol: however markedly confused/ sedated so changed to scheduled Seroquel on  and now is improved marginally although still somewhat lethargic and confused but he is oriented to name, hospital and calls me by name. Unable to give history himself today but per his nurse as follows. He denies CP, SOB or other cardiac or respiratory c/o. He has no GI/ c/o although eating little with very little PO intake. He has no neurologic c/o except the confusion. He is able to be aroused today and answer questions. He has no other c/o on complete ROS. Echo on 10/30 c/w Aortic valve vegetation. Medications reviewed: Current Facility-Administered Medications Medication Dose Route Frequency  guaiFENesin (ROBITUSSIN) 100 mg/5 mL oral liquid 100 mg  100 mg Oral Q4H PRN  
 ceFAZolin (ANCEF) 1 g in 0.9% sodium chloride (MBP/ADV) 50 mL  1 g IntraVENous Q12H  
 dextrose 5 % - 0.45% NaCl infusion  100 mL/hr IntraVENous CONTINUOUS  
 sodium chloride (NS) flush 5-40 mL  5-40 mL IntraVENous PRN  
 QUEtiapine (SEROquel) tablet 25 mg  25 mg Oral BID  
 haloperidol lactate (HALDOL) injection 1 mg  1 mg IntraVENous Q6H PRN  
 fluticasone propionate (FLONASE) 50 mcg/actuation nasal spray 2 Spray  2 Spray Both Nostrils DAILY  temazepam (RESTORIL) capsule 30 mg  30 mg Oral QHS PRN  
 LORazepam (ATIVAN) injection 1 mg  1 mg IntraVENous Q6H PRN  
 albuterol (PROVENTIL HFA, VENTOLIN HFA, PROAIR HFA) inhaler 2 Puff  2 Puff Inhalation Q4H PRN  
 amiodarone (CORDARONE) tablet 200 mg  200 mg Oral DAILY  aspirin chewable tablet 81 mg  81 mg Oral DAILY  clopidogrel (PLAVIX) tablet 75 mg  75 mg Oral DAILY  pantoprazole (PROTONIX) tablet 40 mg  40 mg Oral ACB  levothyroxine (SYNTHROID) tablet 137 mcg  137 mcg Oral ACB  midodrine (PROAMITINE) tablet 10 mg  10 mg Oral TID WITH MEALS  sodium chloride (NS) flush 5-40 mL  5-40 mL IntraVENous Q8H  
 heparin (porcine) injection 5,000 Units  5,000 Units SubCUTAneous Q8H Objective:  
Vitals: 
Visit Vitals /55 (BP 1 Location: Right arm, BP Patient Position: At rest) Pulse 71 Temp 97.5 °F (36.4 °C) Resp 22 Ht 5' 9\" (1.753 m) Wt 216 lb 0.8 oz (98 kg) SpO2 95% BMI 31.91 kg/m² O2 Flow Rate (L/min): 4 l/min O2 Device: Room air Temp (24hrs), Av.7 °F (36.5 °C), Min:97.5 °F (36.4 °C), Max:97.9 °F (36.6 °C) Last 24hr Input/Output: 
 
Intake/Output Summary (Last 24 hours) at 2019 0740 Last data filed at 2019 2234 Gross per 24 hour Intake 2306.67 ml Output  Net 2306.67 ml PHYSICAL EXAM: 
 General:     Somnolent but easily arousable this AM and less lethargic although still quite restless, no distress, appears stated age. Head:    Normocephalic, without obvious abnormality, atraumatic. Eyes:    Conjunctivae/corneas clear. PERRLA Nose:   Nares normal. No drainage or sinus tenderness. Throat:     Lips, mucosa, and tongue normal.  No Thrush Neck:   Supple, symmetrical,  no adenopathy, thyroid: non tender 
   no carotid bruit and no JVD. Back:     Symmetric,  No CVA tenderness. Lungs:    Clear to auscultation bilaterally. No Wheezing or Rhonchi. No rales. Heart:    Irregularly irregular,  2/6 systolic murmur w/o change and w/o rub or gallop. Abdomen:    Soft, non-tender. Not distended. Bowel sounds normal. No masses Extremities:  Extremities normal, atraumatic, No cyanosis. No edema. No clubbing Lymph nodes:  Cervical, supraclavicular normal. 
Neurologic:  General decreased strength, Somnolent but arousable and will answer questions appropriately today Skin:                 No rash and no obvious source of infection. IV site looks OK Lab Data Reviewed: 
 
Recent Results (from the past 24 hour(s)) CBC WITH AUTOMATED DIFF Collection Time: 11/06/19  3:42 AM  
Result Value Ref Range WBC 21.3 (H) 4.1 - 11.1 K/uL  
 RBC 3.33 (L) 4.10 - 5.70 M/uL HGB 9.2 (L) 12.1 - 17.0 g/dL HCT 30.2 (L) 36.6 - 50.3 % MCV 90.7 80.0 - 99.0 FL  
 MCH 27.6 26.0 - 34.0 PG  
 MCHC 30.5 30.0 - 36.5 g/dL  
 RDW 18.3 (H) 11.5 - 14.5 % PLATELET 208 (L) 474 - 400 K/uL MPV 12.7 8.9 - 12.9 FL  
 NRBC 0.1 (H) 0  WBC ABSOLUTE NRBC 0.02 (H) 0.00 - 0.01 K/uL NEUTROPHILS 85 (H) 32 - 75 % LYMPHOCYTES 6 (L) 12 - 49 % MONOCYTES 7 5 - 13 % EOSINOPHILS 0 0 - 7 % BASOPHILS 0 0 - 1 % IMMATURE GRANULOCYTES 2 (H) 0.0 - 0.5 % ABS. NEUTROPHILS 18.2 (H) 1.8 - 8.0 K/UL  
 ABS. LYMPHOCYTES 1.2 0.8 - 3.5 K/UL  
 ABS. MONOCYTES 1.5 (H) 0.0 - 1.0 K/UL ABS. EOSINOPHILS 0.0 0.0 - 0.4 K/UL  
 ABS. BASOPHILS 0.1 0.0 - 0.1 K/UL  
 ABS. IMM. GRANS. 0.3 (H) 0.00 - 0.04 K/UL  
 DF AUTOMATED METABOLIC PANEL, BASIC Collection Time: 11/06/19  3:42 AM  
Result Value Ref Range Sodium 136 136 - 145 mmol/L Potassium 4.3 3.5 - 5.1 mmol/L Chloride 108 97 - 108 mmol/L  
 CO2 17 (L) 21 - 32 mmol/L Anion gap 11 5 - 15 mmol/L Glucose 142 (H) 65 - 100 mg/dL BUN 29 (H) 6 - 20 MG/DL Creatinine 3.24 (H) 0.70 - 1.30 MG/DL  
 BUN/Creatinine ratio 9 (L) 12 - 20 GFR est AA 22 (L) >60 ml/min/1.73m2 GFR est non-AA 18 (L) >60 ml/min/1.73m2 Calcium 9.0 8.5 - 10.1 MG/DL Assessment/Plan:  
 
Principal Problem: 
  Severe sepsis ([facility] (10/24/2019) Active Problems: 
  Glucose intolerance (impaired glucose tolerance) (8/21/2017) COPD (chronic obstructive pulmonary disease) ([facility] (8/21/2017) Stage 3 chronic kidney disease ([facility] (8/21/2017) Atrial fibrillation ([facility] (8/21/2017) ASCVD (arteriosclerotic cardiovascular disease) (8/21/2017) Overview: Catheterization 6/11/2004 80% stenosis nondominant RCA and 30% stenosis of  
    LAD with a notation of some left main disease but no percentage given. No  
    mention of angioplasty or stent made Anemia (8/13/2019) Nonrheumatic aortic valve stenosis (8/27/2019) Overview: Mild to moderate by echocardiogram 8/27/2019 Fever (10/24/2019) KEVAN (acute kidney injury) ([facility] (10/24/2019) Acute encephalopathy (10/24/2019) Chronic orthostatic hypotension (10/24/2019) Acute bacterial endocarditis (10/31/2019) Delirium (11/4/2019) ACP (advance care planning) (11/5/2019) 
 
  
___________________________________________________ PLAN: 
 
1.   Discontinued Maxipime and continued Vancomycin through 11/2 (D #10) for Sepsis (S. Lugdunensis), concerning that WBC was up to 14.0, so based on sensitivities changed to Ancef (D # 5) as less Nephrotoxic, repeat cx 10/31 NGSF at 5 days, WBC 19.2 on 11/4 from 19.9 on 11/3, today up at 21.3 2. Supplemental oxygen for hypoxemia due to COPD, does not have Pneumonia 3. Follow Hgb (9.0 on admission) with history of significant anemia, now 9.2 4. Continue Plavix with recent cardiac stents 5. Continue Midodrine for orthostatic hypotension 6. Amiodarone for PAF, In a fib this AM, rate OK 7. Follow renal function with ARF on CKD, Creat 2.11 on 11/4 from 1.66 on 11/3 and 1.3 on 11/2 so increased IV fluids as taking in little to nothing PO and stopped Vanc 11/2. Today's Creat unfortunately higher at 3.24 
8. Continue Protonix 9. Repeat CXR, no Pneumonia but will re check if further increase WBC or spikes fever 10. Continue Flonase nasal spray 11. Mobilize with PT 
12. Resume HS Restoril 13. Resumed IV fluids and increased 11/4 AM 
14. Ativan or Haldol PRN agitation only if absolutely needed (has had none for 72 hrs), Continue scheduled Seroquel 15. Echocardiogram reveals aortic valvular vegetation so needs 6 weeks antibiotics 16. DNR per family and patient wishes 17. D/Maxim Telemetry 18. Palliative care consult per family request which I D/W them Palliative and Hospice and I D/W Palliative care yesterday by phone D/W Dr. Naima Ocasio and his help appreciated Critically ill  Very high complexity patient with multi-organ system failure and with extremely high risk of further deterioration and death which family is aware of and understanding 
 
___________________________________________________ Attending Physician: Phil Ramey MD

## 2019-11-06 NOTE — PROGRESS NOTES
Progress Note 
 
 
11/6/2019 5:55 PM 
NAME: Desmond Nolasco MRN:  [id number] Admit Diagnosis: Severe sepsis ([facility] [A41.9, R65.20] Bilateral pneumonia [J18.9] Fever [R50.9] KEVAN (acute kidney injury) ([facility] [N17.9]             RNUBZGN List:  
Multivessel PCI Recent DVT, started on anticoagulation, then with GI bleed Now with staph bacteremia and likely endocarditis 
  
- Cath 7/2019 with impella supported PCI of LM into LAD with balloon side hole to LCx, cath 8/29/2019 with patent LM stent. 
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 
- Prox afib, currently back in afib 
- Hx of aflutter ablation 
- HTN 
- Dyslipidemia - Hypothyroid - GERD 
- BPH 
- Distant tobacco 
- DVT 
- GI bleed , lives alone, ADLs, occasionally using cane Son: Kiana Christensen the III.   [phone] 
   
  
            Assessment/Plan:      
Staph bacteremia, TTE with likely endocarditis, discussed BRIAN with family, patient has some delerium and concern with anesthesia, poor surgical candidate, BRIAN may not  at this point.  
No chest pain. Euvolemic so far, echo with normal EF, mild to mod AS, mild AI Prox afib back afib despite amiodarone, unfortunately unable to tolerate anticoagulation given recent GI bleed DVT now with IVC filter 
  
- Cont ASA. - Cont plavix, this can not be stopped. - Cont amiodarone - Cont  Midodrine, still with some labile bp. 
- ? statin - Abx per Dr. Corinne Seat, will need repeat cultures off of abx, poor surgical candidate. ?change to PO abx for comfort. Worsening renal function on abx poor dialysis candidate, WBC still elevated. Patient elderly and has had multiple hospitalizations this past year. Family has been meeting with palliative care. 
   
  
  
 [x]       High complexity decision making was performed in this patient at high risk for decompensation with multiple organ involvement. Subjective: Kehinde Trimble denies chest pain, dyspnea. Discussed with RN events overnight. Review of Systems: 
 
Symptom Y/N Comments  Symptom Y/N Comments Fever/Chills N   Chest Pain N Poor Appetite N   Edema N   
Cough N   Abdominal Pain N Sputum N   Joint Pain N   
SOB/JAFFE N   Pruritis/Rash N   
Nausea/vomit N   Tolerating PT/OT Y Diarrhea N   Tolerating Diet Y Constipation N   Other Could NOT obtain due to:   
 
Objective:  
  
Physical Exam: 
 
Last 24hrs VS reviewed since prior progress note. Most recent are: 
 
Visit Vitals /41 (BP 1 Location: Right arm, BP Patient Position: Supine) Pulse 71 Temp 97.5 °F (36.4 °C) Resp 20 Ht 5' 9\" (1.753 m) Wt 98 kg (216 lb 0.8 oz) SpO2 95% BMI 31.91 kg/m² Intake/Output Summary (Last 24 hours) at 11/6/2019 1016 Last data filed at 11/5/2019 2234 Gross per 24 hour Intake 2306.67 ml Output  Net 2306.67 ml General Appearance: Well developed, well nourished, alert & oriented x 3,  
 no acute distress. Ears/Nose/Mouth/Throat: Hearing grossly normal. 
Neck: Supple. Chest: Lungs clear to auscultation bilaterally. Cardiovascular: Regular rate and rhythm, S1S2 normal, no murmur. Abdomen: Soft, non-tender, bowel sounds are active. Extremities: No edema bilaterally. Skin: Warm and dry. PMH/SH reviewed - no change compared to H&P Data Review Telemetry: normal sinus rhythm Lab Data Personally Reviewed: 
 
Recent Labs 11/06/19 0342 11/04/19 
0354 WBC 21.3* 19.2* HGB 9.2* 9.6* HCT 30.2* 31.6*  
* 134* No results for input(s): INR, PTP, APTT, INREXT, INREXT in the last 72 hours. Recent Labs 11/06/19 0342 11/04/19 
0354  138  
K 4.3 3.9  109* CO2 17* 22 BUN 29* 22* CREA 3.24* 2.11* * 127* CA 9.0 8.9 No results for input(s): CPK, CKNDX, TROIQ in the last 72 hours. No lab exists for component: CPKMB Lab Results Component Value Date/Time Cholesterol, total 117 06/12/2019 02:00 PM  
 HDL Cholesterol 35 06/12/2019 02:00 PM  
 LDL, calculated 65 06/12/2019 02:00 PM  
 Triglyceride 86 06/12/2019 02:00 PM  
 CHOL/HDL Ratio 3 06/12/2019 02:00 PM  
 
 
No results for input(s): SGOT, GPT, AP, TBIL, TP, ALB, GLOB, GGT, AML, LPSE in the last 72 hours. No lab exists for component: AMYP, HLPSE No results for input(s): PH, PCO2, PO2 in the last 72 hours. Medications Personally Reviewed: 
 
Current Facility-Administered Medications Medication Dose Route Frequency  guaiFENesin (ROBITUSSIN) 100 mg/5 mL oral liquid 100 mg  100 mg Oral Q4H PRN  
 ceFAZolin (ANCEF) 1 g in 0.9% sodium chloride (MBP/ADV) 50 mL  1 g IntraVENous Q12H  
 dextrose 5 % - 0.45% NaCl infusion  100 mL/hr IntraVENous CONTINUOUS  
 sodium chloride (NS) flush 5-40 mL  5-40 mL IntraVENous PRN  
 QUEtiapine (SEROquel) tablet 25 mg  25 mg Oral BID  
 haloperidol lactate (HALDOL) injection 1 mg  1 mg IntraVENous Q6H PRN  
 fluticasone propionate (FLONASE) 50 mcg/actuation nasal spray 2 Spray  2 Spray Both Nostrils DAILY  temazepam (RESTORIL) capsule 30 mg  30 mg Oral QHS PRN  
 LORazepam (ATIVAN) injection 1 mg  1 mg IntraVENous Q6H PRN  
 albuterol (PROVENTIL HFA, VENTOLIN HFA, PROAIR HFA) inhaler 2 Puff  2 Puff Inhalation Q4H PRN  
 amiodarone (CORDARONE) tablet 200 mg  200 mg Oral DAILY  aspirin chewable tablet 81 mg  81 mg Oral DAILY  clopidogrel (PLAVIX) tablet 75 mg  75 mg Oral DAILY  pantoprazole (PROTONIX) tablet 40 mg  40 mg Oral ACB  levothyroxine (SYNTHROID) tablet 137 mcg  137 mcg Oral ACB  midodrine (PROAMITINE) tablet 10 mg  10 mg Oral TID WITH MEALS  sodium chloride (NS) flush 5-40 mL  5-40 mL IntraVENous Q8H  
 heparin (porcine) injection 5,000 Units  5,000 Units SubCUTAneous Q8H Rosita Trujillo MD

## 2019-11-06 NOTE — PROGRESS NOTES
Oncology End of Shift Note Bedside shift change report given to Zayda Hampton (incoming nurse) by Harshal Duarte (outgoing nurse) on Kellen Polka. Report included the following information SBAR, Kardex, ED Summary, Intake/Output, MAR and Recent Results. Shift Summary:  
No change in patient condition throughout shift. Patient admitted mid shift. No complaints of pain. Family at bedside. Issues for Physician to Address:  none Patient on Cardiac Monitoring? [] Yes 
[x] No 
 
Rhythm:   
 
 
 
Shift Events Harshal Duarte

## 2019-11-06 NOTE — PROGRESS NOTES
Palliative Medicine Consult Lucian: [phone]) Patient Name: Sonia Huynh YOB: 1931 Date of Initial Consult: Nov 4,2019 Reason for Consult: first available, urgently need end of life guidance 5 children, no legal documentation no advance directive. Danitza Hillman interested in hospice. Requesting Provider: Bianca Elias MD  
Primary Care Physician: Geoff Mejia MD 
 
 SUMMARY:  
Sonia Huynh is a 80 y.o. with a past history of hypotension, GERD, A. fib, BPH, CAD s/p stents, hyperlipidemia, who was admitted on 10/24/2019 from [address] [place] [address],[address] with a diagnosis of severe sepsis, bilateral LL infiltrate. Current medical issues leading to Palliative Medicine involvement include: pt has been seen in the ED 5 times with 4 resulting admissions in the past 6 months; in July he was admitted for acute coronary syndrome with stent placement, shortly following discharge he developed DVT of his LLE despite being on ASA and plavix. He was started on Xarelto, and shortly following discharge was re-admitted with GIB. Xarelto was discontinued, EGD and colonoscopy did not reveal the source for the bleed. Pt was again discharged to [address] [place] [address],[address] for rehab and was doing well, back to baseline per son with anticipation of discharge home in a few days, then on 10/24 developed a fever of 105.7, was re-admitted to [facility] and was treated for +BC staph lugdenensis. This admission has been complicated by delirium, ECHO on 10/30 revealed vegetation on the aortic valve. Creatinine slowly increasing. Family requested Palliative Medicine consult; pt refused to address medical and financial wishes with family in the past.  
11/4: worsening renal function with creatinine up to 2. 11. Wbc still elevated at 19.2. 
11/5: no lab results; RN tried multiple times this am and again this afternoon with no luck.  Pt states \"I feel sick\" but unable to elaborate, denies pain, nausea,          falls asleep mid-conversation. 11/6: creatinine 3.24, wbc 21.3. Pt unresponsive to light tactile and verbal stim. RN reports concerns about oral medications as pt is not swallowing well. Psychosocial: , lives with son in 1 level home, needs assistance with ADLs. 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 5 children. PALLIATIVE DIAGNOSES:  
1. Severe sepsis 2. Hypotension/septic shock 3. Acute on chronic renal failure 4. Acute encephalopathy/delirium 5. PNA/bilateral LL infiltrate 6. Endocarditis 7. Care decisions PLAN:  
1. Prior to visit, I completed an extensive review of patient's medical records, including medical documentation, vital signs, MARs, and results of various labs and other diagnostics. 2. 11:00am: spoke with son Aurelia Jackman, discussed lab results, 's orders to d/c telemetry. Discussed plan to meet Friday, however, if family wants to transition pt to [address] [place] [address] before Friday they can call me. Will also check back in later in the day. 3. 1:30pm: met with dtr Juancarlos Heart and her : she reports that all patient's children have talked today and all agree that it is time to make patient comfort care, they do not want to wait until Friday. Discussed stopping all medications/interventions that are not contributing to comfort, including antibiotics and IVF, Hospice consult, comfort orders. Discussed with family that Steven Lock will most likely continue to manage patient's care while in Hospice (although some MDs defer to the Hospice Physician). 1. PLAN:  
1. Transition to comfort care. 2. Comfort orders placed (in order to move pt to oncology for comfort care.) 3. Message left for  regarding above. 4. Discussed with charge RN regarding transfer pt to oncology for comfort care instead of Neurology.     
2. Provided Palliative contact info, counseled family that they can call before Friday if they have questions or need to talk further. 4. Initial consult note routed to primary continuity provider and/or primary health care team members 5. Communicated plan of care with: Palliative Renea FONG 192 Team 
 
 GOALS OF CARE / TREATMENT PREFERENCES:  
 
GOALS OF CARE:  Continue current level of care Patient/Health Care Proxy Stated Goals: Comfort TREATMENT PREFERENCES:  
Code Status: DNR Advance Care Planning: 
[x] The [facility] Interdisciplinary Team has updated the ACP Navigator with [place] and Patient Capacity Primary Decision Maker: Bowen Del Toro - Child - [phone] Secondary Decision Maker: Phong Obrien - Daughter - [phone]   ALL 5 CHILDREN ARE NOK AND SHOULD BE INCLUDED IN DECISION-MAKING. Advance Care Planning 11/4/2019 Patient's Healthcare Decision Maker is: Legal Next of Kin Primary Decision Maker Name -  
Primary Decision Maker Phone Number -  
Primary Decision Maker Relationship to Patient -  
Confirm Advance Directive None Patient Would Like to Complete Advance Directive Unable Medical Interventions: Comfort measures Other Instructions: Artificially Administered Nutrition: No feeding tube Other: As far as possible, the palliative care team has discussed with patient / health care proxy about goals of care / treatment preferences for patient. HISTORY:  
 
History obtained from: chart, son CHIEF COMPLAINT: high fever HPI/SUBJECTIVE: The patient is:  
[] Verbal and participatory [x] Non-participatory due to: somnolent 10/24: BIBA with c/o progressively declining mental status over the past 24 hours, temp 103.5, , sats 86% In ED, pt does not answer questions, states his name or follow commands Clinical Pain Assessment (nonverbal scale for severity on nonverbal patients):  
Clinical Pain Assessment Severity: 0 Activity (Movement): Lying quietly, normal position Duration: for how long has pt been experiencing pain (e.g., 2 days, 1 month, years) Frequency: how often pain is an issue (e.g., several times per day, once every few days, constant) FUNCTIONAL ASSESSMENT:  
 
Palliative Performance Scale (PPS): PPS: 10 PSYCHOSOCIAL/SPIRITUAL SCREENING:  
 
Palliative IDT has assessed this patient for cultural preferences / practices and a referral made as appropriate to needs (Cultural Services, Patient Advocacy, Ethics, etc.) Any spiritual / Gnosticist concerns: 
[] Yes /  [x] No 
 
Caregiver Burnout: 
[] Yes /  [x] No /  [] No Caregiver Present Anticipatory grief assessment:  
[x] Normal  / [] Maladaptive ESAS Anxiety:  unable to assess due to pt factors ESAS Depression:   unable to assess due to pt factors REVIEW OF SYSTEMS:  
 
Positive and pertinent negative findings in ROS are noted above in HPI. The following systems were [x] reviewed / [] unable to be reviewed as noted in HPI Other findings are noted below. Systems: constitutional, ears/nose/mouth/throat, respiratory, gastrointestinal, genitourinary, musculoskeletal, integumentary, neurologic, psychiatric, endocrine. Positive findings noted below. Modified ESAS Completed by: provider Fatigue: 8 Drowsiness: 8 Pain: 0 Anxiety: 0 Dyspnea: 0 Stool Occurrence(s): 1 PHYSICAL EXAM:  
 
From RN flowsheet: 
Wt Readings from Last 3 Encounters:  
11/06/19 216 lb 0.8 oz (98 kg) 09/27/19 181 lb 15.8 oz (82.6 kg) 09/18/19 181 lb 9.6 oz (82.4 kg) Blood pressure (!) 105/35, pulse 62, temperature 96.9 °F (36.1 °C), resp. rate 20, height 5' 9\" (1.753 m), weight 216 lb 0.8 oz (98 kg), SpO2 98 %. Pain Scale 1: Numeric (0 - 10) Pain Intensity 1: 0 Last bowel movement, if known:  
 
Constitutional: unresponsive Eyes: closed ENMT: no nasal discharge, dry mucous membranes, secretions gurgling in throat Cardiovascular: regular rhythm, distal pulses intact Respiratory: breathing not labored, symmetric Gastrointestinal: soft non-tender, +bowel sounds Musculoskeletal: no deformity, no tenderness to palpation Skin: warm, dry, +peripheral edema Neurologic: unresponsive Psychiatric:unresponsive HISTORY:  
 
Principal Problem: 
  Severe sepsis ([address] (10/24/2019) Active Problems: 
  Glucose intolerance (impaired glucose tolerance) (8/21/2017) COPD (chronic obstructive pulmonary disease) ([address] (8/21/2017) Stage 3 chronic kidney disease ([address] (8/21/2017) Atrial fibrillation ([address] (8/21/2017) ASCVD (arteriosclerotic cardiovascular disease) (8/21/2017) Overview: Catheterization 6/11/2004 80% stenosis nondominant RCA and 30% stenosis of  
    LAD with a notation of some left main disease but no percentage given. No  
    mention of angioplasty or stent made Anemia (8/13/2019) Nonrheumatic aortic valve stenosis (8/27/2019) Overview: Mild to moderate by echocardiogram 8/27/2019 Fever (10/24/2019) KEVAN (acute kidney injury) ([address] (10/24/2019) Acute encephalopathy (10/24/2019) Chronic orthostatic hypotension (10/24/2019) Acute bacterial endocarditis (10/31/2019) Delirium (11/4/2019) ACP (advance care planning) (11/5/2019) Past Medical History:  
Diagnosis Date  Arrhythmia A-fib  GERD (gastroesophageal reflux disease)  Hypertension  Other ill-defined conditions(799.89) BPH  Other ill-defined conditions(799.89) lipids  Other ill-defined conditions(799.89)   
 shingles hx  Thyroid disease  Unspecified adverse effect of anesthesia Past Surgical History:  
Procedure Laterality Date  COLONOSCOPY N/A 9/30/2019 COLONOSCOPY performed by Magnus Varela MD at [facility] ENDOSCOPY  
 HX CATARACT REMOVAL    
 HX HEART CATHETERIZATION    
 ablation  HX HEART CATHETERIZATION  07/18/2019 Stent  HX HEENT    
 scar tissue removed/laser  HX HERNIA REPAIR  2017 Patrick Womack MD  
 HX ORTHOPAEDIC    
 left foot fx--hardware  HX ORTHOPAEDIC  14 Right total knee arthroplasty  HX OTHER SURGICAL    
 prostate bx Family History Problem Relation Age of Onset  Heart Disease Mother  Hypertension Mother  Stroke Mother  Heart Disease Father  Cancer Paternal Grandmother History reviewed, no pertinent family history. Social History Tobacco Use  Smoking status: Former Smoker Packs/day: 0.50 Last attempt to quit: 3/28/1963 Years since quittin.6  Smokeless tobacco: Never Used Substance Use Topics  Alcohol use: Yes Comment: rare/social-beer No Known Allergies Current Facility-Administered Medications Medication Dose Route Frequency  balsam peru-castor oil (VENELEX) ointment   Topical BID  scopolamine (TRANSDERM-SCOP) 1 mg over 3 days 1 Patch  1 Patch TransDERmal Q72H  
 glycopyrrolate (ROBINUL) injection 0.2 mg  0.2 mg IntraVENous TID  guaiFENesin (ROBITUSSIN) 100 mg/5 mL oral liquid 100 mg  100 mg Oral Q4H PRN  
 ceFAZolin (ANCEF) 1 g in 0.9% sodium chloride (MBP/ADV) 50 mL  1 g IntraVENous Q12H  
 sodium chloride (NS) flush 5-40 mL  5-40 mL IntraVENous PRN  
 QUEtiapine (SEROquel) tablet 25 mg  25 mg Oral BID  
 haloperidol lactate (HALDOL) injection 1 mg  1 mg IntraVENous Q6H PRN  
 fluticasone propionate (FLONASE) 50 mcg/actuation nasal spray 2 Spray  2 Spray Both Nostrils DAILY  temazepam (RESTORIL) capsule 30 mg  30 mg Oral QHS PRN  
 LORazepam (ATIVAN) injection 1 mg  1 mg IntraVENous Q6H PRN  
 albuterol (PROVENTIL HFA, VENTOLIN HFA, PROAIR HFA) inhaler 2 Puff  2 Puff Inhalation Q4H PRN  
 amiodarone (CORDARONE) tablet 200 mg  200 mg Oral DAILY  aspirin chewable tablet 81 mg  81 mg Oral DAILY  clopidogrel (PLAVIX) tablet 75 mg  75 mg Oral DAILY  pantoprazole (PROTONIX) tablet 40 mg  40 mg Oral ACB  levothyroxine (SYNTHROID) tablet 137 mcg  137 mcg Oral ACB  midodrine (PROAMITINE) tablet 10 mg  10 mg Oral TID WITH MEALS  sodium chloride (NS) flush 5-40 mL  5-40 mL IntraVENous Q8H  
 heparin (porcine) injection 5,000 Units  5,000 Units SubCUTAneous Q8H  
 
 
 
 LAB AND IMAGING FINDINGS:  
 
Lab Results Component Value Date/Time WBC 21.3 (H) 11/06/2019 03:42 AM  
 HGB 9.2 (L) 11/06/2019 03:42 AM  
 PLATELET 108 (L) 31/78/9711 03:42 AM  
 
Lab Results Component Value Date/Time Sodium 136 11/06/2019 03:42 AM  
 Potassium 4.3 11/06/2019 03:42 AM  
 Chloride 108 11/06/2019 03:42 AM  
 CO2 17 (L) 11/06/2019 03:42 AM  
 BUN 29 (H) 11/06/2019 03:42 AM  
 Creatinine 3.24 (H) 11/06/2019 03:42 AM  
 Calcium 9.0 11/06/2019 03:42 AM  
  
Lab Results Component Value Date/Time AST (SGOT) 30 10/24/2019 05:17 AM  
 Alk. phosphatase 87 10/24/2019 05:17 AM  
 Protein, total 6.7 10/24/2019 05:17 AM  
 Albumin 2.7 (L) 10/24/2019 05:17 AM  
 Globulin 4.0 10/24/2019 05:17 AM  
 
Lab Results Component Value Date/Time INR 1.1 09/26/2019 05:10 PM  
 Prothrombin time 11.7 (H) 09/26/2019 05:10 PM  
 aPTT >130.0 (HH) 07/18/2019 05:22 AM  
  
No results found for: IRON, FE, TIBC, IBCT, PSAT, FERR No results found for: PH, PCO2, PO2 No components found for: [place] Lab Results Component Value Date/Time CK 41 08/26/2019 07:34 PM  
 CK - MB <1.0 08/26/2019 07:34 PM  
  
 
 
   
 
Total time: 55 
Counseling / coordination time, spent as noted above: 45 
> 50% counseling / coordination?: y 
Prolonged service was provided for  []30 min   []75 min in face to face time in the presence of the patient, spent as noted above. Time Start: 
Time End:   
Note: this can only be billed with 14236 (initial) or 84172 (follow up). If multiple start / stop times, list each separately.

## 2019-11-06 NOTE — PROGRESS NOTES
Report received from [address]. Introduced myself as primary RN. Assumed care of patient. Patient alert and oriented to person and place. Call bell within reach. 
 
3:24 PM TRANSFER - OUT REPORT: 
 
Verbal report given to Karen(name) on Jose Luis Pizarro  being transferred to [facility](unit) for change in patient condition(Comfort care) Report consisted of patients Situation, Background, Assessment and  
Recommendations(SBAR). Information from the following report(s) SBAR, Kardex and MAR was reviewed with the receiving nurse. Lines:  
Peripheral IV 11/04/19 Left;Upper Arm (Active) Site Assessment Clean, dry, & intact 11/6/2019  7:42 AM  
Phlebitis Assessment 0 11/6/2019  7:42 AM  
Infiltration Assessment 0 11/6/2019  7:42 AM  
Dressing Status Clean, dry, & intact 11/6/2019  7:42 AM  
Dressing Type Transparent;Tape 11/6/2019  7:42 AM  
Hub Color/Line Status Pink; Infusing 11/6/2019  7:42 AM  
Action Taken Open ports on tubing capped 11/4/2019  7:15 PM  
Alcohol Cap Used Yes 11/4/2019  7:15 PM  
  
 
Opportunity for questions and clarification was provided. Patient transported with patient belongings, RN and PCT.

## 2019-11-06 NOTE — HOSPICE
[employer] Good Help to Those in Need 
[phone] Nursing Note Patient Name: Sonia Huynh YOB: 1931 Age: 80 y.o. [employer] RN Note:  Hospice consult noted. Chart reviewed. In to meet with Vera/rey, MAGDA and grandson. Urvashi Pinaarjun stated that their are other family members that are traveling and will be here tomorrow. Family would like to meet with [facility] treadalong liaison tomorrow once other family members arrived. Left contact information and hospice packet. Will check-in with pt and family tomorrow morning. Thank you for the opportunity to be of service to this patient.

## 2019-11-06 NOTE — PROGRESS NOTES
Report received from [facility]. Introduced myself as primary RN. Assumed care of patient. Instructed patient to call for assistance prior to getting up. Pt verbalized understanding. Call bell within reach.

## 2019-11-06 NOTE — WOUND CARE
Wound care nurse consult from staff nurse stating \" stage 1 midline buttock\". Patient is a 79 y/o admitted with severe sepsis and hypotension, PNE. Palliative care involved now due to patients declining health, renal failure, now obtunded and transitioning to Hospice care, but some family still reluctant to agree to Hospice. Dr Yoan Moeller, PCP, note states he has talked with family and patient is critically ill with multiple organ failure, with \"extremely high risk\" of further deterioration and death. Lab draws have ceased and telemetry d/c'd. Patient remains a DDNR. Past Medical History:  
Diagnosis Date  Arrhythmia A-fib  GERD (gastroesophageal reflux disease)  Hypertension  Other ill-defined conditions(799.89) BPH  Other ill-defined conditions(799.89) lipids  Other ill-defined conditions(799.89)   
 shingles hx  Thyroid disease  Unspecified adverse effect of anesthesia Past Surgical History:  
Procedure Laterality Date  COLONOSCOPY N/A 9/30/2019 COLONOSCOPY performed by Les Owens MD at \[facility]"" ENDOSCOPY  
 HX CATARACT REMOVAL    
 HX HEART CATHETERIZATION    
 ablation  HX HEART CATHETERIZATION  07/18/2019 Stent  HX HEENT    
 scar tissue removed/laser  HX HERNIA REPAIR  05/05/2017 Winston Womack MD  
 HX ORTHOPAEDIC    
 left foot fx--hardware  HX ORTHOPAEDIC  4/9/14 Right total knee arthroplasty  HX OTHER SURGICAL    
 prostate bx Patient has a mid sacral skin failure measuring 3 x 1.3 cm's, that is intact skin and is purple in color. Wound Groin Right (Active) Number of days: 37 Wound Sacrum Right (Active) Pressure Injury Deep Tissue Injury 11/6/2019 12:52 PM  
Wound Length (cm) 3 cm 11/6/2019 12:52 PM  
Wound Width (cm) 1.3 cm 11/6/2019 12:52 PM  
Wound Surface Area (cm^2) 3.9 cm^2 11/6/2019 12:52 PM  
Condition of Base Purple 11/6/2019 12:52 PM  
Tissue Type Percent Maroon/Purple 100 % 11/6/2019 12:52 PM  
 Drainage Amount None 11/6/2019 12:52 PM  
Wound Odor None 11/6/2019 12:52 PM  
Fidelia-wound Assessment Intact 11/6/2019 12:52 PM  
Procedure Tolerated Well 11/6/2019 12:52 PM  
Number of days: 0 Recommend: 
 
Sacrum: cleanse gently with soap and water and apply Venelex ointment BID. Leave QASIM. Kota West RN, [employer]

## 2019-11-06 NOTE — PROGRESS NOTES
MICHELLE PLAN  
 
FAMILY AWAITING HOSPICE INFORMATION SESSION. Pt transferred from PCU to Oncology today. Referral sent to [facility] from CCU CM. CM talked to [facility] representative and they are going to touch base with family tonight. CM noted consult to set up [address]. Call Da Hector: [phone] to set up meeting. CM will continue to monitor discharge plan. Haydee Odell CM Ext [phone]

## 2019-11-06 NOTE — PROGRESS NOTES
MICHELLE PLAN: Family awaiting Hospice info session. Per Palliative Care NP, Joshua Mejia., family is now ready to discuss hospice and would like specific info. Ref sent to [facility] via CC. ZEYNEP spoke to RN liaison Es Boggs, by phone to advise of consult. Nae Guerrero, CIELO

## 2019-11-07 PROBLEM — A41.9 SEPSIS (HCC): Status: ACTIVE | Noted: 2019-01-01

## 2019-11-07 NOTE — HOSPICE
[employer] Good Help to Those in Need 
[phone] Inpatient Nursing Admission Patient Name: Franko Funk YOB: 1931 Age: 80 y.o. Date of Hospice Admission: 11/7/2019 Hospice Attending Elected by Patient: Ivy Flores MD 
Primary Care Physician: Ivy Flores MD 
Admitting RN: Arely Payne RN : Jess Curtis Level of Care (GIP/Routine/Respite): GIP Facility of Care: [facility] Patient Room: 1112/01 HOSPICE SUMMARY  
ER Visits/ Hospitalizations in past 6 months: 3 ED/ 4 admissions (cardiac stents, DVT, GI hemorrhage and severe sepsis) Hospice Diagnosis: Sepsis ([facility] [A41.9] Onset Date of Hospice Diagnosis: 11/7/19 Summary of Disease Progression Leading to Hospice Diagnosis:  Pt since July has been admitted to the hospital 4 times with rehab admissions in between. Despite aggressive treatment pt has declined with WBC (21.3) and Creatinine (3.24) trending up. Currently pt is in extreme pain with restlessness and agitation. Family all in agreement with comfort only care, antibiotics and fluid discontinued, Co-Morbidities:  
Patient Active Problem List  
Diagnosis Code  Cancer of the skin, basal cell C44.91  
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  
 Gout M10.9  Glucose intolerance (impaired glucose tolerance) R73.02  
 Gastroesophageal reflux disease without esophagitis K21.9  
 COPD (chronic obstructive pulmonary disease) (HCC) J44.9  Stage 3 chronic kidney disease (HCC) N18.3  BPH (benign prostatic hyperplasia) N40.0  Atrial fibrillation (HCC) I48.91  
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  
 Acute bilateral low back pain with bilateral sciatica M54.42, M54.41  
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 [place]  Fever R50.9  Severe sepsis (HCC) A41.9, R65.20  KEVAN (acute kidney injury) ([facility] [address] [facility] N17.9  Acute encephalopathy G93.40  Chronic orthostatic hypotension I95.1  Acute bacterial endocarditis I33.0  Delirium R41.0  ACP (advance care planning) Z71.89  Sepsis ([facility] [address] A41.9 Diagnoses RELATED to the terminal prognosis: sepsis Other Diagnoses: see above Rationale for a prognosis of life expectancy of 6 months or less if the disease follows its normal course (Disease Specific History): Regi Guerrier is a 80 y.o. who was admitted to [facility]. The patient's principle diagnosis of sepsis has resulted in current hospitalization. Functionally, the patient's Palliative Performance Scale has declined over a period of weeks and is estimated at 10%. Objective information that support this patients limited prognosis includes: WBC 21.3, Cr 3.24. Blood cultures 10/24 Culture result: Abnormal       Final  
STAPHYLOCOCCUS LUGDUNENSIS GROWING IN ALL 4 BOTTLES DRAWN (SITES = RAC AND LAC) Culture result: Abnormal       Final  
PRELIMINARY REPORT OF GRAM POSITIVE COCCI IN CLUSTERS GROWING IN 2 OF 4 BOTTLES DRAWN CALLED TO AND READ BACK BY Will Mao RN [facility]  [address] ON 10/24/2019. [address] The patient/family chose comfort measures with the support of Hospice. Patient meets for GIP LOC as evidenced by severe agitation and pain requiring scheduled and PRN dilaudid and ativan. Prognosis estimated based on 11/07/19 clinical assessment is:  
[] Few to Many Hours [x] Hours to Days  
[] Few to Many Days  
[] Days to Weeks  
[] Few to Many Weeks  
[] Weeks to Months  
[] Few to Many Months ASSESSMENT Patient self-reports:  []  Yes    [x] No 
 
SYMPTOMS: agitation, pain, when alert, expressed pain visually, is restless and agitated pulling off clothing and climbing out of bed SIGNS/PHYSICAL FINDINGS: pt is minimally responsive, IV access patent. KARNOFSKY: 10% FAST for all dementia:   
 
Learning Assessment: 
Patient N/A Is patient willing/able to learn? What is the highest level of education completed? Learning preference (written material, demonstration, visual)? Learning barriers (ESOL, [place], poor vision)? Caregiver  Daughters and son Andrea May Is caregiver willing to learn care for patient? yes What is the highest level of education completed? university Learning preference (written material, demonstration, visual)? demonstration Learning barriers (ESOL, [place], poor vision)?  none CLINICAL INFORMATION Wt Readings from Last 3 Encounters:  
11/06/19 98 kg (216 lb 0.8 oz) 09/27/19 82.6 kg (181 lb 15.8 oz) 09/18/19 82.4 kg (181 lb 9.6 oz) Ht Readings from Last 3 Encounters:  
10/28/19 5' 9\" (1.753 m)  
09/27/19 5' 9\" (1.753 m)  
09/18/19 5' 9\" (1.753 m) There is no height or weight on file to calculate BMI. There were no vitals taken for this visit. LAB VALUES No results found for this visit on 11/07/19 (from the past 12 hour(s)). No results found for this visit on 11/07/19 (from the past 6 hour(s)). Lab Results Component Value Date/Time Protein, total 6.7 10/24/2019 05:17 AM  
 Albumin 2.7 (L) 10/24/2019 05:17 AM  
 
 
Currently this patient has: 
[] Supplemental O2 [x] Peripheral IV  [] PICC    [] PORT  
[] Greenberg Catheter [] NG Tube   [] PEG Tube [] Ostomy   
[] AICD: Has ICD been deactivated? [] Yes [] No:______ PLAN 1. Admit GIP level of care 2. Agitation: scheduled ativan 0.5mg IV every 6 hrs with PRN availability 3. Pain:  Scheduled dilaudid 0.5mg IV every 6 hrs with PRN availability. 4. Emotional and spiritual support to pt and family by hospice chaplain and  Hospice Team Frequency Orders: 
Skilled Nurse -   Daily x 7 days /every other day x 7 days  with 5 PRN visits for symptom control. MSW  1 visit for initial assessment/evaluation for family support and need for volunteer services. Nikky Reid  1 visit for initial assessment/evaluation for spiritual support. ADVANCE CARE PLANNING (Complete in ACP Flow Sheet) Code Status: DNR Durable DNR: [x]  Yes  []  No 
Code Status Discussed/Confirmed: yes Preference for Other Life Sustaining Treatment Discussed/Confirmed: no further life sustaining measures desired Hospitalization Preference:  Prefer to remain inpt at [facility] Advance Care Planning 2019 Patient's Healthcare Decision Maker is: Legal Next of Kin Primary Decision Maker Name -  
Primary Decision Maker Phone Number -  
Primary Decision Maker Relationship to Patient -  
Confirm Advance Directive None Patient Would Like to Complete Advance Directive Unable  Service: [x] Yes  []  No      [] Unknown Appropriate for Pinning Ceremony:  [] Yes     [x] No 
Scientology: Mandaen  Home: [facility] DISCHARGE PLANNING 1. Discharge Plan: [facility] should pt stabilize 2. Patient/Family teaching: end of life process, use of scheduled medication for balanced blood levels for optimal symptom management 3. Response to patient/family teaching: expressed understanding SOCIAL/EMOTIONAL/SPIRITUAL NEEDS Spiritual Issues Identified: to be evaluated by hospice chaplain Psych/ Social/ Emotional Issues Identified: evaluated by hospice social worker Caregiver Support: 
[x] Provided information on End of Life Care  
[] Material Provided: Gone From My Sight or [facility]  
 
CARE COORDINATION Dr. Fabiana Quiroz contacted, discharge to hospice order received Dr. Fabiana Quiroz contacted, agrees to serve as attending provider for hospice and provided verbal certification of terminal illness with life expectancy of 6 months or less. Orders for hospice admission, medications and plan of treatment received. Medication reconciliation completed. MEDS: See medication list below DME: Per hospital 
Supplies: Per hospital 
IDT communication to include MD, , SW, CH and support team 
 
ALLERGIES AND MEDICATIONS Allergies: No Known Allergies Current Facility-Administered Medications Medication Dose Route Frequency  LORazepam (ATIVAN) injection 1 mg  1 mg IntraVENous Q15MIN PRN  
 ketorolac (TORADOL) injection 30 mg  30 mg IntraVENous Q8H PRN  
 glycopyrrolate (ROBINUL) injection 0.2 mg  0.2 mg IntraVENous Q4H PRN  
 bisacodyl (DULCOLAX) suppository 10 mg  10 mg Rectal DAILY PRN  
 HYDROmorphone (PF) (DILAUDID) injection 0.5 mg  0.5 mg IntraVENous Q15MIN PRN  
 LORazepam (ATIVAN) injection 0.5 mg  0.5 mg IntraVENous Q6H  
 HYDROmorphone (PF) (DILAUDID) injection 0.5 mg  0.5 mg IntraVENous Q6H

## 2019-11-07 NOTE — PROGRESS NOTES
Bedside and Verbal shift change report given to Marcela Arcos (oncoming nurse) by Thiago Keller RN (offgoing nurse). Report included the following information SBAR, Kardex, MAR and Recent Results.

## 2019-11-07 NOTE — PROGRESS NOTES
Nutrition Assessment: 
 
INTERVENTIONS/RECOMMENDATIONS:  
Consider liberalizing diet for comfort care if appropriate ASSESSMENT:  
Chart reviewed. Pt is transitioning to hospice care. No aggressive nutrition interventions at this time. Diet Order: Cardiac 
% Eaten:  No data found. Pertinent Medications: [x]Reviewed:  
Pertinent Labs: [x]Reviewed:  
Food Allergies: [x]NKFA  []Other Last BM:    
Edema:      [x]RUE 2+  [x]LUE 2+  [x]RLE 3+  [x]LLE 3+ Pressure Ulcer:  DTI (sacrum)    [] Stage I   [] Stage II   [] Stage III   [] Stage IV Anthropometrics: Height: 5' 9\" (175.3 cm) Weight: 98 kg (216 lb 0.8 oz) IBW (%IBW):   ( ) UBW (%UBW):   (  %) BMI: Body mass index is 31.91 kg/m². This BMI is indicative of: 
[]Underweight   []Normal   []Overweight   [x] Obesity   [] Extreme Obesity (BMI>40) Last Weight Metrics: 
Weight Loss Metrics 11/6/2019 9/27/2019 9/18/2019 9/11/2019 8/27/2019 8/26/2019 8/26/2019 Today's Wt 216 lb 0.8 oz 181 lb 15.8 oz 181 lb 9.6 oz 183 lb 182 lb 5.1 oz - 182 lb 6.4 oz BMI 31.91 kg/m2 26.88 kg/m2 26.82 kg/m2 27.02 kg/m2 - 26.92 kg/m2 -  
 
 
Estimated Nutrition Needs (Based on): 1932 Kcals/day(BMR (1610) x 1. 2AF) , 75 g(-94g (0.8-1.0 g/kg bw)) Protein Carbohydrate: At Least 130 g/day  Fluids: 1932 mL/day or per primary team 
 
NUTRITION DIAGNOSES:  
Problem:  Inadequate protein-energy intake Etiology: related to decreased appetite, current medical condition Signs/Symptoms: as evidenced by PO intake <25% of meals Previous Nutrition Dx:  [] Resolved  [x] Unresolved           [] Progressing NUTRITION INTERVENTIONS: 
Meals/Snacks: General/healthful diet   Supplements: Commercial supplement GOAL:  
comfort feeding NUTRITION MONITORING AND EVALUATION Food/Nutrient Intake Outcomes: Total energy intake Physical Signs/Symptoms Outcomes: Weight/weight change Previous Goal Met: 
 [] Met              [] Progressing Towards Goal              [x] Not Progressing Towards Goal  
Previous Recommendations: 
 [x] Implemented          [] Not Implemented          [] Not Applicable LEARNING NEEDS (Diet, Food/Nutrient-Drug Interaction):  
 [x] None Identified 
 [] Identified and Education Provided/Documented 
 [] Identified and Pt declined/was not appropriate Cultural, Jehovah's witness, OR Ethnic Dietary Needs:  
 [x] None Identified 
 [] Identified and Addressed 
 
 [x] Interdisciplinary Care Plan Reviewed/Documented  
 [x] Discharge Planning: comfort feeding 
 [] Participated in Interdisciplinary Rounds NUTRITION RISK:  
 [] High              [] Moderate           []  Low  []  Minimal/Uncompromised Trevon Rod RDN Pager [phone] Weekend Pager [phone]

## 2019-11-07 NOTE — PROGRESS NOTES
PROGRESS NOTE 
 
NAME:  Kamla See :   3/13/1931 MRN:   [id number] Date/Time:  2019 7:19 AM 
Subjective:  
History:  Chart reviewed and patient seen and examined this AM and D/W his nurse and all events noted. He has had 3 recent hospital admissions the first one of which was related to acute coronary syndrome and he had a stent placed in his left main extending into his LAD with a side-port to circumflex. After that hospitalization he developed DVT of his left lower extremity despite being on aspirin and Plavix and thus Xarelto was started and unfortunately he was readmitted to the hospital with an acute GI bleed. During that hospitalization he had a inferior vena cava filter placed and Xarelto was discontinued and he had an EGD and colonoscopy without an obvious source for the bleed. He was discharged to rehab and seemed to be doing well until 10/24 when he was admitted with Sepsis with Temp to 105.7 and CXR read as bilateral LL infiltrate although no cough and not requiring oxygen so further evaluation ruled out Pneumonia (CXR 10/25 w/o definite pneumonia). UA not infected. Blood cultures + Staph. Lugdenensis c/w skin and soft tissue source. He has been intermittently agitated and confused since admission and requiring PRN Ativan and Haldol: however markedly confused/ sedated so changed to scheduled Seroquel on  and was improved marginally although still somewhat lethargic and confused; however now barely arousable and non communicative. . Unable to give history himself today but per his nurse as follows. Sedated and somnolent not taking any PO. Echo on 10/30 c/w Aortic valve vegetation. Medications reviewed: 
Current Facility-Administered Medications Medication Dose Route Frequency  balsam peru-castor oil (VENELEX) ointment   Topical BID  scopolamine (TRANSDERM-SCOP) 1 mg over 3 days 1 Patch  1 Patch TransDERmal Q72H  glycopyrrolate (ROBINUL) injection 0.2 mg  0.2 mg IntraVENous TID  
 HYDROmorphone (PF) (DILAUDID) injection 0.5 mg  0.5 mg IntraVENous Q2H PRN  
 guaiFENesin (ROBITUSSIN) 100 mg/5 mL oral liquid 100 mg  100 mg Oral Q4H PRN  
 sodium chloride (NS) flush 5-40 mL  5-40 mL IntraVENous PRN  
 haloperidol lactate (HALDOL) injection 1 mg  1 mg IntraVENous Q6H PRN  
 temazepam (RESTORIL) capsule 30 mg  30 mg Oral QHS PRN  
 LORazepam (ATIVAN) injection 1 mg  1 mg IntraVENous Q6H PRN  
 albuterol (PROVENTIL HFA, VENTOLIN HFA, PROAIR HFA) inhaler 2 Puff  2 Puff Inhalation Q4H PRN  
 sodium chloride (NS) flush 5-40 mL  5-40 mL IntraVENous Q8H Objective:  
Vitals: 
Visit Vitals /46 (BP 1 Location: Right arm, BP Patient Position: At rest) Pulse 60 Temp 97.9 °F (36.6 °C) Resp 24 Ht 5' 9\" (1.753 m) Wt 216 lb 0.8 oz (98 kg) SpO2 96% BMI 31.91 kg/m² O2 Flow Rate (L/min): 4 l/min O2 Device: Room air Temp (24hrs), Av.4 °F (36.3 °C), Min:96.9 °F (36.1 °C), Max:97.9 °F (36.6 °C) Last 24hr Input/Output: 
 
Intake/Output Summary (Last 24 hours) at 2019 0719 Last data filed at 2019 1926 Gross per 24 hour Intake 2136.67 ml Output  Net 2136.67 ml PHYSICAL EXAM: 
General:     Somnolent barely arousable this AM, no distress, appears stated age. Head:    Normocephalic, without obvious abnormality, atraumatic. Eyes:    Conjunctivae/corneas clear. PERRLA Nose:   Nares normal. No drainage or sinus tenderness. Throat:     Lips, mucosa, and tongue normal.  No Thrush Neck:   Supple, symmetrical,  no adenopathy, thyroid: non tender 
   no carotid bruit and no JVD. Back:     Symmetric,  No CVA tenderness. Lungs:    Clear to auscultation bilaterally. No Wheezing or Rhonchi. No rales. Heart:    Irregularly irregular,  2/6 systolic murmur w/o change and w/o rub or gallop. Abdomen:    Soft, non-tender. Not distended. Bowel sounds normal. No masses Extremities:  Extremities normal, atraumatic, No cyanosis. No edema. No clubbing Lymph nodes:  Cervical, supraclavicular normal. 
Neurologic:  General decreased strength, Somnolent Skin:                 No rash and no obvious source of infection. IV site looks OK Lab Data Reviewed: 
 
No results found for this or any previous visit (from the past 24 hour(s)). Assessment/Plan:  
 
Principal Problem: 
  Se[facility] ([facility] (10/24/2019) Active Problems: 
  Glucose intolerance (impaired glucose tolerance) (8/21/2017) COPD (chronic obstructive pulmonary disease) ([facility] [address] (8/21/2017) Stage 3 chronic kidney disease ([facility] (8/21/2017) Atrial fibrillation ([facility] (8/21/2017) ASCVD (arteriosclerotic cardiovascular disease) (8/21/2017) Overview: Catheterization 6/11/2004 80% stenosis nondominant RCA and 30% stenosis of  
    LAD with a notation of some left main disease but no percentage given. No  
    mention of angioplasty or stent made Anemia (8/13/2019) Nonrheumatic aortic valve stenosis (8/27/2019) Overview: Mild to moderate by echocardiogram 8/27/2019 Fever (10/24/2019) KEVAN (acute kidney injury) ([facility] (10/24/2019) Acute encephalopathy (10/24/2019) Chronic orthostatic hypotension (10/24/2019) Acute bacterial endocarditis (10/31/2019) Delirium (11/4/2019) ACP (advance care planning) (11/5/2019) 
 
  
___________________________________________________ PLAN: 
 
1. Discontinued Maxipime and continued Vancomycin through 11/2 (D #10) for Sepsis (S. Lugdunensis), concerning that WBC was up to 14.0, so based on sensitivities changed to Ancef as less nephrotoxic through yesterday (D # 5) when family after discussion with Palliative has decided to stop antibiotic, repeat cx 10/31 NG, WBC 19.2 on 11/4 from 19.9 on 11/3, yesterday up at 21.3 2. Supplemental oxygen for hypoxemia due to COPD, does not have Pneumonia 3.  Follow Hgb (9.0 on admission) with history of significant anemia, now 9.2, no further labs per family wishes 4. Now off Plavix per family wishes with recent cardiac stents 5. Now off Midodrine for orthostatic hypotension 6. Now offAmiodarone for PAF, In a fib this AM, rate OK 7. Renal function with ARF on CKD, Creat 2.11 on 11/4 from 1.66 on 11/3 and 1.3 on 11/2 so increased IV fluids as taking in little due to nothing PO and stopped Vanc 11/2. Creat unfortunately higher yesterday at 3.24 IV fluids stopped yesterday by family/Palliative 8. Ativan or Haldol PRN agitation 9.  Echocardiogram reveals aortic valvular vegetation so initial plan was for 6 weeks antibiotics 10. DNR per family and patient wishes 11. D/Maxim Telemetry 12. Palliative care consult per family request which I D/W them Palliative and Hospice and I D/W Palliative care by phone, Supportive now per family desires Critically ill  Very high complexity patient with multi-organ system failure and with extremely high risk of further deterioration and death which family is aware of and understanding and now to meet with Hospice today 
 
___________________________________________________ Attending Physician: Brenda Curry MD

## 2019-11-07 NOTE — [facility] IDG SOCIAL WORKER NOTES
Patient: Kehinde Trimble 
 
Date: 11/07/19 Time: 2:57 PM 
 
[facility]  Notes Pt is an 79 y/o CM with a hospice diagnosis of sepsis. Pt has comorbid KEVAN on CKD, encephalopathy, HTN, CAD s/p sents, and GERD. Pt was admitted 10/24/2019 from [address] [place] [address],[address] due to fever. Pt is , his wife passed 2 ½ years ago at home under hospice services. LCSW provided emotional support in the form of supportive listening for family in coping with pts EOL. LCSW provided support for the children in coping with anticipatory grief and education re the grief process. Low risk for BR for everyone except Bill due to difficulty coping and health issues. Signed by: Edwena Romberg

## 2019-11-07 NOTE — TELEPHONE ENCOUNTER
Kori from [address] [facility] phoned states they are admitting patient to in patient hospice and she will will be putting orders in that will need to be signed off on by  In the morning.

## 2019-11-07 NOTE — HOSPICE
[facility] Good Help to Those in Need 
[phone] Social Work Admission Note Patient Name: Kamla See YOB: 1931 Age: 80 y.o. Date of Visit: 11/07/19 Facility of Care: [facility] Patient Room: 1112/01 Hospice Attending: Juli Campa MD 
Hospice Diagnosis: sepsis Level of Care:  
 [x]  GIP []  Respite 
 []  Routine NARRATIVE This LCSW met with pt, who is minimally responsive, and 4 of his 5 children; Rocio Teran and her , and Jose Thomas and her  for hospice GIP admission. Pt is an 79 y/o CM with a hospice diagnosis of sepsis. Pt has comorbid KEVAN on CKD, encephalopathy, HTN, CAD s/p sents, and GERD. Pt was admitted 10/24/2019 from [facility] due to fever. Pt is , his wife passed 2 ½ years ago at home under hospice services. Pt and wife have 5 children, those mentioned above and son Jasper Rice. Pt had been living with his son Jasper Rice prior to SNF and his recent hospitalization. 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 Family reports pt enjoyed gardening. Family reports pt was active until a procedure in July, when he began to decline. LCSW provided support to the family as they talked about pts health issues which have led to this hospice admission. LCSW and family discussed coping with pts decline and terminal prognosis. LCSW provided emotional support in the form of supportive listening for family. Family reports son Jasper Rice is not doing very well and does not like hospitals. Family reports Jasper Rice has heart issues and is facing surgery himself. Family further reports Jasper Rice and pt did not get along very well. LCSW and family discussed Bills coping and challenges he is facing in the context of fathers EOL. LCSW provided support for the children in coping with anticipatory grief.  LCSW provided education re the grief process for them in reliving grief associated with mothers death 2 ½ years ago and information re the grief process. LCSW and family discussed resources. LCSW provided a letter for son Rios Carney as he has been assigned jury duty beginning  Monday. Low risk for BR for everyone except Bill due to difficulty coping and health issues. ADVANCE CARE PLANNING Code Status: DDNR Durable DNR: X_ Yes  _ No 
Advance Care Planning 2019 Patient's Healthcare Decision Maker is: Legal Next of Kin Primary Decision Maker Name -  
Primary Decision Maker Phone Number -  
Primary Decision Maker Relationship to Patient -  
Confirm Advance Directive None Patient Would Like to Complete Advance Directive Unable Relationship Status: 
[]  Single    
[]       
[]     
[]  Domestic Partner    
[x]  / 
[]  Common Law 
[]   
[]  Unknown If in a relationship, name of partner/spouse: 
Duration of relationship: 
 
Denominational: Mandaen  Home: Svetlana William ( [phone]) Resources Provided: [facility] Social Work Initial Assessment Gender: 
male Race/Ethnicity: (new all that apply) []  American [place] [facility] or [place] Native 
[]   
[]  Black or [place] American 
[]   or  
[]   or [place] 
[x]  [place] 
[]  Unknown 
  
 Service:   
[x]  Yes  
[]  No      
[]  Unknown Appropriate for Pinning Ceremony:  
[]  Yes     
[x]  No 
Is patient using VA benefits? []  Yes     
[x]  No 
  
Primary Language: English  
[]   Needed 
[]   utilized during visit Ability to express thoughts/needs/feelings 
[]  Expressed thoughts/feelings/needs without difficulty [x]  Requires extra time and cuing 
[]  Speech limited single words 
[]  Uses only gestures (eye, blinking eye or head movement/pointing) []  Unable to express thoughts/feelings/needs (speech unintelligible or inappropriate) []  Unresponsive Notes:  
  
Mental Status: []  Alert-oriented to:   
 []  Person   
 []  Place   
 []  Time 
[]  Comatose-responds to:  
 []   Verbal stimuli  
 []  Tactile stimuli  
 []  Painful stimuli 
[]  Forgetful 
[x]  Disoriented/Confused 
[x]  Lethargic [x]  Agitated 
[]  Other (specify):   
Notes:  
  
Patients description of Illness/Current Health Status:   
[x]  Patient unable to discuss confused, agitated 
[]  Patient unwilling to discuss 
[]  (Specify) Knowledge/Understanding of Disease Process Patient:  
 []  Demonstrates knowledge/understanding of disease process 
 []  Demonstrates knowledge/understanding of treatment plan 
 []  Demonstrates knowledge/understanding of prognosis []  Demonstrates acceptance of prognosis []  Demonstrates knowledge/understanding of resuscitation status [x]  Other (specify) confused, agitated Caregiver: 
 [x]  Demonstrates knowledge/understanding of disease process [x]  Demonstrates knowledge/understanding of treatment plan 
 [x]  Demonstrates knowledge/understanding of prognosis [x]  Demonstrates acceptance of prognosis [x]  Demonstrates knowledge/understanding of resuscitation status 
 []  Other (specify) Notes:  
  
Patients living arrangement/care setting: 
Use the PRIOR COLUMN when the PATIENTS current health status necessitated a change in his/her primary residence. Prior Current Response  
           []             []    Patients own home/residence [x]             []    Home of family member/friend []             []    Boarding home  
           []             []    Assisted living facility/[facility] center []             []    Hospital/Acute care facility []             []    Skilled nursing facility []             []    Long term care facility/Nursing home  
           []             [x]    Hospice in Patient Primary Caregiver: 
[]  No Primary Caregiver Name of Primary Caregiver: Red Dias Relationship or Primary Caregiver:  
 []  Spouse/Significant other     
 [x]  Natural Child      
 []  Step child     
 []  Sibling 
 []  Parent 
 []  Friend/Neighbor 
 []  Community/[employer] Volunteer 
 []  Paid help 
 []  Other (specify):___________ Notes:   
  
Family members/Significant others: 
Name: Toya Mallory Relationship: son Phone Number: Actively involved in care? [x]  Yes  []  No 
 
Name: Fortino Orellana Relationship: daughter Phone [phone] Actively involved in care? [x]  Yes  []  No 
 
Name:Ana \"Klaudia\" Bew Relationship: daughter Phone Number: [phone] Actively involved in care? [x]  Yes  []  No 
 
Social support systems: (select ONE best description) [x]  Excellent social support system which includes three or more family members or friends 
[]  Good social support system which includes two or less members or friends 
[]  [address] support which includes one family member or friend 
[]  Poor social support; no family members or friends; basically ALONE Notes:  
  
Emotional Status: (new all that apply) Patient Caregiver Response  
              []                [x]    Mood/Affect stable and appropriate    
              []                []    Angry  
              [x]                []    Anxious []                []    Apprehensive []                []    Avoidant  
              []                []    Clinging  
              []                []    Depressed  
              []                []    Distraught  
              []                []    Elated []                []    Euphoric  
              []                []    Fearful  
              []                []    Flat Affect  
              []                []    Helpless []                []    Hostile []                []    Impulsive []                []    Irritable []                []    Labile  
              []                []    Manic  
              []                []    Restlessness []                [x]    Sad  
              []                []    Suspicious []                []    Tearful  
              []                []    Withdrawn Notes:  
 
Coping Skills (strengths/weakness):  
 Patient: Coping Skills (strength/weakness): Agitated, confused, encephalopathy. Family/caregiver (strength/weakness): Accepting, well supported son Chcaha Heath is having a difficult coping with pts decline.   
  
Clemons of care (new all that apply):    
[]  No burden evident  
[]  Family must administer medications  
[]  Illness causing financial strain  
[x]  Family/Support feels overwhelmed - Bill 
[]  Family/Support sleep disturbed with patients care  
[]  Patients care causes extra physical stress  of death 
[]  Illness causes changes in family lifestyle 
[]  Illness impacting family/support employment 
[]  Family experiencing increased time demands 
[]  Patients behavior endangers family 
[]  Denial of patients illness 
[]  Concern over outcome of illness/fear 
[]  Patients behavior embarrassing to family Notes:  
  
Risk Factors: (new all that apply):   
[x]  No burden evident  
[]  Alcohol abuse 
[]  Financial resources inadequate to meet basic needs (food/house/etc) []  Financial resources inadequate to meet health care needs (supplies/equipment/medications) 
[]  Food/nutrition resources inadequate 
[]  Home environment unsafe/inadequate for home care 
[]  Homicidal risk 
[]  Lives alone or without concerned relatives 
[]  Multiple medications/complex schedule 
[]  Physical limitations increase likelihood of falls 
[]  Plan of care/treatments complicated 
[]  Substance use/abuse 
[]  Suicidal risk 
[]  Visual impairment threatens safety/ability to perform self-care 
[]  Other (specify): 
  
 Abuse/Neglect (actual/potential risks): 
[x]  No signs of abuse/neglect 
[]  History of abuse/neglect                 []  NSJDLLYU          []  Sexual 
[]  History of domestic violence 
[]  Lacks adequate physical care 
[]  Lacks emotional nurturing/support 
[]  Lacks appropriate stimulation/cognitive experiences 
[]  Left alone inappropriately 
[]  Lacks necessary supervision 
[]  Inadequate or delayed medical care 
[]  Unsafe environment (i.e guns/drug use/history of violence in the home/etc.) []  Bruising or other physical signs of injury present 
[]  Other (specify): 
Notes:  
[]  Refer to child/adult protective services Current Sources of Stress (in Addition to Current Illness):  
[]  None reported 
[]  Bills/Debt   
[]  Career/Job change   
[]   (short term)   
[]   (long term)   
[]  Death of a child (recent)   
[]  Death of a parent (recent)  
[]  Death of a spouse (recent)  
[]  Employment status changed  
[]  Family discord   
[]  Financial loss/Inadequate inther (specify):come 
[]  Job loss 
[]  Legal issues unresolved 
[]  Lifestyle change 
[]  Marital discord 
[]  Marriage within the last year 
[]  Paperwork (insurance/legal/etc) overwhelming 
[]  Separation/Divorce [x]  Other (specify): Son Lisa Pelletier is having a difficult time coping with pts decline. Notes:  
  
Current [employer] Being Utilized 1. Interventions/Plan of Care 1. Assess social and emotional factors related to coping with end of life issues 2. Community resource planning/referral  
3. Relocation to different care setting if/when symptoms stabilize 4. Discharge Planning 1. Pt can go to [facility] if disposition is needed. MSW Assessment Completed by: Gayl Nyhan 11/07/19 Time In: 12:30 pm      
Time Out: 14:30 pm

## 2019-11-07 NOTE — HOSPICE
[address] [facility] RN note: In to meet with 4 adult children (Lena De, Francy George and Alfonzo Washington outside pt room. Discussed hospice philosophy and services as they relate to inpt hospice. Pt is currently restless/ agitated, pulling off gown, moaning with furrowed brow despite 3 PRN doses of dilaudid and 2 PRN doses of ativan. .  Staff RN to give PRN dose of ativan now. Approval for inpt hospice at [facility] level of care per Dr Maeve No. Consent forms to be signed by Reena Scott (all adult in agreement with electing hospice benefit) with  Mariah Nevarez. 13:00--pt continues to be restless and agitated, staff RN to give PRN dose of 0.5mg IV dilaudid. 13:30----much better relief from IV dilaudid than ativan, family more relaxed seeing pt comfortable. Orders from Dr Fercho Mcfadden via Quynh Seaman RN for scheduled dilaudid and ativan with PRN availability. Thank you for the opportunity to care for this pt and family. Please contact hospice at [phone] with any questions or concerns.'

## 2019-11-07 NOTE — INTERDISCIPLINARY ROUNDS
Oncology Interdisciplinary rounds were held today to discuss patient plan of care and outcomes. The following members were present: Nursing, Physician, Case Management, Pharmacy, and PT/OT Actual Length of Stay: 0 Expected Length of Stay: - - - Plan            Discharge Hospice care TBA

## 2019-11-08 PROBLEM — N18.30 ACUTE RENAL FAILURE SUPERIMPOSED ON STAGE 3 CHRONIC KIDNEY DISEASE (HCC): Status: ACTIVE | Noted: 2019-01-01

## 2019-11-08 PROBLEM — N17.9 ACUTE RENAL FAILURE SUPERIMPOSED ON STAGE 3 CHRONIC KIDNEY DISEASE (HCC): Status: ACTIVE | Noted: 2019-01-01

## 2019-11-08 NOTE — PROGRESS NOTES
Oncology End of Shift Note Bedside shift change report given to [facility], RN (incoming nurse) by Khang Arreola RN (outgoing nurse) on Tennie Ming. Report included the following information SBAR, Kardex, Intake/Output and MAR. Shift Summary: Slept during night, being turned by staff. Issues for Physician to Address:  --  
 
Patient on Cardiac Monitoring?    
[] Yes 
[x] No 
 
Rhythm:   
 
 
 
Shift Events 
-- 
 
 
Khang Arreola RN

## 2019-11-08 NOTE — PROGRESS NOTES
Problem: Falls - Risk of 
Goal: *Absence of Falls Description Document Barbara Alfaro Fall Risk and appropriate interventions in the flowsheet. Outcome: Progressing Towards Goal 
Note:  
Fall Risk Interventions: 
Mobility Interventions: Bed/chair exit alarm Mentation Interventions: Door open when patient unattended Medication Interventions: Bed/chair exit alarm Elimination Interventions: Bed/chair exit alarm History of Falls Interventions: Bed/chair exit alarm Problem: Pressure Injury - Risk of 
Goal: *Prevention of pressure injury Description Document Berry Scale and appropriate interventions in the flowsheet. Outcome: Progressing Towards Goal 
Note:  
Pressure Injury Interventions: 
Sensory Interventions: Assess changes in LOC Moisture Interventions: Apply protective barrier, creams and emollients, Absorbent underpads Activity Interventions: Assess need for specialty bed Mobility Interventions: Assess need for specialty bed Nutrition Interventions: Document food/fluid/supplement intake Friction and Shear Interventions: Apply protective barrier, creams and emollients, Feet elevated on foot rest 
 
  
 
 
 
  
Problem: Dyspnea Due to End of Life Goal: Demonstrate understanding of and ability to manage respiratory symptoms at end of life Outcome: Progressing Towards Goal 
  
Problem: Communication Deficit Goal: Effectively communicate symptoms, needs, and concerns Outcome: Progressing Towards Goal 
  
Problem: Imminent death Goal: Collaborate with patient/family/caregiver/interdisciplinary team to minimize and manage end of life symptoms Outcome: Progressing Towards Goal

## 2019-11-08 NOTE — H&P
ADMISSION NOTE 
 
NAME:  Omi Almeida :   3/13/1931 MRN:   [id number] Date/Time:  2019 6:02 AM 
Subjective: CHIEF COMPLAINT:  Sepsis, Encephalopathy HISTORY OF PRESENT ILLNESS:    
Gricelda Rocha is a 80 y.o.  white male who presented with sepsis and was admitted to [address] [place] [address] on 10/24. Workup revealed Endocarditis. Despite treatment his encephalopathy persisted and he had progressive multi-organ failure including progressive renal failure. His family has now decided to proceed with Hospice care and today he is admitted to inpatient Hospice. He is currently somnolent and can give no history. No family at bedside this AM. D/W his nurses this AM. Past Medical History:  
Diagnosis Date  Arrhythmia A-fib  GERD (gastroesophageal reflux disease)  Hypertension  Other ill-defined conditions(799.89) BPH  Other ill-defined conditions(799.89) lipids  Other ill-defined conditions(799.89)   
 shingles hx  Thyroid disease  Unspecified adverse effect of anesthesia Past Surgical History:  
Procedure Laterality Date  COLONOSCOPY N/A 2019 COLONOSCOPY performed by Mike Menendez MD at [facility] ENDOSCOPY  
 HX CATARACT REMOVAL    
 HX HEART CATHETERIZATION    
 ablation  HX HEART CATHETERIZATION  2019 Stent  HX HEENT    
 scar tissue removed/laser  HX HERNIA REPAIR  2017 Aleta Womack MD  
 HX ORTHOPAEDIC    
 left foot fx--hardware  HX ORTHOPAEDIC  14 Right total knee arthroplasty  HX OTHER SURGICAL    
 prostate bx Social History Tobacco Use  Smoking status: Former Smoker Packs/day: 0.50 Last attempt to quit: 3/28/1963 Years since quittin.6  Smokeless tobacco: Never Used Substance Use Topics  Alcohol use: Yes Comment: rare/social-beer Family History Problem Relation Age of Onset  Heart Disease Mother  Hypertension Mother  Stroke Mother  Heart Disease Father  Cancer Paternal Grandmother No Known Allergies Prior to Admission medications Medication Sig Start Date End Date Taking? Authorizing Provider  
temazepam (RESTORIL) 30 mg capsule Take 1 Cap by mouth nightly as needed for Sleep. Max Daily Amount: 30 mg. 10/14/19   Mame Minaya MD  
temazepam (RESTORIL) 30 mg capsule Take two at bedtime. 10/11/19   Mame Minaya MD  
midodrine (PROAMITINE) 10 mg tablet Take 1 Tab by mouth three (3) times daily (with meals) for 30 days. 10/10/19 11/9/19  Mame Minaya MD  
polyethylene glycol [facility]) 17 gram packet Take 1 Packet by mouth daily. 10/10/19   Mame Minaya MD  
trimethoprim-sulfamethoxazole (BACTRIM DS, SEPTRA DS) 160-800 mg per tablet Take 1 Tab by mouth every twelve (12) hours. 10/10/19   Mame Minaya MD  
amiodarone (CORDARONE) 200 mg tablet Take 1 Tab by mouth daily. 9/4/19   Mame Minaya MD  
albuterol [facility] HFA) 90 mcg/actuation inhaler Take 2 Puffs by inhalation every four (4) hours as needed for Wheezing or Shortness of Breath. 8/14/19   Mame Minaya MD  
clopidogrel (PLAVIX) 75 mg tab Take 1 Tab by mouth daily. 8/14/19   Mame Minaya MD  
aspirin 81 mg chewable tablet Take 81 mg by mouth daily. Provider, Historical  
tamsulosin (FLOMAX) 0.4 mg capsule TAKE 2 CAPSULES DAILY 3/22/19   Mame Minaya MD  
levothyroxine (SYNTHROID) 137 mcg tablet Take 137 mcg by mouth Daily (before breakfast). 3/22/19   Mame Minaya MD  
esomeprazole (NEXIUM) 40 mg capsule Take 1 Cap by mouth daily. 3/22/19   Mame Minaya MD  
finasteride (PROSCAR) 5 mg tablet Take 1 Tab by mouth daily (after dinner). 12/12/18   Mame Minaya MD  
simvastatin (ZOCOR) 40 mg tablet Take 1 Tab by mouth nightly. 12/12/18   Mame Minaya MD  
 
 
REVIEW OF SYSTEMS:   
 [x]Patient not able to provide ROS due to mental status change/acute illness Objective: VITALS:   
P -  irreg R - 22 PHYSICAL EXAM:  
General:    Somnolent, no distress, appears stated age. Head:   Normocephalic, without obvious abnormality, atraumatic. Eyes:   Conjunctivae/corneas clear. PERRL Nose:  Nares normal. No drainage or sinus tenderness. Throat:    Lips, mucosa, and tongue normal.  No Thrush Neck:  Supple, symmetrical,  no adenopathy, thyroid: non tender 
  no carotid bruit and no JVD. Back:    Symmetric,  No CVA tenderness. Lungs:   Clear to auscultation bilaterally except scattered rhonchi with decreased BS. No Wheezing. No rales. Chest wall:  No tenderness or deformity. No Accessory muscle use. Heart:   Irregularly irregular rate and rhythm,  2/6 systolic murmur w/o rub or gallop. Abdomen:   Soft, non-tender. Not distended. Bowel sounds normal. No masses Extremities: Extremities normal, atraumatic, No cyanosis. No edema. No clubbing Skin:     Texture, turgor normal. No rashes or lesions. Not Jaundiced Lymph nodes: Cervical, supraclavicular normal. 
Neurologic: EOMs intact. No facial asymmetry. Somnolent LAB DATA REVIEWED:   
No results found for this or any previous visit (from the past 24 hour(s)). Assessment/Plan: Active Problems: COPD (chronic obstructive pulmonary disease) ([facility] (8/21/2017) Acute encephalopathy (10/24/2019) Sepsis ([facility] (11/7/2019) Acute renal failure superimposed on stage 3 chronic kidney disease ([facility] (11/8/2019) 
 
  
___________________________________________________ PLAN:   
 
1. Ativan PRN agitation 2. Dilaudid PRN pain 3. Scopolamine for excess secretions 4. Total comfort care 5. Hospice orders as recorded Discussed Code Status:    []Full Code      [x]DNR    
___________________________________________________ Care Plan discussed with: 
  []Patient   [x]Family      
___________________________________________________ Admitting Physician: Olegario Rod MD

## 2019-11-08 NOTE — PROGRESS NOTES
NAME: Corey Stagekai :  3/13/1931 MRN:  [id number] Death Note Called to pronounce patient. No response to noxious stimuli. No spontaneous breath sounds nor cardiac sounds. Time Pronounced:  1854 Family is present at bedside.   
D/C Summary and death certificate to be completed by Attending MD

## 2019-11-08 NOTE — HOSPICE
[facility] RN note:  Pt  in the present of this RN and daughter Carmela Chaudhry, pt was comfortable and at peace which family is grateful for. TOD 6:30. Dr Luma Ribera notified. Chaplain HAMILTON en route to the hospital to support family. Family notifying family members and friends not present who plan to come to the hospital 
[facility] Good Help to Those in Need 
[phone] Discharge/Death Nursing Note Patient Name: Sreedhar Grace YOB: 1931 Age: 80 y.o. Date of Death: 19 Admitted Date: 2019 Time of Death: 6:30 pronounced 18:54 Facility of Care: [facility] Level of Care: [facility] Patient Room: 1112/01 Hospice Attending: Kal Yu MD 
Hospice Diagnosis: Sepsis [facility]) [A41.9] Death Pronouncement Pronouncement of death completed by: Dr Yoni Dupont Agency staff was present at the time of death At the time of death the patient was documented without signs of life The pt  within [facility]( 
 
The following were notified of the patient's death: family, hospice staff Medications were disposed of per facility protocol Discharge Summary Discharge Reason: Death Summary of Care Provided: 
 
[x] Post mortem care provided by staff RN [x] Notification of  home by nursing supberviror 
[x] Referrals/Community resources provided:  
[] Goals completed 
[] Durable Medical Equipment vendor notified Disciplines involved: [x] RN [x] SW [x]  [] OLVERA [] Vol [] PT [] OT [] ST [] BC 
 
[] IDT communication/notification Attending Physician, Dr. Luma Ribera, notified of death Bereaved Verify bereaved identified with name, address, telephone number and risk level Advance Care Planning 2019 Patient's Healthcare Decision Maker is: Legal Next of Kin Primary Decision Maker Name -  
Primary Decision Maker Phone Number -  
Primary Decision Maker Relationship to Patient -  
Confirm Advance Directive None Patient Would Like to Complete Advance Directive Unable

## 2019-11-08 NOTE — INTERDISCIPLINARY ROUNDS
Oncology Interdisciplinary rounds were held today to discuss patient plan of care and outcomes. The following members were present: Nursing, Physician, Case Management, Pharmacy, and PT/OT Actual Length of Stay: 1 Expected Length of Stay: - - - Plan            Discharge Hospice patient Hospice patient TBA

## 2019-11-08 NOTE — HOSPICE
[address] [facility] LCSW note: This LCSW visited the room of pt. Pt remains minimally responsive. Daughter Anali Trammell, her  and son Hina Cisse" were in the family room. Pt was moved to the hospice suite yesterday afternoon. LCSW engaged family in life review. Family talked about their relationship with pt and memories of the strong work ethic their father instilled in their family. Family reminiscing about growing up in their large family. LCSW and family talked about  pts independence and QOL prior to July. Family talked about health issues that lead to his hospitalization and inpt hospice admission. LCSW provided support for family in coping with pts EOL. LCSW and family discussed brother's coping in the context of his health problems which are similar to pts. Per daughter brother is resistant to seeing MD and pursuing medical interventions. Brother may be projecting his father's health issues re EOL on himself. LCSW provided supportive counseling for daughter in processing the stress of father's EOL and trying to help brother with his health issues. LCSW and daughter discussed mother's EOL 2 1/2 years ago. LCSW and daughter discussed pts EOL trajectory. LCSW provided information re the the signs and symptoms of EOL. LCSW and daughter discussed self care. 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 Family would like to be updated with any changes as they want to be present when pt passes. LCSW will continue to assess and monitor pt and family needs.

## 2019-11-08 NOTE — HOSPICE
This was an initial visit to assess for needs and offer support. Mr Del Toro's eldest son, a daughter and son in law were in the waiting room. Introduced myself and my role. Offered supportive listening as they shared that they lost their mother two years ago. They said the circumstances were the same. Their mother was a hospice patient. We talked a little of the fact that new grief will bring up any unresolved grief. They are engaged in anticipatory grief as they continue to grieve their mother. The son said, we are just waiting. Another daughter and son-in law arrived and joined the conversation. Explained our continuing availability and our desire to support.

## 2019-11-09 NOTE — PROGRESS NOTES
Problem: Falls - Risk of 
Goal: *Absence of Falls Description Document Margoth Ballard Fall Risk and appropriate interventions in the flowsheet. Outcome: Resolved/Met Note:  
Fall Risk Interventions: 
Mobility Interventions: Bed/chair exit alarm Mentation Interventions: Door open when patient unattended Medication Interventions: Bed/chair exit alarm Elimination Interventions: Bed/chair exit alarm History of Falls Interventions: Bed/chair exit alarm Problem: Patient Education: Go to Patient Education Activity Goal: Patient/Family Education Outcome: Resolved/Met Problem: Pressure Injury - Risk of 
Goal: *Prevention of pressure injury Description Document Berry Scale and appropriate interventions in the flowsheet. Outcome: Resolved/Met Note:  
Pressure Injury Interventions: 
Sensory Interventions: Assess changes in LOC Moisture Interventions: Apply protective barrier, creams and emollients, Absorbent underpads Activity Interventions: Assess need for specialty bed Mobility Interventions: Assess need for specialty bed Nutrition Interventions: Document food/fluid/supplement intake Friction and Shear Interventions: Apply protective barrier, creams and emollients, Feet elevated on foot rest 
 
  
 
 
 
  
Problem: Patient Education: Go to Patient Education Activity Goal: Patient/Family Education Outcome: Resolved/Met Problem: Dyspnea Due to End of Life Goal: Demonstrate understanding of and ability to manage respiratory symptoms at end of life Outcome: Resolved/Met Problem: Communication Deficit Goal: Effectively communicate symptoms, needs, and concerns Outcome: Resolved/Met Problem: Imminent death Goal: Collaborate with patient/family/caregiver/interdisciplinary team to minimize and manage end of life symptoms Outcome: Resolved/Met

## 2019-11-09 NOTE — PROGRESS NOTES
1920: Bedside shift change report given to Mimi Yuan (oncoming nurse) by Alessia Esqueda (offgoing nurse). Report included the following information SBAR. Pt passed at [place] [address]. All calls and record of death completed by Day shift nurse Leanne. 1925: Into assess family needs. No needs expressed.  at bedside. Family grieving appropriately. 1935: Family left bedside. Postmortem care to be completed by writing nursing. 2030: Postmortem care completed by Kishan Crowe RN and SHADE Barboza SN19. Transport requested.

## 2019-11-09 NOTE — PROGRESS NOTES
Responded to page from nursing staff notifying patient death on Oncology. Met with family and offered pastoral support for them as all the children gathered at bedside. Offered supportive listening to them as they shared several stories from his life. Family shared about other losses they have experienced over the last few months. Offered words of comfort and assurance of prayer.  home: [facility] . Gilbert Vidal Paging Service: [phone])

## 2019-11-09 NOTE — DISCHARGE SUMMARY
[address] DISCHARGE SUMMARY Name:  Alyson Car 
MR#:  [id number] :  1931 ACCOUNT #:  [de-identified] ADMIT DATE:  10/24/2019 DISCHARGE DATE:  2019 FINAL DIAGNOSES: 
1. Sepsis. 2.  Bacterial endocarditis. 3.  Encephalopathy. 4.  Chronic obstructive pulmonary disease. 5.  Atherosclerotic cardiovascular disease, status post recent stent placement. 6.  Glucose intolerance. 7.  Chronic kidney disease, stage III. 8.  Paroxysmal atrial fibrillation. 9.  Anemia. 10.  Aortic stenosis. 11.  Acute kidney injury. 12.  Chronic orthostatic hypotension. 13.  Delirium. CONSULTATION:  Cardiology, Dr. Peggy Kimble. Palliative Care and Hospice Care. PROCEDURES:  2D echocardiogram. 
 
For further details of admission history and physical, please see admit note. HISTORY OF PRESENT ILLNESS:  Briefly, the patient is an 26-year-old white male who has had several weeks in hospitalization to [facility] first of which was related to acute coronary syndrome, where he had a stent placed into the left main extending into his LAD with a sideport to his circumflex. He was discharged on aspirin and Plavix, and subsequently, developed DVT on that. He was placed on Xarelto. Unfortunately with a combination of those, he developed GI bleeding and was readmitted to the hospital.  At which time, an inferior venacaval filter was placed and his Xarelto was discontinued. He was discharged and seemed to be making some progress and we have until the night of 10/24, at which time, he developed increasing effusion and was noted to be febrile and had a fever of 105.7. At which time, a chest x-ray was read as left lower lobe infiltrate. However, he had no cough and not required any oxygen and followup chest x-ray on 10/25 showed no evidence of pneumonia. He had a normal urine.   His blood culture subsequently returned positive for Staph nkechiunensis and he was started initially on vancomycin and continued that for 10 days of hospitalization. At which time, he was starting to develop slight deterioration of renal function. At that time, he was changed to Ancef. He was continued on that until his family decided to stop the treatment yesterday because of this continued encephalopathy and lack of improvement. He had deteriorating malfunction and was seen by Palliative Care and after discussion with the family, it was decided to change to supportive care and hospice consult was completed today. At which time, it was decided to transfer him to [facility]. At this point, his IV fluids  was stopped and he had been taking [id number]-ce p.o. intake and it was decided to not probably draw labs. DISPOSITION:  He will be discharged to hospice care on no antibiotics with supportive care only. DISCHARGE MEDICATIONS:  His discharge medication will consist of hospice medicines, which are Ativan 0.5 IV q. 6 hours p.r.n., Dilaudid 0.5 IV q. 6 hours p.r.n., Robinul 0.2 intravenous every 4 hours as needed, and Toradol 30 mg IV q. 8 hours. DISCHARGE INSTRUCTIONS:  He will have followup by me in the hospice care service. Gallo Barger MD 
 
 
[id number] 
D:  11/07/2019 19:55 
T:  11/08/2019 20:32 
JOB #:  [id number]

## 2019-11-11 ENCOUNTER — HOME CARE VISIT (OUTPATIENT)
Dept: HOSPICE | Facility: HOSPICE | Age: 84
End: 2019-11-11
Payer: MEDICARE

## 2019-11-12 NOTE — DISCHARGE SUMMARY
[address] DISCHARGE SUMMARY Name:  Frank Villarreal 
MR#:  [id number] :  1931 ACCOUNT #:  [de-identified] ADMIT DATE:  2019 DISCHARGE DATE:  2019 EXPIRATION NOTE FINAL DIAGNOSES: 
1. Acute encephalopathy. 2.  Sepsis. 3.  Multiorgan failure. For details of admission history and physical, please see admit note. HISTORY OF PRESENT ILLNESS:  Briefly, the patient is an 49-year-old white male who was admitted to [facility] on 10/24/2019 with fever of 105.7 and sepsis and had a continued downhill course with multiorgan failure during hospitalization and persistent encephalopathy and it was decision of his family to transfer him to [address] [place] [address] which was done and he  within the first 24 hours of Hospice Care. Martha Barnes MD 
 
 
[id number] 
D:  2019 19:15 
T:  2019 11:39 
JOB #:  [id number] CC:  [id number]

## 2019-12-03 LAB
DATE LAST DOSE: ABNORMAL
REPORTED DOSE,DOSE: ABNORMAL UNITS
REPORTED DOSE/TIME,TMG: ABNORMAL
VANCOMYCIN TROUGH SERPL-MCNC: 16.6 UG/ML (ref 5–10)

## 2019-12-10 RX ORDER — RIVAROXABAN 20 MG/1
TABLET, FILM COATED ORAL
Qty: 90 TAB | Refills: 0 | OUTPATIENT
Start: 2019-12-10

## 2021-01-01 NOTE — PROGRESS NOTES
Chief Complaint Patient presents with  Labs  
  follow up on fasting labs SUBJECTIVE: 
 
Shila Yee is a 80 y.o. male who returns in follow-up for hyperkalemia having been seen here 2 days ago which time his potassium was 6.6. At that time he was complaining of some shortness of breath and I did discuss the case with his cardiologist Dr. Jorge Gamboa with plans were for him to be set up by cardiology for a stress Cardiolite. Apparently their office did call him but did not set them up because some machine was broken. He does note that yesterday when he was cleaning his dog pen he had a little discomfort in his posterior neck area and had that again this morning after taking a shower although he feels like that discomfort got worse with moving his neck. He does not have that discomfort now. He does have his chronic dyspnea which has not progressed. He notes no palpitations. He denies any other cardiorespiratory complaints. He denies any GI or  complaints. He does not take any salt substitute noted to take any potassium supplements or over-the-counter vitamins. He has no other complaints on complete review of systems. Current Outpatient Medications Medication Sig Dispense Refill  naproxen (NAPROSYN) 500 mg tablet Take 500 mg by mouth two (2) times daily (with meals).  rivaroxaban (XARELTO) 15 mg tab tablet Take 1 Tab by mouth daily (with breakfast). 90 Tab 3  
 tamsulosin (FLOMAX) 0.4 mg capsule TAKE 2 CAPSULES DAILY 180 Cap 3  
 levothyroxine (SYNTHROID) 137 mcg tablet Take 137 mcg by mouth Daily (before breakfast). 90 Tab 3  
 esomeprazole (NEXIUM) 40 mg capsule Take 1 Cap by mouth daily. 90 Cap 3  
 finasteride (PROSCAR) 5 mg tablet Take 1 Tab by mouth daily (after dinner). 90 Tab 3  
 simvastatin (ZOCOR) 40 mg tablet Take 1 Tab by mouth nightly. 90 Tab 3  
 losartan (COZAAR) 50 mg tablet Take 1 Tab by mouth daily. 90 Tab prn  dilTIAZem ER (CARDIZEM LA) 120 mg tablet Take 1 Tab by mouth daily. 90 Tab 3  furosemide (LASIX) 40 mg tablet 1 daily (Patient taking differently: Take 40 mg by mouth daily.) 90 Tab 3 Past Medical History:  
Diagnosis Date  Arrhythmia A-fib  GERD (gastroesophageal reflux disease)  Hypertension  Other ill-defined conditions(799.89) BPH  Other ill-defined conditions(799.89) lipids  Other ill-defined conditions(799.89)   
 shingles hx  Thyroid disease  Unspecified adverse effect of anesthesia Past Surgical History:  
Procedure Laterality Date  HX CATARACT REMOVAL    
 HX HEART CATHETERIZATION    
 ablation  HX HEENT    
 scar tissue removed/laser  HX HERNIA REPAIR  05/05/2017 Salvatoer Womack MD  
 HX ORTHOPAEDIC    
 left foot fx--hardware  HX ORTHOPAEDIC  4/9/14 Right total knee arthroplasty  HX OTHER SURGICAL    
 prostate bx No Known Allergies REVIEW OF SYSTEMS: 
General: negative for - chills or fever, or weight loss or gain ENT: negative for - headaches, nasal congestion or tinnitus Eyes: no blurred or visual changes Neck: No stiffness or swollen nodes but some pain in posterior neck as noted yesterday after cleaning the dog pain in this morning after taking a shower Respiratory: negative for - cough, hemoptysis, change of his chronic shortness of breath or wheezing Cardiovascular : negative for - chest pain, edema, palpitations or shortness of breath Gastrointestinal: negative for - abdominal pain, blood in stools, heartburn or nausea/vomiting Genito-Urinary: no dysuria, trouble voiding, or hematuria Musculoskeletal: negative for - gait disturbance, joint pain, joint stiffness or joint swelling Neurological: no TIA or stroke symptoms Hematologic: no bruises, no bleeding Lymphatic: no swollen glands Integument: no lumps, mole changes, nail changes or rash Endocrine:no malaise/lethargy poly uria or polydipsia or unexpected weight changes Social History Socioeconomic History  Marital status:  Spouse name: Not on file  Number of children: Not on file  Years of education: Not on file  Highest education level: Not on file Tobacco Use  Smoking status: Former Smoker Packs/day: 0.50 Last attempt to quit: 3/28/1963 Years since quittin.2  Smokeless tobacco: Never Used Substance and Sexual Activity  Alcohol use: Yes Comment: rare/social-beer  Drug use: Yes Types: Prescription, OTC Family History Problem Relation Age of Onset  Heart Disease Mother  Hypertension Mother  Stroke Mother  Heart Disease Father  Cancer Paternal Grandmother OBJECTIVE:  
 
Visit Vitals /60 (BP 1 Location: Left arm, BP Patient Position: Sitting) Pulse 70 Temp 97.8 °F (36.6 °C) (Oral) Resp 18 Ht 5' 9\" (1.753 m) Wt 199 lb 3.2 oz (90.4 kg) SpO2 97% BMI 29.42 kg/m² CONSTITUTIONAL:   well nourished, appears age appropriate EYES: sclera anicteric, PERRL, EOMI 
ENMT:nares clear, moist mucous membranes, pharynx clear NECK: supple. Thyroid normal, No JVD or bruits RESPIRATORY: Chest: clear to ascultation and percussion, normal inspiratory effort but overall decreased breath sounds CARDIOVASCULAR: Heart: regular rate and rhythm no murmurs, rubs or gallops, PMI not displaced, No thrills GASTROINTESTINAL: Abdomen: non distended, soft, non tender, bowel sounds normal 
HEMATOLOGIC: no purpura, petechiae or bruising LYMPHATIC: No lymph node enlargemant MUSCULOSKELETAL: Extremities: no edema or active synovitis, pulse 1+ INTEGUMENT: No unusual rashes or suspicious skin lesions noted. Nails appear normal. 
PERIPHERAL VASCULAR: normal pulses femoral, PT and DP NEUROLOGIC: non-focal exam, A & O X 3 PSYCHIATRIC:, appropriate affect ASSESSMENT:  
1. Hyperkalemia 2. Stage 3 chronic kidney disease ([facility] [address] 3. Hypertension with renal disease Impression 1. Hyperkalemia EKG reviewed from 2 days ago and repeated today and no evidence of peaked T waves on either occasion. Stat BMP done today reveals her potassium improved to 5.5 although not normal this is improved. I did again discussed dietary measures to improve upon this. 2.  CKD stage III he has a BUN and creatinine is stable today at 40 and 1.6. 
3.  Hypertension that is controlled 4. Atrial fibrillation on Xarelto I renewed that today I will recheck him at his prior scheduled appointment or sooner should there be a problem. PLAN: 
. Orders Placed This Encounter  [facility]  AMB POC EKG ROUTINE W/ 12 LEADS, INTER & REP  naproxen (NAPROSYN) 500 mg tablet  rivaroxaban (XARELTO) 15 mg tab tablet ATTENTION:  
This medical record was transcribed using an electronic medical records system. Although proofread, it may and can contain electronic and spelling errors. Other human spelling and other errors may be present. Corrections may be executed at a later time. Please feel free to contact us for any clarifications as needed. Results for orders placed or performed in visit on 06/14/19 METABOLIC PANEL, BASIC Result Value Ref Range Glucose 107 75 - 110 mg/dL BUN 40.0 (H) 9.0 - 20.0 mg/dL Creatinine 1.6 (H) 0.8 - 1.5 mg/dL Sodium 140 137 - 145 mmol/L Potassium 5.5 (H) 3.6 - 5.0 mmol/L Chloride 108 (H) 98 - 107 mmol/L  
 CO2 25.0 22.0 - 32.0 mmol/L Calcium 9.7 8.4 - 10.2 mg/dl Anion gap 7 mmol/L  
 GFR est AA 50 <60 mL/min/1.73m2 GFR est non-AA 41 <60 mL/min/1.73m2 Arjun Michel MD 
 
The patient verbalized understanding of the problems and plans as explained. Frida Crews(Attending)

## 2021-05-11 NOTE — ROUTINE PROCESS
Desmond Nolasco 
3/13/1931 
[id number] Situation: 
Verbal report received from: Cherelle Easley Procedure: Procedure(s): 
COLONOSCOPY 
ENDOSCOPIC POLYPECTOMY RESOLUTION CLIP Background: 
 
Preoperative diagnosis: anemia Postoperative diagnosis: polyps, hemorrhoids, diverticulosis :  Dr. Maravilla Phlegm Assistant(s): Endoscopy Technician-1: Florian Cifuentes Endoscopy RN-1: Mendoza Nayak, RN Specimens:  
ID Type Source Tests Collected by Time Destination 1 : cecum polylps. Preservative Cecum  Kennedy Evangelista MD 9/30/2019 1209 Pathology H. Pylori  no Assessment: 
Intra-procedure medications Anesthesia gave intra-procedure sedation and medications, see anesthesia flow sheet yes Intravenous fluids: NS@ Raquel Enter Vital signs stable Abdominal assessment: round and soft Recommendation: 
Discharge patient per MD order. Return to floor room 21  Family or Friend none at bedside Permission to share finding with family or friend yes normal bilaterally

## 2021-07-23 NOTE — PROGRESS NOTES
Problem: Self Care Deficits Care Plan (Adult)  Goal: *Acute Goals and Plan of Care (Insert Text)  Description    FUNCTIONAL STATUS PRIOR TO ADMISSION: Patient was modified independent using a Rolling walker for functional mobility. HOME SUPPORT: The patient lived with family but did not require assist.    Occupational Therapy Goals  Initiated 8/27/2019  1. Patient will perform grooming with supervision/set-up within 7 day(s). 2.  Patient will perform bathing with minimal assistance/contact guard assist within 7 day(s). 3.  Patient will perform lower body dressing with minimal assistance/contact guard assist within 7 day(s). 4.  Patient will perform toilet transfers with supervision/set-up within 7 day(s). 5.  Patient will perform all aspects of toileting with independence within 7 day(s). 6.  Patient will participate in upper extremity therapeutic exercise/activities with supervision/set-up for 5 minutes within 7 day(s). 7.  Patient will utilize energy conservation techniques during functional activities with verbal cues within 7 day(s). Outcome: Progressing Towards Goal   OCCUPATIONAL THERAPY EVALUATION  Patient: Sherly Wasserman (89 y.o. male)  Date: 8/27/2019  Primary Diagnosis: ASCVD (arteriosclerotic cardiovascular disease) [I25.10]        Precautions: fall       ASSESSMENT  Based on the objective data described below, the patient presents with generalized weakness, decreased endurance, strength, functional mobility, and ADLs. Pt stated that he had needed to use RW and had falls at home and was able to bathe and dress self but it was becoming harder. Pt has functional range in BUE and his strength is decreased but functional. Nursing needed pt in bed and he was encouraged to get up to recliner for all meals    Current Level of Function Impacting Discharge (ADLs/self-care): decreased endurance, strength, functional mobility    Functional Outcome Measure:   The patient scored 45/100 on 199.9 the Barthel outcome measure which is indicative of decreased strength, functional mobility and weakness, and falls at home. .           Patient will benefit from skilled therapy intervention to address the above noted impairments. PLAN :  Recommendations and Planned Interventions: self care training, functional mobility training, therapeutic exercise, balance training, endurance activities, patient education, home safety training and family training/education    Frequency/Duration: Patient will be followed by occupational therapy 4 times a week to address goals. Recommendation for discharge: (in order for the patient to meet his/her long term goals)  Therapy up to 5 days/week in SNF setting    This discharge recommendation:  Has been made in collaboration with the attending provider and/or case management    Equipment recommendations for successful discharge (if) home: to be determined by rehab facility       SUBJECTIVE:   Patient stated I feel weak and tired.     OBJECTIVE DATA SUMMARY:   HISTORY:   Past Medical History:   Diagnosis Date    Arrhythmia     A-fib    GERD (gastroesophageal reflux disease)     Hypertension     Other ill-defined conditions(799.89)     BPH    Other ill-defined conditions(799.89)     lipids    Other ill-defined conditions(799.89)     shingles hx    Thyroid disease     Unspecified adverse effect of anesthesia      Past Surgical History:   Procedure Laterality Date    HX CATARACT REMOVAL      HX HEART CATHETERIZATION      ablation    HX HEART CATHETERIZATION  07/18/2019    Stent    HX HEENT      scar tissue removed/laser    HX HERNIA REPAIR  05/05/2017    Karen Womack MD    HX ORTHOPAEDIC      left foot fx--hardware    HX ORTHOPAEDIC  4/9/14    Right total knee arthroplasty    HX OTHER SURGICAL      prostate bx       Expanded or extensive additional review of patient history:     Home Situation  Home Environment: Private residence  # Steps to Enter: 3  Rails to Enter: Yes  [employer] Rails : Left  One/Two Story Residence: One story  Living Alone: No  Support Systems: Friends \ neighbors, Family member(s)  Patient Expects to be Discharged to[de-identified] Private residence  Current DME Used/Available at Home: [address], [place], straight, Walker, rolling, Grab bars  Tub or Shower Type: Shower    Hand dominance: Right    EXAMINATION OF PERFORMANCE DEFICITS:  Cognitive/Behavioral Status:  Neurologic State: Drowsy  Orientation Level: Oriented to person;Oriented to place;Oriented to situation  Cognition: Follows commands  Perception: Appears intact  Perseveration: No perseveration noted  Safety/Judgement: Fall prevention    Skin: in [facility]     Edema: none    Hearing: Auditory  Auditory Impairment: Hard of hearing, bilateral    Vision/Perceptual:                 intact                    Range of Motion:    AROM: Generally decreased, functional  PROM: Generally decreased, functional                      Strength:    Strength: Generally decreased, functional                Coordination:  Coordination: Within functional limits  Fine Motor Skills-Upper: Left Intact; Right Intact    Gross Motor Skills-Upper: Left Intact; Right Intact    Tone & Sensation:    Tone: Normal  Sensation: Intact                      Balance:  Sitting: Impaired; Without support  Sitting - Static: Good (unsupported)  Sitting - Dynamic: Fair (occasional)  Standing: Impaired; Without support  Standing - Static: Fair;Constant support  Standing - Dynamic : Fair;Constant support    Functional Mobility and Transfers for ADLs:  Bed Mobility:  Rolling: Minimum assistance;Assist x1  Supine to Sit: Minimum assistance;Assist x1  Sit to Supine: Minimum assistance;Assist x2  Scooting: Contact guard assistance    Transfers:  Sit to Stand: Minimum assistance;Assist x2  Stand to Sit: Minimum assistance;Assist x2  Bed to Chair: Minimum assistance;Assist x2  Toilet Transfer : Minimum assistance;Assist x2    ADL Assessment:  Feeding: Setup    Oral Facial Hygiene/Grooming: Setup    Bathing: Maximum assistance;Minimum assistance    Upper Body Dressing: Minimum assistance;Contact guard assistance    Lower Body Dressing: Maximum assistance;Minimum assistance    Toileting: Minimum assistance;Contact guard assistance                ADL Intervention and task modifications:                                     Cognitive Retraining  Safety/Judgement: Fall prevention    Therapeutic Exercise:     Functional Measure:  Barthel Index:    Bathin  Bladder: 10  Bowels: 10  Groomin  Dressin  Feedin  Mobility: 0  Stairs: 0  Toilet Use: 5  Transfer (Bed to Chair and Back): 10  Total: 45/100        The Barthel ADL Index: Guidelines  1. The index should be used as a record of what a patient does, not as a record of what a patient could do. 2. The main aim is to establish degree of independence from any help, physical or verbal, however minor and for whatever reason. 3. The need for supervision renders the patient not independent. 4. A patient's performance should be established using the best available evidence. Asking the patient, friends/relatives and nurses are the usual sources, but direct observation and common sense are also important. However direct testing is not needed. 5. Usually the patient's performance over the preceding 24-48 hours is important, but occasionally longer periods will be relevant. 6. Middle categories imply that the patient supplies over 50 per cent of the effort. 7. Use of aids to be independent is allowed. Dorcas Villagomez., Barthel, D.W. (1633). Functional evaluation: the Barthel Index. [address] [facility] (14)2. LUIS Rivers, Verner Loan., Gale Mcfarland.[facility], [address] [place] [address] (). Measuring the change indisability after inpatient rehabilitation; comparison of the responsiveness of the Barthel Index and Functional [place] Measure. Journal of Neurology, Neurosurgery, and Psychiatry, 66(4), 593-015.   JAMAR Garcia.WEN, Drew Ho  WPrimitivoJ.M, & Coni Nicholson M.A. (2004.) Assessment of post-stroke quality of life in cost-effectiveness studies: The usefulness of the Barthel Index and the EuroQoL-5D. Quality of Life Research, 15, 786-48         Occupational Therapy Evaluation Charge Determination   History Examination Decision-Making   MEDIUM Complexity : Expanded review of history including physical, cognitive and psychosocial  history  MEDIUM Complexity : 3-5 performance deficits relating to physical, cognitive , or psychosocial skils that result in activity limitations and / or participation restrictions MEDIUM Complexity : Patient may present with comorbidities that affect occupational performnce. Miniml to moderate modification of tasks or assistance (eg, physical or verbal ) with assesment(s) is necessary to enable patient to complete evaluation       Based on the above components, the patient evaluation is determined to be of the following complexity level: MEDIUM  Pain Rating:      Activity Tolerance:   Fair  Please refer to the flowsheet for vital signs taken during this treatment. After treatment patient left in no apparent distress:    Supine in bed, Call bell within reach and Bed / chair alarm activated    COMMUNICATION/EDUCATION:   The patients plan of care was discussed with: Physical Therapist, Registered Nurse and . Home safety education was provided and the patient/caregiver indicated understanding. and Patient/family agree to work toward stated goals and plan of care. This patients plan of care is appropriate for delegation to [facility] [place].     Thank you for this referral.  Efrem Harley OT  Time Calculation: 23 mins

## 2022-09-07 NOTE — PROGRESS NOTES
Bedside report received from Diego Khan RN                  Assessment, Background, Procedure summary, Intake/Output, MAR, and recent results discussed. Care assumed. Pt ambulated 1/2 hallway with walker and x 1 assist. Pt appears steady and has no complaints of pain, shortness of breath, dizziness, or light-headedness. Incision appears clean, dry, and intact with no swelling or hematoma present. 101 F

## 2023-12-28 NOTE — PROGRESS NOTES
A&Ox4. VSS. RA. Elevated BP controlled with PRN labetalol. GI: Abdomen soft, nondistended. Denies nausea. : Voids. Pain controlled with PRN pain medications  Up with standby assist.  Incisions: x6 lap sites with skin glue ABI  Diet: clears  IVF running per order. Okay to saline lock when tolerating PO fluids. All appropriate safety measures in place. Patient updated on plan of care. All questions and concerns addressed. Problem: Falls - Risk of 
Goal: *Absence of Falls Description Document Leonard Desouza Fall Risk and appropriate interventions in the flowsheet. Outcome: Progressing Towards Goal 
Note:  
Fall Risk Interventions: 
  
 
Mentation Interventions: Bed/chair exit alarm, More frequent rounding, Update white board Medication Interventions: Bed/chair exit alarm Elimination Interventions: Bed/chair exit alarm, Call light in reach History of Falls Interventions: Bed/chair exit alarm, Consult care management for discharge planning Problem: Breathing Pattern - Ineffective Goal: *Absence of hypoxia Outcome: Progressing Towards Goal

## 2024-02-05 NOTE — PROGRESS NOTES
Problem: Falls - Risk of 
Goal: *Absence of Falls Description Document Penny Denney Fall Risk and appropriate interventions in the flowsheet. Outcome: Progressing Towards Goal 
Note:  
Fall Risk Interventions: 
Mobility Interventions: Communicate number of staff needed for ambulation/transfer, Patient to call before getting OOB, Utilize walker, cane, or other assistive device Mentation Interventions: Bed/chair exit alarm Medication Interventions: Teach patient to arise slowly, Patient to call before getting OOB, Assess postural VS orthostatic hypotension Elimination Interventions: Call light in reach, Patient to call for help with toileting needs, Urinal in reach History of Falls Interventions: Evaluate medications/consider consulting pharmacy, Door open when patient unattended, Utilize gait belt for transfer/ambulation 105

## (undated) DEVICE — IMPLANTABLE DEVICE: Type: IMPLANTABLE DEVICE | Status: NON-FUNCTIONAL

## (undated) DEVICE — (D)PREP SKN CHLRAPRP APPL 26ML -- CONVERT TO ITEM 371833

## (undated) DEVICE — CATHETER ETER ANGIO L110CM OD5FR ID046IN L75CM 038IN 145DEG CARD

## (undated) DEVICE — PINNACLE INTRODUCER SHEATH: Brand: PINNACLE

## (undated) DEVICE — SET ADMIN 16ML TBNG L100IN 2 Y INJ SITE IV PIGGY BK DISP

## (undated) DEVICE — CATHETER ANGIO JL3.5 AD 5 FRX100 CM PERFORMA

## (undated) DEVICE — RUNTHROUGH NS EXTRA FLOPPY PTCA GUIDEWIRE: Brand: RUNTHROUGH

## (undated) DEVICE — MINI TREK CORONARY DILATATION CATHETER 2.0 MM X 15 MM / RAPID-EXCHANGE: Brand: MINI TREK

## (undated) DEVICE — PACK PROCEDURE SURG HRT CATH

## (undated) DEVICE — TRAP SPEC COLL POLYP POLYSTYR --

## (undated) DEVICE — SYR POWER 150ML 8IN FILL TUBE --

## (undated) DEVICE — GOWN,SIRUS,NONRNF,SETINSLV,2XL,18/CS: Brand: MEDLINE

## (undated) DEVICE — 3M™ TEGADERM™ TRANSPARENT FILM DRESSING FRAME STYLE, 1626W, 4 IN X 4-3/4 IN (10 CM X 12 CM), 50/CT 4CT/CASE: Brand: 3M™ TEGADERM™

## (undated) DEVICE — AGENT HEMSTAT W4XL4IN OXIDIZED REGENERATED CELOS ABSRB SFT

## (undated) DEVICE — Device

## (undated) DEVICE — GUIDEWIRE VASC L260CM 0.035IN J TIP L3MM PTFE FIX COR NAMIC

## (undated) DEVICE — LABEL MED CARD MRMC STRL

## (undated) DEVICE — SUTURE VCRL SZ 3-0 L27IN ABSRB UD L26MM SH 1/2 CIR J416H

## (undated) DEVICE — TR BAND RADIAL ARTERY COMPRESSION DEVICE: Brand: TR BAND

## (undated) DEVICE — Z DISCONTINUED PER MEDLINE LINE GAS SAMPLING O2/CO2 LNG AD 13 FT NSL W/ TBNG FILTERLINE

## (undated) DEVICE — CATH GUID .062IN 7FR 150CM -- GUIDELINER V3

## (undated) DEVICE — KENDALL SCD EXPRESS SLEEVES, KNEE LENGTH, MEDIUM: Brand: KENDALL SCD

## (undated) DEVICE — NC TREK CORONARY DILATATION CATHETER 3.0 MM X 15 MM / RAPID-EXCHANGE: Brand: NC TREK

## (undated) DEVICE — KIT,1200CC CANISTER,3/16"X6' TUBING: Brand: MEDLINE INDUSTRIES, INC.

## (undated) DEVICE — (D)SYR 10ML 1/5ML GRAD NSAF -- PKGING CHANGE USE ITEM 338027

## (undated) DEVICE — CAPSULE ENDOSCP L26.2MM DIA11.4MM BIOCOMPATIBLE PLAS SB 3

## (undated) DEVICE — STAPLER SKIN H3.9MM WIRE DIA0.58MM CRWN 6.9MM 35 STPL ROT

## (undated) DEVICE — ROCKER SWITCH PENCIL BLADE ELECTRODE, HOLSTER: Brand: EDGE

## (undated) DEVICE — NEEDLE HYPO 18GA L1.5IN PNK S STL HUB POLYPR SHLD REG BVL

## (undated) DEVICE — SYRINGE 50ML E/T

## (undated) DEVICE — HI-TORQUE VERSACORE FLOPPY GUIDE WIRE SYSTEM 145 CM: Brand: HI-TORQUE VERSACORE

## (undated) DEVICE — PINNACLE R/O II INTRODUCER SHEATH WITH RADIOPAQUE MARKER: Brand: PINNACLE

## (undated) DEVICE — CLIP INT L235CM WRK CHAN DIA2.8MM OPN 11MM LCK MECHANISM MR

## (undated) DEVICE — GOWN,NON-REINFORCED,XXL: Brand: MEDLINE

## (undated) DEVICE — PROBE VASC 8MHZ WTRPRF

## (undated) DEVICE — TOWEL 4 PLY TISS 19X30 SUE WHT

## (undated) DEVICE — TREK CORONARY DILATATION CATHETER 3.0 MM X 20 MM / RAPID-EXCHANGE: Brand: TREK

## (undated) DEVICE — BASIN EMSIS 16OZ GRAPHITE PLAS KID SHP MOLD GRAD FOR ORAL

## (undated) DEVICE — CATHETER ANGIO AL1 038 5 FRX100 CM 5 CM PERFORMA

## (undated) DEVICE — DRAPE PRB US TRNSDCR 6X96IN --

## (undated) DEVICE — DBD-PACK,LAPAROTOMY,2 REINFORCED GOWNS: Brand: MEDLINE

## (undated) DEVICE — INTRODUCER SHTH 7FR CANN L11CM DIL TIP 35MM ORNG TUNGSTEN

## (undated) DEVICE — STRAP,POSITIONING,KNEE/BODY,FOAM,4X60": Brand: MEDLINE

## (undated) DEVICE — BLADE ASSEMB CLP HAIR FINE --

## (undated) DEVICE — REM POLYHESIVE ADULT PATIENT RETURN ELECTRODE: Brand: VALLEYLAB

## (undated) DEVICE — GUIDEWIRE VASC L145CM 0.035IN J TIP L3MM PTFE FIX COR NAMIC

## (undated) DEVICE — CUSTOM KT PTCA INFL DEV K05 00053H

## (undated) DEVICE — PREP SKN PREVAIL 40ML APPL --

## (undated) DEVICE — TREK CORONARY DILATATION CATHETER 2.50 MM X 15 MM / RAPID-EXCHANGE: Brand: TREK

## (undated) DEVICE — 3M™ IOBAN™ 2 ANTIMICROBIAL INCISE DRAPE 6640EZ: Brand: IOBAN™ 2

## (undated) DEVICE — SURGICAL PROCEDURE PACK BASIN MAJ SET CUST NO CAUT

## (undated) DEVICE — SYR 3ML LL TIP 1/10ML GRAD --

## (undated) DEVICE — RADIFOCUS OPTITORQUE ANGIOGRAPHIC CATHETER: Brand: OPTITORQUE

## (undated) DEVICE — DRAIN WND PENRS RADPQ 0.25X18 -- CONVERT TO ITEM 360112

## (undated) DEVICE — MEDI-TRACE CADENCE ADULT, DEFIBRILLATION ELECTRODE -RTS  (10 PR/PK) - PHILIPS: Brand: MEDI-TRACE CADENCE

## (undated) DEVICE — 1200 GUARD II KIT W/5MM TUBE W/O VAC TUBE: Brand: GUARDIAN

## (undated) DEVICE — SUTURE VCRL SZ 2-0 L27IN ABSRB VLT L26MM SH 1/2 CIR J317H

## (undated) DEVICE — SUTURE PDS II SZ 2-0 L27IN ABSRB VLT SH L26MM 1/2 CIR Z317H

## (undated) DEVICE — PERCLOSE PROGLIDE™ SUTURE-MEDIATED CLOSURE SYSTEM: Brand: PERCLOSE PROGLIDE™

## (undated) DEVICE — MICROPUNCTURE INTRODUCER SET SILHOUETTE TRANSITIONLESS WITH STAINLESS STEEL WIRE GUIDE: Brand: MICROPUNCTURE

## (undated) DEVICE — DRESSING HEMSTAT W4XL4IN 4 PLY WHT IMPREG KAOLIN HYDRPHLC

## (undated) DEVICE — GUIDE EXTENSION CATHETER: Brand: GUIDEZILLA™ II

## (undated) DEVICE — YANKAUER,TAPERED BULBOUS TIP,W/O VENT: Brand: MEDLINE

## (undated) DEVICE — TREK CORONARY DILATATION CATHETER 3.0 MM X 15 MM / RAPID-EXCHANGE: Brand: TREK

## (undated) DEVICE — SENSOR OXMTR AD PED DISP NSL ALAR SPO2 XHALE ASSURANCE

## (undated) DEVICE — DECANTER FLD L85IN IV FLX TBNG CLMP DLP

## (undated) DEVICE — ANGIOGRAPHY KIT CUST [K0910930B] [MERIT MEDICAL SYSTEMS INC]

## (undated) DEVICE — STERILE POLYISOPRENE POWDER-FREE SURGICAL GLOVES: Brand: PROTEXIS

## (undated) DEVICE — SUTURE PROL SZ 5-0 L24IN NONABSORBABLE BLU RB-2 L13IN 1/2 8554H

## (undated) DEVICE — SNARE ENDOSCP M L240CM W27MM SHTH DIA2.4MM CHN 2.8MM OVL

## (undated) DEVICE — TOWEL SURG W17XL27IN STD BLU COT NONFENESTRATED PREWASHED

## (undated) DEVICE — GLIDESHEATH SLENDER ACCESS KIT: Brand: GLIDESHEATH SLENDER

## (undated) DEVICE — NC TREK CORONARY DILATATION CATHETER 3.5 MM X 15 MM / RAPID-EXCHANGE: Brand: NC TREK

## (undated) DEVICE — SOLIDIFIER MEDC 1200ML -- CONVERT TO 356117

## (undated) DEVICE — DRAPE,REIN 53X77,STERILE: Brand: MEDLINE

## (undated) DEVICE — DERMABOND SKIN ADH 0.7ML -- DERMABOND ADVANCED 12/BX

## (undated) DEVICE — GAUZE,SPONGE,AVANT,4"X4",4PLY,STRL,10/TR: Brand: MEDLINE

## (undated) DEVICE — INFECTION CONTROL KIT SYS

## (undated) DEVICE — NC TREK CORONARY DILATATION CATHETER 2.5 MM X 15 MM / RAPID-EXCHANGE: Brand: NC TREK

## (undated) DEVICE — Device: Brand: PROWATER

## (undated) DEVICE — SUT VCRL 3-0 18IN TIE MP VIO --

## (undated) DEVICE — CATHETER ANGIO JR4 AD 5 FRX100 CM 25 CM PERFORMA

## (undated) DEVICE — CATH GUID .056IN 6FR 150CM -- GUIDELINER V3

## (undated) DEVICE — CONTAINER SPEC 20 ML LID NEUT BUFF FORMALIN 10 % POLYPR STS

## (undated) DEVICE — SYRINGE ANGIO CNTRST DEL 20 CC POLYCARB LIGHT GRN MEDALLION

## (undated) DEVICE — NC TREK CORONARY DILATATION CATHETER 3.5 MM X 20 MM / RAPID-EXCHANGE: Brand: NC TREK

## (undated) DEVICE — BLOCK BITE ENDOSCP AD 21 MM W/ DIL BLU LF DISP

## (undated) DEVICE — NEONATAL-ADULT SPO2 SENSOR: Brand: NELLCOR

## (undated) DEVICE — SPLINT WR POS F/ARTERIAL ACC -- BX/10

## (undated) DEVICE — RADIFOCUS GLIDEWIRE: Brand: GLIDEWIRE

## (undated) DEVICE — SOLUTION IV 500ML 0.9% SOD CHL FLX CONT

## (undated) DEVICE — HANDLE LT SNAP ON ULT DURABLE LENS FOR TRUMPF ALC DISPOSABLE

## (undated) DEVICE — SUTURE MCRYL SZ 4-0 L27IN ABSRB UD L19MM PS-2 1/2 CIR PRIM Y426H

## (undated) DEVICE — SWAN-GANZ CCOMBO THERMODILUTION CATHETER: Brand: SWAN-GANZ CCOMBO

## (undated) DEVICE — DRAPE XR C ARM 41X74IN LF --

## (undated) DEVICE — KENDALL RADIOLUCENT FOAM MONITORING ELECTRODE RECTANGULAR SHAPE: Brand: KENDALL

## (undated) DEVICE — SPONGE GZ W4XL4IN COT 12 PLY TYP VII WVN C FLD DSGN

## (undated) DEVICE — NC TREK CORONARY DILATATION CATHETER 2.75 MM X 15 MM / RAPID-EXCHANGE: Brand: NC TREK

## (undated) DEVICE — SUTURE VCRL SZ 2-0 L36IN ABSRB UD L40MM CT 1/2 CIR J957H

## (undated) DEVICE — TOWEL,OR,DSP,ST,BLUE,STD,2/PK,40PK/CS: Brand: MEDLINE

## (undated) DEVICE — DEVON™ KNEE AND BODY STRAP 60" X 3" (1.5 M X 7.6 CM): Brand: DEVON

## (undated) DEVICE — NEEDLE HYPO 22GA L1.5IN BLK S STL HUB POLYPR SHLD REG BVL

## (undated) DEVICE — KIT SHTH INTRO 9FR L10CM PERC INTEGR HEMSTAS VLV SIDEPRT

## (undated) DEVICE — SPONGE: SPECIALTY PEANUT XR 100/CS: Brand: MEDICAL ACTION INDUSTRIES

## (undated) DEVICE — SYR 10ML LUER LOK 1/5ML GRAD --

## (undated) DEVICE — PUMP HEART IMPELLA CP03 --

## (undated) DEVICE — CATH IV AUTOGRD BC PNK 20GA 25 -- INSYTE